# Patient Record
Sex: FEMALE | Race: WHITE | Employment: OTHER | URBAN - METROPOLITAN AREA
[De-identification: names, ages, dates, MRNs, and addresses within clinical notes are randomized per-mention and may not be internally consistent; named-entity substitution may affect disease eponyms.]

---

## 2017-02-20 ENCOUNTER — HOSPITAL ENCOUNTER (OUTPATIENT)
Dept: CT IMAGING | Age: 62
Discharge: HOME | End: 2017-02-20
Payer: SELF-PAY

## 2017-02-20 DIAGNOSIS — I15.8 OTHER SECONDARY HYPERTENSION: ICD-10-CM

## 2017-02-20 PROCEDURE — 75571 CT HRT W/O DYE W/CA TEST: CPT

## 2017-02-21 NOTE — CARDIO/PULMONARY
Reached patient at her given mobile number and shared her coronary artery CT score of 75 with her. Discussed the meaning of this score. 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 Her PCP, Dr. Solo Phillips, is aware of her score, and she plans to follow up with him at her next visit. Patient has no further questions at this time.

## 2017-03-07 ENCOUNTER — OFFICE VISIT (OUTPATIENT)
Dept: FAMILY MEDICINE CLINIC | Age: 62
End: 2017-03-07

## 2017-03-07 VITALS
OXYGEN SATURATION: 96 % | BODY MASS INDEX: 41.15 KG/M2 | HEART RATE: 75 BPM | HEIGHT: 64 IN | DIASTOLIC BLOOD PRESSURE: 75 MMHG | SYSTOLIC BLOOD PRESSURE: 144 MMHG | WEIGHT: 241 LBS | TEMPERATURE: 97.9 F | RESPIRATION RATE: 20 BRPM

## 2017-03-07 DIAGNOSIS — I10 ESSENTIAL HYPERTENSION: ICD-10-CM

## 2017-03-07 DIAGNOSIS — E11.9 TYPE 2 DIABETES MELLITUS WITHOUT COMPLICATION, WITHOUT LONG-TERM CURRENT USE OF INSULIN (HCC): Primary | ICD-10-CM

## 2017-03-07 NOTE — MR AVS SNAPSHOT
Visit Information Date & Time Provider Department Dept. Phone Encounter #  
 3/7/2017  8:00 AM Tobias Becerra MD [address] [place] [address] [id number] Follow-up Instructions Return in about 3 months (around 6/7/2017). Follow-up and Disposition History Upcoming Health Maintenance Date Due Hepatitis C Screening 1955 EYE EXAM RETINAL OR DILATED Q1 7/12/1965 Pneumococcal 19-64 Medium Risk (1 of 1 - PPSV23) 7/12/1974 DTaP/Tdap/Td series (1 - Tdap) 7/12/1976 PAP AKA CERVICAL CYTOLOGY 7/12/1976 FOBT Q 1 YEAR AGE 50-75 7/12/2005 ZOSTER VACCINE AGE 60> 7/12/2015 INFLUENZA AGE 9 TO ADULT 8/1/2016 HEMOGLOBIN A1C Q6M 12/14/2016 FOOT EXAM Q1 6/14/2017 MICROALBUMIN Q1 6/14/2017 LIPID PANEL Q1 6/14/2017 BREAST CANCER SCRN MAMMOGRAM 11/4/2017 Allergies as of 3/7/2017  Review Complete On: 3/7/2017 By: Tobias Becerra MD  
  
 Severity Noted Reaction Type Reactions Iodine  07/07/2015    Swelling  
 shrimp Current Immunizations  Never Reviewed No immunizations on file. Not reviewed this visit You Were Diagnosed With   
  
 Codes Comments Type 2 diabetes mellitus without complication, without long-term current use of insulin (HCC)    -  Primary ICD-10-CM: E11.9 ICD-9-CM: 250.00 Essential hypertension     ICD-10-CM: I10 
ICD-9-CM: 401.9 Vitals BP Pulse Temp Resp Height(growth percentile) Weight(growth percentile) 144/75 (BP 1 Location: Right arm, BP Patient Position: Sitting) 75 97.9 °F (36.6 °C) (Oral) 20 5' 4\" (1.626 m) 241 lb (109.3 kg) SpO2 BMI OB Status Smoking Status 96% 41.37 kg/m2 Hysterectomy Never Smoker Vitals History BMI and BSA Data Body Mass Index Body Surface Area  
 41.37 kg/m 2 2.22 m 2 Preferred Pharmacy Pharmacy Name Phone [facility] [address] [place], [address] Your Updated Medication List  
 This list is accurate as of: 3/7/17  9:04 AM.  Always use your most recent med list.  
  
  
  
  
 amitriptyline 25 mg tablet Commonly known as:  ELAVIL TAKE 3 TABLETS BY MOUTH AT BEDTIME  
  
 aspirin delayed-release 81 mg tablet Take  by mouth daily. carvedilol 25 mg tablet Commonly known as:  COREG  
TAKE 1 TABLET BY MOUTH TWICE DAILY  
  
 glimepiride 1 mg tablet Commonly known as:  AMARYL Take 1 Tab by mouth Daily (before breakfast). metFORMIN 1,000 mg tablet Commonly known as:  GLUCOPHAGE  
TAKE 1 TABLET BY MOUTH TWICE DAILY  
  
 omeprazole 40 mg capsule Commonly known as:  PRILOSEC Take 40 mg by mouth daily. pravastatin 20 mg tablet Commonly known as:  PRAVACHOL  
TAKE 1 TABLET BY MOUTH AT BEDTIME  
  
 triamterene-hydroCHLOROthiazide 37.5-25 mg per capsule Commonly known as:  Verlene Lard TAKE ONE CAPSULE BY MOUTH EVERY DAY We Performed the Following CBC WITH AUTOMATED DIFF [34002 CPT(R)] HEMOGLOBIN A1C WITH EAG [34685 CPT(R)] LIPID PANEL [85532 CPT(R)] METABOLIC PANEL, COMPREHENSIVE [60310 CPT(R)] [place] COLLECTION VENOUS BLOOD,VENIPUNCTURE R024097 CPT(R)] TSH 3RD GENERATION [18272 CPT(R)] Follow-up Instructions Return in about 3 months (around 6/7/2017). Introducing [facility]! Dear Santo Anderson: 
Thank you for requesting a Podclass account. Our records indicate that you already have an active Podclass account. You can access your account anytime at [email or web address]. [employer]iki [employer]SpiderCloud Wireless Did you know that you can access your hospital and ER discharge instructions at any time in Podclass? You can also review all of your test results from your hospital stay or ER visit. Additional Information If you have questions, please visit the Frequently Asked Questions section of the Podclass website at [email or web address] [employer]Spider[employer]/. Remember, Podclass is NOT to be used for urgent needs.  For medical emergencies, dial 911. Now available from your iPhone and Android! Please provide this summary of care documentation to your next provider. Your primary care clinician is listed as Christine Mathew. If you have any questions after today's visit, please call [phone].

## 2017-03-07 NOTE — PROGRESS NOTES
Chief Complaint   Patient presents with    Diabetes     4 month         HPI:       is a 64 y.o. female. 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 She has a history of AODM (A1c = 10.1 June 14, 2016), HTN, hyperlipidemia, and upper airway stenosis. She is is mindful of her diet and walks for exercise. She denies any hypoglycemic events. She is presently not taking insulin. LDL was 130 in June 2016. She is presently taking Pravastatin. New Issues:  Lots of stress at home. Allergies   Allergen Reactions    Iodine Swelling     shrimp       Current Outpatient Prescriptions   Medication Sig    amitriptyline (ELAVIL) 25 mg tablet TAKE 3 TABLETS BY MOUTH AT BEDTIME    aspirin delayed-release 81 mg tablet Take  by mouth daily.  triamterene-hydrochlorothiazide (DYAZIDE) 37.5-25 mg per capsule TAKE ONE CAPSULE BY MOUTH EVERY DAY    pravastatin (PRAVACHOL) 20 mg tablet TAKE 1 TABLET BY MOUTH AT BEDTIME    metFORMIN (GLUCOPHAGE) 1,000 mg tablet TAKE 1 TABLET BY MOUTH TWICE DAILY    glimepiride (AMARYL) 1 mg tablet Take 1 Tab by mouth Daily (before breakfast).  carvedilol (COREG) 25 mg tablet TAKE 1 TABLET BY MOUTH TWICE DAILY    omeprazole (PRILOSEC) 40 mg capsule Take 40 mg by mouth daily. No current facility-administered medications for this visit. Past Medical History:   Diagnosis Date    Diabetes ([address]     GERD (gastroesophageal reflux disease)     Hernia of abdominal cavity     Subxyphoid hernia    Hypertension     Inflammatory polyarthropathy (HCC)     Joint pain     Joint swelling     Ocular nevus of left eye     Pancreatitis     Tracheal stenosis          ROS:  Denies fever, chills, cough, chest pain, SOB,  nausea, vomiting, or diarrhea. Denies wt loss, wt gain, hemoptysis, hematochezia or melena.     Physical Examination:    Visit Vitals    /75 (BP 1 Location: Right arm, BP Patient Position: Sitting)    Pulse 75    Temp 97.9 °F (36.6 °C) (Oral)    Resp 20    Ht 5' 4\" (1.626 m)    Wt 241 lb (109.3 kg)    SpO2 96%    BMI 41.37 kg/m2       General: Alert and Ox3, Fluent speech  HEENT: NC/AT, EOMI, OP: clear; She has a brown nevus in the inferior third of her iris OS  Neck: Supple, no adenopathy, JVD, mass or bruit  Chest: Clear to Ausculation, without wheezes, rales, rubs or ronchi  Cardiac: RRR  Abdomen: +BS, soft, nontender without palpable HSM; She has a subxyphoid hernia  Extremities: No cyanosis, clubbing or edema  Neurologic: Ambulatory without assist, CN 2-12 grossly intact. Moves all extremities. Skin: no rash  Lymphadenopathy: no cervical or supraclavicular nodes      ASSESSMENT AND PLAN:     1. AODM:  Labs today. Restricted from prescribing meds that cause pancreatitis  2. Well controlled HTN  3.  RTC in 3 months    No orders of the defined types were placed in this encounter.       Betty Elmore MD, [place]

## 2017-03-08 LAB
ALBUMIN SERPL-MCNC: 4 G/DL (ref 3.6–4.8)
ALBUMIN/GLOB SERPL: 1.5 {RATIO} (ref 1.1–2.5)
ALP SERPL-CCNC: 70 IU/L (ref 39–117)
ALT SERPL-CCNC: 19 IU/L (ref 0–32)
AST SERPL-CCNC: 17 IU/L (ref 0–40)
BASOPHILS # BLD AUTO: 0 X10E3/UL (ref 0–0.2)
BASOPHILS NFR BLD AUTO: 0 %
BILIRUB SERPL-MCNC: 0.2 MG/DL (ref 0–1.2)
BUN SERPL-MCNC: 15 MG/DL (ref 8–27)
BUN/CREAT SERPL: 24 (ref 11–26)
CALCIUM SERPL-MCNC: 9.6 MG/DL (ref 8.7–10.3)
CHLORIDE SERPL-SCNC: 97 MMOL/L (ref 96–106)
CHOLEST SERPL-MCNC: 194 MG/DL (ref 100–199)
CO2 SERPL-SCNC: 24 MMOL/L (ref 18–29)
CREAT SERPL-MCNC: 0.62 MG/DL (ref 0.57–1)
EOSINOPHIL # BLD AUTO: 0.2 X10E3/UL (ref 0–0.4)
EOSINOPHIL NFR BLD AUTO: 3 %
ERYTHROCYTE [DISTWIDTH] IN BLOOD BY AUTOMATED COUNT: 13.3 % (ref 12.3–15.4)
EST. AVERAGE GLUCOSE BLD GHB EST-MCNC: 180 MG/DL
GLOBULIN SER CALC-MCNC: 2.7 G/DL (ref 1.5–4.5)
GLUCOSE SERPL-MCNC: 214 MG/DL (ref 65–99)
HBA1C MFR BLD: 7.9 % (ref 4.8–5.6)
HCT VFR BLD AUTO: 40.3 % (ref 34–46.6)
HDLC SERPL-MCNC: 50 MG/DL
HGB BLD-MCNC: 13.4 G/DL (ref 11.1–15.9)
IMM GRANULOCYTES # BLD: 0 X10E3/UL (ref 0–0.1)
IMM GRANULOCYTES NFR BLD: 0 %
LDLC SERPL CALC-MCNC: 107 MG/DL (ref 0–99)
LYMPHOCYTES # BLD AUTO: 1.8 X10E3/UL (ref 0.7–3.1)
LYMPHOCYTES NFR BLD AUTO: 27 %
MCH RBC QN AUTO: 29 PG (ref 26.6–33)
MCHC RBC AUTO-ENTMCNC: 33.3 G/DL (ref 31.5–35.7)
MCV RBC AUTO: 87 FL (ref 79–97)
MONOCYTES # BLD AUTO: 0.5 X10E3/UL (ref 0.1–0.9)
MONOCYTES NFR BLD AUTO: 8 %
NEUTROPHILS # BLD AUTO: 4.2 X10E3/UL (ref 1.4–7)
NEUTROPHILS NFR BLD AUTO: 62 %
PLATELET # BLD AUTO: 257 X10E3/UL (ref 150–379)
POTASSIUM SERPL-SCNC: 4.4 MMOL/L (ref 3.5–5.2)
PROT SERPL-MCNC: 6.7 G/DL (ref 6–8.5)
RBC # BLD AUTO: 4.62 X10E6/UL (ref 3.77–5.28)
SODIUM SERPL-SCNC: 139 MMOL/L (ref 134–144)
TRIGL SERPL-MCNC: 186 MG/DL (ref 0–149)
TSH SERPL DL<=0.005 MIU/L-ACNC: 2.59 UIU/ML (ref 0.45–4.5)
VLDLC SERPL CALC-MCNC: 37 MG/DL (ref 5–40)
WBC # BLD AUTO: 6.8 X10E3/UL (ref 3.4–10.8)

## 2017-06-09 ENCOUNTER — OFFICE VISIT (OUTPATIENT)
Dept: FAMILY MEDICINE CLINIC | Age: 62
End: 2017-06-09

## 2017-06-09 VITALS
DIASTOLIC BLOOD PRESSURE: 64 MMHG | SYSTOLIC BLOOD PRESSURE: 118 MMHG | WEIGHT: 248 LBS | OXYGEN SATURATION: 94 % | HEART RATE: 74 BPM | BODY MASS INDEX: 42.34 KG/M2 | TEMPERATURE: 98.5 F | HEIGHT: 64 IN | RESPIRATION RATE: 16 BRPM

## 2017-06-09 DIAGNOSIS — E11.9 TYPE 2 DIABETES MELLITUS WITHOUT COMPLICATION, WITHOUT LONG-TERM CURRENT USE OF INSULIN (HCC): Primary | ICD-10-CM

## 2017-06-09 DIAGNOSIS — Z11.59 ENCOUNTER FOR HEPATITIS C SCREENING TEST FOR LOW RISK PATIENT: ICD-10-CM

## 2017-06-09 DIAGNOSIS — M19.90 OSTEOARTHRITIS, UNSPECIFIED OSTEOARTHRITIS TYPE, UNSPECIFIED SITE: ICD-10-CM

## 2017-06-09 DIAGNOSIS — I10 ESSENTIAL HYPERTENSION: ICD-10-CM

## 2017-06-09 NOTE — PROGRESS NOTES
Chief Complaint   Patient presents with    Diabetes    Hypertension         HPI:       is a 64 y.o. female. 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 She has a history of AODM (A1c = 10.1 June 14, 2016), HTN, hyperlipidemia, and upper airway stenosis. She is is mindful of her diet and walks for exercise. She denies any hypoglycemic events. She is presently not taking insulin. LDL is due. She is presently taking Pravastatin. OA sx are increasingly problematic. Allergies   Allergen Reactions    Iodine Swelling     shrimp       Current Outpatient Prescriptions   Medication Sig    amitriptyline (ELAVIL) 25 mg tablet TAKE 3 TABLETS BY MOUTH AT BEDTIME    aspirin delayed-release 81 mg tablet Take  by mouth daily.  triamterene-hydrochlorothiazide (DYAZIDE) 37.5-25 mg per capsule TAKE ONE CAPSULE BY MOUTH EVERY DAY    metFORMIN (GLUCOPHAGE) 1,000 mg tablet TAKE 1 TABLET BY MOUTH TWICE DAILY    glimepiride (AMARYL) 1 mg tablet Take 1 Tab by mouth Daily (before breakfast).  carvedilol (COREG) 25 mg tablet TAKE 1 TABLET BY MOUTH TWICE DAILY    omeprazole (PRILOSEC) 40 mg capsule Take 40 mg by mouth daily.  pravastatin (PRAVACHOL) 20 mg tablet TAKE 1 TABLET BY MOUTH AT BEDTIME     No current facility-administered medications for this visit. Past Medical History:   Diagnosis Date    Diabetes ([address]     GERD (gastroesophageal reflux disease)     Hernia of abdominal cavity     Subxyphoid hernia    Hypertension     Inflammatory polyarthropathy (HCC)     Joint pain     Joint swelling     Ocular nevus of left eye     Pancreatitis     Tracheal stenosis          ROS:  Denies fever, chills, cough, chest pain, SOB,  nausea, vomiting, or diarrhea. Denies wt loss, wt gain, hemoptysis, hematochezia or melena.     Physical Examination:    /64  Pulse 74  Temp 98.5 °F (36.9 °C) (Oral)   Resp 16  Ht 5' 4\" (1.626 m)  Wt 248 lb (112.5 kg)  SpO2 94%  BMI 42.57 kg/m2    General: Alert and Ox3, Fluent speech  HEENT: NC/AT, EOMI, OP: clear; She has a brown nevus in the inferior third of her iris OS  Neck: Supple, no adenopathy, JVD, mass or bruit  Chest: Clear to Ausculation, without wheezes, rales, rubs or ronchi  Cardiac: RRR  Abdomen: +BS, soft, nontender without palpable HSM; She has a subxyphoid hernia  Extremities: No cyanosis, clubbing or edema  Neurologic: Ambulatory without assist, CN 2-12 grossly intact. Moves all extremities. Skin: no rash  Lymphadenopathy: no cervical or supraclavicular nodes      ASSESSMENT AND PLAN:     1. AODM: Labs today. Restricted from prescribing meds that cause pancreatitis  2.  Well controlled HTN  3. RTC in 3 months    Orders Placed This Encounter    HEMOGLOBIN A1C WITH EAG    METABOLIC PANEL, COMPREHENSIVE    LIPID PANEL    HEPATITIS C AB    SED RATE (ESR)    MICROALBUMIN, UR, RAND W/ MICROALBUMIN/CREA RATIO     DIABETES FOOT EXAM       Will Moran MD, [address]

## 2017-06-09 NOTE — MR AVS SNAPSHOT
Visit Information Date & Time Provider Department Dept. Phone Encounter #  
 6/9/2017  8:00 AM Isabella Fleischer, MD [facility] [id number] Follow-up Instructions Return in about 3 months (around 9/9/2017) for Follow up. Follow-up and Disposition History Upcoming Health Maintenance Date Due Hepatitis C Screening 1955 EYE EXAM RETINAL OR DILATED Q1 7/12/1965 Pneumococcal 19-64 Medium Risk (1 of 1 - PPSV23) 7/12/1974 DTaP/Tdap/Td series (1 - Tdap) 7/12/1976 PAP AKA CERVICAL CYTOLOGY 7/12/1976 FOBT Q 1 YEAR AGE 50-75 7/12/2005 ZOSTER VACCINE AGE 60> 7/12/2015 FOOT EXAM Q1 6/14/2017 MICROALBUMIN Q1 6/14/2017 INFLUENZA AGE 9 TO ADULT 8/1/2017 HEMOGLOBIN A1C Q6M 9/7/2017 BREAST CANCER SCRN MAMMOGRAM 11/4/2017 LIPID PANEL Q1 3/7/2018 Allergies as of 6/9/2017  Review Complete On: 6/9/2017 By: Isabella Fleischer, MD  
  
 Severity Noted Reaction Type Reactions Iodine  07/07/2015    Swelling  
 shrimp Current Immunizations  Reviewed on 6/9/2017 No immunizations on file. Reviewed by Isabella Fleischer, MD on 6/9/2017 at  8:33 AM  
You Were Diagnosed With   
  
 Codes Comments Type 2 diabetes mellitus without complication, without long-term current use of insulin (HCC)    -  Primary ICD-10-CM: E11.9 ICD-9-CM: 250.00 Essential hypertension     ICD-10-CM: I10 
ICD-9-CM: 401.9 Encounter for hepatitis C screening test for low risk patient     ICD-10-CM: Z11.59 
ICD-9-CM: V73.89 Osteoarthritis, unspecified osteoarthritis type, unspecified site     ICD-10-CM: M19.90 ICD-9-CM: 715.90 Vitals BP Pulse Temp Resp Height(growth percentile) Weight(growth percentile)  
 118/64 74 98.5 °F (36.9 °C) (Oral) 16 5' 4\" (1.626 m) 248 lb (112.5 kg) SpO2 BMI OB Status Smoking Status 94% 42.57 kg/m2 Hysterectomy Never Smoker BMI and BSA Data Body Mass Index Body Surface Area 42.57 kg/m 2 2.25 m 2 Preferred Pharmacy Pharmacy Name Phone [facility] [address] Your Updated Medication List  
  
   
This list is accurate as of: 6/9/17  8:50 AM.  Always use your most recent med list.  
  
  
  
  
 amitriptyline 25 mg tablet Commonly known as:  ELAVIL TAKE 3 TABLETS BY MOUTH AT BEDTIME  
  
 aspirin delayed-release 81 mg tablet Take  by mouth daily. carvedilol 25 mg tablet Commonly known as:  COREG  
TAKE 1 TABLET BY MOUTH TWICE DAILY  
  
 glimepiride 1 mg tablet Commonly known as:  AMARYL Take 1 Tab by mouth Daily (before breakfast). metFORMIN 1,000 mg tablet Commonly known as:  GLUCOPHAGE  
TAKE 1 TABLET BY MOUTH TWICE DAILY  
  
 omeprazole 40 mg capsule Commonly known as:  PRILOSEC Take 40 mg by mouth daily. pravastatin 20 mg tablet Commonly known as:  PRAVACHOL  
TAKE 1 TABLET BY MOUTH AT BEDTIME  
  
 triamterene-hydroCHLOROthiazide 37.5-25 mg per capsule Commonly known as:  Aron Irene TAKE ONE CAPSULE BY MOUTH EVERY DAY We Performed the Following HEMOGLOBIN A1C WITH EAG [97093 CPT(R)] HEPATITIS C AB [60890 CPT(R)]  DIABETES FOOT EXAM [HM7 Custom] LIPID PANEL [06326 CPT(R)] METABOLIC PANEL, COMPREHENSIVE [68419 CPT(R)] MICROALBUMIN, UR, RAND W/ MICROALBUMIN/CREA RATIO [id number] CPT(R)] SED RATE (ESR) [id number] CPT(R)] Follow-up Instructions Return in about 3 months (around 9/9/2017) for Follow up. Introducing [facility]! Dear Ara Jasso: 
Thank you for requesting a [employer] account. Our records indicate that you already have an active [employer] account. You can access your account anytime at [email or web address]://[email or web address]. [employer] Did you know that you can access your hospital and ER discharge instructions at any time in [employer]?   You can also review all of your test results from your hospital stay or ER visit. Additional Information If you have questions, please visit the Frequently Asked Questions section of the [employer] website at [email or web address] Remember, [employer] is NOT to be used for urgent needs. For medical emergencies, dial 911. Now available from your iPhone and Android! Please provide this summary of care documentation to your next provider. Your primary care clinician is listed as Maris Woodruff. If you have any questions after today's visit, please call [phone].

## 2017-06-10 LAB
ALBUMIN SERPL-MCNC: 4.4 G/DL (ref 3.6–4.8)
ALBUMIN/CREAT UR: <17.2 MG/G CREAT (ref 0–30)
ALBUMIN/GLOB SERPL: 1.5 {RATIO} (ref 1.2–2.2)
ALP SERPL-CCNC: 75 IU/L (ref 39–117)
ALT SERPL-CCNC: 25 IU/L (ref 0–32)
AST SERPL-CCNC: 20 IU/L (ref 0–40)
BILIRUB SERPL-MCNC: 0.3 MG/DL (ref 0–1.2)
BUN SERPL-MCNC: 13 MG/DL (ref 8–27)
BUN/CREAT SERPL: 18 (ref 12–28)
CALCIUM SERPL-MCNC: 9.7 MG/DL (ref 8.7–10.3)
CHLORIDE SERPL-SCNC: 93 MMOL/L (ref 96–106)
CHOLEST SERPL-MCNC: 210 MG/DL (ref 100–199)
CO2 SERPL-SCNC: 24 MMOL/L (ref 18–29)
CREAT SERPL-MCNC: 0.72 MG/DL (ref 0.57–1)
CREAT UR-MCNC: 17.4 MG/DL
ERYTHROCYTE [SEDIMENTATION RATE] IN BLOOD BY WESTERGREN METHOD: 21 MM/HR (ref 0–40)
EST. AVERAGE GLUCOSE BLD GHB EST-MCNC: 209 MG/DL
GLOBULIN SER CALC-MCNC: 2.9 G/DL (ref 1.5–4.5)
GLUCOSE SERPL-MCNC: 226 MG/DL (ref 65–99)
HBA1C MFR BLD: 8.9 % (ref 4.8–5.6)
HCV AB S/CO SERPL IA: <0.1 S/CO RATIO (ref 0–0.9)
HDLC SERPL-MCNC: 51 MG/DL
LDLC SERPL CALC-MCNC: 118 MG/DL (ref 0–99)
MICROALBUMIN UR-MCNC: <3 UG/ML
POTASSIUM SERPL-SCNC: 4.5 MMOL/L (ref 3.5–5.2)
PROT SERPL-MCNC: 7.3 G/DL (ref 6–8.5)
SODIUM SERPL-SCNC: 136 MMOL/L (ref 134–144)
TRIGL SERPL-MCNC: 203 MG/DL (ref 0–149)
VLDLC SERPL CALC-MCNC: 41 MG/DL (ref 5–40)

## 2017-08-01 RX ORDER — CIPROFLOXACIN 500 MG/1
500 TABLET ORAL 2 TIMES DAILY
Qty: 20 TAB | Refills: 1 | Status: SHIPPED | OUTPATIENT
Start: 2017-08-01 | End: 2017-08-11

## 2017-08-03 ENCOUNTER — TELEPHONE (OUTPATIENT)
Dept: FAMILY MEDICINE CLINIC | Age: 62
End: 2017-08-03

## 2017-09-01 ENCOUNTER — OFFICE VISIT (OUTPATIENT)
Dept: FAMILY MEDICINE CLINIC | Age: 62
End: 2017-09-01

## 2017-09-01 VITALS
OXYGEN SATURATION: 94 % | RESPIRATION RATE: 16 BRPM | SYSTOLIC BLOOD PRESSURE: 108 MMHG | DIASTOLIC BLOOD PRESSURE: 78 MMHG | BODY MASS INDEX: 42.74 KG/M2 | HEART RATE: 76 BPM | WEIGHT: 249 LBS

## 2017-09-01 DIAGNOSIS — E78.2 MIXED HYPERLIPIDEMIA: ICD-10-CM

## 2017-09-01 DIAGNOSIS — M25.572 ACUTE LEFT ANKLE PAIN: ICD-10-CM

## 2017-09-01 DIAGNOSIS — E11.9 TYPE 2 DIABETES MELLITUS WITHOUT COMPLICATION, WITHOUT LONG-TERM CURRENT USE OF INSULIN (HCC): Primary | ICD-10-CM

## 2017-09-01 DIAGNOSIS — K85.00 IDIOPATHIC ACUTE PANCREATITIS, UNSPECIFIED COMPLICATION STATUS: ICD-10-CM

## 2017-09-01 DIAGNOSIS — Z23 ENCOUNTER FOR IMMUNIZATION: ICD-10-CM

## 2017-09-01 NOTE — PROGRESS NOTES
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 T2DM and HTN. Recent problem with left ankle. Last A1c = 8.9; compliant with Meds including Metformin and Glimepiride. No hypoglycemic reactions. Under tremendous stress with her 's illness:  He is bipolar and was recently admitted to [employer] [facility]. He has hydrocephalus. New Issues:  Due for P13    Allergies   Allergen Reactions    Iodine Swelling     shrimp       Current Outpatient Prescriptions   Medication Sig    metFORMIN (GLUCOPHAGE) 1,000 mg tablet TAKE 1 TABLET BY MOUTH TWICE DAILY    glimepiride (AMARYL) 1 mg tablet TAKE 1 TABLET BY MOUTH EVERY DAY BEFORE BREAKFAST    aspirin delayed-release 81 mg tablet Take  by mouth daily.  triamterene-hydrochlorothiazide (DYAZIDE) 37.5-25 mg per capsule TAKE ONE CAPSULE BY MOUTH EVERY DAY    carvedilol (COREG) 25 mg tablet TAKE 1 TABLET BY MOUTH TWICE DAILY    omeprazole (PRILOSEC) 40 mg capsule Take 40 mg by mouth daily.  amitriptyline (ELAVIL) 25 mg tablet TAKE 3 TABLETS BY MOUTH AT BEDTIME    pravastatin (PRAVACHOL) 20 mg tablet TAKE 1 TABLET BY MOUTH AT BEDTIME     No current facility-administered medications for this visit. Past Medical History:   Diagnosis Date    Diabetes ([address]     GERD (gastroesophageal reflux disease)     Hernia of abdominal cavity     Subxyphoid hernia    Hypertension     Inflammatory polyarthropathy (HCC)     Joint pain     Joint swelling     Ocular nevus of left eye     Pancreatitis     Tracheal stenosis          ROS:  Denies fever, chills, cough, chest pain, SOB,  nausea, vomiting, or diarrhea. Denies wt loss, wt gain, hemoptysis, hematochezia or melena.     Physical Examination:    /78 (BP 1 Location: Left arm, BP Patient Position: Sitting)  Pulse 76  Resp 16  Wt 249 lb (112.9 kg)  SpO2 94%  BMI 42.74 kg/m2    General: Alert and Ox3, Fluent speech  HEENT: NC/AT, EOMI, OP: clear; She has a brown nevus in the inferior third of her iris OS  Neck: Supple, no adenopathy, JVD, mass or bruit  Chest: Clear to Ausculation, without wheezes, rales, rubs or ronchi  Cardiac: RRR  Abdomen: +BS, soft, nontender without palpable HSM; She has a subxyphoid hernia  Extremities: No cyanosis, clubbing or edema  Neurologic: Ambulatory without assist, CN 2-12 grossly intact. Moves all extremities. Skin: no rash  Lymphadenopathy: no cervical or supraclavicular nodes      ASSESSMENT AND PLAN:     1. T2DM:  Due for labs in mid Sept  2. Well controlled HTN  3.  P 13  4. Mixed hyperlipidemia:  Labs in Sept    No orders of the defined types were placed in this encounter.       Yvonne Gordon MD, [place]

## 2017-09-01 NOTE — MR AVS SNAPSHOT
Visit Information Date & Time Provider Department Dept. Phone Encounter #  
 9/1/2017  8:00 AM Emilie Trinh MD [address] [place] [address] [id number] Follow-up Instructions Return in about 3 months (around 12/1/2017). Upcoming Health Maintenance Date Due  
 EYE EXAM RETINAL OR DILATED Q1 7/12/1965 Pneumococcal 19-64 Medium Risk (1 of 1 - PPSV23) 7/12/1974 DTaP/Tdap/Td series (1 - Tdap) 7/12/1976 ZOSTER VACCINE AGE 60> 5/12/2015 BREAST CANCER SCRN MAMMOGRAM 11/4/2017 INFLUENZA AGE 9 TO ADULT 10/30/2017* HEMOGLOBIN A1C Q6M 12/9/2017 FOOT EXAM Q1 6/9/2018 MICROALBUMIN Q1 6/9/2018 LIPID PANEL Q1 6/9/2018 COLONOSCOPY 12/16/2018 *Topic was postponed. The date shown is not the original due date. Allergies as of 9/1/2017  Review Complete On: 9/1/2017 By: Emilie Trinh MD  
  
 Severity Noted Reaction Type Reactions Iodine  07/07/2015    Swelling  
 shrimp Current Immunizations  Reviewed on 6/9/2017 Name Date Pneumococcal Conjugate (PCV-13) 9/1/2017  9:11 AM  
  
 Not reviewed this visit You Were Diagnosed With   
  
 Codes Comments Type 2 diabetes mellitus without complication, without long-term current use of insulin (HCC)    -  Primary ICD-10-CM: E11.9 ICD-9-CM: 250.00 Encounter for immunization     ICD-10-CM: Q76 ICD-9-CM: V03.89 Acute left ankle pain     ICD-10-CM: M25.572 ICD-9-CM: 719.47 Mixed hyperlipidemia     ICD-10-CM: E78.2 ICD-9-CM: 272.2 Vitals BP Pulse Resp Weight(growth percentile) SpO2 BMI  
 108/78 (BP 1 Location: Left arm, BP Patient Position: Sitting) 76 16 249 lb (112.9 kg) 94% 42.74 kg/m2 OB Status Smoking Status Hysterectomy Never Smoker BMI and BSA Data Body Mass Index Body Surface Area 42.74 kg/m 2 2.26 m 2 Preferred Pharmacy Pharmacy Name Phone [facility], [address] [place] [address] Se Your Updated Medication List  
  
   
This list is accurate as of: 9/1/17  9:19 AM.  Always use your most recent med list.  
  
  
  
  
 amitriptyline 25 mg tablet Commonly known as:  ELAVIL TAKE 3 TABLETS BY MOUTH AT BEDTIME  
  
 aspirin delayed-release 81 mg tablet Take  by mouth daily. carvedilol 25 mg tablet Commonly known as:  COREG  
TAKE 1 TABLET BY MOUTH TWICE DAILY  
  
 glimepiride 1 mg tablet Commonly known as:  AMARYL  
TAKE 1 TABLET BY MOUTH EVERY DAY BEFORE BREAKFAST  
  
 metFORMIN 1,000 mg tablet Commonly known as:  GLUCOPHAGE  
TAKE 1 TABLET BY MOUTH TWICE DAILY  
  
 omeprazole 40 mg capsule Commonly known as:  PRILOSEC Take 40 mg by mouth daily. pravastatin 20 mg tablet Commonly known as:  PRAVACHOL  
TAKE 1 TABLET BY MOUTH AT BEDTIME  
  
 triamterene-hydroCHLOROthiazide 37.5-25 mg per capsule Commonly known as:  Jane Perone TAKE ONE CAPSULE BY MOUTH EVERY DAY We Performed the Following ADMIN PNEUMOCOCCAL VACCINE [ HCP] PNEUMOCOCCAL CONJ VACCINE 13 VALENT IM [id number] CPT(R)] Follow-up Instructions Return in about 3 months (around 12/1/2017). To-Do List   
 Around 09/11/2017 Lab:  CBC WITH AUTOMATED DIFF Around 09/11/2017 Lab:  HEMOGLOBIN A1C WITH EAG Around 09/11/2017 Lab:  LIPID PANEL Around 09/11/2017 Lab:  METABOLIC PANEL, COMPREHENSIVE Around 09/11/2017 Lab:  MICROALBUMIN, UR, RAND W/ MICROALBUMIN/CREA RATIO Around 09/11/2017 Lab:  TSH 3RD GENERATION Around 10/25/2017 Imaging:  XR ANKLE LT MIN 3 V Introducing [facility]! Dear Medora Goldmann: 
Thank you for requesting a [employer] account. Our records indicate that you already have an active [employer] account. You can access your account anytime at [email or web address] Did you know that you can access your hospital and ER discharge instructions at any time in [employer]? You can also review all of your test results from your hospital stay or ER visit. Additional Information If you have questions, please visit the Frequently Asked Questions section of the [employer] website at [email or web address] Remember, [employer] is NOT to be used for urgent needs. For medical emergencies, dial 911. Now available from your iPhone and Android! Please provide this summary of care documentation to your next provider. Your primary care clinician is listed as Tennille King. If you have any questions after today's visit, please call [phone].

## 2017-11-07 ENCOUNTER — OFFICE VISIT (OUTPATIENT)
Dept: FAMILY MEDICINE CLINIC | Age: 62
End: 2017-11-07

## 2017-11-07 ENCOUNTER — PATIENT OUTREACH (OUTPATIENT)
Dept: OTHER | Age: 62
End: 2017-11-07

## 2017-11-07 VITALS
SYSTOLIC BLOOD PRESSURE: 108 MMHG | HEIGHT: 63 IN | RESPIRATION RATE: 16 BRPM | BODY MASS INDEX: 42.35 KG/M2 | WEIGHT: 239 LBS | OXYGEN SATURATION: 95 % | DIASTOLIC BLOOD PRESSURE: 70 MMHG | HEART RATE: 74 BPM

## 2017-11-07 DIAGNOSIS — K85.90 ACUTE PANCREATITIS, UNSPECIFIED COMPLICATION STATUS, UNSPECIFIED PANCREATITIS TYPE: Primary | ICD-10-CM

## 2017-11-07 RX ORDER — METFORMIN HYDROCHLORIDE 1000 MG/1
1000 TABLET ORAL 2 TIMES DAILY WITH MEALS
COMMUNITY
End: 2017-12-22

## 2017-11-07 NOTE — PROGRESS NOTES
First attempt to reach patient to offer [employer] Benefits CM. Left discreet vm with this CM contact information. Will attempt to contact patient again.

## 2017-11-07 NOTE — PROGRESS NOTES
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 Seen in the ER 3 days ago with acute pancreatitis:  Suggested by sx and CT scan. IV hydration and labs obtained and she was discharged home. Still not back to baseline. Mostly eating liquids. Pain is much less. Wonders aloud if stress can be a factor    Allergies   Allergen Reactions    Iodine Swelling     shrimp       Current Outpatient Prescriptions   Medication Sig    triamterene-hydroCHLOROthiazide (DYAZIDE) 37.5-25 mg per capsule TAKE ONE CAPSULE BY MOUTH EVERY DAY    glimepiride (AMARYL) 1 mg tablet TAKE 1 TABLET BY MOUTH EVERY DAY BEFORE BREAKFAST    aspirin delayed-release 81 mg tablet Take  by mouth daily.  carvedilol (COREG) 25 mg tablet TAKE 1 TABLET BY MOUTH TWICE DAILY    omeprazole (PRILOSEC) 40 mg capsule Take 40 mg by mouth daily.  metFORMIN (GLUCOPHAGE) 1,000 mg tablet Take 1,000 mg by mouth two (2) times daily (with meals).  oxyCODONE-acetaminophen (PERCOCET) 5-325 mg per tablet Take 1 Tab by mouth every four (4) hours as needed for Pain. Max Daily Amount: 6 Tabs.  ondansetron (ZOFRAN ODT) 4 mg disintegrating tablet Take 1 Tab by mouth every eight (8) hours as needed for Nausea.  amitriptyline (ELAVIL) 25 mg tablet TAKE 3 TABLETS BY MOUTH AT BEDTIME    pravastatin (PRAVACHOL) 20 mg tablet TAKE 1 TABLET BY MOUTH AT BEDTIME     No current facility-administered medications for this visit. Past Medical History:   Diagnosis Date    Diabetes ([address]     GERD (gastroesophageal reflux disease)     Hernia of abdominal cavity     Subxyphoid hernia    Hypertension     Inflammatory polyarthropathy (HCC)     Joint pain     Joint swelling     Ocular nevus of left eye     Pancreatitis     Tracheal stenosis          ROS:  Denies fever, chills, cough, chest pain, SOB,  nausea, vomiting, or diarrhea.   Denies wt loss, wt gain, hemoptysis, hematochezia or melena. Physical Examination:    /70 (BP 1 Location: Left arm, BP Patient Position: Sitting)  Pulse 74  Resp 16  Ht 5' 3\" (1.6 m)  Wt 239 lb (108.4 kg)  SpO2 95%  BMI 42.34 kg/m2    General: Alert and Ox3, Fluent speech  HEENT:  NC/AT, EOMI, OP: clear  Neck:  Supple, no adenopathy, JVD, mass or bruit  Chest:  Clear to Ausculation, without wheezes, rales, rubs or ronchi  Cardiac: RRR  Abdomen:  +BS, soft, nontender without palpable HSM  Extremities:  No cyanosis, clubbing or edema  Neurologic:  Ambulatory without assist, CN 2-12 grossly intact. Moves all extremities. Skin: no rash  Lymphadenopathy: no cervical or supraclavicular nodes      ASSESSMENT AND PLAN:     1. Acute pancreatitis:  Etiology is unclear. COntinue with soft foods and liquids. RTC in 2 weeks. Consider GI referral, MRCP, pancreatic enzymes, and discussions about insulin. Orders Placed This Encounter    metFORMIN (GLUCOPHAGE) 1,000 mg tablet     Sig: Take 1,000 mg by mouth two (2) times daily (with meals).        Travis Lerma MD, [address]

## 2017-11-07 NOTE — MR AVS SNAPSHOT
Visit Information Date & Time Provider Department Dept. Phone Encounter #  
 11/7/2017  8:15 AM Iman Roberts MD [address] [place] [address] [id number] Your Appointments 12/22/2017  8:00 AM  
ESTABLISHED PATIENT with MD Neal Sandersjacob 38 ([facility]) Appt Note: 3mo fu  
 [address] [facility] [address] [facility],[address] [address] [phone]  
  
   
 [address] [facility] [address] [facility],[address] [address] Upcoming Health Maintenance Date Due  
 EYE EXAM RETINAL OR DILATED Q1 7/12/1965 ZOSTER VACCINE AGE 60> 5/12/2015 BREAST CANCER SCRN MAMMOGRAM 11/4/2017 HEMOGLOBIN A1C Q6M 12/9/2017 FOOT EXAM Q1 6/9/2018 MICROALBUMIN Q1 6/9/2018 LIPID PANEL Q1 6/9/2018 COLONOSCOPY 12/16/2018 DTaP/Tdap/Td series (2 - Td) 11/7/2027 Allergies as of 11/7/2017  Review Complete On: 11/7/2017 By: Iman Roberts MD  
  
 Severity Noted Reaction Type Reactions Iodine  07/07/2015    Swelling  
 shrimp Current Immunizations  Reviewed on 6/9/2017 Name Date Pneumococcal Conjugate (PCV-13) 9/1/2017  9:11 AM  
  
 Not reviewed this visit You Were Diagnosed With   
  
 Codes Comments Acute pancreatitis, unspecified complication status, unspecified pancreatitis type    -  Primary ICD-10-CM: K85.90 ICD-9-CM: 484.3 Vitals BP Pulse Resp Height(growth percentile) Weight(growth percentile) SpO2  
 108/70 (BP 1 Location: Left arm, BP Patient Position: Sitting) 74 16 5' 3\" (1.6 m) 239 lb (108.4 kg) 95% BMI OB Status Smoking Status 42.34 kg/m2 Hysterectomy Never Smoker Vitals History BMI and BSA Data Body Mass Index Body Surface Area  
 42.34 kg/m 2 2.2 m 2 Preferred Pharmacy Pharmacy Name Phone [facility] [address] [place], [address] [place] [address] Street Se Your Updated Medication List  
  
   
This list is accurate as of: 11/7/17  8:48 AM.  Always use your most recent med list.  
  
  
  
  
 amitriptyline 25 mg tablet Commonly known as:  ELAVIL TAKE 3 TABLETS BY MOUTH AT BEDTIME  
  
 aspirin delayed-release 81 mg tablet Take  by mouth daily. carvedilol 25 mg tablet Commonly known as:  COREG  
TAKE 1 TABLET BY MOUTH TWICE DAILY  
  
 glimepiride 1 mg tablet Commonly known as:  AMARYL  
TAKE 1 TABLET BY MOUTH EVERY DAY BEFORE BREAKFAST  
  
 metFORMIN 1,000 mg tablet Commonly known as:  GLUCOPHAGE Take 1,000 mg by mouth two (2) times daily (with meals). omeprazole 40 mg capsule Commonly known as:  PRILOSEC Take 40 mg by mouth daily. ondansetron 4 mg disintegrating tablet Commonly known as:  ZOFRAN ODT Take 1 Tab by mouth every eight (8) hours as needed for Nausea. oxyCODONE-acetaminophen 5-325 mg per tablet Commonly known as:  PERCOCET Take 1 Tab by mouth every four (4) hours as needed for Pain. Max Daily Amount: 6 Tabs. pravastatin 20 mg tablet Commonly known as:  PRAVACHOL  
TAKE 1 TABLET BY MOUTH AT BEDTIME  
  
 triamterene-hydroCHLOROthiazide 37.5-25 mg per capsule Commonly known as:  [place] Jeffy TAKE ONE CAPSULE BY MOUTH EVERY DAY Introducing [facility]! Dear Didi Moise: 
Thank you for requesting a [employer] account. Our records indicate that you already have an active [employer] account. You can access your account anytime at [email or web address] Did you know that you can access your hospital and ER discharge instructions at any time in Framed Data? You can also review all of your test results from your hospital stay or ER visit. Additional Information If you have questions, please visit the Frequently Asked Questions section of the Framed Data website at [email or web address] Remember, Framed Data is NOT to be used for urgent needs. For medical emergencies, dial 911. Now available from your iPhone and Android! Please provide this summary of care documentation to your next provider. Your primary care clinician is listed as Kain Preston. If you have any questions after today's visit, please call [phone].

## 2017-11-10 ENCOUNTER — PATIENT OUTREACH (OUTPATIENT)
Dept: OTHER | Age: 62
End: 2017-11-10

## 2017-11-10 NOTE — LETTER
11/10/2017 4:26 PM 
 
Ms. Juanito Byrnes [address] [place]  [address] [phone] Dear Ms Chana Pereira My name is Eden Lobo , Employee Care Manager for [employer], and I have been trying to reach you. The Employee Care  is a free-of-charge, confidential service provided to our employees and their family members covered by the [employer]. Part of my job is to follow up with members who have recently been in the hospital or emergency room, to help them coordinate their care and answer questions they may have about their visit. I am able to provide assistance with medication questions, scheduling needed follow-up appointments, and arranging services like home health or home medical equipment. I can also provide education regarding your hospital or ER visit as well as your medical conditions. As healthcare providers, we know that patients do better when they have close follow up with a primary care provider (PCP), especially after a hospital or emergency department visit. If you do not have a PCP, I can help you find one that is convenient to you and covered by your insurance. I can also help you understand any after visit instructions, such as what symptoms to watch out for, or any new or changed medications. Remember that you can access your After Visit Summary by logging into your [employer] account. If you do not have a [employer] account, I can help you request access. Our program is designed to provide you with the opportunity to have a [employer] care manager partner with you for your healthcare needs. Please contact me at the below number if I can provide you with assistance for any of the above services. Sincerely, Mirna Goel RN 
[phone]

## 2017-11-10 NOTE — PROGRESS NOTES
Patient identified as eligible for [address] [place] [address] services. Second telephone outreach attempted. Left discreet voicemail with this CM confidential contact information. Will send UTR letter.

## 2017-11-21 ENCOUNTER — OFFICE VISIT (OUTPATIENT)
Dept: FAMILY MEDICINE CLINIC | Age: 62
End: 2017-11-21

## 2017-11-21 VITALS
OXYGEN SATURATION: 93 % | HEART RATE: 76 BPM | RESPIRATION RATE: 16 BRPM | BODY MASS INDEX: 42.7 KG/M2 | WEIGHT: 241 LBS | TEMPERATURE: 98.5 F | SYSTOLIC BLOOD PRESSURE: 102 MMHG | HEIGHT: 63 IN | DIASTOLIC BLOOD PRESSURE: 76 MMHG

## 2017-11-21 DIAGNOSIS — E11.9 TYPE 2 DIABETES MELLITUS WITHOUT COMPLICATION, WITHOUT LONG-TERM CURRENT USE OF INSULIN (HCC): Primary | ICD-10-CM

## 2017-11-21 RX ORDER — GLIMEPIRIDE 2 MG/1
2 TABLET ORAL
Qty: 60 TAB | Refills: 5 | Status: SHIPPED | OUTPATIENT
Start: 2017-11-21 | End: 2017-12-29 | Stop reason: ALTCHOICE

## 2017-11-21 RX ORDER — INSULIN GLARGINE 100 [IU]/ML
INJECTION, SOLUTION SUBCUTANEOUS
Qty: 3 ADJUSTABLE DOSE PRE-FILLED PEN SYRINGE | Refills: 5 | Status: SHIPPED | OUTPATIENT
Start: 2017-11-21 | End: 2017-12-22 | Stop reason: SDUPTHER

## 2017-11-21 NOTE — MR AVS SNAPSHOT
Visit Information Date & Time Provider Department Dept. Phone Encounter #  
 11/21/2017  8:15 AM Ghassan Mcallister MD [address] [place] [address] [id number] Your Appointments 12/22/2017  8:00 AM  
ESTABLISHED PATIENT with MD Neal Callesjacob 38 ([facility] Appt Note: 3mo fu  
 [address] [address] [phone]  
  
   
 [address] [address] Upcoming Health Maintenance Date Due  
 EYE EXAM RETINAL OR DILATED Q1 7/12/1965 ZOSTER VACCINE AGE 60> 5/12/2015 BREAST CANCER SCRN MAMMOGRAM 11/4/2017 HEMOGLOBIN A1C Q6M 12/9/2017 FOOT EXAM Q1 6/9/2018 MICROALBUMIN Q1 6/9/2018 LIPID PANEL Q1 6/9/2018 COLONOSCOPY 12/16/2018 DTaP/Tdap/Td series (2 - Td) 11/7/2027 Allergies as of 11/21/2017  Review Complete On: 11/21/2017 By: Donnie Edge RN Severity Noted Reaction Type Reactions Iodine  07/07/2015    Swelling  
 shrimp Current Immunizations  Reviewed on 6/9/2017 Name Date Pneumococcal Conjugate (PCV-13) 9/1/2017  9:11 AM  
  
 Not reviewed this visit You Were Diagnosed With   
  
 Codes Comments Type 2 diabetes mellitus without complication, without long-term current use of insulin (HCC)    -  Primary ICD-10-CM: E11.9 ICD-9-CM: 250.00 Vitals BP Pulse Temp Resp Height(growth percentile) Weight(growth percentile) 102/76 (BP 1 Location: Left arm, BP Patient Position: Sitting) 76 98.5 °F (36.9 °C) (Oral) 16 5' 3\" (1.6 m) 241 lb (109.3 kg) SpO2 BMI OB Status Smoking Status 93% 42.69 kg/m2 Hysterectomy Never Smoker BMI and BSA Data Body Mass Index Body Surface Area  
 42.69 kg/m 2 2.2 m 2 Preferred Pharmacy Pharmacy Name Phone [facility] [address] [place], [address] Your Updated Medication List  
  
   
 This list is accurate as of: 11/21/17  9:04 AM.  Always use your most recent med list.  
  
  
  
  
 amitriptyline 25 mg tablet Commonly known as:  ELAVIL TAKE 3 TABLETS BY MOUTH AT BEDTIME  
  
 aspirin delayed-release 81 mg tablet Take  by mouth daily. carvedilol 25 mg tablet Commonly known as:  COREG  
TAKE 1 TABLET BY MOUTH TWICE DAILY  
  
 glimepiride 2 mg tablet Commonly known as:  AMARYL Take 1 Tab by mouth every morning. insulin glargine 100 unit/mL (3 mL) Inpn Commonly known as:  Chava Pill Inject 10 units SQ QHS  
  
 metFORMIN 1,000 mg tablet Commonly known as:  GLUCOPHAGE Take 1,000 mg by mouth two (2) times daily (with meals). omeprazole 40 mg capsule Commonly known as:  PRILOSEC Take 40 mg by mouth daily. ondansetron 4 mg disintegrating tablet Commonly known as:  ZOFRAN ODT Take 1 Tab by mouth every eight (8) hours as needed for Nausea. pravastatin 20 mg tablet Commonly known as:  PRAVACHOL  
TAKE 1 TABLET BY MOUTH AT BEDTIME  
  
 triamterene-hydroCHLOROthiazide 37.5-25 mg per capsule Commonly known as:  Marcia Sarah TAKE ONE CAPSULE BY MOUTH EVERY DAY Prescriptions Printed Refills  
 insulin glargine (LANTUS,BASAGLAR) 100 unit/mL (3 mL) inpn 5 Sig: Inject 10 units SQ QHS Class: Print Prescriptions Sent to Pharmacy Refills  
 glimepiride (AMARYL) 2 mg tablet 5 Sig: Take 1 Tab by mouth every morning. Class: Normal  
 Pharmacy: [facility] [address]  Ph #: [phone] Route: Oral  
  
We Performed the Following REFERRAL TO ENDOCRINOLOGY [WJE47 Custom] Comments:  
 Please evaluate for difficult to control T2DM. 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 Referral Information Referral ID Referred By Referred To  
  
 [id number] Mere Hewitt MD   
   [address] [place] [address] [address] [place] [address], [facility] Phone: [phone] Fax: [phone] Visits Status Start Date End Date 1 New Request 11/21/17 11/21/18 If your referral has a status of pending review or denied, additional information will be sent to support the outcome of this decision. Introducing [facility]! Dear Indira Cueva: 
Thank you for requesting a [employer] account. Our records indicate that you already have an active [employer] account. You can access your account anytime at [email or web address] Did you know that you can access your hospital and ER discharge instructions at any time in Vastari? You can also review all of your test results from your hospital stay or ER visit. Additional Information If you have questions, please visit the Frequently Asked Questions section of the Vastari website at [email or web address] Remember, V[employer]ari is NOT to be used for urgent needs. For medical emergencies, dial 911. Now available from your iPhone and Android! Please provide this summary of care documentation to your next provider. Your primary care clinician is listed as Rodolfo Kebede. If you have any questions after today's visit, please call [phone].

## 2017-11-21 NOTE — PROGRESS NOTES
Chief Complaint   Patient presents with   Beninese Pipe    Diabetes         HPI:       is a 58 y.o. female. Recently recovering from pancreatitis. Glucose remains in the 200-300 range. Metformin held. Glimepiride 1 mg daily. Unable to tolerate many of the other T2DM due to prior pancreatitis. New Issues:  Has a cold. Allergies   Allergen Reactions    Iodine Swelling     shrimp       Current Outpatient Prescriptions   Medication Sig    glimepiride (AMARYL) 2 mg tablet Take 1 Tab by mouth every morning.  insulin glargine (LANTUS,BASAGLAR) 100 unit/mL (3 mL) inpn Inject 10 units SQ QHS    triamterene-hydroCHLOROthiazide (DYAZIDE) 37.5-25 mg per capsule TAKE ONE CAPSULE BY MOUTH EVERY DAY    aspirin delayed-release 81 mg tablet Take  by mouth daily.  pravastatin (PRAVACHOL) 20 mg tablet TAKE 1 TABLET BY MOUTH AT BEDTIME    carvedilol (COREG) 25 mg tablet TAKE 1 TABLET BY MOUTH TWICE DAILY    omeprazole (PRILOSEC) 40 mg capsule Take 40 mg by mouth daily.  metFORMIN (GLUCOPHAGE) 1,000 mg tablet Take 1,000 mg by mouth two (2) times daily (with meals).  ondansetron (ZOFRAN ODT) 4 mg disintegrating tablet Take 1 Tab by mouth every eight (8) hours as needed for Nausea.  amitriptyline (ELAVIL) 25 mg tablet TAKE 3 TABLETS BY MOUTH AT BEDTIME     No current facility-administered medications for this visit. Past Medical History:   Diagnosis Date    Diabetes ([address]     GERD (gastroesophageal reflux disease)     Hernia of abdominal cavity     Subxyphoid hernia    Hypertension     Inflammatory polyarthropathy (HCC)     Joint pain     Joint swelling     Ocular nevus of left eye     Pancreatitis     Tracheal stenosis          ROS:  Denies fever, chills, cough, chest pain, SOB,  nausea, vomiting, or diarrhea. Denies wt loss, wt gain, hemoptysis, hematochezia or melena.     Physical Examination:    /76 (BP 1 Location: Left arm, BP Patient Position: Sitting)  Pulse 76  Temp 98.5 °F (36.9 °C) (Oral)   Resp 16  Ht 5' 3\" (1.6 m)  Wt 241 lb (109.3 kg)  SpO2 93%  BMI 42.69 kg/m2    General: Alert and Ox3, Fluent speech  HEENT:  NC/AT, EOMI, OP: clear  Neck:  Supple, no adenopathy, JVD, mass or bruit  Chest:  Clear to Ausculation, without wheezes, rales, rubs or ronchi  Cardiac: RRR  Abdomen:  +BS, soft, nontender without palpable HSM  Extremities:  No cyanosis, clubbing or edema  Neurologic:  Ambulatory without assist, CN 2-12 grossly intact. Moves all extremities. Skin: no rash  Lymphadenopathy: no cervical or supraclavicular nodes      ASSESSMENT AND PLAN:     1. T2DM:  Up Glimepiride 1 mg per week until FBS < 200. If we are unable to achieve this, will start Lantus. Referral to Dr Melita Rojas. RTC in 3 weeks. Orders Placed This Encounter    glimepiride (AMARYL) 2 mg tablet     Sig: Take 1 Tab by mouth every morning.      Dispense:  60 Tab     Refill:  5    insulin glargine (LANTUS,BASAGLAR) 100 unit/mL (3 mL) inpn     Sig: Inject 10 units SQ QHS     Dispense:  3 Adjustable Dose Pre-filled Pen Syringe     Refill:  5       Johnson Mckeon MD, [address]

## 2017-12-22 ENCOUNTER — OFFICE VISIT (OUTPATIENT)
Dept: ENDOCRINOLOGY | Age: 62
End: 2017-12-22

## 2017-12-22 VITALS
SYSTOLIC BLOOD PRESSURE: 129 MMHG | DIASTOLIC BLOOD PRESSURE: 79 MMHG | WEIGHT: 235.2 LBS | HEIGHT: 63 IN | RESPIRATION RATE: 12 BRPM | BODY MASS INDEX: 41.67 KG/M2 | HEART RATE: 73 BPM

## 2017-12-22 DIAGNOSIS — E11.9 TYPE 2 DIABETES MELLITUS WITHOUT COMPLICATION, WITHOUT LONG-TERM CURRENT USE OF INSULIN (HCC): ICD-10-CM

## 2017-12-22 PROBLEM — E11.21 TYPE 2 DIABETES MELLITUS WITH NEPHROPATHY (HCC): Status: ACTIVE | Noted: 2017-12-22

## 2017-12-22 PROBLEM — E66.01 OBESITY, MORBID (HCC): Status: ACTIVE | Noted: 2017-12-22

## 2017-12-22 RX ORDER — INSULIN GLARGINE 100 [IU]/ML
INJECTION, SOLUTION SUBCUTANEOUS
Qty: 3 ADJUSTABLE DOSE PRE-FILLED PEN SYRINGE | Refills: 5
Start: 2017-12-22 | End: 2018-07-13 | Stop reason: SDUPTHER

## 2017-12-22 NOTE — PROGRESS NOTES
Chief Complaint   Patient presents with    Diabetes     1. Have you been to the ER, urgent care clinic since your last visit? Hospitalized since your last visit? [address] [place] [address] for abdominal pain on 11/3/17. 2. Have you seen or consulted any other health care providers outside of the [address] [place] [address] since your last visit? Include any pap smears or colon screening.  No

## 2017-12-22 NOTE — PROGRESS NOTES
This 57 yo WF with Type 2 diabetes comes for diabetes care and self-management training. Diagnosed approximately 6 years (Note: She had GDM 40 years ago). Positive family history of diabetes in son (YENI) and grandson. . Originally treated with metformin; Jardiance initiated some time ago but discontinued after UTI; now on low dose basal insulin. A1c 8.9% (June 2017). Of note, patient reports pancreatitis at the time of the diabetes diagnosis and again in early November of this year. Patient reports major stressors at this time with  scheduled to have a  shunt completed in early [facility]. Past Medical History:   Diagnosis Date    [facility]     GERD (gastroesophageal reflux disease)     Hernia of abdominal cavity     Subxyphoid hernia    Hypertension     Inflammatory polyarthropathy (HCC)     Joint pain     Joint swelling     Ocular nevus of left eye     Pancreatitis     Tracheal stenosis        Current Outpatient Prescriptions   Medication Sig    insulin glargine (LANTUS,BASAGLAR) 100 unit/mL (3 mL) inpn Inject 30 units SQ QHS. Increase weekly by 4 units until -150    glimepiride (AMARYL) 2 mg tablet Take 1 Tab by mouth every morning.  ondansetron (ZOFRAN ODT) 4 mg disintegrating tablet Take 1 Tab by mouth every eight (8) hours as needed for Nausea.  triamterene-hydroCHLOROthiazide (DYAZIDE) 37.5-25 mg per capsule TAKE ONE CAPSULE BY MOUTH EVERY DAY    amitriptyline (ELAVIL) 25 mg tablet TAKE 3 TABLETS BY MOUTH AT BEDTIME    aspirin delayed-release 81 mg tablet Take  by mouth daily.  pravastatin (PRAVACHOL) 20 mg tablet TAKE 1 TABLET BY MOUTH AT BEDTIME    carvedilol (COREG) 25 mg tablet TAKE 1 TABLET BY MOUTH TWICE DAILY    omeprazole (PRILOSEC) 40 mg capsule Take 40 mg by mouth daily. No current facility-administered medications for this visit.         Medication compliance per rooming staff    Nursing Assessment:    Subjective:   Diabetes Self-care Practices  Healthy Eating-Consumes moderate carbohydrate meals 3 times on most days. Variation in CHO load noted. 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 glucoses using [employer] meter system   (B) Mid-200s  Taking Medications-Is taking prescribed diabetes medications    Objective:  Alert, oriented and in no acute distress     Visit Vitals    /79 (BP 1 Location: Right arm, BP Patient Position: Sitting)    Pulse 73    Resp 12    Ht 5' 3\" (1.6 m)    Wt 235 lb 3.2 oz (106.7 kg)    BMI 41.66 kg/m2        Lab Results   Component Value Date    HBA1C 8.9 (H) 06/09/2017    HBA1C 7.9 (H) 03/07/2017    HBA1C 10.1 (H) 06/14/2016    CHOL 210 (H) 06/09/2017    LDLC 118 (H) 06/09/2017    GFRAA >60 11/03/2017    GFRNA 51 (L) 11/03/2017    MCACR <17.2 06/09/2017    TSH 2.590 03/07/2017       Diabetes Self-care Practices  Problemsolving-Open to discussion of treatment options  Healthy Coping-Is not anxious or depressed  Reducing Risks-Annual diabetic foot exam completed today.  Not on ASA; treated with BP medications and statin therapy  Barriers to diabetes self-care-Include minor educational gaps    Nursing Diagnosis:    Readiness for Enhanced Health Management      Nursing Interventions:  Examined usual diabetes self-management practices related to meals, physical activity, BG monitoring and medication dosing  Explored strategies patient is willing to incorporate into their self-care plan  Informed of carb load limitation to 45 grams per meal  Advised that dose of basal insulin will be weight-based  Encouraged better foot care to keep skin moist and calluses down      Evaluation: Patient is ready, willing, and agrees to weight basing basal insulin. Plan:  1. Diabetes medication reconciliation per Shilpi Lorenzo MD  2. Increase Lantus insulin dose to 30 units daily, and slowly advance dose by 4 units every week until at FBS of <150 (per Shilpi Lorenzo MD)  3. Check fasting blood glucose readings   4.  RTC one month    Madeleine Moser DNP, RN, ACNS-BC, BC-ADM, CDE  Adult Health Clinical Nurse Specialist-Diabetes & Endocrine

## 2017-12-22 NOTE — PROGRESS NOTES
Chief Complaint   Patient presents with    Diabetes       HPI  This is a 58-year-old white female referred for evaluation and management of diabetes mellitus. The patient has a strong family history of diabetes. She was pregnant 40 years ago and her first child weighed 9 pounds. He was subsequently diagnosed with maturity onset diabetes of youth but has been on insulin for quite some time. Her grandson who is now 25years of age also has diabetes. But she was diagnosed with diabetes 6 years ago formally. She was treated with metformin and was on metformin only. It is not clear how well her blood sugar was controlled but 6 years ago she developed pancreatitis and the metformin was stopped. She underwent an ERCP which was negative. She was off the metformin and placed on jardiance briefly but developed a urinary tract infection and this was stopped. She was put back on metformin and did reasonably well with blood sugars in the 200-220 range until November 3 of this year when she had symptoms suggestive of pancreatitis. Laboratory testing was completely negative including lipase and amylase but she did have a CT scan that suggested some mild stranding in the head of the pancreas and she was presumed to have pancreatitis. Metformin was stopped and she was placed on Amaryl 2 mg. Her blood sugars did not respond to this and so Lantus insulin was added at 10 units at bedtime. The patient increased it herself to 15 units and she has been taking that for the last several weeks. Despite that, her blood sugars in the morning are ranging between 250 and 300. Breakfast is yogurt or 2 packs of oatmeal or eggs and toast.  She will have a snack of fruit midmorning. Lunch is a salad or meat and a non-starchy vegetable. She may have a snack back in the afternoon. Dinner can be fish with rice or potatoes or they may have a breakfast tight meal for the dinner. If she snacks on anything it usually Jell-O.     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 She lives with her  who has cognitive impairment thought secondary to congenital hydrocephalus. ROS  She denies chest pain, shortness of breath, constipation or diarrhea. She also denies neuropathic symptoms. Remainder of the review of systems was unremarkable.   Family History   Problem Relation Age of Onset    Heart Disease Mother     Heart Attack Mother     Cancer Father      lung    Diabetes Sister         Social History     Social History    Marital status:      Spouse name: N/A    Number of children: 3    Years of education: N/A     Occupational History    RN  [employer]      Social History Main Topics    Smoking status: Never Smoker    Smokeless tobacco: Never Used    Alcohol use No    Drug use: None    Sexual activity: Not Asked     Other Topics Concern    None     Social History Narrative        Vitals:    12/22/17 1319   BP: 129/79   Pulse: 73   Resp: 12   Weight: 235 lb 3.2 oz (106.7 kg)   Height: 5' 3\" (1.6 m)   PainSc:   0 - No pain        Physical Examination: General appearance - alert, well appearing, and in no distress  Mental status - alert, oriented to person, place, and time  Neck - supple, no significant adenopathy  Chest - clear to auscultation, no wheezes, rales or rhonchi, symmetric air entry  Heart - normal rate, regular rhythm, normal S1, S2, no murmurs, rubs, clicks or gallops  Abdomen - soft, nontender, nondistended, no masses or organomegaly  Extremities - peripheral pulses normal, no pedal edema, no clubbing or cyanosis    Diabetic foot exam:     Left: Reflexes 4+     Vibratory sensation normal    Filament test normal sensation with micro filament   Pulse DP: 2+ (normal)   Pulse PT: 2+ (normal)   Deformities: None  Right: Reflexes 4+   Vibratory sensation normal   Filament test normal sensation with micro filament   Pulse DP: 2+ (normal)   Pulse PT: 2+ (normal)   Deformities: None      ASSESSMENT & PLAN  This woman has diabetes mellitus perhaps related to her pancreatitis but with persistently elevated blood sugars on 15 units of Lantus at bedtime +2 mg of glimepiride daily. The etiology of the pancreatitis is not at all clear. I doubt very much that the metformin contributed in any way to the pancreatitis but for now we will certainly eliminated. We also discontinued the Amaryl. Plan:   1. The Amaryl and metformin were discontinued  2. The Lantus insulin was increased to 30 units (approximately 0.3 U/kg)  3. Patient was instructed to increase the dose of Lantus by 4 units every week until the fasting blood sugars are consistently 100-150.  4. The patient will come back in 1 month and we will review her blood sugar readings.

## 2017-12-26 ENCOUNTER — PATIENT OUTREACH (OUTPATIENT)
Dept: OTHER | Age: 62
End: 2017-12-26

## 2017-12-26 NOTE — LETTER
12/26/2017 1:57 PM 
 
Ms. Tamiko Espinosa [address]  [phone] Dear Ms Lópezema Cesar My name is Nate Sheridan , Employee Care Manager for [employer], and I have been trying to reach you. The Employee Care Management [facility]) program is a free-of-charge, confidential service provided to our employees and their family members covered by the [facility]. I can help you with care transitions such as when you come home from the hospital, when help is needed to manage your disease, or when you need assistance coordinating services or appointments. [employer] now partners with [facility]. If you are a qualifying employee, you may receive an additional 10 wellness incentive points for every month of active participation with an Employee Care Manager. As healthcare providers, we know that patients do better when they have close follow up with a primary care provider (PCP). I can help you find one that is convenient to you and covered by your insurance. I can also help you understand any after visit instructions, such as what symptoms to watch out for, or any new or changed medications. We can work together using your preferred communication -- telephone, email, Sapheonhart. If you do not have a [employer] account, I can help you request access. Our program is designed to provide you with the opportunity to have a [employer] care manager partner with you for your healthcare needs. Due to not being able to reach you, I am closing out the current program, but will remain available to you should you have any questions. Please contact me at the below number if I can provide you with assistance for any of the above services. Sincerely, Devika Loja RN 
[phone]

## 2017-12-29 ENCOUNTER — OFFICE VISIT (OUTPATIENT)
Dept: FAMILY MEDICINE CLINIC | Age: 62
End: 2017-12-29

## 2017-12-29 VITALS
BODY MASS INDEX: 42.45 KG/M2 | DIASTOLIC BLOOD PRESSURE: 80 MMHG | RESPIRATION RATE: 16 BRPM | WEIGHT: 239.6 LBS | SYSTOLIC BLOOD PRESSURE: 130 MMHG | TEMPERATURE: 98 F | HEART RATE: 70 BPM | OXYGEN SATURATION: 96 % | HEIGHT: 63 IN

## 2017-12-29 DIAGNOSIS — E11.21 TYPE 2 DIABETES MELLITUS WITH NEPHROPATHY (HCC): Primary | ICD-10-CM

## 2017-12-29 NOTE — Clinical Note
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 I am grateful that you are managing her diabetes.   Labs are pending Marli

## 2017-12-29 NOTE — PROGRESS NOTES
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 T2DM with pancreatitis seen last week for the first time by Dr Sunny Parker. Lantus now at 30 units. No hypoglycemic sx. No abdominal pain. BP and LDL are being actively managed. Diet and exercise discussed. Her  is getting a  shunt next week at [employer]    Allergies   Allergen Reactions    Iodine Swelling     shrimp       Current Outpatient Prescriptions   Medication Sig    insulin glargine (LANTUS,BASAGLAR) 100 unit/mL (3 mL) inpn Inject 30 units SQ QHS. Increase weekly by 4 units until -150    ondansetron (ZOFRAN ODT) 4 mg disintegrating tablet Take 1 Tab by mouth every eight (8) hours as needed for Nausea.  triamterene-hydroCHLOROthiazide (DYAZIDE) 37.5-25 mg per capsule TAKE ONE CAPSULE BY MOUTH EVERY DAY    amitriptyline (ELAVIL) 25 mg tablet TAKE 3 TABLETS BY MOUTH AT BEDTIME    aspirin delayed-release 81 mg tablet Take  by mouth daily.  pravastatin (PRAVACHOL) 20 mg tablet TAKE 1 TABLET BY MOUTH AT BEDTIME    carvedilol (COREG) 25 mg tablet TAKE 1 TABLET BY MOUTH TWICE DAILY    omeprazole (PRILOSEC) 40 mg capsule Take 40 mg by mouth daily. No current facility-administered medications for this visit. Past Medical History:   Diagnosis Date    Diabetes ([address]     GERD (gastroesophageal reflux disease)     Hernia of abdominal cavity     Subxyphoid hernia    Hypertension     Inflammatory polyarthropathy (HCC)     Joint pain     Joint swelling     Ocular nevus of left eye     Pancreatitis     Tracheal stenosis          ROS:  Denies fever, chills, cough, chest pain, SOB,  nausea, vomiting, or diarrhea. Denies wt loss, wt gain, hemoptysis, hematochezia or melena.     Physical Examination:    /80 (BP 1 Location: Left arm, BP Patient Position: Sitting)  Pulse 70  Temp 98 °F (36.7 °C) (Oral)   Resp 16  Ht 5' 3\" (1.6 m)  Wt 239 lb 9.6 oz (108.7 kg) SpO2 96%  BMI 42.44 kg/m2    General: Alert and Ox3, Fluent speech  HEENT:  NC/AT, EOMI, OP: clear  Neck:  Supple, no adenopathy, JVD, mass or bruit  Chest:  Clear to Ausculation, without wheezes, rales, rubs or ronchi  Cardiac: RRR  Abdomen:  +BS, soft, nontender without palpable HSM  Extremities:  No cyanosis, clubbing or edema  Neurologic:  Ambulatory without assist, CN 2-12 grossly intact. Moves all extremities. Skin: no rash  Lymphadenopathy: no cervical or supraclavicular nodes      ASSESSMENT AND PLAN:     1. T2DM:  Labs today  2. Skin lesion left upper chest: trial of topical HC cream.  Will shave if not resolved next visit. 3.  BMI 42:  Diet and exercise reviewed.   45 gm carbs TID    Orders Placed This Encounter    LIPID PANEL    METABOLIC PANEL, COMPREHENSIVE    CBC WITH AUTOMATED DIFF    HEMOGLOBIN A1C WITH EAG       Melvin Ashraf MD, [address]

## 2017-12-30 LAB
ALBUMIN SERPL-MCNC: 4.5 G/DL (ref 3.6–4.8)
ALBUMIN/GLOB SERPL: 1.6 {RATIO} (ref 1.2–2.2)
ALP SERPL-CCNC: 95 IU/L (ref 39–117)
ALT SERPL-CCNC: 24 IU/L (ref 0–32)
AST SERPL-CCNC: 20 IU/L (ref 0–40)
BASOPHILS # BLD AUTO: 0 X10E3/UL (ref 0–0.2)
BASOPHILS NFR BLD AUTO: 0 %
BILIRUB SERPL-MCNC: 0.3 MG/DL (ref 0–1.2)
BUN SERPL-MCNC: 15 MG/DL (ref 8–27)
BUN/CREAT SERPL: 19 (ref 12–28)
CALCIUM SERPL-MCNC: 10 MG/DL (ref 8.7–10.3)
CHLORIDE SERPL-SCNC: 94 MMOL/L (ref 96–106)
CHOLEST SERPL-MCNC: 243 MG/DL (ref 100–199)
CO2 SERPL-SCNC: 28 MMOL/L (ref 18–29)
CREAT SERPL-MCNC: 0.78 MG/DL (ref 0.57–1)
EOSINOPHIL # BLD AUTO: 0.2 X10E3/UL (ref 0–0.4)
EOSINOPHIL NFR BLD AUTO: 3 %
ERYTHROCYTE [DISTWIDTH] IN BLOOD BY AUTOMATED COUNT: 13.6 % (ref 12.3–15.4)
EST. AVERAGE GLUCOSE BLD GHB EST-MCNC: 258 MG/DL
GLOBULIN SER CALC-MCNC: 2.8 G/DL (ref 1.5–4.5)
GLUCOSE SERPL-MCNC: 310 MG/DL (ref 65–99)
HBA1C MFR BLD: 10.6 % (ref 4.8–5.6)
HCT VFR BLD AUTO: 43.9 % (ref 34–46.6)
HDLC SERPL-MCNC: 57 MG/DL
HGB BLD-MCNC: 14.7 G/DL (ref 11.1–15.9)
IMM GRANULOCYTES # BLD: 0 X10E3/UL (ref 0–0.1)
IMM GRANULOCYTES NFR BLD: 0 %
LDLC SERPL CALC-MCNC: 146 MG/DL (ref 0–99)
LYMPHOCYTES # BLD AUTO: 2 X10E3/UL (ref 0.7–3.1)
LYMPHOCYTES NFR BLD AUTO: 29 %
MCH RBC QN AUTO: 29.6 PG (ref 26.6–33)
MCHC RBC AUTO-ENTMCNC: 33.5 G/DL (ref 31.5–35.7)
MCV RBC AUTO: 88 FL (ref 79–97)
MONOCYTES # BLD AUTO: 0.6 X10E3/UL (ref 0.1–0.9)
MONOCYTES NFR BLD AUTO: 9 %
NEUTROPHILS # BLD AUTO: 3.9 X10E3/UL (ref 1.4–7)
NEUTROPHILS NFR BLD AUTO: 59 %
PLATELET # BLD AUTO: 251 X10E3/UL (ref 150–379)
POTASSIUM SERPL-SCNC: 4.2 MMOL/L (ref 3.5–5.2)
PROT SERPL-MCNC: 7.3 G/DL (ref 6–8.5)
RBC # BLD AUTO: 4.97 X10E6/UL (ref 3.77–5.28)
SODIUM SERPL-SCNC: 137 MMOL/L (ref 134–144)
TRIGL SERPL-MCNC: 201 MG/DL (ref 0–149)
VLDLC SERPL CALC-MCNC: 40 MG/DL (ref 5–40)
WBC # BLD AUTO: 6.8 X10E3/UL (ref 3.4–10.8)

## 2018-02-02 ENCOUNTER — OFFICE VISIT (OUTPATIENT)
Dept: ENDOCRINOLOGY | Age: 63
End: 2018-02-02

## 2018-02-02 VITALS
BODY MASS INDEX: 41.29 KG/M2 | HEART RATE: 70 BPM | HEIGHT: 63 IN | SYSTOLIC BLOOD PRESSURE: 113 MMHG | DIASTOLIC BLOOD PRESSURE: 80 MMHG | WEIGHT: 233 LBS

## 2018-02-02 RX ORDER — INSULIN LISPRO 100 [IU]/ML
INJECTION, SOLUTION INTRAVENOUS; SUBCUTANEOUS
Qty: 1 PEN | Refills: 0 | Status: SHIPPED | COMMUNITY
Start: 2018-02-02 | End: 2018-02-21 | Stop reason: SDUPTHER

## 2018-02-02 NOTE — PROGRESS NOTES
This woman returns for follow-up of her type 2 diabetes mellitus. She has a history of pancreatitis and was on metformin at the time of the pancreatitis episode so the metformin was stopped. Although it seems unlikely that the Metformin caused the pancreatitis she and her primary care physician are reluctant to restart it. The same reason, we started long-acting insulin at 30 units which she has gradually increased to 42 units at bedtime. Unfortunately, despite that, her blood sugars are in the 250-350 range most mornings. Her diet is very restricted in carbohydrate. In fact her only meal that has carbohydrate is her dinner meal and she estimates that she is only eating about 30 g of carbohydrate for that meal.    Examination  Blood pressure 113/80  Pulse 70  Weight 233    Impression type 2 diabetes mellitus with a modest improvement in blood sugar with the addition of bedtime Lantus insulin. Plan: Since she is eating no carbohydrate for breakfast or lunch, we have elected to begin Humalog insulin 10 units with the dinner meal only. If her bedtime blood sugars are not sufficiently covered, we will increase t0 15 or even 20 units. We will see her back in 1 month. We spent more than 25 mintutes face to face, more than 50% was in counseling regarding diet, exercise and carbohydrate intake.

## 2018-02-21 ENCOUNTER — TELEPHONE (OUTPATIENT)
Dept: ENDOCRINOLOGY | Age: 63
End: 2018-02-21

## 2018-02-21 RX ORDER — INSULIN LISPRO 100 [IU]/ML
INJECTION, SOLUTION INTRAVENOUS; SUBCUTANEOUS
Qty: 5 PEN | Refills: 3 | Status: SHIPPED | OUTPATIENT
Start: 2018-02-21 | End: 2018-08-16

## 2018-02-21 NOTE — TELEPHONE ENCOUNTER
Patient is requesting a refill on Humalog to be sent to her pharmacy. She uses [facility] at [address] [facility].

## 2018-02-23 RX ORDER — INSULIN ASPART 100 [IU]/ML
INJECTION, SOLUTION INTRAVENOUS; SUBCUTANEOUS
Qty: 5 PEN | Refills: 3 | Status: SHIPPED | OUTPATIENT
Start: 2018-02-23 | End: 2018-07-06 | Stop reason: SDUPTHER

## 2018-03-23 ENCOUNTER — OFFICE VISIT (OUTPATIENT)
Dept: ENDOCRINOLOGY | Age: 63
End: 2018-03-23

## 2018-03-23 VITALS
BODY MASS INDEX: 41.43 KG/M2 | SYSTOLIC BLOOD PRESSURE: 120 MMHG | HEIGHT: 63 IN | RESPIRATION RATE: 14 BRPM | WEIGHT: 233.8 LBS | DIASTOLIC BLOOD PRESSURE: 85 MMHG | HEART RATE: 74 BPM

## 2018-03-23 DIAGNOSIS — E11.21 TYPE 2 DIABETES MELLITUS WITH NEPHROPATHY (HCC): Primary | ICD-10-CM

## 2018-03-23 LAB — HBA1C MFR BLD HPLC: 9.8 %

## 2018-03-23 NOTE — PROGRESS NOTES
Ms. Negrita Castañeda returns for follow-up of her type 2 diabetes mellitus currently on Basaglar insulin 44 units daily and now on NovoLog insulin 20 units with her dinner meal only. She had previously been on metformin but was admitted with pancreatitis which she and her primary care provider associated with the metformin so the metformin was discontinued. Her A1c has dropped from 96.2-3.1% but certainly not near goal.  She continues to eat a very low carbohydrate diet. She checks her blood sugars in the morning and they are in the 170-250 range. She always checks blood sugars before dinner and most of those readings are in the 200-280 range. Breakfast is usually yogurt. Lunch is a salad. Daiva Sermon is a meal that is relatively low in carbohydrate but still has the highest carbs of her day. She does not check blood sugars after dinner. There is lots of stress in her life. There are health issues with her  and there were issues related to heat over the winter which are now resolved. Examination  Blood pressure 120/85  Pulse 74  Weight 233    Impression type 2 diabetes mellitus with poor control currently on basal insulin and a single dose of mealtime insulin at dinner. Plan: We have asked her to monitor her blood sugar before and 4 hours after dinner for 1 week. She will communicate via my chart and we will make adjustments as needed. She clearly understood the rationale of the strategy. Follow-up will be in 2 months. We spent more than 25 mintutes face to face, more than 50% was in counseling regarding diet, exercise and carbohydrate intake.

## 2018-03-23 NOTE — PROGRESS NOTES
Christ Forbes is a 58 y.o. female      Chief Complaint   Patient presents with    Diabetes       1. Have you been to the ER, urgent care clinic since your last visit? Hospitalized since your last visit? No    2. Have you seen or consulted any other health care providers outside of the [address] [place] [address] since your last visit? Include any pap smears or colon screening.  No

## 2018-08-16 ENCOUNTER — OFFICE VISIT (OUTPATIENT)
Dept: FAMILY MEDICINE CLINIC | Age: 63
End: 2018-08-16

## 2018-08-16 VITALS
TEMPERATURE: 98.5 F | HEIGHT: 63 IN | BODY MASS INDEX: 43.59 KG/M2 | SYSTOLIC BLOOD PRESSURE: 138 MMHG | HEART RATE: 83 BPM | WEIGHT: 246 LBS | OXYGEN SATURATION: 95 % | DIASTOLIC BLOOD PRESSURE: 88 MMHG

## 2018-08-16 DIAGNOSIS — E11.9 TYPE 2 DIABETES MELLITUS WITHOUT COMPLICATION, WITHOUT LONG-TERM CURRENT USE OF INSULIN (HCC): Primary | ICD-10-CM

## 2018-08-16 DIAGNOSIS — G89.29 CHRONIC RIGHT SHOULDER PAIN: ICD-10-CM

## 2018-08-16 DIAGNOSIS — I10 ESSENTIAL HYPERTENSION: ICD-10-CM

## 2018-08-16 DIAGNOSIS — E78.2 MIXED HYPERLIPIDEMIA: ICD-10-CM

## 2018-08-16 DIAGNOSIS — M25.511 CHRONIC RIGHT SHOULDER PAIN: ICD-10-CM

## 2018-08-16 NOTE — MR AVS SNAPSHOT
[address] [place] [address],[address] [address] [phone] Patient: Doc Tomlinson MRN: [id number] YNA:[id number] Visit Information Date & Time Provider Department Dept. Phone Encounter #  
 8/16/2018  2:00 PM Samuel Geiger, Justo Rubio [id number] Upcoming Health Maintenance Date Due ZOSTER VACCINE AGE 60> 5/12/2015 FOOT EXAM Q1 6/9/2018 MICROALBUMIN Q1 6/9/2018 Influenza Age 5 to Adult 8/1/2018 COLONOSCOPY 12/16/2018 HEMOGLOBIN A1C Q6M 9/23/2018 LIPID PANEL Q1 12/29/2018 EYE EXAM RETINAL OR DILATED Q1 5/31/2019 BREAST CANCER SCRN MAMMOGRAM 3/7/2020 DTaP/Tdap/Td series (2 - Td) 11/7/2027 Allergies as of 8/16/2018  Review Complete On: 8/16/2018 By: Megan Sow CNA Severity Noted Reaction Type Reactions Iodine  07/07/2015    Swelling  
 shrimp Current Immunizations  Reviewed on 6/9/2017 Name Date Pneumococcal Conjugate (PCV-13) 9/1/2017  9:11 AM  
  
 Not reviewed this visit You Were Diagnosed With   
  
 Codes Comments Type 2 diabetes mellitus without complication, without long-term current use of insulin (HCC)    -  Primary ICD-10-CM: E11.9 ICD-9-CM: 250.00 BMI 40.0-44.9, adult [facility])     ICD-10-CM: Z68.41 
ICD-9-CM: V85.41 Mixed hyperlipidemia     ICD-10-CM: E78.2 ICD-9-CM: 272.2 Essential hypertension     ICD-10-CM: I10 
ICD-9-CM: 401.9 Chronic right shoulder pain     ICD-10-CM: M25.511, G89.29 ICD-9-CM: 719.41, 338.29 Vitals BP Pulse Temp Height(growth percentile) Weight(growth percentile) 138/88 (BP 1 Location: Left arm, BP Patient Position: Sitting) 83 98.5 °F (36.9 °C) (Temporal) 5' 3\" (1.6 m) 246 lb (111.6 kg) SpO2 BMI OB Status Smoking Status 95% 43.58 kg/m2 Hysterectomy Never Smoker BMI and BSA Data Body Mass Index Body Surface Area 43.58 kg/m 2 2.23 m 2 Preferred Pharmacy Pharmacy Name Phone [facility] [address], [address] [place] [address] Your Updated Medication List  
  
   
This list is accurate as of 8/16/18  3:01 PM.  Always use your most recent med list.  
  
  
  
  
 amitriptyline 25 mg tablet Commonly known as:  ELAVIL TAKE 3 TABLETS BY MOUTH AT BEDTIME  
  
 aspirin delayed-release 81 mg tablet Take  by mouth daily. BASAGLAR KWIKPEN U-100 INSULIN 100 unit/mL (3 mL) Inpn Generic drug:  insulin glargine INJECT 10 UNITS SUBCUTANEOUSLY AT BEDTIME  
  
 carvedilol 25 mg tablet Commonly known as:  COREG  
TAKE 1 TABLET BY MOUTH TWICE DAILY  
  
 insulin aspart U-100 100 unit/mL Inpn Commonly known as:  NovoLOG Flexpen U-100 Insulin 15-20 units with dinner only  
  
 pravastatin 20 mg tablet Commonly known as:  PRAVACHOL  
TAKE 1 TABLET BY MOUTH EVERY NIGHT AT BEDTIME  
  
 triamterene-hydroCHLOROthiazide 37.5-25 mg per capsule Commonly known as:  Yana Ermine TAKE ONE CAPSULE BY MOUTH EVERY DAY We Performed the Following CBC WITH AUTOMATED DIFF [02479 CPT(R)] HEMOGLOBIN A1C WITH EAG [46412 CPT(R)] LIPID PANEL [35996 CPT(R)] METABOLIC PANEL, COMPREHENSIVE [83228 CPT(R)] TSH 3RD GENERATION [57887 CPT(R)] To-Do List   
 Around 08/30/2018 Imaging:  XR SHOULDER RT AP/LAT MIN 2 V Introducing [facility]! Dear Suresh Jimenez: 
Thank you for requesting a tic account. Our records indicate that you already have an active tic account. You can access your account anytime at [email or web address] Did you know that you can access your hospital and ER discharge instructions at any time in tic? You can also review all of your test results from your hospital stay or ER visit. Additional Information If you have questions, please visit the Frequently Asked Questions section of the tic website at [email or web address] Remember, [facility] is NOT to be used for urgent needs. For medical emergencies, dial 911. Now available from your iPhone and Android! Please provide this summary of care documentation to your next provider. Your primary care clinician is listed as Iman Rboerts. If you have any questions after today's visit, please call [phone].

## 2018-08-16 NOTE — PROGRESS NOTES
Chief Complaint   Patient presents with    Diabetes     f/u    Shoulder Pain      Right. Past injury 14+ yrs ago, pain started about a month ago. 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 T2DM with pancreatitis seen last week for the first time by Dr Pam Bautista. Lantus now at 30 units. No hypoglycemic sx. No abdominal pain. BP and LDL are being actively managed. Diet and exercise discussed. Experiencing right shoulder pain for the past few months. Allergies   Allergen Reactions    Iodine Swelling     shrimp       Current Outpatient Prescriptions   Medication Sig    pravastatin (PRAVACHOL) 20 mg tablet TAKE 1 TABLET BY MOUTH EVERY NIGHT AT BEDTIME    BASAGLAR KWIKPEN U-100 INSULIN 100 unit/mL (3 mL) inpn INJECT 10 UNITS SUBCUTANEOUSLY AT BEDTIME    insulin aspart U-100 (NOVOLOG FLEXPEN U-100 INSULIN) 100 unit/mL inpn 15-20 units with dinner only    carvedilol (COREG) 25 mg tablet TAKE 1 TABLET BY MOUTH TWICE DAILY    amitriptyline (ELAVIL) 25 mg tablet TAKE 3 TABLETS BY MOUTH AT BEDTIME (Patient taking differently: TAKE 2 TABLETS BY MOUTH AT BEDTIME)    triamterene-hydroCHLOROthiazide (DYAZIDE) 37.5-25 mg per capsule TAKE ONE CAPSULE BY MOUTH EVERY DAY    aspirin delayed-release 81 mg tablet Take  by mouth daily. No current facility-administered medications for this visit. Past Medical History:   Diagnosis Date    Diabetes ([address]     GERD (gastroesophageal reflux disease)     Hernia of abdominal cavity     Subxyphoid hernia    Hypertension     Inflammatory polyarthropathy (HCC)     Joint pain     Joint swelling     Menopause     Ocular nevus of left eye     Pancreatitis     Tracheal stenosis          ROS:  Denies fever, chills, cough, chest pain, SOB,  nausea, vomiting, or diarrhea. Denies wt loss, wt gain, hemoptysis, hematochezia or melena.     Physical Examination:    /88 (BP 1 Location: Left arm, BP Patient Position: Sitting)  Pulse 83  Temp 98.5 °F (36.9 °C) (Temporal)   Ht 5' 3\" (1.6 m)  Wt 246 lb (111.6 kg)  SpO2 95%  BMI 43.58 kg/m2    General: Alert and Ox3, Fluent speech  HEENT:  NC/AT, EOMI, OP: clear  Neck:  Supple, no adenopathy, JVD, mass or bruit  Chest:  Clear to Ausculation, without wheezes, rales, rubs or ronchi  Cardiac: RRR  Abdomen:  +BS, soft, nontender without palpable HSM  Extremities:  No cyanosis, clubbing or edema  Neurologic:  Ambulatory without assist, CN 2-12 grossly intact. Moves all extremities. Skin: no rash  Lymphadenopathy: no cervical or supraclavicular nodes      ASSESSMENT AND PLAN:     1.  RIght shoulder pain:  Plain films  2. T2DM:  Labs  3. LDL:  Near goal  4. SBP is at goal.    No orders of the defined types were placed in this encounter.       Prerna Lopez MD, [address]

## 2018-08-17 LAB
ALBUMIN SERPL-MCNC: 4.4 G/DL (ref 3.6–4.8)
ALBUMIN/GLOB SERPL: 1.6 {RATIO} (ref 1.2–2.2)
ALP SERPL-CCNC: 84 IU/L (ref 39–117)
ALT SERPL-CCNC: 18 IU/L (ref 0–32)
AST SERPL-CCNC: 16 IU/L (ref 0–40)
BASOPHILS # BLD AUTO: 0 X10E3/UL (ref 0–0.2)
BASOPHILS NFR BLD AUTO: 0 %
BILIRUB SERPL-MCNC: 0.2 MG/DL (ref 0–1.2)
BUN SERPL-MCNC: 16 MG/DL (ref 8–27)
BUN/CREAT SERPL: 18 (ref 12–28)
CALCIUM SERPL-MCNC: 9.9 MG/DL (ref 8.7–10.3)
CHLORIDE SERPL-SCNC: 97 MMOL/L (ref 96–106)
CHOLEST SERPL-MCNC: 226 MG/DL (ref 100–199)
CO2 SERPL-SCNC: 26 MMOL/L (ref 20–29)
CREAT SERPL-MCNC: 0.87 MG/DL (ref 0.57–1)
EOSINOPHIL # BLD AUTO: 0.2 X10E3/UL (ref 0–0.4)
EOSINOPHIL NFR BLD AUTO: 3 %
ERYTHROCYTE [DISTWIDTH] IN BLOOD BY AUTOMATED COUNT: 13.4 % (ref 12.3–15.4)
EST. AVERAGE GLUCOSE BLD GHB EST-MCNC: 240 MG/DL
GLOBULIN SER CALC-MCNC: 2.8 G/DL (ref 1.5–4.5)
GLUCOSE SERPL-MCNC: 186 MG/DL (ref 65–99)
HBA1C MFR BLD: 10 % (ref 4.8–5.6)
HCT VFR BLD AUTO: 42.4 % (ref 34–46.6)
HDLC SERPL-MCNC: 51 MG/DL
HGB BLD-MCNC: 14.3 G/DL (ref 11.1–15.9)
IMM GRANULOCYTES # BLD: 0 X10E3/UL (ref 0–0.1)
IMM GRANULOCYTES NFR BLD: 0 %
LDLC SERPL CALC-MCNC: 132 MG/DL (ref 0–99)
LYMPHOCYTES # BLD AUTO: 2.2 X10E3/UL (ref 0.7–3.1)
LYMPHOCYTES NFR BLD AUTO: 33 %
MCH RBC QN AUTO: 29.2 PG (ref 26.6–33)
MCHC RBC AUTO-ENTMCNC: 33.7 G/DL (ref 31.5–35.7)
MCV RBC AUTO: 87 FL (ref 79–97)
MONOCYTES # BLD AUTO: 0.6 X10E3/UL (ref 0.1–0.9)
MONOCYTES NFR BLD AUTO: 9 %
NEUTROPHILS # BLD AUTO: 3.7 X10E3/UL (ref 1.4–7)
NEUTROPHILS NFR BLD AUTO: 55 %
PLATELET # BLD AUTO: 257 X10E3/UL (ref 150–379)
POTASSIUM SERPL-SCNC: 3.7 MMOL/L (ref 3.5–5.2)
PROT SERPL-MCNC: 7.2 G/DL (ref 6–8.5)
RBC # BLD AUTO: 4.9 X10E6/UL (ref 3.77–5.28)
SODIUM SERPL-SCNC: 140 MMOL/L (ref 134–144)
TRIGL SERPL-MCNC: 216 MG/DL (ref 0–149)
TSH SERPL DL<=0.005 MIU/L-ACNC: 2.99 UIU/ML (ref 0.45–4.5)
VLDLC SERPL CALC-MCNC: 43 MG/DL (ref 5–40)
WBC # BLD AUTO: 6.6 X10E3/UL (ref 3.4–10.8)

## 2019-05-23 RX ORDER — INSULIN ASPART 100 [IU]/ML
INJECTION, SOLUTION INTRAVENOUS; SUBCUTANEOUS
Qty: 15 ADJUSTABLE DOSE PRE-FILLED PEN SYRINGE | Refills: 2 | Status: SHIPPED | OUTPATIENT
Start: 2019-05-23 | End: 2019-06-18

## 2019-07-13 RX ORDER — CARVEDILOL 25 MG/1
TABLET ORAL
Qty: 60 TAB | Refills: 10 | Status: SHIPPED | OUTPATIENT
Start: 2019-07-13 | End: 2020-08-20

## 2019-08-09 RX ORDER — PRAVASTATIN SODIUM 20 MG/1
TABLET ORAL
Qty: 30 TAB | Refills: 10 | Status: SHIPPED | OUTPATIENT
Start: 2019-08-09 | End: 2020-04-14 | Stop reason: SDUPTHER

## 2020-02-25 ENCOUNTER — TELEPHONE (OUTPATIENT)
Dept: PHARMACY | Age: 65
End: 2020-02-25

## 2020-02-25 NOTE — TELEPHONE ENCOUNTER
Noted.    Pharmacy Pop Care Documentation:   Patient is missing the following requirements: DM Program Application; Urine Albumin. If completed by 3/15/20 patient will be eligible for enrollment in the DM Program on 4/01/20. Application e- mailed to patient.     Pantera Umaña, [address] [facility]   Department, toll free: [phone], option 7

## 2020-02-25 NOTE — TELEPHONE ENCOUNTER
Incoming call regarding enrollment for DM program and [facility]. Patient was using [employer]. If completed by 3/01/20 patient will be eligible for enrollment in the DM Program on 4/01/20. Emailed enrollment forms for both using email patient provided:  [email or web address]    Routed to  for tracking purposes.

## 2020-02-25 NOTE — LETTER
[address] [facility] [address] Phone: toll free [phone] option 7 Ms. [facility] [address] \[facility]\"" [phone] Congratulations! You have successfully enrolled in the Be Well With Diabetes program for 2020. What you receive Beginning April 1, you will begin receiving up to $600 in waived copays for specific medications and pharmacy-related supplies purchased through our home delivery pharmacy, [facility]. A list of eligible medications and pharmacy supplies can be found at [employer] under Be Well With Diabetes. In addition, youll receive advice and help from our pharmacists, associate care management team and diabetes educators. (And, if you also participate in the Be Well program, you can earn points and Lifestyle Management or Health Management program credit, if applicable.) What you need to do To maintain your benefit this year and remain eligible next year, complete the following requirements: *Can be satisfied through [facility] Screening on-site. **Requirements to enroll must be completed within 6 months of enrollment date **Pneumonia vaccine is dependent on previous immunization and your age. Remember, program requirements must be completed by deadlines shown. If not, your benefit may be terminated, and you will not be eligible to participate again until the following year. To keep you on track, well review your [employer] account and send reminders for action. (If you dont have a [address], submit documentation to [email or web address] or by fax to [phone].) Congratulations and thank you for taking steps to improve your health and to Be Well With Diabetes. [address] [facility] Phone: [phone], option 7 Email: [email or web address] Fax: [phone]

## 2020-03-23 DIAGNOSIS — E11.9 TYPE 2 DIABETES MELLITUS WITHOUT COMPLICATION, WITHOUT LONG-TERM CURRENT USE OF INSULIN (HCC): ICD-10-CM

## 2020-03-23 RX ORDER — INSULIN GLARGINE 300 U/ML
INJECTION, SOLUTION SUBCUTANEOUS
Qty: 4.5 ML | Refills: 11 | Status: SHIPPED | OUTPATIENT
Start: 2020-03-23 | End: 2020-04-14 | Stop reason: SDUPTHER

## 2020-03-25 ENCOUNTER — TELEPHONE (OUTPATIENT)
Dept: PHARMACY | Age: 65
End: 2020-03-25

## 2020-03-25 NOTE — TELEPHONE ENCOUNTER
Called patient to schedule 2020 yearly pharmacist appointment to discuss medications for Diabetes Management Program.    No answer. Left VM on home/cell TAD: Please call back at [phone] Option #7.      Reyes Whitman, [address] [facility]   Department, toll free: [phone], option 7

## 2020-03-25 NOTE — TELEPHONE ENCOUNTER
DM Program Application received: 7/49/48. Per [address] [facility] Supervisor: Patient will be given an override for the Urine Albumin test requirement for enrollment. Patient will be advised that they must complete this test in 2020. Pharmacy Pop Care Documentation:   Patient will be enrolled in the DM Program on 4/01/20. Application received: 7/38/53  Application scanned. Letter mailed to patient.     Christiane Pride, [address] [address]   Department, toll free: [phone], option 7

## 2020-03-26 DIAGNOSIS — E11.9 TYPE 2 DIABETES MELLITUS WITHOUT COMPLICATION, WITHOUT LONG-TERM CURRENT USE OF INSULIN (HCC): ICD-10-CM

## 2020-03-26 NOTE — LETTER
Dajuan 2 [address] [facility] [address] Phone: [phone], option 7 [address] [facility] [address] [place]  [phone]  
 
 
 
 
 
03/30/20 Dear [address] [place] [address], [facility]) Clinical Pharmacy Team has attempted to contact you for your scheduled Diabetes Management telephone appointment but was unable to reach you. One of the requirements to participate in the Be Well with Diabetes Management Program is to complete this appointment. We would like to work with you and your doctor to: · Review your medications, including over-the-counter and herbal medications · Answer questions about your medications and how to get the most benefit from them · Identify potential drug interactions or side effects and help fix them · Identify preferred medications that are equally effective, but available at a lower cost to you · Help you reach the necessary requirements to remain enrolled in the Diabetes Management Program offered by [facility] Please call [phone] and select option #7 to reschedule this appointment to take advantage of this service. Sincerely, Dajuan 2 Phone: [phone], option 7

## 2020-03-27 ENCOUNTER — PATIENT MESSAGE (OUTPATIENT)
Dept: PHARMACY | Age: 65
End: 2020-03-27

## 2020-03-27 NOTE — TELEPHONE ENCOUNTER
CLINICAL PHARMACY NOTE - [facility] Diabetes Management Program    Two attempts made to reach the patient by telephone for scheduled pharmacist appointment. Left voice message for patient to return clinician's phone call to [phone] and/or [phone] option 7. Will prepare letter and send to the patient (previous Nail Your Mortgagehart message has not yet been read).     Ragini Russ, PharmD, [facility], [address]  [phone], Option 7    =======================================================    For Pharmacy Admin Tracking Only  PHSO: [id number] Patient?: Yes  Recommended intervention potential cost savings: 0  Time Spent (min): 30

## 2020-03-27 NOTE — TELEPHONE ENCOUNTER
CLINICAL PHARMACY NOTE - [address] Diabetes Management Program    Two attempts made to reach the patient by telephone for scheduled pharmacist appointment. Left voice message for patient to return clinician's phone call to [phone] and/or [phone] option 7. Will prepare [employer] message and send to the patient.      Joelle Mendoza, PharmD, BCACP, [address]  [phone], Option 7

## 2020-03-27 NOTE — TELEPHONE ENCOUNTER
CLINICAL PHARMACY NOTE - [facility] AT [place] Diabetes Management Program    Patient returned writer's message - unable to complete appointment at this time, has to reschedule. Appointment rescheduled for 9am on Monday 3/30.      Rodney Dockery, PharmD, BCACP, [address] [facility]  [phone], Option 7

## 2020-03-30 RX ORDER — IBUPROFEN 200 MG
200 TABLET ORAL
COMMUNITY
End: 2020-07-16

## 2020-03-30 RX ORDER — ACETAMINOPHEN 500 MG
1000 TABLET ORAL
Status: ON HOLD | COMMUNITY
End: 2022-07-23

## 2020-03-30 NOTE — TELEPHONE ENCOUNTER
CLINICAL PHARMACY NOTE - [facility] Diabetes Management [facility]    Cassy Kearney is a 59 y.o. female enrolled in the [facility] AT [facility]) Employee Diabetes Management Program. Patient provided writer with verbal consent to participate in the program for this year. Medications:  Current Outpatient Medications   Medication Sig Dispense Refill    omeprazole (PRILOSEC) 40 mg capsule Take 1 Cap by mouth daily. 90 Cap 1    insulin lispro (HumaLOG KwikPen Insulin) 100 unit/mL kwikpen Inject 25 units into the skin with breakfast, 15 units with lunch (if BG is >200 and eating carbs with meal), and 30 units with dinner      acetaminophen (Acetaminophen Extra Strength) 500 mg tablet Take 1,000 mg by mouth two (2) times a day.  ibuprofen (Motrin IB) 200 mg tablet Take 200 mg by mouth every eight (8) hours as needed for Pain.  Needle, Disp, ndle 1 Each by Does Not Apply route two (2) times daily as needed for Other. 500 Each 5    insulin glargine U-300 conc (Toujeo SoloStar U-300 Insulin) 300 unit/mL (1.5 mL) inpn pen 55 units every bedtime 4.5 mL 11    triamterene-hydroCHLOROthiazide (DYAZIDE) 37.5-25 mg per capsule TAKE ONE CAPSULE BY MOUTH EVERY DAY 30 Cap 11    pravastatin (PRAVACHOL) 20 mg tablet TAKE 1 TABLET BY MOUTH EVERY NIGHT AT BEDTIME 30 Tab 10    carvedilol (COREG) 25 mg tablet TAKE 1 TABLET BY MOUTH TWICE DAILY 60 Tab 10    aspirin delayed-release 81 mg tablet Take  by mouth daily. Current Pharmacy: [facility]  Current testing supplies/frequency: [employer]     Allergies:   Allergen Reactions    Iodine Swelling     shrimp      Vitals/Labs:  BP Readings from Last 3 Encounters:   03/06/20 102/56   01/27/20 (!) 80/60   11/21/19 128/62     A1c   Component Value Date/Time    Hemoglobin A1c 9.8 (H) 01/27/2020 09:20 AM    Hemoglobin A1c 9.9 (H) 07/01/2019 08:36 AM    Hemoglobin A1c 10.2 (H) 04/01/2019 08:41 AM     Lipids   Component Value Date/Time Cholesterol, total 201 (H) 07/01/2019 08:36 AM    HDL Cholesterol 52 07/01/2019 08:36 AM    LDL, calculated 113 (H) 07/01/2019 08:36 AM    VLDL, calculated 36 07/01/2019 08:36 AM    Triglyceride 178 (H) 07/01/2019 08:36 AM     ALT (SGPT)   Date Value Ref Range Status   01/27/2020 18 0 - 32 IU/L Final     Renal Function   Component Value Date/Time    Creatinine 0.78 01/27/2020 09:20 AM    GFR est non-AA 81 01/27/2020 09:20 AM   Estimated Creatinine Clearance: 85.9 mL/min (by C-G formula based on SCr of 0.78 mg/dL). Immunizations:  Administered Date(s) Administered    Influenza Vaccine 09/17/2018    Pneumococcal Conjugate (PCV-13) 09/01/2017    Tdap 08/17/2017      Social History:  Tobacco Use    Smoking status: Never Smoker    Smokeless tobacco: Never Used   Substance Use Topics    Alcohol use: No     Alcohol/week: 0.0 standard drinks     Frequency: Never     Binge frequency: Never     ASSESSMENT:  Initial Program Requirements (Y indicates has completed for the year, N indicates needs to be completed by 07/01/2020):  Yes - OV with provider for DM (1st)  Yes - A1c (1st)     Ongoing Program Requirements (Y indicates has completed for the year, N indicates needs to be completed by 12/31/2020): No - OV with provider for DM (2nd)  No - ACC/diabetes educator visit (if A1c over 8%)   No - A1c (2nd)  No - Lipid panel  No - Urine microalbumin  No - Pneumococcal vaccination: PPSV23  No - Influenza vaccination for Fall 2020  No - Medication adherence over 70%  Yes - On statin or contraindication(s) - pravastatin  Yes - On ACEi/ARB or contraindication(s) Normal blood pressure, urinary albumin-to-creatinine ratio, and eGFR - will override for this year and review MCR in 2021    Formulary Medication Review:  Non-formulary or medications with cost-effective alternatives: n/a.      Current medications eligible for copay waiver, up to $600, through [facility]:  - Toujeo (insulin glargine U300), Humalog (insulin lispro), pravastatin   - Generic ([facility] pharmacy-stocked) insulin syringes and pen needles  - Agamatrix or Prodigy meter and supplies    Diabetes Care:   - Glycemic Goal: <7.0%. Is not at blood glucose goal but is on insulin therapy. Type 2 DM under poor control as evidenced by A1c = 9.8%. - Appropriateness of Insulin Therapy: be consistent with Humalog dosing, consider 50:50  - Adherent to statin: yes per MedImpact  - Medication compliance: compliant all of the time  · Pravastatin 20m2019 30ds, 2019 30ds, 2019 30ds, 2019 30ds,  30ds, 10/15/2019 30ds, 2019 30ds, 2019 30ds, 2020 30ds, 2020 30ds, 2020 30ds  · Toujeo: 2019 30ds, 2019 30ds, 2019 30ds, 08/10/2019 24ds, 2019 24ds, 10/04/2019 24ds, 2019 24ds, 2019 24ds, 2019 24ds, 2020 24ds, 2020 24ds, 2020 24ds  · Humalo2019 30ds, 2019 30ds, 2019 30ds, 09/10/2019 30ds, 10/15/2019 30ds, 2019 30ds, 2019 30ds, 2019 30ds, 2020 30ds, 2020 30ds, 2020 30ds    Other Considerations:  - Blood Pressure Goal: BP less than 140/90 mmHg due to history of DM: Is at blood pressure goal.   - Lipids: Patient is prescribed low-intensity statin therapy.   - Smoking status: never smoked    PLAN:  - DM program gaps identified:   · Initial requirements: Requirements met   · Ongoing requirements: OV with provider for DM (2nd), ACC/diabetes educator visit (if A1c over 8%), A1c (2nd), Lipid panel, Urine microalbumin, Pneumococcal vaccination: PPSV23, Influenza vaccination for 5154-9999 and Medication adherence over 70%   - Education to patient: program review, mail order   - Follow up: PCP for identified gaps or as scheduled below and Diabetes Educator or [address] [place] [address] Coordinator for identified gaps or as scheduled below  - Upcoming appointments:   Date Time Provider Louis Campuzano   2020  8:00 AM Fawn Dubois,  Woods Chandan, PharmD, Jono fink [place], [address]  [phone], Option 7    =======================================================    For Pharmacy Admin Tracking Only  PHSO: [facility] Patient?: Yes  Total # of Interventions Recommended: Count: 1  - Updated Order #: 1 Updated Order Reason(s):  Other  Recommended intervention potential cost savings: 1  Total Interventions Accepted: 1  Time Spent (min): 120

## 2020-03-31 RX ORDER — INSULIN LISPRO 100 [IU]/ML
INJECTION, SOLUTION INTRAVENOUS; SUBCUTANEOUS
COMMUNITY
End: 2020-04-14 | Stop reason: SDUPTHER

## 2020-04-13 ENCOUNTER — TELEPHONE (OUTPATIENT)
Dept: PHARMACY | Age: 65
End: 2020-04-13

## 2020-04-13 NOTE — TELEPHONE ENCOUNTER
Patient would like to speak with pharmacist who performed her telephone appointment. Currently unavailable. Patient has questions about setting up HHP, what meter to change to and suggestions for better BS control. Patient will be available at [phone] for call back.

## 2020-04-14 NOTE — TELEPHONE ENCOUNTER
Dr. Radha aPstor MD,    Your patient is currently enrolled in the [facility] AT [place] Employee Diabetes Management Program. After your patient's recent visit with a [facility] Clinical Pharmacist, the below were identified as opportunities to assist with their diabetes management (if patient is not eligible for below recommendations, please reply with reason/contraindication):   · Patient is interesting in trying the Dexcom G6 to monitor her blood glucose. If you agree, please sign pended prescriptions. · Consider issuing prescriptions for Humalog, Toujeo, and pravastatin so patient can utilize copay waiver benefit of program when filled at [address]. Orders have been pended for your convenience. To remain active in the program, the patient is to meet the remaining requirements and documentation must be provided by 12/31/2020:   1. Office visit with provider for DM (2nd)  2. A1c (2nd)  3. Lipid panel  4. Urine microalbumin   5. [id number]  6.  Influenza vaccination for Fall 2020    Thank you,  Barb Wallace, PharmD, Anselmo Mcgregor, [address]  [phone], Option 7

## 2020-04-14 NOTE — TELEPHONE ENCOUNTER
CLINICAL PHARMACY NOTE - [facility] AT [place] Diabetes Management Program    Attempt made to reach the patient by telephone to review mail order pharmacy and glucometer. Left voice message for patient to return clinician's phone call to [phone]. Also resent email with information.      Joelle Mendoza, PharmD, BCACP, [address]  [phone], Option 7

## 2020-04-15 RX ORDER — BLOOD-GLUCOSE TRANSMITTER
EACH MISCELLANEOUS
Qty: 1 DEVICE | Refills: 3 | Status: SHIPPED | OUTPATIENT
Start: 2020-04-15 | End: 2021-05-10 | Stop reason: SDUPTHER

## 2020-04-15 RX ORDER — INSULIN GLARGINE 300 U/ML
INJECTION, SOLUTION SUBCUTANEOUS
Qty: 9 PEN | Refills: 3 | Status: SHIPPED | OUTPATIENT
Start: 2020-04-15 | End: 2020-11-02

## 2020-04-15 RX ORDER — PRAVASTATIN SODIUM 20 MG/1
TABLET ORAL
Qty: 90 TAB | Refills: 3 | Status: SHIPPED | OUTPATIENT
Start: 2020-04-15 | End: 2020-07-16

## 2020-04-15 RX ORDER — BLOOD-GLUCOSE SENSOR
EACH MISCELLANEOUS
Qty: 3 DEVICE | Refills: 3 | Status: SHIPPED | OUTPATIENT
Start: 2020-04-15 | End: 2021-05-10 | Stop reason: SDUPTHER

## 2020-04-15 RX ORDER — INSULIN LISPRO 100 [IU]/ML
INJECTION, SOLUTION INTRAVENOUS; SUBCUTANEOUS
Qty: 15 PEN | Refills: 3 | Status: SHIPPED | OUTPATIENT
Start: 2020-04-15 | End: 2021-06-16

## 2020-04-15 RX ORDER — BLOOD-GLUCOSE,RECEIVER,CONT
EACH MISCELLANEOUS
Qty: 1 EACH | Refills: 0 | Status: SHIPPED | OUTPATIENT
Start: 2020-04-15 | End: 2021-05-10 | Stop reason: SDUPTHER

## 2020-05-04 DIAGNOSIS — K85.90 ACUTE PANCREATITIS, UNSPECIFIED COMPLICATION STATUS, UNSPECIFIED PANCREATITIS TYPE: Primary | ICD-10-CM

## 2020-05-04 RX ORDER — HYDROMORPHONE HYDROCHLORIDE 2 MG/1
2 TABLET ORAL
Qty: 30 TAB | Refills: 0 | Status: SHIPPED | OUTPATIENT
Start: 2020-05-04 | End: 2020-06-10 | Stop reason: SDUPTHER

## 2020-05-08 ENCOUNTER — ANESTHESIA EVENT (OUTPATIENT)
Dept: ENDOSCOPY | Age: 65
End: 2020-05-08
Payer: COMMERCIAL

## 2020-05-08 ENCOUNTER — ANESTHESIA (OUTPATIENT)
Dept: ENDOSCOPY | Age: 65
End: 2020-05-08
Payer: COMMERCIAL

## 2020-05-08 ENCOUNTER — HOSPITAL ENCOUNTER (OUTPATIENT)
Age: 65
Setting detail: OUTPATIENT SURGERY
Discharge: HOME | End: 2020-05-08
Attending: INTERNAL MEDICINE | Admitting: INTERNAL MEDICINE
Payer: COMMERCIAL

## 2020-05-08 VITALS
TEMPERATURE: 97.9 F | DIASTOLIC BLOOD PRESSURE: 77 MMHG | OXYGEN SATURATION: 98 % | RESPIRATION RATE: 15 BRPM | HEART RATE: 75 BPM | BODY MASS INDEX: 39.09 KG/M2 | WEIGHT: 229 LBS | SYSTOLIC BLOOD PRESSURE: 150 MMHG | HEIGHT: 64 IN

## 2020-05-08 LAB
GLUCOSE BLD STRIP.AUTO-MCNC: 190 MG/DL (ref 65–100)
SERVICE CMNT-IMP: ABNORMAL

## 2020-05-08 PROCEDURE — 77030019988 HC FCPS ENDOSC DISP BSC -B: Performed by: INTERNAL MEDICINE

## 2020-05-08 PROCEDURE — 88173 CYTOPATH EVAL FNA REPORT: CPT

## 2020-05-08 PROCEDURE — 74011000250 HC RX REV CODE- 250: Performed by: REGISTERED NURSE

## 2020-05-08 PROCEDURE — 74011250636 HC RX REV CODE- 250/636: Performed by: ANESTHESIOLOGY

## 2020-05-08 PROCEDURE — 76040000007: Performed by: INTERNAL MEDICINE

## 2020-05-08 PROCEDURE — 74011250636 HC RX REV CODE- 250/636: Performed by: INTERNAL MEDICINE

## 2020-05-08 PROCEDURE — 76060000032 HC ANESTHESIA 0.5 TO 1 HR: Performed by: INTERNAL MEDICINE

## 2020-05-08 PROCEDURE — 77030019957 HC CUF BLN GASTSCP OCOA -B: Performed by: INTERNAL MEDICINE

## 2020-05-08 PROCEDURE — 88305 TISSUE EXAM BY PATHOLOGIST: CPT

## 2020-05-08 PROCEDURE — 77030003406 HC NDL ASPIR BIOP OCOA -C: Performed by: INTERNAL MEDICINE

## 2020-05-08 PROCEDURE — 74011000250 HC RX REV CODE- 250: Performed by: ANESTHESIOLOGY

## 2020-05-08 PROCEDURE — 82962 GLUCOSE BLOOD TEST: CPT

## 2020-05-08 PROCEDURE — 88172 CYTP DX EVAL FNA 1ST EA SITE: CPT

## 2020-05-08 RX ORDER — SODIUM CHLORIDE 9 MG/ML
75 INJECTION, SOLUTION INTRAVENOUS CONTINUOUS
Status: DISCONTINUED | OUTPATIENT
Start: 2020-05-08 | End: 2020-05-08 | Stop reason: HOSPADM

## 2020-05-08 RX ORDER — PROPOFOL 10 MG/ML
INJECTION, EMULSION INTRAVENOUS AS NEEDED
Status: DISCONTINUED | OUTPATIENT
Start: 2020-05-08 | End: 2020-05-08

## 2020-05-08 RX ORDER — SODIUM CHLORIDE 0.9 % (FLUSH) 0.9 %
5-40 SYRINGE (ML) INJECTION AS NEEDED
Status: DISCONTINUED | OUTPATIENT
Start: 2020-05-08 | End: 2020-05-08 | Stop reason: HOSPADM

## 2020-05-08 RX ORDER — EPINEPHRINE 0.1 MG/ML
1 INJECTION INTRACARDIAC; INTRAVENOUS
Status: DISCONTINUED | OUTPATIENT
Start: 2020-05-08 | End: 2020-05-08 | Stop reason: HOSPADM

## 2020-05-08 RX ORDER — ATROPINE SULFATE 0.1 MG/ML
0.5 INJECTION INTRAVENOUS
Status: DISCONTINUED | OUTPATIENT
Start: 2020-05-08 | End: 2020-05-08 | Stop reason: HOSPADM

## 2020-05-08 RX ORDER — LIDOCAINE HYDROCHLORIDE 20 MG/ML
INJECTION, SOLUTION EPIDURAL; INFILTRATION; INTRACAUDAL; PERINEURAL AS NEEDED
Status: DISCONTINUED | OUTPATIENT
Start: 2020-05-08 | End: 2020-05-08 | Stop reason: HOSPADM

## 2020-05-08 RX ORDER — DEXTROMETHORPHAN/PSEUDOEPHED 2.5-7.5/.8
1.2 DROPS ORAL
Status: DISCONTINUED | OUTPATIENT
Start: 2020-05-08 | End: 2020-05-08 | Stop reason: HOSPADM

## 2020-05-08 RX ORDER — FLUMAZENIL 0.1 MG/ML
0.2 INJECTION INTRAVENOUS
Status: DISCONTINUED | OUTPATIENT
Start: 2020-05-08 | End: 2020-05-08 | Stop reason: HOSPADM

## 2020-05-08 RX ORDER — GLYCOPYRROLATE 0.2 MG/ML
INJECTION INTRAMUSCULAR; INTRAVENOUS AS NEEDED
Status: DISCONTINUED | OUTPATIENT
Start: 2020-05-08 | End: 2020-05-08 | Stop reason: HOSPADM

## 2020-05-08 RX ORDER — PROPOFOL 10 MG/ML
INJECTION, EMULSION INTRAVENOUS
Status: DISCONTINUED | OUTPATIENT
Start: 2020-05-08 | End: 2020-05-08 | Stop reason: HOSPADM

## 2020-05-08 RX ORDER — PROPOFOL 10 MG/ML
INJECTION, EMULSION INTRAVENOUS AS NEEDED
Status: DISCONTINUED | OUTPATIENT
Start: 2020-05-08 | End: 2020-05-08 | Stop reason: HOSPADM

## 2020-05-08 RX ORDER — SODIUM CHLORIDE 0.9 % (FLUSH) 0.9 %
5-40 SYRINGE (ML) INJECTION EVERY 8 HOURS
Status: DISCONTINUED | OUTPATIENT
Start: 2020-05-08 | End: 2020-05-08 | Stop reason: HOSPADM

## 2020-05-08 RX ORDER — PROPOFOL 10 MG/ML
INJECTION, EMULSION INTRAVENOUS
Status: DISCONTINUED | OUTPATIENT
Start: 2020-05-08 | End: 2020-05-08

## 2020-05-08 RX ORDER — NALOXONE HYDROCHLORIDE 0.4 MG/ML
0.4 INJECTION, SOLUTION INTRAMUSCULAR; INTRAVENOUS; SUBCUTANEOUS
Status: DISCONTINUED | OUTPATIENT
Start: 2020-05-08 | End: 2020-05-08 | Stop reason: HOSPADM

## 2020-05-08 RX ADMIN — PROPOFOL 250 MG: 10 INJECTION, EMULSION INTRAVENOUS at 15:10

## 2020-05-08 RX ADMIN — LIDOCAINE HYDROCHLORIDE 100 MG: 20 INJECTION, SOLUTION EPIDURAL; INFILTRATION; INTRACAUDAL; PERINEURAL at 14:15

## 2020-05-08 RX ADMIN — PROPOFOL 75 MCG/KG/MIN: 10 INJECTION, EMULSION INTRAVENOUS at 14:20

## 2020-05-08 RX ADMIN — SODIUM CHLORIDE 75 ML/HR: 900 INJECTION, SOLUTION INTRAVENOUS at 13:44

## 2020-05-08 RX ADMIN — GLYCOPYRROLATE 0.2 MG: 0.2 INJECTION, SOLUTION INTRAMUSCULAR; INTRAVENOUS at 14:07

## 2020-05-08 NOTE — DISCHARGE INSTRUCTIONS
Cindy Jeffers  [id number]  1955    EGD DISCHARGE INSTRUCTIONS  Discomfort:  Sore throat- throat lozenges or warm salt water gargle  redness at IV site- apply warm compress to area; if redness or soreness persist- contact your physician  Gaseous discomfort- walking, belching will help relieve any discomfort  You may not operate a vehicle for 12 hours  You may not engage in an occupation involving machinery or appliances for rest of today  You may not drink alcoholic beverages for at least 12 hours  Avoid making any critical decisions for at least 24 hour  DIET  You may resume your regular diet - however -  remember your colon is empty and a heavy meal will produce gas. Avoid these foods:  vegetables, fried / greasy foods, carbonated drinks    MEDICATIONS:  Per Medication Reconciliation      ACTIVITY  You may resume your normal daily activities until tomorrow AM;  Spend the remainder of the day resting -  avoid any strenuous activity. CALL M.D. ANY SIGN OF   Increasing pain, nausea, vomiting  Abdominal distension (swelling)  New increased bleeding (oral or rectal)  Fever (chills)  Pain in chest area  Bloody discharge from nose or mouth  Shortness of breath    You may not take any Advil, Aspirin, Ibuprofen, Motrin, Aleve, or Goodys for 10 days, ONLY  Tylenol as needed for pain. IMPRESSION:  Endoscopic:  1. Normal proximal, mid, and distal esophagus  2. Mild, patchy, non-erosive gastropathy in body and antrum. Biopsied  3. Stomach otherwise normal, including retroflexion  4. Normal duodenal bulb and 2nd portion of the duodenum    Ultrasound:   Pancreas:     1. 35 mm by 25 mm hypoechoic mass in pancreatic body with invasion of SMV. Interventions: Fine needle biopsy was performed of the pancreas  using a 22 gauge needle with 4 of passes with preliminary results suggesting malignancy. Recommendations:   1. Follow up pathology and cytology  2.  Oncology referral/Dr. Marylen Juba    Follow-up Instructions:   Call Dr. Michaelle Young for the results of procedure / biopsy in 7-10 days   Telephone # [phone]    Ai Flor MD

## 2020-05-08 NOTE — PROCEDURES
NAME:  Levar Faith   :   1955   MRN:   [id number]     [facility]ULIANNA THORNE [facility]    Date/Time:  2020   Procedure Type: EGD/Linear EUS    Indications: Pancreatic mass    Pre-operative Diagnosis: see indication above    Post-operative Diagnosis:  See findings below    : Ashutosh Ann MD    Referring Provider: --Jazmín Kerr MD    Procedure Details:    Exam:  Airway: clear, no airway problems anticipated  Heart: RRR, without gallops or rubs  Lungs: clear bilaterally without wheezes, crackles, or rhonchi  Abdomen: soft, nontender, nondistended, bowel sounds present  Mental Status: awake, alert and oriented to person, place and time     Anethesia/Sedation:  MAC anesthesia Propofol      Procedure Details   After infom consent was obtained for the procedure, with all risks and benefits of procedure explained the patient was taken to the endoscopy suite and placed in the left lateral decubitus position. Following sequential administration of sedation as per above, the linear echoendoscope was inserted into the mouth and advanced under direct vision to second portion of the duodenum. A careful inspection was made as the gastroscope was withdrawn, including a retroflexed view of the proximal stomach; findings and interventions are described below. Findings:     Endoscopic:  1. Normal proximal, mid, and distal esophagus  2. Mild, patchy, non-erosive gastropathy in body and antrum. Biopsied  3. Stomach otherwise normal, including retroflexion  4. Normal duodenal bulb and 2nd portion of the duodenum    Ultrasound:   Pancreas:     1. 35 mm by 25 mm hypoechoic mass in pancreatic body with invasion of SMV. Specimen Removed: 1. Gastric    Complications: None. EBL:  None. Interventions: Fine needle biopsy was performed of the pancreas  using a 22 gauge needle with 4 of passes with preliminary results suggesting malignancy. Recommendations:   1. Follow up pathology and cytology  2.  Oncology referral/Dr. Bhavna Cordova MD

## 2020-05-08 NOTE — PROGRESS NOTES
.. Endoscope was pre-cleaned at bedside immediately following procedure by DORI Laguna .Anesthesia reports 650mg Propofol, 100mg Lidocaine and 500mL NS given during procedure. Received report from anesthesia staff on vital signs and status of patient.

## 2020-05-08 NOTE — ANESTHESIA PREPROCEDURE EVALUATION
Relevant Problems   No relevant active problems       Anesthetic History   No history of anesthetic complications            Review of Systems / Medical History  Patient summary reviewed, nursing notes reviewed and pertinent labs reviewed    Pulmonary  Within defined limits                 Neuro/Psych   Within defined limits           Cardiovascular    Hypertension          Hyperlipidemia    Exercise tolerance: >4 METS  Comments: 5-1-2020 EKG: NSR   GI/Hepatic/Renal     GERD          Comments: Mass of pancreas Endo/Other    Diabetes: using insulin    Morbid obesity and arthritis     Other Findings   Comments: Tracheal stenosis  Inflammatory polyarthropathy  Ocular nevus Left           Physical Exam    Airway  Mallampati: II  TM Distance: 4 - 6 cm  Neck ROM: normal range of motion   Mouth opening: Normal     Cardiovascular  Regular rate and rhythm,  S1 and S2 normal,  no murmur, click, rub, or gallop             Dental  No notable dental hx       Pulmonary  Breath sounds clear to auscultation               Abdominal  GI exam deferred       Other Findings            Anesthetic Plan    ASA: 3  Anesthesia type: total IV anesthesia          Induction: Intravenous  Anesthetic plan and risks discussed with: Patient

## 2020-05-08 NOTE — ANESTHESIA POSTPROCEDURE EVALUATION
Procedure(s):  ENDOSCOPIC ULTRASOUND (EUS)  ESOPHAGOGASTRODUODENOSCOPY (EGD)  ESOPHAGOGASTRODUODENAL (EGD) BIOPSY  ENDOSCOPIC ULTRASOUND ASPIRATION FINE NEEDLE.    total IV anesthesia    Anesthesia Post Evaluation        Patient location during evaluation: PACU  Note status: Adequate. Level of consciousness: responsive to verbal stimuli and sleepy but conscious  Pain management: satisfactory to patient  Airway patency: patent  Anesthetic complications: no  Cardiovascular status: acceptable  Respiratory status: acceptable  Hydration status: acceptable  Comments: +Post-Anesthesia Evaluation and Assessment    Patient: Jaxon Maldonado MRN: [id number]  SSN: [id number]   YOB: 1955  Age: 59 y.o. Sex: female      Cardiovascular Function/Vital Signs    /84   Pulse 75   Temp 36.6 °C (97.9 °F)   Resp 20   Ht 5' 4\" (1.626 m)   Wt 103.9 kg (229 lb)   SpO2 98%   Breastfeeding No   BMI 39.31 kg/m²     Patient is status post Procedure(s):  ENDOSCOPIC ULTRASOUND (EUS)  ESOPHAGOGASTRODUODENOSCOPY (EGD)  ESOPHAGOGASTRODUODENAL (EGD) BIOPSY  ENDOSCOPIC ULTRASOUND ASPIRATION FINE NEEDLE. Nausea/Vomiting: Controlled. Postoperative hydration reviewed and adequate. Pain:  Pain Scale 1: Numeric (0 - 10) (05/08/20 1523)  Pain Intensity 1: 0 (05/08/20 1523)   Managed. Neurological Status: At baseline. Mental Status and Level of Consciousness: Arousable. Pulmonary Status:   O2 Device: Room air (05/08/20 1523)   Adequate oxygenation and airway patent. Complications related to anesthesia: None    Post-anesthesia assessment completed. No concerns.     Signed By: Noemy Valentine DO    5/8/2020  Post anesthesia nausea and vomiting:  controlled      Vitals Value Taken Time   /84 5/8/2020  3:23 PM   Temp 36.6 °C (97.9 °F) 5/8/2020  3:18 PM   Pulse 75 5/8/2020  3:23 PM   Resp 20 5/8/2020  3:23 PM   SpO2 98 % 5/8/2020  3:23 PM

## 2020-05-11 ENCOUNTER — PATIENT OUTREACH (OUTPATIENT)
Dept: OTHER | Age: 65
End: 2020-05-11

## 2020-05-11 NOTE — PROGRESS NOTES
[facility] progress note    Patient eligible for [facility] care management    Two patient identifiers verified. Discussed the care management program.  Patient agrees to care management services at this time. Pt reported she is doing well from procedure. Pt continues to have abdominal/back pain. 5/10 on pain scale with Tylenol. Patient denies C/P, SOB, cough, wheezing, fever, pain/swelling of legs or feet, N/V, diarrhea, difficulty urinating or constipation. Appetite/hydration fair. Pt has not checked BS today, but agreed to check later. Patient's primary care provider relationship reviewed with patient and modified, as applicable. Patient has significant diagnosis of pancreas mass and DM2 . Care management assessment completed:    Patient stated problem: \"I'm waiting on my biopsy results and I'm scheduled to see oncology soon\"    Care manager identified primary concern     [facility] on 5/8/2020    Pre-procedure Diagnoses   Mass of pancreas [K86.89]   Post-procedure Diagnoses   Mass of pancreas [K86.89]   Gastritis without bleeding, unspecified chronicity, unspecified gastritis type [K29.70]   Procedures   UPPER GI ENDOSCOPY,BIOPSY [[id number]]   UPPER GI ENDOSCOPY,FN NEEDLE BX,GUIDED [[id number]]       Emotional Status/Adjustment to Health State: in good spirits    Readiness to Change: []  Pre-contemplative    []  Contemplative  [x]  Preparation               []  Action                  []  Maintenance    Barriers/Challenges to Care: []  Decline in memory    []  Language barrier     []  Emotional                  []  Limited mobility  [x]  Lack of motivation     [] Vision, hearing or cognitive impairment []  Knowledge [] Financial Barriers  []  Other      Patient stated goal I need to start checking my BS's daily    Care Manager/Provider identified medical goal     Goals      Attends follow-up appointments as directed.       Oncology - 5/15/2020  PCP - TBD       Patient verbalizes understanding of self -management goals of living with Diabetes.  Supportive resources in place to maintain patient in the community (ie. Home Health, DME equipment, refer to, medication assistant plan, etc.)           Support System/Resources:     Advance Care Planning: not on file     ADLS/DME: no    Referrals: no      Upcoming appointments:   Future Appointments   Date Time Provider Louis Vaili   5/15/2020  2:15 PM MD [address]   7/14/2020 11:00 AM Tate Serve, DO [address] for Chronic Disease:    1. Diagnosis 1- DM2    2. Diagnosis 2- pancreas mass    3. Focused Assessment- Gastrointestinal Condition Focused Assessment    Skin- any open wounds, incisions or appliance no  Description of wound- n/a  New or worsening pain? yes  If yes, pain rated 0-10: 5 Location/pain characteristics: \"over pancreas and back\"   New or worsening numbness or tingling? no  If yes, location of numbness and tingling: n/a  Activity level- moving several times a day, or as recommended? yes  Abnormal activity level reported: no   Nutrition- prescribed diet? yes   Diet prescribed or recommended: diabetic  Difficulty swallowing no  Last weight of 229 lbs on 5/8/2020  Last BM on 5/11/2020 abnormal consistency or amount no  Abnormal labs no     In the last 24 hour have you experienced; Fever no  Low body temperature no  Chills or shaking no  Sweating no  Fast heart rate no  Fast breathing no  Dizziness/lightheadedness no  Confusion or unusual change in mental status no  Diarrhea no  Nausea no   Vomiting no  Shortness of breath or difficulty breathing no  Less urine output no  Cold, clammy, and pale skin no  Skin rash or skin color changes no      4.  Key pt activities to achieve better health:   [x]  Weight loss  [x]  Improved diabetic control  []  Decreased cholesterol levels  []  Decreased blood pressure  []    []    Patient verbalized understanding of all information discussed. Pt has my contact information for any further questions, concerns, or needs.     Plan for next call:    1 week

## 2020-05-14 PROBLEM — C25.9 ADENOCARCINOMA OF PANCREAS (HCC): Status: ACTIVE | Noted: 2020-05-13

## 2020-05-15 ENCOUNTER — DOCUMENTATION ONLY (OUTPATIENT)
Dept: ONCOLOGY | Age: 65
End: 2020-05-15

## 2020-05-15 ENCOUNTER — OFFICE VISIT (OUTPATIENT)
Dept: ONCOLOGY | Age: 65
End: 2020-05-15

## 2020-05-15 VITALS
SYSTOLIC BLOOD PRESSURE: 96 MMHG | HEART RATE: 66 BPM | HEIGHT: 64 IN | DIASTOLIC BLOOD PRESSURE: 62 MMHG | OXYGEN SATURATION: 97 % | TEMPERATURE: 98.7 F | WEIGHT: 230 LBS | BODY MASS INDEX: 39.27 KG/M2

## 2020-05-15 DIAGNOSIS — C25.9 ADENOCARCINOMA OF PANCREAS (HCC): Primary | ICD-10-CM

## 2020-05-15 DIAGNOSIS — G89.3 CANCER ASSOCIATED PAIN: ICD-10-CM

## 2020-05-15 RX ORDER — LIDOCAINE AND PRILOCAINE 25; 25 MG/G; MG/G
CREAM TOPICAL AS NEEDED
Qty: 30 G | Refills: 0 | Status: SHIPPED | OUTPATIENT
Start: 2020-05-15 | End: 2020-07-27 | Stop reason: ALTCHOICE

## 2020-05-15 RX ORDER — PROCHLORPERAZINE MALEATE 10 MG
5 TABLET ORAL
Qty: 60 TAB | Refills: 3 | Status: SHIPPED | OUTPATIENT
Start: 2020-05-15 | End: 2021-12-16 | Stop reason: ALTCHOICE

## 2020-05-15 NOTE — PROGRESS NOTES
Oncology Navigator  Psychosocial Assessment    Reason for Assessment:    []Depression  []Anxiety  []Caregiver [place]  []Maladaptive Coping with Serious Illness   [] Social Work Referral [x] Initial Assessment  [] Other     Sources of Information:    [x]Patient  []Family  [x]Staff  [x]Medical Record    Advance Care Planning:  No flowsheet data found.     Mental Status:    [x]Alert  []Lethargic  []Unresponsive   [] Unable to assess   Oriented to:  [x]Person  [x]Place  [x]Time  [x]Situation      Barriers to Learning:    []Language  []Developmental  []Cognitive  []Altered Mental Status  []Visual/Hearing Impairment  []Unable to Read/Write  []Motivational   [x]No Barriers Identified  []Other:    Relationship Status:  []Single  [x]  []Significant Other/Life Partner  []  []  []      Living Circumstances:  []Lives Alone  [x]Family/Significant Other in Household  []Roommates  []Children in the Home  []Paid Caregivers  []Assisted Living Facility/Group Home  []Skilled [address]  []Homeless  []Incarcerated  []Environmental/Care Concerns  []other:    Employment Status:  [x]Employed Full-time []Employed Part-time []Homemaker [] Disabled  [] Retired []Other:    Support System:    []Strong  [x]Fair  []Limited    Financial/Legal Concerns:    []Uninsured  []Limited Income/Resources  []Non-Citizen  [x]No Concerns Identified  []Financial POA:    []Other:    [employer]ian/Spiritual/Existential:  [x]Strong Sense of Spirituality  [x]Involved in [employer]  []Request  Visit  []Expressing Spiritual/Existential Angst  []No Concerns Identified    Coping with Illness:         Patient: Family/Caregiver:   Understanding and Acceptance of Illness/Prognosis  [x] []   Strong Sense of Resilience [x] []   Self Reflection [x] []   Engaged Support System [x] []   Does not Readily Discuss Illness [] []   Denial of Terminal Status [] []   Anger [] []   Depression [] []   Anxiety/Fear [] []   Bargaining [] [] Recent Diagnosis/Prognosis [] []   Difficulties with Body Image [] []   Loss of Identity [] []   Excessive Substance Use [] []   Mental Health History [] []   Enmeshed Relationships [] []   History of Loss [] []   Anticipatory Grief [] []   Concern for Complicated Grief [] []   Suicidal Ideation or Plan [] []   Unable to assess [] []            Narrative:    Met with patient to introduce social work navigator role and supports. Pt present with best friend, who is wife of her PCP.  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   Pts CG for her spouse who is ok when in his own home\"   shunt in 2018 for hydrocephalus which caused frontal lobe damage. Assessment/Action:    1. Introduce self and role of the  in the [employer]. 2. Informed the patient of the [facility] and available resources there. virtual  3. Continue to meet with the patient when she  returns to the clinic for ongoing assessment of the patient's adjustment to her diagnosis and treatment. 4. Ongoing psychosocial support as desired by patient. Plan/Referral:      Insurance/Entitlements referral   [facility] insurance      Financial/Medication assistance referral   Unsure of out of pocket expenses  Other referral       TATIANNA Alejo/ZANE  Supervisee in Social Work

## 2020-05-15 NOTE — PROGRESS NOTES
Юлия Pereira is a 59 y. [place] female here for new patient appt for:  Chief Complaint   Patient presents with    New Patient     pancreatic mass    Referral / Consult     hospital     1. Have you been to the ER, urgent care clinic since your last visit? Hospitalized since your last visit? New Pt    2. Have you seen or consulted any other health care providers outside of the [address] [place] [address] since your last visit? Include any pap smears or colon screening. New Pt    Pt here with best friend. They both work for [employer]. Pt has had bouts of pancreatitis in 2013, 2017, and 2019. She had been having bad back pain for about 2 months and was getting progressive. She had lack of energy, fatigue, and weight loss. Pt had recent scan on 5/12 and endoscopy Bx 5/8.  is disabled. Daughter lives across street from her. She is a  and is home right now. Has lots of supportive friends.

## 2020-05-15 NOTE — PROGRESS NOTES
Chemotherapy education packet provided to the patient and reviewed. Patient's close friend in with her during the visit. Consent was also obtained. All questions and concerns were addressed. Time spent with patient approximately 15 minutes.

## 2020-05-18 ENCOUNTER — OFFICE VISIT (OUTPATIENT)
Dept: SURGERY | Age: 65
End: 2020-05-18

## 2020-05-18 VITALS
TEMPERATURE: 98 F | DIASTOLIC BLOOD PRESSURE: 63 MMHG | SYSTOLIC BLOOD PRESSURE: 145 MMHG | RESPIRATION RATE: 16 BRPM | HEIGHT: 64 IN | BODY MASS INDEX: 39.27 KG/M2 | HEART RATE: 64 BPM | WEIGHT: 230 LBS

## 2020-05-18 DIAGNOSIS — C25.9 MALIGNANT NEOPLASM OF PANCREAS, UNSPECIFIED LOCATION OF MALIGNANCY (HCC): Primary | ICD-10-CM

## 2020-05-18 DIAGNOSIS — C25.9 ADENOCARCINOMA OF PANCREAS (HCC): Primary | ICD-10-CM

## 2020-05-18 NOTE — PATIENT INSTRUCTIONS
Implanted Port: Before Your Procedure  What is an implanted port? An implanted port is a device to put medicine, blood, nutrients, or fluids directly into your blood. The port may be used to draw blood for tests only if another vein, such as in the hand or arm, can't be used. People can have a port for weeks, months, or longer. A port is usually put under the skin of your chest below your collarbone. A thin, flexible tube goes from the port into a large vein. This tube also goes under your skin. It's called a catheter. A port can be made of plastic, stainless steel, or titanium. It's usually about the size of a quarter, but thicker. It has a silicone bubble in the center. This is called a septum. Before your doctor puts in the port, you will get medicine to make you sleep or feel relaxed. Then the doctor threads the catheter up a vein in your neck or chest to a larger vein. Next, the doctor puts in the port just under your skin. It looks like a small bump. Fluid goes into the port through a needle. You will feel a slight pain when the needle goes into the port. Some ports have a small reservoir that can be filled with medicine or fluid. The reservoir slowly puts medicine into your bloodstream. A special needle may stay in the port for a short time. This is called a Ferguson needle. Follow-up care is a key part of your treatment and safety. Be sure to make and go to all appointments, and call your doctor if you are having problems. It's also a good idea to know your test results and keep a list of the medicines you take. What happens before the procedure?   Preparing for the procedure    · Understand exactly what procedure is planned, along with the risks, benefits, and other options. · Tell your doctors ALL the medicines, vitamins, supplements, and herbal remedies you take.  Some of these can increase the risk of bleeding or interact with anesthesia.     · If you take aspirin or some other blood thinner, ask your doctor if you should stop taking it before your procedure. Make sure that you understand exactly what your doctor wants you to do. These medicines increase the risk of bleeding.     · Your doctor will tell you which medicines to take or stop before your procedure. You may need to stop taking certain medicines a week or more before the procedure. So talk to your doctor as soon as you can.     · If you have an advance directive, let your doctor know. It may include a living will and a durable power of  for health care. Bring a copy to the hospital. If you don't have one, you may want to prepare one. It lets your doctor and loved ones know your health care wishes. Doctors advise that everyone prepare these papers before any type of surgery or procedure. Procedures can be stressful. This information will help you understand what you can expect. And it will help you safely prepare for your procedure. What happens on the day of the procedure? · Follow the instructions exactly about when to stop eating and drinking. If you don't, your procedure may be canceled. If your doctor told you to take your medicines on the day of the procedure, take them with only a sip of water.     · Take a bath or shower before you come in for your procedure. Do not apply lotions, perfumes, deodorants, or nail polish.     · Take off all jewelry and piercings. And take out contact lenses, if you wear them.    At the hospital or surgery center   · Bring a picture ID.     · You will be kept comfortable and safe by your anesthesia provider. The anesthesia may make you sleep. Or it may just numb the area being worked on.     · The procedure will take about 1 hour. Going home   · Be sure you have someone to drive you home. Anesthesia and pain medicine make it unsafe for you to drive.     · You will be given more specific instructions about recovering from your procedure.  They will cover things like diet, wound care, follow-up care, driving, and getting back to your normal routine.     · You will be told how to use and care for the implanted port. When should you call your doctor? · You have questions or concerns.     · You don't understand how to prepare for your procedure.     · You become ill before the procedure (such as fever, flu, or a cold).     · You need to reschedule or have changed your mind about having the procedure. Where can you learn more? Go to http://silver-jessica.info/  Enter L362 in the search box to learn more about \"Implanted Port: Before Your Procedure. \"  Current as of: June 26, 2019Content Version: 12.4  © 9019-8133 Healthwise, Incorporated. Care instructions adapted under license by [employer] (which disclaims liability or warranty for this information). If you have questions about a medical condition or this instruction, always ask your healthcare professional. [address] any warranty or liability for your use of this information.

## 2020-05-18 NOTE — PROGRESS NOTES
Edda Gar is a 59 y.o. female who presents today with the following:  No chief complaint on file. HPI    66-year-old female who presents to us as a referral by Dr. Gwen Silva for evaluation of newly diagnosed adenocarcinoma the pancreas with plans for port placement. She has had recurrent episodes of pancreatitis and had a CT scan that showed a suspicious lesion in the pancreas and underwent endoscopic ultrasound with biopsy that revealed adenocarcinoma. She has already had discussions with oncology with anticipation of chemotherapy. Unfortunately the lesion is not currently resectable. She has not had complete work-up in terms of staging yet. The plan is for chemotherapy to prevent progression of the lesion or possibly reduce the size of the lesion to allow for surgery. She has a history of insulin-dependent diabetes. .  She also has hypertension and arthritis. She has had a cholecystectomy and a hysterectomy and a tonsillectomy in the right inguinal hernia repair. She does take 81 mg aspirin a day and she did take some today. Her chart lists an allergy to iodine but she states that she is not allergic to any contrast dye.   Past Medical History:   Diagnosis Date    Adenocarcinoma of pancreas ([facility] 05/13/2020    Dr Mary Ellen Ramey biopsy via EUS    Diabetes ([facility]     GERD (gastroesophageal reflux disease)     Hernia of abdominal cavity     Subxyphoid hernia    Hypertension     Inflammatory polyarthropathy (HCC)     Joint pain     Joint swelling     Menopause     Ocular nevus of left eye     Pancreatitis     Tracheal stenosis        Past Surgical History:   Procedure Laterality Date    HX CARPAL TUNNEL RELEASE Bilateral     HX COLONOSCOPY      HX HYSTERECTOMY      HX KNEE ARTHROSCOPY Right     HX KNEE REPLACEMENT Right     partial    HX TONSILLECTOMY         Social History     Socioeconomic History    Marital status:      Spouse name: Mann Posadas Number of children: 3    Years of education: Not on file    Highest education level: Associate degree: academic program   Occupational History    Occupation: RN  [employer]   Social Needs    Financial resource strain: Not hard at all   Carolin-Rafa insecurity     Worry: Never true     Inability: Never true   [employer] needs     Medical: No     Non-medical: No   Tobacco Use    Smoking status: Never Smoker    Smokeless tobacco: Never Used   Substance and Sexual Activity    Alcohol use: No     Alcohol/week: 0.0 standard drinks     Frequency: Never     Binge frequency: Never    Drug use: No    Sexual activity: Not Currently   Lifestyle    Physical activity     Days per week: 0 days     Minutes per session: 0 min    Stress: Rather much   Relationships    Social connections     Talks on phone: More than three times a week     Gets together: Once a week     Attends Mu-ism service: Never     Active member of club or organization: Yes     Attends meetings of clubs or organizations: 1 to 4 times per year     Relationship status:     Intimate partner violence     Fear of current or ex partner: No     Emotionally abused: No     Physically abused: No     Forced sexual activity: No   Other Topics Concern    Not on file   Social History Narrative    Not on file       Family History   Problem Relation Age of Onset    Heart Disease Mother     Heart Attack Mother     Cancer Father         lung    Diabetes Sister        Allergies   Allergen Reactions    Iodine Swelling     Shrimp      5/15/2020 Pt states she is not allergic to iodine. Had one reaction many years ago but has used many times since just fine. Current Outpatient Medications   Medication Sig    lidocaine-prilocaine (EMLA) topical cream Apply  to affected area as needed for Pain.  prochlorperazine (COMPAZINE) 10 mg tablet Take 0.5 Tabs by mouth every six (6) hours as needed for Nausea.     ondansetron (ZOFRAN ODT) 4 mg disintegrating tablet Take 1 Tab by mouth every eight (8) hours as needed for Nausea or Vomiting.  lipase-protease-amylase (CREON 12,000) 12,000-38,000 -60,000 unit capsule Take 1 Cap by mouth three (3) times daily (with meals). Indications: exocrine pancreatic insufficiency    HYDROmorphone (DILAUDID) 2 mg tablet Take 1 Tab by mouth every four (4) hours as needed for Pain for up to 30 days. Max Daily Amount: 12 mg.  Blood-Glucose Meter,Continuous (Dexcom G6 ) misc Use as directed to monitor blood glucose as directed    Blood-Glucose Sensor (Dexcom G6 Sensor) fadi Use as directed to monitor blood glucose as directed    Blood-Glucose Transmitter (Dexcom G6 Transmitter) fadi Use as directed to monitor blood glucose as directed    insulin lispro (HumaLOG KwikPen Insulin) 100 unit/mL kwikpen Inject 25 units into the skin with breakfast, 15 units with lunch (if BG is >200 and eating carbs with meal), and 30 units with dinner    insulin glargine U-300 conc (Toujeo SoloStar U-300 Insulin) 300 unit/mL (1.5 mL) inpn pen 55 units every bedtime    pravastatin (PRAVACHOL) 20 mg tablet TAKE 1 TABLET BY MOUTH EVERY NIGHT AT BEDTIME    omeprazole (PRILOSEC) 40 mg capsule Take 1 Cap by mouth daily.  acetaminophen (Acetaminophen Extra Strength) 500 mg tablet Take 1,000 mg by mouth two (2) times a day.  ibuprofen (Motrin IB) 200 mg tablet Take 200 mg by mouth every eight (8) hours as needed for Pain.  Needle, Disp, ndle 1 Each by Does Not Apply route two (2) times daily as needed for Other.  triamterene-hydroCHLOROthiazide (DYAZIDE) 37.5-25 mg per capsule TAKE ONE CAPSULE BY MOUTH EVERY DAY    carvedilol (COREG) 25 mg tablet TAKE 1 TABLET BY MOUTH TWICE DAILY    aspirin delayed-release 81 mg tablet Take  by mouth daily. No current facility-administered medications for this visit. The above histories, medications and allergies have been reviewed. Review of Systems   Constitutional: Positive for weight loss.    Gastrointestinal: Positive for abdominal pain. Visit Vitals  /63 (BP 1 Location: Left arm, BP Patient Position: Sitting)   Pulse 64   Temp 98 °F (36.7 °C)   Resp 16   Ht 5' 4\" (1.626 m)   Wt 230 lb (104.3 kg)   BMI 39.48 kg/m²     Physical Exam  Constitutional:       Appearance: She is obese. Cardiovascular:      Rate and Rhythm: Normal rate and regular rhythm. Pulmonary:      Effort: Pulmonary effort is normal.      Breath sounds: Normal breath sounds. Neurological:      Mental Status: She is alert. 1. Adenocarcinoma of pancreas ([address]  Offered Port-A-Cath placement. Risks of the procedure were shared. Risk of pneumothorax, injury to the great vessels of the chest, infection, port malplacement or malfunction were all discussed. Benefits of port were also discussed. Alternative treatments were discussed including PICC line placement. Patient understands and wishes to proceed with port placement. We will schedule. The patient was counseled at length about the risks of nithya Covid-19 during their perioperative period and any recovery window from their procedure. The patient was made aware that nithya Covid-19  may worsen their prognosis for recovering from their procedure and lend to a higher morbidity and/or mortality risk. All material risks, benefits, and reasonable alternatives including postponing the procedure were discussed. The patient does  wish to proceed with the procedure at this time. Follow-up and Dispositions    · Return for post procedure.          Edd Victor MD

## 2020-05-18 NOTE — PROGRESS NOTES
1. Have you been to the ER, urgent care clinic since your last visit? Hospitalized since your last visit? No    2. Have you seen or consulted any other health care providers outside of the [address] [place] [address] since your last visit? Include any pap smears or colon screening.  No

## 2020-05-19 ENCOUNTER — TELEPHONE (OUTPATIENT)
Dept: ONCOLOGY | Age: 65
End: 2020-05-19

## 2020-05-19 RX ORDER — HYDROCORTISONE SODIUM SUCCINATE 100 MG/2ML
100 INJECTION, POWDER, FOR SOLUTION INTRAMUSCULAR; INTRAVENOUS AS NEEDED
Status: CANCELLED | OUTPATIENT
Start: 2020-05-26

## 2020-05-19 RX ORDER — ALBUTEROL SULFATE 0.83 MG/ML
2.5 SOLUTION RESPIRATORY (INHALATION) AS NEEDED
Status: CANCELLED
Start: 2020-05-26

## 2020-05-19 RX ORDER — SODIUM CHLORIDE 9 MG/ML
10 INJECTION INTRAMUSCULAR; INTRAVENOUS; SUBCUTANEOUS AS NEEDED
Status: CANCELLED | OUTPATIENT
Start: 2020-05-26

## 2020-05-19 RX ORDER — SODIUM CHLORIDE 9 MG/ML
10 INJECTION INTRAMUSCULAR; INTRAVENOUS; SUBCUTANEOUS AS NEEDED
Status: CANCELLED | OUTPATIENT
Start: 2020-05-28

## 2020-05-19 RX ORDER — SODIUM CHLORIDE 0.9 % (FLUSH) 0.9 %
10 SYRINGE (ML) INJECTION AS NEEDED
Status: CANCELLED
Start: 2020-05-28

## 2020-05-19 RX ORDER — ATROPINE SULFATE 0.4 MG/ML
0.4 INJECTION, SOLUTION ENDOTRACHEAL; INTRAMEDULLARY; INTRAMUSCULAR; INTRAVENOUS; SUBCUTANEOUS
Status: CANCELLED | OUTPATIENT
Start: 2020-05-26

## 2020-05-19 RX ORDER — ONDANSETRON 2 MG/ML
8 INJECTION INTRAMUSCULAR; INTRAVENOUS AS NEEDED
Status: CANCELLED | OUTPATIENT
Start: 2020-05-26

## 2020-05-19 RX ORDER — DIPHENHYDRAMINE HYDROCHLORIDE 50 MG/ML
50 INJECTION, SOLUTION INTRAMUSCULAR; INTRAVENOUS AS NEEDED
Status: CANCELLED
Start: 2020-05-26

## 2020-05-19 RX ORDER — HEPARIN 100 UNIT/ML
300-500 SYRINGE INTRAVENOUS AS NEEDED
Status: CANCELLED
Start: 2020-05-28

## 2020-05-19 RX ORDER — ATROPINE SULFATE 0.4 MG/ML
0.4 INJECTION, SOLUTION ENDOTRACHEAL; INTRAMEDULLARY; INTRAMUSCULAR; INTRAVENOUS; SUBCUTANEOUS ONCE
Status: CANCELLED | OUTPATIENT
Start: 2020-05-26

## 2020-05-19 RX ORDER — FLUOROURACIL 50 MG/ML
400 INJECTION, SOLUTION INTRAVENOUS ONCE
Status: CANCELLED
Start: 2020-05-26 | End: 2020-05-26

## 2020-05-19 RX ORDER — EPINEPHRINE 1 MG/ML
0.3 INJECTION, SOLUTION, CONCENTRATE INTRAVENOUS AS NEEDED
Status: CANCELLED | OUTPATIENT
Start: 2020-05-26

## 2020-05-19 RX ORDER — SODIUM CHLORIDE 0.9 % (FLUSH) 0.9 %
10 SYRINGE (ML) INJECTION AS NEEDED
Status: CANCELLED
Start: 2020-05-26

## 2020-05-19 RX ORDER — DIPHENHYDRAMINE HYDROCHLORIDE 50 MG/ML
25 INJECTION, SOLUTION INTRAMUSCULAR; INTRAVENOUS AS NEEDED
Status: CANCELLED
Start: 2020-05-26

## 2020-05-19 RX ORDER — DEXTROSE MONOHYDRATE 50 MG/ML
25 INJECTION, SOLUTION INTRAVENOUS CONTINUOUS
Status: CANCELLED
Start: 2020-05-26

## 2020-05-19 RX ORDER — HEPARIN 100 UNIT/ML
300-500 SYRINGE INTRAVENOUS AS NEEDED
Status: CANCELLED
Start: 2020-05-26

## 2020-05-19 RX ORDER — PALONOSETRON 0.05 MG/ML
0.25 INJECTION, SOLUTION INTRAVENOUS ONCE
Status: CANCELLED | OUTPATIENT
Start: 2020-05-26

## 2020-05-19 RX ORDER — ACETAMINOPHEN 325 MG/1
650 TABLET ORAL AS NEEDED
Status: CANCELLED
Start: 2020-05-26

## 2020-05-19 NOTE — TELEPHONE ENCOUNTER
[address] [facility] [facility], [address] [facility]   W: [phone]  F: [phone]    Medical Nutrition Therapy  Nutrition Referral:    Referral received regarding new pancreatic cancer. Called and left a message, explained that RD is available to address nutrition throughout the spectrum of care. Provided contact information. Signed By: Joselo Lal, [address] [facility] [facility] [address], [address] [place] [address] , [address] [facility] [address]      Addendum: Patient called office, PSR indicated RD would call her back. RD called back shortly after, no answer.

## 2020-05-20 ENCOUNTER — ANESTHESIA (OUTPATIENT)
Dept: ENDOSCOPY | Age: 65
End: 2020-05-20
Payer: COMMERCIAL

## 2020-05-20 ENCOUNTER — NURSE NAVIGATOR (OUTPATIENT)
Dept: CASE MANAGEMENT | Age: 65
End: 2020-05-20

## 2020-05-20 ENCOUNTER — PATIENT OUTREACH (OUTPATIENT)
Dept: OTHER | Age: 65
End: 2020-05-20

## 2020-05-20 ENCOUNTER — HOSPITAL ENCOUNTER (OUTPATIENT)
Age: 65
Setting detail: OUTPATIENT SURGERY
Discharge: HOME | End: 2020-05-20
Attending: INTERNAL MEDICINE | Admitting: INTERNAL MEDICINE
Payer: COMMERCIAL

## 2020-05-20 ENCOUNTER — TELEPHONE (OUTPATIENT)
Dept: ONCOLOGY | Age: 65
End: 2020-05-20

## 2020-05-20 ENCOUNTER — ANESTHESIA EVENT (OUTPATIENT)
Dept: ENDOSCOPY | Age: 65
End: 2020-05-20
Payer: COMMERCIAL

## 2020-05-20 VITALS
DIASTOLIC BLOOD PRESSURE: 61 MMHG | SYSTOLIC BLOOD PRESSURE: 119 MMHG | OXYGEN SATURATION: 99 % | HEIGHT: 64 IN | RESPIRATION RATE: 22 BRPM | TEMPERATURE: 97.9 F | BODY MASS INDEX: 39.27 KG/M2 | HEART RATE: 61 BPM | WEIGHT: 230 LBS

## 2020-05-20 DIAGNOSIS — C25.9 ADENOCARCINOMA OF PANCREAS (HCC): Primary | ICD-10-CM

## 2020-05-20 PROCEDURE — 77030003406 HC NDL ASPIR BIOP OCOA -C: Performed by: INTERNAL MEDICINE

## 2020-05-20 PROCEDURE — 77030003494 HC NDL BIOP SFT COOK -C: Performed by: INTERNAL MEDICINE

## 2020-05-20 PROCEDURE — 74011250636 HC RX REV CODE- 250/636: Performed by: NURSE ANESTHETIST, CERTIFIED REGISTERED

## 2020-05-20 PROCEDURE — 74011000250 HC RX REV CODE- 250: Performed by: INTERNAL MEDICINE

## 2020-05-20 PROCEDURE — 74011000250 HC RX REV CODE- 250: Performed by: NURSE ANESTHETIST, CERTIFIED REGISTERED

## 2020-05-20 PROCEDURE — 77030018846 HC SOL IRR STRL H20 ICUM -A: Performed by: INTERNAL MEDICINE

## 2020-05-20 PROCEDURE — 76060000032 HC ANESTHESIA 0.5 TO 1 HR: Performed by: INTERNAL MEDICINE

## 2020-05-20 PROCEDURE — 77030011992 HC AIRWY NASOPHGL TELE -A: Performed by: ANESTHESIOLOGY

## 2020-05-20 PROCEDURE — 77030019957 HC CUF BLN GASTSCP OCOA -B: Performed by: INTERNAL MEDICINE

## 2020-05-20 PROCEDURE — 74011250636 HC RX REV CODE- 250/636: Performed by: INTERNAL MEDICINE

## 2020-05-20 PROCEDURE — 76040000007: Performed by: INTERNAL MEDICINE

## 2020-05-20 RX ORDER — GLYCOPYRROLATE 0.2 MG/ML
INJECTION INTRAMUSCULAR; INTRAVENOUS AS NEEDED
Status: DISCONTINUED | OUTPATIENT
Start: 2020-05-20 | End: 2020-05-20 | Stop reason: HOSPADM

## 2020-05-20 RX ORDER — FLUMAZENIL 0.1 MG/ML
0.2 INJECTION INTRAVENOUS
Status: DISCONTINUED | OUTPATIENT
Start: 2020-05-20 | End: 2020-05-20 | Stop reason: HOSPADM

## 2020-05-20 RX ORDER — SODIUM CHLORIDE 9 MG/ML
75 INJECTION, SOLUTION INTRAVENOUS CONTINUOUS
Status: DISCONTINUED | OUTPATIENT
Start: 2020-05-20 | End: 2020-05-20 | Stop reason: HOSPADM

## 2020-05-20 RX ORDER — LIDOCAINE HYDROCHLORIDE 20 MG/ML
INJECTION, SOLUTION EPIDURAL; INFILTRATION; INTRACAUDAL; PERINEURAL AS NEEDED
Status: DISCONTINUED | OUTPATIENT
Start: 2020-05-20 | End: 2020-05-20 | Stop reason: HOSPADM

## 2020-05-20 RX ORDER — BUPIVACAINE HYDROCHLORIDE 5 MG/ML
10 INJECTION, SOLUTION EPIDURAL; INTRACAUDAL ONCE
Status: DISCONTINUED | OUTPATIENT
Start: 2020-05-20 | End: 2020-05-20 | Stop reason: HOSPADM

## 2020-05-20 RX ORDER — SODIUM CHLORIDE 0.9 % (FLUSH) 0.9 %
5-40 SYRINGE (ML) INJECTION EVERY 8 HOURS
Status: DISCONTINUED | OUTPATIENT
Start: 2020-05-20 | End: 2020-05-20 | Stop reason: HOSPADM

## 2020-05-20 RX ORDER — SODIUM CHLORIDE 9 MG/ML
1000 INJECTION, SOLUTION INTRAVENOUS CONTINUOUS
Status: DISCONTINUED | OUTPATIENT
Start: 2020-05-20 | End: 2020-05-20 | Stop reason: HOSPADM

## 2020-05-20 RX ORDER — SODIUM CHLORIDE 0.9 % (FLUSH) 0.9 %
5-40 SYRINGE (ML) INJECTION AS NEEDED
Status: DISCONTINUED | OUTPATIENT
Start: 2020-05-20 | End: 2020-05-20 | Stop reason: HOSPADM

## 2020-05-20 RX ORDER — DEXTROMETHORPHAN/PSEUDOEPHED 2.5-7.5/.8
1.2 DROPS ORAL
Status: DISCONTINUED | OUTPATIENT
Start: 2020-05-20 | End: 2020-05-20 | Stop reason: HOSPADM

## 2020-05-20 RX ORDER — BUPIVACAINE HYDROCHLORIDE 2.5 MG/ML
10 INJECTION, SOLUTION EPIDURAL; INFILTRATION; INTRACAUDAL ONCE
Status: COMPLETED | OUTPATIENT
Start: 2020-05-20 | End: 2020-05-20

## 2020-05-20 RX ORDER — PROPOFOL 10 MG/ML
INJECTION, EMULSION INTRAVENOUS AS NEEDED
Status: DISCONTINUED | OUTPATIENT
Start: 2020-05-20 | End: 2020-05-20 | Stop reason: HOSPADM

## 2020-05-20 RX ORDER — EPINEPHRINE 0.1 MG/ML
1 INJECTION INTRACARDIAC; INTRAVENOUS
Status: DISCONTINUED | OUTPATIENT
Start: 2020-05-20 | End: 2020-05-20 | Stop reason: HOSPADM

## 2020-05-20 RX ORDER — NALOXONE HYDROCHLORIDE 0.4 MG/ML
0.4 INJECTION, SOLUTION INTRAMUSCULAR; INTRAVENOUS; SUBCUTANEOUS
Status: DISCONTINUED | OUTPATIENT
Start: 2020-05-20 | End: 2020-05-20 | Stop reason: HOSPADM

## 2020-05-20 RX ORDER — ATROPINE SULFATE 0.1 MG/ML
0.5 INJECTION INTRAVENOUS
Status: DISCONTINUED | OUTPATIENT
Start: 2020-05-20 | End: 2020-05-20 | Stop reason: HOSPADM

## 2020-05-20 RX ADMIN — GLYCOPYRROLATE 0.2 MG: 0.2 INJECTION, SOLUTION INTRAMUSCULAR; INTRAVENOUS at 12:53

## 2020-05-20 RX ADMIN — PROPOFOL 50 MG: 10 INJECTION, EMULSION INTRAVENOUS at 12:44

## 2020-05-20 RX ADMIN — BUPIVACAINE HYDROCHLORIDE 25 MG: 2.5 INJECTION, SOLUTION EPIDURAL; INFILTRATION; INTRACAUDAL at 12:54

## 2020-05-20 RX ADMIN — PROPOFOL 50 MG: 10 INJECTION, EMULSION INTRAVENOUS at 12:42

## 2020-05-20 RX ADMIN — SODIUM CHLORIDE 75 ML/HR: 900 INJECTION, SOLUTION INTRAVENOUS at 12:00

## 2020-05-20 RX ADMIN — LIDOCAINE HYDROCHLORIDE 20 MG: 20 INJECTION, SOLUTION EPIDURAL; INFILTRATION; INTRACAUDAL; PERINEURAL at 12:41

## 2020-05-20 RX ADMIN — ALCOHOL 10 ML: 0.98 INJECTION INTRASPINAL at 11:01

## 2020-05-20 RX ADMIN — PROPOFOL 50 MG: 10 INJECTION, EMULSION INTRAVENOUS at 12:53

## 2020-05-20 NOTE — ANESTHESIA POSTPROCEDURE EVALUATION
Procedure(s):  ENDOSCOPIC ULTRASOUND (EUS) WITH SILIAC PLEXUS.    total IV anesthesia, general    Anesthesia Post Evaluation        Patient location during evaluation: PACU  Note status: Adequate. Level of consciousness: responsive to verbal stimuli and sleepy but conscious  Pain management: satisfactory to patient  Airway patency: patent  Anesthetic complications: no  Cardiovascular status: acceptable  Respiratory status: acceptable  Hydration status: acceptable  Comments: +Post-Anesthesia Evaluation and Assessment    Patient: Jaxon Maldonado MRN: [id number]  SSN: [id number]   YOB: 1955  Age: 59 y.o. Sex: female      Cardiovascular Function/Vital Signs    /63   Pulse 63   Temp 36.8 °C (98.2 °F)   Resp 10   Ht 5' 4\" (1.626 m)   Wt 104.3 kg (230 lb)   SpO2 95%   Breastfeeding No   BMI 39.48 kg/m²     Patient is status post Procedure(s):  ENDOSCOPIC ULTRASOUND (EUS) WITH SILIAC PLEXUS. Nausea/Vomiting: Controlled. Postoperative hydration reviewed and adequate. Pain:  Pain Scale 1: Visual (05/20/20 1309)  Pain Intensity 1: 0 (05/20/20 1309)   Managed. Neurological Status: At baseline. Mental Status and Level of Consciousness: Arousable. Pulmonary Status:   O2 Device: Nasal cannula (05/20/20 1309)   Adequate oxygenation and airway patent. Complications related to anesthesia: None    Post-anesthesia assessment completed. No concerns.     Signed By: Noemy Valentine DO    5/20/2020  Post anesthesia nausea and vomiting:  controlled      Vitals Value Taken Time   /63 5/20/2020  1:09 PM   Temp     Pulse 63 5/20/2020  1:09 PM   Resp 10 5/20/2020  1:09 PM   SpO2 95 % 5/20/2020  1:09 PM

## 2020-05-20 NOTE — PROGRESS NOTES
[facility] Outreach     Call placed to pt, Verified  and address for HIPAA security. Pt reported she just arrived home after having a scheduled procedure today at [facility]. Procedure Type: Linear EUS with Celiac Plexus Neurolysis    Indications: Pancreatic adenocarcinoma    Spoke briefly with pt. Pt reported she is scheduled to return tomorrow for a diane cath placement in order to start chemo at infusion center. Pt reported she is doing as well as expected, but continues to have a good outlook on the situation and is very pleasant to work with. Continues to have pain in abdomen/back. 4/10 on pain scale. Appetite fair. Planning to have soup and rice tonight with children. Denies N/V, cough, wheeze, SOB or temp. Encouraged pt to get some rest and that this     CM will f/u back up in 1 week.

## 2020-05-20 NOTE — ANESTHESIA PREPROCEDURE EVALUATION
Relevant Problems   No relevant active problems       Anesthetic History   No history of anesthetic complications            Review of Systems / Medical History  Patient summary reviewed, nursing notes reviewed and pertinent labs reviewed    Pulmonary  Within defined limits                 Neuro/Psych   Within defined limits           Cardiovascular    Hypertension          Hyperlipidemia    Exercise tolerance: >4 METS  Comments: 5-1-2020 EKG: NSR    2015 ECHO: 60% EF    2015 Neg Stress Test   GI/Hepatic/Renal     GERD          Comments: Pancreatic Adenocarcinoma for Celiac Plexus Block Endo/Other    Diabetes: using insulin    Morbid obesity and arthritis     Other Findings   Comments: 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  Inflammatory polyarthropathy  Ocular nevus Left           Physical Exam    Airway  Mallampati: II  TM Distance: 4 - 6 cm  Neck ROM: normal range of motion   Mouth opening: Normal     Cardiovascular  Regular rate and rhythm,  S1 and S2 normal,  no murmur, click, rub, or gallop             Dental  No notable dental hx       Pulmonary  Breath sounds clear to auscultation               Abdominal  GI exam deferred       Other Findings            Anesthetic Plan    ASA: 3  Anesthesia type: total IV anesthesia          Induction: Intravenous  Anesthetic plan and risks discussed with: Patient      Preop Glucose 140

## 2020-05-20 NOTE — NURSE NAVIGATOR
ONCOLOGY NURSE NAVIGATOR    Marly Ba 60 yo     (spouse has dementia) 2 dtr (1 local, 1 [facility] 1 son (hx stroke essentially homebound) 9 grandchildren 1 great grandchild    Dx: Adenocarcinoma Pancreas, staging workup CT Scan ABD 5/21; PAC placement 5/21  Start FOLFIRINOX next week     ONN referred to see pt in endoscopy, here today for Celiac Plexus Block per Dr. Bandar Espinoza. Restless, moving trying to get comfortable. Has Diluadid 2mg Every 4 hrs PRN, \"I've only taken twice as I have a histamine release. Feel like ants crawling all over my skin, took one at 9PM and was up until 1am\". Used Benadryl with Diluadid. Reports having difficulty in past w/ Percocet and Lortab, similar sx. Does not find much pain relief from [address]. Explained supportive care to pt, message sent to Justina Delgado offered to email resources and contact dtr, agreed verbalized appreciation. Email sent with [facility], [facility], [address] [facility], and [facility] site address and links to Panatic Ca Guidelines,  Chemotherapy and You/Eating Hints PDF  Also sent information on Hand/Feet moisturizing twice a day using 20% UREA lotion and using baking soda/salt water mouth rinses after meals and at bedtime. FINANCIAL: has seen  to get paperwork in order (Will, Trust for care of spouse)    EMOTIONAL: Pt upbeat and positive, tearful at times, shares \"I am not afraid to die, I have a lot to live for, I'm ready to fight this. My God is bigger than this. I am not going to waste time asking why me\"  States she feels as if she's been given a gift, as in having time to spend, unlike some who just wake up and don't come home one day. I do worry about my family, which is why I've spent the last 3 weeks getting my affairs in order. Dtr/YRIS and their 3 children will help care for spouse, as live across the street. Speaks about raising her 26 yo granddaughter from age 15. 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     TC to dtr Socorro Clancy (waiting in parking lot) reports pt is [employer] and will shake her head, gets easily flustered, and have difficulty remembering things. She is not normally anxious and tries to make everyone think she is ok. Pts CG for her spouse who is ok when in his own home\"   shunt in 2018 for hydrocephalus which caused frontal lobe damage. TRANSPORTATION: Dtr Eduardo Gilliam) whom lives across the street drove her today. Will receive chemo at [facility]. Pt drives.     Ashley Kate RN

## 2020-05-20 NOTE — PROGRESS NOTES
[facility]  at [address] [place] [address], [address] [facility]  [phone]       Oncology Consultation Note        Patient: Jaxon Maldonado MRN: [id number]  SSN: [id number]    YOB: 1955  Age: 59 y.o. Sex: female      Subjective:      Jaxon Maldonado is a 59 y.o. female who I am seeing in consultation for a new diagnosis of adenocarcinoma of the head of the pancreas. She has noticed elevation in the blood sugar in the last 3-6 months which has been difficult to manage. She has also been experiencing increasing pain and discomfort in the epigastric area radiating to the back and referred pain in the left shoulder. The pain is described as constant with bouts of exacerbation, radiating to the back, 8/10 in severity and partially relieved with pain meds. A CT showed a mass in the pancreas. She was then referred to Dr. Shereen Aparicio. An EUS established the diagnosis of pancreatic adenocarcinoma. She comes in to discuss the next steps in management.      Review of Systems:    Constitutional: negative  Eyes: negative  Ears, Nose, Mouth, Throat, and Face: negative  Respiratory: negative  Cardiovascular: negative  Gastrointestinal: negative  Genitourinary:negative  Integument/Breast: negative  Hematologic/Lymphatic: negative  Musculoskeletal:negative  Neurological: negative        Past Medical History:   Diagnosis Date    Adenocarcinoma of pancreas ([facility] [address] 05/13/2020    Dr Shereen Aparicio biopsy via EUS    Diabetes ([facility] [address]     GERD (gastroesophageal reflux disease)     Hernia of abdominal cavity     Subxyphoid hernia    Hypertension     Inflammatory polyarthropathy (HCC)     Joint pain     Joint swelling     Menopause     Ocular nevus of left eye     Pancreatitis     Tracheal stenosis      Past Surgical History:   Procedure Laterality Date    HX CARPAL TUNNEL RELEASE Bilateral     HX COLONOSCOPY      HX HYSTERECTOMY      HX KNEE ARTHROSCOPY Right     HX KNEE REPLACEMENT Right     partial    HX LAP CHOLECYSTECTOMY      HX TONSILLECTOMY        Family History   Problem Relation Age of Onset    Heart Disease Mother     Heart Attack Mother     Cancer Father         lung    Diabetes Sister      Social History     Tobacco Use    Smoking status: Never Smoker    Smokeless tobacco: Never Used   Substance Use Topics    Alcohol use: No     Alcohol/week: 0.0 standard drinks     Frequency: Never     Binge frequency: Never      Prior to Admission medications    Medication Sig Start Date End Date Taking? Authorizing Provider   lidocaine-prilocaine (EMLA) topical cream Apply  to affected area as needed for Pain. 5/15/20  Yes Malcolm Melchor MD   prochlorperazine (COMPAZINE) 10 mg tablet Take 0.5 Tabs by mouth every six (6) hours as needed for Nausea. 5/15/20  Yes Malcolm Melchor MD   ondansetron (ZOFRAN ODT) 4 mg disintegrating tablet Take 1 Tab by mouth every eight (8) hours as needed for Nausea or Vomiting. 5/12/20  Yes Shira Prieto MD   lipase-protease-amylase (CREON 12,000) 12,000-38,000 -60,000 unit capsule Take 1 Cap by mouth three (3) times daily (with meals). Indications: exocrine pancreatic insufficiency 5/6/20  Yes Jose Yang MD   HYDROmorphone (DILAUDID) 2 mg tablet Take 1 Tab by mouth every four (4) hours as needed for Pain for up to 30 days.  Max Daily Amount: 12 mg. 5/4/20 6/3/20 Yes Shira Prieto MD   Blood-Glucose Meter,Continuous (Dexcom G6 ) misc Use as directed to monitor blood glucose as directed 4/15/20  Yes Jose Yang MD   Blood-Glucose Sensor (Dexcom G6 Sensor) fadi Use as directed to monitor blood glucose as directed 4/15/20  Yes Shira Prieto MD   Blood-Glucose Transmitter (Dexcom G6 Transmitter) fadi Use as directed to monitor blood glucose as directed 4/15/20  Yes Shira Prieto MD   insulin lispro (HumaLOG KwikPen Insulin) 100 unit/mL kwikpen Inject 25 units into the skin with breakfast, 15 units with lunch (if BG is >200 and eating carbs with meal), and 30 units with dinner 4/15/20  Yes Darby Yang MD   insulin glargine U-300 conc (Toujeo SoloStar U-300 Insulin) 300 unit/mL (1.5 mL) inpn pen 55 units every bedtime 4/15/20  Yes Darby Yang MD   pravastatin (PRAVACHOL) 20 mg tablet TAKE 1 TABLET BY MOUTH EVERY NIGHT AT BEDTIME 4/15/20  Yes Darby Yang MD   omeprazole (PRILOSEC) 40 mg capsule Take 1 Cap by mouth daily. Patient taking differently: Take 40 mg by mouth nightly. 4/2/20  Yes Darby Yang MD   acetaminophen (Acetaminophen Extra Strength) 500 mg tablet Take 1,000 mg by mouth two (2) times a day. Yes Provider, Historical   ibuprofen (Motrin IB) 200 mg tablet Take 200 mg by mouth every eight (8) hours as needed for Pain. Yes Provider, Historical   Needle, Disp, ndle 1 Each by Does Not Apply route two (2) times daily as needed for Other. 3/27/20  Yes Harsha Silverman MD   triamterene-hydroCHLOROthiazide (DYAZIDE) 37.5-25 mg per capsule TAKE ONE CAPSULE BY MOUTH EVERY DAY 3/18/20 3/18/21 Yes Darby Yang MD   carvedilol (COREG) 25 mg tablet TAKE 1 TABLET BY MOUTH TWICE DAILY  Patient taking differently: !/2 dose twice daily 7/13/19  Yes Harsha Silverman MD   aspirin delayed-release 81 mg tablet Take  by mouth daily. Yes Provider, Historical              No Known Allergies        Objective:     Vitals:    05/15/20 1412   BP: 96/62   Pulse: 66   Temp: 98.7 °F (37.1 °C)   TempSrc: Oral   SpO2: 97%   Weight: 230 lb (104.3 kg)   Height: 5' 4\" (1.626 m)            Physical Exam:    GENERAL: alert, cooperative, no distress, appears stated age  EYE: conjunctivae/corneas clear. PERRL, EOM's intact  LYMPHATIC: Cervical, supraclavicular, and axillary nodes normal.   THROAT & NECK: normal and no erythema or exudates noted.    LUNG: clear to auscultation bilaterally  HEART: regular rate and rhythm, S1, S2 normal, no murmur, click, rub or gallop  ABDOMEN: soft, non-tender. Bowel sounds normal. No masses,  no organomegaly  EXTREMITIES:  extremities normal, atraumatic, no cyanosis or edema  SKIN: Normal.  NEUROLOGIC: AOx3. Gait normal. Reflexes and motor strength normal and symmetric. Cranial nerves 2-12 and sensation grossly intact. Lab Results   Component Value Date/Time    WBC 6.3 04/30/2020 09:00 AM    HGB 13.4 04/30/2020 09:00 AM    HCT 40.2 04/30/2020 09:00 AM    PLATELET 001 83/12/0216 09:00 AM    MCV 88.4 04/30/2020 09:00 AM       Lab Results   Component Value Date/Time    Sodium 141 04/30/2020 09:00 AM    Potassium 3.9 04/30/2020 09:00 AM    Chloride 104 04/30/2020 09:00 AM    CO2 30 04/30/2020 09:00 AM    Anion gap 7 04/30/2020 09:00 AM    Glucose 150 (H) 04/30/2020 09:00 AM    BUN 12 04/30/2020 09:00 AM    Creatinine 0.87 04/30/2020 09:00 AM    BUN/Creatinine ratio 14 04/30/2020 09:00 AM    GFR est AA >60 04/30/2020 09:00 AM    GFR est non-AA >60 04/30/2020 09:00 AM    Calcium 9.3 04/30/2020 09:00 AM    Bilirubin, total 0.5 04/30/2020 09:00 AM    AST (SGOT) 18 04/30/2020 09:00 AM    Alk. phosphatase 63 04/30/2020 09:00 AM    Protein, total 6.7 05/01/2020 10:26 AM    Albumin 3.6 04/30/2020 09:00 AM    Globulin 3.5 04/30/2020 09:00 AM    A-G Ratio 1.0 (L) 04/30/2020 09:00 AM    ALT (SGPT) 24 04/30/2020 09:00 AM         CT Results (most recent):  Results from Hospital Encounter encounter on 04/30/20   CT ABD PELV W WO CONT    Narrative EXAM: CT ABD PELV W WO CONT    INDICATION: Acute pancreatitis, unspecified. COMPARISON: None. CONTRAST: 100 mL of Isovue-370. TECHNIQUE:    Multislice helical CT was performed from the diaphragm to the pubic symphysis  prior to intravenous contrast administration and from the diaphragm to the  symphysis pubis during uneventful rapid bolus intravenous contrast  administration in portal venous phase. Oral contrast administered.  Contiguous 5  mm axial images were reconstructed and lung and soft tissue windows were  generated. Coronal and sagittal reformations were generated. CT dose reduction  was achieved through use of a standardized protocol tailored for this  examination and automatic exposure control for dose modulation. FINDINGS:   LOWER THORAX: No significant abnormality in the incidentally imaged lower chest.  LIVER: No significant change in right and left lobe cysts. Otherwise  unremarkable. BILIARY TREE: The gallbladder is surgically absent. CBD is not dilated. SPLEEN: within normal limits. PANCREAS: There is pancreatic body and tail atrophy with indistinct  hypoenhancement and slight peripancreatic stranding of the head and adjacent  body, with significant increase in soft tissue volume compared to prior  measuring approximately 2.7 x 4.8 cm. The splenic vein is occluded at the  posterior left lateral margin of this lesion and there is an occlusion the  superior most inferior mesenteric vein at the inferior aspect of the lesion. The  splenic artery courses along the superior margin of this lesion but does not  appear to be significantly encased. The superior mesenteric artery is patent and  courses along the posterior aspect of this lesion with some poorly defined  strandiness around the lumen at the inferior margin of the lesion (4, 34). ADRENALS: Unremarkable. KIDNEYS: 5 mm lower pole collecting system stone on the left. No other stones,  masses, or hydronephrosis. STOMACH: Unremarkable. SMALL BOWEL: No dilatation or wall thickening. COLON: No dilatation or wall thickening. APPENDIX: Unremarkable. PERITONEUM: No ascites or pneumoperitoneum. RETROPERITONEUM: Atherosclerotic calcination without aneurysm. No enlarged  lymphadenopathy. REPRODUCTIVE ORGANS: Uterus is surgically absent. Ovaries appear unremarkable. URINARY BLADDER: No mass or calculus. BONES: Degenerative spine change. No acute fracture or aggressive lesion. ABDOMINAL WALL: No mass or hernia.   ADDITIONAL COMMENTS: N/A      Impression IMPRESSION: Suspect neoplastic pancreatic mass versus atypical focal  pancreatitis with splenic and superior mesenteric vein occlusion. Consider  further evaluation with endoscopic ultrasound at which time biopsy could  potentially be performed. 23X     I personally reviewed the images. Pancreatic head mass incasing the splenic vein and superior mesenteric vein. Assessment:     1. Adenocarcinoma of the head of the pancreas   T2 N0    ECOG PS 0  Intent of Treatment: Palliative  Prognosis: Poor    I spent 90 minute with the patient in a face-to-face encounter. I explained her the stage of the disease, pathophysiology of the disease and the treatment approaches. I answered all her questions. More than 50% of the time was utilized in education, counseling and co-ordination of care. Since the pancreatic tumor encases the splenic vein and superior mesenteric vein, it is unresectable. I proposed systemic therapy with mFOLFIRINOX. I will plan to administer this therapy for 3-4 months. Then I shall restage the disease. If the disease remains unresectable, I would consider either SBRT or concurrent chemotherapy with radiation with a goal for disease control.      The duration of this treatment plan will be until progression, intolerable side effects, or patient choice. Patient will be meeting with navigation services to discuss any financial barriers to care/estimated cost of care. The patient's emotional well being was addressed during this office visit and patient seems to be coping well with the diagnosis and the treatment.      Common Side Effects of Chemotherapy  Decreased Blood Counts Your blood counts can decrease temporarily due to chemotherapy, they will recover over time. This is an expected side effect that your Doctor will be monitoring.   · If you experience fevers (temperature >100.4°F), bleeding or unexplained bruising, please call the office right away   Risk of Infection Your white blood cells can decrease temporarily due to chemotherapy and can put you at higher risk of infection. Washing hands frequently with soap and avoiding sick contacts can reduce your risk of infection. · If you experience fevers (temperature >100.4°F), shaking chills, or any signs of infection, please call the office immediately   Anemia Chemotherapy can cause your red blood cells to temporarily decrease; this is an expected side effect that your Doctor will be monitoring. · You may experience fatigue if this occurs, please notify the office if you experience bleeding, shortness of breath with minimal exertion or at rest, rapid heartbeat, or feeling as though you may lose consciousness. Hair Loss Chemotherapy can affect your hair follicles and cause you to lose hair. This can occur on your scalp hair but also all over your body including eyebrows and eye lashes   Nausea  You have been prescribed nausea medication to take if needed. Please follow the directions given to you by your Doctor. · Please call the office if the medications you have been given are not relieving nausea. Vomiting Make sure you are taking anti-nausea medication as prescribed. Eating small amounts of bland foods frequently can help. · Please call the office right away if you are vomiting more than 4 times per day or are unable to keep down food or fluids   Diarrhea Eating small amounts of bland foods frequently can help, increase your fluid intake. It is usually ok to take Imodium for diarrhea. · Please call the office right away if you experience more than 4 episodes of watery diarrhea or if you are feeling dehydrated.         You can reach Medical Oncology at [facility] with further questions or concerns at: [phone]  . · Calls during normal business hours will reach our office. · Calls after hours or on the weekend will reach an answering service and the on-call Oncologist will return your call.       2. Abdominal pain    Celiac plexus block  Pain medicine  Palliative care consultation    Plan:       > Discuss the patient in the multidisciplinary cancer conference.  > Start mFOLFIRINOX.   > Port placement  > Celiac plexus block       Signed By: Radha Perez MD     May 19, 2020         CC. Joe Price MD  CC. Bárbara Zuniga MD  CC.  Mathew Kenyon MD

## 2020-05-20 NOTE — PROCEDURES
NAME:  [facility]   :   1955   MRN:   [id number]     JULIANNA THORNE [facility]    Date/Time:  2020   Procedure Type: Linear EUS with Celiac Plexus Neurolysis    Indications: Pancreatic adenocarcinoma    Pre-operative Diagnosis: see indication above    Post-operative Diagnosis:  See findings below    : Joe Price MD    Referring Provider: Aiyana Martinez MD    Procedure Details:    Exam:  Airway: clear, no airway problems anticipated  Heart: RRR, without gallops or rubs  Lungs: clear bilaterally without wheezes, crackles, or rhonchi  Abdomen: soft, nontender, nondistended, bowel sounds present  Mental Status: awake, alert and oriented to person, place and time     Anethesia/Sedation:  MAC anesthesia Propofol      Procedure Details   After infom consent was obtained for the procedure, with all risks and benefits of procedure explained the patient was taken to the endoscopy suite and placed in the left lateral decubitus position. Following sequential administration of sedation as per above, the linear echoendoscope was inserted into the mouth and advanced under direct vision to second portion of the duodenum. A careful inspection was made as the gastroscope was withdrawn, including a retroflexed view of the proximal stomach; findings and interventions are described below. Findings:     Ultrasound:  Under EUS guidance the celiac trunk was identified branching off the aorta. A 20 gauge Celiac plexus needle was primed with 5 cc of saline. The needle was then advanced anterior to the celiac trunk in the region of the celiac plexus. 3 cc of of normal saline was used to then flush the needle. An aspiration test was performed and this was negative (ruled out vessel penetration). Subsequently 10 cc of 0.25% Bupivacaine was injected followed by 10 cc of 98% alcohol.  The needle was then flushed with an additional 3 cc of normal saline prior to withdrawing the needle. Complications: None. EBL:  None. Recommendations:   1. Monitor in Recovery for 90 minutes  2.  Follow up with Dr. Elkin Brock as planned    Sudheer Harris MD

## 2020-05-20 NOTE — PERIOP NOTES
Endoscope was pre-cleaned at bedside immediately following procedure by Shikha Davis ET    Anesthesia reports 150mg Propofol, 20mg Lidocaine and 1100mL NS and 0.2mg Robinol given during procedure. Received report from anesthesia staff on vital signs and status of patient.

## 2020-05-20 NOTE — DISCHARGE INSTRUCTIONS
[facility]  [id number]  1955    EUS DISCHARGE INSTRUCTIONS  Discomfort:  Sore throat- throat lozenges or warm salt water gargle  redness at IV site- apply warm compress to area; if redness or soreness persist- contact your physician  Gaseous discomfort- walking, belching will help relieve any discomfort  You may not operate a vehicle for 12 hours  You may not engage in an occupation involving machinery or appliances for rest of today  You may not drink alcoholic beverages for at least 12 hours  Avoid making any critical decisions for at least 24 hour  DIET  You may resume your regular diet - however -  remember your colon is empty and a heavy meal will produce gas. Avoid these foods:  vegetables, fried / greasy foods, carbonated drinks    MEDICATIONS:  Per Medication Reconciliation      ACTIVITY  You may resume your normal daily activities until tomorrow AM;  Spend the remainder of the day resting -  avoid any strenuous activity. CALL M.D. ANY SIGN OF   Increasing pain, nausea, vomiting  Abdominal distension (swelling)  New increased bleeding (oral or rectal)  Fever (chills)  Pain in chest area  Bloody discharge from nose or mouth  Shortness of breath    You may take any Advil, Aspirin, Ibuprofen, Motrin, Aleve, or Goodys for 10 days, ONLY  Tylenol as needed for pain. IMPRESSION:  Ultrasound:  Under EUS guidance the celiac trunk was identified branching off the aorta. A 20 gauge Celiac plexus needle was primed with 5 cc of saline. The needle was then advanced anterior to the celiac trunk in the region of the celiac plexus. 3 cc of of normal saline was used to then flush the needle. An aspiration test was performed and this was negative (ruled out vessel penetration). Subsequently 10 cc of 0.25% Bupivacaine was injected followed by 10 cc of 98% alcohol. The needle was then flushed with an additional 3 cc of normal saline prior to withdrawing the needle.        Recommendations:   1.  Monitor in Recovery for 90 minutes  2.  Follow up with Dr. Elkin Brock as planned    Follow-up Instructions:   Telephone # [phone]    Sudheer Harris MD

## 2020-05-21 PROCEDURE — 74011000250 HC RX REV CODE- 250: Performed by: INTERNAL MEDICINE

## 2020-05-22 ENCOUNTER — TELEPHONE (OUTPATIENT)
Dept: PALLATIVE CARE | Age: 65
End: 2020-05-22

## 2020-05-22 ENCOUNTER — PATIENT OUTREACH (OUTPATIENT)
Dept: OTHER | Age: 65
End: 2020-05-22

## 2020-05-22 NOTE — TELEPHONE ENCOUNTER
[employer] Palliative Medicine Office  Nursing Note  [phone]  Fax [phone]      Name:  ySdney Mahoney  YOB: 1955    Received outpatient Palliative Medicine referral from Dr. Antonio Rogers to see pt for symptom management and supportive care. Chart  reviewed. Sydney Mahoney is a 59 y.o. female with recently diagnosed pancreatic cancer. Pt's most recent office visit with Dr. Chloe Nation was on 5/15/20. See his note for complete HPI, treatment history, and plan. Pt had celiac plexus block on 5/20/20 and port placement on 5/21/20. ACP:  None on file                                                                                                                                            Nurse called pt and introduced Palliative Medicine services. Pt is receptive to Palliative Medicine and would like to schedule and appt. PSR will contact pt to schedule appt at [facility] office. Update:  Appt scheduled for 6/15/20 at 8:30am, [facility] office.

## 2020-05-22 NOTE — PROGRESS NOTES
[facility] Outreach     Call placed to pt, Verified  and address for HIPAA security. Pt left rosalba cath placement yesterday 2020 at \[facility]\"" for upcoming chemo infusion. Pt reported she is doing well from procedure. Taking Tylenol for pain. Denies sough, wheeze, SOB, temp or s/s of infection. Pt reported BS was 168 at noon and 217 this AM fasting. Pt reported she is starting to see an improvement in BS's; down from 360's. Pt reported PCP will like to have BS's between 100-200 at this time. Pt has now started reusing Dex com 6 today in order to track BS's constantly. Discussed reaching out to  with Oncology for assistance with employee financial aid/hardship henri related to extremely high co-pays/deductibles. Goals      Attends follow-up appointments as directed. Oncology - 5/15/2020  PCP - TBD    2020   scheduled to start chemo 2020  Oncology - attended 5/15/2020 F/U 6/3/2020  PCP - TBD  Palliative - new pt appt 6/15/2020  Surgeon - attended 2020 F/U 2020  Neuro - 2020       Care managment related to pancreatic cancer (pt-stated)      2020   Rosalba cath placement - 2020. Scheduled to start chemo at infusion center 2020. Advised to speak with oncology MSW to assist with financial aid/employee hardship henri.  Patient verbalizes understanding of self -management goals of living with Diabetes. 2020  Pt reported BS was 168 at noon and 217 this AM fasting. Pt reported she is starting to see an improvement in BS's; down from 360's. Pt reported PCP will like to have BS's between 100-200 at this time. Pt has now started reusing Dex com 6 today in order to track BS's constantly.  Supportive resources in place to maintain patient in the community (ie.  Home Health, DME equipment, refer to, medication assistant plan, etc.)      [facility] - out of territory  [address]   Nurse Access line   Oncology MSW            CM will f/u in 1 week

## 2020-05-26 ENCOUNTER — TELEPHONE (OUTPATIENT)
Dept: PALLATIVE CARE | Age: 65
End: 2020-05-26

## 2020-05-27 ENCOUNTER — TELEPHONE (OUTPATIENT)
Dept: ONCOLOGY | Age: 65
End: 2020-05-27

## 2020-05-27 NOTE — TELEPHONE ENCOUNTER
[facility]  Social Work Navigator Encounter     Patient Name:  Gwen Ba    Medical History: dx pancreatic cancer     Advance Directives: not on file    Narrative: Pt shared her PCP, Dr. Yi Sterling was or had completed for forms. Pt last worked on 5/19/20.       Barriers to Care:     Assessment/Action:    Plan/Referral:   Insurance/Entitlements referral  Other referral

## 2020-05-28 ENCOUNTER — OFFICE VISIT (OUTPATIENT)
Dept: ONCOLOGY | Age: 65
End: 2020-05-28

## 2020-05-28 VITALS
RESPIRATION RATE: 16 BRPM | TEMPERATURE: 98.2 F | DIASTOLIC BLOOD PRESSURE: 62 MMHG | OXYGEN SATURATION: 95 % | HEIGHT: 64 IN | HEART RATE: 65 BPM | BODY MASS INDEX: 39.16 KG/M2 | WEIGHT: 229.4 LBS | SYSTOLIC BLOOD PRESSURE: 113 MMHG

## 2020-05-28 DIAGNOSIS — C25.9 MALIGNANT NEOPLASM OF PANCREAS, UNSPECIFIED LOCATION OF MALIGNANCY (HCC): ICD-10-CM

## 2020-05-28 DIAGNOSIS — F32.A DEPRESSION, UNSPECIFIED DEPRESSION TYPE: Primary | ICD-10-CM

## 2020-05-28 RX ORDER — METHYLPHENIDATE HYDROCHLORIDE 5 MG/1
5 TABLET ORAL 2 TIMES DAILY
Qty: 60 TAB | Refills: 0 | Status: SHIPPED | OUTPATIENT
Start: 2020-05-28 | End: 2020-07-27 | Stop reason: ALTCHOICE

## 2020-05-28 NOTE — PROGRESS NOTES
Suresh Rodrigues is a 59 y.o. female her for follow up of new pancreatic cancer. Patient had C1D1 on 5/26/2020, she has had nausea without vomiting, bowels are moving. Patient feels exhausted. Patient has had ABD pain, she took dilaudid it made her itchy, felt likes ants were crawling under her skin and figidity. She tried Oxycodone 5mg IR she did not like how this made her feel, then she took Percocet and did not like that as well. She is now taking 1000mg tylenol 3-4 times daily for pain. She did not like the taste the zofran left in her mouth, she has been taking compazine but it makes her sleepy. Dr Anatoliy Morrison did a celiac plexus block. She has had difficulty eating; Yesterday she had OJ 8 oz, applesauce and 1/4 slice of toast.   Today she had 4 oz of gingerale, 1/2 cup coffee        Chemotherapy Flowsheet 5/26/2020   Cycle C1 D1   Date 5/26/2020   Drug / Regimen Folfirinox   Dosage see MAR   Pre Meds Aloxi,0.25 mg, Emend 150 mg, Decadron 12 mg, Atropine 0.4 mg, see MAR for addtional meds given during treatment   Notes CIV 5FU 5208 mg initiated per pump     Key Pain Meds             HYDROmorphone (DILAUDID) 2 mg tablet Take 1 Tab by mouth every four (4) hours as needed for Pain for up to 30 days. Max Daily Amount: 12 mg.    acetaminophen (Acetaminophen Extra Strength) 500 mg tablet Take 1,000 mg by mouth two (2) times a day. ibuprofen (Motrin IB) 200 mg tablet Take 200 mg by mouth every eight (8) hours as needed for Pain. Key Oncology Meds             prochlorperazine (COMPAZINE) 10 mg tablet Take 0.5 Tabs by mouth every six (6) hours as needed for Nausea. ondansetron (ZOFRAN ODT) 4 mg disintegrating tablet Take 1 Tab by mouth every eight (8) hours as needed for Nausea or Vomiting.

## 2020-05-28 NOTE — PROGRESS NOTES
Reason for Visit:   Cameron Bueno is a 59 y.o. female who is seen for pancreatic adenocarcinoma    Treatment History:   Dx: Pancreatic Adenocarcinoma--May 2020--[id number]--ombhqxelnhr of splenic vein and superior mesenteric vein  Tx: mFOLFIRINOX--first cycle 5/26/2020  Goal: Prolong life--Palliative    History of Present Illness:   Here for dx above, having vague abd/back pain, and increased BG for about 6 months. Had CT scans done 4/30/2020 showing pancreatic head mass. Endoscopic ultrasound showing encasement of splenic/sup mesenteric veins, biopsy revealed pancreatic adenocarcinoma. Had elevated CA 19-9 of 111. She also underwent celiac plexus block. She has seen Dr Frieda Ponce who began mFOLFIRINOX--first dose was two days prior. Did well but had dysarthria towards end of Irinotecan--this was intermittent and lasted approx 3 hours. Complete resolution of dysarthria since. Some nausea that began after chemotherapy. Did not get relief from ondansetron but did have relief with prochlorperazine. Was taking hydromorphone for the pain but caused severe itching and dysphoria--stopped and tried oxycodone (had left from a prior procedure) which also caused severe itching and dysphoria. States does not do well with opiates. Not sure if she's tried morphine in the past but did try hydrocodone with similar side effects. Pain has been well controlled with acetaminophen since her chemotherapy. Is afraid we are going to stop irinotecan since she experienced dysarthria. Mood has been stable, has a lot to live for, getting things together for her  and children to ensure they are cared for if/when she does die. Very little energy for several months--finds it difficult to maintain her activities--also having trouble controlling BG.        Past Medical History:   Diagnosis Date    Adenocarcinoma of pancreas ([facility] [address] 05/13/2020    Dr Evonne Berkowitz biopsy via EUS    Diabetes [facility])     GERD (gastroesophageal reflux disease)     Hernia of abdominal cavity     Subxyphoid hernia    Hypertension     Inflammatory polyarthropathy (HCC)     Joint pain     Joint swelling     Menopause     Ocular nevus of left eye     Pancreatitis     Tracheal stenosis       Past Surgical History:   Procedure Laterality Date    HX CARPAL TUNNEL RELEASE Bilateral     HX COLONOSCOPY      HX HYSTERECTOMY      HX KNEE ARTHROSCOPY Right     HX KNEE REPLACEMENT Right     partial    HX LAP CHOLECYSTECTOMY      HX TONSILLECTOMY      HX VASCULAR ACCESS        Social History     Tobacco Use    Smoking status: Never Smoker    Smokeless tobacco: Never Used   Substance Use Topics    Alcohol use: No     Alcohol/week: 0.0 standard drinks     Frequency: Never     Binge frequency: Never      Family History   Problem Relation Age of Onset    Heart Disease Mother     Heart Attack Mother     Cancer Father         lung    Diabetes Sister      Current Outpatient Medications   Medication Sig    diphenhydrAMINE (BENADRYL) 25 mg tablet Take 25 mg by mouth every six (6) hours as needed.  lidocaine-prilocaine (EMLA) topical cream Apply  to affected area as needed for Pain.  prochlorperazine (COMPAZINE) 10 mg tablet Take 0.5 Tabs by mouth every six (6) hours as needed for Nausea.  ondansetron (ZOFRAN ODT) 4 mg disintegrating tablet Take 1 Tab by mouth every eight (8) hours as needed for Nausea or Vomiting.  lipase-protease-amylase (CREON 12,000) 12,000-38,000 -60,000 unit capsule Take 1 Cap by mouth three (3) times daily (with meals). Indications: exocrine pancreatic insufficiency (Patient taking differently: Take 2 Caps by mouth three (3) times daily (with meals). Indications: exocrine pancreatic insufficiency)    HYDROmorphone (DILAUDID) 2 mg tablet Take 1 Tab by mouth every four (4) hours as needed for Pain for up to 30 days. Max Daily Amount: 12 mg.     Blood-Glucose Meter,Continuous (Dexcom G6 ) misc Use as directed to monitor blood glucose as directed    Blood-Glucose Sensor (Dexcom G6 Sensor) fadi Use as directed to monitor blood glucose as directed    Blood-Glucose Transmitter (Dexcom G6 Transmitter) fadi Use as directed to monitor blood glucose as directed    insulin lispro (HumaLOG KwikPen Insulin) 100 unit/mL kwikpen Inject 25 units into the skin with breakfast, 15 units with lunch (if BG is >200 and eating carbs with meal), and 30 units with dinner    insulin glargine U-300 conc (Toujeo SoloStar U-300 Insulin) 300 unit/mL (1.5 mL) inpn pen 55 units every bedtime    pravastatin (PRAVACHOL) 20 mg tablet TAKE 1 TABLET BY MOUTH EVERY NIGHT AT BEDTIME    omeprazole (PRILOSEC) 40 mg capsule Take 1 Cap by mouth daily. (Patient taking differently: Take 40 mg by mouth nightly.)    acetaminophen (Acetaminophen Extra Strength) 500 mg tablet Take 1,000 mg by mouth two (2) times a day.  ibuprofen (Motrin IB) 200 mg tablet Take 200 mg by mouth every eight (8) hours as needed for Pain.  Needle, Disp, ndle 1 Each by Does Not Apply route two (2) times daily as needed for Other.  carvedilol (COREG) 25 mg tablet TAKE 1 TABLET BY MOUTH TWICE DAILY (Patient taking differently: !/2 dose twice daily)    aspirin delayed-release 81 mg tablet Take  by mouth daily. No current facility-administered medications for this visit.       Facility-Administered Medications Ordered in Other Visits   Medication Dose Route Frequency    pegfilgrastim (NEULASTA) wearable SQ injector 6 mg  6 mg SubCUTAneous ONCE    saline peripheral flush soln 10 mL  10 mL InterCATHeter PRN    0.9% sodium chloride injection 10 mL  10 mL IntraVENous PRN    heparin (porcine) pf 300-500 Units  300-500 Units InterCATHeter PRN    fluorouraciL (ADRUCIL) 5,208 mg in 104.16 mL CADD Cassette  2,400 mg/m2 (Treatment Plan Recorded) IntraVENous ONCE      No Known Allergies     Review of Systems: A complete review of systems was obtained, negative except as described above.    Physical Exam:   There were no vitals taken for this visit. ECOG PS: 0  General: No distress  Eyes: PERRLA, anicteric sclerae  HENT: Atraumatic, OP clear  Neck: Supple  Lymphatic: No cervical, supraclavicular, or inguinal adenopathy  Respiratory: CTAB, normal respiratory effort  CV: Normal rate, regular rhythm, no murmurs, no peripheral edema  GI: Soft, nontender, nondistended, no masses, no hepatomegaly, no splenomegaly  MS: Normal gait and station. Digits without clubbing or cyanosis. Skin: No rashes, ecchymoses, or petechiae. Normal temperature, turgor, and texture. Psych: Alert, oriented, appropriate affect, normal judgment/insight    Results:     Lab Results   Component Value Date/Time    WBC 4.7 05/26/2020 08:35 AM    HGB 12.7 05/26/2020 08:35 AM    HCT 38.3 05/26/2020 08:35 AM    PLATELET 486 09/10/5958 08:35 AM    MCV 89.3 05/26/2020 08:35 AM    ABS. NEUTROPHILS 2.8 05/26/2020 08:35 AM     Lab Results   Component Value Date/Time    Sodium 139 05/26/2020 08:35 AM    Potassium 4.0 05/26/2020 08:35 AM    Chloride 102 05/26/2020 08:35 AM    CO2 28 05/26/2020 08:35 AM    Glucose 174 (H) 05/26/2020 08:35 AM    BUN 15 05/26/2020 08:35 AM    Creatinine 0.79 05/26/2020 08:35 AM    GFR est AA >60 05/26/2020 08:35 AM    GFR est non-AA >60 05/26/2020 08:35 AM    Calcium 9.1 05/26/2020 08:35 AM    Glucose (POC) 156 (H) 05/21/2020 07:19 AM    Creatinine (POC) 0.7 02/06/2013 10:21 AM     Lab Results   Component Value Date/Time    Bilirubin, total 0.4 05/26/2020 08:35 AM    ALT (SGPT) 24 05/26/2020 08:35 AM    Alk. phosphatase 66 05/26/2020 08:35 AM    Protein, total 6.8 05/26/2020 08:35 AM    Albumin 3.5 05/26/2020 08:35 AM    Globulin 3.3 05/26/2020 08:35 AM       CT A/P 4/30/2020  IMPRESSION: Suspect neoplastic pancreatic mass versus atypical focal  pancreatitis with splenic and superior mesenteric vein occlusion.  Consider  further evaluation with endoscopic ultrasound at which time biopsy could  potentially be performed. CT Chest 5/21/2020  IMPRESSION:  1. No evidence of pulmonary metastatic disease. No evidence of mediastinal or  hilar lymphadenopathy. No pleural effusions identified. 2. No definite evidence of central pulmonary embolic disease. 3. Presence of a mass lesion involving the pancreatic head/body. 4. Evidence of fatty infiltration involving the liver. Presence of focal  low-density areas within the liver compatible with cysts. Surgical absence of  the gallbladder. Path: 5/8/2020  CYTOLOGIC INTERPRETATION:   Pancreas, EUS-guided fine needle aspiration and cell blocks: Adenocarcinoma     Assessment:   1) Pancreatic Adenocarcinoma--Unresectable  2) Neoplasm Pain  3) Fatigue  4) Nausea  5) DM  6) Nutrition      Plan:   1) Continue with mFOLFIRINOX--transient dysarthria with Irinotecan is a known side effect--multiple case reports in literature--unknown mechanism of action--we can continue--will try slowing infusion on next cycle, can also give irinotecan prior to oxaliplatin if slowing doesn't resolve. BRCA pending--following with Dr Elkin Brock 6/3.      2) S/p celiac block--pain better and controlled with acetaminophen currently. Future options include repeat celiac block, methadone, fentanyl. 3) Try methylphenidate 5mg bid--watch for anxiety and insomnia    4) Compazine controlling--switch to olanzapine 5mg bid or 10mg once daily if becomes difficult to manage with compazine. 5) Defer to Dr Neelam Gómez for further management. Pharmacy may have ideas. 6) On pancreatic enzymes--weight has stabilized, may need nutrition consult.       Signed By: Audra Holloway MD

## 2020-05-29 ENCOUNTER — OFFICE VISIT (OUTPATIENT)
Dept: PRIMARY CARE CLINIC | Age: 65
End: 2020-05-29

## 2020-05-29 VITALS — OXYGEN SATURATION: 93 % | HEART RATE: 66 BPM | TEMPERATURE: 98.2 F

## 2020-05-29 DIAGNOSIS — Z20.828 EXPOSURE TO SARS-ASSOCIATED CORONAVIRUS: Primary | ICD-10-CM

## 2020-05-29 NOTE — PROGRESS NOTES
Arnulfo Corona is a 59 y.o. female who was seen in clinic today (5/29/2020) for an acute visit. Subjective:   Kathleen was seen today for   Concern For COVID-19 (Coronavirus) (exposure to covid)    Exposed to now known C19+ patient one day prior to Folfirinox chemotherapy for pancreatic cancer (infusion 5/26/20). No sx but was notified today of exposure--now 5 + days previously. She did have physical contact (hug) with the nephew before he became ill. Recent Travel Screening and Travel History documentation     Travel Screening       Question Response     In the last month, have you been in contact with someone who was confirmed or suspected to have Coronavirus / COVID-19? No / Unsure     Do you have any of the following symptoms? None of these     Have you traveled internationally in the last month? No      Travel History   Travel since 04/29/20     No documented travel since 04/29/20                 ROS - Pertinent items are noted in HPI    Patient Active Problem List   Diagnosis Code    Arthritis, degenerative M19.90    Pancreatitis K85.90    Tracheal stenosis J39.8    GERD (gastroesophageal reflux disease) K21.9    Hypertension I10    Ocular nevus of left eye D31.92    Type 2 diabetes mellitus without complication, without long-term current use of insulin (HCC) E11.9    Mixed hyperlipidemia E78.2    Obesity, morbid ([address] E66.01    Type 2 diabetes mellitus with nephropathy ([facility] [address] E11.21    Adenocarcinoma of pancreas ([facility] [address] C25.9     Home Medications    Medication Sig Start Date End Date Taking? Authorizing Provider   methylphenidate HCl (RITALIN) 5 mg tablet Take 1 Tab by mouth two (2) times a day. Max Daily Amount: 10 mg. 5/28/20   Matt Gill MD   diphenhydrAMINE (BENADRYL) 25 mg tablet Take 25 mg by mouth every six (6) hours as needed. Provider, Historical   lidocaine-prilocaine (EMLA) topical cream Apply  to affected area as needed for Pain.  5/15/20   July Kaplan MD prochlorperazine (COMPAZINE) 10 mg tablet Take 0.5 Tabs by mouth every six (6) hours as needed for Nausea. 5/15/20   Shun Campbell MD   ondansetron (ZOFRAN ODT) 4 mg disintegrating tablet Take 1 Tab by mouth every eight (8) hours as needed for Nausea or Vomiting. 5/12/20   Edi Shay MD   lipase-protease-amylase (CREON 12,000) 12,000-38,000 -60,000 unit capsule Take 1 Cap by mouth three (3) times daily (with meals). Indications: exocrine pancreatic insufficiency  Patient taking differently: Take 2 Caps by mouth three (3) times daily (with meals). Indications: exocrine pancreatic insufficiency 5/6/20   Estefanía Beal MD   HYDROmorphone (DILAUDID) 2 mg tablet Take 1 Tab by mouth every four (4) hours as needed for Pain for up to 30 days. Max Daily Amount: 12 mg. 5/4/20 6/3/20  Estefanía Beal MD   Blood-Glucose Meter,Continuous (Dexcom G6 ) misc Use as directed to monitor blood glucose as directed 4/15/20   Edi Shay MD   Blood-Glucose Sensor (Dexcom G6 Sensor) fadi Use as directed to monitor blood glucose as directed 4/15/20   Edi Shay MD   Blood-Glucose Transmitter (Dexcom G6 Transmitter) fadi Use as directed to monitor blood glucose as directed 4/15/20   Estefanía Beal MD   insulin lispro (HumaLOG KwikPen Insulin) 100 unit/mL kwikpen Inject 25 units into the skin with breakfast, 15 units with lunch (if BG is >200 and eating carbs with meal), and 30 units with dinner  Patient taking differently: Inject 25 units into the skin with breakfast, 15 units with lunch (if BG is >200 and eating carbs with meal), and 30 units with dinner  Patient is taking based on her BS levels at Breakfast, Lunch and [employer].  4/15/20   Estefanía Beal MD   insulin glargine U-300 conc (Toujeo SoloStar U-300 Insulin) 300 unit/mL (1.5 mL) inpn pen 55 units every bedtime 4/15/20   Estefanía Beal MD   pravastatin (PRAVACHOL) 20 mg tablet TAKE 1 TABLET BY MOUTH EVERY NIGHT AT BEDTIME 4/15/20 Vijay Taylor MD   omeprazole (PRILOSEC) 40 mg capsule Take 1 Cap by mouth daily. 4/2/20   Vijay Taylor MD   acetaminophen (Acetaminophen Extra Strength) 500 mg tablet Take 1,000 mg by mouth two (2) times a day. Provider, Historical   ibuprofen (Motrin IB) 200 mg tablet Take 200 mg by mouth every eight (8) hours as needed for Pain. Provider, Historical   Needle, Disp, ndle 1 Each by Does Not Apply route two (2) times daily as needed for Other. 3/27/20   Vijay Taylor MD   carvedilol (COREG) 25 mg tablet TAKE 1 TABLET BY MOUTH TWICE DAILY  Patient taking differently: 1/2 dose twice daily  5/28/2020 patient is taking her BP if BP is low she is only taking 1/2 a tab, if high she is taking the 25mg. 7/13/19   Vijay Taylor MD   aspirin delayed-release 81 mg tablet Take  by mouth daily. Provider, Historical      No Known Allergies       Objective:   Physical Exam    Visit Vitals  Pulse 66   Temp 98.2 °F (36.8 °C) (Oral)   SpO2 93%       General:  NAD. Nontoxic  Neck:  appears supple without nodes or JVD  Oropharynx:  no exudate  Respiratory:  speaks in complete sentences. Unlabored. CTA without wheezes, rales or ronchi    Cardiac:  RRR without MGR  Skin:  No rash      Assessment/Plan:     There is a HIGH probability that this is COVID-19. * COVID-19 sample collected and submitted       * Patient given detailed CDC instructions contained within After Visit Summary    Diagnoses and all orders for this visit:    1. Exposure to SARS-associated coronavirus  -     NOVEL CORONAVIRUS (COVID-19)         Reviewed with her that COVID-19 pandemic is an evolving situation with rapidly changing recommendations & guidelines. Medical decisions are made based on the the best information available at the time.   Recommended she stay tuned for updates published by trusted sources and to advise your PCP of any unexpected changes in clinical condition       Disclaimer:  Discussed expected course/resolution/complications of diagnosis in detail with patient. Medication risks/benefits/costs/interactions/alternatives discussed with patient. She was given an after visit summary which includes diagnoses, current medications, & vitals. She expressed understanding with the diagnosis and plan. Aspects of this note may have been generated using voice recognition software. Despite editing, there may be some syntax errors.        Trice Flynn MD

## 2020-05-31 LAB — SARS-COV-2, NAA: NOT DETECTED

## 2020-06-01 ENCOUNTER — OFFICE VISIT (OUTPATIENT)
Dept: SURGERY | Age: 65
End: 2020-06-01

## 2020-06-01 VITALS
WEIGHT: 229 LBS | DIASTOLIC BLOOD PRESSURE: 63 MMHG | TEMPERATURE: 96.1 F | HEART RATE: 75 BPM | BODY MASS INDEX: 39.09 KG/M2 | HEIGHT: 64 IN | SYSTOLIC BLOOD PRESSURE: 120 MMHG

## 2020-06-01 DIAGNOSIS — D49.2 ATYPICAL SQUAMOPROLIFERATIVE SKIN LESION: Primary | ICD-10-CM

## 2020-06-01 DIAGNOSIS — Z09 POSTOPERATIVE EXAMINATION: ICD-10-CM

## 2020-06-01 DIAGNOSIS — K90.9 DIARRHEA DUE TO MALABSORPTION: ICD-10-CM

## 2020-06-01 DIAGNOSIS — R19.7 DIARRHEA DUE TO MALABSORPTION: ICD-10-CM

## 2020-06-01 DIAGNOSIS — K85.00 IDIOPATHIC ACUTE PANCREATITIS, UNSPECIFIED COMPLICATION STATUS: Primary | ICD-10-CM

## 2020-06-01 RX ORDER — DIPHENHYDRAMINE HYDROCHLORIDE 50 MG/ML
25 INJECTION, SOLUTION INTRAMUSCULAR; INTRAVENOUS AS NEEDED
Status: CANCELLED
Start: 2020-06-10

## 2020-06-01 RX ORDER — DEXTROSE MONOHYDRATE 50 MG/ML
25 INJECTION, SOLUTION INTRAVENOUS CONTINUOUS
Status: CANCELLED
Start: 2020-06-30

## 2020-06-01 RX ORDER — HEPARIN 100 UNIT/ML
300-500 SYRINGE INTRAVENOUS AS NEEDED
Status: CANCELLED
Start: 2020-06-12

## 2020-06-01 RX ORDER — SODIUM CHLORIDE 9 MG/ML
10 INJECTION INTRAMUSCULAR; INTRAVENOUS; SUBCUTANEOUS AS NEEDED
Status: CANCELLED | OUTPATIENT
Start: 2020-06-30

## 2020-06-01 RX ORDER — ACETAMINOPHEN 325 MG/1
650 TABLET ORAL AS NEEDED
Status: CANCELLED
Start: 2020-06-30

## 2020-06-01 RX ORDER — HEPARIN 100 UNIT/ML
300-500 SYRINGE INTRAVENOUS AS NEEDED
Status: CANCELLED
Start: 2020-06-30

## 2020-06-01 RX ORDER — ATROPINE SULFATE 0.4 MG/ML
0.4 INJECTION, SOLUTION ENDOTRACHEAL; INTRAMEDULLARY; INTRAMUSCULAR; INTRAVENOUS; SUBCUTANEOUS ONCE
Status: CANCELLED | OUTPATIENT
Start: 2020-06-30

## 2020-06-01 RX ORDER — SODIUM CHLORIDE 0.9 % (FLUSH) 0.9 %
10 SYRINGE (ML) INJECTION AS NEEDED
Status: CANCELLED
Start: 2020-07-02

## 2020-06-01 RX ORDER — DIPHENHYDRAMINE HYDROCHLORIDE 50 MG/ML
25 INJECTION, SOLUTION INTRAMUSCULAR; INTRAVENOUS AS NEEDED
Status: CANCELLED
Start: 2020-06-30

## 2020-06-01 RX ORDER — DIPHENOXYLATE HYDROCHLORIDE AND ATROPINE SULFATE 2.5; .025 MG/1; MG/1
2 TABLET ORAL
Qty: 200 TAB | Refills: 0 | Status: ON HOLD | OUTPATIENT
Start: 2020-06-01 | End: 2022-07-23

## 2020-06-01 RX ORDER — EPINEPHRINE 1 MG/ML
0.3 INJECTION, SOLUTION, CONCENTRATE INTRAVENOUS AS NEEDED
Status: CANCELLED | OUTPATIENT
Start: 2020-06-10

## 2020-06-01 RX ORDER — SODIUM CHLORIDE 0.9 % (FLUSH) 0.9 %
10 SYRINGE (ML) INJECTION AS NEEDED
Status: CANCELLED
Start: 2020-06-10

## 2020-06-01 RX ORDER — PALONOSETRON 0.05 MG/ML
0.25 INJECTION, SOLUTION INTRAVENOUS ONCE
Status: CANCELLED | OUTPATIENT
Start: 2020-06-10

## 2020-06-01 RX ORDER — HYDROCORTISONE SODIUM SUCCINATE 100 MG/2ML
100 INJECTION, POWDER, FOR SOLUTION INTRAMUSCULAR; INTRAVENOUS AS NEEDED
Status: CANCELLED | OUTPATIENT
Start: 2020-06-10

## 2020-06-01 RX ORDER — SODIUM CHLORIDE 9 MG/ML
10 INJECTION INTRAMUSCULAR; INTRAVENOUS; SUBCUTANEOUS AS NEEDED
Status: CANCELLED | OUTPATIENT
Start: 2020-06-12

## 2020-06-01 RX ORDER — HYDROCORTISONE SODIUM SUCCINATE 100 MG/2ML
100 INJECTION, POWDER, FOR SOLUTION INTRAMUSCULAR; INTRAVENOUS AS NEEDED
Status: CANCELLED | OUTPATIENT
Start: 2020-06-30

## 2020-06-01 RX ORDER — SODIUM CHLORIDE 0.9 % (FLUSH) 0.9 %
10 SYRINGE (ML) INJECTION AS NEEDED
Status: CANCELLED
Start: 2020-06-12

## 2020-06-01 RX ORDER — DIPHENHYDRAMINE HYDROCHLORIDE 50 MG/ML
50 INJECTION, SOLUTION INTRAMUSCULAR; INTRAVENOUS AS NEEDED
Status: CANCELLED
Start: 2020-06-10

## 2020-06-01 RX ORDER — ATROPINE SULFATE 0.4 MG/ML
0.4 INJECTION, SOLUTION ENDOTRACHEAL; INTRAMEDULLARY; INTRAMUSCULAR; INTRAVENOUS; SUBCUTANEOUS
Status: CANCELLED | OUTPATIENT
Start: 2020-06-30

## 2020-06-01 RX ORDER — FLUOROURACIL 50 MG/ML
400 INJECTION, SOLUTION INTRAVENOUS ONCE
Status: CANCELLED
Start: 2020-06-24

## 2020-06-01 RX ORDER — SODIUM CHLORIDE 9 MG/ML
10 INJECTION INTRAMUSCULAR; INTRAVENOUS; SUBCUTANEOUS AS NEEDED
Status: CANCELLED | OUTPATIENT
Start: 2020-06-10

## 2020-06-01 RX ORDER — ONDANSETRON 2 MG/ML
8 INJECTION INTRAMUSCULAR; INTRAVENOUS AS NEEDED
Status: CANCELLED | OUTPATIENT
Start: 2020-06-10

## 2020-06-01 RX ORDER — ATROPINE SULFATE 0.4 MG/ML
0.4 INJECTION, SOLUTION ENDOTRACHEAL; INTRAMEDULLARY; INTRAMUSCULAR; INTRAVENOUS; SUBCUTANEOUS
Status: CANCELLED | OUTPATIENT
Start: 2020-06-10

## 2020-06-01 RX ORDER — SODIUM CHLORIDE 0.9 % (FLUSH) 0.9 %
10 SYRINGE (ML) INJECTION AS NEEDED
Status: CANCELLED
Start: 2020-06-30

## 2020-06-01 RX ORDER — ACETAMINOPHEN 325 MG/1
650 TABLET ORAL AS NEEDED
Status: CANCELLED
Start: 2020-06-10

## 2020-06-01 RX ORDER — ALBUTEROL SULFATE 0.83 MG/ML
2.5 SOLUTION RESPIRATORY (INHALATION) AS NEEDED
Status: CANCELLED
Start: 2020-06-30

## 2020-06-01 RX ORDER — SODIUM CHLORIDE 9 MG/ML
10 INJECTION INTRAMUSCULAR; INTRAVENOUS; SUBCUTANEOUS AS NEEDED
Status: CANCELLED | OUTPATIENT
Start: 2020-07-02

## 2020-06-01 RX ORDER — FLUOROURACIL 50 MG/ML
400 INJECTION, SOLUTION INTRAVENOUS ONCE
Status: CANCELLED
Start: 2020-06-10 | End: 2020-06-10

## 2020-06-01 RX ORDER — DIPHENHYDRAMINE HYDROCHLORIDE 50 MG/ML
50 INJECTION, SOLUTION INTRAMUSCULAR; INTRAVENOUS AS NEEDED
Status: CANCELLED
Start: 2020-06-30

## 2020-06-01 RX ORDER — EPINEPHRINE 1 MG/ML
0.3 INJECTION, SOLUTION, CONCENTRATE INTRAVENOUS AS NEEDED
Status: CANCELLED | OUTPATIENT
Start: 2020-06-30

## 2020-06-01 RX ORDER — ATROPINE SULFATE 0.4 MG/ML
0.4 INJECTION, SOLUTION ENDOTRACHEAL; INTRAMEDULLARY; INTRAMUSCULAR; INTRAVENOUS; SUBCUTANEOUS ONCE
Status: CANCELLED | OUTPATIENT
Start: 2020-06-10

## 2020-06-01 RX ORDER — PALONOSETRON 0.05 MG/ML
0.25 INJECTION, SOLUTION INTRAVENOUS ONCE
Status: CANCELLED | OUTPATIENT
Start: 2020-06-30

## 2020-06-01 RX ORDER — ALBUTEROL SULFATE 0.83 MG/ML
2.5 SOLUTION RESPIRATORY (INHALATION) AS NEEDED
Status: CANCELLED
Start: 2020-06-10

## 2020-06-01 RX ORDER — DEXTROSE MONOHYDRATE 50 MG/ML
25 INJECTION, SOLUTION INTRAVENOUS CONTINUOUS
Status: CANCELLED
Start: 2020-06-10

## 2020-06-01 RX ORDER — HEPARIN 100 UNIT/ML
300-500 SYRINGE INTRAVENOUS AS NEEDED
Status: CANCELLED
Start: 2020-07-02

## 2020-06-01 RX ORDER — ONDANSETRON 2 MG/ML
8 INJECTION INTRAMUSCULAR; INTRAVENOUS AS NEEDED
Status: CANCELLED | OUTPATIENT
Start: 2020-06-30

## 2020-06-01 RX ORDER — HEPARIN 100 UNIT/ML
300-500 SYRINGE INTRAVENOUS AS NEEDED
Status: CANCELLED
Start: 2020-06-10

## 2020-06-01 NOTE — PROGRESS NOTES
Senait Hardin is a 59 y.o. female who presents today with the following:  Chief Complaint   Patient presents with    Post OP Follow Up    Lesion       HPI    She presents in follow-up today after placement of a left Port-A-Cath. She is doing well. They have access this and uses for chemotherapy for treatment of her pancreatic cancer without any problems. She also presents for excision of the left temporal region skin lesion that is been present for some time and is easily friable and has increased in size.   Past Medical History:   Diagnosis Date    Adenocarcinoma of pancreas ([facility] [address] 05/13/2020    Dr Balderrama Monday biopsy via EUS    [facility]     GERD (gastroesophageal reflux disease)     Hernia of abdominal cavity     Subxyphoid hernia    Hypertension     Inflammatory polyarthropathy (HCC)     Joint pain     Joint swelling     Menopause     Ocular nevus of left eye     Pancreatitis     Tracheal stenosis        Past Surgical History:   Procedure Laterality Date    HX CARPAL TUNNEL RELEASE Bilateral     HX COLONOSCOPY      HX HYSTERECTOMY      HX KNEE ARTHROSCOPY Right     HX KNEE REPLACEMENT Right     partial    HX LAP CHOLECYSTECTOMY      HX TONSILLECTOMY      HX VASCULAR ACCESS         Social History     Socioeconomic History    Marital status:      Spouse name: Priscilla Sauer Number of children: 3    Years of education: Not on file    Highest education level: Associate degree: academic program   Occupational History    Occupation: RN  [employer] [place]   Social Needs    Financial resource strain: Not hard at all   [place] insecurity     Worry: Never true     Inability: Never true   [employer] needs     Medical: No     Non-medical: No   Tobacco Use    Smoking status: Never Smoker    Smokeless tobacco: Never Used   Substance and Sexual Activity    Alcohol use: No     Alcohol/week: 0.0 standard drinks     Frequency: Never     Binge frequency: Never    Drug use: No    Sexual activity: Not Currently   Lifestyle    Physical activity     Days per week: 0 days     Minutes per session: 0 min    Stress: Rather much   Relationships    Social connections     Talks on phone: More than three times a week     Gets together: Once a week     Attends Christianity service: Never     Active member of club or organization: Yes     Attends meetings of clubs or organizations: 1 to 4 times per year     Relationship status:     Intimate partner violence     Fear of current or ex partner: No     Emotionally abused: No     Physically abused: No     Forced sexual activity: No   Other Topics Concern     Service Not Asked    Blood Transfusions Not Asked    Caffeine Concern Not Asked    Occupational Exposure Not Asked    Hobby Hazards Not Asked    Sleep Concern Not Asked    Stress Concern Not Asked    Weight Concern Not Asked    Special Diet Not Asked    Back Care Not Asked    Exercise Not Asked    Bike Helmet Not Asked   [address] Not Asked    Self-Exams Not Asked   Social History Narrative    Not on file       Family History   Problem Relation Age of Onset    Heart Disease Mother     Heart Attack Mother     Cancer Father         lung    Diabetes Sister        No Known Allergies    Current Outpatient Medications   Medication Sig    diphenoxylate-atropine (LomotiL) 2.5-0.025 mg per tablet Take 2 Tabs by mouth four (4) times daily as needed for Diarrhea. Max Daily Amount: 8 Tabs.  methylphenidate HCl (RITALIN) 5 mg tablet Take 1 Tab by mouth two (2) times a day. Max Daily Amount: 10 mg.    diphenhydrAMINE (BENADRYL) 25 mg tablet Take 25 mg by mouth every six (6) hours as needed.  lidocaine-prilocaine (EMLA) topical cream Apply  to affected area as needed for Pain.  prochlorperazine (COMPAZINE) 10 mg tablet Take 0.5 Tabs by mouth every six (6) hours as needed for Nausea.     ondansetron (ZOFRAN ODT) 4 mg disintegrating tablet Take 1 Tab by mouth every eight (8) hours as needed for Nausea or Vomiting.  lipase-protease-amylase (CREON 12,000) 12,000-38,000 -60,000 unit capsule Take 1 Cap by mouth three (3) times daily (with meals). Indications: exocrine pancreatic insufficiency (Patient taking differently: Take 2 Caps by mouth three (3) times daily (with meals). Indications: exocrine pancreatic insufficiency)    HYDROmorphone (DILAUDID) 2 mg tablet Take 1 Tab by mouth every four (4) hours as needed for Pain for up to 30 days. Max Daily Amount: 12 mg.  Blood-Glucose Meter,Continuous (Dexcom G6 ) misc Use as directed to monitor blood glucose as directed    Blood-Glucose Sensor (Dexcom G6 Sensor) fadi Use as directed to monitor blood glucose as directed    Blood-Glucose Transmitter (Dexcom G6 Transmitter) fadi Use as directed to monitor blood glucose as directed    insulin lispro (HumaLOG KwikPen Insulin) 100 unit/mL kwikpen Inject 25 units into the skin with breakfast, 15 units with lunch (if BG is >200 and eating carbs with meal), and 30 units with dinner (Patient taking differently: Inject 25 units into the skin with breakfast, 15 units with lunch (if BG is >200 and eating carbs with meal), and 30 units with dinner  Patient is taking based on her BS levels at Breakfast, Lunch and Dinner.)    insulin glargine U-300 conc (Toujeo SoloStar U-300 Insulin) 300 unit/mL (1.5 mL) inpn pen 55 units every bedtime    pravastatin (PRAVACHOL) 20 mg tablet TAKE 1 TABLET BY MOUTH EVERY NIGHT AT BEDTIME    omeprazole (PRILOSEC) 40 mg capsule Take 1 Cap by mouth daily.  acetaminophen (Acetaminophen Extra Strength) 500 mg tablet Take 1,000 mg by mouth two (2) times a day.  ibuprofen (Motrin IB) 200 mg tablet Take 200 mg by mouth every eight (8) hours as needed for Pain.  Needle, Disp, ndle 1 Each by Does Not Apply route two (2) times daily as needed for Other.     carvedilol (COREG) 25 mg tablet TAKE 1 TABLET BY MOUTH TWICE DAILY (Patient taking differently: 1/2 dose twice daily  5/28/2020 patient is taking her BP if BP is low she is only taking 1/2 a tab, if high she is taking the 25mg.)    aspirin delayed-release 81 mg tablet Take  by mouth daily. No current facility-administered medications for this visit. The above histories, medications and allergies have been reviewed. ROS    Visit Vitals  /63   Pulse 75   Temp (!) 96.1 °F (35.6 °C)   Ht 5' 4\" (1.626 m)   Wt 229 lb (103.9 kg)   BMI 39.31 kg/m²     Physical Exam  Chest:      Comments: Left infraclavicular port site is healing nicely without signs of infection  Skin:     Comments: Temporal region shows a squama proliferative lesion that measures approximately 6 mm in greatest dimension. 1. Atypical squamoproliferative skin lesion  Recommend excision. 2. Postoperative examination  Doing well. Follow-up PRN. MD Sugey Noe MD    Location of neoplasm: Left temple    Diagnosis:  Encounter Diagnoses   Name Primary?  Atypical squamoproliferative skin lesion Yes    Postoperative examination      Procedure: Excision of lesion of left temple       Local anesthesia given: 2 ml of 2% buffered lidocaine    Excision diameter: 15  mm  Depth: 5 mm    Type of closure: simple    Description of closure: simple interrupted    Antibiotic dressing is applied, and wound care instructions provided. Be alert for any signs of cutaneous infection. The procedure was well tolerated without complications. Follow up: the specimen is labeled and sent to pathology for evaluation, return for suture removal in 10 days.      [facility]  OFFICE PROCEDURE PROGRESS NOTE        Chart reviewed for the following:   Brigette Sanders MD, have reviewed the History, Physical and updated the Allergic reactions for [address] performed immediately prior to start of procedure:   Brigette Sanders MD, have performed the following reviews on Cristina Austin prior to the start of the procedure:            * Patient was identified by name and date of birth   * Agreement on procedure being performed was verified  * Risks and Benefits explained to the patient  * Procedure site verified and marked as necessary  * Patient was positioned for comfort  * Consent was signed and verified     Time In: 3484  Time Out: 8398      Date of procedure: 6/1/2020    Procedure performed by: Luis Sanchez MD    Provider assisted by: None     Patient assisted by: self    How tolerated by patient: tolerated the procedure well with no complications    Pain Scale: 0 - No pain/10    Post Procedural Pain Scale: 0 - No Hurt    Comments: Informed consent was obtained. Risks of the procedure were shared including the risks of bleeding, infection, scar formation or need for additional surgery. The area was then prepped with Betadine. It was draped in standard aseptic fashion. Timeout was performed. A field block was established with a local anesthetic and the lesion was excised in an elliptical fashion and passed off the field as a specimen. Closure of the incision was carried out using 5-0 nylon interrupted sutures. Antibiotic ointment and a Band-Aid were applied. Patient tolerated the procedure well.

## 2020-06-01 NOTE — PATIENT INSTRUCTIONS
Skin Lesion Removal: What to Expect at Home Your Recovery After your procedure, you should not have much pain. But some soreness, swelling, or bruising is normal. Your doctor may recommend over-the-counter medicines to help with any discomfort. Most people can return to their normal routine the same day of their procedure. How quickly your wound heals depends on the size of your wound and the type of procedure you had. Most wounds take 1 to 3 weeks to heal. If you had laser surgery, your skin may change color and then slowly return to its normal color. You may need only a bandage, or you may need stitches. If you had stitches, your doctor will probably remove them 5 to 14 days later. If you have the type of stitches that dissolve, they do not have to be removed. They will disappear on their own. This care sheet gives you a general idea about how long it will take for you to recover. But each person recovers at a different pace. Follow the steps below to get better as quickly as possible. How can you care for yourself at home? Activity · For the first few days, try not to bump or knock your wound. · Depending on where your wound is, you may need to avoid strenuous exercise for 2 weeks after the procedure or until your doctor says it is okay. · If you have had a lesion removed from your face, do not use makeup near your wound until you have your stitches taken out. · Ask your doctor when it is okay to shower, bathe, or swim. Medicines · Your doctor will tell you if and when you can restart your medicines. He or she will also give you instructions about taking any new medicines. · If you take aspirin or some other blood thinner, ask your doctor if and when to start taking it again. Make sure that you understand exactly what your doctor wants you to do. · Be safe with medicines. Take pain medicines exactly as directed.  
? If the doctor gave you a prescription medicine for pain, take it as prescribed. ? If you are not taking a prescription pain medicine, ask your doctor if you can take an over-the-counter medicine. Wound care · If your doctor told you how to care for your incision, follow your doctor's instructions. If you did not get instructions, follow this general advice: 
? Keep the wound bandaged and dry for the first day. ? After the first 24 to 48 hours, wash around the wound with clean water 2 times a day. Don't use hydrogen peroxide or alcohol, which can slow healing. ? You may cover the wound with a thin layer of petroleum jelly, such as Vaseline, and a nonstick bandage. ? Apply more petroleum jelly and replace the bandage as needed. · If you have stitches, you may get other instructions. · If a scab forms, do not pull it off. Let it fall off on its own. Wounds heal faster if no scab forms. Washing the area every day and using petroleum jelly will help prevent a scab from forming. · If the wound bleeds, put direct pressure on it with a clean cloth until the bleeding stops. · If you had a growth \"frozen\" off, you may get a blister. Do not break it. Let it dry up on its own. It is common for the blister to fill with blood. You do not need to do anything about this, but if it becomes too painful, call your doctor. · Avoid the sun until your stitches are removed. Follow-up care is a key part of your treatment and safety. Be sure to make and go to all appointments, and call your doctor if you are having problems. It's also a good idea to know your test results and keep a list of the medicines you take. When should you call for help? [id number] anytime you think you may need emergency care. For example, call if: 
· You passed out (lost consciousness). · You have severe trouble breathing. · You have sudden chest pain and shortness of breath, or you cough up blood. Call your doctor now or seek immediate medical care if: · You have symptoms of a blood clot in your leg (called a deep vein thrombosis), such as: 
? Pain in the calf, back of the knee, thigh, or groin. ? Redness and swelling in your leg or groin. · You have signs of infection, such as: 
? Increased pain, swelling, warmth, or redness. ? Red streaks leading from the wound. ? Pus draining from the wound. ? A fever. · You have pain that does not get better after you take pain medicine. · You have loose stitches. Watch closely for changes in your health, and be sure to contact your doctor if you have any problems. Where can you learn more? Go to http://silver-jessica.info/ Enter Q228 in the search box to learn more about \"Skin Lesion Removal: What to Expect at Home. \" Current as of: October 31, 2019               Content Version: 12.5 © 0264-5529 [employer]wise[employer]. Care instructions adapted under license by [employer] (which disclaims liability or warranty for this information). If you have questions about a medical condition or this instruction, always ask your healthcare professional. [place] [address] any warranty or liability for your use of this information.

## 2020-06-03 ENCOUNTER — TELEPHONE (OUTPATIENT)
Dept: ONCOLOGY | Age: 65
End: 2020-06-03

## 2020-06-03 NOTE — TELEPHONE ENCOUNTER
Upon chart review. Noted pt had COVID testing that was negative. However per our policy we can not see the patient for 14 days from onset. In this case from exposure date. I phoned pt at approx. 0930. HIPAA verified by two patient identifiers and she and her friend were actually on her way here but understood. She mentioned she is going to  a lomotil prescription to be proactive for next round. She had diarrhea starting the Friday after her treatment 5-6 episodes, Saturday 4 and started to subside thereafter. She noted taking benadryl before dilaudid helps her. She had dysarthria from what appeared to be irinotecan so was given medication for that during infusion. She has a \"fuzzy\" feeling on her tongue, she does not feel it is thrush nor her firend but will keep monitoring. She elected to have  as primary due to location and will still reach out to  if needed/requested. This nurse called [facility] at this time who will send out saliva kits to [address]: /Tatianna Holcomb. So pt can get BRCA 1/2 testing done.

## 2020-06-08 ENCOUNTER — PATIENT OUTREACH (OUTPATIENT)
Dept: OTHER | Age: 65
End: 2020-06-08

## 2020-06-08 NOTE — PROGRESS NOTES
[facility] Outreach    Call placed to pt, Verified  and address for HIPAA security. Goals      Attends follow-up appointments as directed. Oncology - 5/15/2020  PCP - TBD    2020   scheduled to start chemo 2020  Oncology - attended 5/15/2020 F/U 6/3/2020  PCP - TBD  Palliative - new pt appt 6/15/2020  Surgeon - attended 2020 F/U 2020  Neuro - 2020 on track       [address] related to pancreatic cancer (pt-stated)      2020   Rosalba cath placement - 2020. Scheduled to start chemo at infusion center 2020. Advised to speak with oncology MSW to assist with financial aid/employee hardship henri. 2020 Started first infusion 2020 and is second for 2nd treatment tomorrow 6/10/2020. Pt reported feeling extremely sick and having difficulty even functioning, poor appetite, N/V, diarrhea and extremely fatigue sleeping most of the day up until 4 days ago. Pt was able to spend time with family and enjoy 3 birthday parties too. Appetite returned and pt has been eating as much as tolerate in anticipation of next treatment. Pt reported she has finally been able to sleep in her bed after 6 months of being in pain related to back pain, which was related to cancer we now know. Pain is now being managed well. Pt is currently scheduled for a new patient appt with Palliative and we discussed uploading ACP into [employer].  Patient verbalizes understanding of self -management goals of living with Diabetes. 2020  Pt reported BS was 168 at noon and 217 this AM fasting. Pt reported she is starting to see an improvement in BS's; down from 360's. Pt reported PCP will like to have BS's between 100-200 at this time. Pt has now started reusing Dex com 6 today in order to track BS's constantly. 2020 BS holding at 187 in AM and 131 in PM. Pt is adjusting insulin as needed based on appetite and food intake.        Prepare patients and caregivers for end of life decisions (ie. need for hospice, pain management, symptom relief, advance directives etc.)      6/8/2020   Discussed with pt about uploading ACP through [address] Healthcare decision makers updated in [address] [facility] resources in place to maintain patient in the community (ie.  Home Health, DME equipment, refer to, medication assistant plan, etc.)      [facility] - out of territory  [facility] 24/7  Nurse Access line 24/7  Oncology MSW            CM will f/u in 3 weeks and PRN

## 2020-06-10 RX ORDER — POTASSIUM CHLORIDE 20 MEQ/1
20 TABLET, EXTENDED RELEASE ORAL 2 TIMES DAILY
Qty: 60 TAB | Refills: 1 | Status: SHIPPED | OUTPATIENT
Start: 2020-06-10 | End: 2020-06-19 | Stop reason: CLARIF

## 2020-06-10 RX ORDER — LORAZEPAM 1 MG/1
1 TABLET ORAL
Qty: 90 TAB | Refills: 2 | Status: SHIPPED | OUTPATIENT
Start: 2020-06-10 | End: 2021-06-11 | Stop reason: SDUPTHER

## 2020-06-11 ENCOUNTER — TELEPHONE (OUTPATIENT)
Dept: PALLATIVE CARE | Age: 65
End: 2020-06-11

## 2020-06-11 NOTE — TELEPHONE ENCOUNTER
Called patient to advise/confirm upcoming appt with Dr. Martinez Sanchez on 6/15/2020    at 8:30am at [facility]. Patient confirmed however she states that she was exposed to person that tested positive for COVID over [address] day weekend. SHe states she has had 2 negative COVID tests done (one on 5/18/2020 and one on 5/29/2020) and she has no symptoms. Advised patient that I would check with nurse and if nurse feels like appt needs to be rescheduled, she will call patient to advised. Call placed to nurse to advise of situation. Also advised to please bring in your Drivers License and Insurance Card with you to appointment as well as any medication in the original container with you to appt.

## 2020-06-12 RX ORDER — DIPHENHYDRAMINE HYDROCHLORIDE 50 MG/ML
25 INJECTION, SOLUTION INTRAMUSCULAR; INTRAVENOUS AS NEEDED
Status: CANCELLED
Start: 2020-07-14

## 2020-06-12 RX ORDER — SODIUM CHLORIDE 9 MG/ML
10 INJECTION INTRAMUSCULAR; INTRAVENOUS; SUBCUTANEOUS AS NEEDED
Status: CANCELLED | OUTPATIENT
Start: 2020-07-14

## 2020-06-12 RX ORDER — LORAZEPAM 2 MG/ML
0.5 INJECTION INTRAMUSCULAR ONCE
Status: CANCELLED
Start: 2020-06-30

## 2020-06-12 RX ORDER — SODIUM CHLORIDE 0.9 % (FLUSH) 0.9 %
10 SYRINGE (ML) INJECTION AS NEEDED
Status: CANCELLED
Start: 2020-07-14

## 2020-06-12 RX ORDER — DEXTROSE MONOHYDRATE 50 MG/ML
25 INJECTION, SOLUTION INTRAVENOUS CONTINUOUS
Status: CANCELLED
Start: 2020-07-14

## 2020-06-12 RX ORDER — ACETAMINOPHEN 325 MG/1
650 TABLET ORAL AS NEEDED
Status: CANCELLED
Start: 2020-07-14

## 2020-06-12 RX ORDER — HEPARIN 100 UNIT/ML
300-500 SYRINGE INTRAVENOUS AS NEEDED
Status: CANCELLED
Start: 2020-07-14

## 2020-06-12 RX ORDER — EPINEPHRINE 1 MG/ML
0.3 INJECTION, SOLUTION, CONCENTRATE INTRAVENOUS AS NEEDED
Status: CANCELLED | OUTPATIENT
Start: 2020-07-14

## 2020-06-12 RX ORDER — ATROPINE SULFATE 0.4 MG/ML
0.4 INJECTION, SOLUTION ENDOTRACHEAL; INTRAMEDULLARY; INTRAMUSCULAR; INTRAVENOUS; SUBCUTANEOUS ONCE
Status: CANCELLED | OUTPATIENT
Start: 2020-07-14

## 2020-06-12 RX ORDER — ONDANSETRON 2 MG/ML
8 INJECTION INTRAMUSCULAR; INTRAVENOUS AS NEEDED
Status: CANCELLED | OUTPATIENT
Start: 2020-07-14

## 2020-06-12 RX ORDER — PALONOSETRON 0.05 MG/ML
0.25 INJECTION, SOLUTION INTRAVENOUS ONCE
Status: CANCELLED | OUTPATIENT
Start: 2020-07-14

## 2020-06-12 RX ORDER — SODIUM CHLORIDE 0.9 % (FLUSH) 0.9 %
10 SYRINGE (ML) INJECTION AS NEEDED
Status: CANCELLED
Start: 2020-07-16

## 2020-06-12 RX ORDER — ALBUTEROL SULFATE 0.83 MG/ML
2.5 SOLUTION RESPIRATORY (INHALATION) AS NEEDED
Status: CANCELLED
Start: 2020-07-14

## 2020-06-12 RX ORDER — HEPARIN 100 UNIT/ML
300-500 SYRINGE INTRAVENOUS AS NEEDED
Status: CANCELLED
Start: 2020-07-16

## 2020-06-12 RX ORDER — FLUOROURACIL 50 MG/ML
400 INJECTION, SOLUTION INTRAVENOUS ONCE
Status: CANCELLED
Start: 2020-07-08

## 2020-06-12 RX ORDER — ATROPINE SULFATE 0.4 MG/ML
0.4 INJECTION, SOLUTION ENDOTRACHEAL; INTRAMEDULLARY; INTRAMUSCULAR; INTRAVENOUS; SUBCUTANEOUS
Status: CANCELLED | OUTPATIENT
Start: 2020-07-14

## 2020-06-12 RX ORDER — DIPHENHYDRAMINE HYDROCHLORIDE 50 MG/ML
50 INJECTION, SOLUTION INTRAMUSCULAR; INTRAVENOUS AS NEEDED
Status: CANCELLED
Start: 2020-07-14

## 2020-06-12 RX ORDER — HYDROCORTISONE SODIUM SUCCINATE 100 MG/2ML
100 INJECTION, POWDER, FOR SOLUTION INTRAMUSCULAR; INTRAVENOUS AS NEEDED
Status: CANCELLED | OUTPATIENT
Start: 2020-07-14

## 2020-06-12 RX ORDER — SODIUM CHLORIDE 9 MG/ML
10 INJECTION INTRAMUSCULAR; INTRAVENOUS; SUBCUTANEOUS AS NEEDED
Status: CANCELLED | OUTPATIENT
Start: 2020-07-16

## 2020-06-12 RX ORDER — LORAZEPAM 2 MG/ML
0.5 INJECTION INTRAMUSCULAR ONCE
Status: CANCELLED
Start: 2020-07-14

## 2020-06-15 ENCOUNTER — OFFICE VISIT (OUTPATIENT)
Dept: PALLATIVE CARE | Age: 65
End: 2020-06-15

## 2020-06-15 VITALS
HEART RATE: 79 BPM | OXYGEN SATURATION: 96 % | HEIGHT: 64 IN | SYSTOLIC BLOOD PRESSURE: 131 MMHG | TEMPERATURE: 97.8 F | WEIGHT: 218.2 LBS | BODY MASS INDEX: 37.25 KG/M2 | RESPIRATION RATE: 18 BRPM | DIASTOLIC BLOOD PRESSURE: 80 MMHG

## 2020-06-15 DIAGNOSIS — R53.83 PROFOUND FATIGUE: ICD-10-CM

## 2020-06-15 DIAGNOSIS — C25.9 MALIGNANT NEOPLASM OF PANCREAS, UNSPECIFIED LOCATION OF MALIGNANCY (HCC): Primary | ICD-10-CM

## 2020-06-15 DIAGNOSIS — R11.0 CHEMOTHERAPY-INDUCED NAUSEA: ICD-10-CM

## 2020-06-15 DIAGNOSIS — B37.0 ORAL THRUSH: ICD-10-CM

## 2020-06-15 DIAGNOSIS — T45.1X5A CHEMOTHERAPY-INDUCED NAUSEA: ICD-10-CM

## 2020-06-15 DIAGNOSIS — R10.9 INTRACTABLE ABDOMINAL PAIN: ICD-10-CM

## 2020-06-15 RX ORDER — DRONABINOL 2.5 MG/1
2.5 CAPSULE ORAL 2 TIMES DAILY
Qty: 60 CAP | Refills: 0 | Status: SHIPPED | OUTPATIENT
Start: 2020-06-15 | End: 2020-06-29 | Stop reason: ALTCHOICE

## 2020-06-15 RX ORDER — NYSTATIN 100000 [USP'U]/ML
1 SUSPENSION ORAL 4 TIMES DAILY
Qty: 100 ML | Refills: 1 | Status: SHIPPED | OUTPATIENT
Start: 2020-06-15 | End: 2020-07-15

## 2020-06-15 NOTE — PROGRESS NOTES
[facility]  Palliative Medicine Outpatient   Psychosocial Assessment  [phone]      Reason for Assessment:    [x] Initial Social Work Encounter  [] Continued Social Work Assessment from previous encounter    Sources of Information:    [x]Patient  [x]Family  []Staff  [x]Medical Record    Narrative:   Ms. Nancy Rojas is a 60 y/o woman whose pmh includes a new diagnosis of Pancreatic CA (4-). She lives at home with her , Doris Fernandez, who suffers from dementia. Their daughter, Narda Rodriguez, lives across the street and was present today. [place] older children are available to help pt and her . Pt has another daughter who lives in [place], and a niece in [place], who also is helpful when available. 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 She has been counting down the days to [facility] in July, 2020 ,when she turns 72, and was looking forward to having more time with her . \"Pancreatic cancer was not in my plan. \"  Pt expressed a philosophy of acceptance, exhibiting a calm and kind demeanor. Assessment / Action:  · Introduced Palliative Social Work as part of the PM supportive team by providing emotional support, resource referrals, advocacy, assistance with AMDs and counseling. · Acknowledged pt's new diagnosis, providing space for pt to express frustrations with this life change. · SW encouraged expression while actively listening. · Provided affirmation to pt's daughter for her presence and support.     Advance Care Planning:    Primary Decision Maker: Yajaira Ning Patel Daughter - [phone]  Advance Care Planning 6/15/2020   Patient's Healthcare Decision Maker is: Verbal statement (Legal Next of Kin remains as decision maker)   Confirm Advance Directive None   Patient Would Like to Complete Advance Directive Yes   Does the patient have other document types -       Mental Status:    [x]Alert  [x]Lethargic []Unresponsive  Oriented to:  [x]Person  [x]Place  [x]Time  [x]Situation      Barriers to Learning:    []Language  []Developmental  []Cognitive  []Altered Mental Status  []Forgetful []Visual/Hearing Impairment  []Unable to Read/Write  []Motivational   [x]No Barriers Identified  []Other:    Relationship Status:  []Single  [x]  []Significant Other/Life Partner  []  []  []      Living Circumstances:  []Lives Alone  [x]Family/Significant Other in Household  []Roommates  []Children in the Home  []Paid Caregivers  []Assisted Living Facility/Group Home  []Skilled [address]  []Homeless  []Incarcerated  []Environmental/Care Concerns  []Transportation Issues  [] Issues  []Domestic Violence []Other:    Support System:    [x]Strong  []Fair  []Limited  How often do you communicate with people who are supportive for you? Sleep Habits:   []Poor []Unsatisfactory [x]Satisfactory []Good []Very Good   Specific sleep problems?      Financial/Legal Concerns:    []Uninsured   []Medical Care Financial Strain  []Limited Income/Resources  []Utility Issues []Rent/Mortgage Issues []Food Insecurity []Non-Citizen [x]No Concerns Identified    []Financial POA:    []Other:    [place]/Spiritual/Existential:  [x]Strong Sense of Spirituality  [x]Involved in [employer]  []Request  Visit  []Expressing Spiritual/Existential Angst  []No Concerns Identified    Coping with Illness:         Patient: Family/Caregiver:   Understanding and Acceptance of Illness/Prognosis  [x] [x]   Strong Sense of Resilience [x] [x]   Self-Reflection [x] [x]   Engaged Support System [x] [x]   Does not Readily Discuss Illness [] []   Denial of Terminal Status [] []   Anger [] []   Depression [] []   Anxiety/Fear/Restlessness/Stress [] []   Bargaining [] []   Recent Diagnosis/Prognosis [x] []   Difficulties with Body Image [] []   Loss of Identity [] []   Excessive Substance Use [] []   Mental Health History [] [] Enmeshed Relationships [] []   History of Loss [] []   Anticipatory Grief [] [x]   Concern for Complicated Grief [] []   Suicidal Ideation or Plan [] []   Caregiver Stress [] []   Unable to assess [] []     Goals/Plan:   Ongoing psychosocial support including assessments, links with resources and counseling prn.      Kristin Marquez, JOANNA, MSSW, LCSW  Palliative   [facility] Palliative Medicine  [phone]

## 2020-06-15 NOTE — PROGRESS NOTES
Palliative Medicine Office Visit  Palliative Medicine Nurse Check In  [phone]    Patient Name: Rossana Shoemaker  YOB: 1955      Date of Office Visit: 6/15/2020    Patient states: \" Diarrhea \"    From Check In Sheet (scanned in Media):  Has a medical provider talked with you about cardiopulmonary resuscitation (CPR)? [x] Yes   [] No   [] Unable to obtain    Nurse reminder to complete or update ACP FlowSheet:    Is ACP on the Problem List?    [] Yes    [x] No  IF ACP Document is ON FILE; Nurse to place ACP on Problem List     Is there an ACP Note in Chart Review/Note? [] Yes    [x] No   If NO: ALERT PROVIDER       Primary Decision MakerLadencarli Patel [facility] [phone]  Advance Care Planning 6/15/2020   Patient's Healthcare Decision Maker is: Verbal statement (Legal Next of Kin remains as decision maker)   Confirm Advance Directive None   Patient Would Like to Complete Advance Directive Yes   Does the patient have other document types -         Is there anything that we should know about you as a person in order to provide you the best care possible? Have you been to the ER, urgent care clinic since your last visit? [] Yes   [x] No   [] Unable to obtain    Have you been hospitalized since your last visit? [] Yes   [x] No   [] Unable to obtain    Have you seen or consulted any other health care providers outside of the [address] [place] [address] since your last visit?    [] Yes   [x] No   [] Unable to obtain    Functional status (describe):         Last BM: 6/15/2020     accessed (date): 6/15/2020    Bottle review (for opioid pain medication):  Medication 1:   Date filled:   Directions:   # filled:   # left:   # pills taking per day:  Last dose taken:    Medication 2:   Date filled:   Directions:   # filled:   # left:   # pills taking per day:  Last dose taken:    Medication 3:   Date filled:   Directions:   # filled:   # left:   # pills taking per day:  Last dose taken:     Medication 4:   Date filled:   Directions:   # filled:   # left:   # pills taking per day:  Last dose taken:

## 2020-06-15 NOTE — PATIENT INSTRUCTIONS
Dear Cristina Austin , It was a pleasure seeing you today in [facility] office We will see you again in 3 weeks If labs or imaging tests have been ordered for you today, please call the office  at [phone] 48 hours after completion to obtain the results. Your stated goal:  
-To live well for as long as possible Your described symptoms were: Fatigue: 10 Drowsiness: 6 Depression: 0 Pain: 4 Anxiety: 4 Nausea: 6 Anorexia: 10 Dyspnea: 0 Best Well-Being: 10 Constipation: No  
Other Problem (Comment): 0 This is the plan we talked about: 1. Severe upper abdominal pain-pancreatic cancer related 
-You are struggling with on and off severe abdominal pain. You tolerate pain fairly well but this contributes to fatigue and you not doing things you enjoy doing therefore we have to work towards achieving better pain control. You are currently taking Dilaudid 2 mg in the morning and at night. Please increase this to Dilaudid 2 mg every 4 hours as needed. -You have a history of intolerances to opioids with itching but thankfully you were able to tolerate Dilaudid and therefore we will continue the same. 2.  Severe nausea 
-You have chemotherapy-induced nausea. You are currently taking Zofran 4 mg oral disintegrating tablet 2 times a day, you can increase this to every 6 hours as needed. You also have Compazine 10 mg tablets at home which you can take in between  Zofran doses. 3.  Profound fatigue 
-You are struggling with severe fatigue, you sleep most of the time, you barely eat anything. 
- You were given Ritalin to try but you are already very anxious when you are awake therefore you did not want to try it. 
-I think we need to help you with your fatigue multifactorially and not rely on chemicals at this time. 
-From what you shared with me, you are not eating anything and barely drink any water.   This will most certainly contribute to the fatigue in addition to fatigue from chemotherapy and cancer itself. 
-We talked about the below to help you with fatigue 4. Anorexia and oral thrush 
-You have absolutely no desire to eat, once in a while when you eat, you have trouble swallowing due to pain in your throat. We identified oral thrush today. 
-Start nystatin swish and swallow 4 times a day 
-Drink warm water with lemon and honey every morning 
-Start Marinol 2.5 mg 2 times a day. I am hoping this will help with nausea and improving appetite 
-You have already met with a nutritionist but the information provided to you was overwhelming. We reviewed things that you can eat today and you will in to improve your calorie intake to at least 1200 Kcal/day 
-Increase water intake to between 60 to 80 ounces of water per day 5. Uncontrolled diabetes 
-You are taking 55 units glargine insulin at nighttime and modify your daytime insulin based on your sugars. You do not have a set sliding scale. I am very worried about extreme sugar levels. Start a food diary and measure your blood glucose 4 times a day and keep a diary so we can review this and modify your insulin needs. I will communicate this with your PCP Dr. Leopoldo Lloyd as well. 6.  Diarrhea and constipation 
-You struggle with severe diarrhea right after chemotherapy but after that you have constipation. 
-Start stool softeners with senna 2 tablets 1 day after your last diarrhea episode. This way we can stay ahead of constipation as much as possible. I am hoping that the water intake will also help with this. 
-You appear very dehydrated today, I have arranged for you to receive IV fluids at [facility] general outpatient infusion center. 7.  Advanced care planning 
-You completed a living will and advanced medical directive naming your daughter Keisha Chapa as you medical power of  8.   Psychosocial support 
-You are the primary caregiver of your  who is cognitively impaired. You dealing with the cancer is causing tremendous amount of stress on you and your family. Your daughter Elvin Acosta is the only one who lives local and is able to help out. We talked about how palliative medicine can certainly provide you as much support as possible. This is what you have shared with us about Advance Care Planning: 
 
  Primary Decision Maker: Ziyad Amor - Daughter - [phone] Advance Care Planning 6/15/2020 Patient's Healthcare Decision Maker is: Verbal statement (Legal Next of Kin remains as decision maker) Confirm Advance Directive None Patient Would Like to Complete Advance Directive Yes Does the patient have other document types - The Palliative Medicine Team is here to support you and your family.   
 
 
Sincerely, 
 
 
Sofía Rivero MD and the Palliative Medicine Team

## 2020-06-15 NOTE — PROGRESS NOTES
Palliative Medicine Outpatient Services  [place]: [phone])    Patient Name: Hebert Sadler  YOB: 1955    Date of Current Visit: 06/15/20  Location of Current Visit:    [] [facility] Office  [] [facility] Office  [x] [facility] Office  [] Home  [] Virtual visit    Date of Initial Visit: 6/15/2020  Referral from: Dr. Kamar Ibanez  Primary Care Physician: Miguel Ángel Ribeiro MD      SUMMARY:   Hebert Sadler is a 59y.o. year old with a  history of uncontrolled diabetes with an A1c of 9.2, newly diagnosed inoperable pancreatic cancer, who was referred to Palliative Medicine by Dr. Kamar Ibanez for management of intractable symptoms and psychosocial support. 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 Patient currently on 5-FU    Initial Referral Intake note reviewed   PALLIATIVE DIAGNOSES:       ICD-10-CM ICD-9-CM    1. Malignant neoplasm of pancreas, unspecified location of malignancy (HCC) C25.9 157.9 dronabinoL (Marinol) 2.5 mg capsule   2. Intractable abdominal pain R10.9 789.00    3. Chemotherapy-induced nausea R11.0 787.02     T45.1X5A E933.1    4. Oral thrush B37.0 112.0 nystatin (MYCOSTATIN) 100,000 unit/mL suspension   5. Profound fatigue R53.83 780.79           PLAN:     Patient Instructions     Dear Hebert Sadler ,    It was a pleasure seeing you today in [facility] [address] office    We will see you again in 3 weeks    If labs or imaging tests have been ordered for you today, please call the office  at [phone] 48 hours after completion to obtain the results.       Your stated goal:   -To live well for as long as possible    Your described symptoms were:  Fatigue: 10 Drowsiness: 6   Depression: 0 Pain: 4   Anxiety: 4 Nausea: 6   Anorexia: 10 Dyspnea: 0   Best Well-Being: 10 Constipation: No   Other Problem (Comment): 0       This is the plan we talked about:  1. Severe upper abdominal pain-pancreatic cancer related  -You are struggling with on and off severe abdominal pain. You tolerate pain fairly well but this contributes to fatigue and you not doing things you enjoy doing therefore we have to work towards achieving better pain control. You are currently taking Dilaudid 2 mg in the morning and at night. Please increase this to Dilaudid 2 mg every 4 hours as needed. 2.  Severe nausea  -You have chemotherapy-induced nausea. You are currently taking Zofran 4 mg oral disintegrating tablet 2 times a day, you can increase this to every 6 hours as needed. You also have Compazine 10 mg tablets at home which you can take in between  Zofran doses. 3.  Profound fatigue  -You are struggling with severe fatigue, you sleep most of the time, you barely eat anything.  - You were given Ritalin to try but you are already very anxious when you are awake therefore you did not want to try it.  -I think we need to help you with your fatigue multifactorially and not rely on chemicals at this time.  -From what you shared with me, you are not eating anything and barely drink any water. This will most certainly contribute to the fatigue in addition to fatigue from chemotherapy and cancer itself.  -We talked about the below to help you with fatigue    4. Anorexia and oral thrush  -You have absolutely no desire to eat, once in a while when you eat, you have trouble swallowing due to pain in your throat. We identified oral thrush today.  -Start nystatin swish and swallow 4 times a day  -Drink warm water with lemon and honey every morning  -Start Marinol 2.5 mg 2 times a day.   I am hoping this will help with nausea and improving appetite  -You have already met with a nutritionist but the information provided to you was overwhelming. We reviewed things that you can eat today and you will in to improve your calorie intake to at least 1200 Kcal/day  -Increase water intake to between 60 to 80 ounces of water per day    5. Uncontrolled diabetes  -You are taking 55 units glargine insulin at nighttime and modify your daytime insulin based on your sugars. You do not have a set sliding scale. I am very worried about extreme sugar levels. Start a food diary and measure your blood glucose 4 times a day and keep a diary so we can review this and modify your insulin needs. I will communicate this with your PCP Dr. Tosin Blackman as well. 6.  Diarrhea and constipation  -You struggle with severe diarrhea right after chemotherapy but after that you have constipation.  -Start stool softeners with senna 2 tablets 1 day after your last diarrhea episode. This way we can stay ahead of constipation as much as possible. I am hoping that the water intake will also help with this.  -You appear very dehydrated today, I have arranged for you to receive IV fluids at [facility] outpatient infusion center. 7.  Advanced care planning  -You completed a living will and advanced medical directive naming your daughter Jacqueline Perez as you medical power of     8. Psychosocial support  -You are the primary caregiver of your  who is cognitively impaired. You dealing with the cancer is causing tremendous amount of stress on you and your family. Your daughter Jacqueline Perez is the only one who lives local and is able to help out. We talked about how palliative medicine can certainly provide you as much support as possible.     This is what you have shared with us about Advance Care Planning:      Primary Decision Maker: Mino Zambrano - Daughter - [phone]  Advance Care Planning 6/15/2020   Patient's Healthcare Decision Maker is: Verbal statement (Legal Next of Kin remains as decision maker) Confirm Advance Directive None   Patient Would Like to Complete Advance Directive Yes   Does the patient have other document types -           The Palliative Medicine Team is here to support you and your family. Sincerely,      Narcisa Arita MD and the Palliative Medicine Team       GOALS OF CARE / TREATMENT PREFERENCES:   [====Goals of Care====]  GOALS OF CARE:  Patient / health care proxy stated goals: See Patient Instructions / Summary    TREATMENT PREFERENCES:   Code Status:  [x] Attempt Resuscitation       [] Do Not Attempt Resuscitation    Advance Care Planning:  [x] The [employer] Interdisciplinary Team has updated the ACP Navigator with Decision Maker and Patient Capacity      Primary Decision MakerFerbenjamin Taylor - [phone]  Advance Care Planning 6/15/2020   Patient's Healthcare Decision Maker is: Verbal statement (Legal Next of Kin remains as decision maker)   Confirm Advance Directive None   Patient Would Like to Complete Advance Directive Yes   Does the patient have other document types -       Other:  (If patient appropriate for POST, consider using PALLPOST smart phrase here)    The palliative care team has discussed with patient / health care proxy about goals of care / treatment preferences for patient.  [====Goals of Care====]     PRESCRIPTIONS GIVEN:     Medications Ordered Today   Medications    dronabinoL (Marinol) 2.5 mg capsule     Sig: Take 1 Cap by mouth two (2) times a day. Max Daily Amount: 5 mg. Dispense:  60 Cap     Refill:  0    nystatin (MYCOSTATIN) 100,000 unit/mL suspension     Sig: Take 5 mL by mouth four (4) times daily.  swish and spit     Dispense:  100 mL     Refill:  1           FOLLOW UP:     Future Appointments   Date Time Provider Louis Campuzano   6/19/2020 10:00 AM Mine Kerr MD [address]   6/19/2020 10:30 AM [facility] INF [facility] 3 [facility]   6/24/2020  8:15 AM [facility] 4 [facility]   6/26/2020  2:30 PM \[facility]\"" 2 [facility] 7/1/2020  3:30 PM Reyna Hussein MD [facility] [address]   7/8/2020  8:15 AM \[facility]\"" 4 [address]   7/10/2020  2:30 PM [facility] INF [facility] 2 [address] [place] [address] & [facility] IN CARE:   Patient Care Team:  Alwin Councilman, MD as PCP - General (Internal Medicine)  Alwin Councilman, MD as PCP - [facility] Empaneled Provider  Mercedes Quiñones MD (General Surgery)  JENN Miller (Certified Clinical Nurse Specialist)  Arti Perez MD (Endocrinology)  Carlos Sullivan RN as Benefits Care Manager  Desirae Cabral MD (Hematology and Oncology)       HISTORY:   Reviewed patient-completed ESAS and advance care planning form. Reviewed patient record in prescription monitoring program.    CHIEF COMPLAINT: No chief complaint on file. HPI/SUBJECTIVE:    The patient is: [x] Verbal / [] Nonverbal   Patient seen today along with her daughter Maria E. Both are very tearful and stressed from patient's new diagnosis. Shraddha Saez tells me that she ignored her abdominal pain for several months and now she is paying for it. She is determined to do everything she can to live as long as possible but is very realistic about her outcome. She is struggling with abdominal pain and is not taking medications as prescribed. Her most significant complaint is profound fatigue. But from what she is sharing, she is eating little to nothing every day, drinking less than 10 ounces of water per day. Her daughter forces her to eat some applesauce with her meds which is about the only calories she gets per day. She sleeps most of the time. She is identified that the first 6 days of the chemotherapy is the worst but she seems to recover from it a little bit after and eats a little bit. She struggles with diarrhea after chemotherapy but constipation once the diarrhea dissipates. 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   Daughter is overwhelmed because of the illness of her mother. Clinical Pain Assessment (nonverbal scale for nonverbal patients):   [++++ Clinical Pain Assessment++++]  [++++Pain Severity++++]: Pain: 4  [++++Pain Character++++]: cramping  [++++Pain Duration++++]: month  [++++Pain Effect++++]: functional and emotional  [++++Pain Factors++++]: none in particular  [++++Pain Frequency++++]: constant  [++++Pain Location++++]: upper abdomen  [++++ Clinical Pain Assessment++++]       FUNCTIONAL ASSESSMENT:     Palliative Performance Scale (PPS):  PPS: 70       PSYCHOSOCIAL/SPIRITUAL SCREENING:     Any spiritual / Yazidism concerns:  [] Yes /  [x] No    Caregiver Burnout:  [] Yes /  [x] No /  [] No Caregiver Present      Anticipatory grief assessment:   [x] Normal  / [] Maladaptive       ESAS Anxiety: Anxiety: 4    ESAS Depression: Depression: 0       REVIEW OF SYSTEMS:     The following systems were [x] reviewed / [] unable to be reviewed  Systems: constitutional, ears/nose/mouth/throat, respiratory, gastrointestinal, genitourinary, musculoskeletal, integumentary, neurologic, psychiatric, endocrine. Positive findings noted below. Modified ESAS Completed by: provider   Fatigue: 10 Drowsiness: 6   Depression: 0 Pain: 4   Anxiety: 4 Nausea: 6   Anorexia: 10 Dyspnea: 0   Best Well-Being: 10 Constipation: No   Other Problem (Comment): 0          PHYSICAL EXAM:     Wt Readings from Last 3 Encounters:   06/15/20 218 lb 3.2 oz (99 kg)   06/12/20 224 lb (101.6 kg)   06/10/20 223 lb 3.2 oz (101.2 kg)     Blood pressure 131/80, pulse 79, temperature 97.8 °F (36.6 °C), temperature source Oral, resp. rate 18, height 5' 4\" (1.626 m), weight 218 lb 3.2 oz (99 kg), SpO2 96 %.   Last bowel movement: See Nursing Note    Constitutional: Alert, oriented, hair is falling  Eyes: pupils equal, anicteric  ENMT: Dry mucous membranes, oral thrush seen  Cardiovascular: regular rhythm, distal pulses intact  Respiratory: breathing not labored, symmetric  Gastrointestinal: Distended abdomen with bowel sounds heard, some tenderness to palpation in the upper abdomen  Musculoskeletal: no deformity, no tenderness to palpation, mild pitting edema bilateral legs  Skin: warm, dry  Neurologic: following commands, moving all extremities  Psychiatric: full affect, no hallucinations  Other:       HISTORY:     Past Medical History:   Diagnosis Date    Adenocarcinoma of pancreas ([facility] 05/13/2020    Dr Ginny Conrad biopsy via EUS    Diabetes ([facility]     GERD (gastroesophageal reflux disease)     Hernia of abdominal cavity     Subxyphoid hernia    Hypertension     Inflammatory polyarthropathy (HCC)     Joint pain     Joint swelling     Menopause     Ocular nevus of left eye     Pancreatitis     Tracheal stenosis       Past Surgical History:   Procedure Laterality Date    HX CARPAL TUNNEL RELEASE Bilateral     HX COLONOSCOPY      HX HYSTERECTOMY      HX KNEE ARTHROSCOPY Right     HX KNEE REPLACEMENT Right     partial    HX LAP CHOLECYSTECTOMY      HX TONSILLECTOMY      HX VASCULAR ACCESS        Family History   Problem Relation Age of Onset    Heart Disease Mother     Heart Attack Mother     Cancer Father         lung    Diabetes Sister       History reviewed, no pertinent family history. Social History     Tobacco Use    Smoking status: Never Smoker    Smokeless tobacco: Never Used   Substance Use Topics    Alcohol use: No     Alcohol/week: 0.0 standard drinks     Frequency: Never     Binge frequency: Never     No Known Allergies   Current Outpatient Medications   Medication Sig    dronabinoL (Marinol) 2.5 mg capsule Take 1 Cap by mouth two (2) times a day. Max Daily Amount: 5 mg.  nystatin (MYCOSTATIN) 100,000 unit/mL suspension Take 5 mL by mouth four (4) times daily. swish and spit    LORazepam (ATIVAN) 1 mg tablet Take 1 Tab by mouth every six (6) hours as needed for Anxiety. Max Daily Amount: 4 mg.  Indications: nausea and vomiting caused by cancer drugs    ondansetron (ZOFRAN ODT) 4 mg disintegrating tablet Take 1 Tab by mouth every eight (8) hours as needed for Nausea or Vomiting.  HYDROmorphone (DILAUDID) 2 mg tablet Take 1 Tab by mouth every four (4) hours as needed for Pain for up to 30 days. Max Daily Amount: 12 mg.  potassium chloride (K-DUR, KLOR-CON) 20 mEq tablet Take 1 Tab by mouth two (2) times a day. Indications: low amount of potassium in the blood    diphenoxylate-atropine (LomotiL) 2.5-0.025 mg per tablet Take 2 Tabs by mouth four (4) times daily as needed for Diarrhea. Max Daily Amount: 8 Tabs.  diphenhydrAMINE (BENADRYL) 25 mg tablet Take 25 mg by mouth every six (6) hours as needed.  lidocaine-prilocaine (EMLA) topical cream Apply  to affected area as needed for Pain.  lipase-protease-amylase (CREON 12,000) 12,000-38,000 -60,000 unit capsule Take 1 Cap by mouth three (3) times daily (with meals). Indications: exocrine pancreatic insufficiency (Patient taking differently: Take 2 Caps by mouth three (3) times daily (with meals).  Indications: exocrine pancreatic insufficiency)    Blood-Glucose Meter,Continuous (Dexcom G6 ) misc Use as directed to monitor blood glucose as directed    Blood-Glucose Sensor (Dexcom G6 Sensor) fadi Use as directed to monitor blood glucose as directed    Blood-Glucose Transmitter (Dexcom G6 Transmitter) fadi Use as directed to monitor blood glucose as directed    insulin lispro (HumaLOG KwikPen Insulin) 100 unit/mL kwikpen Inject 25 units into the skin with breakfast, 15 units with lunch (if BG is >200 and eating carbs with meal), and 30 units with dinner (Patient taking differently: Inject 25 units into the skin with breakfast, 15 units with lunch (if BG is >200 and eating carbs with meal), and 30 units with dinner  Patient is taking based on her BS levels at Breakfast, Lunch and Dinner.)    pravastatin (PRAVACHOL) 20 mg tablet TAKE 1 TABLET BY MOUTH EVERY NIGHT AT BEDTIME    omeprazole (PRILOSEC) 40 mg capsule Take 1 Cap by mouth daily.  Needle, Disp, ndle 1 Each by Does Not Apply route two (2) times daily as needed for Other.  carvedilol (COREG) 25 mg tablet TAKE 1 TABLET BY MOUTH TWICE DAILY (Patient taking differently: 1/2 dose twice daily  5/28/2020 patient is taking her BP if BP is low she is only taking 1/2 a tab, if high she is taking the 25mg.)    aspirin delayed-release 81 mg tablet Take  by mouth daily.  methylphenidate HCl (RITALIN) 5 mg tablet Take 1 Tab by mouth two (2) times a day. Max Daily Amount: 10 mg.  prochlorperazine (COMPAZINE) 10 mg tablet Take 0.5 Tabs by mouth every six (6) hours as needed for Nausea.  insulin glargine U-300 conc (Toujeo SoloStar U-300 Insulin) 300 unit/mL (1.5 mL) inpn pen 55 units every bedtime    acetaminophen (Acetaminophen Extra Strength) 500 mg tablet Take 1,000 mg by mouth two (2) times a day.  ibuprofen (Motrin IB) 200 mg tablet Take 200 mg by mouth every eight (8) hours as needed for Pain. No current facility-administered medications for this visit. LAB DATA REVIEWED:     Lab Results   Component Value Date/Time    WBC 6.6 06/10/2020 09:01 AM    HGB 12.0 06/10/2020 09:01 AM    PLATELET 426 (L) 41/04/4592 09:01 AM     Lab Results   Component Value Date/Time    Sodium 142 06/12/2020 01:35 PM    Potassium 3.4 (L) 06/12/2020 01:35 PM    Chloride 101 06/12/2020 01:35 PM    CO2 31 06/12/2020 01:35 PM    BUN 19 06/12/2020 01:35 PM    Creatinine 0.90 06/12/2020 01:35 PM    Calcium 9.0 06/12/2020 01:35 PM    Magnesium 1.9 06/12/2020 01:35 PM      Lab Results   Component Value Date/Time    Alk.  phosphatase 86 06/10/2020 09:01 AM    Protein, total 6.6 06/10/2020 09:01 AM    Albumin 3.2 (L) 06/10/2020 09:01 AM    Globulin 3.4 06/10/2020 09:01 AM     No results found for: INR, PTMR, PTP, PT1, PT2, APTT, INREXT, INREXT   No results found for: IRON, FE, TIBC, IBCT, PSAT, FERR        CONTROLLED SUBSTANCES SAFETY ASSESSMENT (IF ON CONTROLLED SUBSTANCES):     Reviewed opioid safety handout:  [x] Yes   [] No  24 hour opioid dose >150mg morphine equivalent/day:  [] Yes   [x] No  Benzodiazepines:  [] Yes   [x] No  Sleep apnea:  [] Yes   [x] No  Urine Toxicology Testing within last 6 months:  [] Yes   [x] No  History of or new aberrant medication taking behaviors:  [] Yes   [x] No  Has Narcan been prescribed [x] Yes   [] No          Total time: 90 minutes  Counseling / coordination time: 60 minutes  > 50% counseling / coordination?:  Yes

## 2020-06-16 ENCOUNTER — TELEPHONE (OUTPATIENT)
Dept: ONCOLOGY | Age: 65
End: 2020-06-16

## 2020-06-16 NOTE — TELEPHONE ENCOUNTER
HIPAA verified. Aby Betancur with [employer] called needed questions answered for BRCA testing. Questions answered. Thanked for call.

## 2020-06-17 ENCOUNTER — TELEPHONE (OUTPATIENT)
Dept: PALLATIVE CARE | Age: 65
End: 2020-06-17

## 2020-06-17 NOTE — TELEPHONE ENCOUNTER
Palliative Medicine  Nursing Note  [phone]  Fax [phone]      Telephone Call  Patient Name: Caesar Began  YOB: [birth year]        Primary Decision Maker: Treva Taylor - [phone]   Advance Care Planning 6/16/2020   Patient's Healthcare Decision Maker is: Legal Next of Kin   Confirm Advance Directive None   Patient Would Like to Complete Advance Directive No   Does the patient have other document types -       Outgoing call placed to patient/daughter - per grand daughter, patient is sleeping, but will have Sarabjit Minor return call    Information was shared with Dr. Ngoc Decker, RN  Palliative Medicine

## 2020-06-18 ENCOUNTER — TELEPHONE (OUTPATIENT)
Dept: PALLATIVE CARE | Age: 65
End: 2020-06-18

## 2020-06-18 ENCOUNTER — OFFICE VISIT (OUTPATIENT)
Dept: ONCOLOGY | Age: 65
End: 2020-06-18

## 2020-06-18 VITALS
SYSTOLIC BLOOD PRESSURE: 97 MMHG | DIASTOLIC BLOOD PRESSURE: 60 MMHG | HEIGHT: 64 IN | HEART RATE: 77 BPM | TEMPERATURE: 97.2 F | RESPIRATION RATE: 16 BRPM | OXYGEN SATURATION: 95 % | BODY MASS INDEX: 36.57 KG/M2 | WEIGHT: 214.2 LBS

## 2020-06-18 DIAGNOSIS — C25.9 ADENOCARCINOMA OF PANCREAS (HCC): Primary | ICD-10-CM

## 2020-06-18 NOTE — TELEPHONE ENCOUNTER
Patient's daughter returned call and stated that patient is doing a little better today - only one diarrhea episode this morning so far. She reported that patient has had IV fluids Monday, Tuesday, and yesterday along with injection for the diarrhea as the oral medications were not working - she has a follow up visit with oncologist today, and possibly more fluids. She reported that she continue to have nausea, but better than it has been. No vomiting, but continue to have diarrhea which is finally wearing off     She continues to have abdominal pain when she is having the diarrhea - which she feel is different from the pancreas pain - she reported pain level 3/10 on pain scale - she continues to take Dilaudid 2 mg 3 times per daily. Advised ok to take every 4 hours as needed if pain is worse. Patient/daughter verbalized understanding    Patient was able to get Marinol yesterday, she is taking and tolerating so far - still only taking small bites, but bowels is stimulated when she eats anything. Encouraged small bites as tolerated, and/or protein shakes. Daughter verbalized understanding now that diarrhea seem to be tapering off.     Information was shared with Dr. Cathleen Roy, RN  Palliative Medicine

## 2020-06-18 NOTE — PROGRESS NOTES
Grabiel Dominguez is a 59 y.o. female patient here for f/u pancreatic cancer. Patient took insulin this morning for a BS over 200. Patient had large amount of diarrhea started about 730pm last night, she took 2 lomitil, again at midnight she had large amount of diarrhea took 2 immodium and at 0500 she had a small amount of diarrhea, did not take meds. Patient feels weak and tired. Patient has pain in abdomen upper quadrants when radiates down into bladder area when she coughs. Patient has pain with movement in RUQ. Patient drank lemon gatoraide last night, about a half of a cup. She was able to eat about 4 bites of bakes of fish and 2 bites of potatoes, She is taking her meds with applesauce. Patient ate about 1/3 of a scrambled egg this morning. Chemotherapy Flowsheet 6/12/2020   Cycle C2 D3   Date 6/12/2020   Drug / Regimen CIV 5FU discontinued   Dosage -   Pre Hydration Hydration 1 liter over 2 hours   Pre Meds -   Notes On Body Neulasta 6 mg     Key Pain Meds             HYDROmorphone (DILAUDID) 2 mg tablet (Taking) Take 1 Tab by mouth every four (4) hours as needed for Pain for up to 30 days. Max Daily Amount: 12 mg.    acetaminophen (Acetaminophen Extra Strength) 500 mg tablet Take 1,000 mg by mouth two (2) times a day. ibuprofen (Motrin IB) 200 mg tablet Take 200 mg by mouth every eight (8) hours as needed for Pain. Key Oncology Meds             ondansetron (ZOFRAN ODT) 4 mg disintegrating tablet (Taking) Take 1 Tab by mouth every eight (8) hours as needed for Nausea or Vomiting. prochlorperazine (COMPAZINE) 10 mg tablet Take 0.5 Tabs by mouth every six (6) hours as needed for Nausea.

## 2020-06-18 NOTE — PROGRESS NOTES
6/19/2020-Pain much worse--will get CT a/p today--check amylase and lipase            Reason for Visit:   Bernabe Castañeda is a 59 y.o. female who is seen for pancreatic adenocarcinoma     Treatment History:   Dx: Pancreatic Adenocarcinoma--May 2020--[id number]--aioftsbpkrz of splenic vein and superior mesenteric vein  Tx: mFOLFIRINOX--first cycle 5/26/2020  Goal: Prolong life--Palliative    History of Present Illness:   Severe diarrhea after second cycle--brought to clinic yesterday and given 2L IVF along with Octreotide--diarrhea much better last night. Back for IVF and possible octreotide. Past Medical History:   Diagnosis Date    Adenocarcinoma of pancreas ([facility] [address] 05/13/2020    Dr [employer] biopsy via EUS    [facility]     GERD (gastroesophageal reflux disease)     Hernia of abdominal cavity     Subxyphoid hernia    Hypertension     Inflammatory polyarthropathy (HCC)     Joint pain     Joint swelling     Menopause     Ocular nevus of left eye     Pancreatitis     Tracheal stenosis       Past Surgical History:   Procedure Laterality Date    HX CARPAL TUNNEL RELEASE Bilateral     HX COLONOSCOPY      HX HYSTERECTOMY      HX KNEE ARTHROSCOPY Right     HX KNEE REPLACEMENT Right     partial    HX LAP CHOLECYSTECTOMY      HX TONSILLECTOMY      HX VASCULAR ACCESS        Social History     Tobacco Use    Smoking status: Never Smoker    Smokeless tobacco: Never Used   Substance Use Topics    Alcohol use: No     Alcohol/week: 0.0 standard drinks     Frequency: Never     Binge frequency: Never      Family History   Problem Relation Age of Onset    Heart Disease Mother     Heart Attack Mother     Cancer Father         lung    Diabetes Sister      Current Outpatient Medications   Medication Sig    dronabinoL (Marinol) 2.5 mg capsule Take 1 Cap by mouth two (2) times a day. Max Daily Amount: 5 mg.  nystatin (MYCOSTATIN) 100,000 unit/mL suspension Take 5 mL by mouth four (4) times daily.  swish and spit    LORazepam (ATIVAN) 1 mg tablet Take 1 Tab by mouth every six (6) hours as needed for Anxiety. Max Daily Amount: 4 mg. Indications: nausea and vomiting caused by cancer drugs    ondansetron (ZOFRAN ODT) 4 mg disintegrating tablet Take 1 Tab by mouth every eight (8) hours as needed for Nausea or Vomiting.  HYDROmorphone (DILAUDID) 2 mg tablet Take 1 Tab by mouth every four (4) hours as needed for Pain for up to 30 days. Max Daily Amount: 12 mg.  potassium chloride (K-DUR, KLOR-CON) 20 mEq tablet Take 1 Tab by mouth two (2) times a day. Indications: low amount of potassium in the blood    diphenoxylate-atropine (LomotiL) 2.5-0.025 mg per tablet Take 2 Tabs by mouth four (4) times daily as needed for Diarrhea. Max Daily Amount: 8 Tabs.  diphenhydrAMINE (BENADRYL) 25 mg tablet Take 25 mg by mouth every six (6) hours as needed.  lidocaine-prilocaine (EMLA) topical cream Apply  to affected area as needed for Pain.  lipase-protease-amylase (CREON 12,000) 12,000-38,000 -60,000 unit capsule Take 1 Cap by mouth three (3) times daily (with meals). Indications: exocrine pancreatic insufficiency (Patient taking differently: Take 2 Caps by mouth three (3) times daily (with meals).  Indications: exocrine pancreatic insufficiency)    insulin lispro (HumaLOG KwikPen Insulin) 100 unit/mL kwikpen Inject 25 units into the skin with breakfast, 15 units with lunch (if BG is >200 and eating carbs with meal), and 30 units with dinner (Patient taking differently: Inject 25 units into the skin with breakfast, 15 units with lunch (if BG is >200 and eating carbs with meal), and 30 units with dinner  Patient is taking based on her BS levels at Breakfast, Lunch and Dinner.)    insulin glargine U-300 conc (Toujeo SoloStar U-300 Insulin) 300 unit/mL (1.5 mL) inpn pen 55 units every bedtime    pravastatin (PRAVACHOL) 20 mg tablet TAKE 1 TABLET BY MOUTH EVERY NIGHT AT BEDTIME    omeprazole (PRILOSEC) 40 mg capsule Take 1 Cap by mouth daily.  carvedilol (COREG) 25 mg tablet TAKE 1 TABLET BY MOUTH TWICE DAILY (Patient taking differently: 1/2 dose twice daily  5/28/2020 patient is taking her BP if BP is low she is only taking 1/2 a tab, if high she is taking the 25mg.)    aspirin delayed-release 81 mg tablet Take  by mouth daily.  methylphenidate HCl (RITALIN) 5 mg tablet Take 1 Tab by mouth two (2) times a day. Max Daily Amount: 10 mg.  prochlorperazine (COMPAZINE) 10 mg tablet Take 0.5 Tabs by mouth every six (6) hours as needed for Nausea.  Blood-Glucose Meter,Continuous (Dexcom G6 ) misc Use as directed to monitor blood glucose as directed    Blood-Glucose Sensor (Dexcom G6 Sensor) fadi Use as directed to monitor blood glucose as directed    Blood-Glucose Transmitter (Dexcom G6 Transmitter) fadi Use as directed to monitor blood glucose as directed    acetaminophen (Acetaminophen Extra Strength) 500 mg tablet Take 1,000 mg by mouth two (2) times a day.  ibuprofen (Motrin IB) 200 mg tablet Take 200 mg by mouth every eight (8) hours as needed for Pain.  Needle, Disp, ndle 1 Each by Does Not Apply route two (2) times daily as needed for Other. No current facility-administered medications for this visit. Facility-Administered Medications Ordered in Other Visits   Medication Dose Route Frequency    sodium chloride 0.9 % bolus infusion 1,000 mL  1,000 mL IntraVENous ONCE    octreotide (SANDOSTATIN) injection 100 mcg  100 mcg SubCUTAneous Q8H    diphenhydrAMINE (BENADRYL) capsule 25 mg  25 mg Oral Q6H PRN    0.9% sodium chloride infusion  999 mL/hr IntraVENous CONTINUOUS    diphenhydrAMINE (BENADRYL) capsule 25 mg  25 mg Oral Q6H PRN      No Known Allergies     Review of Systems: A complete review of systems was obtained, negative except as described above.     Physical Exam:     Visit Vitals  Pulse 77   Temp 97.2 °F (36.2 °C) (Skin)   Resp 16   Ht 5' 4\" (1.626 m)   Wt 214 lb 3.2 oz (97.2 kg) SpO2 95%   BMI 36.77 kg/m²     ECOG PS: 2  General: No distress  Eyes: PERRLA, anicteric sclerae  HENT: Atraumatic, OP clear  Neck: Supple  Lymphatic: No cervical, supraclavicular, or inguinal adenopathy  Respiratory: CTAB, normal respiratory effort  CV: Normal rate, regular rhythm, no murmurs, no peripheral edema  GI: Soft, nontender, nondistended, no masses, no hepatomegaly, no splenomegaly  MS: Normal gait and station. Digits without clubbing or cyanosis. Skin: No rashes, ecchymoses, or petechiae. Normal temperature, turgor, and texture. Psych: Alert, oriented, appropriate affect, normal judgment/insight    Results:     Lab Results   Component Value Date/Time    WBC 2.0 (L) 06/17/2020 10:20 AM    HGB 11.6 06/17/2020 10:20 AM    HCT 35.4 06/17/2020 10:20 AM    PLATELET 77 (L) 17/20/6139 10:20 AM    MCV 89.4 06/17/2020 10:20 AM    ABS. NEUTROPHILS 1.3 (L) 06/17/2020 10:20 AM     Lab Results   Component Value Date/Time    Sodium 139 06/17/2020 10:20 AM    Potassium 3.9 06/17/2020 10:20 AM    Chloride 104 06/17/2020 10:20 AM    CO2 25 06/17/2020 10:20 AM    Glucose 110 (H) 06/17/2020 10:20 AM    BUN 9 06/17/2020 10:20 AM    Creatinine 0.71 06/17/2020 10:20 AM    GFR est AA >60 06/17/2020 10:20 AM    GFR est non-AA >60 06/17/2020 10:20 AM    Calcium 8.4 (L) 06/17/2020 10:20 AM    Glucose (POC) 156 (H) 05/21/2020 07:19 AM    Creatinine (POC) 0.7 02/06/2013 10:21 AM     Lab Results   Component Value Date/Time    Bilirubin, total 0.6 06/17/2020 10:20 AM    ALT (SGPT) 38 06/17/2020 10:20 AM    Alk. phosphatase 98 06/17/2020 10:20 AM    Protein, total 6.6 06/17/2020 10:20 AM    Albumin 3.0 (L) 06/17/2020 10:20 AM    Globulin 3.6 06/17/2020 10:20 AM       CT A/P 4/30/2020  IMPRESSION: Suspect neoplastic pancreatic mass versus atypical focal  pancreatitis with splenic and superior mesenteric vein occlusion.  Consider  further evaluation with endoscopic ultrasound at which time biopsy could  potentially be performed.     CT Chest 5/21/2020  IMPRESSION:  1. No evidence of pulmonary metastatic disease. No evidence of mediastinal or  hilar lymphadenopathy. No pleural effusions identified. 2. No definite evidence of central pulmonary embolic disease. 3. Presence of a mass lesion involving the pancreatic head/body. 4. Evidence of fatty infiltration involving the liver. Presence of focal  low-density areas within the liver compatible with cysts. Surgical absence of  the gallbladder.     Path: 5/8/2020  CYTOLOGIC INTERPRETATION:   Pancreas, EUS-guided fine needle aspiration and cell blocks: Adenocarcinoma      Assessment:   1) Pancreatic Adenocarcinoma--Unresectable  2) Neoplasm Pain  3) Fatigue  4) Nausea  5) DM  6) Nutrition  7) Diarrhea        Plan:   1) Continue with mFOLFIRINOX--transient dysarthria with Irinotecan is a known side effect--multiple case reports in literature--unknown mechanism of action--we can continue--will try slowing infusion on next cycle, can also give irinotecan prior to oxaliplatin if slowing doesn't resolve. BRCA pending.      2) S/p celiac block--pain better and controlled with acetaminophen currently. Future options include repeat celiac block, methadone, fentanyl.       3) Try methylphenidate 5mg bid--watch for anxiety and insomnia     4) Compazine controlling--switch to olanzapine 5mg bid or 10mg once daily if becomes difficult to manage with compazine.    5) Defer to Dr Zenia Luther for further management. Pharmacy may have ideas.    6) On pancreatic enzymes--weight has stabilized, may need nutrition consult. 7) Chemo related--will give another liter of fluid today and repeat the Octreotide.   Will also dose reduce 15%.         Signed By: Jose Kelly MD

## 2020-06-19 DIAGNOSIS — K85.00 IDIOPATHIC ACUTE PANCREATITIS, UNSPECIFIED COMPLICATION STATUS: Primary | ICD-10-CM

## 2020-06-19 DIAGNOSIS — C25.9 MALIGNANT NEOPLASM OF PANCREAS, UNSPECIFIED LOCATION OF MALIGNANCY (HCC): Primary | ICD-10-CM

## 2020-06-19 RX ORDER — POTASSIUM CHLORIDE 20MEQ/15ML
20 LIQUID (ML) ORAL 2 TIMES DAILY
Qty: 480 ML | Refills: 2 | Status: SHIPPED | OUTPATIENT
Start: 2020-06-19 | End: 2020-07-15

## 2020-06-19 RX ORDER — FLUOROURACIL 50 MG/ML
340 INJECTION, SOLUTION INTRAVENOUS ONCE
Status: CANCELLED
Start: 2020-06-30 | End: 2020-06-30

## 2020-06-29 ENCOUNTER — TELEPHONE (OUTPATIENT)
Dept: PALLATIVE CARE | Age: 65
End: 2020-06-29

## 2020-07-01 ENCOUNTER — VIRTUAL VISIT (OUTPATIENT)
Dept: PALLATIVE CARE | Age: 65
End: 2020-07-01

## 2020-07-01 DIAGNOSIS — R10.9 INTRACTABLE ABDOMINAL PAIN: Primary | ICD-10-CM

## 2020-07-01 DIAGNOSIS — C25.9 MALIGNANT NEOPLASM OF PANCREAS, UNSPECIFIED LOCATION OF MALIGNANCY (HCC): ICD-10-CM

## 2020-07-01 DIAGNOSIS — K85.90 ACUTE PANCREATITIS, UNSPECIFIED COMPLICATION STATUS, UNSPECIFIED PANCREATITIS TYPE: ICD-10-CM

## 2020-07-01 DIAGNOSIS — T45.1X5A CHEMOTHERAPY-INDUCED NAUSEA: ICD-10-CM

## 2020-07-01 DIAGNOSIS — R11.0 CHEMOTHERAPY-INDUCED NAUSEA: ICD-10-CM

## 2020-07-01 RX ORDER — FLUOROURACIL 50 MG/ML
340 INJECTION, SOLUTION INTRAVENOUS ONCE
Status: CANCELLED
Start: 2020-07-14

## 2020-07-01 RX ORDER — DICLOFENAC SODIUM 50 MG/1
50 TABLET, DELAYED RELEASE ORAL 2 TIMES DAILY
Qty: 60 TAB | Refills: 0 | Status: SHIPPED | OUTPATIENT
Start: 2020-07-01 | End: 2020-07-16

## 2020-07-01 NOTE — PROGRESS NOTES
Palliative Medicine Outpatient Services  [place]: [phone] ([phone])    Patient Name: Rosaura Keith  YOB: 1955    Date of Current Visit: 07/01/20  Location of Current Visit:    [] [facility] Office  [] [facility] Office  [] [id number] [facility] Office  [] Home  [x]Synchronous A/V virtual visit    Date of Initial Visit: 6/15/2020  Referral from: Dr. Marva Zuleta  Primary Care Physician: Delilah Gudino MD      SUMMARY:   Rosaura Keith is a 59y.o. year old with a  history of uncontrolled diabetes with an A1c of 9.2, newly diagnosed inoperable pancreatic cancer, who was referred to Palliative Medicine by Dr. Marva Zuleta for management of intractable symptoms and psychosocial support. 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 Patient currently on 5-FU    CT on June 19 shows pancreatitis with some ascites, pancreatic mass has disappeared. PALLIATIVE DIAGNOSES:       ICD-10-CM ICD-9-CM    1. Intractable abdominal pain R10.9 789.00 diclofenac EC (VOLTAREN) 50 mg EC tablet   2. Chemotherapy-induced nausea R11.0 787.02     T45.1X5A E933.1    3. Malignant neoplasm of pancreas, unspecified location of malignancy (HCC) C25.9 157.9 diclofenac EC (VOLTAREN) 50 mg EC tablet   4.  Acute pancreatitis, unspecified complication status, unspecified pancreatitis type K85.90 577.0           PLAN:     Patient Instructions     Dear Rosaura Keith ,    It was a pleasure seeing you today in your home along with your daughter virtually    We will see you again in 4 weeks    If labs or imaging tests have been ordered for you today, please call the office  at [phone] 48 hours after completion to obtain the results. Your stated goal:   -To live well for as long as possible    Your described symptoms were: Fatigue: 5 Drowsiness: 5   Depression: 2 Pain: 2   Anxiety: 4 Nausea: 0   Anorexia: 4 Dyspnea: 0   Best Well-Bein Constipation: No   Other Problem (Comment): 0       This is the plan we talked about:  1. Severe upper abdominal pain-severe pancreatitis, pancreatic cancer  -We reviewed your most recent CT scan which shows pancreatitis along with some ascites and the pancreatic mass has disappeared. You are very happy with this. I do believe that the pain severity correlates with pancreatitis.  -You are currently on Dilaudid 6 mg every 4 hours with optimal pain relief. We agreed on starting an extended release anti-inflammatory medicine to help with pancreatitis pain the goal is to decrease the amount of Dilaudid you need per day in an effort to achieve better pain control.  -Start diclofenac ER 50 mg 2 times a day. 2.  Severe nausea  -You have chemotherapy-induced nausea. You are currently taking Zofran 4 mg oral disintegrating tablet 2 times a day, you can increase this to every 6 hours as needed. You also have Compazine 10 mg tablets at home which you can take in between  Zofran doses. ,  None    3. Fatigue  -This is improved some especially since she learned the CT results that showed disappearance of the pancreatic mass. 4.  Anorexia and oral thrush  -Oral thrush resolved with nystatin swish and swallow  -Drink warm water with lemon and honey every morning  -You tried Marinol for a week but it caused confusion and hallucination so you stopped it.  -You have already met with a nutritionist but the information provided to you was overwhelming. We reviewed things that you can eat today and you will in to improve your calorie intake to at least 1200 Kcal/day  -Increase water intake to between 60 to 80 ounces of water per day    5.   Uncontrolled diabetes  -You are taking 55 units glargine insulin at nighttime and modify your daytime insulin based on your sugars. You do not have a set sliding scale. I am very worried about extreme sugar levels. Start a food diary and measure your blood glucose 4 times a day and keep a diary so we can review this and modify your insulin needs. 6.  Diarrhea and constipation  -You struggle with severe diarrhea right after chemotherapy but after that you have constipation.  -Start stool softeners with senna 2 tablets 1 day after your last diarrhea episode. This way we can stay ahead of constipation as much as possible. 7.  Advanced care planning  -You completed a living will and advanced medical directive naming your daughter Cori Taylor as you medical power of     8. Psychosocial support  -You are the primary caregiver of your  who is cognitively impaired. You dealing with the cancer is causing tremendous amount of stress on you and your family. Your daughter Cori Taylor is the only one who lives local and is able to help out. We talked about how palliative medicine can certainly provide you as much support as possible. This is what you have shared with us about Advance Care Planning:      Primary Decision Maker: Branda Gilford - Daughter - [phone]  Advance Care Planning 7/1/2020   Patient's [place] is: Verbal statement (Legal Next of Kin remains as decision maker)   Confirm Advance Directive None   Patient Would Like to Complete Advance Directive Yes   Does the patient have other document types -           The Palliative Medicine Team is here to support you and your family.        Sincerely,      Robyn Marshall MD and the Palliative Medicine Team       GOALS OF CARE / TREATMENT PREFERENCES:   [====Goals of Care====]  GOALS OF CARE:  Patient / health care proxy stated goals: See Patient Instructions / Summary    TREATMENT PREFERENCES:   Code Status:  [x] Attempt Resuscitation       [] Do Not Attempt Resuscitation    Advance Care Planning:  [x] The Pall [facility] Interdisciplinary Team has updated the ACP Navigator with Decision Maker and Patient Capacity      Primary Decision Maker: Kasia Sawyer - Daughter - [phone]  Advance Care Planning 7/1/2020   Patient's Healthcare Decision Maker is: Verbal statement (Legal Next of Kin remains as decision maker)   Confirm Advance Directive None   Patient Would Like to Complete Advance Directive Yes   Does the patient have other document types -       Other:  (If patient appropriate for POST, consider using PALLPOST smart phrase here)    The palliative care team has discussed with patient / health care proxy about goals of care / treatment preferences for patient.  [====Goals of Care====]     PRESCRIPTIONS GIVEN:     Medications Ordered Today   Medications    diclofenac EC (VOLTAREN) 50 mg EC tablet     Sig: Take 1 Tab by mouth two (2) times a day. Dispense:  60 Tab     Refill:  0           FOLLOW UP:     Future Appointments   Date Time Provider Louis Campuzano   7/1/2020  3:30 PM Sonny Jenkins MD [facility] Marco Morales   7/2/2020  1:00 PM [facility]   7/2/2020  1:30 PM [address] INF [facility] [address] [facility] [address] [address]   7/15/2020  8:15 AM [address] INF [facility] [address]   7/17/2020  3:30 PM [address] INF [facility] [address]   7/29/2020  8:15 AM [address] INF [facility] [address]   7/31/2020  3:30 PM [address] INF [address]           PHYSICIANS INVOLVED IN CARE:   Patient Care Team:  Sherren Nobles, MD as PCP - General (Internal Medicine)  Sherren Nobles, MD as PCP - [facility] Provider  Cas Parker MD (General Surgery)  JENN Segovia (Certified Clinical Nurse Specialist)  Luke Plascencia RN as Benefits Care Manager  Wilfrido Zavala MD (Hematology and Oncology)       HISTORY:   Reviewed patient-completed ESAS and advance care planning form.   Reviewed patient record in prescription monitoring program.    CHIEF COMPLAINT: No chief complaint on file.      HPI/SUBJECTIVE:    The patient is: [x] Verbal / [] Nonverbal     Patient seen today virtually along with her daughter. She is very happy and talks to me about the CT scan that showed no pancreatic mass. Her pain is better with Dilaudid 6 mg every 4 hours. She got a few nights of sleep with better pain control. Per daughter, her eating is improved a little bit especially after learning the CT results. She still a bit tired but not as much as before. Her second round of 5-FU was much more tolerable. She currently has the 5-FU pump on her and doing fairly well. She has diarrhea right after chemotherapy and now has a good plan in place to combat that.    --------  Patient seen today along with her daughter Claudia Flores. Both are very tearful and stressed from patient's new diagnosis. Carrie Thomas tells me that she ignored her abdominal pain for several months and now she is paying for it. She is determined to do everything she can to live as long as possible but is very realistic about her outcome. She is struggling with abdominal pain and is not taking medications as prescribed. Her most significant complaint is profound fatigue. But from what she is sharing, she is eating little to nothing every day, drinking less than 10 ounces of water per day. Her daughter forces her to eat some applesauce with her meds which is about the only calories she gets per day. She sleeps most of the time. She is identified that the first 6 days of the chemotherapy is the worst but she seems to recover from it a little bit after and eats a little bit. She struggles with diarrhea after chemotherapy but constipation once the diarrhea dissipates. 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 Daughter is overwhelmed because of the illness of her mother.     Clinical Pain Assessment (nonverbal scale for nonverbal patients):   [++++ Clinical Pain Assessment++++]  [++++Pain Severity++++]: Pain: 2  [++++Pain Character++++]: cramping  [++++Pain Duration++++]: month  [++++Pain Effect++++]: functional and emotional  [++++Pain Factors++++]: none in particular  [++++Pain Frequency++++]: constant  [++++Pain Location++++]: upper abdomen  [++++ Clinical Pain Assessment++++]       FUNCTIONAL ASSESSMENT:     Palliative Performance Scale (PPS):  PPS: 70       PSYCHOSOCIAL/SPIRITUAL SCREENING:     Any spiritual / Muslim concerns:  [] Yes /  [x] No    Caregiver Burnout:  [] Yes /  [x] No /  [] No Caregiver Present      Anticipatory grief assessment:   [x] Normal  / [] Maladaptive       ESAS Anxiety: Anxiety: 4    ESAS Depression: Depression: 2       REVIEW OF SYSTEMS:     The following systems were [x] reviewed / [] unable to be reviewed  Systems: constitutional, ears/nose/mouth/throat, respiratory, gastrointestinal, genitourinary, musculoskeletal, integumentary, neurologic, psychiatric, endocrine. Positive findings noted below. Modified ESAS Completed by: provider   Fatigue: 5 Drowsiness: 5   Depression: 2 Pain: 2   Anxiety: 4 Nausea: 0   Anorexia: 4 Dyspnea: 0   Best Well-Bein Constipation: No   Other Problem (Comment): 0          PHYSICAL EXAM:     Wt Readings from Last 3 Encounters:   20 213 lb (96.6 kg)   20 213 lb 6.4 oz (96.8 kg)   20 218 lb (98.9 kg)     There were no vitals taken for this visit.   Last bowel movement: See Nursing Note    Constitutional    [x] Appears well-developed and well-nourished in no apparent distress    [] Abnormal:  Mental status  [x] Alert and awake  [x] Oriented to person/place/time  [x] Able to follow commands  [] Abnormal:   Eyes  [x] EOM normal   [x] Sclera normal   [x] No visible ocular discharge  [] Abnormal:   HENT  [x] Normocephalic, atraumatic  [x] Mouth/Throat: Moist mucous membranes   [x] External Ears normal  [] Abnormal:  Oral thrush resolved  Neck  [x] No visualized mass  [] Abnormal:  Pulmonary/Chest   [x] Respiratory effort normal  [x] No visualized signs of difficulty breathing or respiratory distress  [] Abnormal:  Musculoskeletal  [x] Normal gait with no signs of ataxia  [x] Normal range of motion of neck  [] Abnormal:  Neurological:   [x] No facial asymmetry (Cranial nerve 7 motor function)  [x] No gaze palsy  [] Abnormal:   Skin  [x] No significant exanthematous lesions or discoloration noted on facial skin  [] Abnormal:                                  Psychiatric  [x] Normal affect  [x] No hallucinations  [] Abnormal:    Other pertinent observable physical exam findings:    Due to this being a TeleHealth evaluation, many elements of the physical examination are unable to be assessed. HISTORY:     Past Medical History:   Diagnosis Date    Adenocarcinoma [facility] pancreas ([facility] [address] 05/13/2020    Dr Nancy Hyde biopsy via EUS    [facility]     GERD (gastroesophageal reflux disease)     Hernia of abdominal cavity     Subxyphoid hernia    Hypertension     Inflammatory polyarthropathy (HCC)     Joint pain     Joint swelling     Menopause     Ocular nevus of left eye     Pancreatitis     Tracheal stenosis       Past Surgical History:   Procedure Laterality Date    HX CARPAL TUNNEL RELEASE Bilateral     HX COLONOSCOPY      HX HYSTERECTOMY      HX KNEE ARTHROSCOPY Right     HX KNEE REPLACEMENT Right     partial    HX LAP CHOLECYSTECTOMY      HX TONSILLECTOMY      HX VASCULAR ACCESS        Family History   Problem Relation Age of Onset    Heart Disease Mother     Heart Attack Mother     Cancer Father         lung    Diabetes Sister       History reviewed, no pertinent family history.   Social History     Tobacco Use    Smoking status: Never Smoker    Smokeless tobacco: Never Used   Substance Use Topics    Alcohol use: No     Alcohol/week: 0.0 standard drinks     Frequency: Never     Binge frequency: Never     No Known Allergies   Current Outpatient Medications   Medication Sig    diclofenac EC (VOLTAREN) 50 mg EC tablet Take 1 Tab by mouth two (2) times a day.  HYDROmorphone (DILAUDID) 2 mg tablet Take 3 Tabs by mouth every four (4) hours as needed for Pain for up to 30 days. Max Daily Amount: 36 mg.    potassium chloride (KAON 10%) 20 mEq/15 mL solution Take 15 mL by mouth two (2) times a day. Indications: low amount of potassium in the blood    nystatin (MYCOSTATIN) 100,000 unit/mL suspension Take 5 mL by mouth four (4) times daily. swish and spit    LORazepam (ATIVAN) 1 mg tablet Take 1 Tab by mouth every six (6) hours as needed for Anxiety. Max Daily Amount: 4 mg. Indications: nausea and vomiting caused by cancer drugs    ondansetron (ZOFRAN ODT) 4 mg disintegrating tablet Take 1 Tab by mouth every eight (8) hours as needed for Nausea or Vomiting.  diphenoxylate-atropine (LomotiL) 2.5-0.025 mg per tablet Take 2 Tabs by mouth four (4) times daily as needed for Diarrhea. Max Daily Amount: 8 Tabs.  diphenhydrAMINE (BENADRYL) 25 mg tablet Take 25 mg by mouth every six (6) hours as needed.  lidocaine-prilocaine (EMLA) topical cream Apply  to affected area as needed for Pain.  prochlorperazine (COMPAZINE) 10 mg tablet Take 0.5 Tabs by mouth every six (6) hours as needed for Nausea.  lipase-protease-amylase (CREON 12,000) 12,000-38,000 -60,000 unit capsule Take 1 Cap by mouth three (3) times daily (with meals). Indications: exocrine pancreatic insufficiency (Patient taking differently: Take 2 Caps by mouth three (3) times daily (with meals).  Indications: exocrine pancreatic insufficiency)    Blood-Glucose Meter,Continuous (Dexcom G6 ) misc Use as directed to monitor blood glucose as directed    Blood-Glucose Sensor (Dexcom G6 Sensor) fadi Use as directed to monitor blood glucose as directed    Blood-Glucose Transmitter (Dexcom G6 Transmitter) fadi Use as directed to monitor blood glucose as directed    insulin lispro (HumaLOG KwikPen Insulin) 100 unit/mL kwikpen Inject 25 units into the skin with breakfast, 15 units with lunch (if BG is >200 and eating carbs with meal), and 30 units with dinner (Patient taking differently: Inject 25 units into the skin with breakfast, 15 units with lunch (if BG is >200 and eating carbs with meal), and 30 units with dinner  Patient is taking based on her BS levels at Breakfast, Lunch and Dinner.)    insulin glargine U-300 conc (Toujeo SoloStar U-300 Insulin) 300 unit/mL (1.5 mL) inpn pen 55 units every bedtime    Needle, Disp, ndle 1 Each by Does Not Apply route two (2) times daily as needed for Other.  carvedilol (COREG) 25 mg tablet TAKE 1 TABLET BY MOUTH TWICE DAILY (Patient taking differently: 1/2 dose twice daily  5/28/2020 patient is taking her BP if BP is low she is only taking 1/2 a tab, if high she is taking the 25mg.)    aspirin delayed-release 81 mg tablet Take  by mouth daily.  methylphenidate HCl (RITALIN) 5 mg tablet Take 1 Tab by mouth two (2) times a day. Max Daily Amount: 10 mg.  pravastatin (PRAVACHOL) 20 mg tablet TAKE 1 TABLET BY MOUTH EVERY NIGHT AT BEDTIME    omeprazole (PRILOSEC) 40 mg capsule Take 1 Cap by mouth daily.  acetaminophen (Acetaminophen Extra Strength) 500 mg tablet Take 1,000 mg by mouth two (2) times a day.  ibuprofen (Motrin IB) 200 mg tablet Take 200 mg by mouth every eight (8) hours as needed for Pain. No current facility-administered medications for this visit.       Facility-Administered Medications Ordered in Other Visits   Medication Dose Route Frequency    fluorouraciL (ADRUCIL) 4,427 mg in 0.9% sodium chloride 100 mL CADD Cassette  2,040 mg/m2 (Treatment Plan Recorded) IntraVENous ONCE    0.9% sodium chloride infusion  25 mL/hr IntraVENous CONTINUOUS          LAB DATA REVIEWED:     Lab Results   Component Value Date/Time    WBC 9.1 06/30/2020 08:25 AM    HGB 11.8 06/30/2020 08:25 AM    PLATELET 313 92/84/7421 08:25 AM     Lab Results Component Value Date/Time    Sodium 142 06/30/2020 08:25 AM    Potassium 4.2 06/30/2020 08:25 AM    Chloride 103 06/30/2020 08:25 AM    CO2 30 06/30/2020 08:25 AM    BUN 6 06/30/2020 08:25 AM    Creatinine 0.83 06/30/2020 08:25 AM    Calcium 8.9 06/30/2020 08:25 AM    Magnesium 1.6 06/30/2020 08:25 AM      Lab Results   Component Value Date/Time    Alk. phosphatase 108 06/30/2020 08:25 AM    Protein, total 6.6 06/30/2020 08:25 AM    Albumin 3.0 (L) 06/30/2020 08:25 AM    Globulin 3.6 06/30/2020 08:25 AM     No results found for: INR, PTMR, PTP, PT1, PT2, APTT, INREXT, INREXT   No results found for: IRON, FE, TIBC, IBCT, PSAT, FERR        CONTROLLED SUBSTANCES SAFETY ASSESSMENT (IF ON CONTROLLED SUBSTANCES):     Reviewed opioid safety handout:  [x] Yes   [] No  24 hour opioid dose >150mg morphine equivalent/day:  [] Yes   [x] No  Benzodiazepines:  [] Yes   [x] No  Sleep apnea:  [] Yes   [x] No  Urine Toxicology Testing within last 6 months:  [] Yes   [x] No  History of or new aberrant medication taking behaviors:  [] Yes   [x] No  Has Narcan been prescribed [x] Yes   [] No          Total time: 60 minutes  Counseling / coordination time: 40 minutes  > 50% counseling / coordination?:  Yes    Consent:  He and/or health care decision maker is aware that that he may receive a bill for this telehealth service, depending on his insurance coverage, and has provided verbal consent to proceed: Yes    CPT Codes 56019-26613 for Established Patients may apply to this Telehealth Visit  Pursuant to the emergency declaration under the [facility], [facility] waiver authority and the [employer] and [employer] Act, this Virtual  Visit was conducted, with patient's consent, to reduce the patient's risk of exposure to COVID-19 and provide continuity of care for an established patient.    Services were provided through a video synchronous discussion virtually to substitute for in-person clinic visit.

## 2020-07-01 NOTE — PATIENT INSTRUCTIONS
Dear Merline Martinez , It was a pleasure seeing you today in your home along with your daughter virtually We will see you again in 4 weeks If labs or imaging tests have been ordered for you today, please call the office  at [phone] 48 hours after completion to obtain the results. Your stated goal:  
-To live well for as long as possible Your described symptoms were: Fatigue: 5 Drowsiness: 5 Depression: 2 Pain: 2 Anxiety: 4 Nausea: 0 Anorexia: 4 Dyspnea: 0 Best Well-Bein Constipation: No  
Other Problem (Comment): 0 This is the plan we talked about: 1. Severe upper abdominal pain-severe pancreatitis, pancreatic cancer 
-We reviewed your most recent CT scan which shows pancreatitis along with some ascites and the pancreatic mass has disappeared. You are very happy with this. I do believe that the pain severity correlates with pancreatitis. 
-You are currently on Dilaudid 6 mg every 4 hours with optimal pain relief. We agreed on starting an extended release anti-inflammatory medicine to help with pancreatitis pain the goal is to decrease the amount of Dilaudid you need per day in an effort to achieve better pain control. 
-Start diclofenac ER 50 mg 2 times a day. 2.  Severe nausea 
-You have chemotherapy-induced nausea. You are currently taking Zofran 4 mg oral disintegrating tablet 2 times a day, you can increase this to every 6 hours as needed. You also have Compazine 10 mg tablets at home which you can take in between  Zofran doses. ,  None 3. Fatigue 
-This is improved some especially since she learned the CT results that showed disappearance of the pancreatic mass. 4.  Anorexia and oral thrush 
-Oral thrush resolved with nystatin swish and swallow 
-Drink warm water with lemon and honey every morning 
-You tried Marinol for a week but it caused confusion and hallucination so you stopped it. -You have already met with a nutritionist but the information provided to you was overwhelming. We reviewed things that you can eat today and you will in to improve your calorie intake to at least 1200 Kcal/day 
-Increase water intake to between 60 to 80 ounces of water per day 5. Uncontrolled diabetes 
-You are taking 55 units glargine insulin at nighttime and modify your daytime insulin based on your sugars. You do not have a set sliding scale. I am very worried about extreme sugar levels. Start a food diary and measure your blood glucose 4 times a day and keep a diary so we can review this and modify your insulin needs. 6.  Diarrhea and constipation 
-You struggle with severe diarrhea right after chemotherapy but after that you have constipation. 
-Start stool softeners with senna 2 tablets 1 day after your last diarrhea episode. This way we can stay ahead of constipation as much as possible. 7.  Advanced care planning 
-You completed a living will and advanced medical directive naming your daughter Fermin Wilhelm as you medical power of  8. Psychosocial support 
-You are the primary caregiver of your  who is cognitively impaired. You dealing with the cancer is causing tremendous amount of stress on you and your family. Your daughter Fermin Wilhelm is the only one who lives local and is able to help out. We talked about how palliative medicine can certainly provide you as much support as possible. This is what you have shared with us about Advance Care Planning: 
 
  Primary Decision Maker: Denis Chris - Daughter - [phone] Advance Care Planning 7/1/2020 Patient's Healthcare Decision Maker is: Verbal statement (Legal Next of Kin remains as decision maker) Confirm Advance Directive None Patient Would Like to Complete Advance Directive Yes Does the patient have other document types - The Palliative Medicine Team is here to support you and your family. Sincerely, 
 
 
Sandrita Robbins MD and the Palliative Medicine Team

## 2020-07-01 NOTE — PROGRESS NOTES
Palliative Medicine Office Visit  Palliative Medicine Nurse Check In  [phone]    Patient Name: Jalen Desai  YOB: 1955      Date of Office Visit: 7/1/2020    Patient states: \"  \"    From Check In Sheet (scanned in Media):  Has a medical provider talked with you about cardiopulmonary resuscitation (CPR)? [x] Yes   [] No   [] Unable to obtain    Nurse reminder to complete or update ACP FlowSheet:    Is ACP on the Problem List?    [] Yes    [x] No  IF ACP Document is ON FILE; Nurse to place ACP on Problem List     Is there an ACP Note in Chart Review/Note? [] Yes    [x] No   If NO: ALERT PROVIDER       Primary Decision MakerEloise Taylor - [phone]  Advance Care Planning 7/1/2020   Patient's [place] is: Verbal statement (Legal Next of Kin remains as decision maker)   Confirm Advance Directive None   Patient Would Like to Complete Advance Directive Yes   Does the patient have other document types -         Is there anything that we should know about you as a person in order to provide you the best care possible? Have you been to the ER, urgent care clinic since your last visit? [] Yes   [x] No   [] Unable to obtain    Have you been hospitalized since your last visit? [] Yes   [x] No   [] Unable to obtain    Have you seen or consulted any other health care providers outside of the [address] [place] [address] since your last visit?    [] Yes   [x] No   [] Unable to obtain    Functional status (describe):         Last BM: 6/30/2020     accessed (date): 7/1/2020    Bottle review (for opioid pain medication):  Medication 1:   Date filled:   Directions:   # filled:   # left:   # pills taking per day:  Last dose taken:    Medication 2:   Date filled:   Directions:   # filled:   # left:   # pills taking per day:  Last dose taken:    Medication 3:   Date filled:   Directions:   # filled:   # left:   # pills taking per day:  Last dose taken:     Medication 4:   Date filled:   Directions:   # filled:   # left:   # pills taking per day:  Last dose taken:

## 2020-07-02 ENCOUNTER — PATIENT OUTREACH (OUTPATIENT)
Dept: OTHER | Age: 65
End: 2020-07-02

## 2020-07-02 ENCOUNTER — OFFICE VISIT (OUTPATIENT)
Dept: ONCOLOGY | Age: 65
End: 2020-07-02

## 2020-07-02 VITALS
HEART RATE: 72 BPM | WEIGHT: 210.6 LBS | OXYGEN SATURATION: 97 % | DIASTOLIC BLOOD PRESSURE: 70 MMHG | SYSTOLIC BLOOD PRESSURE: 102 MMHG | HEIGHT: 64 IN | RESPIRATION RATE: 16 BRPM | TEMPERATURE: 97.3 F | BODY MASS INDEX: 35.96 KG/M2

## 2020-07-02 DIAGNOSIS — C25.9 ADENOCARCINOMA OF PANCREAS (HCC): Primary | ICD-10-CM

## 2020-07-02 NOTE — PROGRESS NOTES
Elizabeth Conner is a 59 y.o. female patient here for f/u pancreatic cancer. Patient is taking her Dilaudid 4-6 mg daily, Q6H prn. Patient is feeling better than she has. Pump to be removed today. Patient is eating 3 meals a day, small meals, she is also using a protein supplement. Chemotherapy Flowsheet 6/30/2020   Cycle C3D1   Date 6/30/2020   Drug / Regimen FOLFIRINOX   Dosage see MAR   Time Up 1130   Time Down 1713   Pre Hydration -   Pre Meds Aloxi, Dexamethasone, Emend   Notes 5FU CIV initiated over 46 hour CIV     Key Oncology Meds             ondansetron (ZOFRAN ODT) 4 mg disintegrating tablet Take 1 Tab by mouth every eight (8) hours as needed for Nausea or Vomiting. prochlorperazine (COMPAZINE) 10 mg tablet Take 0.5 Tabs by mouth every six (6) hours as needed for Nausea. Key Pain Meds             diclofenac EC (VOLTAREN) 50 mg EC tablet Take 1 Tab by mouth two (2) times a day. HYDROmorphone (DILAUDID) 2 mg tablet Take 3 Tabs by mouth every four (4) hours as needed for Pain for up to 30 days. Max Daily Amount: 36 mg.    acetaminophen (Acetaminophen Extra Strength) 500 mg tablet Take 1,000 mg by mouth two (2) times a day. ibuprofen (Motrin IB) 200 mg tablet Take 200 mg by mouth every eight (8) hours as needed for Pain.

## 2020-07-02 NOTE — PROGRESS NOTES
[facility] Outreach    Call placed to pt, Verified  and address for HIPAA security. Goals      Attends follow-up appointments as directed. Oncology - 5/15/2020  PCP - TBD    2020   scheduled to start chemo 2020  Oncology - attended 5/15/2020 F/U 6/3/2020  PCP - TBD  Palliative - new pt appt 6/15/2020  Surgeon - attended 2020 F/U 2020  Neuro - 2020 on track     7/3/2020 attending appt's       [address] managment related to pancreatic cancer (pt-stated)      2020   Rosalba cath placement - 2020. Scheduled to start chemo at infusion center 2020. Advised to speak with oncology MSW to assist with financial aid/employee hardship henri. 2020 Started first infusion 2020 and is second for 2nd treatment tomorrow 6/10/2020. Pt reported feeling extremely sick and having difficulty even functioning, poor appetite, N/V, diarrhea and extremely fatigue sleeping most of the day up until 4 days ago. Pt was able to spend time with family and enjoy 3 birthday parties too. Appetite returned and pt has been eating as much as tolerate in anticipation of next treatment. Pt reported she has finally been able to sleep in her bed after 6 months of being in pain related to back pain, which was related to cancer we now know. Pain is now being managed well. Pt is currently scheduled for a new patient appt with Palliative and we discussed uploading ACP into [facility] [employer]. 7/3/2020   Spoke briefly, currently in the car coming home from infusion. Pt reported so far she is feeling ok. Adjustments were made to treatment this time pt reported in order to hopefully reduce side effects. Pt is having extreme fatigue, diarrhea, poor appetite and nausea. Palliative is assisting with symptom management.  Patient verbalizes understanding of self -management goals of living with Diabetes. 2020  Pt reported BS was 168 at noon and 217 this AM fasting.  Pt reported she is starting to see an improvement in BS's; down from 360's. Pt reported PCP will like to have BS's between 100-200 at this time. Pt has now started reusing Dex com 6 today in order to track BS's constantly. 6/8/2020 BS holding at 187 in AM and 131 in PM. Pt is adjusting insulin as needed based on appetite and food intake.  Prepare patients and caregivers for end of life decisions (ie. need for hospice, pain management, symptom relief, advance directives etc.)      6/8/2020   Discussed with pt about uploading ACP through [address] Healthcare decision makers updated in [facility] resources in place to maintain patient in the community (ie.  Home Health, DME equipment, refer to, medication assistant plan, etc.)      [facility] - out of [facility] 24/7  Nurse Access line 24/7  Oncology MSW  Palliative              CM will f/u in 14 days

## 2020-07-02 NOTE — PROGRESS NOTES
Reason for Visit:   Korin Samuel a 59 y. o. female who is seen for pancreatic adenocarcinoma     Treatment History:   Dx: Pancreatic Adenocarcinoma--May 2020--[id number]--ibljiyxzbzg of splenic vein and superior mesenteric vein  Tx: mFOLFIRINOX--first cycle 5/26/2020, second cycle 6/10/2020, dose reduced 15% cycle 3 on 6/30/2020--delayed due to severe side effects. Goal: Prolong life--Palliative     History of Present Illness:   Almost back to baseline, eating well, no n/v/diarrhea or constipation. No pain--controlling very well with hydromorphone. Saw Palliative--appreciate visit. Past Medical History:   Diagnosis Date    Adenocarcinoma of pancreas ([facility] [address] 05/13/2020    Dr Tesfaye Meyers biopsy via EUS    Diabetes ([facility] [address]     GERD (gastroesophageal reflux disease)     Hernia of abdominal cavity     Subxyphoid hernia    Hypertension     Inflammatory polyarthropathy (HCC)     Joint pain     Joint swelling     Menopause     Ocular nevus of left eye     Pancreatitis     Tracheal stenosis       Past Surgical History:   Procedure Laterality Date    HX CARPAL TUNNEL RELEASE Bilateral     HX COLONOSCOPY      HX HYSTERECTOMY      HX KNEE ARTHROSCOPY Right     HX KNEE REPLACEMENT Right     partial    HX LAP CHOLECYSTECTOMY      HX TONSILLECTOMY      HX VASCULAR ACCESS        Social History     Tobacco Use    Smoking status: Never Smoker    Smokeless tobacco: Never Used   Substance Use Topics    Alcohol use: No     Alcohol/week: 0.0 standard drinks     Frequency: Never     Binge frequency: Never      Family History   Problem Relation Age of Onset    Heart Disease Mother     Heart Attack Mother     Cancer Father         lung    Diabetes Sister      Current Outpatient Medications   Medication Sig    diclofenac EC (VOLTAREN) 50 mg EC tablet Take 1 Tab by mouth two (2) times a day.     HYDROmorphone (DILAUDID) 2 mg tablet Take 3 Tabs by mouth every four (4) hours as needed for Pain for up to 30 days. Max Daily Amount: 36 mg.    potassium chloride (KAON 10%) 20 mEq/15 mL solution Take 15 mL by mouth two (2) times a day. Indications: low amount of potassium in the blood    nystatin (MYCOSTATIN) 100,000 unit/mL suspension Take 5 mL by mouth four (4) times daily. swish and spit    LORazepam (ATIVAN) 1 mg tablet Take 1 Tab by mouth every six (6) hours as needed for Anxiety. Max Daily Amount: 4 mg. Indications: nausea and vomiting caused by cancer drugs    ondansetron (ZOFRAN ODT) 4 mg disintegrating tablet Take 1 Tab by mouth every eight (8) hours as needed for Nausea or Vomiting.  diphenoxylate-atropine (LomotiL) 2.5-0.025 mg per tablet Take 2 Tabs by mouth four (4) times daily as needed for Diarrhea. Max Daily Amount: 8 Tabs.  methylphenidate HCl (RITALIN) 5 mg tablet Take 1 Tab by mouth two (2) times a day. Max Daily Amount: 10 mg.    diphenhydrAMINE (BENADRYL) 25 mg tablet Take 25 mg by mouth every six (6) hours as needed.  lidocaine-prilocaine (EMLA) topical cream Apply  to affected area as needed for Pain.  prochlorperazine (COMPAZINE) 10 mg tablet Take 0.5 Tabs by mouth every six (6) hours as needed for Nausea.  lipase-protease-amylase (CREON 12,000) 12,000-38,000 -60,000 unit capsule Take 1 Cap by mouth three (3) times daily (with meals). Indications: exocrine pancreatic insufficiency (Patient taking differently: Take 2 Caps by mouth three (3) times daily (with meals).  Indications: exocrine pancreatic insufficiency)    Blood-Glucose Meter,Continuous (Dexcom G6 ) misc Use as directed to monitor blood glucose as directed    Blood-Glucose Sensor (Dexcom G6 Sensor) fadi Use as directed to monitor blood glucose as directed    Blood-Glucose Transmitter (Dexcom G6 Transmitter) fadi Use as directed to monitor blood glucose as directed    insulin lispro (HumaLOG KwikPen Insulin) 100 unit/mL kwikpen Inject 25 units into the skin with breakfast, 15 units with lunch (if BG is >200 and eating carbs with meal), and 30 units with dinner (Patient taking differently: Inject 25 units into the skin with breakfast, 15 units with lunch (if BG is >200 and eating carbs with meal), and 30 units with dinner  Patient is taking based on her BS levels at Breakfast, Lunch and Dinner.)    insulin glargine U-300 conc (Toujeo SoloStar U-300 Insulin) 300 unit/mL (1.5 mL) inpn pen 55 units every bedtime    pravastatin (PRAVACHOL) 20 mg tablet TAKE 1 TABLET BY MOUTH EVERY NIGHT AT BEDTIME    omeprazole (PRILOSEC) 40 mg capsule Take 1 Cap by mouth daily.  acetaminophen (Acetaminophen Extra Strength) 500 mg tablet Take 1,000 mg by mouth two (2) times a day.  ibuprofen (Motrin IB) 200 mg tablet Take 200 mg by mouth every eight (8) hours as needed for Pain.  Needle, Disp, ndle 1 Each by Does Not Apply route two (2) times daily as needed for Other.  carvedilol (COREG) 25 mg tablet TAKE 1 TABLET BY MOUTH TWICE DAILY (Patient taking differently: 1/2 dose twice daily  5/28/2020 patient is taking her BP if BP is low she is only taking 1/2 a tab, if high she is taking the 25mg.)    aspirin delayed-release 81 mg tablet Take  by mouth daily. No current facility-administered medications for this visit. Facility-Administered Medications Ordered in Other Visits   Medication Dose Route Frequency    pegfilgrastim (NEULASTA) wearable SQ injector 6 mg  6 mg SubCUTAneous ONCE    saline peripheral flush soln 10 mL  10 mL InterCATHeter PRN    0.9% sodium chloride injection 10 mL  10 mL IntraVENous PRN    heparin (porcine) pf 300-500 Units  300-500 Units InterCATHeter PRN    fluorouraciL (ADRUCIL) 4,427 mg in 0.9% sodium chloride 100 mL CADD Cassette  2,040 mg/m2 (Treatment Plan Recorded) IntraVENous ONCE    0.9% sodium chloride infusion  25 mL/hr IntraVENous CONTINUOUS      No Known Allergies     Review of Systems: A complete review of systems was obtained, negative except as described above.     Physical Exam:     Visit Vitals  /70   Pulse 72   Temp 97.3 °F (36.3 °C) (Skin)   Resp 16   Ht 5' 4\" (1.626 m)   Wt 210 lb 9.6 oz (95.5 kg)   SpO2 97%   BMI 36.15 kg/m²     ECOG PS: 1  General: No distress  Eyes: PERRLA, anicteric sclerae  HENT: Atraumatic, OP clear  Neck: Supple  Lymphatic: No cervical, supraclavicular, or inguinal adenopathy  Respiratory: CTAB, normal respiratory effort  CV: Normal rate, regular rhythm, no murmurs, no peripheral edema  GI: Soft, nontender, nondistended, no masses, no hepatomegaly, no splenomegaly  MS: Normal gait and station. Digits without clubbing or cyanosis. Skin: No rashes, ecchymoses, or petechiae. Normal temperature, turgor, and texture. Psych: Alert, oriented, appropriate affect, normal judgment/insight    Results:     Lab Results   Component Value Date/Time    WBC 9.1 06/30/2020 08:25 AM    HGB 11.8 06/30/2020 08:25 AM    HCT 36.5 06/30/2020 08:25 AM    PLATELET 434 48/58/1531 08:25 AM    MCV 90.8 06/30/2020 08:25 AM    ABS. NEUTROPHILS 6.3 06/30/2020 08:25 AM     Lab Results   Component Value Date/Time    Sodium 142 06/30/2020 08:25 AM    Potassium 4.2 06/30/2020 08:25 AM    Chloride 103 06/30/2020 08:25 AM    CO2 30 06/30/2020 08:25 AM    Glucose 379 (H) 06/30/2020 04:05 PM    BUN 6 06/30/2020 08:25 AM    Creatinine 0.83 06/30/2020 08:25 AM    GFR est AA >60 06/30/2020 08:25 AM    GFR est non-AA >60 06/30/2020 08:25 AM    Calcium 8.9 06/30/2020 08:25 AM    Glucose (POC) 156 (H) 05/21/2020 07:19 AM    Creatinine (POC) 0.7 02/06/2013 10:21 AM     Lab Results   Component Value Date/Time    Bilirubin, total 0.4 06/30/2020 08:25 AM    ALT (SGPT) 31 06/30/2020 08:25 AM    Alk.  phosphatase 108 06/30/2020 08:25 AM    Protein, total 6.6 06/30/2020 08:25 AM    Albumin 3.0 (L) 06/30/2020 08:25 AM    Globulin 3.6 06/30/2020 08:25 AM       CT A/P 4/30/2020  IMPRESSION: Suspect neoplastic pancreatic mass versus atypical focal  pancreatitis with splenic and superior mesenteric vein occlusion. Consider  further evaluation with endoscopic ultrasound at which time biopsy could  potentially be performed.     CT Chest 5/21/2020  IMPRESSION:  1. No evidence of pulmonary metastatic disease. No evidence of mediastinal or  hilar lymphadenopathy. No pleural effusions identified. 2. No definite evidence of central pulmonary embolic disease. 3. Presence of a mass lesion involving the pancreatic head/body. 4. Evidence of fatty infiltration involving the liver. Presence of focal  low-density areas within the liver compatible with cysts. Surgical absence of  the gallbladder. CT A/P 6/19/2020  IMPRESSION:  Pancreatitis with ascites favored. Discrete pancreatic mass is not identified. No biliary dilatation or definite arterial or venous thrombosis. No liver  metastases. Fat-containing ventral hernia  Nonobstructing left renal calculus  Retrolisthesis of L2 and L3.     Path: 5/8/2020  CYTOLOGIC INTERPRETATION:   Pancreas, EUS-guided fine needle aspiration and cell blocks: Adenocarcinoma      Assessment:   1) Pancreatic Adenocarcinoma--Unresectable  2) Neoplasm Pain  3) Fatigue  4) Nausea  5) DM  6) Nutrition  7) Diarrhea        Plan:   1) Continue with mFOLFIRINOX--transient dysarthria with Irinotecan is a known side effect--multiple case reports in literature--unknown mechanism of action--we can continue-- slowing infusion on next cycle, can also give irinotecan prior to oxaliplatin if slowing doesn't resolve.  BRCA pending.  Overall doing a 15% dose reduction due to the severe side effects.    2) S/p celiac block--pain better and controlled with acetaminophen currently.  Future options include repeat celiac block, methadone, fentanyl.       3) Methylphenidate 5mg bid--watch for anxiety and insomnia     4) Compazine controlling--switch to olanzapine 5mg bid or 10mg once daily if becomes difficult to manage with compazine.    5) Defer to Dr Jamaal Dunbar for further management. Manfred Brandt may have ideas.       6) On pancreatic enzymes--weight has stabilized, may need nutrition consult.     7) Chemo related--will give another liter of fluid today and repeat the Octreotide. Will also dose reduce 15%.       Signed By: Maranda Reece MD

## 2020-07-06 PROBLEM — C25.0: Status: ACTIVE | Noted: 2020-07-06

## 2020-07-06 PROBLEM — T45.1X5A CHEMOTHERAPY INDUCED NAUSEA AND VOMITING: Status: ACTIVE | Noted: 2020-07-06

## 2020-07-06 PROBLEM — R11.2 INTRACTABLE NAUSEA AND VOMITING: Status: ACTIVE | Noted: 2020-07-06

## 2020-07-06 PROBLEM — R11.2 CHEMOTHERAPY INDUCED NAUSEA AND VOMITING: Status: ACTIVE | Noted: 2020-07-06

## 2020-07-06 PROBLEM — R19.7 DIARRHEA: Status: ACTIVE | Noted: 2020-07-06

## 2020-07-15 ENCOUNTER — HOME HEALTH ADMISSION (OUTPATIENT)
Dept: HOME HEALTH SERVICES | Facility: HOME HEALTH | Age: 65
End: 2020-07-15
Payer: MEDICARE

## 2020-07-15 ENCOUNTER — TELEPHONE (OUTPATIENT)
Dept: PALLATIVE CARE | Age: 65
End: 2020-07-15

## 2020-07-15 NOTE — TELEPHONE ENCOUNTER
Returned call to [facility], [facility] and advised Dr. Anni Kaiser will follow while in [facility]

## 2020-07-16 ENCOUNTER — PATIENT OUTREACH (OUTPATIENT)
Dept: CASE MANAGEMENT | Age: 65
End: 2020-07-16

## 2020-07-16 ENCOUNTER — HOME CARE VISIT (OUTPATIENT)
Dept: SCHEDULING | Facility: HOME HEALTH | Age: 65
End: 2020-07-16
Payer: MEDICARE

## 2020-07-16 ENCOUNTER — HOME CARE VISIT (OUTPATIENT)
Dept: HOME HEALTH SERVICES | Facility: HOME HEALTH | Age: 65
End: 2020-07-16
Payer: MEDICARE

## 2020-07-16 PROCEDURE — 400013 HH SOC

## 2020-07-16 PROCEDURE — 3331090002 HH PPS REVENUE DEBIT

## 2020-07-16 PROCEDURE — G0299 HHS/HOSPICE OF RN EA 15 MIN: HCPCS

## 2020-07-16 PROCEDURE — 3331090001 HH PPS REVENUE CREDIT

## 2020-07-16 NOTE — PROGRESS NOTES
Patient was admitted to [facility] on 7-6-20 and discharged on 7-15-20 for:    DISCHARGE DIAGNOSIS:     Principal Problem:    Intractable nausea and vomiting (7/6/2020)     Active Problems:    Chemotherapy induced nausea and vomiting (7/6/2020)    Diarrhea (7/6/2020)    Pancreatitis     Adenocarcinoma of pancreas ([facility] [address] (5/13/2020)      Overview: Dr Terry Molina biopsy via EUS May 8, 2020      Bx (+) for adenocarcinoma of the pancrease May 13, 2020     Pancreatic malignancy syndrome ([facility] [address] (7/6/2020)    Hypertension     Type 2 diabetes mellitus without complication, without long-term current use of insulin ([facility] [address] (9/1/2017)    Mixed hyperlipidemia (9/1/2017)    GERD (gastroesophageal reflux disease)     Patient was contacted within one business day of discharge. Discharge Challenges to be reviewed by the provider:   Additional needs identified to be addressed with provider:  yes  - Patient reports she has had one episode of nausea/vomiting last evening, 7-15-20, since discharge and vomited 100mls of emesis. States, \"I've been doing pretty good since I've been home. \" States she is keeping her medications down, is taking in fluids, and is \"nibbling on a protein bar\" this morning. Patient states she has a poor appetite and has lost approximately 40 pounds since January 2020.    - Patient states side effects of medications are nausea, vomiting, and anxiety. COVID-19 testing was not completed during this hospitalization.    Method of communication with provider : chart routing       Component      Latest Ref Rng & Units 7/15/2020 7/15/2020 7/14/2020 7/14/2020          11:56 AM  6:10 AM 10:18 PM  4:13 PM   GLUCOSE,FAST - POC      65 - 100 mg/dL 192 (H) 191 (H) 173 (H) 269 (H)     Component      Latest Ref Rng & Units 7/15/2020 7/14/2020 7/14/2020 7/13/2020           5:35 AM  5:57 AM  5:57 AM  5:45 AM   BUN/Creatinine ratio      12 - 20   8 (L)        Component      Latest Ref Rng & Units 7/13/2020 2020           5:45 AM  5:45 AM   BUN/Creatinine ratio      12 - 20    10 (L)     Component      Latest Ref Rng & Units 2020           5:57 AM  5:45 AM  4:30 AM   WBC      3.6 - 11.0 K/uL 13.0 (H) 10.5 7.1   RBC      3.80 - 5.20 M/uL 3.44 (L) 3.12 (L) 3.22 (L)   HGB      11.5 - 16.0 g/dL 10.0 (L) 9.2 (L) 9.4 (L)   HCT      35.0 - 47.0 % 30.4 (L) 27.5 (L) 28.6 (L)     Component      Latest Ref Rng & Units 2020           5:57 AM  5:45 AM  4:30 AM   RDW      11.5 - 14.5 % 16.0 (H) 15.7 (H) 15.3 (H)   PLATELET      929 - 841 K/uL 134 (L) 125 (L) 106 (L)     Component      Latest Ref Rng & Units 2020           5:57 AM  5:45 AM  4:30 AM   NRBC      0  WBC 0.2 (H) 0.5 (H) 0.6 (H)   ABSOLUTE NRBC      0.00 - 0.01 K/uL 0.03 (H) 0.05 (H) 0.04 (H)     Component      Latest Ref Rng & Units 2020           5:57 AM   LYMPHOCYTES      12 - 49 % 10 (L)   MONOCYTES      5 - 13 % 3 (L)     Component      Latest Ref Rng & Units 2020           5:57 AM   ABS. NEUTROPHILS      1.8 - 8.0 K/UL 9.9 (H)       Advance Care Planning:   Does patient have an Advance Directive:  reviewed and current     Was this a readmission? no   Patient stated reason for the admission: \"Nausea and vomiting from my chemo medications. \"  Patients top risk factors for readmission: medical condition  Interventions to address risk factors: Education provided regarding signs/symptoms of intractable nausea and vomiting and patient verbalizes an understanding. Care Transition Nurse (CTN) contacted the patient by telephone to perform post hospital discharge assessment. Verified name and  with patient as identifiers. Provided introduction to self, and explanation of the CTN role. CTN reviewed discharge instructions, medical action plan and red flags with patient who verbalized understanding.  Patient given an opportunity to ask questions and does not have any further questions or concerns at this time. The patient agrees to contact the PCP office for questions related to their healthcare. Medication reconciliation was performed with patient, who verbalizes understanding of administration of home medications. Advised obtaining a 90-day supply of all daily and as-needed medications. Referral to Pharm D needed: no     Home Health/Outpatient orders at discharge: PT, OT, SN, personal care aide and and SW per [facility] office. [facility]. Date of initial visit:  7-16-20. Durable Medical Equipment ordered at discharge: none  [address] received: n/a    Covid Risk Education    Patient has following risk factors of: immunocompromised. Education provided regarding infection prevention, and signs and symptoms of COVID-19 and when to seek medical attention with patient who verbalized understanding. Discussed exposure protocols and quarantine From CDC: Are you at higher risk for severe illness?  and given an opportunity for questions and concerns. The patient agrees to contact the COVID-19 hotline [phone] or PCP office for questions related to COVID-19. For more information on steps you can take to protect yourself, see CDC's How to Protect Yourself     Patient/family/caregiver given information for [employer] and agrees to enroll no  Patient's preferred e-mail: declines  Patient's preferred phone number: declines    Discussed follow-up appointments.  If no appointment was previously scheduled, appointment scheduling offered: yes  [facility] follow up appointment(s):   Future Appointments   Date Time Provider Department Center   7/16/2020 To Be Determined Nicole Herring RN [facility] [address]   7/16/2020  3:20 PM Juli Yang MD [address]   7/28/2020  9:10 AM Juli Daly MD [address]   7/29/2020  8:15 AM [facility] [address]   7/29/2020  2:30 PM Robyn Marshall MD [facility] [address]   7/31/2020  3:30 PM [facility] 2 [address]     Non-[facility] follow up appointment(s): None noted at this time. Plan for follow-up call in 10-14 days based on severity of symptoms and risk factors. CTN provided contact information for future needs. Goals      Attends follow-up appointments as directed. 7-16-20: Patient has virtual [employer] appointment scheduled with Dr. Liberty Clement. Celia/PCP on 7-16-20, palliative appointment scheduled with Dr. Selene Andrew on 7-29-20, [employer] appointment and appointment with Dr. Mary French. Yoli/oncology on 7-22-20. Patient reports her daughter is currently providing transportation to/from appointments. Fanta Sampson Understands red flags post discharge. 7-16-20: Red flags of intractable nausea and vomiting reviewed with patient and patient verbalizes an understanding. Patient reports she has had one episode of nausea/vomiting last evening, 7-15-20, since discharge and vomited 100mls of emesis. States, \"I've been doing pretty good since I've been home. \" States she is keeping her medications down, is taking in fluids, and is \"nibbling on a protein bar\" this morning. Patient states she has a poor appetite and has lost approximately 40 pounds since January 2020. Care Transitions Nurse will review red flags again on next phone conversation with patient.  Seth Horan

## 2020-07-17 ENCOUNTER — HOME CARE VISIT (OUTPATIENT)
Dept: SCHEDULING | Facility: HOME HEALTH | Age: 65
End: 2020-07-17
Payer: MEDICARE

## 2020-07-17 VITALS
RESPIRATION RATE: 16 BRPM | SYSTOLIC BLOOD PRESSURE: 124 MMHG | DIASTOLIC BLOOD PRESSURE: 74 MMHG | OXYGEN SATURATION: 97 % | HEART RATE: 78 BPM | TEMPERATURE: 97.5 F

## 2020-07-17 PROCEDURE — 3331090001 HH PPS REVENUE CREDIT

## 2020-07-17 PROCEDURE — G0299 HHS/HOSPICE OF RN EA 15 MIN: HCPCS

## 2020-07-17 PROCEDURE — 3331090002 HH PPS REVENUE DEBIT

## 2020-07-18 PROBLEM — K86.81 EXOCRINE PANCREATIC INSUFFICIENCY: Chronic | Status: ACTIVE | Noted: 2020-07-18

## 2020-07-18 PROCEDURE — 3331090001 HH PPS REVENUE CREDIT

## 2020-07-18 PROCEDURE — 3331090002 HH PPS REVENUE DEBIT

## 2020-07-19 PROCEDURE — 3331090002 HH PPS REVENUE DEBIT

## 2020-07-19 PROCEDURE — 3331090001 HH PPS REVENUE CREDIT

## 2020-07-20 ENCOUNTER — HOME CARE VISIT (OUTPATIENT)
Dept: SCHEDULING | Facility: HOME HEALTH | Age: 65
End: 2020-07-20
Payer: MEDICARE

## 2020-07-20 VITALS
DIASTOLIC BLOOD PRESSURE: 76 MMHG | HEART RATE: 76 BPM | SYSTOLIC BLOOD PRESSURE: 136 MMHG | OXYGEN SATURATION: 97 % | RESPIRATION RATE: 20 BRPM | TEMPERATURE: 97.2 F

## 2020-07-20 PROCEDURE — 3331090001 HH PPS REVENUE CREDIT

## 2020-07-20 PROCEDURE — G0151 HHCP-SERV OF PT,EA 15 MIN: HCPCS

## 2020-07-20 PROCEDURE — 3331090002 HH PPS REVENUE DEBIT

## 2020-07-20 NOTE — PROGRESS NOTES
Roe Schaumann a 72 y. o. female patient here for f/u pancreatic cancer. Recent extended hospitalization for Chemotherapy induced nausea and vomiting, and diarrhea. Patint c/o having urgency with urination, pain with urination. Patient states as of this past Eloise Ocampo has begun to have shoulder pain again. Chemotherapy Flowsheet 7/2/2020   Cycle C3 D3   Date 7/2/2020   Drug / Regimen CIV 5FU infused and discontinued   Dosage -   Time Up -   Time Down -   Pre Hydration 1000 ml NS hydration   Pre Meds -   Notes ON Body Neulasta 6 mg right post. upper arm     Key Oncology Meds             megestroL (MEGACE) 40 mg tablet Take 1 Tab by mouth two (2) times a day. ondansetron (ZOFRAN ODT) 4 mg disintegrating tablet Take 1 Tab by mouth every eight (8) hours as needed for Nausea or Vomiting. prochlorperazine (COMPAZINE) 10 mg tablet Take 0.5 Tabs by mouth every six (6) hours as needed for Nausea. Key Pain Meds             HYDROmorphone (DILAUDID) 2 mg tablet Take 3 Tabs by mouth every four (4) hours as needed for Pain for up to 30 days. Max Daily Amount: 36 mg.    acetaminophen (Acetaminophen Extra Strength) 500 mg tablet Take 1,000 mg by mouth two (2) times a day.

## 2020-07-21 ENCOUNTER — HOME CARE VISIT (OUTPATIENT)
Dept: SCHEDULING | Facility: HOME HEALTH | Age: 65
End: 2020-07-21
Payer: MEDICARE

## 2020-07-21 ENCOUNTER — HOME CARE VISIT (OUTPATIENT)
Dept: HOME HEALTH SERVICES | Facility: HOME HEALTH | Age: 65
End: 2020-07-21
Payer: MEDICARE

## 2020-07-21 VITALS
HEART RATE: 77 BPM | DIASTOLIC BLOOD PRESSURE: 87 MMHG | SYSTOLIC BLOOD PRESSURE: 139 MMHG | TEMPERATURE: 97.7 F | OXYGEN SATURATION: 97 %

## 2020-07-21 VITALS
TEMPERATURE: 97.8 F | OXYGEN SATURATION: 97 % | HEART RATE: 84 BPM | BODY MASS INDEX: 35.19 KG/M2 | SYSTOLIC BLOOD PRESSURE: 130 MMHG | DIASTOLIC BLOOD PRESSURE: 68 MMHG | WEIGHT: 205 LBS | RESPIRATION RATE: 20 BRPM

## 2020-07-21 PROCEDURE — G0156 HHCP-SVS OF AIDE,EA 15 MIN: HCPCS

## 2020-07-21 PROCEDURE — 3331090002 HH PPS REVENUE DEBIT

## 2020-07-21 PROCEDURE — G0152 HHCP-SERV OF OT,EA 15 MIN: HCPCS

## 2020-07-21 PROCEDURE — 3331090001 HH PPS REVENUE CREDIT

## 2020-07-21 PROCEDURE — G0299 HHS/HOSPICE OF RN EA 15 MIN: HCPCS

## 2020-07-22 ENCOUNTER — HOME CARE VISIT (OUTPATIENT)
Dept: SCHEDULING | Facility: HOME HEALTH | Age: 65
End: 2020-07-22
Payer: MEDICARE

## 2020-07-22 ENCOUNTER — PATIENT OUTREACH (OUTPATIENT)
Dept: OTHER | Age: 65
End: 2020-07-22

## 2020-07-22 PROCEDURE — 3331090002 HH PPS REVENUE DEBIT

## 2020-07-22 PROCEDURE — G0151 HHCP-SERV OF PT,EA 15 MIN: HCPCS

## 2020-07-22 PROCEDURE — 3331090001 HH PPS REVENUE CREDIT

## 2020-07-22 NOTE — PROGRESS NOTES
CCM Outreach     Call placed to pt, Verified  and address for HIPAA security. Pt was noted admitted to [facility] 2020. Admission dates: 2020 - 7/15/2020. This ACM who has been providing CCM services for some time now was not aware of hospital d/c. It was noted in [address] [place] [address] that Margaret Solano RN completed JESUS call on 2020. See note in CC. DISCHARGE DIAGNOSIS:  Principal Problem:    Intractable nausea and vomiting (2020)  Active Problems:  Chemotherapy induced nausea and vomiting (2020)  Diarrhea (2020)  Pancreatitis ()  Adenocarcinoma of pancreas ([address] (2020)  Overview: Dr Ifeanyi Paezull biopsy via EUS May 8, 2020  Bx (+) for adenocarcinoma of the pancrease May 13, 2020   Exocrine Pancreatic Insufficiency  Hypertension ()  Type 2 diabetes mellitus without complication, without long-term current use of insulin ([facility] [address] (2017)  Mixed hyperlipidemia (2017)  GERD (gastroesophageal reflux disease) ()     This CM spoke briefly with pt. Pt reported she is feeling much better since d/c and that she honestly doesn't remember much about hospital admission due to poor condition. Pt reported oncology is currently holding chemo treatment, but will be discussing plan of care tomorrow 2020 at f/u appt. Pt reported appetite has improved and that her diet consisted of 1 piece of [place] toast this AM, mash potatoes and tomatoes for lunch and she will be having BBQ for dinner. Pt reported N/V and diarrhea has resolved; last BM was yesterday, which prior to admission pt was having frequent diarrhea throughout the day and bowel incontinence as well as persistent N/V. V/S today was Temp-97.2, B/P - 116/82, BS - 216, which is an improvement for pt. Pt stated that she is currently on a SS insulin and that her carvedilol dosage is also based on B/P reading. \" systolic B/P <290 hold, 080 take 1/2 dose and >130 full tablet.      Lab Results   Component Value Date/Time    Hemoglobin A1c 8.6 (H) 07/17/2020 01:30 PM    Hemoglobin A1c (POC) 9.8 03/23/2018 02:40 PM     Lab Results   Component Value Date/Time    Sodium 140 07/17/2020 01:30 PM    Potassium 3.2 (L) 07/17/2020 01:30 PM    Chloride 102 07/17/2020 01:30 PM    CO2 26 07/17/2020 01:30 PM    Anion gap 12 07/17/2020 01:30 PM    Glucose 172 (H) 07/17/2020 01:30 PM    BUN 9 07/17/2020 01:30 PM    Creatinine 1.18 (H) 07/17/2020 01:30 PM    BUN/Creatinine ratio 8 (L) 07/17/2020 01:30 PM    GFR est AA 56 (L) 07/17/2020 01:30 PM    GFR est non-AA 46 (L) 07/17/2020 01:30 PM    Calcium 8.9 07/17/2020 01:30 PM     Lab Results   Component Value Date/Time    WBC 12.3 (H) 07/17/2020 01:30 PM    HGB 10.5 (L) 07/17/2020 01:30 PM    HCT 32.2 (L) 07/17/2020 01:30 PM    PLATELET 970 10/80/7399 01:30 PM    MCV 90.4 07/17/2020 01:30 PM       Pt voiced appreciation for CM follow-up call. Pt is also being followed by Terri shin and [facility]. ACM will f/u in 2 weeks to obtain an update and discuss plan of care.

## 2020-07-23 ENCOUNTER — OFFICE VISIT (OUTPATIENT)
Dept: ONCOLOGY | Age: 65
End: 2020-07-23

## 2020-07-23 ENCOUNTER — HOME CARE VISIT (OUTPATIENT)
Dept: SCHEDULING | Facility: HOME HEALTH | Age: 65
End: 2020-07-23
Payer: MEDICARE

## 2020-07-23 VITALS
HEIGHT: 64 IN | SYSTOLIC BLOOD PRESSURE: 105 MMHG | WEIGHT: 204.8 LBS | OXYGEN SATURATION: 95 % | TEMPERATURE: 97.5 F | BODY MASS INDEX: 34.97 KG/M2 | DIASTOLIC BLOOD PRESSURE: 65 MMHG | HEART RATE: 87 BPM | RESPIRATION RATE: 18 BRPM

## 2020-07-23 DIAGNOSIS — R30.0 DYSURIA: Primary | ICD-10-CM

## 2020-07-23 DIAGNOSIS — C25.9 ADENOCARCINOMA OF PANCREAS (HCC): ICD-10-CM

## 2020-07-23 PROCEDURE — 3331090001 HH PPS REVENUE CREDIT

## 2020-07-23 PROCEDURE — 3331090002 HH PPS REVENUE DEBIT

## 2020-07-23 PROCEDURE — G0156 HHCP-SVS OF AIDE,EA 15 MIN: HCPCS

## 2020-07-23 RX ORDER — DIPHENHYDRAMINE HYDROCHLORIDE 50 MG/ML
50 INJECTION, SOLUTION INTRAMUSCULAR; INTRAVENOUS AS NEEDED
Status: CANCELLED
Start: 2020-09-30

## 2020-07-23 RX ORDER — ONDANSETRON 2 MG/ML
8 INJECTION INTRAMUSCULAR; INTRAVENOUS ONCE
Status: CANCELLED | OUTPATIENT
Start: 2020-10-21

## 2020-07-23 RX ORDER — DEXAMETHASONE SODIUM PHOSPHATE 100 MG/10ML
10 INJECTION INTRAMUSCULAR; INTRAVENOUS ONCE
Status: CANCELLED
Start: 2020-08-19

## 2020-07-23 RX ORDER — SODIUM CHLORIDE 9 MG/ML
10 INJECTION INTRAMUSCULAR; INTRAVENOUS; SUBCUTANEOUS AS NEEDED
Status: CANCELLED | OUTPATIENT
Start: 2020-11-04

## 2020-07-23 RX ORDER — HYDROCORTISONE SODIUM SUCCINATE 100 MG/2ML
100 INJECTION, POWDER, FOR SOLUTION INTRAMUSCULAR; INTRAVENOUS AS NEEDED
Status: CANCELLED | OUTPATIENT
Start: 2020-11-18

## 2020-07-23 RX ORDER — LORAZEPAM 2 MG/ML
0.5 INJECTION INTRAMUSCULAR
Status: CANCELLED
Start: 2020-09-16

## 2020-07-23 RX ORDER — ONDANSETRON 2 MG/ML
8 INJECTION INTRAMUSCULAR; INTRAVENOUS ONCE
Status: CANCELLED | OUTPATIENT
Start: 2020-07-29

## 2020-07-23 RX ORDER — SODIUM CHLORIDE 9 MG/ML
10 INJECTION INTRAMUSCULAR; INTRAVENOUS; SUBCUTANEOUS AS NEEDED
Status: CANCELLED | OUTPATIENT
Start: 2020-09-30

## 2020-07-23 RX ORDER — HYDROCORTISONE SODIUM SUCCINATE 100 MG/2ML
100 INJECTION, POWDER, FOR SOLUTION INTRAMUSCULAR; INTRAVENOUS AS NEEDED
Status: CANCELLED | OUTPATIENT
Start: 2020-09-09

## 2020-07-23 RX ORDER — DIPHENHYDRAMINE HYDROCHLORIDE 50 MG/ML
25 INJECTION, SOLUTION INTRAMUSCULAR; INTRAVENOUS AS NEEDED
Status: CANCELLED
Start: 2020-09-09

## 2020-07-23 RX ORDER — SODIUM CHLORIDE 9 MG/ML
10 INJECTION INTRAMUSCULAR; INTRAVENOUS; SUBCUTANEOUS AS NEEDED
Status: CANCELLED | OUTPATIENT
Start: 2020-11-18

## 2020-07-23 RX ORDER — ACETAMINOPHEN 325 MG/1
650 TABLET ORAL AS NEEDED
Status: CANCELLED
Start: 2020-08-19

## 2020-07-23 RX ORDER — DIPHENHYDRAMINE HYDROCHLORIDE 50 MG/ML
50 INJECTION, SOLUTION INTRAMUSCULAR; INTRAVENOUS AS NEEDED
Status: CANCELLED
Start: 2020-11-11

## 2020-07-23 RX ORDER — ALBUTEROL SULFATE 0.83 MG/ML
2.5 SOLUTION RESPIRATORY (INHALATION) AS NEEDED
Status: CANCELLED
Start: 2020-09-02

## 2020-07-23 RX ORDER — DIPHENHYDRAMINE HYDROCHLORIDE 50 MG/ML
50 INJECTION, SOLUTION INTRAMUSCULAR; INTRAVENOUS AS NEEDED
Status: CANCELLED
Start: 2020-07-29

## 2020-07-23 RX ORDER — ALBUTEROL SULFATE 0.83 MG/ML
2.5 SOLUTION RESPIRATORY (INHALATION) AS NEEDED
Status: CANCELLED
Start: 2020-08-05

## 2020-07-23 RX ORDER — DIPHENHYDRAMINE HYDROCHLORIDE 50 MG/ML
25 INJECTION, SOLUTION INTRAMUSCULAR; INTRAVENOUS AS NEEDED
Status: CANCELLED
Start: 2020-10-14

## 2020-07-23 RX ORDER — DIPHENHYDRAMINE HYDROCHLORIDE 50 MG/ML
50 INJECTION, SOLUTION INTRAMUSCULAR; INTRAVENOUS AS NEEDED
Status: CANCELLED
Start: 2020-11-18

## 2020-07-23 RX ORDER — DEXAMETHASONE SODIUM PHOSPHATE 100 MG/10ML
10 INJECTION INTRAMUSCULAR; INTRAVENOUS ONCE
Status: CANCELLED
Start: 2020-09-30

## 2020-07-23 RX ORDER — ONDANSETRON 2 MG/ML
8 INJECTION INTRAMUSCULAR; INTRAVENOUS AS NEEDED
Status: CANCELLED | OUTPATIENT
Start: 2020-08-19

## 2020-07-23 RX ORDER — ONDANSETRON 2 MG/ML
8 INJECTION INTRAMUSCULAR; INTRAVENOUS AS NEEDED
Status: CANCELLED | OUTPATIENT
Start: 2020-10-21

## 2020-07-23 RX ORDER — EPINEPHRINE 1 MG/ML
0.3 INJECTION, SOLUTION, CONCENTRATE INTRAVENOUS AS NEEDED
Status: CANCELLED | OUTPATIENT
Start: 2020-07-29

## 2020-07-23 RX ORDER — LORAZEPAM 2 MG/ML
0.5 INJECTION INTRAMUSCULAR
Status: CANCELLED
Start: 2020-09-02

## 2020-07-23 RX ORDER — DIPHENHYDRAMINE HYDROCHLORIDE 50 MG/ML
25 INJECTION, SOLUTION INTRAMUSCULAR; INTRAVENOUS
Status: CANCELLED
Start: 2020-09-09

## 2020-07-23 RX ORDER — ONDANSETRON 2 MG/ML
8 INJECTION INTRAMUSCULAR; INTRAVENOUS AS NEEDED
Status: CANCELLED | OUTPATIENT
Start: 2020-10-14

## 2020-07-23 RX ORDER — SODIUM CHLORIDE 9 MG/ML
25 INJECTION, SOLUTION INTRAVENOUS CONTINUOUS
Status: CANCELLED | OUTPATIENT
Start: 2020-08-19

## 2020-07-23 RX ORDER — DIPHENHYDRAMINE HYDROCHLORIDE 50 MG/ML
50 INJECTION, SOLUTION INTRAMUSCULAR; INTRAVENOUS AS NEEDED
Status: CANCELLED
Start: 2020-11-04

## 2020-07-23 RX ORDER — DIPHENHYDRAMINE HYDROCHLORIDE 50 MG/ML
50 INJECTION, SOLUTION INTRAMUSCULAR; INTRAVENOUS AS NEEDED
Status: CANCELLED
Start: 2020-10-14

## 2020-07-23 RX ORDER — HEPARIN 100 UNIT/ML
300-500 SYRINGE INTRAVENOUS AS NEEDED
Status: CANCELLED
Start: 2020-09-09

## 2020-07-23 RX ORDER — HEPARIN 100 UNIT/ML
300-500 SYRINGE INTRAVENOUS AS NEEDED
Status: CANCELLED
Start: 2020-11-04

## 2020-07-23 RX ORDER — ACETAMINOPHEN 325 MG/1
650 TABLET ORAL AS NEEDED
Status: CANCELLED
Start: 2020-09-16

## 2020-07-23 RX ORDER — DIPHENHYDRAMINE HYDROCHLORIDE 50 MG/ML
25 INJECTION, SOLUTION INTRAMUSCULAR; INTRAVENOUS
Status: CANCELLED
Start: 2020-07-29

## 2020-07-23 RX ORDER — HYDROCORTISONE SODIUM SUCCINATE 100 MG/2ML
100 INJECTION, POWDER, FOR SOLUTION INTRAMUSCULAR; INTRAVENOUS AS NEEDED
Status: CANCELLED | OUTPATIENT
Start: 2020-07-29

## 2020-07-23 RX ORDER — DIPHENHYDRAMINE HYDROCHLORIDE 50 MG/ML
50 INJECTION, SOLUTION INTRAMUSCULAR; INTRAVENOUS AS NEEDED
Status: CANCELLED
Start: 2020-09-02

## 2020-07-23 RX ORDER — EPINEPHRINE 1 MG/ML
0.3 INJECTION, SOLUTION, CONCENTRATE INTRAVENOUS AS NEEDED
Status: CANCELLED | OUTPATIENT
Start: 2020-09-09

## 2020-07-23 RX ORDER — DIPHENHYDRAMINE HYDROCHLORIDE 50 MG/ML
25 INJECTION, SOLUTION INTRAMUSCULAR; INTRAVENOUS
Status: CANCELLED
Start: 2020-09-30

## 2020-07-23 RX ORDER — SODIUM CHLORIDE 9 MG/ML
10 INJECTION INTRAMUSCULAR; INTRAVENOUS; SUBCUTANEOUS AS NEEDED
Status: CANCELLED | OUTPATIENT
Start: 2020-11-11

## 2020-07-23 RX ORDER — LORAZEPAM 2 MG/ML
0.5 INJECTION INTRAMUSCULAR
Status: CANCELLED
Start: 2020-11-04

## 2020-07-23 RX ORDER — DEXAMETHASONE SODIUM PHOSPHATE 100 MG/10ML
10 INJECTION INTRAMUSCULAR; INTRAVENOUS ONCE
Status: CANCELLED
Start: 2020-11-11

## 2020-07-23 RX ORDER — ACETAMINOPHEN 325 MG/1
650 TABLET ORAL AS NEEDED
Status: CANCELLED
Start: 2020-10-14

## 2020-07-23 RX ORDER — DIPHENHYDRAMINE HYDROCHLORIDE 50 MG/ML
25 INJECTION, SOLUTION INTRAMUSCULAR; INTRAVENOUS AS NEEDED
Status: CANCELLED
Start: 2020-08-19

## 2020-07-23 RX ORDER — ONDANSETRON 2 MG/ML
8 INJECTION INTRAMUSCULAR; INTRAVENOUS AS NEEDED
Status: CANCELLED | OUTPATIENT
Start: 2020-09-16

## 2020-07-23 RX ORDER — CIPROFLOXACIN 250 MG/1
250 TABLET, FILM COATED ORAL 2 TIMES DAILY
Qty: 14 TAB | Refills: 1 | Status: SHIPPED | OUTPATIENT
Start: 2020-07-23 | End: 2020-09-08 | Stop reason: ALTCHOICE

## 2020-07-23 RX ORDER — ACETAMINOPHEN 325 MG/1
650 TABLET ORAL AS NEEDED
Status: CANCELLED
Start: 2020-10-21

## 2020-07-23 RX ORDER — EPINEPHRINE 1 MG/ML
0.3 INJECTION, SOLUTION, CONCENTRATE INTRAVENOUS AS NEEDED
Status: CANCELLED | OUTPATIENT
Start: 2020-09-02

## 2020-07-23 RX ORDER — DIPHENHYDRAMINE HYDROCHLORIDE 50 MG/ML
25 INJECTION, SOLUTION INTRAMUSCULAR; INTRAVENOUS
Status: CANCELLED
Start: 2020-11-11

## 2020-07-23 RX ORDER — DIPHENHYDRAMINE HYDROCHLORIDE 50 MG/ML
25 INJECTION, SOLUTION INTRAMUSCULAR; INTRAVENOUS AS NEEDED
Status: CANCELLED
Start: 2020-10-21

## 2020-07-23 RX ORDER — EPINEPHRINE 1 MG/ML
0.3 INJECTION, SOLUTION, CONCENTRATE INTRAVENOUS AS NEEDED
Status: CANCELLED | OUTPATIENT
Start: 2020-11-04

## 2020-07-23 RX ORDER — DIPHENHYDRAMINE HYDROCHLORIDE 50 MG/ML
25 INJECTION, SOLUTION INTRAMUSCULAR; INTRAVENOUS AS NEEDED
Status: CANCELLED
Start: 2020-08-05

## 2020-07-23 RX ORDER — DIPHENHYDRAMINE HYDROCHLORIDE 50 MG/ML
50 INJECTION, SOLUTION INTRAMUSCULAR; INTRAVENOUS AS NEEDED
Status: CANCELLED
Start: 2020-08-19

## 2020-07-23 RX ORDER — HEPARIN 100 UNIT/ML
300-500 SYRINGE INTRAVENOUS AS NEEDED
Status: CANCELLED
Start: 2020-08-19

## 2020-07-23 RX ORDER — EPINEPHRINE 1 MG/ML
0.3 INJECTION, SOLUTION, CONCENTRATE INTRAVENOUS AS NEEDED
Status: CANCELLED | OUTPATIENT
Start: 2020-09-16

## 2020-07-23 RX ORDER — DEXAMETHASONE SODIUM PHOSPHATE 100 MG/10ML
10 INJECTION INTRAMUSCULAR; INTRAVENOUS ONCE
Status: CANCELLED
Start: 2020-10-21

## 2020-07-23 RX ORDER — DIPHENHYDRAMINE HYDROCHLORIDE 50 MG/ML
50 INJECTION, SOLUTION INTRAMUSCULAR; INTRAVENOUS AS NEEDED
Status: CANCELLED
Start: 2020-09-09

## 2020-07-23 RX ORDER — DIPHENHYDRAMINE HYDROCHLORIDE 50 MG/ML
50 INJECTION, SOLUTION INTRAMUSCULAR; INTRAVENOUS AS NEEDED
Status: CANCELLED
Start: 2020-10-21

## 2020-07-23 RX ORDER — DIPHENHYDRAMINE HYDROCHLORIDE 50 MG/ML
25 INJECTION, SOLUTION INTRAMUSCULAR; INTRAVENOUS AS NEEDED
Status: CANCELLED
Start: 2020-11-04

## 2020-07-23 RX ORDER — SODIUM CHLORIDE 9 MG/ML
25 INJECTION, SOLUTION INTRAVENOUS CONTINUOUS
Status: CANCELLED | OUTPATIENT
Start: 2020-10-14

## 2020-07-23 RX ORDER — ACETAMINOPHEN 325 MG/1
650 TABLET ORAL AS NEEDED
Status: CANCELLED
Start: 2020-11-11

## 2020-07-23 RX ORDER — SODIUM CHLORIDE 9 MG/ML
25 INJECTION, SOLUTION INTRAVENOUS CONTINUOUS
Status: CANCELLED | OUTPATIENT
Start: 2020-09-02

## 2020-07-23 RX ORDER — ALBUTEROL SULFATE 0.83 MG/ML
2.5 SOLUTION RESPIRATORY (INHALATION) AS NEEDED
Status: CANCELLED
Start: 2020-09-09

## 2020-07-23 RX ORDER — ONDANSETRON 2 MG/ML
8 INJECTION INTRAMUSCULAR; INTRAVENOUS AS NEEDED
Status: CANCELLED | OUTPATIENT
Start: 2020-11-11

## 2020-07-23 RX ORDER — DIPHENHYDRAMINE HYDROCHLORIDE 50 MG/ML
25 INJECTION, SOLUTION INTRAMUSCULAR; INTRAVENOUS
Status: CANCELLED
Start: 2020-08-05

## 2020-07-23 RX ORDER — DEXAMETHASONE SODIUM PHOSPHATE 100 MG/10ML
10 INJECTION INTRAMUSCULAR; INTRAVENOUS ONCE
Status: CANCELLED
Start: 2020-10-14

## 2020-07-23 RX ORDER — SODIUM CHLORIDE 0.9 % (FLUSH) 0.9 %
10 SYRINGE (ML) INJECTION AS NEEDED
Status: CANCELLED
Start: 2020-08-05

## 2020-07-23 RX ORDER — SODIUM CHLORIDE 9 MG/ML
25 INJECTION, SOLUTION INTRAVENOUS CONTINUOUS
Status: CANCELLED | OUTPATIENT
Start: 2020-07-29

## 2020-07-23 RX ORDER — ONDANSETRON 2 MG/ML
8 INJECTION INTRAMUSCULAR; INTRAVENOUS ONCE
Status: CANCELLED | OUTPATIENT
Start: 2020-11-04

## 2020-07-23 RX ORDER — SODIUM CHLORIDE 0.9 % (FLUSH) 0.9 %
10 SYRINGE (ML) INJECTION AS NEEDED
Status: CANCELLED
Start: 2020-09-30

## 2020-07-23 RX ORDER — ONDANSETRON 2 MG/ML
8 INJECTION INTRAMUSCULAR; INTRAVENOUS ONCE
Status: CANCELLED | OUTPATIENT
Start: 2020-08-19

## 2020-07-23 RX ORDER — LORAZEPAM 2 MG/ML
0.5 INJECTION INTRAMUSCULAR
Status: CANCELLED
Start: 2020-09-09

## 2020-07-23 RX ORDER — HYDROCORTISONE SODIUM SUCCINATE 100 MG/2ML
100 INJECTION, POWDER, FOR SOLUTION INTRAMUSCULAR; INTRAVENOUS AS NEEDED
Status: CANCELLED | OUTPATIENT
Start: 2020-10-14

## 2020-07-23 RX ORDER — SODIUM CHLORIDE 9 MG/ML
10 INJECTION INTRAMUSCULAR; INTRAVENOUS; SUBCUTANEOUS AS NEEDED
Status: CANCELLED | OUTPATIENT
Start: 2020-10-21

## 2020-07-23 RX ORDER — SODIUM CHLORIDE 9 MG/ML
10 INJECTION INTRAMUSCULAR; INTRAVENOUS; SUBCUTANEOUS AS NEEDED
Status: CANCELLED | OUTPATIENT
Start: 2020-09-09

## 2020-07-23 RX ORDER — SODIUM CHLORIDE 0.9 % (FLUSH) 0.9 %
10 SYRINGE (ML) INJECTION AS NEEDED
Status: CANCELLED
Start: 2020-11-18

## 2020-07-23 RX ORDER — LORAZEPAM 2 MG/ML
0.5 INJECTION INTRAMUSCULAR
Status: CANCELLED
Start: 2020-09-30

## 2020-07-23 RX ORDER — HYDROCORTISONE SODIUM SUCCINATE 100 MG/2ML
100 INJECTION, POWDER, FOR SOLUTION INTRAMUSCULAR; INTRAVENOUS AS NEEDED
Status: CANCELLED | OUTPATIENT
Start: 2020-08-19

## 2020-07-23 RX ORDER — ALBUTEROL SULFATE 0.83 MG/ML
2.5 SOLUTION RESPIRATORY (INHALATION) AS NEEDED
Status: CANCELLED
Start: 2020-09-30

## 2020-07-23 RX ORDER — SODIUM CHLORIDE 9 MG/ML
25 INJECTION, SOLUTION INTRAVENOUS CONTINUOUS
Status: CANCELLED | OUTPATIENT
Start: 2020-09-16

## 2020-07-23 RX ORDER — EPINEPHRINE 1 MG/ML
0.3 INJECTION, SOLUTION, CONCENTRATE INTRAVENOUS AS NEEDED
Status: CANCELLED | OUTPATIENT
Start: 2020-11-18

## 2020-07-23 RX ORDER — SODIUM CHLORIDE 9 MG/ML
25 INJECTION, SOLUTION INTRAVENOUS CONTINUOUS
Status: CANCELLED | OUTPATIENT
Start: 2020-08-05

## 2020-07-23 RX ORDER — ONDANSETRON 2 MG/ML
8 INJECTION INTRAMUSCULAR; INTRAVENOUS AS NEEDED
Status: CANCELLED | OUTPATIENT
Start: 2020-09-02

## 2020-07-23 RX ORDER — DIPHENHYDRAMINE HYDROCHLORIDE 50 MG/ML
25 INJECTION, SOLUTION INTRAMUSCULAR; INTRAVENOUS AS NEEDED
Status: CANCELLED
Start: 2020-11-11

## 2020-07-23 RX ORDER — EPINEPHRINE 1 MG/ML
0.3 INJECTION, SOLUTION, CONCENTRATE INTRAVENOUS AS NEEDED
Status: CANCELLED | OUTPATIENT
Start: 2020-10-14

## 2020-07-23 RX ORDER — ACETAMINOPHEN 325 MG/1
650 TABLET ORAL AS NEEDED
Status: CANCELLED
Start: 2020-07-29

## 2020-07-23 RX ORDER — HEPARIN 100 UNIT/ML
300-500 SYRINGE INTRAVENOUS AS NEEDED
Status: CANCELLED
Start: 2020-09-02

## 2020-07-23 RX ORDER — SODIUM CHLORIDE 9 MG/ML
25 INJECTION, SOLUTION INTRAVENOUS CONTINUOUS
Status: CANCELLED | OUTPATIENT
Start: 2020-11-18

## 2020-07-23 RX ORDER — ACETAMINOPHEN 325 MG/1
650 TABLET ORAL AS NEEDED
Status: CANCELLED
Start: 2020-11-04

## 2020-07-23 RX ORDER — SODIUM CHLORIDE 0.9 % (FLUSH) 0.9 %
10 SYRINGE (ML) INJECTION AS NEEDED
Status: CANCELLED
Start: 2020-11-11

## 2020-07-23 RX ORDER — ACETAMINOPHEN 325 MG/1
650 TABLET ORAL AS NEEDED
Status: CANCELLED
Start: 2020-09-30

## 2020-07-23 RX ORDER — HYDROCORTISONE SODIUM SUCCINATE 100 MG/2ML
100 INJECTION, POWDER, FOR SOLUTION INTRAMUSCULAR; INTRAVENOUS AS NEEDED
Status: CANCELLED | OUTPATIENT
Start: 2020-09-16

## 2020-07-23 RX ORDER — DEXAMETHASONE SODIUM PHOSPHATE 100 MG/10ML
10 INJECTION INTRAMUSCULAR; INTRAVENOUS ONCE
Status: CANCELLED
Start: 2020-09-09

## 2020-07-23 RX ORDER — DIPHENHYDRAMINE HYDROCHLORIDE 50 MG/ML
25 INJECTION, SOLUTION INTRAMUSCULAR; INTRAVENOUS AS NEEDED
Status: CANCELLED
Start: 2020-09-16

## 2020-07-23 RX ORDER — DIPHENHYDRAMINE HYDROCHLORIDE 50 MG/ML
50 INJECTION, SOLUTION INTRAMUSCULAR; INTRAVENOUS AS NEEDED
Status: CANCELLED
Start: 2020-08-05

## 2020-07-23 RX ORDER — LORAZEPAM 2 MG/ML
0.5 INJECTION INTRAMUSCULAR
Status: CANCELLED
Start: 2020-08-19

## 2020-07-23 RX ORDER — LORAZEPAM 2 MG/ML
0.5 INJECTION INTRAMUSCULAR
Status: CANCELLED
Start: 2020-10-21

## 2020-07-23 RX ORDER — SODIUM CHLORIDE 0.9 % (FLUSH) 0.9 %
10 SYRINGE (ML) INJECTION AS NEEDED
Status: CANCELLED
Start: 2020-10-21

## 2020-07-23 RX ORDER — SODIUM CHLORIDE 9 MG/ML
25 INJECTION, SOLUTION INTRAVENOUS CONTINUOUS
Status: CANCELLED | OUTPATIENT
Start: 2020-10-21

## 2020-07-23 RX ORDER — SODIUM CHLORIDE 0.9 % (FLUSH) 0.9 %
10 SYRINGE (ML) INJECTION AS NEEDED
Status: CANCELLED
Start: 2020-07-29

## 2020-07-23 RX ORDER — ACETAMINOPHEN 325 MG/1
650 TABLET ORAL AS NEEDED
Status: CANCELLED
Start: 2020-08-05

## 2020-07-23 RX ORDER — SODIUM CHLORIDE 0.9 % (FLUSH) 0.9 %
10 SYRINGE (ML) INJECTION AS NEEDED
Status: CANCELLED
Start: 2020-08-19

## 2020-07-23 RX ORDER — DIPHENHYDRAMINE HYDROCHLORIDE 50 MG/ML
25 INJECTION, SOLUTION INTRAMUSCULAR; INTRAVENOUS
Status: CANCELLED
Start: 2020-10-14

## 2020-07-23 RX ORDER — SODIUM CHLORIDE 9 MG/ML
10 INJECTION INTRAMUSCULAR; INTRAVENOUS; SUBCUTANEOUS AS NEEDED
Status: CANCELLED | OUTPATIENT
Start: 2020-07-29

## 2020-07-23 RX ORDER — ONDANSETRON 2 MG/ML
8 INJECTION INTRAMUSCULAR; INTRAVENOUS ONCE
Status: CANCELLED | OUTPATIENT
Start: 2020-08-05

## 2020-07-23 RX ORDER — ONDANSETRON 2 MG/ML
8 INJECTION INTRAMUSCULAR; INTRAVENOUS ONCE
Status: CANCELLED | OUTPATIENT
Start: 2020-11-11

## 2020-07-23 RX ORDER — SODIUM CHLORIDE 9 MG/ML
10 INJECTION INTRAMUSCULAR; INTRAVENOUS; SUBCUTANEOUS AS NEEDED
Status: CANCELLED | OUTPATIENT
Start: 2020-09-16

## 2020-07-23 RX ORDER — ONDANSETRON 2 MG/ML
8 INJECTION INTRAMUSCULAR; INTRAVENOUS AS NEEDED
Status: CANCELLED | OUTPATIENT
Start: 2020-07-29

## 2020-07-23 RX ORDER — HYDROCORTISONE SODIUM SUCCINATE 100 MG/2ML
100 INJECTION, POWDER, FOR SOLUTION INTRAMUSCULAR; INTRAVENOUS AS NEEDED
Status: CANCELLED | OUTPATIENT
Start: 2020-11-04

## 2020-07-23 RX ORDER — DIPHENHYDRAMINE HYDROCHLORIDE 50 MG/ML
25 INJECTION, SOLUTION INTRAMUSCULAR; INTRAVENOUS
Status: CANCELLED
Start: 2020-08-19

## 2020-07-23 RX ORDER — ONDANSETRON 2 MG/ML
8 INJECTION INTRAMUSCULAR; INTRAVENOUS ONCE
Status: CANCELLED | OUTPATIENT
Start: 2020-09-30

## 2020-07-23 RX ORDER — HEPARIN 100 UNIT/ML
300-500 SYRINGE INTRAVENOUS AS NEEDED
Status: CANCELLED
Start: 2020-09-30

## 2020-07-23 RX ORDER — ALBUTEROL SULFATE 0.83 MG/ML
2.5 SOLUTION RESPIRATORY (INHALATION) AS NEEDED
Status: CANCELLED
Start: 2020-07-29

## 2020-07-23 RX ORDER — HEPARIN 100 UNIT/ML
300-500 SYRINGE INTRAVENOUS AS NEEDED
Status: CANCELLED
Start: 2020-10-14

## 2020-07-23 RX ORDER — LORAZEPAM 2 MG/ML
0.5 INJECTION INTRAMUSCULAR
Status: CANCELLED
Start: 2020-11-18

## 2020-07-23 RX ORDER — DIPHENHYDRAMINE HYDROCHLORIDE 50 MG/ML
25 INJECTION, SOLUTION INTRAMUSCULAR; INTRAVENOUS AS NEEDED
Status: CANCELLED
Start: 2020-09-30

## 2020-07-23 RX ORDER — ALBUTEROL SULFATE 0.83 MG/ML
2.5 SOLUTION RESPIRATORY (INHALATION) AS NEEDED
Status: CANCELLED
Start: 2020-10-21

## 2020-07-23 RX ORDER — DIPHENHYDRAMINE HYDROCHLORIDE 50 MG/ML
25 INJECTION, SOLUTION INTRAMUSCULAR; INTRAVENOUS
Status: CANCELLED
Start: 2020-10-21

## 2020-07-23 RX ORDER — ONDANSETRON 2 MG/ML
8 INJECTION INTRAMUSCULAR; INTRAVENOUS AS NEEDED
Status: CANCELLED | OUTPATIENT
Start: 2020-09-09

## 2020-07-23 RX ORDER — DIPHENHYDRAMINE HYDROCHLORIDE 50 MG/ML
25 INJECTION, SOLUTION INTRAMUSCULAR; INTRAVENOUS AS NEEDED
Status: CANCELLED
Start: 2020-09-02

## 2020-07-23 RX ORDER — HYDROCORTISONE SODIUM SUCCINATE 100 MG/2ML
100 INJECTION, POWDER, FOR SOLUTION INTRAMUSCULAR; INTRAVENOUS AS NEEDED
Status: CANCELLED | OUTPATIENT
Start: 2020-09-02

## 2020-07-23 RX ORDER — ONDANSETRON 2 MG/ML
8 INJECTION INTRAMUSCULAR; INTRAVENOUS ONCE
Status: CANCELLED | OUTPATIENT
Start: 2020-09-16

## 2020-07-23 RX ORDER — ONDANSETRON 2 MG/ML
8 INJECTION INTRAMUSCULAR; INTRAVENOUS ONCE
Status: CANCELLED | OUTPATIENT
Start: 2020-10-14

## 2020-07-23 RX ORDER — ACETAMINOPHEN 325 MG/1
650 TABLET ORAL AS NEEDED
Status: CANCELLED
Start: 2020-09-09

## 2020-07-23 RX ORDER — ALBUTEROL SULFATE 0.83 MG/ML
2.5 SOLUTION RESPIRATORY (INHALATION) AS NEEDED
Status: CANCELLED
Start: 2020-11-04

## 2020-07-23 RX ORDER — DEXAMETHASONE SODIUM PHOSPHATE 100 MG/10ML
10 INJECTION INTRAMUSCULAR; INTRAVENOUS ONCE
Status: CANCELLED
Start: 2020-07-29

## 2020-07-23 RX ORDER — HEPARIN 100 UNIT/ML
300-500 SYRINGE INTRAVENOUS AS NEEDED
Status: CANCELLED
Start: 2020-10-21

## 2020-07-23 RX ORDER — LORAZEPAM 2 MG/ML
0.5 INJECTION INTRAMUSCULAR
Status: CANCELLED
Start: 2020-08-05

## 2020-07-23 RX ORDER — EPINEPHRINE 1 MG/ML
0.3 INJECTION, SOLUTION, CONCENTRATE INTRAVENOUS AS NEEDED
Status: CANCELLED | OUTPATIENT
Start: 2020-10-21

## 2020-07-23 RX ORDER — ACETAMINOPHEN 325 MG/1
650 TABLET ORAL AS NEEDED
Status: CANCELLED
Start: 2020-09-02

## 2020-07-23 RX ORDER — NYSTATIN 100000 [USP'U]/G
1 POWDER TOPICAL 3 TIMES DAILY
Qty: 60 G | Refills: 1 | Status: SHIPPED | OUTPATIENT
Start: 2020-07-23 | End: 2021-08-04 | Stop reason: SDUPTHER

## 2020-07-23 RX ORDER — DIPHENHYDRAMINE HYDROCHLORIDE 50 MG/ML
25 INJECTION, SOLUTION INTRAMUSCULAR; INTRAVENOUS
Status: CANCELLED
Start: 2020-11-04

## 2020-07-23 RX ORDER — SODIUM CHLORIDE 9 MG/ML
10 INJECTION INTRAMUSCULAR; INTRAVENOUS; SUBCUTANEOUS AS NEEDED
Status: CANCELLED | OUTPATIENT
Start: 2020-10-14

## 2020-07-23 RX ORDER — DIPHENHYDRAMINE HYDROCHLORIDE 50 MG/ML
25 INJECTION, SOLUTION INTRAMUSCULAR; INTRAVENOUS
Status: CANCELLED
Start: 2020-09-16

## 2020-07-23 RX ORDER — LORAZEPAM 2 MG/ML
0.5 INJECTION INTRAMUSCULAR
Status: CANCELLED
Start: 2020-10-14

## 2020-07-23 RX ORDER — SODIUM CHLORIDE 9 MG/ML
25 INJECTION, SOLUTION INTRAVENOUS CONTINUOUS
Status: CANCELLED | OUTPATIENT
Start: 2020-09-09

## 2020-07-23 RX ORDER — DIPHENHYDRAMINE HYDROCHLORIDE 50 MG/ML
25 INJECTION, SOLUTION INTRAMUSCULAR; INTRAVENOUS AS NEEDED
Status: CANCELLED
Start: 2020-07-29

## 2020-07-23 RX ORDER — ONDANSETRON 2 MG/ML
8 INJECTION INTRAMUSCULAR; INTRAVENOUS AS NEEDED
Status: CANCELLED | OUTPATIENT
Start: 2020-11-04

## 2020-07-23 RX ORDER — SODIUM CHLORIDE 9 MG/ML
10 INJECTION INTRAMUSCULAR; INTRAVENOUS; SUBCUTANEOUS AS NEEDED
Status: CANCELLED | OUTPATIENT
Start: 2020-08-05

## 2020-07-23 RX ORDER — SODIUM CHLORIDE 0.9 % (FLUSH) 0.9 %
10 SYRINGE (ML) INJECTION AS NEEDED
Status: CANCELLED
Start: 2020-09-09

## 2020-07-23 RX ORDER — DEXAMETHASONE SODIUM PHOSPHATE 100 MG/10ML
10 INJECTION INTRAMUSCULAR; INTRAVENOUS ONCE
Status: CANCELLED
Start: 2020-08-05

## 2020-07-23 RX ORDER — LORAZEPAM 2 MG/ML
0.5 INJECTION INTRAMUSCULAR
Status: CANCELLED
Start: 2020-11-11

## 2020-07-23 RX ORDER — ALBUTEROL SULFATE 0.83 MG/ML
2.5 SOLUTION RESPIRATORY (INHALATION) AS NEEDED
Status: CANCELLED
Start: 2020-09-16

## 2020-07-23 RX ORDER — DIPHENHYDRAMINE HYDROCHLORIDE 50 MG/ML
25 INJECTION, SOLUTION INTRAMUSCULAR; INTRAVENOUS
Status: CANCELLED
Start: 2020-09-02

## 2020-07-23 RX ORDER — SODIUM CHLORIDE 0.9 % (FLUSH) 0.9 %
10 SYRINGE (ML) INJECTION AS NEEDED
Status: CANCELLED
Start: 2020-10-14

## 2020-07-23 RX ORDER — ONDANSETRON 2 MG/ML
8 INJECTION INTRAMUSCULAR; INTRAVENOUS ONCE
Status: CANCELLED | OUTPATIENT
Start: 2020-11-18

## 2020-07-23 RX ORDER — SODIUM CHLORIDE 9 MG/ML
25 INJECTION, SOLUTION INTRAVENOUS CONTINUOUS
Status: CANCELLED | OUTPATIENT
Start: 2020-11-11

## 2020-07-23 RX ORDER — SODIUM CHLORIDE 0.9 % (FLUSH) 0.9 %
10 SYRINGE (ML) INJECTION AS NEEDED
Status: CANCELLED
Start: 2020-09-02

## 2020-07-23 RX ORDER — DEXAMETHASONE SODIUM PHOSPHATE 100 MG/10ML
10 INJECTION INTRAMUSCULAR; INTRAVENOUS ONCE
Status: CANCELLED
Start: 2020-09-16

## 2020-07-23 RX ORDER — SODIUM CHLORIDE 0.9 % (FLUSH) 0.9 %
10 SYRINGE (ML) INJECTION AS NEEDED
Status: CANCELLED
Start: 2020-11-04

## 2020-07-23 RX ORDER — EPINEPHRINE 1 MG/ML
0.3 INJECTION, SOLUTION, CONCENTRATE INTRAVENOUS AS NEEDED
Status: CANCELLED | OUTPATIENT
Start: 2020-08-05

## 2020-07-23 RX ORDER — HEPARIN 100 UNIT/ML
300-500 SYRINGE INTRAVENOUS AS NEEDED
Status: CANCELLED
Start: 2020-11-18

## 2020-07-23 RX ORDER — HEPARIN 100 UNIT/ML
300-500 SYRINGE INTRAVENOUS AS NEEDED
Status: CANCELLED
Start: 2020-07-29

## 2020-07-23 RX ORDER — HYDROCORTISONE SODIUM SUCCINATE 100 MG/2ML
100 INJECTION, POWDER, FOR SOLUTION INTRAMUSCULAR; INTRAVENOUS AS NEEDED
Status: CANCELLED | OUTPATIENT
Start: 2020-10-21

## 2020-07-23 RX ORDER — EPINEPHRINE 1 MG/ML
0.3 INJECTION, SOLUTION, CONCENTRATE INTRAVENOUS AS NEEDED
Status: CANCELLED | OUTPATIENT
Start: 2020-08-19

## 2020-07-23 RX ORDER — DEXAMETHASONE SODIUM PHOSPHATE 100 MG/10ML
10 INJECTION INTRAMUSCULAR; INTRAVENOUS ONCE
Status: CANCELLED
Start: 2020-11-04

## 2020-07-23 RX ORDER — ONDANSETRON 2 MG/ML
8 INJECTION INTRAMUSCULAR; INTRAVENOUS ONCE
Status: CANCELLED | OUTPATIENT
Start: 2020-09-02

## 2020-07-23 RX ORDER — ALBUTEROL SULFATE 0.83 MG/ML
2.5 SOLUTION RESPIRATORY (INHALATION) AS NEEDED
Status: CANCELLED
Start: 2020-08-19

## 2020-07-23 RX ORDER — ONDANSETRON 2 MG/ML
8 INJECTION INTRAMUSCULAR; INTRAVENOUS AS NEEDED
Status: CANCELLED | OUTPATIENT
Start: 2020-11-18

## 2020-07-23 RX ORDER — HEPARIN 100 UNIT/ML
300-500 SYRINGE INTRAVENOUS AS NEEDED
Status: CANCELLED
Start: 2020-11-11

## 2020-07-23 RX ORDER — ALBUTEROL SULFATE 0.83 MG/ML
2.5 SOLUTION RESPIRATORY (INHALATION) AS NEEDED
Status: CANCELLED
Start: 2020-10-14

## 2020-07-23 RX ORDER — HYDROCORTISONE SODIUM SUCCINATE 100 MG/2ML
100 INJECTION, POWDER, FOR SOLUTION INTRAMUSCULAR; INTRAVENOUS AS NEEDED
Status: CANCELLED | OUTPATIENT
Start: 2020-11-11

## 2020-07-23 RX ORDER — DIPHENHYDRAMINE HYDROCHLORIDE 50 MG/ML
50 INJECTION, SOLUTION INTRAMUSCULAR; INTRAVENOUS AS NEEDED
Status: CANCELLED
Start: 2020-09-16

## 2020-07-23 RX ORDER — SODIUM CHLORIDE 9 MG/ML
25 INJECTION, SOLUTION INTRAVENOUS CONTINUOUS
Status: CANCELLED | OUTPATIENT
Start: 2020-09-30

## 2020-07-23 RX ORDER — SODIUM CHLORIDE 9 MG/ML
10 INJECTION INTRAMUSCULAR; INTRAVENOUS; SUBCUTANEOUS AS NEEDED
Status: CANCELLED | OUTPATIENT
Start: 2020-09-02

## 2020-07-23 RX ORDER — ONDANSETRON 2 MG/ML
8 INJECTION INTRAMUSCULAR; INTRAVENOUS AS NEEDED
Status: CANCELLED | OUTPATIENT
Start: 2020-09-30

## 2020-07-23 RX ORDER — ALBUTEROL SULFATE 0.83 MG/ML
2.5 SOLUTION RESPIRATORY (INHALATION) AS NEEDED
Status: CANCELLED
Start: 2020-11-11

## 2020-07-23 RX ORDER — ALBUTEROL SULFATE 0.83 MG/ML
2.5 SOLUTION RESPIRATORY (INHALATION) AS NEEDED
Status: CANCELLED
Start: 2020-11-18

## 2020-07-23 RX ORDER — SODIUM CHLORIDE 0.9 % (FLUSH) 0.9 %
10 SYRINGE (ML) INJECTION AS NEEDED
Status: CANCELLED
Start: 2020-09-16

## 2020-07-23 RX ORDER — HYDROCORTISONE SODIUM SUCCINATE 100 MG/2ML
100 INJECTION, POWDER, FOR SOLUTION INTRAMUSCULAR; INTRAVENOUS AS NEEDED
Status: CANCELLED | OUTPATIENT
Start: 2020-08-05

## 2020-07-23 RX ORDER — EPINEPHRINE 1 MG/ML
0.3 INJECTION, SOLUTION, CONCENTRATE INTRAVENOUS AS NEEDED
Status: CANCELLED | OUTPATIENT
Start: 2020-11-11

## 2020-07-23 RX ORDER — DEXAMETHASONE SODIUM PHOSPHATE 100 MG/10ML
10 INJECTION INTRAMUSCULAR; INTRAVENOUS ONCE
Status: CANCELLED
Start: 2020-09-02

## 2020-07-23 RX ORDER — LORAZEPAM 2 MG/ML
0.5 INJECTION INTRAMUSCULAR
Status: CANCELLED
Start: 2020-07-29

## 2020-07-23 RX ORDER — HYDROCORTISONE SODIUM SUCCINATE 100 MG/2ML
100 INJECTION, POWDER, FOR SOLUTION INTRAMUSCULAR; INTRAVENOUS AS NEEDED
Status: CANCELLED | OUTPATIENT
Start: 2020-09-30

## 2020-07-23 RX ORDER — PHENAZOPYRIDINE HYDROCHLORIDE 200 MG/1
200 TABLET, FILM COATED ORAL
Qty: 90 TAB | Refills: 1 | Status: SHIPPED | OUTPATIENT
Start: 2020-07-23 | End: 2020-09-08 | Stop reason: ALTCHOICE

## 2020-07-23 RX ORDER — DIPHENHYDRAMINE HYDROCHLORIDE 50 MG/ML
25 INJECTION, SOLUTION INTRAMUSCULAR; INTRAVENOUS
Status: CANCELLED
Start: 2020-11-18

## 2020-07-23 RX ORDER — SODIUM CHLORIDE 9 MG/ML
25 INJECTION, SOLUTION INTRAVENOUS CONTINUOUS
Status: CANCELLED | OUTPATIENT
Start: 2020-11-04

## 2020-07-23 RX ORDER — HEPARIN 100 UNIT/ML
300-500 SYRINGE INTRAVENOUS AS NEEDED
Status: CANCELLED
Start: 2020-08-05

## 2020-07-23 RX ORDER — ACETAMINOPHEN 325 MG/1
650 TABLET ORAL AS NEEDED
Status: CANCELLED
Start: 2020-11-18

## 2020-07-23 RX ORDER — HEPARIN 100 UNIT/ML
300-500 SYRINGE INTRAVENOUS AS NEEDED
Status: CANCELLED
Start: 2020-09-16

## 2020-07-23 RX ORDER — DEXAMETHASONE SODIUM PHOSPHATE 100 MG/10ML
10 INJECTION INTRAMUSCULAR; INTRAVENOUS ONCE
Status: CANCELLED
Start: 2020-11-18

## 2020-07-23 RX ORDER — ONDANSETRON 2 MG/ML
8 INJECTION INTRAMUSCULAR; INTRAVENOUS ONCE
Status: CANCELLED | OUTPATIENT
Start: 2020-09-09

## 2020-07-23 RX ORDER — ONDANSETRON 2 MG/ML
8 INJECTION INTRAMUSCULAR; INTRAVENOUS AS NEEDED
Status: CANCELLED | OUTPATIENT
Start: 2020-08-05

## 2020-07-23 RX ORDER — DIPHENHYDRAMINE HYDROCHLORIDE 50 MG/ML
25 INJECTION, SOLUTION INTRAMUSCULAR; INTRAVENOUS AS NEEDED
Status: CANCELLED
Start: 2020-11-18

## 2020-07-23 RX ORDER — EPINEPHRINE 1 MG/ML
0.3 INJECTION, SOLUTION, CONCENTRATE INTRAVENOUS AS NEEDED
Status: CANCELLED | OUTPATIENT
Start: 2020-09-30

## 2020-07-23 RX ORDER — SODIUM CHLORIDE 9 MG/ML
10 INJECTION INTRAMUSCULAR; INTRAVENOUS; SUBCUTANEOUS AS NEEDED
Status: CANCELLED | OUTPATIENT
Start: 2020-08-19

## 2020-07-23 NOTE — PROGRESS NOTES
Reason for Visit:   Domingo Miranda a 72 y. o. female who is seen for pancreatic adenocarcinoma     Treatment History:   Dx: Pancreatic Adenocarcinoma--May 2020--[id number]--[place] of splenic vein and superior mesenteric vein  Tx: mFOLFIRINOX--first cycle 5/26/2020, second cycle 6/10/2020, dose reduced 15% cycle 3 on 6/30/2020--delayed due to severe side effects. Goal: Prolong life--Palliative    History of Present Illness:   Burning with urination, decreased pain but still using hydromorphone 1mg prn. Wants to restart--agrees with [place]/Abraxane    Past Medical History:   Diagnosis Date    Adenocarcinoma of pancreas ([facility] [address] 05/13/2020    Dr Ariel Estes biopsy via EUS    [facility]     GERD (gastroesophageal reflux disease)     Hernia of abdominal cavity     Subxyphoid hernia    Hypertension     Inflammatory polyarthropathy (HCC)     Joint pain     Joint swelling     Menopause     Ocular nevus of left eye     Pancreatitis     Tracheal stenosis       Past Surgical History:   Procedure Laterality Date    HX CARPAL TUNNEL RELEASE Bilateral     HX COLONOSCOPY      HX HYSTERECTOMY      HX KNEE ARTHROSCOPY Right     HX KNEE REPLACEMENT Right     partial    HX LAP CHOLECYSTECTOMY      HX TONSILLECTOMY      HX VASCULAR ACCESS        Social History     Tobacco Use    Smoking status: Never Smoker    Smokeless tobacco: Never Used   Substance Use Topics    Alcohol use: No     Alcohol/week: 0.0 standard drinks     Frequency: Never     Binge frequency: Never      Family History   Problem Relation Age of Onset    Heart Disease Mother     Heart Attack Mother     Cancer Father         lung    Diabetes Sister      Current Outpatient Medications   Medication Sig    promethazine (PHENERGAN) 25 mg tablet Take 1 Tab by mouth every six (6) hours as needed for Nausea.  megestroL (MEGACE) 40 mg tablet Take 1 Tab by mouth two (2) times a day.     lipase-protease-amylase (Creon) 12,000-38,000 -60,000 unit capsule Take 2 Caps by mouth four (4) times daily (with meals).  potassium bicarb-citric acid (EFFER-K) 20 mEq tablet Take 1 Tab by mouth two (2) times daily (with meals).  HYDROmorphone (DILAUDID) 2 mg tablet Take 3 Tabs by mouth every four (4) hours as needed for Pain for up to 30 days. Max Daily Amount: 36 mg.    LORazepam (ATIVAN) 1 mg tablet Take 1 Tab by mouth every six (6) hours as needed for Anxiety. Max Daily Amount: 4 mg. Indications: nausea and vomiting caused by cancer drugs    ondansetron (ZOFRAN ODT) 4 mg disintegrating tablet Take 1 Tab by mouth every eight (8) hours as needed for Nausea or Vomiting.  diphenoxylate-atropine (LomotiL) 2.5-0.025 mg per tablet Take 2 Tabs by mouth four (4) times daily as needed for Diarrhea. Max Daily Amount: 8 Tabs.  methylphenidate HCl (RITALIN) 5 mg tablet Take 1 Tab by mouth two (2) times a day. Max Daily Amount: 10 mg.    diphenhydrAMINE (BENADRYL) 25 mg tablet Take 25 mg by mouth every six (6) hours as needed.  lidocaine-prilocaine (EMLA) topical cream Apply  to affected area as needed for Pain.  prochlorperazine (COMPAZINE) 10 mg tablet Take 0.5 Tabs by mouth every six (6) hours as needed for Nausea.     Blood-Glucose Meter,Continuous (Dexcom G6 ) misc Use as directed to monitor blood glucose as directed    Blood-Glucose Sensor (Dexcom G6 Sensor) fadi Use as directed to monitor blood glucose as directed    Blood-Glucose Transmitter (Dexcom G6 Transmitter) fadi Use as directed to monitor blood glucose as directed    insulin lispro (HumaLOG KwikPen Insulin) 100 unit/mL kwikpen Inject 25 units into the skin with breakfast, 15 units with lunch (if BG is >200 and eating carbs with meal), and 30 units with dinner (Patient taking differently: Inject 25 units into the skin with breakfast, 15 units with lunch (if BG is >200 and eating carbs with meal), and 30 units with dinner  Patient is taking based on her BS levels at Breakfast, Lunch and Dinner.)    insulin glargine U-300 conc (Toujeo SoloStar U-300 Insulin) 300 unit/mL (1.5 mL) inpn pen 55 units every bedtime    omeprazole (PRILOSEC) 40 mg capsule Take 1 Cap by mouth daily.  acetaminophen (Acetaminophen Extra Strength) 500 mg tablet Take 1,000 mg by mouth two (2) times a day.  Needle, Disp, ndle 1 Each by Does Not Apply route two (2) times daily as needed for Other.  carvedilol (COREG) 25 mg tablet TAKE 1 TABLET BY MOUTH TWICE DAILY (Patient taking differently: 1/2 dose twice daily  5/28/2020 patient is taking her BP if BP is low she is only taking 1/2 a tab, if high she is taking the 25mg.)    aspirin delayed-release 81 mg tablet Take  by mouth daily. No current facility-administered medications for this visit. No Known Allergies     Review of Systems: A complete review of systems was obtained, negative except as described above. Physical Exam:     Visit Vitals  /65   Pulse 87   Temp 97.5 °F (36.4 °C) (Skin)   Resp 18   Ht 5' 4\" (1.626 m)   Wt 204 lb 12.8 oz (92.9 kg)   SpO2 95%   BMI 35.15 kg/m²     ECOG PS: 1  General: No distress  Eyes: PERRLA, anicteric sclerae  HENT: Atraumatic, OP clear  Neck: Supple  Lymphatic: No cervical, supraclavicular, or inguinal adenopathy  Respiratory: CTAB, normal respiratory effort  CV: Normal rate, regular rhythm, no murmurs, no peripheral edema  GI: Soft, nontender, nondistended, no masses, no hepatomegaly, no splenomegaly  MS: Normal gait and station. Digits without clubbing or cyanosis. Skin: No rashes, ecchymoses, or petechiae. Normal temperature, turgor, and texture. Psych: Alert, oriented, appropriate affect, normal judgment/insight    Results:     Lab Results   Component Value Date/Time    WBC 12.3 (H) 07/17/2020 01:30 PM    HGB 10.5 (L) 07/17/2020 01:30 PM    HCT 32.2 (L) 07/17/2020 01:30 PM    PLATELET 180 09/46/9261 01:30 PM    MCV 90.4 07/17/2020 01:30 PM    ABS.  NEUTROPHILS 8.7 (H) 07/17/2020 01:30 PM     Lab Results   Component Value Date/Time    Sodium 140 07/17/2020 01:30 PM    Potassium 3.2 (L) 07/17/2020 01:30 PM    Chloride 102 07/17/2020 01:30 PM    CO2 26 07/17/2020 01:30 PM    Glucose 172 (H) 07/17/2020 01:30 PM    BUN 9 07/17/2020 01:30 PM    Creatinine 1.18 (H) 07/17/2020 01:30 PM    GFR est AA 56 (L) 07/17/2020 01:30 PM    GFR est non-AA 46 (L) 07/17/2020 01:30 PM    Calcium 8.9 07/17/2020 01:30 PM    Glucose (POC) 192 (H) 07/15/2020 11:56 AM    Creatinine (POC) 0.7 02/06/2013 10:21 AM     Lab Results   Component Value Date/Time    Bilirubin, total 0.6 07/05/2020 09:00 PM    ALT (SGPT) 38 07/05/2020 09:00 PM    Alk. phosphatase 143 (H) 07/05/2020 09:00 PM    Protein, total 7.0 07/05/2020 09:00 PM    Albumin 3.2 (L) 07/05/2020 09:00 PM    Globulin 3.8 07/05/2020 09:00 PM       CT A/P 4/30/2020  IMPRESSION: Suspect neoplastic pancreatic mass versus atypical focal  pancreatitis with splenic and superior mesenteric vein occlusion. Consider  further evaluation with endoscopic ultrasound at which time biopsy could  potentially be performed.     CT Chest 5/21/2020  IMPRESSION:  1. No evidence of pulmonary metastatic disease. No evidence of mediastinal or  hilar lymphadenopathy. No pleural effusions identified. 2. No definite evidence of central pulmonary embolic disease. 3. Presence of a mass lesion involving the pancreatic head/body. 4. Evidence of fatty infiltration involving the liver. Presence of focal  low-density areas within the liver compatible with cysts. Surgical absence of  the gallbladder.     CT A/P 6/19/2020  IMPRESSION:  Pancreatitis with ascites favored. Discrete pancreatic mass is not identified. No biliary dilatation or definite arterial or venous thrombosis. No liver  metastases.   Fat-containing ventral hernia  Nonobstructing left renal calculus  Retrolisthesis of L2 and L3.     Path: 5/8/2020  CYTOLOGIC INTERPRETATION:   Pancreas, EUS-guided fine needle aspiration and cell blocks: Adenocarcinoma      Assessment:   1) Pancreatic Adenocarcinoma--Unresectable  2) Neoplasm Pain  3) Fatigue  4) Nausea  5) DM  6) Nutrition  7) Diarrhea        Plan:   1) Continue with mFOLFIRINOX--Stop--switched to gem/abraxane.  BRCA pending.      2) S/p celiac block--pain better and controlled with hydromorphone.  Future options include repeat celiac block, methadone, fentanyl.       3) Methylphenidate 5mg bid--watch for anxiety and insomnia     4) Compazine controlling--switch to olanzapine 5mg bid or 10mg once daily if becomes difficult to manage with compazine.       5) Defer to Dr Joel Coats for further management.  Pharmacy may have ideas.       6) On pancreatic enzymes--weight has stabilized, may need nutrition consult.     7) Chemo related--hopefully with not recurr.         Signed By: Shakir Casarez MD

## 2020-07-24 ENCOUNTER — HOME CARE VISIT (OUTPATIENT)
Dept: SCHEDULING | Facility: HOME HEALTH | Age: 65
End: 2020-07-24
Payer: MEDICARE

## 2020-07-24 ENCOUNTER — HOME CARE VISIT (OUTPATIENT)
Dept: HOME HEALTH SERVICES | Facility: HOME HEALTH | Age: 65
End: 2020-07-24
Payer: MEDICARE

## 2020-07-24 VITALS
DIASTOLIC BLOOD PRESSURE: 72 MMHG | TEMPERATURE: 97.2 F | BODY MASS INDEX: 35.02 KG/M2 | OXYGEN SATURATION: 96 % | WEIGHT: 204 LBS | SYSTOLIC BLOOD PRESSURE: 128 MMHG | RESPIRATION RATE: 18 BRPM | HEART RATE: 74 BPM

## 2020-07-24 PROCEDURE — G0299 HHS/HOSPICE OF RN EA 15 MIN: HCPCS

## 2020-07-24 PROCEDURE — 3331090001 HH PPS REVENUE CREDIT

## 2020-07-24 PROCEDURE — 3331090002 HH PPS REVENUE DEBIT

## 2020-07-25 PROCEDURE — 3331090001 HH PPS REVENUE CREDIT

## 2020-07-25 PROCEDURE — 3331090002 HH PPS REVENUE DEBIT

## 2020-07-26 PROCEDURE — 3331090001 HH PPS REVENUE CREDIT

## 2020-07-26 PROCEDURE — 3331090002 HH PPS REVENUE DEBIT

## 2020-07-27 ENCOUNTER — DOCUMENTATION ONLY (OUTPATIENT)
Dept: ONCOLOGY | Age: 65
End: 2020-07-27

## 2020-07-27 ENCOUNTER — TELEPHONE (OUTPATIENT)
Dept: PALLATIVE CARE | Age: 65
End: 2020-07-27

## 2020-07-27 ENCOUNTER — TELEPHONE (OUTPATIENT)
Dept: ONCOLOGY | Age: 65
End: 2020-07-27

## 2020-07-27 PROCEDURE — 3331090001 HH PPS REVENUE CREDIT

## 2020-07-27 PROCEDURE — 3331090002 HH PPS REVENUE DEBIT

## 2020-07-27 NOTE — TELEPHONE ENCOUNTER
Patient in for chemo education today. She has a few questions she would like to ask.    1. She has noted vision changes and floaters, and was thinking about going to see Eye doctor for RX change. The education for the Abraxane says it can cause vision changes in 10-29% of the patients. She would like you advise if she should wait or go ahead an see Eye doctor. 2. She has not been taking a multivitamin since she was diagnosed with cancer. She would like to know if it is okay to start taking a multivitamin, and do you recommend a specific one?     Thank you

## 2020-07-27 NOTE — TELEPHONE ENCOUNTER
The vision issues are likely due to steroids. I doubt the Abraxane causes it to worsen. I wouldnt get Visio checked until chemo is over as the steroids will continue to cause the lens to shrink and swell.

## 2020-07-27 NOTE — PROGRESS NOTES
Chemo care information for Paclitaxel- protein bound and Gemcitabine reviewed with Patient and daughter. Side effects/symptom management reviewed. Provided and reviewed chemo packet, and after hours contact information. Questions answered. Consent for Palliative treatment obtained at this time.

## 2020-07-27 NOTE — TELEPHONE ENCOUNTER
Calling patient to advise of Virtual Visit with Dr. Sandi Jules on 7/29/2020 at 2:30pm . Advised nurse could be calling as early as 12:00pm to get updates . Patient states this is fine, she will be in [facility] on 7/29/2020 at 8:00 and is not sure how long she will be there, but will have her phone with her.   Advised would inform team.

## 2020-07-28 ENCOUNTER — HOME CARE VISIT (OUTPATIENT)
Dept: SCHEDULING | Facility: HOME HEALTH | Age: 65
End: 2020-07-28
Payer: MEDICARE

## 2020-07-28 ENCOUNTER — HOME CARE VISIT (OUTPATIENT)
Dept: HOME HEALTH SERVICES | Facility: HOME HEALTH | Age: 65
End: 2020-07-28
Payer: MEDICARE

## 2020-07-28 VITALS
TEMPERATURE: 97.2 F | WEIGHT: 201 LBS | RESPIRATION RATE: 18 BRPM | OXYGEN SATURATION: 98 % | SYSTOLIC BLOOD PRESSURE: 130 MMHG | HEART RATE: 93 BPM | BODY MASS INDEX: 34.5 KG/M2 | DIASTOLIC BLOOD PRESSURE: 70 MMHG

## 2020-07-28 PROCEDURE — G0299 HHS/HOSPICE OF RN EA 15 MIN: HCPCS

## 2020-07-28 PROCEDURE — 3331090002 HH PPS REVENUE DEBIT

## 2020-07-28 PROCEDURE — 3331090001 HH PPS REVENUE CREDIT

## 2020-07-28 PROCEDURE — G0156 HHCP-SVS OF AIDE,EA 15 MIN: HCPCS

## 2020-07-28 PROCEDURE — G0151 HHCP-SERV OF PT,EA 15 MIN: HCPCS

## 2020-07-29 ENCOUNTER — VIRTUAL VISIT (OUTPATIENT)
Dept: PALLATIVE CARE | Age: 65
End: 2020-07-29

## 2020-07-29 ENCOUNTER — TELEPHONE (OUTPATIENT)
Dept: FAMILY MEDICINE CLINIC | Age: 65
End: 2020-07-29

## 2020-07-29 DIAGNOSIS — C25.9 MALIGNANT NEOPLASM OF PANCREAS, UNSPECIFIED LOCATION OF MALIGNANCY (HCC): ICD-10-CM

## 2020-07-29 DIAGNOSIS — K85.90 ACUTE PANCREATITIS, UNSPECIFIED COMPLICATION STATUS, UNSPECIFIED PANCREATITIS TYPE: ICD-10-CM

## 2020-07-29 DIAGNOSIS — T45.1X5A CHEMOTHERAPY-INDUCED NAUSEA: ICD-10-CM

## 2020-07-29 DIAGNOSIS — R11.0 CHEMOTHERAPY-INDUCED NAUSEA: ICD-10-CM

## 2020-07-29 DIAGNOSIS — R10.9 INTRACTABLE ABDOMINAL PAIN: Primary | ICD-10-CM

## 2020-07-29 PROCEDURE — 3331090001 HH PPS REVENUE CREDIT

## 2020-07-29 PROCEDURE — 3331090002 HH PPS REVENUE DEBIT

## 2020-07-29 NOTE — PATIENT INSTRUCTIONS
Dear Matty Johnson , It was a pleasure seeing you today in your home along with your daughter virtually We will see you again in 6 weeks If labs or imaging tests have been ordered for you today, please call the office  at [phone] 48 hours after completion to obtain the results. Your stated goal:  
-To live well for as long as possible Your described symptoms were: Fatigue: 5 Drowsiness: 1 Depression: 0 Pain: 0 Anxiety: 4 Nausea: 0 Anorexia: 5 Dyspnea: 0 Best Well-Bein Constipation: No  
Other Problem (Comment): 0 This is the plan we talked about: 1. Upper abdominal pain-pancreatic cancer related 
-Your pain is much improved now, your CA 19 numbers have come down as well. You are now using only Dilaudid 1 mg every 6 hours, up to 4 tablets/day. -You can start weaning this off by taking 1 tablet every 8 hours tomorrow and then followed by taking 1 tablet every 12 hours the next day and then just 1 tablet the day after and then stop. 2.  Severe nausea 
-You have chemotherapy-induced nausea. You are currently taking Zofran 8 mg oral disintegrating tablet 2 times a day, you can increase this to every 6 hours as needed. You also have Compazine 10 mg tablets at home. 
-Please take Zofran 8 mg at least 3 times a day for 5 days following chemotherapy. Taking nausea medicine proactively will help you. If you are having nausea even after taking Zofran, you can take Compazine or Phenergan. 
-You can also use Ativan 0.5 mg every 6 hours as needed for nausea. 3.  Fatigue 
-This is improved some especially since she learned the CT results that showed disappearance of the pancreatic mass. 4.  Anorexia and oral thrush 
-Oral thrush resolved with nystatin swish and swallow 
-Drink warm water with lemon and honey every morning 
-You tried Marinol for a week but it caused confusion and hallucination so you stopped it. -You have already met with a nutritionist but the information provided to you was overwhelming. We reviewed things that you can eat today and you will in to improve your calorie intake to at least 1200 Kcal/day 
-Increase water intake to between 60 to 80 ounces of water per day 5. Uncontrolled diabetes 
-You are taking 55 units glargine insulin at nighttime and modify your daytime insulin based on your sugars. You do not have a set sliding scale. I am very worried about extreme sugar levels. Start a food diary and measure your blood glucose 4 times a day and keep a diary so we can review this and modify your insulin needs. 6.  Diarrhea and constipation 
-Diarrhea is improved since stopping FOLFOX. 7.  Advanced care planning 
-You completed a living will and advanced medical directive naming your daughter Jeb Norman as you medical power of  8. Psychosocial support 
-You are the primary caregiver of your  who is cognitively impaired. You dealing with the cancer is causing tremendous amount of stress on you and your family. Your daughter Jeb Norman is the only one who lives local and is able to help out. We talked about how palliative medicine can certainly provide you as much support as possible. 9.  Pancreatic cancer 
-You completed FOLFOX treatment, you had to be admitted for about 10 days after your last treatment of FOLFOX due to severe dehydration from nausea vomiting and diarrhea. He started your first dose of gemcitabine plus Abraxane today. The plan is to do this regimen every week for 3 weeks and then a week off. You have scans scheduled in the next several weeks. We talked about how most patients do tolerate gemcitabine plus Abraxane better than FOLFOX and you are hoping the same. You are very encouraged now that the pain has much better and the pancreatic mass is decreased on your previous CT along with decrease in your cancer markers. This is what you have shared with us about Advance Care Planning:  
 
  Primary Decision Maker: Rosaura Mejia - Daughter - [phone] Advance Care Planning 7/6/2020 Patient's Healthcare Decision Maker is: - Confirm Advance Directive Yes, on file Patient Would Like to Complete Advance Directive - Does the patient have other document types - The Palliative Medicine Team is here to support you and your family.   
 
 
Sincerely, 
 
 
Cuauhtemoc Toro MD and the Palliative Medicine Team

## 2020-07-29 NOTE — TELEPHONE ENCOUNTER
Pt called stating that she received a call and it hung up. She is ready for virtual visit.     Best contact: [phone]

## 2020-07-29 NOTE — PROGRESS NOTES
Palliative Medicine Outpatient Services  [place]: [phone])    Patient Name: Delmar Moreno  YOB: 1955    Date of Current Visit: 07/29/20  Location of Current Visit:    [] [facility] Office  [] [facility] Office  [] [facility] Office  [] Home  [x]Synchronous A/V virtual visit    Date of Initial Visit: 6/15/2020  Referral from: Dr. Lyric Vergara  Primary Care Physician: Shannan Barnhart MD      SUMMARY:   Delmar Moreno is a 72y.o. year old with a  history of uncontrolled diabetes with an A1c of 9.2, newly diagnosed inoperable pancreatic cancer, who was referred to Palliative Medicine by Dr. Lyric Vergara for management of intractable symptoms and psychosocial support. 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 CT on June 19 shows pancreatitis with some ascites, pancreatic mass has disappeared. Hospitalization at [facility] from July 4 to July 15 for dehydration. Completed 3 doses of 5-FU, started Gemzar Abraxane on 7/29/2020     PALLIATIVE DIAGNOSES:       ICD-10-CM ICD-9-CM    1. Intractable abdominal pain  R10.9 789.00    2. Chemotherapy-induced nausea  R11.0 787.02     T45.1X5A E933.1    3. Malignant neoplasm of pancreas, unspecified location of malignancy (HCC)  C25.9 157.9    4.  Acute pancreatitis, unspecified complication status, unspecified pancreatitis type  K85.90 577.0           PLAN:     Patient Instructions     Dear Delmar Moreno ,    It was a pleasure seeing you today in your home along with your daughter virtually    We will see you again in 6 weeks    If labs or imaging tests have been ordered for you today, please call the office  at [phone] 48 hours after completion to obtain the results. Your stated goal:   -To live well for as long as possible    Your described symptoms were: Fatigue: 5 Drowsiness: 1   Depression: 0 Pain: 0   Anxiety: 4 Nausea: 0   Anorexia: 5 Dyspnea: 0   Best Well-Bein Constipation: No   Other Problem (Comment): 0       This is the plan we talked about:    1. Upper abdominal pain-pancreatic cancer related  -Your pain is much improved now, your CA 19 numbers have come down as well. You are now using only Dilaudid 1 mg every 6 hours, up to 4 tablets/day. -You can start weaning this off by taking 1 tablet every 8 hours tomorrow and then followed by taking 1 tablet every 12 hours the next day and then just 1 tablet the day after and then stop. 2.  Severe nausea  -You have chemotherapy-induced nausea. You are currently taking Zofran 8 mg oral disintegrating tablet 2 times a day, you can increase this to every 6 hours as needed. You also have Compazine 10 mg tablets at home.  -Please take Zofran 8 mg at least 3 times a day for 5 days following chemotherapy. Taking nausea medicine proactively will help you. If you are having nausea even after taking Zofran, you can take Compazine or Phenergan.  -You can also use Ativan 0.5 mg every 6 hours as needed for nausea. 3.  Fatigue  -This is improved some especially since she learned the CT results that showed disappearance of the pancreatic mass. 4.  Anorexia and oral thrush  -Oral thrush resolved with nystatin swish and swallow  -Drink warm water with lemon and honey every morning  -You tried Marinol for a week but it caused confusion and hallucination so you stopped it.  -You have already met with a nutritionist but the information provided to you was overwhelming. We reviewed things that you can eat today and you will in to improve your calorie intake to at least 1200 Kcal/day  -Increase water intake to between 60 to 80 ounces of water per day    5. Uncontrolled diabetes  -You are taking 55 units glargine insulin at nighttime and modify your daytime insulin based on your sugars. You do not have a set sliding scale. I am very worried about extreme sugar levels. Start a food diary and measure your blood glucose 4 times a day and keep a diary so we can review this and modify your insulin needs. 6.  Diarrhea and constipation  -Diarrhea is improved since stopping FOLFOX. 7.  Advanced care planning  -You completed a living will and advanced medical directive naming your daughter Oracio Spencer as you medical power of     8. Psychosocial support  -You are the primary caregiver of your  who is cognitively impaired. You dealing with the cancer is causing tremendous amount of stress on you and your family. Your daughter Oracio Spencer is the only one who lives local and is able to help out. We talked about how palliative medicine can certainly provide you as much support as possible. 9.  Pancreatic cancer  -You completed FOLFOX treatment, you had to be admitted for about 10 days after your last treatment of FOLFOX due to severe dehydration from nausea vomiting and diarrhea. He started your first dose of gemcitabine plus Abraxane today. The plan is to do this regimen every week for 3 weeks and then a week off. You have scans scheduled in the next several weeks. We talked about how most patients do tolerate gemcitabine plus Abraxane better than FOLFOX and you are hoping the same. You are very encouraged now that the pain has much better and the pancreatic mass is decreased on your previous CT along with decrease in your cancer markers.       This is what you have shared with us about Advance Care Planning:       Primary Decision Maker: Cristine Cowart - Daughter - [phone]  Advance Care Planning 7/6/2020   Patient's [address] is: -   Confirm Advance Directive Yes, on file   Patient Would Like to Complete Advance Directive -   Does the patient have other document types -           The Palliative Medicine Team is here to support you and your family. Sincerely,      Theresa Espinosa MD and the Palliative Medicine Team       GOALS OF CARE / TREATMENT PREFERENCES:   [====Goals of Care====]  GOALS OF CARE:  Patient / health care proxy stated goals: See Patient Instructions / Summary    TREATMENT PREFERENCES:   Code Status:  [x] Attempt Resuscitation       [] Do Not Attempt Resuscitation    Advance Care Planning:  [x] [facility] Interdisciplinary Team has updated the ACP Navigator with Decision Maker and Patient Capacity      Primary Decision MakerMichelet Taylor - [phone]  Advance Care Planning 7/29/2020   Patient's Healthcare Decision Maker is: Named in scanned ACP document   Confirm Advance Directive Yes, on file   Patient Would Like to Complete Advance Directive -   Does the patient have other document types Power of        Other:  (If patient appropriate for POST, consider using PALLPOST smart phrase here)    The palliative care team has discussed with patient / health care proxy about goals of care / treatment preferences for patient.  [====Goals of Care====]     PRESCRIPTIONS GIVEN:     No orders of the defined types were placed in this encounter.           FOLLOW UP:     Future Appointments   Date Time Provider Department Center   7/30/2020 To Be Determined Osorio Daniel [address]   7/30/2020 To Be Determined Jessica [facility]   7/31/2020 To Be Determined Abdirashid Chavez LPN [place] [address] Medical [facility]   7/31/2020  9:30 AM Sabas Saeed [facility]   7/31/2020  2:40 PM Malcolm Yang MD [facility] [facility] [facility]   8/3/2020 To Be Determined Chalino eBck [place] [address] [address] [facility]   8/4/2020 To Be Determined Jessica [facility]   8/4/2020 To Be Determined Alexy Fragoso RN [facility] [address]   8/5/2020  8:30 AM [address] [facility] [address] [facility] 8/6/2020 To Be Determined Daryn Og [place] [address]   8/6/2020 To Be Determined Vanetta Nissen [facility]   8/7/2020 To Be Determined Yoselin Fragoso RN [facility] [address]   8/7/2020 To Be Determined Yoselin Fragoso RN [facility] [address]   8/7/2020 To Be Determined Yonatan Bartlett [place] [address]   8/10/2020 To Be Determined Star [facility] [address]   8/11/2020 To Be Determined Yoselin Fragoso RN [facility] [address]   8/12/2020  8:30 AM [facility]   8/13/2020 To Be Determined Daryn Foley [place] [address]   8/14/2020 To Be Determined Yonatan [facility] [address]   8/17/2020 To Be Determined [facility] [address] [facility]           PHYSICIANS INVOLVED IN CARE:   Patient Care Team:  Celeste Carrillo MD as PCP - General (Internal Medicine)  Celeste Carrillo MD as PCP - [facility]  Erika Doty MD (General Surgery)  Estela Gitelman, CNS (Certified Clinical Nurse Specialist)  Ronald Danielle, VERITO as Benefits Care Manager  Marci Ryan MD (Hematology and Oncology)  Chyna Nava MD as Physician (Hematology and Oncology)  Tez Marr RN as Care Transitions Nurse       HISTORY:   Reviewed patient-completed ESAS and advance care planning form. Reviewed patient record in prescription monitoring program.    CHIEF COMPLAINT: No chief complaint on file. HPI/SUBJECTIVE:    The patient is: [x] Verbal / [] Nonverbal     Patient seen today virtually along with her daughter. She appears well, wearing a new wig and feeling very happy about it. We reviewed her hospitalization in detail, she was very dehydrated, intractable nausea vomiting and diarrhea which led to severe dehydration after her last dose of FOLFOX. She feels much better now. She started treatment with Gemzar Abraxane today and hoping that she will be able to tolerate this much better.   She started eating much better and drinking protein shakes. --------  Patient seen today along with her daughter Abdulkadir Abel. Both are very tearful and stressed from patient's new diagnosis. Harman Brand tells me that she ignored her abdominal pain for several months and now she is paying for it. She is determined to do everything she can to live as long as possible but is very realistic about her outcome. She is struggling with abdominal pain and is not taking medications as prescribed. Her most significant complaint is profound fatigue. But from what she is sharing, she is eating little to nothing every day, drinking less than 10 ounces of water per day. Her daughter forces her to eat some applesauce with her meds which is about the only calories she gets per day. She sleeps most of the time. She is identified that the first 6 days of the chemotherapy is the worst but she seems to recover from it a little bit after and eats a little bit. She struggles with diarrhea after chemotherapy but constipation once the diarrhea dissipates. 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 Daughter is overwhelmed because of the illness of her mother.     Clinical Pain Assessment (nonverbal scale for nonverbal patients):   [++++ Clinical Pain Assessment++++]  [++++Pain Severity++++]: Pain: 0  [++++Pain Character++++]: cramping  [++++Pain Duration++++]: month  [++++Pain Effect++++]: functional and emotional  [++++Pain Factors++++]: none in particular  [++++Pain Frequency++++]: constant  [++++Pain Location++++]: upper abdomen  [++++ Clinical Pain Assessment++++]       FUNCTIONAL ASSESSMENT:     Palliative Performance Scale (PPS):  PPS: 70       PSYCHOSOCIAL/SPIRITUAL SCREENING:     Any spiritual / Gnosticist concerns:  [] Yes /  [x] No    Caregiver Burnout:  [] Yes /  [x] No /  [] No Caregiver Present      Anticipatory grief assessment:   [x] Normal  / [] Maladaptive       ESAS Anxiety: Anxiety: 4    ESAS Depression: Depression: 0       REVIEW OF SYSTEMS:     The following systems were [x] reviewed / [] unable to be reviewed  Systems: constitutional, ears/nose/mouth/throat, respiratory, gastrointestinal, genitourinary, musculoskeletal, integumentary, neurologic, psychiatric, endocrine. Positive findings noted below. Modified ESAS Completed by: provider   Fatigue: 5 Drowsiness: 1   Depression: 0 Pain: 0   Anxiety: 4 Nausea: 0   Anorexia: 5 Dyspnea: 0   Best Well-Bein Constipation: No   Other Problem (Comment): 0          PHYSICAL EXAM:     Wt Readings from Last 3 Encounters:   20 202 lb 3.2 oz (91.7 kg)   20 201 lb (91.2 kg)   20 204 lb (92.5 kg)     There were no vitals taken for this visit.   Last bowel movement: See Nursing Note    Constitutional    [x] Appears well-developed and well-nourished in no apparent distress    [] Abnormal:  Mental status  [x] Alert and awake  [x] Oriented to person/place/time  [x] Able to follow commands  [] Abnormal:   Eyes  [x] EOM normal   [x] Sclera normal   [x] No visible ocular discharge  [] Abnormal:   HENT  [x] Normocephalic, atraumatic  [x] Mouth/Throat: Moist mucous membranes   [x] External Ears normal  [] Abnormal:  Oral thrush resolved  Neck  [x] No visualized mass  [] Abnormal:  Pulmonary/Chest   [x] Respiratory effort normal  [x] No visualized signs of difficulty breathing or respiratory distress  [] Abnormal:  Musculoskeletal  [x] Normal gait with no signs of ataxia  [x] Normal range of motion of neck  [] Abnormal:  Neurological:   [x] No facial asymmetry (Cranial nerve 7 motor function)  [x] No gaze palsy  [] Abnormal:   Skin  [x] No significant exanthematous lesions or discoloration noted on facial skin  [] Abnormal:                                  Psychiatric  [x] Normal affect  [x] No hallucinations  [] Abnormal:    Other pertinent observable physical exam findings:    Due to this being a TeleHealth evaluation, many elements of the physical examination are unable to be assessed. HISTORY:     Past Medical History:   Diagnosis Date    Adenocarcinoma of pancreas ([facility] [address] 05/13/2020    Dr Gusman Filler biopsy via EUS    [facility]     GERD (gastroesophageal reflux disease)     Hernia of abdominal cavity     Subxyphoid hernia    Hypertension     Inflammatory polyarthropathy (HCC)     Joint pain     Joint swelling     Menopause     Ocular nevus of left eye     Pancreatitis     Tracheal stenosis       Past Surgical History:   Procedure Laterality Date    HX CARPAL TUNNEL RELEASE Bilateral     HX COLONOSCOPY      HX HYSTERECTOMY      HX KNEE ARTHROSCOPY Right     HX KNEE REPLACEMENT Right     partial    HX LAP CHOLECYSTECTOMY      HX TONSILLECTOMY      HX VASCULAR ACCESS        Family History   Problem Relation Age of Onset    Heart Disease Mother     Heart Attack Mother     Cancer Father         lung    Diabetes Sister       History reviewed, no pertinent family history. Social History     Tobacco Use    Smoking status: Never Smoker    Smokeless tobacco: Never Used   Substance Use Topics    Alcohol use: No     Alcohol/week: 0.0 standard drinks     Frequency: Never     Binge frequency: Never     No Known Allergies   Current Outpatient Medications   Medication Sig    esomeprazole (NexIUM) 20 mg capsule Take 20 mg by mouth daily.  aspirin delayed-release 81 mg tablet Take 81 mg by mouth daily.  lidocaine-prilocaine (EMLA) topical cream Apply  to affected area as needed for Pain (pain ). Apply a thin layer over port site prior to accessing port    diphenhydrAMINE (BENADRYL) 25 mg tablet Take 25 mg by mouth every six (6) hours as needed for Itching or Skin Irritation.  ciprofloxacin HCl (CIPRO) 250 mg tablet Take 1 Tab by mouth two (2) times a day.  nystatin (MYCOSTATIN) powder Apply 1 g to affected area three (3) times daily.     promethazine (PHENERGAN) 25 mg tablet Take 1 Tab by mouth every six (6) hours as needed for Nausea.  lipase-protease-amylase (Creon) 12,000-38,000 -60,000 unit capsule Take 2 Caps by mouth four (4) times daily (with meals). (Patient taking differently: Take 2 Caps by mouth four (4) times daily (with meals). And one with a snack)    potassium bicarb-citric acid (EFFER-K) 20 mEq tablet Take 1 Tab by mouth two (2) times daily (with meals). (Patient taking differently: Take 20 mEq by mouth two (2) times daily (with meals). takes 10 meq 2 x day)    HYDROmorphone (DILAUDID) 2 mg tablet Take 3 Tabs by mouth every four (4) hours as needed for Pain for up to 30 days. Max Daily Amount: 36 mg. (Patient taking differently: Take 1 mg by mouth every four (4) hours as needed for Pain.)    LORazepam (ATIVAN) 1 mg tablet Take 1 Tab by mouth every six (6) hours as needed for Anxiety. Max Daily Amount: 4 mg. Indications: nausea and vomiting caused by cancer drugs    ondansetron (ZOFRAN ODT) 4 mg disintegrating tablet Take 1 Tab by mouth every eight (8) hours as needed for Nausea or Vomiting.  diphenoxylate-atropine (LomotiL) 2.5-0.025 mg per tablet Take 2 Tabs by mouth four (4) times daily as needed for Diarrhea. Max Daily Amount: 8 Tabs.  prochlorperazine (COMPAZINE) 10 mg tablet Take 0.5 Tabs by mouth every six (6) hours as needed for Nausea.     Blood-Glucose Meter,Continuous (Dexcom G6 ) misc Use as directed to monitor blood glucose as directed    Blood-Glucose Sensor (Dexcom G6 Sensor) fadi Use as directed to monitor blood glucose as directed    Blood-Glucose Transmitter (Dexcom G6 Transmitter) fadi Use as directed to monitor blood glucose as directed    insulin lispro (HumaLOG KwikPen Insulin) 100 unit/mL kwikpen Inject 25 units into the skin with breakfast, 15 units with lunch (if BG is >200 and eating carbs with meal), and 30 units with dinner (Patient taking differently: patient on sliding scale)    insulin glargine U-300 conc (Toujeo SoloStar U-300 Insulin) 300 unit/mL (1.5 mL) inpn pen 55 units every bedtime (Patient taking differently: 15 Units by SubCUTAneous route nightly. 55 units every bedtime)    acetaminophen (Acetaminophen Extra Strength) 500 mg tablet Take 1,000 mg by mouth two (2) times a day.  Needle, Disp, ndle 1 Each by Does Not Apply route two (2) times daily as needed for Other.  carvedilol (COREG) 25 mg tablet TAKE 1 TABLET BY MOUTH TWICE DAILY (Patient taking differently: 1/2 dose twice daily  5/28/2020 patient is taking her BP if BP is low she is only taking 1/2 a tab, if high she is taking the 25mg.)    phenazopyridine (PYRIDIUM) 200 mg tablet Take 1 Tab by mouth three (3) times daily as needed for Pain for up to 60 days.  megestroL (MEGACE) 40 mg tablet Take 1 Tab by mouth two (2) times a day.  omeprazole (PRILOSEC) 40 mg capsule Take 1 Cap by mouth daily. No current facility-administered medications for this visit.       Facility-Administered Medications Ordered in Other Visits   Medication Dose Route Frequency    lidocaine-prilocaine (EMLA) 2.5-2.5 % cream 5 g  5 g Topical PRN    0.9% sodium chloride infusion  25 mL/hr IntraVENous CONTINUOUS    saline peripheral flush soln 10 mL  10 mL InterCATHeter PRN    heparin (porcine) pf 300-500 Units  300-500 Units InterCATHeter PRN    sodium chloride 0.9 % bolus infusion 500 mL  500 mL IntraVENous PRN    diphenhydrAMINE (BENADRYL) injection 25 mg  25 mg IntraVENous PRN    famotidine (PF) (PEPCID) 20 mg in 0.9% sodium chloride 10 mL injection  20 mg IntraVENous PRN    acetaminophen (TYLENOL) tablet 650 mg  650 mg Oral PRN    meperidine (DEMEROL) injection 25 mg  25 mg IntraVENous PRN    ondansetron (ZOFRAN) injection 8 mg  8 mg IntraVENous PRN          LAB DATA REVIEWED:     Lab Results   Component Value Date/Time    WBC 5.0 07/29/2020 08:46 AM    HGB 10.6 (L) 07/29/2020 08:46 AM    PLATELET 986 37/84/2271 08:46 AM     Lab Results   Component Value Date/Time    Sodium 138 07/29/2020 08:46 AM Potassium 4.1 07/29/2020 08:46 AM    Chloride 101 07/29/2020 08:46 AM    CO2 28 07/29/2020 08:46 AM    BUN 9 07/29/2020 08:46 AM    Creatinine 1.02 07/29/2020 08:46 AM    Calcium 8.9 07/29/2020 08:46 AM    Magnesium 1.7 07/15/2020 05:35 AM    Phosphorus 3.0 07/15/2020 05:35 AM      Lab Results   Component Value Date/Time    Alk. phosphatase 94 07/29/2020 08:46 AM    Protein, total 7.0 07/29/2020 08:46 AM    Albumin 3.2 (L) 07/29/2020 08:46 AM    Globulin 3.8 07/29/2020 08:46 AM     No results found for: INR, PTMR, PTP, PT1, PT2, APTT, INREXT, INREXT   No results found for: IRON, FE, TIBC, IBCT, PSAT, FERR        CONTROLLED SUBSTANCES SAFETY ASSESSMENT (IF ON CONTROLLED SUBSTANCES):     Reviewed opioid safety handout:  [x] Yes   [] No  24 hour opioid dose >150mg morphine equivalent/day:  [] Yes   [x] No  Benzodiazepines:  [] Yes   [x] No  Sleep apnea:  [] Yes   [x] No  Urine Toxicology Testing within last 6 months:  [] Yes   [x] No  History of or new aberrant medication taking behaviors:  [] Yes   [x] No  Has Narcan been prescribed [x] Yes   [] No          Total time: 60 minutes  Counseling / coordination time: 40 minutes  > 50% counseling / coordination?:  Yes    Consent:  He and/or health care decision maker is aware that that he may receive a bill for this telehealth service, depending on his insurance coverage, and has provided verbal consent to proceed: Yes    CPT Codes 51463-57179 for Established Patients may apply to this Telehealth Visit  Pursuant to the emergency declaration under the [facility] waiver authority and the [employer] Act, this Virtual  Visit was conducted, with patient's consent, to reduce the patient's risk of exposure to COVID-19 and provide continuity of care for an established patient.    Services were provided through a video synchronous discussion virtually to substitute for in-person clinic visit.

## 2020-07-30 ENCOUNTER — HOME CARE VISIT (OUTPATIENT)
Dept: SCHEDULING | Facility: HOME HEALTH | Age: 65
End: 2020-07-30
Payer: MEDICARE

## 2020-07-30 PROCEDURE — G0151 HHCP-SERV OF PT,EA 15 MIN: HCPCS

## 2020-07-30 PROCEDURE — 3331090002 HH PPS REVENUE DEBIT

## 2020-07-30 PROCEDURE — 3331090001 HH PPS REVENUE CREDIT

## 2020-07-30 PROCEDURE — G0156 HHCP-SVS OF AIDE,EA 15 MIN: HCPCS

## 2020-07-31 ENCOUNTER — HOME CARE VISIT (OUTPATIENT)
Dept: HOME HEALTH SERVICES | Facility: HOME HEALTH | Age: 65
End: 2020-07-31
Payer: MEDICARE

## 2020-07-31 ENCOUNTER — HOME CARE VISIT (OUTPATIENT)
Dept: SCHEDULING | Facility: HOME HEALTH | Age: 65
End: 2020-07-31
Payer: MEDICARE

## 2020-07-31 VITALS
HEART RATE: 82 BPM | DIASTOLIC BLOOD PRESSURE: 80 MMHG | TEMPERATURE: 97.8 F | SYSTOLIC BLOOD PRESSURE: 102 MMHG | OXYGEN SATURATION: 98 %

## 2020-07-31 PROCEDURE — G0300 HHS/HOSPICE OF LPN EA 15 MIN: HCPCS

## 2020-07-31 PROCEDURE — G0152 HHCP-SERV OF OT,EA 15 MIN: HCPCS

## 2020-07-31 PROCEDURE — 3331090001 HH PPS REVENUE CREDIT

## 2020-07-31 PROCEDURE — 3331090002 HH PPS REVENUE DEBIT

## 2020-08-01 PROCEDURE — 3331090001 HH PPS REVENUE CREDIT

## 2020-08-01 PROCEDURE — 3331090002 HH PPS REVENUE DEBIT

## 2020-08-02 VITALS
OXYGEN SATURATION: 97 % | DIASTOLIC BLOOD PRESSURE: 70 MMHG | TEMPERATURE: 98.4 F | HEART RATE: 76 BPM | SYSTOLIC BLOOD PRESSURE: 120 MMHG

## 2020-08-02 PROCEDURE — 3331090002 HH PPS REVENUE DEBIT

## 2020-08-02 PROCEDURE — 3331090001 HH PPS REVENUE CREDIT

## 2020-08-03 ENCOUNTER — HOME CARE VISIT (OUTPATIENT)
Dept: SCHEDULING | Facility: HOME HEALTH | Age: 65
End: 2020-08-03
Payer: MEDICARE

## 2020-08-03 ENCOUNTER — HOME CARE VISIT (OUTPATIENT)
Dept: HOME HEALTH SERVICES | Facility: HOME HEALTH | Age: 65
End: 2020-08-03
Payer: MEDICARE

## 2020-08-03 VITALS
SYSTOLIC BLOOD PRESSURE: 130 MMHG | BODY MASS INDEX: 33.99 KG/M2 | TEMPERATURE: 97.1 F | WEIGHT: 198 LBS | DIASTOLIC BLOOD PRESSURE: 68 MMHG | RESPIRATION RATE: 18 BRPM | HEART RATE: 85 BPM | OXYGEN SATURATION: 99 %

## 2020-08-03 VITALS
SYSTOLIC BLOOD PRESSURE: 116 MMHG | OXYGEN SATURATION: 98 % | DIASTOLIC BLOOD PRESSURE: 80 MMHG | TEMPERATURE: 97.7 F | HEART RATE: 77 BPM | RESPIRATION RATE: 20 BRPM

## 2020-08-03 PROCEDURE — G0157 HHC PT ASSISTANT EA 15: HCPCS

## 2020-08-03 PROCEDURE — 3331090001 HH PPS REVENUE CREDIT

## 2020-08-03 PROCEDURE — 3331090002 HH PPS REVENUE DEBIT

## 2020-08-04 ENCOUNTER — HOME CARE VISIT (OUTPATIENT)
Dept: HOME HEALTH SERVICES | Facility: HOME HEALTH | Age: 65
End: 2020-08-04
Payer: MEDICARE

## 2020-08-04 PROBLEM — E66.01 OBESITY, MORBID (HCC): Status: RESOLVED | Noted: 2017-12-22 | Resolved: 2020-08-04

## 2020-08-04 PROCEDURE — 3331090001 HH PPS REVENUE CREDIT

## 2020-08-04 PROCEDURE — 3331090002 HH PPS REVENUE DEBIT

## 2020-08-05 PROCEDURE — 3331090002 HH PPS REVENUE DEBIT

## 2020-08-05 PROCEDURE — 3331090001 HH PPS REVENUE CREDIT

## 2020-08-06 ENCOUNTER — HOME CARE VISIT (OUTPATIENT)
Dept: SCHEDULING | Facility: HOME HEALTH | Age: 65
End: 2020-08-06
Payer: MEDICARE

## 2020-08-06 ENCOUNTER — HOME CARE VISIT (OUTPATIENT)
Dept: HOME HEALTH SERVICES | Facility: HOME HEALTH | Age: 65
End: 2020-08-06
Payer: MEDICARE

## 2020-08-06 VITALS
OXYGEN SATURATION: 97 % | TEMPERATURE: 97.2 F | SYSTOLIC BLOOD PRESSURE: 143 MMHG | HEART RATE: 82 BPM | DIASTOLIC BLOOD PRESSURE: 82 MMHG

## 2020-08-06 VITALS
HEART RATE: 82 BPM | OXYGEN SATURATION: 98 % | TEMPERATURE: 96.9 F | DIASTOLIC BLOOD PRESSURE: 80 MMHG | RESPIRATION RATE: 18 BRPM | SYSTOLIC BLOOD PRESSURE: 152 MMHG

## 2020-08-06 PROCEDURE — G0151 HHCP-SERV OF PT,EA 15 MIN: HCPCS

## 2020-08-06 PROCEDURE — G0156 HHCP-SVS OF AIDE,EA 15 MIN: HCPCS

## 2020-08-06 PROCEDURE — 3331090001 HH PPS REVENUE CREDIT

## 2020-08-06 PROCEDURE — 3331090002 HH PPS REVENUE DEBIT

## 2020-08-06 PROCEDURE — G0299 HHS/HOSPICE OF RN EA 15 MIN: HCPCS

## 2020-08-06 PROCEDURE — G0152 HHCP-SERV OF OT,EA 15 MIN: HCPCS

## 2020-08-07 ENCOUNTER — PATIENT OUTREACH (OUTPATIENT)
Dept: OTHER | Age: 65
End: 2020-08-07

## 2020-08-07 ENCOUNTER — HOME CARE VISIT (OUTPATIENT)
Dept: SCHEDULING | Facility: HOME HEALTH | Age: 65
End: 2020-08-07
Payer: MEDICARE

## 2020-08-07 PROCEDURE — G0156 HHCP-SVS OF AIDE,EA 15 MIN: HCPCS

## 2020-08-07 PROCEDURE — 3331090002 HH PPS REVENUE DEBIT

## 2020-08-07 PROCEDURE — 3331090001 HH PPS REVENUE CREDIT

## 2020-08-07 NOTE — PROGRESS NOTES
[facility] Outreach    Pt returned call, Verified  and address for HIPAA security. Pt reported she is doing much better with new chemo regimen. Denies further issues with uncontrollable N/V and diarrhea. Pt reported having body aches and chills a couple of days after treatment, but soon resolved. Reported appetite has been fair and that she has been consuming a diabetic shake daily. Weight now 197 lbs down from 244 lbs. Scheduled for f/u diagnostic scan this month.      CM will f/u 2 weeks

## 2020-08-08 PROCEDURE — 3331090001 HH PPS REVENUE CREDIT

## 2020-08-08 PROCEDURE — 3331090002 HH PPS REVENUE DEBIT

## 2020-08-09 PROCEDURE — 3331090002 HH PPS REVENUE DEBIT

## 2020-08-09 PROCEDURE — 3331090001 HH PPS REVENUE CREDIT

## 2020-08-10 ENCOUNTER — HOME CARE VISIT (OUTPATIENT)
Dept: SCHEDULING | Facility: HOME HEALTH | Age: 65
End: 2020-08-10
Payer: MEDICARE

## 2020-08-10 ENCOUNTER — HOME CARE VISIT (OUTPATIENT)
Dept: HOME HEALTH SERVICES | Facility: HOME HEALTH | Age: 65
End: 2020-08-10
Payer: MEDICARE

## 2020-08-10 VITALS
OXYGEN SATURATION: 99 % | DIASTOLIC BLOOD PRESSURE: 70 MMHG | SYSTOLIC BLOOD PRESSURE: 132 MMHG | RESPIRATION RATE: 18 BRPM | HEART RATE: 76 BPM | WEIGHT: 196 LBS | TEMPERATURE: 97.7 F | BODY MASS INDEX: 33.64 KG/M2

## 2020-08-10 PROCEDURE — 3331090001 HH PPS REVENUE CREDIT

## 2020-08-10 PROCEDURE — G0299 HHS/HOSPICE OF RN EA 15 MIN: HCPCS

## 2020-08-10 PROCEDURE — 3331090002 HH PPS REVENUE DEBIT

## 2020-08-10 PROCEDURE — G0151 HHCP-SERV OF PT,EA 15 MIN: HCPCS

## 2020-08-11 VITALS — DIASTOLIC BLOOD PRESSURE: 65 MMHG | TEMPERATURE: 97.2 F | OXYGEN SATURATION: 99 % | SYSTOLIC BLOOD PRESSURE: 123 MMHG

## 2020-08-11 PROCEDURE — 3331090001 HH PPS REVENUE CREDIT

## 2020-08-11 PROCEDURE — 3331090002 HH PPS REVENUE DEBIT

## 2020-08-12 DIAGNOSIS — C25.9 ADENOCARCINOMA OF PANCREAS (HCC): Primary | ICD-10-CM

## 2020-08-12 PROCEDURE — 3331090001 HH PPS REVENUE CREDIT

## 2020-08-12 PROCEDURE — 3331090002 HH PPS REVENUE DEBIT

## 2020-08-12 RX ORDER — PANCRELIPASE 60000; 12000; 38000 [USP'U]/1; [USP'U]/1; [USP'U]/1
3 CAPSULE, DELAYED RELEASE PELLETS ORAL
Qty: 240 CAP | Refills: 5 | Status: SHIPPED | OUTPATIENT
Start: 2020-08-12 | End: 2021-09-07 | Stop reason: SDUPTHER

## 2020-08-12 RX ORDER — TRIAMCINOLONE ACETONIDE 1 MG/ML
LOTION TOPICAL 3 TIMES DAILY
Qty: 60 ML | Refills: 5 | Status: SHIPPED | OUTPATIENT
Start: 2020-08-12 | End: 2020-09-08 | Stop reason: ALTCHOICE

## 2020-08-13 ENCOUNTER — HOME CARE VISIT (OUTPATIENT)
Dept: HOME HEALTH SERVICES | Facility: HOME HEALTH | Age: 65
End: 2020-08-13
Payer: MEDICARE

## 2020-08-13 ENCOUNTER — HOME CARE VISIT (OUTPATIENT)
Dept: SCHEDULING | Facility: HOME HEALTH | Age: 65
End: 2020-08-13
Payer: MEDICARE

## 2020-08-13 VITALS
TEMPERATURE: 97.4 F | DIASTOLIC BLOOD PRESSURE: 81 MMHG | HEART RATE: 85 BPM | SYSTOLIC BLOOD PRESSURE: 110 MMHG | OXYGEN SATURATION: 96 %

## 2020-08-13 PROCEDURE — 3331090001 HH PPS REVENUE CREDIT

## 2020-08-13 PROCEDURE — G0157 HHC PT ASSISTANT EA 15: HCPCS

## 2020-08-13 PROCEDURE — 3331090002 HH PPS REVENUE DEBIT

## 2020-08-14 ENCOUNTER — HOME CARE VISIT (OUTPATIENT)
Dept: HOME HEALTH SERVICES | Facility: HOME HEALTH | Age: 65
End: 2020-08-14
Payer: MEDICARE

## 2020-08-14 ENCOUNTER — HOME CARE VISIT (OUTPATIENT)
Dept: SCHEDULING | Facility: HOME HEALTH | Age: 65
End: 2020-08-14
Payer: MEDICARE

## 2020-08-14 VITALS
DIASTOLIC BLOOD PRESSURE: 70 MMHG | RESPIRATION RATE: 18 BRPM | SYSTOLIC BLOOD PRESSURE: 114 MMHG | OXYGEN SATURATION: 98 % | TEMPERATURE: 97.3 F | HEART RATE: 91 BPM

## 2020-08-14 VITALS
TEMPERATURE: 97.7 F | HEART RATE: 84 BPM | DIASTOLIC BLOOD PRESSURE: 82 MMHG | SYSTOLIC BLOOD PRESSURE: 99 MMHG | OXYGEN SATURATION: 96 %

## 2020-08-14 PROCEDURE — 3331090003 HH PPS REVENUE ADJ

## 2020-08-14 PROCEDURE — 3331090002 HH PPS REVENUE DEBIT

## 2020-08-14 PROCEDURE — 3331090001 HH PPS REVENUE CREDIT

## 2020-08-14 PROCEDURE — G0299 HHS/HOSPICE OF RN EA 15 MIN: HCPCS

## 2020-08-14 PROCEDURE — G0152 HHCP-SERV OF OT,EA 15 MIN: HCPCS

## 2020-08-15 PROCEDURE — 3331090002 HH PPS REVENUE DEBIT

## 2020-08-15 PROCEDURE — 3331090001 HH PPS REVENUE CREDIT

## 2020-08-16 PROCEDURE — 3331090001 HH PPS REVENUE CREDIT

## 2020-08-16 PROCEDURE — 3331090002 HH PPS REVENUE DEBIT

## 2020-08-17 ENCOUNTER — HOME CARE VISIT (OUTPATIENT)
Dept: SCHEDULING | Facility: HOME HEALTH | Age: 65
End: 2020-08-17
Payer: MEDICARE

## 2020-08-17 PROCEDURE — 400013 HH SOC

## 2020-08-17 PROCEDURE — 3331090002 HH PPS REVENUE DEBIT

## 2020-08-17 PROCEDURE — G0151 HHCP-SERV OF PT,EA 15 MIN: HCPCS

## 2020-08-17 PROCEDURE — 3331090001 HH PPS REVENUE CREDIT

## 2020-08-18 ENCOUNTER — HOME CARE VISIT (OUTPATIENT)
Dept: HOME HEALTH SERVICES | Facility: HOME HEALTH | Age: 65
End: 2020-08-18
Payer: MEDICARE

## 2020-08-18 PROCEDURE — 3331090001 HH PPS REVENUE CREDIT

## 2020-08-18 PROCEDURE — 3331090002 HH PPS REVENUE DEBIT

## 2020-08-18 PROCEDURE — G0156 HHCP-SVS OF AIDE,EA 15 MIN: HCPCS

## 2020-08-19 PROCEDURE — 3331090001 HH PPS REVENUE CREDIT

## 2020-08-19 PROCEDURE — 3331090002 HH PPS REVENUE DEBIT

## 2020-08-20 ENCOUNTER — HOME CARE VISIT (OUTPATIENT)
Dept: SCHEDULING | Facility: HOME HEALTH | Age: 65
End: 2020-08-20
Payer: MEDICARE

## 2020-08-20 ENCOUNTER — HOME CARE VISIT (OUTPATIENT)
Dept: HOME HEALTH SERVICES | Facility: HOME HEALTH | Age: 65
End: 2020-08-20
Payer: MEDICARE

## 2020-08-20 VITALS
DIASTOLIC BLOOD PRESSURE: 70 MMHG | BODY MASS INDEX: 34.5 KG/M2 | HEART RATE: 81 BPM | SYSTOLIC BLOOD PRESSURE: 124 MMHG | OXYGEN SATURATION: 98 % | RESPIRATION RATE: 20 BRPM | TEMPERATURE: 97.9 F | WEIGHT: 201 LBS

## 2020-08-20 PROCEDURE — G0299 HHS/HOSPICE OF RN EA 15 MIN: HCPCS

## 2020-08-20 PROCEDURE — G0156 HHCP-SVS OF AIDE,EA 15 MIN: HCPCS

## 2020-08-20 PROCEDURE — 3331090002 HH PPS REVENUE DEBIT

## 2020-08-20 PROCEDURE — 3331090001 HH PPS REVENUE CREDIT

## 2020-08-20 RX ORDER — CARVEDILOL 25 MG/1
TABLET ORAL
Qty: 60 TAB | Refills: 9 | Status: SHIPPED | OUTPATIENT
Start: 2020-08-20 | End: 2021-12-16 | Stop reason: ALTCHOICE

## 2020-08-21 ENCOUNTER — HOME CARE VISIT (OUTPATIENT)
Dept: SCHEDULING | Facility: HOME HEALTH | Age: 65
End: 2020-08-21
Payer: MEDICARE

## 2020-08-21 ENCOUNTER — HOME CARE VISIT (OUTPATIENT)
Dept: HOME HEALTH SERVICES | Facility: HOME HEALTH | Age: 65
End: 2020-08-21
Payer: MEDICARE

## 2020-08-21 PROCEDURE — G0157 HHC PT ASSISTANT EA 15: HCPCS

## 2020-08-21 PROCEDURE — 3331090001 HH PPS REVENUE CREDIT

## 2020-08-21 PROCEDURE — 3331090002 HH PPS REVENUE DEBIT

## 2020-08-22 PROCEDURE — 3331090002 HH PPS REVENUE DEBIT

## 2020-08-22 PROCEDURE — 3331090001 HH PPS REVENUE CREDIT

## 2020-08-23 VITALS
TEMPERATURE: 97.6 F | DIASTOLIC BLOOD PRESSURE: 73 MMHG | HEART RATE: 79 BPM | SYSTOLIC BLOOD PRESSURE: 117 MMHG | OXYGEN SATURATION: 99 %

## 2020-08-23 PROCEDURE — 3331090002 HH PPS REVENUE DEBIT

## 2020-08-23 PROCEDURE — 3331090001 HH PPS REVENUE CREDIT

## 2020-08-24 ENCOUNTER — HOME CARE VISIT (OUTPATIENT)
Dept: HOME HEALTH SERVICES | Facility: HOME HEALTH | Age: 65
End: 2020-08-24
Payer: MEDICARE

## 2020-08-24 PROCEDURE — 3331090002 HH PPS REVENUE DEBIT

## 2020-08-24 PROCEDURE — 3331090001 HH PPS REVENUE CREDIT

## 2020-08-25 ENCOUNTER — HOME CARE VISIT (OUTPATIENT)
Dept: SCHEDULING | Facility: HOME HEALTH | Age: 65
End: 2020-08-25
Payer: MEDICARE

## 2020-08-25 PROCEDURE — 3331090001 HH PPS REVENUE CREDIT

## 2020-08-25 PROCEDURE — G0156 HHCP-SVS OF AIDE,EA 15 MIN: HCPCS

## 2020-08-25 PROCEDURE — 3331090002 HH PPS REVENUE DEBIT

## 2020-08-26 ENCOUNTER — HOME CARE VISIT (OUTPATIENT)
Dept: HOME HEALTH SERVICES | Facility: HOME HEALTH | Age: 65
End: 2020-08-26
Payer: MEDICARE

## 2020-08-26 ENCOUNTER — HOME CARE VISIT (OUTPATIENT)
Dept: SCHEDULING | Facility: HOME HEALTH | Age: 65
End: 2020-08-26
Payer: MEDICARE

## 2020-08-26 VITALS
TEMPERATURE: 97.6 F | SYSTOLIC BLOOD PRESSURE: 112 MMHG | OXYGEN SATURATION: 99 % | HEART RATE: 76 BPM | DIASTOLIC BLOOD PRESSURE: 66 MMHG

## 2020-08-26 PROCEDURE — 3331090001 HH PPS REVENUE CREDIT

## 2020-08-26 PROCEDURE — 3331090002 HH PPS REVENUE DEBIT

## 2020-08-26 PROCEDURE — G0157 HHC PT ASSISTANT EA 15: HCPCS

## 2020-08-27 ENCOUNTER — HOME CARE VISIT (OUTPATIENT)
Dept: SCHEDULING | Facility: HOME HEALTH | Age: 65
End: 2020-08-27
Payer: MEDICARE

## 2020-08-27 ENCOUNTER — HOME CARE VISIT (OUTPATIENT)
Dept: HOME HEALTH SERVICES | Facility: HOME HEALTH | Age: 65
End: 2020-08-27
Payer: MEDICARE

## 2020-08-27 VITALS
RESPIRATION RATE: 18 BRPM | BODY MASS INDEX: 33.3 KG/M2 | WEIGHT: 194 LBS | OXYGEN SATURATION: 98 % | HEART RATE: 86 BPM | DIASTOLIC BLOOD PRESSURE: 64 MMHG | TEMPERATURE: 97.3 F | SYSTOLIC BLOOD PRESSURE: 112 MMHG

## 2020-08-27 PROCEDURE — G0156 HHCP-SVS OF AIDE,EA 15 MIN: HCPCS

## 2020-08-27 PROCEDURE — 3331090001 HH PPS REVENUE CREDIT

## 2020-08-27 PROCEDURE — 3331090002 HH PPS REVENUE DEBIT

## 2020-08-27 PROCEDURE — G0157 HHC PT ASSISTANT EA 15: HCPCS

## 2020-08-27 PROCEDURE — G0299 HHS/HOSPICE OF RN EA 15 MIN: HCPCS

## 2020-08-28 PROCEDURE — 3331090002 HH PPS REVENUE DEBIT

## 2020-08-28 PROCEDURE — 3331090001 HH PPS REVENUE CREDIT

## 2020-08-29 PROCEDURE — 3331090001 HH PPS REVENUE CREDIT

## 2020-08-29 PROCEDURE — 3331090002 HH PPS REVENUE DEBIT

## 2020-08-30 VITALS
TEMPERATURE: 97.5 F | OXYGEN SATURATION: 98 % | SYSTOLIC BLOOD PRESSURE: 130 MMHG | HEART RATE: 86 BPM | DIASTOLIC BLOOD PRESSURE: 70 MMHG

## 2020-08-30 PROCEDURE — 3331090002 HH PPS REVENUE DEBIT

## 2020-08-30 PROCEDURE — 3331090001 HH PPS REVENUE CREDIT

## 2020-08-31 ENCOUNTER — HOME CARE VISIT (OUTPATIENT)
Dept: HOME HEALTH SERVICES | Facility: HOME HEALTH | Age: 65
End: 2020-08-31
Payer: MEDICARE

## 2020-08-31 ENCOUNTER — HOME CARE VISIT (OUTPATIENT)
Dept: SCHEDULING | Facility: HOME HEALTH | Age: 65
End: 2020-08-31
Payer: MEDICARE

## 2020-08-31 VITALS
TEMPERATURE: 97.5 F | SYSTOLIC BLOOD PRESSURE: 101 MMHG | DIASTOLIC BLOOD PRESSURE: 73 MMHG | HEART RATE: 78 BPM | OXYGEN SATURATION: 96 %

## 2020-08-31 PROCEDURE — 3331090002 HH PPS REVENUE DEBIT

## 2020-08-31 PROCEDURE — 3331090001 HH PPS REVENUE CREDIT

## 2020-08-31 PROCEDURE — G0157 HHC PT ASSISTANT EA 15: HCPCS

## 2020-09-01 ENCOUNTER — HOME CARE VISIT (OUTPATIENT)
Dept: SCHEDULING | Facility: HOME HEALTH | Age: 65
End: 2020-09-01
Payer: MEDICARE

## 2020-09-01 PROCEDURE — 3331090001 HH PPS REVENUE CREDIT

## 2020-09-01 PROCEDURE — 3331090002 HH PPS REVENUE DEBIT

## 2020-09-01 PROCEDURE — G0156 HHCP-SVS OF AIDE,EA 15 MIN: HCPCS

## 2020-09-02 PROCEDURE — 3331090001 HH PPS REVENUE CREDIT

## 2020-09-02 PROCEDURE — 3331090002 HH PPS REVENUE DEBIT

## 2020-09-03 ENCOUNTER — HOME CARE VISIT (OUTPATIENT)
Dept: SCHEDULING | Facility: HOME HEALTH | Age: 65
End: 2020-09-03
Payer: MEDICARE

## 2020-09-03 PROCEDURE — 3331090002 HH PPS REVENUE DEBIT

## 2020-09-03 PROCEDURE — 3331090001 HH PPS REVENUE CREDIT

## 2020-09-03 PROCEDURE — G0156 HHCP-SVS OF AIDE,EA 15 MIN: HCPCS

## 2020-09-04 ENCOUNTER — HOME CARE VISIT (OUTPATIENT)
Dept: HOME HEALTH SERVICES | Facility: HOME HEALTH | Age: 65
End: 2020-09-04
Payer: MEDICARE

## 2020-09-04 ENCOUNTER — HOME CARE VISIT (OUTPATIENT)
Dept: SCHEDULING | Facility: HOME HEALTH | Age: 65
End: 2020-09-04
Payer: MEDICARE

## 2020-09-04 VITALS
SYSTOLIC BLOOD PRESSURE: 113 MMHG | DIASTOLIC BLOOD PRESSURE: 70 MMHG | HEART RATE: 74 BPM | OXYGEN SATURATION: 99 % | TEMPERATURE: 97.6 F

## 2020-09-04 PROCEDURE — G0157 HHC PT ASSISTANT EA 15: HCPCS

## 2020-09-04 PROCEDURE — 3331090002 HH PPS REVENUE DEBIT

## 2020-09-04 PROCEDURE — 3331090001 HH PPS REVENUE CREDIT

## 2020-09-05 PROCEDURE — 3331090002 HH PPS REVENUE DEBIT

## 2020-09-05 PROCEDURE — 3331090001 HH PPS REVENUE CREDIT

## 2020-09-06 PROCEDURE — 3331090001 HH PPS REVENUE CREDIT

## 2020-09-06 PROCEDURE — 3331090002 HH PPS REVENUE DEBIT

## 2020-09-07 PROCEDURE — 3331090001 HH PPS REVENUE CREDIT

## 2020-09-07 PROCEDURE — 3331090002 HH PPS REVENUE DEBIT

## 2020-09-08 ENCOUNTER — HOME CARE VISIT (OUTPATIENT)
Dept: SCHEDULING | Facility: HOME HEALTH | Age: 65
End: 2020-09-08
Payer: MEDICARE

## 2020-09-08 ENCOUNTER — VIRTUAL VISIT (OUTPATIENT)
Dept: PALLATIVE CARE | Age: 65
End: 2020-09-08
Payer: MEDICARE

## 2020-09-08 VITALS
HEART RATE: 88 BPM | TEMPERATURE: 96.8 F | OXYGEN SATURATION: 98 % | DIASTOLIC BLOOD PRESSURE: 58 MMHG | SYSTOLIC BLOOD PRESSURE: 150 MMHG

## 2020-09-08 DIAGNOSIS — R11.0 CHEMOTHERAPY-INDUCED NAUSEA: Primary | ICD-10-CM

## 2020-09-08 DIAGNOSIS — R10.84 ABDOMINAL PAIN, GENERALIZED: ICD-10-CM

## 2020-09-08 DIAGNOSIS — C25.9 MALIGNANT NEOPLASM OF PANCREAS, UNSPECIFIED LOCATION OF MALIGNANCY (HCC): ICD-10-CM

## 2020-09-08 DIAGNOSIS — R53.83 PROFOUND FATIGUE: ICD-10-CM

## 2020-09-08 DIAGNOSIS — T45.1X5A CHEMOTHERAPY-INDUCED NAUSEA: Primary | ICD-10-CM

## 2020-09-08 DIAGNOSIS — G62.9 NEUROPATHY: ICD-10-CM

## 2020-09-08 PROCEDURE — G0157 HHC PT ASSISTANT EA 15: HCPCS

## 2020-09-08 PROCEDURE — 3331090001 HH PPS REVENUE CREDIT

## 2020-09-08 PROCEDURE — 3331090002 HH PPS REVENUE DEBIT

## 2020-09-08 PROCEDURE — G0156 HHCP-SVS OF AIDE,EA 15 MIN: HCPCS

## 2020-09-08 PROCEDURE — 99215 OFFICE O/P EST HI 40 MIN: CPT | Performed by: INTERNAL MEDICINE

## 2020-09-08 RX ORDER — SENNOSIDES 8.6 MG/1
1-2 CAPSULE, GELATIN COATED ORAL 2 TIMES DAILY
Status: ON HOLD | COMMUNITY
End: 2022-07-23

## 2020-09-08 RX ORDER — POLYETHYLENE GLYCOL 3350 17 G/17G
17 POWDER, FOR SOLUTION ORAL
Status: ON HOLD | COMMUNITY
End: 2022-07-23

## 2020-09-08 RX ORDER — GABAPENTIN 100 MG/1
100 CAPSULE ORAL 3 TIMES DAILY
Qty: 90 CAP | Refills: 0 | Status: SHIPPED | OUTPATIENT
Start: 2020-09-08 | End: 2020-10-07 | Stop reason: SDUPTHER

## 2020-09-08 RX ORDER — FAMOTIDINE 20 MG/1
20 TABLET, FILM COATED ORAL 2 TIMES DAILY
Status: ON HOLD | COMMUNITY
End: 2022-07-23

## 2020-09-08 NOTE — PROGRESS NOTES
Palliative Medicine Outpatient Services  [place]: [phone] ([phone])    Patient Name: Matty Johnson  YOB: 1955    Date of Current Visit: 09/08/20  Location of Current Visit:    [] [facility] Office  [] [facility] Office  [] [id number] [facility] Office  [] Home  [x]Synchronous A/V virtual visit    Date of Initial Visit: 6/15/2020  Referral from: Dr. Hilary Mesa  Primary Care Physician: Roslyn Gonsalez MD      SUMMARY:   Matty Johnson is a 72y.o. year old with a  history of uncontrolled diabetes with an A1c of 9.2, newly diagnosed inoperable pancreatic cancer, who was referred to Palliative Medicine by Dr. Hilary Mesa for management of intractable symptoms and psychosocial support. 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 CT on June 19 shows pancreatitis with some ascites, pancreatic mass has disappeared. Hospitalization at [facility] from July 4 to July 15 for dehydration. Completed 3 doses of 5-FU, started Gemzar Abraxane on 7/29/2020   CA 19-normal       PALLIATIVE DIAGNOSES:       ICD-10-CM ICD-9-CM    1. Chemotherapy-induced nausea  R11.0 787.02     T45.1X5A E933.1    2. Abdominal pain, generalized  R10.84 789.07    3. Malignant neoplasm of pancreas, unspecified location of malignancy (HCC)  C25.9 157.9 gabapentin (NEURONTIN) 100 mg capsule   4. Neuropathy  G62.9 355.9    5.  Profound fatigue  R53.83 780.79           PLAN:     Patient Instructions     Dear Matty Johnson ,    It was a pleasure seeing you today in your home along with your daughter virtually    We will see you again in 4 weeks virtually    If labs or imaging tests have been ordered for you today, please call the office  at [phone] 48 hours after completion to obtain the results. Your stated goal:   -To live well for as long as possible    Your described symptoms were: Fatigue: 10 Drowsiness: 2   Depression: 2 Pain: 4   Anxiety: 3 Nausea: 0   Anorexia: 5 Dyspnea: 0     Constipation: No           This is the plan we talked about:    1. Upper abdominal pain-pancreatic cancer related  -Your pain is much improved now, your CA 19 is normal!.  You are now using only Dilaudid 1 mg every 6 hours as needed, you take about 2 to 3 tablets/day or less some days. You have plenty of Dilaudid, no need for refill now. 2.  Severe nausea  -You have chemotherapy-induced nausea. You are currently taking Zofran 8 mg oral disintegrating tablet 2 times and this is helping. You have not needed Compazine. You also take Ativan at bedtime which helps with sleep and nausea. 3.  Insomnia   - You have been taking Ativan 0.5 mg at bedtime which is very helpful and so you want to continue the same. 4.  Anorexia and oral thrush  -Oral thrush resolved with nystatin swish and swallow  -Drink warm water with lemon and honey every morning  -You tried Marinol for a week but it caused confusion and hallucination so you stopped it.  -You have already met with a nutritionist but the information provided to you was overwhelming. We reviewed things that you can eat today and you will in to improve your calorie intake to at least 1200 Kcal/day  -Increase water intake to between 60 to 80 ounces of water per day    5. Uncontrolled diabetes  -You are taking 55 units glargine insulin at nighttime and modify your daytime insulin based on your sugars. You do not have a set sliding scale. I am very worried about extreme sugar levels. Start a food diary and measure your blood glucose 4 times a day and keep a diary so we can review this and modify your insulin needs.      6.  Diarrhea and constipation  -Diarrhea is improved since stopping FOLFOX.  - You are taking stool softeners on a regular basis and using MiraLAX as needed. 7.  Neuropathy  -You are starting to experience some numbness and tingling in your bilateral fingers and toes. Given that you are high risk for neuropathy from chemotherapy and diabetes, let us start gabapentin 100 mg at bedtime for a week, increase to 200 mg at bedtime for a week, and then increase to 300 mg/day. 8.  Advanced care planning  -You completed a living will and advanced medical directive naming your daughter Xiang Brunner as you medical power of     9. Psychosocial support  -You are the primary caregiver of your  who is cognitively impaired. You dealing with the cancer is causing tremendous amount of stress on you and your family. Your daughter Xiang Brunner is the only one who lives local and is able to help out. We talked about how palliative medicine can certainly provide you as much support as possible. 10.  Pancreatic cancer  - You are doing very well, your cancer is responding really well to treatment. Your CA 19 numbers are normal.  You are currently on gemcitabine plus Abraxane and doing fairly well. This is what you have shared with us about Advance Care Planning:       Primary Decision Maker: Chata Marr - Daughter - [phone]  Advance Care Planning 9/2/2020   Patient's [place] is: Named in scanned ACP document   Confirm Advance Directive Yes, on file   Patient Would Like to Complete Advance Directive -   Does the patient have other document types -           The Palliative Medicine Team is here to support you and your family.        Sincerely,      Romina Pichardo MD and the Palliative Medicine Team       GOALS OF CARE / TREATMENT PREFERENCES:   [====Goals of Care====]  GOALS OF CARE:  Patient / health care proxy stated goals: See Patient Instructions / Summary    TREATMENT PREFERENCES:   Code Status:  [x] Attempt Resuscitation [] Do Not Attempt Resuscitation    Advance Care Planning:  [x] The Pall [facility] Interdisciplinary Team has updated the ACP Navigator with Decision Maker and Patient Capacity      Primary Decision Maker: Sandra Justice - [place] - [phone]  Advance Care Planning 9/2/2020   Patient's Healthcare Decision Maker is: Named in scanned ACP document   Confirm Advance Directive Yes, on file   Patient Would Like to Complete Advance Directive -   Does the patient have other document types -       Other:  (If patient appropriate for POST, consider using PALLPOST smart phrase here)    The palliative care team has discussed with patient / health care proxy about goals of care / treatment preferences for patient.  [====Goals of Care====]     PRESCRIPTIONS GIVEN:     Medications Ordered Today   Medications    gabapentin (NEURONTIN) 100 mg capsule     Sig: Take 1 Cap by mouth three (3) times daily. Max Daily Amount: 300 mg.      Dispense:  90 Cap     Refill:  0           FOLLOW UP:     Future Appointments   Date Time Provider Louis Campuzano   9/8/2020  1:30 PM Hoda Laws MD [facility] BS AMB   9/9/2020  8:30 AM [facility] INF [facility] [address] [facility]   9/9/2020  8:40 AM [facility]    9/10/2020 To Be Determined Ayoub Shailesh [facility]   9/11/2020 To Be Determined Melia Montiel RI [facility] Medical [facility]   9/11/2020  1:00 PM Sophia Pineda MD [facility]   9/16/2020  8:30 AM [facility]   9/30/2020  8:30 AM [facility]   10/7/2020  8:30 AM [facility] [address] [facility]           PHYSICIANS INVOLVED IN CARE:   Patient Care Team:  Sophia Pineda MD as PCP - General (Internal Medicine)  Sophia Pineda MD as PCP - [facility]  Virgil Perez MD (General Surgery)  JENN Haro (Certified Clinical Nurse Specialist)  Anamika Baer RN as Benefits Care Manager  Deborah Santiago MD (Hematology and Oncology)  Dasha Corea, MD as Physician (Hematology and Oncology)       HISTORY:   Reviewed patient-completed ESAS and advance care planning form. Reviewed patient record in prescription monitoring program.    CHIEF COMPLAINT:   Chief Complaint   Patient presents with    Joint Pain       HPI/SUBJECTIVE:    The patient is: [x] Verbal / [] Nonverbal     Patient seen today virtually along with her daughter. She appears well, very excited to tell me about her CA 19 numbers which are normal.  She is very pleased with how she has tolerated treatment well so far. She continues to have some nausea which is well controlled with Zofran and Ativan. She also needs the Ativan to sleep at night. She continues to use Dilaudid 1 to 2 tablets/day on most days. She is starting to feel some numbness and tingling in her fingers and toes. Appetite is fairly good, she consumes about 1500 to 2000 preet/day.  --------  Patient seen today along with her daughter Sean Velazco. Both are very tearful and stressed from patient's new diagnosis. Nick Kebede tells me that she ignored her abdominal pain for several months and now she is paying for it. She is determined to do everything she can to live as long as possible but is very realistic about her outcome. She is struggling with abdominal pain and is not taking medications as prescribed. Her most significant complaint is profound fatigue. But from what she is sharing, she is eating little to nothing every day, drinking less than 10 ounces of water per day. Her daughter forces her to eat some applesauce with her meds which is about the only calories she gets per day. She sleeps most of the time. She is identified that the first 6 days of the chemotherapy is the worst but she seems to recover from it a little bit after and eats a little bit. She struggles with diarrhea after chemotherapy but constipation once the diarrhea dissipates.   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 Daughter is overwhelmed because of the illness of her mother. Clinical Pain Assessment (nonverbal scale for nonverbal patients):   [++++ Clinical Pain Assessment++++]  [++++Pain Severity++++]: Pain: 4  [++++Pain Character++++]: cramping  [++++Pain Duration++++]: month  [++++Pain Effect++++]: functional and emotional  [++++Pain Factors++++]: none in particular  [++++Pain Frequency++++]: constant  [++++Pain Location++++]: upper abdomen  [++++ Clinical Pain Assessment++++]       FUNCTIONAL ASSESSMENT:     Palliative Performance Scale (PPS):          PSYCHOSOCIAL/SPIRITUAL SCREENING:     Any spiritual / Worship concerns:  [] Yes /  [x] No    Caregiver Burnout:  [] Yes /  [x] No /  [] No Caregiver Present      Anticipatory grief assessment:   [x] Normal  / [] Maladaptive       ESAS Anxiety: Anxiety: 3    ESAS Depression: Depression: 2       REVIEW OF SYSTEMS:     The following systems were [x] reviewed / [] unable to be reviewed  Systems: constitutional, ears/nose/mouth/throat, respiratory, gastrointestinal, genitourinary, musculoskeletal, integumentary, neurologic, psychiatric, endocrine. Positive findings noted below. Modified ESAS Completed by: provider   Fatigue: 10 Drowsiness: 2   Depression: 2 Pain: 4   Anxiety: 3 Nausea: 0   Anorexia: 5 Dyspnea: 0     Constipation: No              PHYSICAL EXAM:     Wt Readings from Last 3 Encounters:   09/02/20 200 lb 13.4 oz (91.1 kg)   08/27/20 194 lb (88 kg)   08/20/20 201 lb (91.2 kg)     There were no vitals taken for this visit.   Last bowel movement: See Nursing Note    Constitutional    [x] Appears well-developed and well-nourished in no apparent distress    [] Abnormal:  Mental status  [x] Alert and awake  [x] Oriented to person/place/time  [x] Able to follow commands  [] Abnormal:   Eyes  [x] EOM normal   [x] Sclera normal   [x] No visible ocular discharge  [] Abnormal:   HENT  [x] Normocephalic, atraumatic  [x] Mouth/Throat: Moist mucous membranes   [x] External Ears normal  [] Abnormal:  Oral thrush resolved  Neck  [x] No visualized mass  [] Abnormal:  Pulmonary/Chest   [x] Respiratory effort normal  [x] No visualized signs of difficulty breathing or respiratory distress  [] Abnormal:  Musculoskeletal  [x] Normal gait with no signs of ataxia  [x] Normal range of motion of neck  [] Abnormal:  Neurological:   [x] No facial asymmetry (Cranial nerve 7 motor function)  [x] No gaze palsy  [] Abnormal:   Skin  [x] No significant exanthematous lesions or discoloration noted on facial skin  [] Abnormal:                                  Psychiatric  [x] Normal affect  [x] No hallucinations  [] Abnormal:    Other pertinent observable physical exam findings:    Due to this being a TeleHealth evaluation, many elements of the physical examination are unable to be assessed. HISTORY:     Past Medical History:   Diagnosis Date    Aden[facility] pancreas [facility] [address] 05/13/2020    Dr Carlin Filler biopsy via EUS    [facility]     GERD (gastroesophageal reflux disease)     Hernia of abdominal cavity     Subxyphoid hernia    Hypertension     Inflammatory polyarthropathy (HCC)     Joint pain     Joint swelling     Menopause     Ocular nevus of left eye     Pancreatitis     Tracheal stenosis       Past Surgical History:   Procedure Laterality Date    HX CARPAL TUNNEL RELEASE Bilateral     HX COLONOSCOPY      HX HYSTERECTOMY      HX KNEE ARTHROSCOPY Right     HX KNEE REPLACEMENT Right     partial    HX LAP CHOLECYSTECTOMY      HX TONSILLECTOMY      HX VASCULAR ACCESS        Family History   Problem Relation Age of Onset    Heart Disease Mother     Heart Attack Mother     Cancer Father         lung    Diabetes Sister       History reviewed, no pertinent family history. Social History     Tobacco Use    Smoking status: Never Smoker    Smokeless tobacco: Never Used   Substance Use Topics    Alcohol use:  No Alcohol/week: 0.0 standard drinks     Frequency: Never     Binge frequency: Never     No Known Allergies   Current Outpatient Medications   Medication Sig    famotidine (Pepcid) 20 mg tablet Take 20 mg by mouth two (2) times a day.  sennosides (Senna) 8.6 mg cap Take  by mouth.  polyethylene glycol (Miralax) 17 gram/dose powder Take 17 g by mouth two (2) times a day.  gabapentin (NEURONTIN) 100 mg capsule Take 1 Cap by mouth three (3) times daily. Max Daily Amount: 300 mg.  carvediloL (COREG) 25 mg tablet TAKE 1 TABLET BY MOUTH TWICE DAILY    HYDROmorphone (Dilaudid) 2 mg tablet Take 2 mg by mouth every four (4) hours as needed for Pain. takes 3 tabs  by mouth    lipase-protease-amylase (Creon) 12,000-38,000 -60,000 unit capsule Take 3 Caps by mouth three (3) times daily (with meals). Indications: exocrine pancreatic insufficiency    lidocaine-prilocaine (EMLA) topical cream Apply  to affected area as needed for Pain (pain ). Apply a thin layer over port site prior to accessing port    diphenhydrAMINE (BENADRYL) 25 mg tablet Take 25 mg by mouth two (2) times a day.  nystatin (MYCOSTATIN) powder Apply 1 g to affected area three (3) times daily.  potassium bicarb-citric acid (EFFER-K) 20 mEq tablet Take 1 Tab by mouth two (2) times daily (with meals). (Patient taking differently: Take 20 mEq by mouth two (2) times daily (with meals). takes 10 meq 2 x day)    LORazepam (ATIVAN) 1 mg tablet Take 1 Tab by mouth every six (6) hours as needed for Anxiety. Max Daily Amount: 4 mg. Indications: nausea and vomiting caused by cancer drugs    ondansetron (ZOFRAN ODT) 4 mg disintegrating tablet Take 1 Tab by mouth every eight (8) hours as needed for Nausea or Vomiting.     Blood-Glucose Meter,Continuous (Dexcom G6 ) misc Use as directed to monitor blood glucose as directed    Blood-Glucose Sensor (Dexcom G6 Sensor) fadi Use as directed to monitor blood glucose as directed    Blood-Glucose Transmitter (Dexcom G6 Transmitter) fadi Use as directed to monitor blood glucose as directed    insulin lispro (HumaLOG KwikPen Insulin) 100 unit/mL kwikpen Inject 25 units into the skin with breakfast, 15 units with lunch (if BG is >200 and eating carbs with meal), and 30 units with dinner (Patient taking differently: patient on sliding scale)    insulin glargine U-300 conc (Toujeo SoloStar U-300 Insulin) 300 unit/mL (1.5 mL) inpn pen 55 units every bedtime (Patient taking differently: 15 Units by SubCUTAneous route nightly. 55 units every bedtime)    acetaminophen (Acetaminophen Extra Strength) 500 mg tablet Take 1,000 mg by mouth two (2) times a day.  Needle, Disp, ndle 1 Each by Does Not Apply route two (2) times daily as needed for Other.  promethazine (PHENERGAN) 25 mg tablet Take 1 Tab by mouth every six (6) hours as needed for Nausea.  diphenoxylate-atropine (LomotiL) 2.5-0.025 mg per tablet Take 2 Tabs by mouth four (4) times daily as needed for Diarrhea. Max Daily Amount: 8 Tabs.  prochlorperazine (COMPAZINE) 10 mg tablet Take 0.5 Tabs by mouth every six (6) hours as needed for Nausea. No current facility-administered medications for this visit. LAB DATA REVIEWED:     Lab Results   Component Value Date/Time    WBC 2.6 (L) 09/02/2020 08:50 AM    HGB 10.6 (L) 09/02/2020 08:50 AM    PLATELET 070 13/49/8659 08:50 AM     Lab Results   Component Value Date/Time    Sodium 140 09/02/2020 08:50 AM    Potassium 4.2 09/02/2020 08:50 AM    Chloride 103 09/02/2020 08:50 AM    CO2 25 09/02/2020 08:50 AM    BUN 14 09/02/2020 08:50 AM    Creatinine 0.84 09/02/2020 08:50 AM    Calcium 9.1 09/02/2020 08:50 AM    Magnesium 1.7 07/15/2020 05:35 AM    Phosphorus 3.0 07/15/2020 05:35 AM      Lab Results   Component Value Date/Time    Alk.  phosphatase 78 09/02/2020 08:50 AM    Protein, total 7.0 09/02/2020 08:50 AM    Albumin 3.5 09/02/2020 08:50 AM    Globulin 3.5 09/02/2020 08:50 AM     No results found for: INR, PTMR, PTP, PT1, PT2, APTT, INREXT, INREXT   Lab Results   Component Value Date/Time    Iron 50 08/12/2020 11:00 AM    TIBC 244 (L) 08/12/2020 11:00 AM    Iron % saturation 20 08/12/2020 11:00 AM    Ferritin 603 (H) 08/12/2020 11:00 AM           CONTROLLED SUBSTANCES SAFETY ASSESSMENT (IF ON CONTROLLED SUBSTANCES):     Reviewed opioid safety handout:  [x] Yes   [] No  24 hour opioid dose >150mg morphine equivalent/day:  [] Yes   [x] No  Benzodiazepines:  [] Yes   [x] No  Sleep apnea:  [] Yes   [x] No  Urine Toxicology Testing within last 6 months:  [] Yes   [x] No  History of or new aberrant medication taking behaviors:  [] Yes   [x] No  Has Narcan been prescribed [x] Yes   [] No          Total time: 60 minutes  Counseling / coordination time: 40 minutes  > 50% counseling / coordination?:  Yes    Consent:  He and/or health care decision maker is aware that that he may receive a bill for this telehealth service, depending on his insurance coverage, and has provided verbal consent to proceed: Yes    CPT Codes 53755-34805 for Established Patients may apply to this Telehealth Visit  Pursuant to the emergency declaration under the [facility], [facility] waiver authority and the [employer] and [employer] Act, this Virtual  Visit was conducted, with patient's consent, to reduce the patient's risk of exposure to COVID-19 and provide continuity of care for an established patient. Services were provided through a video synchronous discussion virtually to substitute for in-person clinic visit.

## 2020-09-08 NOTE — PATIENT INSTRUCTIONS
Dear Elisha Mejia , It was a pleasure seeing you today in your home along with your daughter virtually We will see you again in 4 weeks virtually If labs or imaging tests have been ordered for you today, please call the office  at [phone] 48 hours after completion to obtain the results. Your stated goal:  
-To live well for as long as possible Your described symptoms were: Fatigue: 10 Drowsiness: 2 Depression: 2 Pain: 4 Anxiety: 3 Nausea: 0 Anorexia: 5 Dyspnea: 0 Constipation: No  
     
 
This is the plan we talked about: 1. Upper abdominal pain-pancreatic cancer related 
-Your pain is much improved now, your CA 19 is normal!.  You are now using only Dilaudid 1 mg every 6 hours as needed, you take about 2 to 3 tablets/day or less some days. You have plenty of Dilaudid, no need for refill now. 2.  Severe nausea 
-You have chemotherapy-induced nausea. You are currently taking Zofran 8 mg oral disintegrating tablet 2 times and this is helping. You have not needed Compazine. You also take Ativan at bedtime which helps with sleep and nausea. 3.  Insomnia - You have been taking Ativan 0.5 mg at bedtime which is very helpful and so you want to continue the same. 4.  Anorexia and oral thrush 
-Oral thrush resolved with nystatin swish and swallow 
-Drink warm water with lemon and honey every morning 
-You tried Marinol for a week but it caused confusion and hallucination so you stopped it. 
-You have already met with a nutritionist but the information provided to you was overwhelming. We reviewed things that you can eat today and you will in to improve your calorie intake to at least 1200 Kcal/day 
-Increase water intake to between 60 to 80 ounces of water per day 5. Uncontrolled diabetes 
-You are taking 55 units glargine insulin at nighttime and modify your daytime insulin based on your sugars.   You do not have a set sliding scale.  I am very worried about extreme sugar levels. Start a food diary and measure your blood glucose 4 times a day and keep a diary so we can review this and modify your insulin needs. 6.  Diarrhea and constipation 
-Diarrhea is improved since stopping FOLFOX. 
- You are taking stool softeners on a regular basis and using MiraLAX as needed. 7.  Neuropathy 
-You are starting to experience some numbness and tingling in your bilateral fingers and toes. Given that you are high risk for neuropathy from chemotherapy and diabetes, let us start gabapentin 100 mg at bedtime for a week, increase to 200 mg at bedtime for a week, and then increase to 300 mg/day. 8.  Advanced care planning 
-You completed a living will and advanced medical directive naming your daughter Dianne San as you medical power of  9. Psychosocial support 
-You are the primary caregiver of your  who is cognitively impaired. You dealing with the cancer is causing tremendous amount of stress on you and your family. Your daughter Dianne San is the only one who lives local and is able to help out. We talked about how palliative medicine can certainly provide you as much support as possible. 10.  Pancreatic cancer - You are doing very well, your cancer is responding really well to treatment. Your CA 19 numbers are normal.  You are currently on gemcitabine plus Abraxane and doing fairly well. This is what you have shared with us about Advance Care Planning:  
 
  Primary Decision Maker: Dandre Villalta - Daughter - [phone] Advance Care Planning 9/2/2020 Patient's Healthcare Decision Maker is: Named in scanned ACP document Confirm Advance Directive Yes, on file Patient Would Like to Complete Advance Directive - Does the patient have other document types - The Palliative Medicine Team is here to support you and your family.   
 
 
Sincerely, 
 
 
Danay Rizzo MD and the Palliative Medicine Team

## 2020-09-08 NOTE — PROGRESS NOTES
Palliative Medicine Office Visit  Palliative Medicine Nurse Check In  [phone] ([phone])    Patient Name: Malick Bella  YOB: 1955      Date of Office Visit:     Patient states: Would like to discuss white count levels    From Check In Sheet (scanned in Media):  Has a medical provider talked with you about cardiopulmonary resuscitation (CPR)? [x] Yes   [] No   [] Unable to obtain    Nurse reminder to complete or update ACP FlowSheet:    Is ACP on the Problem List?    [x] Yes    [] No  IF ACP Document is ON FILE; Nurse to place ACP on Problem List     Is there an ACP Note in Chart Review/Note? [x] Yes    [] No   If NO: [address] 9/2/2020   Patient's Healthcare Decision Maker is: Named in scanned ACP document   Confirm Advance Directive Yes, on file   Patient Would Like to Complete Advance Directive -   Does the patient have other document types -       Is there anything that we should know about you as a person in order to provide you the best care possible? No  Have you been to the ER, urgent care clinic since your last visit? [] Yes   [x] No   [] Unable to obtain    Have you been hospitalized since your last visit? [] Yes   [x] No   [] Unable to obtain    Have you seen or consulted any other health care providers outside of the [address] [place] [address] since your last visit?    [] Yes   [x] No   [] Unable to obtain    Functional status (describe):   Reminder to complete Palliative Performance Scale or ECOG Performance Scale if patient has cancer      Last BM: 9/8/20     accessed (date): 9/8/20    Bottle review (for opioid pain medication):  Medication 1:   Date filled:   Directions:   # filled:   # left:   # pills taking per day:  Last dose taken:    Medication 2:   Date filled:   Directions:   # filled:   # left:   # pills taking per day:  Last dose taken:    Medication 3:   Date filled:   Directions:   # filled:   # left:   # pills taking per day:  Last dose taken:    Medication 4:   Date filled:   Directions:   # filled:   # left:   # pills taking per day:  Last dose taken:

## 2020-09-08 NOTE — PROGRESS NOTES
Mariann James is a 72 y.o. female here for f/u for Pancreatic cancer. Patient is scheduled for C2D8 today in [facility]. Patient states she did not rebound as quickly after this is cycle as she did the time before. After this last treatment she has flu type s/s until Monday. She c/o neuropathy s/s in her fingertips bilaterally and the neuropathy in her feet has progressaed. During the day she feels the skin under her toes feels burned, and is painful, at night the neuropathy s/s progresses to about 1/3 of her feet, she feels they are on fire. Her palliative care doctor has written her an RX for Gabapentin. Chemotherapy Flowsheet 9/2/2020   Cycle C2 D1   Date 9/2/2020   Drug / Regimen Abraxane/Gemcitabine   Dosage see MAR   Time Up 1040   Time Down 1125   Pre Hydration -   Pre Meds Zofran/Decadron   Notes -     Key Oncology Meds             megestroL (MEGACE) 40 mg tablet (Discontinued) Take 1 Tab by mouth two (2) times a day. ondansetron (ZOFRAN ODT) 4 mg disintegrating tablet Take 1 Tab by mouth every eight (8) hours as needed for Nausea or Vomiting. prochlorperazine (COMPAZINE) 10 mg tablet Take 0.5 Tabs by mouth every six (6) hours as needed for Nausea. Key Pain Meds             HYDROmorphone (Dilaudid) 2 mg tablet Take 2 mg by mouth every four (4) hours as needed for Pain. takes 3 tabs  by mouth    diclofenac EC (VOLTAREN) 50 mg EC tablet (Discontinued)     acetaminophen (Acetaminophen Extra Strength) 500 mg tablet Take 1,000 mg by mouth two (2) times a day.

## 2020-09-09 ENCOUNTER — OFFICE VISIT (OUTPATIENT)
Dept: ONCOLOGY | Age: 65
End: 2020-09-09

## 2020-09-09 VITALS
DIASTOLIC BLOOD PRESSURE: 58 MMHG | OXYGEN SATURATION: 99 % | TEMPERATURE: 97.3 F | SYSTOLIC BLOOD PRESSURE: 118 MMHG | HEIGHT: 64 IN | WEIGHT: 199 LBS | HEART RATE: 82 BPM | RESPIRATION RATE: 16 BRPM | BODY MASS INDEX: 33.97 KG/M2

## 2020-09-09 DIAGNOSIS — C25.9 ADENOCARCINOMA OF PANCREAS (HCC): Primary | ICD-10-CM

## 2020-09-09 PROCEDURE — 3331090002 HH PPS REVENUE DEBIT

## 2020-09-09 PROCEDURE — 3331090001 HH PPS REVENUE CREDIT

## 2020-09-09 RX ORDER — METHYLPHENIDATE HYDROCHLORIDE 5 MG/1
5 TABLET ORAL
COMMUNITY
End: 2020-10-21 | Stop reason: SDUPTHER

## 2020-09-09 NOTE — PROGRESS NOTES
Reason for Visit:   Ev Brown a 72 y. o. female who is seen for pancreatic adenocarcinoma     Treatment History:   Dx: Pancreatic Adenocarcinoma--May 2020--[id number]--oaweuvzpien of splenic vein and superior mesenteric vein  Tx: mFOLFIRINOX--first cycle 5/26/2020, second cycle 6/10/2020, dose reduced 15% cycle 3 on 6/30/2020--delayed due to severe side effects--switched to Gemcitabine/Abraxane--Cycle #1 7/29/2002, Cycle #2 9/2/2020  Goal: Prolong life--Palliative    History of Present Illness:   More fatigue--needed assistance moving around over the weekend, neuropathy is getting worse--especially in toes at night--pain and burning. Palliative care prescribed gabapentin but hasn't started. Taking pepcid and benadryl at night at it does help her sleep. Taking ritalin and it does help her mood.       Past Medical History:   Diagnosis Date    Adenocarcinoma of pancreas ([facility] 05/13/2020    Dr Jalen Curiel biopsy via EUS    Diabetes ([facility]     GERD (gastroesophageal reflux disease)     Hernia of abdominal cavity     Subxyphoid hernia    Hypertension     Inflammatory polyarthropathy (HCC)     Joint pain     Joint swelling     Menopause     Ocular nevus of left eye     Pancreatitis     Tracheal stenosis       Past Surgical History:   Procedure Laterality Date    HX CARPAL TUNNEL RELEASE Bilateral     HX COLONOSCOPY      HX HYSTERECTOMY      HX KNEE ARTHROSCOPY Right     HX KNEE REPLACEMENT Right     partial    HX LAP CHOLECYSTECTOMY      HX TONSILLECTOMY      HX VASCULAR ACCESS        Social History     Tobacco Use    Smoking status: Never Smoker    Smokeless tobacco: Never Used   Substance Use Topics    Alcohol use: No     Alcohol/week: 0.0 standard drinks     Frequency: Never     Binge frequency: Never      Family History   Problem Relation Age of Onset    Heart Disease Mother     Heart Attack Mother     Cancer Father         lung    Diabetes Sister      Current Outpatient Medications Medication Sig    methylphenidate HCl (Ritalin) 5 mg tablet Take 5 mg by mouth daily as needed.  famotidine (Pepcid) 20 mg tablet Take 20 mg by mouth two (2) times a day.  polyethylene glycol (Miralax) 17 gram/dose powder Take 17 g by mouth two (2) times a day.  carvediloL (COREG) 25 mg tablet TAKE 1 TABLET BY MOUTH TWICE DAILY (Patient taking differently: Take 25 mg by mouth two (2) times daily as needed.)    HYDROmorphone (Dilaudid) 2 mg tablet Take 1 mg by mouth two (2) times a day. takes 3 tabs  by mouth, taking BID and PRN    lipase-protease-amylase (Creon) 12,000-38,000 -60,000 unit capsule Take 3 Caps by mouth three (3) times daily (with meals). Indications: exocrine pancreatic insufficiency    lidocaine-prilocaine (EMLA) topical cream Apply  to affected area as needed for Pain (pain ). Apply a thin layer over port site prior to accessing port    diphenhydrAMINE (BENADRYL) 25 mg tablet Take 25 mg by mouth two (2) times a day.  nystatin (MYCOSTATIN) powder Apply 1 g to affected area three (3) times daily.  potassium bicarb-citric acid (EFFER-K) 20 mEq tablet Take 1 Tab by mouth two (2) times daily (with meals). (Patient taking differently: Take 20 mEq by mouth two (2) times daily (with meals). takes 10 meq 2 x day)    LORazepam (ATIVAN) 1 mg tablet Take 1 Tab by mouth every six (6) hours as needed for Anxiety. Max Daily Amount: 4 mg. Indications: nausea and vomiting caused by cancer drugs    ondansetron (ZOFRAN ODT) 4 mg disintegrating tablet Take 1 Tab by mouth every eight (8) hours as needed for Nausea or Vomiting.     insulin lispro (HumaLOG KwikPen Insulin) 100 unit/mL kwikpen Inject 25 units into the skin with breakfast, 15 units with lunch (if BG is >200 and eating carbs with meal), and 30 units with dinner (Patient taking differently: patient on sliding scale)    insulin glargine U-300 conc (Toujeo SoloStar U-300 Insulin) 300 unit/mL (1.5 mL) inpn pen 55 units every bedtime (Patient taking differently: 25 Units by SubCUTAneous route nightly. 55 units every bedtime)    acetaminophen (Acetaminophen Extra Strength) 500 mg tablet Take 1,000 mg by mouth two (2) times a day.  sennosides (Senna) 8.6 mg cap Take 3 Tabs by mouth nightly.  gabapentin (NEURONTIN) 100 mg capsule Take 1 Cap by mouth three (3) times daily. Max Daily Amount: 300 mg.  promethazine (PHENERGAN) 25 mg tablet Take 1 Tab by mouth every six (6) hours as needed for Nausea.  diphenoxylate-atropine (LomotiL) 2.5-0.025 mg per tablet Take 2 Tabs by mouth four (4) times daily as needed for Diarrhea. Max Daily Amount: 8 Tabs.  prochlorperazine (COMPAZINE) 10 mg tablet Take 0.5 Tabs by mouth every six (6) hours as needed for Nausea.  Blood-Glucose Meter,Continuous (Dexcom G6 ) misc Use as directed to monitor blood glucose as directed    Blood-Glucose Sensor (Dexcom G6 Sensor) fadi Use as directed to monitor blood glucose as directed    Blood-Glucose Transmitter (Dexcom G6 Transmitter) fadi Use as directed to monitor blood glucose as directed    Needle, Disp, ndle 1 Each by Does Not Apply route two (2) times daily as needed for Other. No current facility-administered medications for this visit. No Known Allergies     Review of Systems: A complete review of systems was obtained, negative except as described above. Physical Exam:     Visit Vitals  /58   Pulse 82   Temp 97.3 °F (36.3 °C) (Skin)   Resp 16   Ht 5' 4\" (1.626 m)   Wt 199 lb (90.3 kg)   SpO2 99%   BMI 34.16 kg/m²     ECOG PS: 1  General: No distress  Eyes: PERRLA, anicteric sclerae  HENT: Atraumatic, OP clear  Neck: Supple  Lymphatic: No cervical, supraclavicular, or inguinal adenopathy  Respiratory: CTAB, normal respiratory effort  CV: Normal rate, regular rhythm, no murmurs, no peripheral edema  GI: Soft, nontender, nondistended, no masses, no hepatomegaly, no splenomegaly  MS: Normal gait and station.  Digits without clubbing or cyanosis. Skin: No rashes, ecchymoses, or petechiae. Normal temperature, turgor, and texture. Psych: Alert, oriented, appropriate affect, normal judgment/insight    Results:     Lab Results   Component Value Date/Time    WBC 3.5 (L) 09/09/2020 08:27 AM    HGB 10.4 (L) 09/09/2020 08:27 AM    HCT 32.8 (L) 09/09/2020 08:27 AM    PLATELET 408 (L) 96/97/2518 08:27 AM    MCV 97.6 09/09/2020 08:27 AM    ABS. NEUTROPHILS 2.0 09/09/2020 08:27 AM     Lab Results   Component Value Date/Time    Sodium 140 09/02/2020 08:50 AM    Potassium 4.2 09/02/2020 08:50 AM    Chloride 103 09/02/2020 08:50 AM    CO2 25 09/02/2020 08:50 AM    Glucose 198 (H) 09/02/2020 08:50 AM    BUN 14 09/02/2020 08:50 AM    Creatinine 0.84 09/02/2020 08:50 AM    GFR est AA >60 09/02/2020 08:50 AM    GFR est non-AA >60 09/02/2020 08:50 AM    Calcium 9.1 09/02/2020 08:50 AM    Glucose (POC) 192 (H) 07/15/2020 11:56 AM    Creatinine (POC) 0.7 02/06/2013 10:21 AM     Lab Results   Component Value Date/Time    Bilirubin, total 0.3 09/02/2020 08:50 AM    ALT (SGPT) 26 09/02/2020 08:50 AM    Alk. phosphatase 78 09/02/2020 08:50 AM    Protein, total 7.0 09/02/2020 08:50 AM    Albumin 3.5 09/02/2020 08:50 AM    Globulin 3.5 09/02/2020 08:50 AM       CT A/P 4/30/2020  IMPRESSION: Suspect neoplastic pancreatic mass versus atypical focal  pancreatitis with splenic and superior mesenteric vein occlusion. Consider  further evaluation with endoscopic ultrasound at which time biopsy could  potentially be performed.     CT Chest 5/21/2020  IMPRESSION:  1. No evidence of pulmonary metastatic disease. No evidence of mediastinal or  hilar lymphadenopathy. No pleural effusions identified. 2. No definite evidence of central pulmonary embolic disease. 3. Presence of a mass lesion involving the pancreatic head/body. 4. Evidence of fatty infiltration involving the liver. Presence of focal  low-density areas within the liver compatible with cysts.  Surgical absence of  the gallbladder.     CT A/P 6/19/2020  IMPRESSION:  Pancreatitis with ascites favored. Discrete pancreatic mass is not identified. No biliary dilatation or definite arterial or venous thrombosis. No liver  metastases. Fat-containing ventral hernia  Nonobstructing left renal calculus  Retrolisthesis of L2 and L3.     Path: 5/8/2020  CYTOLOGIC INTERPRETATION:   Pancreas, EUS-guided fine needle aspiration and cell blocks: Adenocarcinoma      Assessment:   1) Pancreatic Adenocarcinoma--Unresectable  2) Neoplasm Pain/Neuropathic Pain  3) Fatigue  4) Nausea  5) DM  6) Nutrition  7) Diarrhea        Plan:   1) Switched to gem/abraxane--today is day 8 of cycle 2.  Repeat imaging after cycle #3. BRCA pending--may need to send off for know your tumor.    2) S/p celiac block--pain better and controlled with hydromorphone.  Future options include repeat celiac block, methadone, fentanyl.  Agree with trial of gabapentin for the neuropathic pain in feet/toes (100mg qhs titrating to 300mg qhs)     3) Methylphenidate 5mg bid--watch for anxiety and insomnia. I do want to drop the dose of both Abraxane (from 125 to 100mg/m2) and Gemcitabine (from 1000 to 900mg/m2) due to her significant fatigue.    4) Compazine controlling--switch to olanzapine 5mg bid or 10mg once daily if becomes difficult to manage with compazine.    5) Defer to Dr Vitor Hudson for further management.  Pharmacy may have ideas.       6) On pancreatic enzymes--weight has stabilized, may need nutrition consult.     7) Chemo related--hopefully with not recurr.   Taking senna/miralax to prevent constipation.       Signed By: Kinga Choi MD

## 2020-09-10 ENCOUNTER — HOME CARE VISIT (OUTPATIENT)
Dept: SCHEDULING | Facility: HOME HEALTH | Age: 65
End: 2020-09-10
Payer: MEDICARE

## 2020-09-10 ENCOUNTER — PATIENT OUTREACH (OUTPATIENT)
Dept: OTHER | Age: 65
End: 2020-09-10

## 2020-09-10 PROCEDURE — G0156 HHCP-SVS OF AIDE,EA 15 MIN: HCPCS

## 2020-09-10 PROCEDURE — 3331090002 HH PPS REVENUE DEBIT

## 2020-09-10 PROCEDURE — 3331090001 HH PPS REVENUE CREDIT

## 2020-09-10 NOTE — PROGRESS NOTES
[facility]    Call placed to pt, Verified  and address for HIPAA security. Goals      Benefits (pt-stated)      9/10/2020 pt remains a [employer] on [employer], but no longer has MMO; switched to Medicare. Be Well - TBD       Care Cordination related to Pancreas Cancer (pt-stated)      9/10/2020 continues to be followed by oncology and receive chemo at infusion center. Last infusion was yesterday 2020. Dosage has been decreased due to s/s, but pt reported so far so good with new dosage. Appetite good and denies diarrhea, which had previously been an issue.           CM will f/u in 4 weeks

## 2020-09-11 ENCOUNTER — HOME CARE VISIT (OUTPATIENT)
Dept: HOME HEALTH SERVICES | Facility: HOME HEALTH | Age: 65
End: 2020-09-11
Payer: MEDICARE

## 2020-09-11 PROCEDURE — 3331090002 HH PPS REVENUE DEBIT

## 2020-09-11 PROCEDURE — 3331090001 HH PPS REVENUE CREDIT

## 2020-09-12 PROCEDURE — 3331090001 HH PPS REVENUE CREDIT

## 2020-09-12 PROCEDURE — 3331090002 HH PPS REVENUE DEBIT

## 2020-09-13 PROCEDURE — 3331090002 HH PPS REVENUE DEBIT

## 2020-09-13 PROCEDURE — 3331090001 HH PPS REVENUE CREDIT

## 2020-09-15 ENCOUNTER — TELEPHONE (OUTPATIENT)
Dept: PALLATIVE CARE | Age: 65
End: 2020-09-15

## 2020-09-30 RX ORDER — SODIUM CHLORIDE 0.9 % (FLUSH) 0.9 %
10 SYRINGE (ML) INJECTION AS NEEDED
Status: CANCELLED
Start: 2020-10-07

## 2020-09-30 RX ORDER — ALBUTEROL SULFATE 0.83 MG/ML
2.5 SOLUTION RESPIRATORY (INHALATION) AS NEEDED
Status: CANCELLED
Start: 2020-10-07

## 2020-09-30 RX ORDER — SODIUM CHLORIDE 9 MG/ML
10 INJECTION INTRAMUSCULAR; INTRAVENOUS; SUBCUTANEOUS AS NEEDED
Status: CANCELLED | OUTPATIENT
Start: 2020-10-07

## 2020-09-30 RX ORDER — DIPHENHYDRAMINE HYDROCHLORIDE 50 MG/ML
25 INJECTION, SOLUTION INTRAMUSCULAR; INTRAVENOUS AS NEEDED
Status: CANCELLED
Start: 2020-10-07

## 2020-09-30 RX ORDER — HEPARIN 100 UNIT/ML
300-500 SYRINGE INTRAVENOUS AS NEEDED
Status: CANCELLED
Start: 2020-10-07

## 2020-09-30 RX ORDER — SODIUM CHLORIDE 9 MG/ML
25 INJECTION, SOLUTION INTRAVENOUS CONTINUOUS
Status: CANCELLED | OUTPATIENT
Start: 2020-10-07

## 2020-09-30 RX ORDER — DIPHENHYDRAMINE HYDROCHLORIDE 50 MG/ML
25 INJECTION, SOLUTION INTRAMUSCULAR; INTRAVENOUS
Status: CANCELLED
Start: 2020-10-07

## 2020-09-30 RX ORDER — ONDANSETRON 2 MG/ML
8 INJECTION INTRAMUSCULAR; INTRAVENOUS AS NEEDED
Status: CANCELLED | OUTPATIENT
Start: 2020-10-07

## 2020-09-30 RX ORDER — EPINEPHRINE 1 MG/ML
0.3 INJECTION, SOLUTION, CONCENTRATE INTRAVENOUS AS NEEDED
Status: CANCELLED | OUTPATIENT
Start: 2020-10-07

## 2020-09-30 RX ORDER — ACETAMINOPHEN 325 MG/1
650 TABLET ORAL AS NEEDED
Status: CANCELLED
Start: 2020-10-07

## 2020-09-30 RX ORDER — ONDANSETRON 2 MG/ML
8 INJECTION INTRAMUSCULAR; INTRAVENOUS ONCE
Status: CANCELLED | OUTPATIENT
Start: 2020-10-07

## 2020-09-30 RX ORDER — LORAZEPAM 2 MG/ML
0.5 INJECTION INTRAMUSCULAR
Status: CANCELLED
Start: 2020-10-07

## 2020-09-30 RX ORDER — HYDROCORTISONE SODIUM SUCCINATE 100 MG/2ML
100 INJECTION, POWDER, FOR SOLUTION INTRAMUSCULAR; INTRAVENOUS AS NEEDED
Status: CANCELLED | OUTPATIENT
Start: 2020-10-07

## 2020-09-30 RX ORDER — DIPHENHYDRAMINE HYDROCHLORIDE 50 MG/ML
50 INJECTION, SOLUTION INTRAMUSCULAR; INTRAVENOUS AS NEEDED
Status: CANCELLED
Start: 2020-10-07

## 2020-09-30 RX ORDER — DEXAMETHASONE SODIUM PHOSPHATE 100 MG/10ML
10 INJECTION INTRAMUSCULAR; INTRAVENOUS ONCE
Status: CANCELLED
Start: 2020-10-07

## 2020-10-02 RX ORDER — POTASSIUM CHLORIDE 20 MEQ/1
TABLET, EXTENDED RELEASE ORAL
Qty: 60 TAB | Refills: 0 | Status: SHIPPED | OUTPATIENT
Start: 2020-10-02 | End: 2020-11-30 | Stop reason: SDUPTHER

## 2020-10-07 ENCOUNTER — OFFICE VISIT (OUTPATIENT)
Dept: ONCOLOGY | Age: 65
End: 2020-10-07
Payer: MEDICARE

## 2020-10-07 ENCOUNTER — VIRTUAL VISIT (OUTPATIENT)
Dept: PALLATIVE CARE | Age: 65
End: 2020-10-07
Payer: MEDICARE

## 2020-10-07 VITALS
OXYGEN SATURATION: 98 % | SYSTOLIC BLOOD PRESSURE: 145 MMHG | HEART RATE: 95 BPM | WEIGHT: 195.8 LBS | RESPIRATION RATE: 18 BRPM | TEMPERATURE: 98.4 F | HEIGHT: 64 IN | BODY MASS INDEX: 33.43 KG/M2 | DIASTOLIC BLOOD PRESSURE: 66 MMHG

## 2020-10-07 DIAGNOSIS — R11.0 CHEMOTHERAPY-INDUCED NAUSEA: Primary | ICD-10-CM

## 2020-10-07 DIAGNOSIS — C25.9 MALIGNANT NEOPLASM OF PANCREAS, UNSPECIFIED LOCATION OF MALIGNANCY (HCC): ICD-10-CM

## 2020-10-07 DIAGNOSIS — T45.1X5A CHEMOTHERAPY-INDUCED NAUSEA: Primary | ICD-10-CM

## 2020-10-07 DIAGNOSIS — G62.9 NEUROPATHY: ICD-10-CM

## 2020-10-07 DIAGNOSIS — R10.84 ABDOMINAL PAIN, GENERALIZED: ICD-10-CM

## 2020-10-07 PROCEDURE — 99214 OFFICE O/P EST MOD 30 MIN: CPT | Performed by: INTERNAL MEDICINE

## 2020-10-07 RX ORDER — GABAPENTIN 300 MG/1
300 CAPSULE ORAL 3 TIMES DAILY
Qty: 90 CAP | Refills: 3 | Status: SHIPPED | OUTPATIENT
Start: 2020-10-07 | End: 2020-11-11 | Stop reason: SDUPTHER

## 2020-10-07 NOTE — PROGRESS NOTES
Palliative Medicine Office Visit  Palliative Medicine Nurse Check In  [phone]    Patient Name: Juanito Byrnes  YOB: 1955      Date of Office Visit: 10/7/2020    Patient states: \"  \"    From Check In Sheet (scanned in Media):  Has a medical provider talked with you about cardiopulmonary resuscitation (CPR)? [x] Yes   [] No   [] Unable to obtain    Nurse reminder to complete or update ACP FlowSheet:    Is ACP on the Problem List?    [] Yes    [] No  IF ACP Document is ON FILE; Nurse to place ACP on Problem List     Is there an ACP Note in Chart Review/Note? [] Yes    [] No   If NO: [address] 10/7/2020   Patient's Healthcare Decision Maker is: Named in scanned ACP document   Confirm Advance Directive Yes, on file   Patient Would Like to Complete Advance Directive -   Does the patient have other document types -       Is there anything that we should know about you as a person in order to provide you the best care possible? Have you been to the ER, urgent care clinic since your last visit? [] Yes   [] No   [] Unable to obtain    Have you been hospitalized since your last visit? [] Yes   [] No   [] Unable to obtain    Have you seen or consulted any other health care providers outside of the [address] [place] [address] since your last visit?    [] Yes   [] No   [] Unable to obtain    Functional status (describe):         Last BM: 10/6/2020     accessed (date): 10/7/2020    Bottle review (for opioid pain medication):  Medication 1:   Date filled:   Directions:   # filled:   # left:   # pills taking per day:  Last dose taken:    Medication 2:   Date filled:   Directions:   # filled:   # left:   # pills taking per day:  Last dose taken:    Medication 3:   Date filled:   Directions:   # filled:   # left:   # pills taking per day:  Last dose taken:    Medication 4:   Date filled:   Directions:   # filled:   # left:   # pills taking per day:  Last dose taken:

## 2020-10-07 NOTE — PATIENT INSTRUCTIONS
Dear Cecil Herring , It was a pleasure seeing you today in your home along with your daughter virtually We will see you again in 6 weeks virtually If labs or imaging tests have been ordered for you today, please call the office  at [phone] 48 hours after completion to obtain the results. Your stated goal:  
-To live well for as long as possible Your described symptoms were: Fatigue: 4 Drowsiness: 0 Depression: 1 Pain: 4 Anxiety: 2 Nausea: 0 Anorexia: 4 Dyspnea: 0 Best Well-Bein Constipation: No  
Other Problem (Comment): 0 This is the plan we talked about: 1. Upper abdominal pain-pancreatic cancer related 
-Your pain is much improved now, your CA 19 is normal!.  You are now using only Dilaudid 1 mg every 6 hours as needed, you take about 2 to 3 tablets/day or less some days. You have plenty of Dilaudid, no need for refill now. 2.  Severe nausea 
-You have chemotherapy-induced nausea. You are currently taking Zofran 8 mg oral disintegrating tablet 2 times and this is helping. You have not needed Compazine. You also take Ativan at bedtime which helps with sleep and nausea. 3.  Insomnia - You have been taking Ativan 0.5 mg at bedtime which is very helpful and so you want to continue the same. 4.  Anorexia and oral thrush 
-Oral thrush resolved with nystatin swish and swallow 
-Drink warm water with lemon and honey every morning 
-You tried Marinol for a week but it caused confusion and hallucination so you stopped it. 
-You have already met with a nutritionist but the information provided to you was overwhelming. We reviewed things that you can eat today and you will in to improve your calorie intake to at least 1200 Kcal/day 
-Increase water intake to between 60 to 80 ounces of water per day 5.   Uncontrolled diabetes 
-You are taking 55 units glargine insulin at nighttime and modify your daytime insulin based on your sugars. You do not have a set sliding scale. I am very worried about extreme sugar levels. Start a food diary and measure your blood glucose 4 times a day and keep a diary so we can review this and modify your insulin needs. 6.  Diarrhea and constipation 
-Diarrhea is improved since stopping FOLFOX. 
- You are taking stool softeners on a regular basis and using MiraLAX as needed. 7.  Neuropathy 
-We started you on gabapentin for the numbness and tingling in your fingers and toes and slowly increased it up to 300 mg/day. Now let us increase this to 300 mg 2 times a day for about a week and then increase to 300 mg 3 times a day. 8.  Advanced care planning 
-You completed a living will and advanced medical directive naming your daughter Morena Edge as you medical power of  9. Psychosocial support 
-You are the primary caregiver of your  who is cognitively impaired. You dealing with the cancer is causing tremendous amount of stress on you and your family. Your daughter Morena Edge is the only one who lives local and is able to help out. We talked about how palliative medicine can certainly provide you as much support as possible. 10.  Pancreatic cancer - You are doing very well, your cancer is responding really well to treatment. Your CA 19 numbers are normal.  You are currently on gemcitabine plus Abraxane and doing fairly well. This is what you have shared with us about Advance Care Planning:  
 
  Primary Decision Maker: Reji Dears - Daughter - [phone] Advance Care Planning 10/7/2020 Patient's Healthcare Decision Maker is: Named in scanned ACP document Confirm Advance Directive Yes, on file Patient Would Like to Complete Advance Directive - Does the patient have other document types - The Palliative Medicine Team is here to support you and your family.   
 
 
Sincerely, 
 
 
 Joon Alberto MD and the Palliative Medicine Team

## 2020-10-07 NOTE — PROGRESS NOTES
Pt is here for routine follow up, she reports feeling well, some pain, but taking pain meds with relief. Visit Vitals  BP (!) 145/66   Pulse 95   Temp 98.4 °F (36.9 °C)   Resp 18   Ht 5' 4\" (1.626 m)   Wt 195 lb 12.8 oz (88.8 kg)   SpO2 98%   BMI 33.61 kg/m²       Pain Scale: 4/10  Pain Location:       1. Have you been to the ER, urgent care clinic since your last visit? Hospitalized since your last visit? no    2. Have you seen or consulted any other health care providers outside of the [address] [place] [address] since your last visit? Include any pap smears or colon screening.   No    Health Maintenance reviewed

## 2020-10-07 NOTE — PROGRESS NOTES
Reason for Visit:   Mini Weber a 64 U. o. female who is seen for pancreatic adenocarcinoma     Treatment History:   Dx: Pancreatic Adenocarcinoma--May 2020--[id number]--fvpxojiaxjy of splenic vein and superior mesenteric vein  Tx: mFOLFIRINOX--first cycle 5/26/2020, second cycle 6/10/2020, dose reduced 15% cycle 3 on 6/30/2020--delayed due to severe side effects--switched to Gemcitabine/Abraxane--Cycle #1 7/29/2002, Cycle #2 9/2/2020, Cycle #3 10/7/2020  Goal: Prolong life--Palliative    History of Present Illness:   Still with neuropathy--little better with gabapentin--taking 300mg qhs. Also notes increased back pain--lower--hasn't required pain medications--4/10 at worst--this is similar to pain had when first diagnosed--this is causing anxiety. Taking lorazepam for the anxiety and it helps some.       Past Medical History:   Diagnosis Date    Adenocarcinoma of pancreas ([facility] [address] 05/13/2020    Dr Palomo Parents biopsy via EUS    Diabetes ([facility] [address]     GERD (gastroesophageal reflux disease)     Hernia of abdominal cavity     Subxyphoid hernia    Hypertension     Inflammatory polyarthropathy (HCC)     Joint pain     Joint swelling     Menopause     Ocular nevus of left eye     Pancreatitis     Tracheal stenosis       Past Surgical History:   Procedure Laterality Date    HX CARPAL TUNNEL RELEASE Bilateral     HX COLONOSCOPY      HX HYSTERECTOMY      HX KNEE ARTHROSCOPY Right     HX KNEE REPLACEMENT Right     partial    HX LAP CHOLECYSTECTOMY      HX TONSILLECTOMY      HX VASCULAR ACCESS        Social History     Tobacco Use    Smoking status: Never Smoker    Smokeless tobacco: Never Used   Substance Use Topics    Alcohol use: No     Alcohol/week: 0.0 standard drinks     Frequency: Never     Binge frequency: Never      Family History   Problem Relation Age of Onset    Heart Disease Mother     Heart Attack Mother     Cancer Father         lung    Diabetes Sister      Current Outpatient Medications   Medication Sig    potassium chloride (K-DUR, KLOR-CON) 20 mEq tablet TAKE 1 TABLET BY MOUTH TWICE A DAY. Indications: low amount of potassium in the blood    methylphenidate HCl (Ritalin) 5 mg tablet Take 5 mg by mouth daily as needed.  famotidine (Pepcid) 20 mg tablet Take 20 mg by mouth two (2) times a day.  sennosides (Senna) 8.6 mg cap Take 3 Tabs by mouth nightly.  polyethylene glycol (Miralax) 17 gram/dose powder Take 17 g by mouth two (2) times a day.  gabapentin (NEURONTIN) 100 mg capsule Take 1 Cap by mouth three (3) times daily. Max Daily Amount: 300 mg.  carvediloL (COREG) 25 mg tablet TAKE 1 TABLET BY MOUTH TWICE DAILY (Patient taking differently: Take 25 mg by mouth two (2) times daily as needed.)    HYDROmorphone (Dilaudid) 2 mg tablet Take 1 mg by mouth two (2) times a day. takes 3 tabs  by mouth, taking BID and PRN    lipase-protease-amylase (Creon) 12,000-38,000 -60,000 unit capsule Take 3 Caps by mouth three (3) times daily (with meals). Indications: exocrine pancreatic insufficiency    lidocaine-prilocaine (EMLA) topical cream Apply  to affected area as needed for Pain (pain ). Apply a thin layer over port site prior to accessing port    diphenhydrAMINE (BENADRYL) 25 mg tablet Take 25 mg by mouth two (2) times a day.  nystatin (MYCOSTATIN) powder Apply 1 g to affected area three (3) times daily.  promethazine (PHENERGAN) 25 mg tablet Take 1 Tab by mouth every six (6) hours as needed for Nausea.  potassium bicarb-citric acid (EFFER-K) 20 mEq tablet Take 1 Tab by mouth two (2) times daily (with meals). (Patient taking differently: Take 20 mEq by mouth two (2) times daily (with meals). takes 10 meq 2 x day)    LORazepam (ATIVAN) 1 mg tablet Take 1 Tab by mouth every six (6) hours as needed for Anxiety. Max Daily Amount: 4 mg.  Indications: nausea and vomiting caused by cancer drugs    ondansetron (ZOFRAN ODT) 4 mg disintegrating tablet Take 1 Tab by mouth every eight (8) hours as needed for Nausea or Vomiting.  diphenoxylate-atropine (LomotiL) 2.5-0.025 mg per tablet Take 2 Tabs by mouth four (4) times daily as needed for Diarrhea. Max Daily Amount: 8 Tabs.  prochlorperazine (COMPAZINE) 10 mg tablet Take 0.5 Tabs by mouth every six (6) hours as needed for Nausea.  Blood-Glucose Meter,Continuous (Dexcom G6 ) misc Use as directed to monitor blood glucose as directed    Blood-Glucose Sensor (Dexcom G6 Sensor) fadi Use as directed to monitor blood glucose as directed    Blood-Glucose Transmitter (Dexcom G6 Transmitter) fadi Use as directed to monitor blood glucose as directed    insulin lispro (HumaLOG KwikPen Insulin) 100 unit/mL kwikpen Inject 25 units into the skin with breakfast, 15 units with lunch (if BG is >200 and eating carbs with meal), and 30 units with dinner (Patient taking differently: patient on sliding scale)    insulin glargine U-300 conc (Toujeo SoloStar U-300 Insulin) 300 unit/mL (1.5 mL) inpn pen 55 units every bedtime (Patient taking differently: 25 Units by SubCUTAneous route nightly. 55 units every bedtime)    acetaminophen (Acetaminophen Extra Strength) 500 mg tablet Take 1,000 mg by mouth two (2) times a day.  Needle, Disp, ndle 1 Each by Does Not Apply route two (2) times daily as needed for Other.  nitrofurantoin, macrocrystal-monohydrate, (Macrobid) 100 mg capsule Take 1 Cap by mouth two (2) times a day for 5 days. No current facility-administered medications for this visit. No Known Allergies     Review of Systems: A complete review of systems was obtained, negative except as described above.     Physical Exam:     Visit Vitals  BP (!) 145/66   Pulse 95   Temp 98.4 °F (36.9 °C)   Resp 18   Ht 5' 4\" (1.626 m)   Wt 195 lb 12.8 oz (88.8 kg)   SpO2 98%   BMI 33.61 kg/m²     ECOG PS: 1  General: No distress  Eyes: PERRLA, anicteric sclerae  HENT: Atraumatic, OP clear  Neck: Supple  Lymphatic: No cervical, supraclavicular, or inguinal adenopathy  Respiratory: CTAB, normal respiratory effort  CV: Normal rate, regular rhythm, no murmurs, no peripheral edema  GI: Soft, nontender, nondistended, no masses, no hepatomegaly, no splenomegaly  MS: Normal gait and station. Digits without clubbing or cyanosis. Skin: No rashes, ecchymoses, or petechiae. Normal temperature, turgor, and texture. Psych: Alert, oriented, appropriate affect, normal judgment/insight    Results:     Lab Results   Component Value Date/Time    WBC 2.0 (L) 09/16/2020 08:27 AM    HGB 9.8 (L) 09/16/2020 08:27 AM    HCT 30.2 (L) 09/16/2020 08:27 AM    PLATELET 96 (L) 41/44/2807 08:27 AM    MCV 95.3 09/16/2020 08:27 AM    ABS. NEUTROPHILS 0.8 (L) 09/16/2020 08:27 AM     Lab Results   Component Value Date/Time    Sodium 140 09/16/2020 08:27 AM    Potassium 4.2 09/16/2020 08:27 AM    Chloride 102 09/16/2020 08:27 AM    CO2 29 09/16/2020 08:27 AM    Glucose 182 (H) 09/16/2020 08:27 AM    BUN 12 09/16/2020 08:27 AM    Creatinine 0.79 09/16/2020 08:27 AM    GFR est AA >60 09/16/2020 08:27 AM    GFR est non-AA >60 09/16/2020 08:27 AM    Calcium 9.1 09/16/2020 08:27 AM    Glucose (POC) 192 (H) 07/15/2020 11:56 AM    Creatinine (POC) 0.7 02/06/2013 10:21 AM     Lab Results   Component Value Date/Time    Bilirubin, total 0.4 09/16/2020 08:27 AM    ALT (SGPT) 37 09/16/2020 08:27 AM    Alk. phosphatase 61 09/16/2020 08:27 AM    Protein, total 6.7 09/16/2020 08:27 AM    Albumin 3.4 (L) 09/16/2020 08:27 AM    Globulin 3.3 09/16/2020 08:27 AM       CT A/P 4/30/2020  IMPRESSION: Suspect neoplastic pancreatic mass versus atypical focal  pancreatitis with splenic and superior mesenteric vein occlusion. Consider  further evaluation with endoscopic ultrasound at which time biopsy could  potentially be performed.     CT Chest 5/21/2020  IMPRESSION:  1. No evidence of pulmonary metastatic disease. No evidence of mediastinal or  hilar lymphadenopathy.  No pleural effusions identified. 2. No definite evidence of central pulmonary embolic disease. 3. Presence of a mass lesion involving the pancreatic head/body. 4. Evidence of fatty infiltration involving the liver. Presence of focal  low-density areas within the liver compatible with cysts. Surgical absence of  the gallbladder.     CT A/P 6/19/2020  IMPRESSION:  Pancreatitis with ascites favored. Discrete pancreatic mass is not identified. No biliary dilatation or definite arterial or venous thrombosis. No liver  metastases. Fat-containing ventral hernia  Nonobstructing left renal calculus  Retrolisthesis of L2 and L3.     Path: 5/8/2020  CYTOLOGIC INTERPRETATION:   Pancreas, EUS-guided fine needle aspiration and cell blocks: Adenocarcinoma      Assessment:   1) Pancreatic Adenocarcinoma--Unresectable  2) Neoplasm Pain/Neuropathic Pain  3) Fatigue  4) Nausea  5) DM  6) Nutrition  7) Diarrhea        Plan:   1) Switched to gem/abraxane--today is day 1 of cycle 3.  Repeat imaging. BRCA pending--send off for know your tumor.    2) S/p celiac block--pain better and controlled with hydromorphone.  Future options include repeat celiac block, methadone, fentanyl.  Increase gabapentin for the neuropathic pain in feet/toes to 300mg tid.    3) Methylphenidate 5mg bid--watch for anxiety and insomnia (normally only taking dose in am). I did drop the dose of both Abraxane (from 125 to 100mg/m2) and Gemcitabine (from 1000 to 900mg/m2) due to her significant fatigue.    4) Compazine/Ondansetron controlling--switch to olanzapine 5mg bid or 10mg once daily if becomes difficult to manage with compazine.    5) Defer to Dr Zara Garcia for further management.  Pharmacy may have ideas.       6) On pancreatic enzymes--weight has stabilized, may need nutrition consult.     7) Chemo related--hopefully with not recurr. Taking senna/miralax to prevent constipation.       Signed By: Giovanny Shoemaker MD

## 2020-10-07 NOTE — PROGRESS NOTES
Palliative Medicine Outpatient Services  [place]: [phone] ([phone])    Patient Name: Onel Brothers  YOB: 1955    Date of Current Visit: 10/07/20  Location of Current Visit:    [] [facility] Office  [] [facility] Office  [] [facility] Office  [] Home  [x]Synchronous A/V virtual visit    Date of Initial Visit: 6/15/2020  Referral from: Dr. Olimpia Alaniz  Primary Care Physician: Kena Minor MD      SUMMARY:   Onel Brothers is a 72y.o. year old with a  history of uncontrolled diabetes with an A1c of 9.2, newly diagnosed inoperable pancreatic cancer, who was referred to Palliative Medicine by Dr. Olimpia Alaniz for management of intractable symptoms and psychosocial support. 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 CT on June 19 shows pancreatitis with some ascites, pancreatic mass has disappeared. Hospitalization at [facility] from July 4 to July 15 for dehydration. Completed 3 doses of 5-FU, started Gemzar Abraxane on 7/29/2020   CA 19-normal       PALLIATIVE DIAGNOSES:       ICD-10-CM ICD-9-CM    1. Chemotherapy-induced nausea  R11.0 787.02     T45.1X5A E933.1    2. Abdominal pain, generalized  R10.84 789.07    3. Malignant neoplasm of pancreas, unspecified location of malignancy (HCC)  C25.9 157.9    4.  Neuropathy  G62.9 355.9           PLAN:     Patient Instructions     Dear Onel Brothers ,    It was a pleasure seeing you today in your home along with your daughter virtually    We will see you again in 6 weeks virtually    If labs or imaging tests have been ordered for you today, please call the office  at [phone] 48 hours after completion to obtain the results. Your stated goal:   -To live well for as long as possible    Your described symptoms were: Fatigue: 4 Drowsiness: 0   Depression: 1 Pain: 4   Anxiety: 2 Nausea: 0   Anorexia: 4 Dyspnea: 0   Best Well-Bein Constipation: No   Other Problem (Comment): 0       This is the plan we talked about:    1. Upper abdominal pain-pancreatic cancer related  -Your pain is much improved now, your CA 19 is normal!.  You are now using only Dilaudid 1 mg every 6 hours as needed, you take about 2 to 3 tablets/day or less some days. You have plenty of Dilaudid, no need for refill now. 2.  Severe nausea  -You have chemotherapy-induced nausea. You are currently taking Zofran 8 mg oral disintegrating tablet 2 times and this is helping. You have not needed Compazine. You also take Ativan at bedtime which helps with sleep and nausea. 3.  Insomnia   - You have been taking Ativan 0.5 mg at bedtime which is very helpful and so you want to continue the same. 4.  Anorexia and oral thrush  -Oral thrush resolved with nystatin swish and swallow  -Drink warm water with lemon and honey every morning  -You tried Marinol for a week but it caused confusion and hallucination so you stopped it.  -You have already met with a nutritionist but the information provided to you was overwhelming. We reviewed things that you can eat today and you will in to improve your calorie intake to at least 1200 Kcal/day  -Increase water intake to between 60 to 80 ounces of water per day    5. Uncontrolled diabetes  -You are taking 55 units glargine insulin at nighttime and modify your daytime insulin based on your sugars. You do not have a set sliding scale. I am very worried about extreme sugar levels. Start a food diary and measure your blood glucose 4 times a day and keep a diary so we can review this and modify your insulin needs.      6.  Diarrhea and constipation  -Diarrhea is improved since stopping FOLFOX.  - You are taking stool softeners on a regular basis and using MiraLAX as needed. 7.  Neuropathy  -We started you on gabapentin for the numbness and tingling in your fingers and toes and slowly increased it up to 300 mg/day. Now let us increase this to 300 mg 2 times a day for about a week and then increase to 300 mg 3 times a day. 8.  Advanced care planning  -You completed a living will and advanced medical directive naming your daughter Farshad Post as you medical power of     9. Psychosocial support  -You are the primary caregiver of your  who is cognitively impaired. You dealing with the cancer is causing tremendous amount of stress on you and your family. Your daughter Farshad Post is the only one who lives local and is able to help out. We talked about how palliative medicine can certainly provide you as much support as possible. 10.  Pancreatic cancer  - You are doing very well, your cancer is responding really well to treatment. Your CA 19 numbers are normal.  You are currently on gemcitabine plus Abraxane and doing fairly well. This is what you have shared with us about Advance Care Planning:       Primary Decision Maker: Jerri Hussein - Daughter - [phone]  Advance Care Planning 10/7/2020   Patient's [address] is: Named in scanned ACP document   Confirm Advance Directive Yes, on file   Patient Would Like to Complete Advance Directive -   Does the patient have other document types -           The Palliative Medicine Team is here to support you and your family.        Sincerely,      Mani Hodge MD and the Palliative Medicine Team       GOALS OF CARE / TREATMENT PREFERENCES:   [====Goals of Care====]  GOALS OF CARE:  Patient / health care proxy stated goals: See Patient Instructions / Summary    TREATMENT PREFERENCES:   Code Status:  [x] Attempt Resuscitation       [] Do Not Attempt Resuscitation    Advance Care Planning:  [x] [facility] [employer] Interdisciplinary Team has updated the ACP Navigator with Decision Maker and Patient Capacity      Primary Decision Maker: Hebert Mora - Daughter - [phone]  Advance Care Planning 10/7/2020   Patient's Healthcare Decision Maker is: Named in scanned ACP document   Confirm Advance Directive Yes, on file   Patient Would Like to Complete Advance Directive -   Does the patient have other document types -       Other:  (If patient appropriate for POST, consider using PALLPOST smart phrase here)    The palliative care team has discussed with patient / health care proxy about goals of care / treatment preferences for patient.  [====Goals of Care====]     PRESCRIPTIONS GIVEN:     No orders of the defined types were placed in this encounter. FOLLOW UP:     Future Appointments   Date Time Provider Louis Campuzano   10/7/2020  2:30 PM [facility]   10/14/2020  8:00 AM [facility] INF [facility] [address]   10/21/2020  9:30 AM [facility] INF MACI [address]   11/4/2020  [facility]           PHYSICIANS INVOLVED IN CARE:   Patient Care Team:  Zoë Velásquez MD as PCP - General (Internal Medicine)  Zoë Velásquez MD as PCP - [address] [place] [address] Provider  Pantera Molina MD (General Surgery)  Althea Hamman, CNS (Certified Clinical Nurse Specialist)  Berny Truong RN as Benefits Care Manager  Nav Munguia MD (Hematology and Oncology)  Ning Foley MD as Physician (Hematology and Oncology)       HISTORY:   Reviewed patient-completed ESAS and advance care planning form. Reviewed patient record in prescription monitoring program.    CHIEF COMPLAINT:   No chief complaint on file. HPI/SUBJECTIVE:    The patient is: [x] Verbal / [] Nonverbal     Patient seen today virtually . She is doing well overall. Her pain is pretty well controlled with occasional Dilaudid. She takes about 2-3 doses per week. She could take more but she tries not to.   We talked about taking Dilaudid as needed and not wait until the pain is too severe. Her numbness and tingling in fingers and toes is worsened over the last few weeks, she felt a bit dizzy with gabapentin in the beginning but now is able to tolerate the dose very well, we agreed on increasing the dose. No constipation. Eating well. Nausea well controlled with current regimen.  --------  Patient seen today along with her daughter Leanne Flores. Both are very tearful and stressed from patient's new diagnosis. Chetan Montaño tells me that she ignored her abdominal pain for several months and now she is paying for it. She is determined to do everything she can to live as long as possible but is very realistic about her outcome. She is struggling with abdominal pain and is not taking medications as prescribed. Her most significant complaint is profound fatigue. But from what she is sharing, she is eating little to nothing every day, drinking less than 10 ounces of water per day. Her daughter forces her to eat some applesauce with her meds which is about the only calories she gets per day. She sleeps most of the time. She is identified that the first 6 days of the chemotherapy is the worst but she seems to recover from it a little bit after and eats a little bit. She struggles with diarrhea after chemotherapy but constipation once the diarrhea dissipates. 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 Daughter is overwhelmed because of the illness of her mother.     Clinical Pain Assessment (nonverbal scale for nonverbal patients):   [++++ Clinical Pain Assessment++++]  [++++Pain Severity++++]: Pain: 4  [++++Pain Character++++]: cramping  [++++Pain Duration++++]: month  [++++Pain Effect++++]: functional and emotional  [++++Pain Factors++++]: none in particular  [++++Pain Frequency++++]: constant  [++++Pain Location++++]: upper abdomen  [++++ Clinical Pain Assessment++++] FUNCTIONAL ASSESSMENT:     Palliative Performance Scale (PPS):  PPS: 70       PSYCHOSOCIAL/SPIRITUAL SCREENING:     Any spiritual / Voodoo concerns:  [] Yes /  [x] No    Caregiver Burnout:  [] Yes /  [x] No /  [] No Caregiver Present      Anticipatory grief assessment:   [x] Normal  / [] Maladaptive       ESAS Anxiety: Anxiety: 2    ESAS Depression: Depression: 1       REVIEW OF SYSTEMS:     The following systems were [x] reviewed / [] unable to be reviewed  Systems: constitutional, ears/nose/mouth/throat, respiratory, gastrointestinal, genitourinary, musculoskeletal, integumentary, neurologic, psychiatric, endocrine. Positive findings noted below. Modified ESAS Completed by: provider   Fatigue: 4 Drowsiness: 0   Depression: 1 Pain: 4   Anxiety: 2 Nausea: 0   Anorexia: 4 Dyspnea: 0   Best Well-Bein Constipation: No   Other Problem (Comment): 0          PHYSICAL EXAM:     Wt Readings from Last 3 Encounters:   10/07/20 195 lb 12.8 oz (88.8 kg)   10/07/20 195 lb 12.3 oz (88.8 kg)   20 195 lb 12.8 oz (88.8 kg)     There were no vitals taken for this visit.   Last bowel movement: See Nursing Note    Constitutional    [x] Appears well-developed and well-nourished in no apparent distress    [] Abnormal:  Mental status  [x] Alert and awake  [x] Oriented to person/place/time  [x] Able to follow commands  [] Abnormal:   Eyes  [x] EOM normal   [x] Sclera normal   [x] No visible ocular discharge  [] Abnormal:   HENT  [x] Normocephalic, atraumatic  [x] Mouth/Throat: Moist mucous membranes   [x] External Ears normal  [] Abnormal:  Oral thrush resolved  Neck  [x] No visualized mass  [] Abnormal:  Pulmonary/Chest   [x] Respiratory effort normal  [x] No visualized signs of difficulty breathing or respiratory distress  [] Abnormal:  Musculoskeletal  [x] Normal gait with no signs of ataxia  [x] Normal range of motion of neck  [] Abnormal:  Neurological:   [x] No facial asymmetry (Cranial nerve 7 motor function)  [x] No gaze palsy  [] Abnormal:   Skin  [x] No significant exanthematous lesions or discoloration noted on facial skin  [] Abnormal:                                  Psychiatric  [x] Normal affect  [x] No hallucinations  [] Abnormal:    Other pertinent observable physical exam findings:    Due to this being a TeleHealth evaluation, many elements of the physical examination are unable to be assessed. HISTORY:     Past Medical History:   Diagnosis Date    Adenocarcinoma of pancreas ([facility] [address] 05/13/2020    Dr Valente Led biopsy via EUS    [facility]     GERD (gastroesophageal reflux disease)     Hernia of abdominal cavity     Subxyphoid hernia    Hypertension     Inflammatory polyarthropathy (HCC)     Joint pain     Joint swelling     Menopause     Ocular nevus of left eye     Pancreatitis     Tracheal stenosis       Past Surgical History:   Procedure Laterality Date    HX CARPAL TUNNEL RELEASE Bilateral     HX COLONOSCOPY      HX HYSTERECTOMY      HX KNEE ARTHROSCOPY Right     HX KNEE REPLACEMENT Right     partial    HX LAP CHOLECYSTECTOMY      HX TONSILLECTOMY      HX VASCULAR ACCESS        Family History   Problem Relation Age of Onset    Heart Disease Mother     Heart Attack Mother     Cancer Father         lung    Diabetes Sister       History reviewed, no pertinent family history. Social History     Tobacco Use    Smoking status: Never Smoker    Smokeless tobacco: Never Used   Substance Use Topics    Alcohol use: No     Alcohol/week: 0.0 standard drinks     Frequency: Never     Binge frequency: Never     No Known Allergies   Current Outpatient Medications   Medication Sig    gabapentin (NEURONTIN) 300 mg capsule Take 1 Cap by mouth three (3) times daily. Max Daily Amount: 900 mg. Indications: neuropathic pain    potassium chloride (K-DUR, KLOR-CON) 20 mEq tablet TAKE 1 TABLET BY MOUTH TWICE A DAY.  Indications: low amount of potassium in the blood    methylphenidate HCl (Ritalin) 5 mg tablet Take 5 mg by mouth daily as needed.  famotidine (Pepcid) 20 mg tablet Take 20 mg by mouth two (2) times a day.  sennosides (Senna) 8.6 mg cap Take 3 Tabs by mouth nightly.  polyethylene glycol (Miralax) 17 gram/dose powder Take 17 g by mouth two (2) times a day.  carvediloL (COREG) 25 mg tablet TAKE 1 TABLET BY MOUTH TWICE DAILY (Patient taking differently: Take 25 mg by mouth two (2) times daily as needed.)    HYDROmorphone (Dilaudid) 2 mg tablet Take 1 mg by mouth two (2) times a day. takes 3 tabs  by mouth, taking BID and PRN    lipase-protease-amylase (Creon) 12,000-38,000 -60,000 unit capsule Take 3 Caps by mouth three (3) times daily (with meals). Indications: exocrine pancreatic insufficiency    lidocaine-prilocaine (EMLA) topical cream Apply  to affected area as needed for Pain (pain ). Apply a thin layer over port site prior to accessing port    diphenhydrAMINE (BENADRYL) 25 mg tablet Take 25 mg by mouth two (2) times a day.  nystatin (MYCOSTATIN) powder Apply 1 g to affected area three (3) times daily.  promethazine (PHENERGAN) 25 mg tablet Take 1 Tab by mouth every six (6) hours as needed for Nausea.  potassium bicarb-citric acid (EFFER-K) 20 mEq tablet Take 1 Tab by mouth two (2) times daily (with meals). (Patient taking differently: Take 20 mEq by mouth two (2) times daily (with meals). takes 10 meq 2 x day)    LORazepam (ATIVAN) 1 mg tablet Take 1 Tab by mouth every six (6) hours as needed for Anxiety. Max Daily Amount: 4 mg. Indications: nausea and vomiting caused by cancer drugs    ondansetron (ZOFRAN ODT) 4 mg disintegrating tablet Take 1 Tab by mouth every eight (8) hours as needed for Nausea or Vomiting.  diphenoxylate-atropine (LomotiL) 2.5-0.025 mg per tablet Take 2 Tabs by mouth four (4) times daily as needed for Diarrhea. Max Daily Amount: 8 Tabs.     prochlorperazine (COMPAZINE) 10 mg tablet Take 0.5 Tabs by mouth every six (6) hours as needed for Nausea.  Blood-Glucose Meter,Continuous (Dexcom G6 ) misc Use as directed to monitor blood glucose as directed    Blood-Glucose Sensor (Dexcom G6 Sensor) fadi Use as directed to monitor blood glucose as directed    Blood-Glucose Transmitter (Dexcom G6 Transmitter) fadi Use as directed to monitor blood glucose as directed    insulin lispro (HumaLOG KwikPen Insulin) 100 unit/mL kwikpen Inject 25 units into the skin with breakfast, 15 units with lunch (if BG is >200 and eating carbs with meal), and 30 units with dinner (Patient taking differently: patient on sliding scale)    insulin glargine U-300 conc (Toujeo SoloStar U-300 Insulin) 300 unit/mL (1.5 mL) inpn pen 55 units every bedtime (Patient taking differently: 25 Units by SubCUTAneous route nightly. 55 units every bedtime)    acetaminophen (Acetaminophen Extra Strength) 500 mg tablet Take 1,000 mg by mouth two (2) times a day.  Needle, Disp, ndle 1 Each by Does Not Apply route two (2) times daily as needed for Other.  nitrofurantoin, macrocrystal-monohydrate, (Macrobid) 100 mg capsule Take 1 Cap by mouth two (2) times a day for 5 days. No current facility-administered medications for this visit.       Facility-Administered Medications Ordered in Other Visits   Medication Dose Route Frequency    0.9% sodium chloride infusion  25 mL/hr IntraVENous CONTINUOUS    saline peripheral flush soln 10 mL  10 mL InterCATHeter PRN    0.9% sodium chloride injection 10 mL  10 mL IntraVENous PRN    heparin (porcine) pf 300-500 Units  300-500 Units InterCATHeter PRN    sodium chloride 0.9 % bolus infusion 500 mL  500 mL IntraVENous PRN    diphenhydrAMINE (BENADRYL) injection 25 mg  25 mg IntraVENous PRN    famotidine (PF) (PEPCID) 20 mg in 0.9% sodium chloride 10 mL injection  20 mg IntraVENous PRN    acetaminophen (TYLENOL) tablet 650 mg  650 mg Oral PRN    meperidine (DEMEROL) injection 25 mg  25 mg IntraVENous PRN    ondansetron (ZOFRAN) injection 8 mg  8 mg IntraVENous PRN          LAB DATA REVIEWED:     Lab Results   Component Value Date/Time    WBC 4.7 10/07/2020 09:08 AM    HGB 10.8 (L) 10/07/2020 09:08 AM    PLATELET 780 99/92/5335 09:08 AM     Lab Results   Component Value Date/Time    Sodium 140 10/07/2020 09:08 AM    Potassium 3.6 10/07/2020 09:08 AM    Chloride 104 10/07/2020 09:08 AM    CO2 25 10/07/2020 09:08 AM    BUN 9 10/07/2020 09:08 AM    Creatinine 0.87 10/07/2020 09:08 AM    Calcium 9.1 10/07/2020 09:08 AM    Magnesium 1.7 07/15/2020 05:35 AM    Phosphorus 3.0 07/15/2020 05:35 AM      Lab Results   Component Value Date/Time    Alk.  phosphatase 57 10/07/2020 09:08 AM    Protein, total 6.5 10/07/2020 09:08 AM    Albumin 3.5 10/07/2020 09:08 AM    Globulin 3.0 10/07/2020 09:08 AM     No results found for: INR, PTMR, PTP, PT1, PT2, APTT, INREXT, INREXT   Lab Results   Component Value Date/Time    Iron 50 08/12/2020 11:00 AM    TIBC 244 (L) 08/12/2020 11:00 AM    Iron % saturation 20 08/12/2020 11:00 AM    Ferritin 603 (H) 08/12/2020 11:00 AM           CONTROLLED SUBSTANCES SAFETY ASSESSMENT (IF ON CONTROLLED SUBSTANCES):     Reviewed opioid safety handout:  [x] Yes   [] No  24 hour opioid dose >150mg morphine equivalent/day:  [] Yes   [x] No  Benzodiazepines:  [] Yes   [x] No  Sleep apnea:  [] Yes   [x] No  Urine Toxicology Testing within last 6 months:  [] Yes   [x] No  History of or new aberrant medication taking behaviors:  [] Yes   [x] No  Has Narcan been prescribed [x] Yes   [] No          Total time: 60 minutes  Counseling / coordination time: 40 minutes  > 50% counseling / coordination?:  Yes    Consent:  He and/or health care decision maker is aware that that he may receive a bill for this telehealth service, depending on his insurance coverage, and has provided verbal consent to proceed: Yes    CPT Codes 77810-02396 for Established Patients may apply to this Telehealth Visit  Pursuant to the emergency declaration under the [facility] waiver authority and the [employer] Act, this Virtual  Visit was conducted, with patient's consent, to reduce the patient's risk of exposure to COVID-19 and provide continuity of care for an established patient. Services were provided through a video synchronous discussion virtually to substitute for in-person clinic visit.

## 2020-10-08 ENCOUNTER — DOCUMENTATION ONLY (OUTPATIENT)
Dept: PHARMACY | Age: 65
End: 2020-10-08

## 2020-10-08 NOTE — PROGRESS NOTES
The application for Domenica Martin for enrollment into the diabetes management program has been reviewed and accepted on 10/08/20.     Julianna Osorio

## 2020-10-19 ENCOUNTER — PATIENT OUTREACH (OUTPATIENT)
Dept: OTHER | Age: 65
End: 2020-10-19

## 2020-10-19 NOTE — PROGRESS NOTES
[facility] Outreach     Call placed to pt, Verified  and address for HIPAA security. Pt no longer has MMO insurance, but instead has medicare. ACM reaching out anyway to pt to touch base on how she is doing. Pt reported she is doing fair, but is feeling down becoming emotional throughout conversation. Pt and  is now living with dtr and grandchildren after selling home and buying home with dtr. Pt is struggling with isolation, lack of ability to care for self at times and not looking and feeling like her old self. Pt is quick to point out that is she is very grateful and blessed that her treatment is indicating tumor is shrinking. Actively listened to pt and offered suggestions for lifting spirits. Pt also stated that she may have a UTI as a results of intermittent issues with diarrhea and the need to wear a depends due to inability to control B&B at times. Pt is scheduled to have labs checked in 2 days, but will reach out to provider if s/s get worse and need to be seen sooner for a UA/CS. Also suggested pt discuss with oncologist/palliative about having weekly hydration infusions due to poor/lack of appetite. This CM sent staff message to oncologist and Palliative provider. Reminded pt to reach out to this ACM anytime if she needed anything or just to talk, agreed.

## 2020-10-21 DIAGNOSIS — C25.9 ADENOCARCINOMA OF PANCREAS (HCC): ICD-10-CM

## 2020-10-21 DIAGNOSIS — F32.A DEPRESSION, UNSPECIFIED DEPRESSION TYPE: Primary | ICD-10-CM

## 2020-10-21 RX ORDER — METHYLPHENIDATE HYDROCHLORIDE 5 MG/1
5 TABLET ORAL 3 TIMES DAILY
Qty: 90 TAB | Refills: 0 | Status: SHIPPED | OUTPATIENT
Start: 2020-10-21 | End: 2020-10-26 | Stop reason: SDUPTHER

## 2020-10-22 ENCOUNTER — TELEPHONE (OUTPATIENT)
Dept: PALLATIVE CARE | Age: 65
End: 2020-10-22

## 2020-10-22 NOTE — TELEPHONE ENCOUNTER
Palliative Medicine  Nursing Note  [phone]  Fax [phone]      Telephone Call  Patient Name: Esperanza Calvin  YOB: 1955    10/22/2020        Primary Decision Maker: Yunior Arthur - Daughter - [phone]   Advance Care Planning 10/14/2020   Patient's Healthcare Decision Maker is: Named in scanned ACP document   Confirm Advance Directive Yes, on file   Patient Would Like to Complete Advance Directive -   Does the patient have other document types -     Outgoing call placed to patient and she reported that today is a good day - even though she had chemo yesterday. she stated that Decadron usually gives her boost of energy, and then she is sure she will feel Marlyne Jose David starting tomorrow through the weekend, which she will try to sleep through and will be back to her norm come Monday. Patient stated that after chemo she has increase pain in legs (stabbing and joint pain) and will take her Dilaudid 1 mg as prescribed - she will then have a break next week and her next chemo is schedule for Nov 4    Patient reported that she is still not eating well, she is now down to 188 lbs - she reported lack of appetite, and things doesn't taste good. she continues to drink a protein shake in the morning, and make it a point to eat a small kids plate at dinner with the family. she reported that everyday that she gets up is a blessing    Patient reported that last scan (last Monday) showed that tumor is no longer on her pancreas. She discussed the report with her PCP and he had another radiology look at it and has now referred her to Dr. Ari Rodriguez with [facility] - appt pending     Per patient she is still trying to schedule genetic marker testing through [facility] \"know your tumor\" which is still pending     She stated that Kasia, case management with [facility] called her earlier this week and caught her on a bad day - she was crying a lot but is really doing fine.     Patient reported that about 4 weeks ago, her and her daughter sold both homes, and bougt a new home with everyone now living together - she stated that it is an adjustment but overall better for the family. Advised patient that if she has any questions or concerns that she can reach out to this nurse by my chart or phone anytime.   Patient verbalized understanding and appreciative of phone call and our team.    Next virtual visit with Dr. Chuy Mackey schedule for 11/18/20     Information was shared with Dr. Waleska Tarango, RN  Palliative Medicine

## 2020-11-02 NOTE — PROGRESS NOTES
Lebron Ba is a 65 y. o. female who is seen for pancreatic adenocarcinoma. Patient is scheduled for C4D1 today in [facility]. Patient started with diarrhea/mucus via rectum it started 2-3 days after chemo which lasted for 4 days, she has been using a rectal hemroid suppository and taking metamucil to help form a stool, she was not eating during this time. She is now having formed stools and eating. Patient complains of neuropathy, she states it has taken on a life of its owns. Her fingertips and feet are numb. Chemotherapy Flowsheet 10/21/2020   Cycle C3D15   Date 10/21/2020   Drug / Regimen Abraxane/Gemcitabine   Dosage see MAR   Time Up 1100   Time Down 1300   Pre Hydration -   Pre Meds Zofran/Dexamethasone   Notes -     Key Oncology Meds             ondansetron (ZOFRAN ODT) 4 mg disintegrating tablet Take 1 Tab by mouth every eight (8) hours as needed for Nausea or Vomiting. prochlorperazine (COMPAZINE) 10 mg tablet Take 0.5 Tabs by mouth every six (6) hours as needed for Nausea. Key Pain Meds             HYDROmorphone (Dilaudid) 2 mg tablet Take 1 mg by mouth two (2) times a day. takes 3 tabs  by mouth, taking BID and PRN    acetaminophen (Acetaminophen Extra Strength) 500 mg tablet Take 1,000 mg by mouth two (2) times a day.

## 2020-11-04 ENCOUNTER — OFFICE VISIT (OUTPATIENT)
Dept: ONCOLOGY | Age: 65
End: 2020-11-04
Payer: MEDICARE

## 2020-11-04 VITALS
DIASTOLIC BLOOD PRESSURE: 56 MMHG | WEIGHT: 191.6 LBS | BODY MASS INDEX: 32.71 KG/M2 | TEMPERATURE: 96.9 F | HEART RATE: 72 BPM | HEIGHT: 64 IN | RESPIRATION RATE: 17 BRPM | OXYGEN SATURATION: 97 % | SYSTOLIC BLOOD PRESSURE: 118 MMHG

## 2020-11-04 DIAGNOSIS — C25.9 ADENOCARCINOMA OF PANCREAS (HCC): Primary | ICD-10-CM

## 2020-11-04 PROCEDURE — 99214 OFFICE O/P EST MOD 30 MIN: CPT | Performed by: INTERNAL MEDICINE

## 2020-11-04 NOTE — PROGRESS NOTES
Reason for Visit:   Mateus Garcia a 69 F. o. female who is seen for pancreatic adenocarcinoma     Treatment History:   Dx: Pancreatic Adenocarcinoma--May 2020--Y7D1Ca--bcodgsvbwld of splenic vein and superior mesenteric vein  Tx: mFOLFIRINOX--first cycle 5/26/2020, second cycle 6/10/2020, dose reduced 15% cycle 3 on 6/30/2020--delayed due to severe side effects--switched to Gemcitabine/Abraxane--Cycle #1 7/29/2002, Cycle #2 9/2/2020, Cycle #3 10/7/2020, Cycle #4 11/4/2020  Goal: Prolong life--Palliative    History of Present Illness:   Still with neuropathy--becoming an issue with daily activities--doesn't want to drop the dose. Diarrhea with mucous last cycle. Added metamucil and became formed.       Past Medical History:   Diagnosis Date    Adenocarcinoma of pancreas ([facility] 05/13/2020    Dr Ryan Wiggins biopsy via EUS    Diabetes ([facility] [address]     GERD (gastroesophageal reflux disease)     Hernia of abdominal cavity     Subxyphoid hernia    Hypertension     Inflammatory polyarthropathy (HCC)     Joint pain     Joint swelling     Menopause     Ocular nevus of left eye     Pancreatitis     Tracheal stenosis       Past Surgical History:   Procedure Laterality Date    HX CARPAL TUNNEL RELEASE Bilateral     HX COLONOSCOPY      HX HYSTERECTOMY      HX KNEE ARTHROSCOPY Right     HX KNEE REPLACEMENT Right     partial    HX LAP CHOLECYSTECTOMY      HX TONSILLECTOMY      HX VASCULAR ACCESS        Social History     Tobacco Use    Smoking status: Never Smoker    Smokeless tobacco: Never Used   Substance Use Topics    Alcohol use: No     Alcohol/week: 0.0 standard drinks     Frequency: Never     Binge frequency: Never      Family History   Problem Relation Age of Onset    Heart Disease Mother     Heart Attack Mother     Cancer Father         lung    Diabetes Sister      Current Outpatient Medications   Medication Sig    insulin glargine U-300 conc (Toujeo SoloStar U-300 Insulin) 300 unit/mL (1.5 mL) inpn pen INJECT 55 UNITS ONCE DAILY AT BEDTIME - DOSE INCREASED (Patient taking differently: 15 Units. INJECT 15 UNITS ONCE DAILY AT BEDTIME - DOSE INCREASED)    methylphenidate HCl (Ritalin) 5 mg tablet Take 1 Tab by mouth three (3) times daily. Max Daily Amount: 15 mg.    gabapentin (NEURONTIN) 300 mg capsule Take 1 Cap by mouth three (3) times daily. Max Daily Amount: 900 mg. Indications: neuropathic pain (Patient taking differently: Take 300 mg by mouth three (3) times daily. 2 caps in AM, the 1 tab BID  Indications: neuropathic pain)    famotidine (Pepcid) 20 mg tablet Take 20 mg by mouth two (2) times a day.  sennosides (Senna) 8.6 mg cap Take 3 Tabs by mouth nightly.  carvediloL (COREG) 25 mg tablet TAKE 1 TABLET BY MOUTH TWICE DAILY (Patient taking differently: Take 25 mg by mouth two (2) times daily as needed.)    HYDROmorphone (Dilaudid) 2 mg tablet Take 1 mg by mouth two (2) times a day. takes 3 tabs  by mouth, taking BID and PRN    lipase-protease-amylase (Creon) 12,000-38,000 -60,000 unit capsule Take 3 Caps by mouth three (3) times daily (with meals). Indications: exocrine pancreatic insufficiency    lidocaine-prilocaine (EMLA) topical cream Apply  to affected area as needed for Pain (pain ). Apply a thin layer over port site prior to accessing port    diphenhydrAMINE (BENADRYL) 25 mg tablet Take 25 mg by mouth two (2) times a day.  nystatin (MYCOSTATIN) powder Apply 1 g to affected area three (3) times daily.  potassium bicarb-citric acid (EFFER-K) 20 mEq tablet Take 1 Tab by mouth two (2) times daily (with meals). (Patient taking differently: Take 20 mEq by mouth two (2) times daily (with meals). takes 10 meq 2 x day)    LORazepam (ATIVAN) 1 mg tablet Take 1 Tab by mouth every six (6) hours as needed for Anxiety. Max Daily Amount: 4 mg.  Indications: nausea and vomiting caused by cancer drugs (Patient taking differently: Take 1 mg by mouth every six (6) hours as needed for Anxiety. Taking 1 mg at bedtime  Indications: nausea and vomiting caused by cancer drugs)    ondansetron (ZOFRAN ODT) 4 mg disintegrating tablet Take 1 Tab by mouth every eight (8) hours as needed for Nausea or Vomiting.  insulin lispro (HumaLOG KwikPen Insulin) 100 unit/mL kwikpen Inject 25 units into the skin with breakfast, 15 units with lunch (if BG is >200 and eating carbs with meal), and 30 units with dinner (Patient taking differently: patient on sliding scale)    acetaminophen (Acetaminophen Extra Strength) 500 mg tablet Take 1,000 mg by mouth two (2) times a day.  hydrocortisone (ANUSOL-HC) 25 mg supp Insert 1 Suppository into rectum nightly.  potassium chloride (K-DUR, KLOR-CON) 20 mEq tablet TAKE 1 TABLET BY MOUTH TWICE A DAY. Indications: low amount of potassium in the blood    polyethylene glycol (Miralax) 17 gram/dose powder Take 17 g by mouth two (2) times a day.  promethazine (PHENERGAN) 25 mg tablet Take 1 Tab by mouth every six (6) hours as needed for Nausea.  diphenoxylate-atropine (LomotiL) 2.5-0.025 mg per tablet Take 2 Tabs by mouth four (4) times daily as needed for Diarrhea. Max Daily Amount: 8 Tabs.  prochlorperazine (COMPAZINE) 10 mg tablet Take 0.5 Tabs by mouth every six (6) hours as needed for Nausea.  Blood-Glucose Meter,Continuous (Dexcom G6 ) misc Use as directed to monitor blood glucose as directed    Blood-Glucose Sensor (Dexcom G6 Sensor) fadi Use as directed to monitor blood glucose as directed    Blood-Glucose Transmitter (Dexcom G6 Transmitter) fadi Use as directed to monitor blood glucose as directed    Needle, Disp, ndle 1 Each by Does Not Apply route two (2) times daily as needed for Other. No current facility-administered medications for this visit. No Known Allergies     Review of Systems: A complete review of systems was obtained, negative except as described above.     Physical Exam:     Visit Vitals  BP (!) 118/56   Pulse 72 Temp 96.9 °F (36.1 °C) (Skin)   Resp 17   Ht 5' 4\" (1.626 m)   Wt 191 lb 9.6 oz (86.9 kg)   SpO2 97%   BMI 32.89 kg/m²     ECOG PS: 1  General: No distress  Eyes: PERRLA, anicteric sclerae  HENT: Atraumatic, OP clear  Neck: Supple  Lymphatic: No cervical, supraclavicular, or inguinal adenopathy  Respiratory: CTAB, normal respiratory effort  CV: Normal rate, regular rhythm, no murmurs, no peripheral edema  GI: Soft, nontender, nondistended, no masses, no hepatomegaly, no splenomegaly  MS: Normal gait and station. Digits without clubbing or cyanosis. Skin: No rashes, ecchymoses, or petechiae. Normal temperature, turgor, and texture. Psych: Alert, oriented, appropriate affect, normal judgment/insight    Results:     Lab Results   Component Value Date/Time    WBC 3.2 (L) 11/04/2020 08:27 AM    HGB 9.6 (L) 11/04/2020 08:27 AM    HCT 30.0 (L) 11/04/2020 08:27 AM    PLATELET 296 86/68/3845 08:27 AM    MCV 92.9 11/04/2020 08:27 AM    ABS. NEUTROPHILS 1.6 (L) 11/04/2020 08:27 AM     Lab Results   Component Value Date/Time    Sodium 133 (L) 10/21/2020 08:23 AM    Potassium 4.1 10/21/2020 08:23 AM    Chloride 101 10/21/2020 08:23 AM    CO2 26 10/21/2020 08:23 AM    Glucose 248 (H) 10/21/2020 08:23 AM    BUN 9 10/21/2020 08:23 AM    Creatinine 0.84 10/21/2020 08:23 AM    GFR est AA >60 10/21/2020 08:23 AM    GFR est non-AA >60 10/21/2020 08:23 AM    Calcium 9.2 10/21/2020 08:23 AM    Glucose (POC) 192 (H) 07/15/2020 11:56 AM    Creatinine (POC) 0.7 02/06/2013 10:21 AM     Lab Results   Component Value Date/Time    Bilirubin, total 0.5 10/21/2020 08:23 AM    ALT (SGPT) 47 10/21/2020 08:23 AM    Alk. phosphatase 60 10/21/2020 08:23 AM    Protein, total 6.8 10/21/2020 08:23 AM    Albumin 3.3 (L) 10/21/2020 08:23 AM    Globulin 3.5 10/21/2020 08:23 AM       CT A/P 4/30/2020  IMPRESSION: Suspect neoplastic pancreatic mass versus atypical focal  pancreatitis with splenic and superior mesenteric vein occlusion.  Consider  further evaluation with endoscopic ultrasound at which time biopsy could  potentially be performed.     CT Chest 5/21/2020  IMPRESSION:  1. No evidence of pulmonary metastatic disease. No evidence of mediastinal or  hilar lymphadenopathy. No pleural effusions identified. 2. No definite evidence of central pulmonary embolic disease. 3. Presence of a mass lesion involving the pancreatic head/body. 4. Evidence of fatty infiltration involving the liver. Presence of focal  low-density areas within the liver compatible with cysts. Surgical absence of  the gallbladder.     CT A/P 6/19/2020  IMPRESSION:  Pancreatitis with ascites favored. Discrete pancreatic mass is not identified. No biliary dilatation or definite arterial or venous thrombosis. No liver  metastases. Fat-containing ventral hernia  Nonobstructing left renal calculus  Retrolisthesis of L2 and L3.    CT C/A/P 10/12/2020  IMPRESSION:  1. Interval improvement in appearance of the pancreas (see discussion above). 2. Normal sized liver. Stable appearance of the previously described areas of  decreased attenuation within liver. 3. Surgical absence of the gallbladder. 4. Evidence of splenomegaly. 5. Normal sized kidneys. Presence of small, nonobstructing calculi within the  left kidney. No evidence of hydronephrotic change. 6. Suboptimal distention of the urinary bladder. 7. Presence of a small, fat-containing ventral hernia in the mid abdominal  region.     Path: 5/8/2020  CYTOLOGIC INTERPRETATION:   Pancreas, EUS-guided fine needle aspiration and cell blocks: Adenocarcinoma      Assessment:   1) Pancreatic Adenocarcinoma--Unresectable  2) Neoplasm Pain/Neuropathic Pain  3) Fatigue  4) Nausea  5) DM  6) Nutrition  7) Diarrhea        Plan:   1) Switched to gem/abraxane--today is day 1 of cycle 4.  Repeat imaging continues to show response.  BRCA negative.  [facility].   Will need radiation if no surgical intervention appropriate.     2) S/p celiac block--pain better and controlled with hydromorphone.  Future options include repeat celiac block, methadone, fentanyl.  Increase gabapentin for the neuropathic pain in feet/toes to 600mg am, 300mg bid. Will continue titrating gabapentin dose, switch to lyrica or Cymbalta if nelida stops working     3) Methylphenidate 5mg bid--watch for anxiety and insomnia (normally only taking dose in am).  I did drop the dose of both Abraxane (from 125 to 100mg/m2) and Gemcitabine (from 1000 to 900mg/m2) due to her significant fatigue.       4) Compazine/Ondansetron controlling--switch to olanzapine 5mg bid or 10mg once daily if becomes difficult to manage with compazine.    5) Defer to Dr Jenni Whitney for further management.  Pharmacy may have ideas.       6) On pancreatic enzymes--weight has stabilized, may need nutrition consult.     7) Chemo related--hopefully with not recurr.  Taking senna/miralax to prevent constipation.       Signed By: Celi Grant MD

## 2020-11-09 ENCOUNTER — DOCUMENTATION ONLY (OUTPATIENT)
Dept: PHARMACY | Age: 65
End: 2020-11-09

## 2020-11-09 NOTE — LETTER
[address]uan [address] [facility] [address] Phone: toll free [phone] option 7 [address] [facility] [address] [facility] [phone] We regret to inform you that you have been disqualified from [facility] Program because of the following: 
  
Per [address], you are no longer eligible: Benefits ended on 630/20 You will not receive the copay waiver up to $600 towards your diabetic medications and supplies in 2020. If you qualify once again, you will be eligible to reapply to [facility]  the following calendar year. [address]ajuan 2 [phone] Option #7 Email: [email or web address] Fax Number: [phone]

## 2020-11-09 NOTE — PROGRESS NOTES
Patient has been discharged from the DM Program: Per [address] [employer]: Changed coverage due to loss or gain of coverage; coverage ended 6/30/2020. Letter mailed to patient.

## 2020-11-11 DIAGNOSIS — C25.9 MALIGNANT NEOPLASM OF PANCREAS, UNSPECIFIED LOCATION OF MALIGNANCY (HCC): ICD-10-CM

## 2020-11-11 RX ORDER — GABAPENTIN 300 MG/1
600 CAPSULE ORAL 3 TIMES DAILY
Qty: 180 CAP | Refills: 3 | Status: SHIPPED | OUTPATIENT
Start: 2020-11-11 | End: 2021-03-08 | Stop reason: SDUPTHER

## 2020-11-12 ENCOUNTER — TELEPHONE (OUTPATIENT)
Dept: PALLATIVE CARE | Age: 65
End: 2020-11-12

## 2020-11-12 NOTE — TELEPHONE ENCOUNTER
Calling patient to advise of Virtual Visit with Dr. Chata Mena on  11/18/2020    . A nurse will call you between the hours of 9:00am and 1:30pm.  If you have any questions, please call [phone]. No answer so left voicemail.

## 2020-11-18 ENCOUNTER — VIRTUAL VISIT (OUTPATIENT)
Dept: PALLATIVE CARE | Age: 65
End: 2020-11-18
Payer: MEDICARE

## 2020-11-18 DIAGNOSIS — C25.9 MALIGNANT NEOPLASM OF PANCREAS, UNSPECIFIED LOCATION OF MALIGNANCY (HCC): ICD-10-CM

## 2020-11-18 DIAGNOSIS — R11.0 CHEMOTHERAPY-INDUCED NAUSEA: ICD-10-CM

## 2020-11-18 DIAGNOSIS — C25.9 ADENOCARCINOMA OF PANCREAS (HCC): ICD-10-CM

## 2020-11-18 DIAGNOSIS — G62.9 NEUROPATHY: ICD-10-CM

## 2020-11-18 DIAGNOSIS — T45.1X5A CHEMOTHERAPY-INDUCED NAUSEA: ICD-10-CM

## 2020-11-18 DIAGNOSIS — R53.83 PROFOUND FATIGUE: ICD-10-CM

## 2020-11-18 DIAGNOSIS — R10.84 ABDOMINAL PAIN, GENERALIZED: Primary | ICD-10-CM

## 2020-11-18 PROCEDURE — 99215 OFFICE O/P EST HI 40 MIN: CPT | Performed by: INTERNAL MEDICINE

## 2020-11-18 RX ORDER — DULOXETIN HYDROCHLORIDE 20 MG/1
20 CAPSULE, DELAYED RELEASE ORAL DAILY
Qty: 30 CAP | Refills: 0 | Status: SHIPPED | OUTPATIENT
Start: 2020-11-18 | End: 2020-12-28 | Stop reason: SDUPTHER

## 2020-11-18 NOTE — PATIENT INSTRUCTIONS
Dear Annie Black , It was a pleasure seeing you today in your home  virtually We will see you again in 4 virtually or sooner if needed If labs or imaging tests have been ordered for you today, please call the office  at [phone] 48 hours after completion to obtain the results. Your stated goal:  
-To live well for as long as possible Your described symptoms were: Fatigue: 4 Drowsiness: 1 Depression: 0 Pain: 3 Anxiety: 3 Nausea: 0 Anorexia: 4 Dyspnea: 4 Best Well-Bein Constipation: No  
Other Problem (Comment): 0 This is the plan we talked about: 1. Upper abdominal pain-pancreatic cancer related 
-Your pain is well controlled. you are now using only Dilaudid 1 mg every 6 hours as needed, you take about 2 to 3 tablets/day or less some days. You have plenty of Dilaudid, no need for refill now. 2.  Severe nausea 
-You have chemotherapy-induced nausea. You are currently taking Zofran 8 mg oral disintegrating tablet 2 times and this is helping. You have not needed Compazine. You also take Ativan at bedtime which helps with sleep and nausea. -Please call us when you having severe nausea and vomiting, you may need IV intervention along with fluids, we did this for you 2 weeks ago and this was very helpful. 3.  Insomnia - You have been taking Ativan 0.5 mg at bedtime which is very helpful and so you want to continue the same. 4.  Next steps and new treatment 
-You have a very busy next few weeks, you met with the [facility] radiation oncology team and plan is to place fiduciary markers endoscopically into your pancreas to start stereotactic radiation next week. You are very anxious about this and worried about the symptoms you may face. My team will call you every few days to check on you, we talked about you reaching out to us for any symptoms and not wait until you feel very poorly. 5.  Uncontrolled diabetes -You are taking 55 units glargine insulin at nighttime and modify your daytime insulin based on your sugars. You do not have a set sliding scale. I am very worried about extreme sugar levels. Start a food diary and measure your blood glucose 4 times a day and keep a diary so we can review this and modify your insulin needs. 6.  Diarrhea and constipation 
-Diarrhea is improved since stopping FOLFOX. 
- You are taking stool softeners on a regular basis and using MiraLAX as needed. 7.  Neuropathy 
-Right hand neuropathy and numbness is worse than the left hand. We increased her gabapentin to 600 mg 3 times a day and you have not seen much of an improvement. Let us add Cymbalta 20 mg at bedtime in addition to the gabapentin. 8.  Advanced care planning 
-You completed a living will and advanced medical directive naming your daughter Jose Magana as you medical power of  9. Psychosocial support 
-You are the primary caregiver of your  who is cognitively impaired. You dealing with the cancer is causing tremendous amount of stress on you and your family. Your daughter Jose Magana is the only one who lives local and is able to help out. We talked about how palliative medicine can certainly provide you as much support as possible. This is what you have shared with us about Advance Care Planning:  
 
  Primary Decision Maker: Yoshi Schneider - Daughter - [phone] Secondary Decision Maker: Hong Padgett Advance Care Planning 11/18/2020 Patient's Healthcare Decision Maker is: Named in scanned ACP document Confirm Advance Directive Yes, on file Patient Would Like to Complete Advance Directive - Does the patient have other document types Power of RadioSck The Palliative Medicine Team is here to support you and your family.   
 
 
Sincerely, 
 
 
Cecile Lyons MD and the Palliative Medicine Team

## 2020-11-18 NOTE — PROGRESS NOTES
Palliative Medicine Office Visit  Palliative Medicine Nurse Check In  [phone]    Patient Name: Subhash Ba  YOB: 1955      Date of Office Visit: 11/18/2020    Patient states: \"  \"    From Check In Sheet (scanned in Media):  Has a medical provider talked with you about cardiopulmonary resuscitation (CPR)? [x] Yes   [] No   [] Unable to obtain    Nurse reminder to complete or update ACP FlowSheet:    Is ACP on the Problem List?    [x] Yes    [] No  IF ACP Document is ON FILE; Nurse to place ACP on Problem List     Is there an ACP Note in Chart Review/Note? [x] Yes    [] No   If NO: ALERT PROVIDER       Primary Decision MakerEvalene Malena - Daughter - [phone]  Advance Care Planning 11/4/2020   Patient's Healthcare Decision Maker is: Named in scanned ACP document   Confirm Advance Directive Yes, on file   Patient Would Like to Complete Advance Directive -   Does the patient have other document types -         Is there anything that we should know about you as a person in order to provide you the best care possible? Have you been to the ER, urgent care clinic since your last visit? [] Yes   [x] No   [] Unable to obtain    Have you been hospitalized since your last visit? [] Yes   [x] No   [] Unable to obtain    Have you seen or consulted any other health care providers outside of the [address] [place] [address] since your last visit?    [] Yes   [x] No   [] Unable to obtain    Functional status (describe):         Last BM:    accessed (date): 11/18/2020    Bottle review (for opioid pain medication):  Medication 1:   Date filled:   Directions:   # filled:   # left:   # pills taking per day:  Last dose taken:    Medication 2:   Date filled:   Directions:   # filled:   # left:   # pills taking per day:  Last dose taken:    Medication 3:   Date filled:   Directions:   # filled:   # left:   # pills taking per day:  Last dose taken:    Medication 4:   Date filled: Directions:   # filled:   # left:   # pills taking per day:  Last dose taken:

## 2020-11-19 NOTE — PROGRESS NOTES
Palliative Medicine Outpatient Services  Morris: [phone] ([phone])    Patient Name: Mundo Rashid  YOB: 1955    Date of Current Visit: 11/19/20  Location of Current Visit:    [] [facility] Office  [] [facility] Office  [] [facility] Office  [] Home  [x]Synchronous A/V virtual visit    Date of Initial Visit: 6/15/2020  Referral from: Dr. Chung Emery  Primary Care Physician: Augustine Anderson MD      SUMMARY:   Mundo Rashid is a 72y.o. year old with a  history of uncontrolled diabetes with an A1c of 9.2, newly diagnosed inoperable pancreatic cancer, who was referred to Palliative Medicine by Dr. Chung Emery for management of intractable symptoms and psychosocial support. 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 CT on June 19 shows pancreatitis with some ascites, pancreatic mass has disappeared. Hospitalization at [facility] from July 4 to July 15 for dehydration. Completed 3 doses of 5-FU, started Gemzar Abraxane on 7/29/2020   CA 19 normal, starting stereotactic radiation part of a clinical trial at [address] [place] [address] with plans for surgery eventually. PALLIATIVE DIAGNOSES:       ICD-10-CM ICD-9-CM    1. Abdominal pain, generalized  R10.84 789.07    2. Chemotherapy-induced nausea  R11.0 787.02     T45.1X5A E933.1    3. Malignant neoplasm of pancreas, unspecified location of malignancy (HCC)  C25.9 157.9    4. Neuropathy  G62.9 355.9    5. Profound fatigue  R53.83 780.79    6.  Adenocarcinoma of pancreas (HCC)  C25.9 157.9           PLAN:     Patient Instructions     Dear Mundo Rashid ,    It was a pleasure seeing you today in your home  virtually    We will see you again in 4 virtually or sooner if needed    If labs or imaging tests have been ordered for you today, please call the office  at [phone] 48 hours after completion to obtain the results. Your stated goal:   -To live well for as long as possible    Your described symptoms were: Fatigue: 4 Drowsiness: 1   Depression: 0 Pain: 3   Anxiety: 3 Nausea: 0   Anorexia: 4 Dyspnea: 4   Best Well-Bein Constipation: No   Other Problem (Comment): 0       This is the plan we talked about:    1. Upper abdominal pain-pancreatic cancer related  -Your pain is well controlled. you are now using only Dilaudid 1 mg every 6 hours as needed, you take about 2 to 3 tablets/day or less some days. You have plenty of Dilaudid, no need for refill now. 2.  Severe nausea  -You have chemotherapy-induced nausea. You are currently taking Zofran 8 mg oral disintegrating tablet 2 times and this is helping. You have not needed Compazine. You also take Ativan at bedtime which helps with sleep and nausea. -Please call us when you having severe nausea and vomiting, you may need IV intervention along with fluids, we did this for you 2 weeks ago and this was very helpful. 3.  Insomnia   - You have been taking Ativan 0.5 mg at bedtime which is very helpful and so you want to continue the same. 4.  Next steps and new treatment  -You have a very busy next few weeks, you met with the [facility] radiation oncology team and plan is to place fiduciary markers endoscopically into your pancreas to start stereotactic radiation next week. You are very anxious about this and worried about the symptoms you may face. My team will call you every few days to check on you, we talked about you reaching out to us for any symptoms and not wait until you feel very poorly. 5.  Uncontrolled diabetes  -You are taking 55 units glargine insulin at nighttime and modify your daytime insulin based on your sugars. You do not have a set sliding scale.   I am very worried about extreme sugar levels. Start a food diary and measure your blood glucose 4 times a day and keep a diary so we can review this and modify your insulin needs. 6.  Diarrhea and constipation  -Diarrhea is improved since stopping FOLFOX.  - You are taking stool softeners on a regular basis and using MiraLAX as needed. 7.  Neuropathy  -Right hand neuropathy and numbness is worse than the left hand. We increased her gabapentin to 600 mg 3 times a day and you have not seen much of an improvement. Let us add Cymbalta 20 mg at bedtime in addition to the gabapentin. 8.  Advanced care planning  -You completed a living will and advanced medical directive naming your daughter Shawnee Corona as you medical power of     9. Psychosocial support  -You are the primary caregiver of your  who is cognitively impaired. You dealing with the cancer is causing tremendous amount of stress on you and your family. Your daughter Shawnee Corona is the only one who lives local and is able to help out. We talked about how palliative medicine can certainly provide you as much support as possible. This is what you have shared with us about Advance Care Planning:       Primary Decision Maker: Racquel Taylor - [phone]    Secondary Decision Maker: Priscila Saldaña, [facility] Planning 11/18/2020   Patient's Healthcare Decision Maker is: Named in scanned ACP document   Confirm Advance Directive Yes, on file   Patient Would Like to Complete Advance Directive -   Does the patient have other document types Power of            The Palliative Medicine Team is here to support you and your family.        Sincerely,      Priscilla Lyon MD and the Palliative Medicine Team       GOALS OF CARE / TREATMENT PREFERENCES:   [====Goals of Care====]  GOALS OF CARE:  Patient / health care proxy stated goals: See Patient Instructions / Summary    TREATMENT PREFERENCES:   Code Status:  [x] Attempt Resuscitation       [] Do Not Attempt Resuscitation    Advance Care Planning:  [x] The Pall Med Interdisciplinary Team has updated the ACP Navigator with Decision Maker and Patient Capacity      Primary Decision Maker: Racquel Taylor - [phone]    Secondary Decision Maker: Tanmay Brewer  Advance Care Planning 11/18/2020   Patient's Healthcare Decision Maker is: Named in scanned ACP document   Confirm Advance Directive Yes, on file   Patient Would Like to Complete Advance Directive -   Does the patient have other document types Power of        Other:  (If patient appropriate for POST, consider using PALLPOST smart phrase here)    The palliative care team has discussed with patient / health care proxy about goals of care / treatment preferences for patient.  [====Goals of Care====]     PRESCRIPTIONS GIVEN:     Medications Ordered Today   Medications    DULoxetine (CYMBALTA) 20 mg capsule     Sig: Take 1 Cap by mouth daily. Dispense:  30 Cap     Refill:  0           FOLLOW UP:     Future Appointments   Date Time Provider Louis Campuzano   12/2/2020 11:10 AM Cathryn Lesches, MD ONC BS [facility]   12/2/2020 11:45 AM \[facility] [place] [facility]\"" 2 [facility]           PHYSICIANS INVOLVED IN CARE:   Patient Care Team:  William Acuña MD as PCP - General (Internal Medicine)  William Acuña MD as PCP - [facility] Provider  Yuliet Woods MD (General Surgery)  JENN Chauhan (Certified Clinical Nurse Specialist)  Micah Christianson RN as Benefits Care Manager  Magaly Alvarado MD (Hematology and Oncology)  Cathryn Lesches, MD as Physician (Hematology and Oncology)       HISTORY:   Reviewed patient-completed ESAS and advance care planning form. Reviewed patient record in prescription monitoring program.    CHIEF COMPLAINT:   No chief complaint on file. HPI/SUBJECTIVE:    The patient is: [x] Verbal / [] Nonverbal     Patient seen today virtually .   Eduin Wei is very tearful today, anxious about the next steps with the radiation. We talked extensively about her reaching out to us when she is feeling poorly. She very rarely calls us to complain of symptoms, home health nurse calls us to let us know that she is doing poorly after which we initiate either fluids or change in medication which has been helpful for her. Akil Barber agrees to call us more regularly to discuss her symptoms so we can treat her better. She is also agreeing for my nurse to follow her every few days by phone to make sure she is doing okay now that we are starting radiation. Spent time talking about likely side effects of radiation. Encouraged her to share how she is truly feeling with her family and not necessarily to have a smile on her face all the time. Abdominal pain is fairly well controlled with Dilaudid as needed. She has been taking at least 1 tablet almost every day. Nausea is okay now that she completed chemotherapy. Constipation well managed. --------  Patient seen today along with her daughter Royce Corbett. Both are very tearful and stressed from patient's new diagnosis. Akil Barber tells me that she ignored her abdominal pain for several months and now she is paying for it. She is determined to do everything she can to live as long as possible but is very realistic about her outcome. She is struggling with abdominal pain and is not taking medications as prescribed. Her most significant complaint is profound fatigue. But from what she is sharing, she is eating little to nothing every day, drinking less than 10 ounces of water per day. Her daughter forces her to eat some applesauce with her meds which is about the only calories she gets per day. She sleeps most of the time. She is identified that the first 6 days of the chemotherapy is the worst but she seems to recover from it a little bit after and eats a little bit.   She struggles with diarrhea after chemotherapy but constipation once the diarrhea dissipates. 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 Daughter is overwhelmed because of the illness of her mother. Clinical Pain Assessment (nonverbal scale for nonverbal patients):   [++++ Clinical Pain Assessment++++]  [++++Pain Severity++++]: Pain: 3  [++++Pain Character++++]: cramping  [++++Pain Duration++++]: month  [++++Pain Effect++++]: functional and emotional  [++++Pain Factors++++]: none in particular  [++++Pain Frequency++++]: constant  [++++Pain Location++++]: upper abdomen  [++++ Clinical Pain Assessment++++]       FUNCTIONAL ASSESSMENT:     Palliative Performance Scale (PPS):  PPS: 70       PSYCHOSOCIAL/SPIRITUAL SCREENING:     Any spiritual / Advent concerns:  [] Yes /  [x] No    Caregiver Burnout:  [] Yes /  [x] No /  [] No Caregiver Present      Anticipatory grief assessment:   [x] Normal  / [] Maladaptive       ESAS Anxiety: Anxiety: 3    ESAS Depression: Depression: 0       REVIEW OF SYSTEMS:     The following systems were [x] reviewed / [] unable to be reviewed  Systems: constitutional, ears/nose/mouth/throat, respiratory, gastrointestinal, genitourinary, musculoskeletal, integumentary, neurologic, psychiatric, endocrine. Positive findings noted below. Modified ESAS Completed by: provider   Fatigue: 4 Drowsiness: 1   Depression: 0 Pain: 3   Anxiety: 3 Nausea: 0   Anorexia: 4 Dyspnea: 4   Best Well-Bein Constipation: No   Other Problem (Comment): 0          PHYSICAL EXAM:     Wt Readings from Last 3 Encounters:   20 186 lb 4.6 oz (84.5 kg)   20 191 lb 9.3 oz (86.9 kg)   20 191 lb 9.6 oz (86.9 kg)     There were no vitals taken for this visit.   Last bowel movement: See Nursing Note    Constitutional    [x] Appears well-developed and well-nourished in no apparent distress    [] Abnormal:  Mental status  [x] Alert and awake  [x] Oriented to person/place/time  [x] Able to follow commands  [] Abnormal:   Eyes  [x] EOM normal   [x] Sclera normal   [x] No visible ocular discharge  [] Abnormal:   HENT  [x] Normocephalic, atraumatic  [x] Mouth/Throat: Moist mucous membranes   [x] External Ears normal  [] Abnormal:  Oral thrush resolved  Neck  [x] No visualized mass  [] Abnormal:  Pulmonary/Chest   [x] Respiratory effort normal  [x] No visualized signs of difficulty breathing or respiratory distress  [] Abnormal:  Musculoskeletal  [x] Normal gait with no signs of ataxia  [x] Normal range of motion of neck  [] Abnormal:  Neurological:   [x] No facial asymmetry (Cranial nerve 7 motor function)  [x] No gaze palsy  [] Abnormal:   Skin  [x] No significant exanthematous lesions or discoloration noted on facial skin  [] Abnormal:                                  Psychiatric  [x] Normal affect  [x] No hallucinations  [] Abnormal:    Other pertinent observable physical exam findings:    Due to this being a TeleHealth evaluation, many elements of the physical examination are unable to be assessed.                HISTORY:     Past Medical History:   Diagnosis Date    Adenocarcinoma of pancreas ([facility] 05/13/2020    Dr Yee Sleet biopsy via EUS    [facility]     GERD (gastroesophageal reflux disease)     Hernia of abdominal cavity     Subxyphoid hernia    Hypertension     Inflammatory polyarthropathy (HCC)     Joint pain     Joint swelling     Menopause     Ocular nevus of left eye     Pancreatitis     Tracheal stenosis       Past Surgical History:   Procedure Laterality Date    HX CARPAL TUNNEL RELEASE Bilateral     HX COLONOSCOPY      HX HYSTERECTOMY      HX KNEE ARTHROSCOPY Right     HX KNEE REPLACEMENT Right     partial    HX LAP CHOLECYSTECTOMY      HX TONSILLECTOMY      HX VASCULAR ACCESS        Family History   Problem Relation Age of Onset    Heart Disease Mother     Heart Attack Mother     Cancer Father         lung    Diabetes Sister       History reviewed, no pertinent family history. Social History     Tobacco Use    Smoking status: Never Smoker    Smokeless tobacco: Never Used   Substance Use Topics    Alcohol use: No     Alcohol/week: 0.0 standard drinks     Frequency: Never     Binge frequency: Never     No Known Allergies   Current Outpatient Medications   Medication Sig    DULoxetine (CYMBALTA) 20 mg capsule Take 1 Cap by mouth daily.  gabapentin (NEURONTIN) 300 mg capsule Take 2 Caps by mouth three (3) times daily. Max Daily Amount: 1,800 mg. Indications: neuropathic pain    insulin glargine U-300 conc (Toujeo SoloStar U-300 Insulin) 300 unit/mL (1.5 mL) inpn pen INJECT 55 UNITS ONCE DAILY AT BEDTIME - DOSE INCREASED (Patient taking differently: 15 Units. INJECT 15 UNITS ONCE DAILY AT BEDTIME - DOSE INCREASED)    methylphenidate HCl (Ritalin) 5 mg tablet Take 1 Tab by mouth three (3) times daily. Max Daily Amount: 15 mg.    hydrocortisone (ANUSOL-HC) 25 mg supp Insert 1 Suppository into rectum nightly.  famotidine (Pepcid) 20 mg tablet Take 20 mg by mouth two (2) times a day.  sennosides (Senna) 8.6 mg cap Take 3 Tabs by mouth nightly.  polyethylene glycol (Miralax) 17 gram/dose powder Take 17 g by mouth two (2) times a day.  carvediloL (COREG) 25 mg tablet TAKE 1 TABLET BY MOUTH TWICE DAILY (Patient taking differently: Take 25 mg by mouth two (2) times daily as needed.)    HYDROmorphone (Dilaudid) 2 mg tablet Take 1 mg by mouth two (2) times a day. takes 3 tabs  by mouth, taking BID and PRN    lipase-protease-amylase (Creon) 12,000-38,000 -60,000 unit capsule Take 3 Caps by mouth three (3) times daily (with meals). Indications: exocrine pancreatic insufficiency    lidocaine-prilocaine (EMLA) topical cream Apply  to affected area as needed for Pain (pain ). Apply a thin layer over port site prior to accessing port    diphenhydrAMINE (BENADRYL) 25 mg tablet Take 25 mg by mouth two (2) times a day.     nystatin (MYCOSTATIN) powder Apply 1 g to affected area three (3) times daily.  promethazine (PHENERGAN) 25 mg tablet Take 1 Tab by mouth every six (6) hours as needed for Nausea.  potassium bicarb-citric acid (EFFER-K) 20 mEq tablet Take 1 Tab by mouth two (2) times daily (with meals). (Patient taking differently: Take 20 mEq by mouth two (2) times daily (with meals). takes 10 meq 2 x day)    LORazepam (ATIVAN) 1 mg tablet Take 1 Tab by mouth every six (6) hours as needed for Anxiety. Max Daily Amount: 4 mg. Indications: nausea and vomiting caused by cancer drugs (Patient taking differently: Take 1 mg by mouth every six (6) hours as needed for Anxiety. Taking 1 mg at bedtime  Indications: nausea and vomiting caused by cancer drugs)    ondansetron (ZOFRAN ODT) 4 mg disintegrating tablet Take 1 Tab by mouth every eight (8) hours as needed for Nausea or Vomiting.  diphenoxylate-atropine (LomotiL) 2.5-0.025 mg per tablet Take 2 Tabs by mouth four (4) times daily as needed for Diarrhea. Max Daily Amount: 8 Tabs.  prochlorperazine (COMPAZINE) 10 mg tablet Take 0.5 Tabs by mouth every six (6) hours as needed for Nausea.  Blood-Glucose Meter,Continuous (Dexcom G6 ) misc Use as directed to monitor blood glucose as directed    Blood-Glucose Sensor (Dexcom G6 Sensor) fadi Use as directed to monitor blood glucose as directed    Blood-Glucose Transmitter (Dexcom G6 Transmitter) fadi Use as directed to monitor blood glucose as directed    insulin lispro (HumaLOG KwikPen Insulin) 100 unit/mL kwikpen Inject 25 units into the skin with breakfast, 15 units with lunch (if BG is >200 and eating carbs with meal), and 30 units with dinner (Patient taking differently: patient on sliding scale)    acetaminophen (Acetaminophen Extra Strength) 500 mg tablet Take 1,000 mg by mouth two (2) times a day.  Needle, Disp, ndle 1 Each by Does Not Apply route two (2) times daily as needed for Other.     potassium chloride (K-DUR, KLOR-CON) 20 mEq tablet TAKE 1 TABLET BY MOUTH TWICE A DAY. Indications: low amount of potassium in the blood     No current facility-administered medications for this visit. LAB DATA REVIEWED:     Lab Results   Component Value Date/Time    WBC 3.8 11/11/2020 08:27 AM    HGB 9.6 (L) 11/11/2020 08:27 AM    PLATELET 243 37/47/4596 08:27 AM     Lab Results   Component Value Date/Time    Sodium 138 11/11/2020 08:27 AM    Potassium 3.9 11/11/2020 08:27 AM    Chloride 102 11/11/2020 08:27 AM    CO2 26 11/11/2020 08:27 AM    BUN 9 11/11/2020 08:27 AM    Creatinine 0.85 11/11/2020 08:27 AM    Calcium 8.7 11/11/2020 08:27 AM    Magnesium 1.7 07/15/2020 05:35 AM    Phosphorus 3.0 07/15/2020 05:35 AM      Lab Results   Component Value Date/Time    Alk.  phosphatase 66 11/11/2020 08:27 AM    Protein, total 6.4 11/11/2020 08:27 AM    Albumin 2.8 (L) 11/11/2020 08:27 AM    Globulin 3.6 11/11/2020 08:27 AM     No results found for: INR, PTMR, PTP, PT1, PT2, APTT, INREXT, INREXT   Lab Results   Component Value Date/Time    Iron 50 08/12/2020 11:00 AM    TIBC 244 (L) 08/12/2020 11:00 AM    Iron % saturation 20 08/12/2020 11:00 AM    Ferritin 603 (H) 08/12/2020 11:00 AM           CONTROLLED SUBSTANCES SAFETY ASSESSMENT (IF ON CONTROLLED SUBSTANCES):     Reviewed opioid safety handout:  [x] Yes   [] No  24 hour opioid dose >150mg morphine equivalent/day:  [] Yes   [x] No  Benzodiazepines:  [] Yes   [x] No  Sleep apnea:  [] Yes   [x] No  Urine Toxicology Testing within last 6 months:  [] Yes   [x] No  History of or new aberrant medication taking behaviors:  [] Yes   [x] No  Has Narcan been prescribed [x] Yes   [] No          Total time: 60 minutes  Counseling / coordination time: 40 minutes  > 50% counseling / coordination?:  Yes    Consent:  He and/or health care decision maker is aware that that he may receive a bill for this telehealth service, depending on his insurance coverage, and has provided verbal consent to proceed: Yes    CPT Codes 54923-63127 for Established Patients may apply to this Telehealth Visit  Pursuant to the emergency declaration under the [facility], [facility] waiver authority and the [employer] Act, this Virtual  Visit was conducted, with patient's consent, to reduce the patient's risk of exposure to COVID-19 and provide continuity of care for an established patient. Services were provided through a video synchronous discussion virtually to substitute for in-person clinic visit.

## 2020-11-24 ENCOUNTER — TELEPHONE (OUTPATIENT)
Dept: ONCOLOGY | Age: 65
End: 2020-11-24

## 2020-11-24 NOTE — TELEPHONE ENCOUNTER
HIPAA verified. Patient states she saw DR Brandon Weeks yesterday at [facility]. She had at CT with contrast and has had her tattoo's to start sterotactatic radiation. She stated she is scheduled for XRT on December 8, 10, 14, 16, and 18th. Then rest for 4-6 weeks, then she will see DR Perrna Banuelos for surgery.     Requested Viral Beatty to get records

## 2020-11-24 NOTE — TELEPHONE ENCOUNTER
Called Dr Gonzalez Notice off @ [facility]. Spoke to [employer]. Requested office notes & CT results. Fax number given.

## 2020-11-30 DIAGNOSIS — C25.9 MALIGNANT NEOPLASM OF PANCREAS, UNSPECIFIED (HCC): ICD-10-CM

## 2020-11-30 RX ORDER — POTASSIUM CHLORIDE 20 MEQ/1
TABLET, EXTENDED RELEASE ORAL
Qty: 60 TAB | Refills: 0 | Status: SHIPPED | OUTPATIENT
Start: 2020-11-30 | End: 2020-12-02

## 2020-12-02 RX ORDER — POTASSIUM CHLORIDE 20 MEQ/1
TABLET, EXTENDED RELEASE ORAL
Qty: 60 TAB | Refills: 0 | Status: SHIPPED | OUTPATIENT
Start: 2020-12-02 | End: 2021-02-19 | Stop reason: SDUPTHER

## 2020-12-03 NOTE — PROGRESS NOTES
Raya Ba is a 65 y. o. female who is seen for pancreatic adenocarcinoma      Key Oncology Meds             ondansetron (ZOFRAN ODT) 4 mg disintegrating tablet Take 1 Tab by mouth every eight (8) hours as needed for Nausea or Vomiting. prochlorperazine (COMPAZINE) 10 mg tablet Take 0.5 Tabs by mouth every six (6) hours as needed for Nausea. Chemotherapy Flowsheet 11/11/2020   Cycle C 5   Date 11/11/2020   Drug / Regimen HELD   Dosage -   Time Up -   Time Down -   Pre Hydration -   Pre Meds -   Notes -     Key Pain Meds             HYDROmorphone (Dilaudid) 2 mg tablet Take 1 mg by mouth two (2) times a day. takes 3 tabs  by mouth, taking BID and PRN    acetaminophen (Acetaminophen Extra Strength) 500 mg tablet Take 1,000 mg by mouth two (2) times a day.

## 2020-12-04 ENCOUNTER — OFFICE VISIT (OUTPATIENT)
Dept: ONCOLOGY | Age: 65
End: 2020-12-04
Payer: MEDICARE

## 2020-12-04 VITALS
WEIGHT: 186 LBS | TEMPERATURE: 98.1 F | SYSTOLIC BLOOD PRESSURE: 113 MMHG | OXYGEN SATURATION: 98 % | BODY MASS INDEX: 31.76 KG/M2 | RESPIRATION RATE: 20 BRPM | HEART RATE: 74 BPM | DIASTOLIC BLOOD PRESSURE: 55 MMHG | HEIGHT: 64 IN

## 2020-12-04 DIAGNOSIS — C25.9 ADENOCARCINOMA OF PANCREAS (HCC): Primary | ICD-10-CM

## 2020-12-04 PROCEDURE — G8427 DOCREV CUR MEDS BY ELIG CLIN: HCPCS | Performed by: INTERNAL MEDICINE

## 2020-12-04 PROCEDURE — 1090F PRES/ABSN URINE INCON ASSESS: CPT | Performed by: INTERNAL MEDICINE

## 2020-12-04 PROCEDURE — G9899 SCRN MAM PERF RSLTS DOC: HCPCS | Performed by: INTERNAL MEDICINE

## 2020-12-04 PROCEDURE — G8510 SCR DEP NEG, NO PLAN REQD: HCPCS | Performed by: INTERNAL MEDICINE

## 2020-12-04 PROCEDURE — G8399 PT W/DXA RESULTS DOCUMENT: HCPCS | Performed by: INTERNAL MEDICINE

## 2020-12-04 PROCEDURE — 99214 OFFICE O/P EST MOD 30 MIN: CPT | Performed by: INTERNAL MEDICINE

## 2020-12-04 PROCEDURE — 3017F COLORECTAL CA SCREEN DOC REV: CPT | Performed by: INTERNAL MEDICINE

## 2020-12-04 PROCEDURE — G8752 SYS BP LESS 140: HCPCS | Performed by: INTERNAL MEDICINE

## 2020-12-04 PROCEDURE — G8754 DIAS BP LESS 90: HCPCS | Performed by: INTERNAL MEDICINE

## 2020-12-04 PROCEDURE — G8536 NO DOC ELDER MAL SCRN: HCPCS | Performed by: INTERNAL MEDICINE

## 2020-12-04 PROCEDURE — 1100F PTFALLS ASSESS-DOCD GE2>/YR: CPT | Performed by: INTERNAL MEDICINE

## 2020-12-04 PROCEDURE — 3288F FALL RISK ASSESSMENT DOCD: CPT | Performed by: INTERNAL MEDICINE

## 2020-12-04 PROCEDURE — G8417 CALC BMI ABV UP PARAM F/U: HCPCS | Performed by: INTERNAL MEDICINE

## 2020-12-04 RX ORDER — LIDOCAINE AND PRILOCAINE 25; 25 MG/G; MG/G
CREAM TOPICAL AS NEEDED
Qty: 30 G | Refills: 0 | Status: SHIPPED | OUTPATIENT
Start: 2020-12-04 | End: 2021-01-08 | Stop reason: SDUPTHER

## 2020-12-04 NOTE — PROGRESS NOTES
Reason for Visit:   Kalie Pritchett a 29 H. o. female who is seen for pancreatic adenocarcinoma     Treatment History:   Dx: Pancreatic Adenocarcinoma--May 2020--[id number]--rwpsmazzady of splenic vein and superior mesenteric vein  Tx: mFOLFIRINOX--first cycle 5/26/2020, second cycle 6/10/2020, dose reduced 15% cycle 3 on 6/30/2020--delayed due to severe side effects--switched to Gemcitabine/Abraxane--Cycle #1 7/29/2002, Cycle #2 9/2/2020, Cycle #3 10/7/2020, Cycle #4 11/4/2020  Goal: Prolong life--Palliative    History of Present Illness:   Doing better, strength is improving, eval by [facility]--decision for Radiation with Dr Rob Opitz, then possible surgical intervention with Dr Oh Armstrong. Needs refill of ondansetron. Stopping benadryl and pepcid (was taking due to rash from gem/abraxane). Described significant neuropathy--pain and numbness hands/feet--burning, hard to hold objects and put pressure on feet--on 1800mg gabapentin tid--Palliative Care added Cymbalta.       Past Medical History:   Diagnosis Date    Adenocarcinoma of pancreas ([facility] [address] 05/13/2020    Dr Sonja Patel biopsy via EUS    Diabetes ([facility] [address]     GERD (gastroesophageal reflux disease)     Hernia of abdominal cavity     Subxyphoid hernia    Hypertension     Inflammatory polyarthropathy (HCC)     Joint pain     Joint swelling     Menopause     Ocular nevus of left eye     Pancreatitis     Tracheal stenosis       Past Surgical History:   Procedure Laterality Date    HX CARPAL TUNNEL RELEASE Bilateral     HX COLONOSCOPY      HX HYSTERECTOMY      HX KNEE ARTHROSCOPY Right     HX KNEE REPLACEMENT Right     partial    HX LAP CHOLECYSTECTOMY      HX TONSILLECTOMY      HX VASCULAR ACCESS        Social History     Tobacco Use    Smoking status: Never Smoker    Smokeless tobacco: Never Used   Substance Use Topics    Alcohol use: No     Alcohol/week: 0.0 standard drinks     Frequency: Never     Binge frequency: Never      Family History   Problem Relation Age of Onset    Heart Disease Mother     Heart Attack Mother     Cancer Father         lung    Diabetes Sister      Current Outpatient Medications   Medication Sig    potassium chloride (K-DUR, KLOR-CON) 20 mEq tablet TAKE 1 TABLET BY MOUTH TWICE DAILY    DULoxetine (CYMBALTA) 20 mg capsule Take 1 Cap by mouth daily.  gabapentin (NEURONTIN) 300 mg capsule Take 2 Caps by mouth three (3) times daily. Max Daily Amount: 1,800 mg. Indications: neuropathic pain    insulin glargine U-300 conc (Toujeo SoloStar U-300 Insulin) 300 unit/mL (1.5 mL) inpn pen INJECT 55 UNITS ONCE DAILY AT BEDTIME - DOSE INCREASED (Patient taking differently: 15 Units. INJECT 15 UNITS ONCE DAILY AT BEDTIME - DOSE INCREASED)    methylphenidate HCl (Ritalin) 5 mg tablet Take 1 Tab by mouth three (3) times daily. Max Daily Amount: 15 mg.    hydrocortisone (ANUSOL-HC) 25 mg supp Insert 1 Suppository into rectum nightly.  famotidine (Pepcid) 20 mg tablet Take 20 mg by mouth two (2) times a day.  sennosides (Senna) 8.6 mg cap Take 3 Tabs by mouth nightly.  polyethylene glycol (Miralax) 17 gram/dose powder Take 17 g by mouth two (2) times a day.  carvediloL (COREG) 25 mg tablet TAKE 1 TABLET BY MOUTH TWICE DAILY (Patient taking differently: Take 25 mg by mouth two (2) times daily as needed.)    HYDROmorphone (Dilaudid) 2 mg tablet Take 1 mg by mouth two (2) times a day. takes 3 tabs  by mouth, taking BID and PRN    lipase-protease-amylase (Creon) 12,000-38,000 -60,000 unit capsule Take 3 Caps by mouth three (3) times daily (with meals). Indications: exocrine pancreatic insufficiency    lidocaine-prilocaine (EMLA) topical cream Apply  to affected area as needed for Pain (pain ). Apply a thin layer over port site prior to accessing port    diphenhydrAMINE (BENADRYL) 25 mg tablet Take 25 mg by mouth two (2) times a day.  nystatin (MYCOSTATIN) powder Apply 1 g to affected area three (3) times daily.     promethazine (PHENERGAN) 25 mg tablet Take 1 Tab by mouth every six (6) hours as needed for Nausea.  potassium bicarb-citric acid (EFFER-K) 20 mEq tablet Take 1 Tab by mouth two (2) times daily (with meals). (Patient taking differently: Take 20 mEq by mouth two (2) times daily (with meals). takes 10 meq 2 x day)    LORazepam (ATIVAN) 1 mg tablet Take 1 Tab by mouth every six (6) hours as needed for Anxiety. Max Daily Amount: 4 mg. Indications: nausea and vomiting caused by cancer drugs (Patient taking differently: Take 1 mg by mouth every six (6) hours as needed for Anxiety. Taking 1 mg at bedtime  Indications: nausea and vomiting caused by cancer drugs)    ondansetron (ZOFRAN ODT) 4 mg disintegrating tablet Take 1 Tab by mouth every eight (8) hours as needed for Nausea or Vomiting.  diphenoxylate-atropine (LomotiL) 2.5-0.025 mg per tablet Take 2 Tabs by mouth four (4) times daily as needed for Diarrhea. Max Daily Amount: 8 Tabs.  prochlorperazine (COMPAZINE) 10 mg tablet Take 0.5 Tabs by mouth every six (6) hours as needed for Nausea.  Blood-Glucose Meter,Continuous (Dexcom G6 ) misc Use as directed to monitor blood glucose as directed    Blood-Glucose Sensor (Dexcom G6 Sensor) fadi Use as directed to monitor blood glucose as directed    Blood-Glucose Transmitter (Dexcom G6 Transmitter) fadi Use as directed to monitor blood glucose as directed    insulin lispro (HumaLOG KwikPen Insulin) 100 unit/mL kwikpen Inject 25 units into the skin with breakfast, 15 units with lunch (if BG is >200 and eating carbs with meal), and 30 units with dinner (Patient taking differently: patient on sliding scale)    acetaminophen (Acetaminophen Extra Strength) 500 mg tablet Take 1,000 mg by mouth two (2) times a day.  Needle, Disp, ndle 1 Each by Does Not Apply route two (2) times daily as needed for Other. No current facility-administered medications for this visit.        No Known Allergies     Review of Systems: A complete review of systems was obtained, negative except as described above. Physical Exam:     Visit Vitals  BP (!) 113/55   Pulse 74   Temp 98.1 °F (36.7 °C) (Oral)   Resp 20   Ht 5' 4\" (1.626 m)   Wt 186 lb (84.4 kg)   SpO2 98%   BMI 31.93 kg/m²     ECOG PS: 1  General: No distress  Eyes: PERRLA, anicteric sclerae  HENT: Atraumatic, OP clear  Neck: Supple  Lymphatic: No cervical, supraclavicular, or inguinal adenopathy  Respiratory: CTAB, normal respiratory effort  CV: Normal rate, regular rhythm, no murmurs, no peripheral edema  GI: Soft, nontender, nondistended, no masses, no hepatomegaly, no splenomegaly  MS: Normal gait and station. Digits without clubbing or cyanosis. Skin: No rashes, ecchymoses, or petechiae. Normal temperature, turgor, and texture. Psych: Alert, oriented, appropriate affect, normal judgment/insight    Results:     Lab Results   Component Value Date/Time    WBC 3.8 11/11/2020 08:27 AM    HGB 9.6 (L) 11/11/2020 08:27 AM    HCT 29.9 (L) 11/11/2020 08:27 AM    PLATELET 746 49/47/1585 08:27 AM    MCV 92.3 11/11/2020 08:27 AM    ABS. NEUTROPHILS 2.6 11/11/2020 08:27 AM     Lab Results   Component Value Date/Time    Sodium 138 11/11/2020 08:27 AM    Potassium 3.9 11/11/2020 08:27 AM    Chloride 102 11/11/2020 08:27 AM    CO2 26 11/11/2020 08:27 AM    Glucose 279 (H) 11/11/2020 08:27 AM    BUN 9 11/11/2020 08:27 AM    Creatinine 0.85 11/11/2020 08:27 AM    GFR est AA >60 11/11/2020 08:27 AM    GFR est non-AA >60 11/11/2020 08:27 AM    Calcium 8.7 11/11/2020 08:27 AM    Glucose (POC) 192 (H) 07/15/2020 11:56 AM    Creatinine (POC) 0.7 02/06/2013 10:21 AM     Lab Results   Component Value Date/Time    Bilirubin, total 0.4 11/11/2020 08:27 AM    ALT (SGPT) 40 11/11/2020 08:27 AM    Alk.  phosphatase 66 11/11/2020 08:27 AM    Protein, total 6.4 11/11/2020 08:27 AM    Albumin 2.8 (L) 11/11/2020 08:27 AM    Globulin 3.6 11/11/2020 08:27 AM       CT A/P 4/30/2020  IMPRESSION: Suspect neoplastic pancreatic mass versus atypical focal  pancreatitis with splenic and superior mesenteric vein occlusion. Consider  further evaluation with endoscopic ultrasound at which time biopsy could  potentially be performed.     CT Chest 5/21/2020  IMPRESSION:  1. No evidence of pulmonary metastatic disease. No evidence of mediastinal or  hilar lymphadenopathy. No pleural effusions identified. 2. No definite evidence of central pulmonary embolic disease. 3. Presence of a mass lesion involving the pancreatic head/body. 4. Evidence of fatty infiltration involving the liver. Presence of focal  low-density areas within the liver compatible with cysts. Surgical absence of  the gallbladder.     CT A/P 6/19/2020  IMPRESSION:  Pancreatitis with ascites favored. Discrete pancreatic mass is not identified. No biliary dilatation or definite arterial or venous thrombosis. No liver  metastases. Fat-containing ventral hernia  Nonobstructing left renal calculus  Retrolisthesis of L2 and L3.     CT C/A/P 10/12/2020  IMPRESSION:  1. Interval improvement in appearance of the pancreas (see discussion above). 2. Normal sized liver. Stable appearance of the previously described areas of  decreased attenuation within liver. 3. Surgical absence of the gallbladder. 4. Evidence of splenomegaly. 5. Normal sized kidneys. Presence of small, nonobstructing calculi within the  left kidney. No evidence of hydronephrotic change. 6. Suboptimal distention of the urinary bladder. 7. Presence of a small, fat-containing ventral hernia in the mid abdominal  region.     Path: 5/8/2020  CYTOLOGIC INTERPRETATION:   Pancreas, EUS-guided fine needle aspiration and cell blocks:    Adenocarcinoma      Assessment:   1) Pancreatic Adenocarcinoma--Unresectable  2) Neoplasm Pain/Neuropathic Pain  3) Fatigue  4) Nausea  5) DM  6) Nutrition  7) Diarrhea        Plan:   1) Completed 4 cycles of gem/abraxane.  Repeat imaging continues to show response.  BRCA negative.  Has seen Shaunna Jacome is to get Radiation with Dr Praneeth Grewal, then possible surgery. Radiation starts Tuesday 12/8/2020.     2) S/p celiac block--pain better and controlled with hydromorphone.  Future options include repeat celiac block, methadone, fentanyl.  Increase gabapentin for the neuropathic pain in feet/toes to 600mg am, 300mg bid. Will continue titrating gabapentin dose, switch to lyrica or Cymbalta if nelida stops working     3) Methylphenidate 5mg bid--watch for anxiety and insomnia (normally only taking dose in am). We did drop the dose of both Abraxane (from 125 to 100mg/m2) and Gemcitabine (from 1000 to 900mg/m2) due to her significant fatigue.       4) Compazine/Ondansetron controlling--switch to olanzapine 5mg bid or 10mg once daily if becomes difficult to manage with compazine.    5) Defer to Dr Alo Foy for further management.  Pharmacy may have ideas.       6) On pancreatic enzymes--weight has stabilized, may need nutrition consult.     7) Chemo related--hopefully with not recurr.  Taking senna/miralax to prevent constipation.       Signed By: Rock Jarvis MD

## 2020-12-10 ENCOUNTER — PATIENT OUTREACH (OUTPATIENT)
Dept: OTHER | Age: 65
End: 2020-12-10

## 2020-12-10 NOTE — PROGRESS NOTES
Resolving current episode of case management due to change in bebefits.  Pt continues to be managed by Oncology and Palliative team.

## 2020-12-28 ENCOUNTER — TELEPHONE (OUTPATIENT)
Dept: PALLATIVE CARE | Age: 65
End: 2020-12-28

## 2020-12-28 RX ORDER — DULOXETIN HYDROCHLORIDE 20 MG/1
20 CAPSULE, DELAYED RELEASE ORAL DAILY
Qty: 30 CAP | Refills: 1 | Status: SHIPPED | OUTPATIENT
Start: 2020-12-28 | End: 2021-01-08 | Stop reason: SDUPTHER

## 2020-12-28 NOTE — TELEPHONE ENCOUNTER
Palliative Medicine  Nursing Note  [phone]  Fax [phone]      Telephone Call  Patient Name: Epifanio Puente  YOB: [birth year]        Primary Decision Maker: Felice Baldwin - Daughter - [phone]    Secondary Decision Maker: Trisha Simmons   Advance Care Planning 11/18/2020   Patient's Healthcare Decision Maker is: Named in scanned ACP document   Confirm Advance Directive Yes, on file   Patient Would Like to Complete Advance Directive -   Does the patient have other document types Power of      Returned call to patient and she requested refill on Cymbalta to be sent to local pharmacy. Patient reported that she saw Dr. Kian Clay @ [facility] and had radiation which completed on 12/18. Patient reported that she experienced a few days of fatigue and diarrhea which has now resolved. She is schedule for follow up on 1/15/21 and CT scan to decide if she is a candidate for exploratory laparoscopy now that the tumor has shrunk.      Patient appreciative of palliative medicine support, and thankful that she has lived to [facility] and spend quality time with her family    Information was shared with Dr. Jasmeet Adler    Virtual visit schedule for 1/20/21     Deric Gracia RN  Palliative Medicine

## 2021-01-08 ENCOUNTER — OFFICE VISIT (OUTPATIENT)
Dept: ONCOLOGY | Age: 66
End: 2021-01-08
Payer: MEDICARE

## 2021-01-08 VITALS
SYSTOLIC BLOOD PRESSURE: 124 MMHG | WEIGHT: 179.2 LBS | RESPIRATION RATE: 16 BRPM | HEIGHT: 64 IN | TEMPERATURE: 98.2 F | BODY MASS INDEX: 30.59 KG/M2 | OXYGEN SATURATION: 94 % | HEART RATE: 72 BPM | DIASTOLIC BLOOD PRESSURE: 82 MMHG

## 2021-01-08 DIAGNOSIS — C25.9 ADENOCARCINOMA OF PANCREAS (HCC): Primary | ICD-10-CM

## 2021-01-08 DIAGNOSIS — E08.21 DIABETES MELLITUS DUE TO UNDERLYING CONDITION WITH DIABETIC NEPHROPATHY, UNSPECIFIED WHETHER LONG TERM INSULIN USE (HCC): ICD-10-CM

## 2021-01-08 PROCEDURE — G8427 DOCREV CUR MEDS BY ELIG CLIN: HCPCS | Performed by: INTERNAL MEDICINE

## 2021-01-08 PROCEDURE — 1090F PRES/ABSN URINE INCON ASSESS: CPT | Performed by: INTERNAL MEDICINE

## 2021-01-08 PROCEDURE — 2022F DILAT RTA XM EVC RTNOPTHY: CPT | Performed by: INTERNAL MEDICINE

## 2021-01-08 PROCEDURE — 3017F COLORECTAL CA SCREEN DOC REV: CPT | Performed by: INTERNAL MEDICINE

## 2021-01-08 PROCEDURE — 3046F HEMOGLOBIN A1C LEVEL >9.0%: CPT | Performed by: INTERNAL MEDICINE

## 2021-01-08 PROCEDURE — G8754 DIAS BP LESS 90: HCPCS | Performed by: INTERNAL MEDICINE

## 2021-01-08 PROCEDURE — G8536 NO DOC ELDER MAL SCRN: HCPCS | Performed by: INTERNAL MEDICINE

## 2021-01-08 PROCEDURE — 1100F PTFALLS ASSESS-DOCD GE2>/YR: CPT | Performed by: INTERNAL MEDICINE

## 2021-01-08 PROCEDURE — G8399 PT W/DXA RESULTS DOCUMENT: HCPCS | Performed by: INTERNAL MEDICINE

## 2021-01-08 PROCEDURE — 3288F FALL RISK ASSESSMENT DOCD: CPT | Performed by: INTERNAL MEDICINE

## 2021-01-08 PROCEDURE — G8510 SCR DEP NEG, NO PLAN REQD: HCPCS | Performed by: INTERNAL MEDICINE

## 2021-01-08 PROCEDURE — G8417 CALC BMI ABV UP PARAM F/U: HCPCS | Performed by: INTERNAL MEDICINE

## 2021-01-08 PROCEDURE — 99214 OFFICE O/P EST MOD 30 MIN: CPT | Performed by: INTERNAL MEDICINE

## 2021-01-08 PROCEDURE — G8752 SYS BP LESS 140: HCPCS | Performed by: INTERNAL MEDICINE

## 2021-01-08 RX ORDER — DULOXETIN HYDROCHLORIDE 20 MG/1
20 CAPSULE, DELAYED RELEASE ORAL 2 TIMES DAILY
Qty: 60 CAP | Refills: 3 | Status: SHIPPED | OUTPATIENT
Start: 2021-01-08 | End: 2021-06-11 | Stop reason: SDUPTHER

## 2021-01-08 NOTE — PROGRESS NOTES
Reason for Visit:   Boby Ruggiero a 24 O. o. female who is seen for pancreatic adenocarcinoma     Treatment History:   Dx: Pancreatic Adenocarcinoma--May 2020--[id number]--zoqjxrhbkvp of splenic vein and superior mesenteric vein  Tx: mFOLFIRINOX--first cycle 5/26/2020, second cycle 6/10/2020, dose reduced 15% cycle 3 on 6/30/2020--delayed due to severe side effects--switched to Gemcitabine/Abraxane--Cycle #1 7/29/2002, Cycle #2 9/2/2020, Cycle #3 10/7/2020, Cycle #4 11/4/2020. Radiation therapy with Dr Ragini Jenkins at [facility] 30Gy in 5 fractions--completed 12/18/2020. Clinical Trial with CIVA sheet. Goal: Prolong life--Palliative    History of Present Illness:   Still with neuropathy in hands and feet that is limiting. On gabapentin and cymbalta--they both help--only on 20mg cymbalta. Completed radiation with Dr Trang Lopez enrolled in clinical trial with CIVA Sheet--Dr David Day to do ex lap. Pain well managed.     Past Medical History:   Diagnosis Date    Adenocarcinoma of pancreas ([facility] [address] 05/13/2020    Dr Maria De Jesus Snyder biopsy via EUS    Diabetes ([facility] [address]     GERD (gastroesophageal reflux disease)     Hernia of abdominal cavity     Subxyphoid hernia    Hypertension     Inflammatory polyarthropathy (HCC)     Joint pain     Joint swelling     Menopause     Ocular nevus of left eye     Pancreatitis     Tracheal stenosis       Past Surgical History:   Procedure Laterality Date    HX CARPAL TUNNEL RELEASE Bilateral     HX COLONOSCOPY      HX HYSTERECTOMY      HX KNEE ARTHROSCOPY Right     HX KNEE REPLACEMENT Right     partial    HX LAP CHOLECYSTECTOMY      HX TONSILLECTOMY      HX VASCULAR ACCESS        Social History     Tobacco Use    Smoking status: Never Smoker    Smokeless tobacco: Never Used   Substance Use Topics    Alcohol use: No     Alcohol/week: 0.0 standard drinks     Frequency: Never     Binge frequency: Never      Family History   Problem Relation Age of Onset    Heart Disease Mother     Heart Attack Mother     Cancer Father         lung    Diabetes Sister      Current Outpatient Medications   Medication Sig    DULoxetine (CYMBALTA) 20 mg capsule Take 1 Cap by mouth daily.  lidocaine-prilocaine (EMLA) topical cream Apply  to affected area as needed for Pain.  potassium chloride (K-DUR, KLOR-CON) 20 mEq tablet TAKE 1 TABLET BY MOUTH TWICE DAILY    gabapentin (NEURONTIN) 300 mg capsule Take 2 Caps by mouth three (3) times daily. Max Daily Amount: 1,800 mg. Indications: neuropathic pain    insulin glargine U-300 conc (Toujeo SoloStar U-300 Insulin) 300 unit/mL (1.5 mL) inpn pen INJECT 55 UNITS ONCE DAILY AT BEDTIME - DOSE INCREASED (Patient taking differently: 15 Units. Sliding scale)    methylphenidate HCl (Ritalin) 5 mg tablet Take 1 Tab by mouth three (3) times daily. Max Daily Amount: 15 mg. (Patient taking differently: Take 5 mg by mouth daily.)    hydrocortisone (ANUSOL-HC) 25 mg supp Insert 1 Suppository into rectum nightly.  famotidine (Pepcid) 20 mg tablet Take 20 mg by mouth two (2) times a day.  sennosides (Senna) 8.6 mg cap Take 2 Tabs by mouth nightly.  polyethylene glycol (Miralax) 17 gram/dose powder Take 17 g by mouth daily as needed.  carvediloL (COREG) 25 mg tablet TAKE 1 TABLET BY MOUTH TWICE DAILY (Patient taking differently: Take 25 mg by mouth two (2) times daily as needed.)    HYDROmorphone (Dilaudid) 2 mg tablet Take 1 mg by mouth two (2) times a day. takes 3 tabs  by mouth, taking BID and PRN    lipase-protease-amylase (Creon) 12,000-38,000 -60,000 unit capsule Take 3 Caps by mouth three (3) times daily (with meals). Indications: exocrine pancreatic insufficiency (Patient taking differently: Take 2 Caps by mouth three (3) times daily (with meals). 2 caps with meal and 1 with snacks  Indications: exocrine pancreatic insufficiency)    lidocaine-prilocaine (EMLA) topical cream Apply  to affected area as needed for Pain (pain ).  Apply a thin layer over port site prior to accessing port    diphenhydrAMINE (BENADRYL) 25 mg tablet Take 25 mg by mouth two (2) times a day.  nystatin (MYCOSTATIN) powder Apply 1 g to affected area three (3) times daily.  promethazine (PHENERGAN) 25 mg tablet Take 1 Tab by mouth every six (6) hours as needed for Nausea.  potassium bicarb-citric acid (EFFER-K) 20 mEq tablet Take 1 Tab by mouth two (2) times daily (with meals). (Patient taking differently: Take 20 mEq by mouth two (2) times daily (with meals). takes 10 meq 2 x day)    LORazepam (ATIVAN) 1 mg tablet Take 1 Tab by mouth every six (6) hours as needed for Anxiety. Max Daily Amount: 4 mg. Indications: nausea and vomiting caused by cancer drugs (Patient taking differently: Take 1 mg by mouth every six (6) hours as needed for Anxiety. Taking 1 mg at bedtime  Indications: nausea and vomiting caused by cancer drugs)    ondansetron (ZOFRAN ODT) 4 mg disintegrating tablet Take 1 Tab by mouth every eight (8) hours as needed for Nausea or Vomiting.  diphenoxylate-atropine (LomotiL) 2.5-0.025 mg per tablet Take 2 Tabs by mouth four (4) times daily as needed for Diarrhea. Max Daily Amount: 8 Tabs.  prochlorperazine (COMPAZINE) 10 mg tablet Take 0.5 Tabs by mouth every six (6) hours as needed for Nausea.     Blood-Glucose Meter,Continuous (Dexcom G6 ) misc Use as directed to monitor blood glucose as directed    Blood-Glucose Sensor (Dexcom G6 Sensor) fadi Use as directed to monitor blood glucose as directed    Blood-Glucose Transmitter (Dexcom G6 Transmitter) fadi Use as directed to monitor blood glucose as directed    insulin lispro (HumaLOG KwikPen Insulin) 100 unit/mL kwikpen Inject 25 units into the skin with breakfast, 15 units with lunch (if BG is >200 and eating carbs with meal), and 30 units with dinner (Patient taking differently: patient on sliding scale)    acetaminophen (Acetaminophen Extra Strength) 500 mg tablet Take 1,000 mg by mouth daily as needed.  Needle, Disp, ndle 1 Each by Does Not Apply route two (2) times daily as needed for Other. No current facility-administered medications for this visit. No Known Allergies     Review of Systems: A complete review of systems was obtained, negative except as described above. Physical Exam:   There were no vitals taken for this visit. ECOG PS: 1  General: No distress  Eyes: PERRLA, anicteric sclerae  HENT: Atraumatic, OP clear  Neck: Supple  Lymphatic: No cervical, supraclavicular, or inguinal adenopathy  Respiratory: CTAB, normal respiratory effort  CV: Normal rate, regular rhythm, no murmurs, no peripheral edema  GI: Soft, nontender, nondistended, no masses, no hepatomegaly, no splenomegaly  MS: Normal gait and station. Digits without clubbing or cyanosis. Skin: No rashes, ecchymoses, or petechiae. Normal temperature, turgor, and texture. Psych: Alert, oriented, appropriate affect, normal judgment/insight    Results:     Lab Results   Component Value Date/Time    WBC 3.8 11/11/2020 08:27 AM    HGB 9.6 (L) 11/11/2020 08:27 AM    HCT 29.9 (L) 11/11/2020 08:27 AM    PLATELET 721 44/94/4904 08:27 AM    MCV 92.3 11/11/2020 08:27 AM    ABS. NEUTROPHILS 2.6 11/11/2020 08:27 AM     Lab Results   Component Value Date/Time    Sodium 138 11/11/2020 08:27 AM    Potassium 3.9 11/11/2020 08:27 AM    Chloride 102 11/11/2020 08:27 AM    CO2 26 11/11/2020 08:27 AM    Glucose 279 (H) 11/11/2020 08:27 AM    BUN 9 11/11/2020 08:27 AM    Creatinine 0.85 11/11/2020 08:27 AM    GFR est AA >60 11/11/2020 08:27 AM    GFR est non-AA >60 11/11/2020 08:27 AM    Calcium 8.7 11/11/2020 08:27 AM    Glucose (POC) 192 (H) 07/15/2020 11:56 AM    Creatinine (POC) 0.7 02/06/2013 10:21 AM     Lab Results   Component Value Date/Time    Bilirubin, total 0.4 11/11/2020 08:27 AM    ALT (SGPT) 40 11/11/2020 08:27 AM    Alk.  phosphatase 66 11/11/2020 08:27 AM    Protein, total 6.4 11/11/2020 08:27 AM    Albumin 2.8 (L) 11/11/2020 08:27 AM    Globulin 3.6 11/11/2020 08:27 AM       CT A/P 4/30/2020  IMPRESSION: Suspect neoplastic pancreatic mass versus atypical focal  pancreatitis with splenic and superior mesenteric vein occlusion. Consider  further evaluation with endoscopic ultrasound at which time biopsy could  potentially be performed.     CT Chest 5/21/2020  IMPRESSION:  1. No evidence of pulmonary metastatic disease. No evidence of mediastinal or  hilar lymphadenopathy. No pleural effusions identified. 2. No definite evidence of central pulmonary embolic disease. 3. Presence of a mass lesion involving the pancreatic head/body. 4. Evidence of fatty infiltration involving the liver. Presence of focal  low-density areas within the liver compatible with cysts. Surgical absence of  the gallbladder.     CT A/P 6/19/2020  IMPRESSION:  Pancreatitis with ascites favored. Discrete pancreatic mass is not identified. No biliary dilatation or definite arterial or venous thrombosis. No liver  metastases. Fat-containing ventral hernia  Nonobstructing left renal calculus  Retrolisthesis of L2 and L3.     CT C/A/P 10/12/2020  IMPRESSION:  1. Interval improvement in appearance of the pancreas (see discussion above). 2. Normal sized liver. Stable appearance of the previously described areas of  decreased attenuation within liver. 3. Surgical absence of the gallbladder. 4. Evidence of splenomegaly. 5. Normal sized kidneys. Presence of small, nonobstructing calculi within the  left kidney. No evidence of hydronephrotic change. 6. Suboptimal distention of the urinary bladder. 7. Presence of a small, fat-containing ventral hernia in the mid abdominal  region.     Path: 5/8/2020  CYTOLOGIC INTERPRETATION:   Pancreas, EUS-guided fine needle aspiration and cell blocks:    Adenocarcinoma      Assessment:   1) Pancreatic Adenocarcinoma--Unresectable  2) Neoplasm Pain/Neuropathic Pain  3) Fatigue  4) Nausea  5) DM  6) Nutrition  7) Diarrhea        Plan: 1) Completed 4 cycles of gem/abraxane.  Repeat imaging continues to show response.  BRCA negative.  Completed Radiation with Dr Prabha Diaz, now seeing Dr Danny Aguilar and getting CIVA sheet. Will discuss possible \"adjuvant\" therapies once surgery is complete.     2) S/p celiac block--pain better and controlled with hydromorphone.  Future options include repeat celiac block, methadone, fentanyl. Continue gabapentin for the neuropathic pain and increase Cymbalta to 20mg bid     3) Methylphenidate 5mg bid--watch for anxiety and insomnia (normally only taking dose in am). We did drop the dose of both Abraxane (from 125 to 100mg/m2) and Gemcitabine (from 1000 to 900mg/m2) due to her significant fatigue.       4) Compazine/Ondansetron controlling--none since stopping chemotherapy.       5) Defer to Dr Sage Martins for further management.  Pharmacy may have ideas.       6) On pancreatic enzymes--weight has stabilized, may need nutrition consult.     7) Chemo related--hopefully with not recurr.  Taking senna/miralax to prevent constipation.         Signed By: Ashley Bruce MD

## 2021-01-08 NOTE — PROGRESS NOTES
Jaxon Maldonado is a 72 y.o. female here for f/u of pancreatic cancer. Patient c/o feet being completely numb on he bottoms, she has trouble with keeping up with others while walking. She gets along okay at home, but does not do well if she has to walk a lot, between her feet, balance and fatigue. Patient c/o swelling in hands, Right is greater than Left, he pinkie finger on the right is swollen. By night time patient is not able to lift her hands over her head. Patient is having problems gripping and picking up things. Right hand is weaker than Left, but her Left leg is weaker than the right. Key Oncology Meds             ondansetron (ZOFRAN ODT) 4 mg disintegrating tablet (Taking) Take 1 Tab by mouth every eight (8) hours as needed for Nausea or Vomiting. prochlorperazine (COMPAZINE) 10 mg tablet (Taking) Take 0.5 Tabs by mouth every six (6) hours as needed for Nausea. Key Pain Meds             HYDROmorphone (Dilaudid) 2 mg tablet (Taking) Take 1 mg by mouth two (2) times a day. takes 3 tabs  by mouth, taking BID and PRN    acetaminophen (Acetaminophen Extra Strength) 500 mg tablet (Taking) Take 1,000 mg by mouth daily as needed.         Chemotherapy Flowsheet 11/11/2020   Cycle C 5   Date 11/11/2020   Drug / Regimen HELD   Dosage -   Time Up -   Time Down -   Pre Hydration -   Pre Meds -   Notes -

## 2021-01-12 ENCOUNTER — TELEPHONE (OUTPATIENT)
Dept: ONCOLOGY | Age: 66
End: 2021-01-12

## 2021-01-12 NOTE — TELEPHONE ENCOUNTER
HIPAA verified. Notified patient of decreasing CA 19.9 results, per Dr Freda Huerta note. Patient verbalized understanding and thanked for call.

## 2021-01-12 NOTE — TELEPHONE ENCOUNTER
----- Message from Jose Kelly MD sent at 1/11/2021  7:42 PM EST -----  19-9 still falling--please inform

## 2021-01-20 ENCOUNTER — VIRTUAL VISIT (OUTPATIENT)
Dept: PALLATIVE CARE | Age: 66
End: 2021-01-20

## 2021-01-20 NOTE — PROGRESS NOTES
Palliative Medicine Office Visit  Palliative Medicine Nurse Check In  [phone])    Patient Name: Belle Castillo  YOB: 1955      Date of Office Visit: 1/20/2021    Patient states: \"  \"    From Check In Sheet (scanned in Media):  Has a medical provider talked with you about cardiopulmonary resuscitation (CPR)? [x] Yes   [] No   [] Unable to obtain    Nurse reminder to complete or update ACP FlowSheet:    Is ACP on the Problem List?    [] Yes    [] No  IF ACP Document is ON FILE; Nurse to place ACP on Problem List     Is there an ACP Note in Chart Review/Note? [] Yes    [] No   If NO: [address] Planning 11/18/2020   Patient's Healthcare Decision Maker is: Named in scanned ACP document   Confirm Advance Directive Yes, on file   Patient Would Like to Complete Advance Directive -   Does the patient have other document types Power of        Is there anything that we should know about you as a person in order to provide you the best care possible? Have you been to the ER, urgent care clinic since your last visit? [] Yes   [x] No   [] Unable to obtain    Have you been hospitalized since your last visit? [] Yes   [x] No   [] Unable to obtain    Have you seen or consulted any other health care providers outside of the [address] [place] [address] since your last visit?    [] Yes   [x] No   [] Unable to obtain    Functional status (describe):         Last BM: 1/20/2021     accessed (date): 1/20/2021  Bottle review (for opioid pain medication):  Medication 1:   Date filled:   Directions:   # filled:   # left:   # pills taking per day:  Last dose taken:    Medication 2:   Date filled:   Directions:   # filled:   # left:   # pills taking per day:  Last dose taken:    Medication 3:   Date filled:   Directions:   # filled:   # left:   # pills taking per day:  Last dose taken:    Medication 4:   Date filled:   Directions:   # filled:   # left:   # pills taking per day:  Last dose taken:

## 2021-01-29 ENCOUNTER — IMMUNIZATION (OUTPATIENT)
Dept: PEDIATRICS CLINIC | Age: 66
End: 2021-01-29
Payer: MEDICARE

## 2021-01-29 DIAGNOSIS — Z23 ENCOUNTER FOR IMMUNIZATION: Primary | ICD-10-CM

## 2021-01-29 PROCEDURE — 91301 COVID-19, MRNA, LNP-S, PF, 100MCG/0.5ML DOSE(MODERNA): CPT | Performed by: FAMILY MEDICINE

## 2021-01-29 PROCEDURE — 0011A PR IMM ADMN SARSCOV2 100 MCG/0.5 ML 1ST DOSE: CPT | Performed by: FAMILY MEDICINE

## 2021-02-02 RX ORDER — LIDOCAINE AND PRILOCAINE 25; 25 MG/G; MG/G
CREAM TOPICAL
Qty: 30 G | OUTPATIENT
Start: 2021-02-02

## 2021-02-03 ENCOUNTER — VIRTUAL VISIT (OUTPATIENT)
Dept: PALLATIVE CARE | Age: 66
End: 2021-02-03
Payer: MEDICARE

## 2021-02-03 DIAGNOSIS — R10.84 ABDOMINAL PAIN, GENERALIZED: Primary | ICD-10-CM

## 2021-02-03 DIAGNOSIS — K59.1 FUNCTIONAL DIARRHEA: ICD-10-CM

## 2021-02-03 DIAGNOSIS — G62.9 NEUROPATHY: ICD-10-CM

## 2021-02-03 DIAGNOSIS — C25.9 MALIGNANT NEOPLASM OF PANCREAS, UNSPECIFIED LOCATION OF MALIGNANCY (HCC): ICD-10-CM

## 2021-02-03 PROCEDURE — 99214 OFFICE O/P EST MOD 30 MIN: CPT | Performed by: INTERNAL MEDICINE

## 2021-02-03 NOTE — PATIENT INSTRUCTIONS
Dear Yoko Gonzalez , It was a pleasure seeing you today in your home  virtually We will see you again after your surgery in March, 6 to 8 weeks If labs or imaging tests have been ordered for you today, please call the office  at [phone] 48 hours after completion to obtain the results. Your stated goal:  
-To live well for as long as possible Your described symptoms were: Fatigue: 2 Drowsiness: 1 Depression: 3 Pain: 3 Anxiety: 3 Nausea: 0 Anorexia: 2 Dyspnea: 0 Best Well-Being: 3 Constipation: No  
Other Problem (Comment): 0 This is the plan we talked about: 1. Upper abdominal pain-pancreatic cancer related 
-Your pain is well controlled. you are now using only Dilaudid 1 mg every 6 hours as needed, you take about 2 to 3 tablets/day or less some days. You have plenty of Dilaudid, no need for refill now. 2.  Severe nausea 
-This is improved since completion of chemotherapy. But he still needs Zofran occasionally 3. Insomnia - You have been taking Ativan 0.5 mg at bedtime which is very helpful and so you want to continue the same. 4.  Exploratory laparoscopy 
-We talked at length about your upcoming procedure in March. You are very anxious about it and we went over step-by-step what that would involve. You are also anxious because no family members will be allowed with you. Encourage you to talk to the team over there and see if they will allow 1 person to be with you want to be going to the PACU and then have one visitor after the surgery. We also agreed that you will let them know that you want palliative medicine consulted while you are at [facility] for better pain control and support. 
-Plan is to have distal pancreatectomy and splenectomy along with placement of fiduciary markers for radiation after. 5.  Uncontrolled diabetes 
-You are taking 55 units glargine insulin at nighttime and modify your daytime insulin based on your sugars.   You do not have a set sliding scale. I am very worried about extreme sugar levels. Start a food diary and measure your blood glucose 4 times a day and keep a diary so we can review this and modify your insulin needs. 6.  Diarrhea  
-You are on Creon but still have diarrhea which we talked about in detail. 7.  Neuropathy 
-Right hand neuropathy and numbness is worse than the left hand. We increased her gabapentin to 600 mg 3 times a day and you have not seen much of an improvement. Let us continue Cymbalta 20 mg at bedtime in addition to the gabapentin. 8.  Advanced care planning 
-You completed a living will and advanced medical directive naming your daughter Masood Moran as you medical power of  9. Psychosocial support 
-You are the primary caregiver of your  who is cognitively impaired. You dealing with the cancer is causing tremendous amount of stress on you and your family. Your daughter Masood Moran is the only one who lives local and is able to help out. We talked about how palliative medicine can certainly provide you as much support as possible. This is what you have shared with us about Advance Care Planning:  
 
  Primary Decision Maker: Giovanny Hess - Daughter - [phone] Secondary Decision Maker: Lida Lloyd Advance Care Planning 2/3/2021 Patient's Healthcare Decision Maker is: Named in scanned ACP document Confirm Advance Directive Yes, on file Patient Would Like to Complete Advance Directive - Does the patient have other document types Do Not Resuscitate The Palliative Medicine Team is here to support you and your family.   
 
 
Sincerely, 
 
 
Reyna Hussein MD and the Palliative Medicine Team

## 2021-02-03 NOTE — PROGRESS NOTES
Palliative Medicine Office Visit  Palliative Medicine Nurse Check In  [phone]    Patient Name: Bernabe Castañeda  YOB: 1955      Date of Office Visit: 2/3/2021    Patient states: \"  \"    From Check In Sheet (scanned in Media):  Has a medical provider talked with you about cardiopulmonary resuscitation (CPR)? [x] Yes   [] No   [] Unable to obtain    Nurse reminder to complete or update ACP FlowSheet:    Is ACP on the Problem List?    [x] Yes    [] No  IF ACP Document is ON FILE; Nurse to place ACP on Problem List     Is there an ACP Note in Chart Review/Note? [x] Yes    [] No   If NO: ALERT PROVIDER       Primary Decision MakerMvik Anay - Daughter - [phone]    Secondary Decision Maker: Savanna Valentin, [address] Planning 2/3/2021   Patient's Healthcare Decision Maker is: Named in scanned ACP document   Confirm Advance Directive Yes, on file   Patient Would Like to Complete Advance Directive -   Does the patient have other document types Do Not Resuscitate         Is there anything that we should know about you as a person in order to provide you the best care possible? Have you been to the ER, urgent care clinic since your last visit? [] Yes   [x] No   [] Unable to obtain    Have you been hospitalized since your last visit? [] Yes   [x] No   [] Unable to obtain    Have you seen or consulted any other health care providers outside of the [address] [place] [address] since your last visit?    [] Yes   [x] No   [] Unable to obtain    Functional status (describe):       Last BM:      accessed (date): 2/3/2021    Bottle review (for opioid pain medication):  Medication 1:   Date filled:   Directions:   # filled:   # left:   # pills taking per day:  Last dose taken:    Medication 2:   Date filled:   Directions:   # filled:   # left:   # pills taking per day:  Last dose taken:    Medication 3:   Date filled:   Directions:   # filled:   # left:   # pills taking per day:  Last dose taken:    Medication 4:   Date filled:   Directions:   # filled:   # left:   # pills taking per day:  Last dose taken:

## 2021-02-03 NOTE — PROGRESS NOTES
Palliative Medicine Outpatient Services  [place]: [phone] ([phone])    Patient Name: Senait Hardin  YOB: 1955    Date of Current Visit: 02/03/21  Location of Current Visit:    [] [address] [place] [address] Office  [] [facility] Office  [] [facility] Office  [] Home  [x]Synchronous A/V virtual visit    Date of Initial Visit: 6/15/2020  Referral from: Dr. Bernardo Toledo  Primary Care Physician: Nellie Contreras MD      SUMMARY:   Senait Hardin is a 72y.o. year old with a  history of uncontrolled diabetes with an A1c of 9.2, newly diagnosed inoperable pancreatic cancer, who was referred to Palliative Medicine by Dr. Bernardo Toledo for management of intractable symptoms and psychosocial support. 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 CT on June 19 shows pancreatitis with some ascites, pancreatic mass has disappeared. Hospitalization at [facility] from July 4 to July 15 for dehydration. Completed 3 doses of 5-FU, started Gemzar Abraxane on 7/29/2020     CA 19 normal, patient undergoing exploratory  laparoscopy at [facility] with Dr. Rosalba Devries and Dr. Tanner Dorman as part of clinical trial on March 17, 2021     PALLIATIVE DIAGNOSES:       ICD-10-CM ICD-9-CM    1. Abdominal pain, generalized  R10.84 789.07    2. Malignant neoplasm of pancreas, unspecified location of malignancy (HCC)  C25.9 157.9    3. Functional diarrhea  K59.1 564.5    4.  Neuropathy  G62.9 355.9           PLAN:     Patient Instructions     Dear Senait Hardin ,    It was a pleasure seeing you today in your home  virtually    We will see you again after your surgery in March, 6 to 8 weeks    If labs or imaging tests have been ordered for you today, please call the office  at [phone] 48 hours after completion to obtain the results. Your stated goal:   -To live well for as long as possible    Your described symptoms were: Fatigue: 2 Drowsiness: 1   Depression: 3 Pain: 3   Anxiety: 3 Nausea: 0   Anorexia: 2 Dyspnea: 0   Best Well-Being: 3 Constipation: No   Other Problem (Comment): 0       This is the plan we talked about:    1. Upper abdominal pain-pancreatic cancer related  -Your pain is well controlled. you are now using only Dilaudid 1 mg every 6 hours as needed, you take about 2 to 3 tablets/day or less some days. You have plenty of Dilaudid, no need for refill now. 2.  Severe nausea  -This is improved since completion of chemotherapy. But he still needs Zofran occasionally    3. Insomnia   - You have been taking Ativan 0.5 mg at bedtime which is very helpful and so you want to continue the same. 4.  Exploratory laparoscopy  -We talked at length about your upcoming procedure in March. You are very anxious about it and we went over step-by-step what that would involve. You are also anxious because no family members will be allowed with you. Encourage you to talk to the team over there and see if they will allow 1 person to be with you want to be going to the PACU and then have one visitor after the surgery. We also agreed that you will let them know that you want palliative medicine consulted while you are at [facility] for better pain control and support.  -Plan is to have distal pancreatectomy and splenectomy along with placement of fiduciary markers for radiation after. 5.  Uncontrolled diabetes  -You are taking 55 units glargine insulin at nighttime and modify your daytime insulin based on your sugars. You do not have a set sliding scale. I am very worried about extreme sugar levels.   Start a food diary and measure your blood glucose 4 times a day and keep a diary so we can review this and modify your insulin needs. 6.  Diarrhea   -You are on Creon but still have diarrhea which we talked about in detail. 7.  Neuropathy  -Right hand neuropathy and numbness is worse than the left hand. We increased her gabapentin to 600 mg 3 times a day and you have not seen much of an improvement. Let us continue Cymbalta 20 mg at bedtime in addition to the gabapentin. 8.  Advanced care planning  -You completed a living will and advanced medical directive naming your daughter Dilan Weber as you medical power of     9. Psychosocial support  -You are the primary caregiver of your  who is cognitively impaired. You dealing with the cancer is causing tremendous amount of stress on you and your family. Your daughter Dilan Weber is the only one who lives local and is able to help out. We talked about how palliative medicine can certainly provide you as much support as possible. This is what you have shared with us about Advance Care Planning:       Primary Decision Maker: Liban Champagne - Daughter - [phone]    Secondary Decision Maker: Sharon Hannah, [address] [place] [address] Planning 2/3/2021   Patient's Healthcare Decision Maker is: Named in scanned ACP document   Confirm Advance Directive Yes, on file   Patient Would Like to Complete Advance Directive -   Does the patient have other document types Do Not Resuscitate           The Palliative Medicine Team is here to support you and your family.        Sincerely,      Constantino Savage MD and the Palliative Medicine Team       GOALS OF CARE / TREATMENT PREFERENCES:   [====Goals of Care====]  GOALS OF CARE:  Patient / health care proxy stated goals: See Patient Instructions / Summary    TREATMENT PREFERENCES:   Code Status:  [x] Attempt Resuscitation       [] Do Not Attempt Resuscitation    Advance Care Planning:  [x] The [facility] Interdisciplinary Team has updated the ACP Navigator with Decision Maker and Patient Capacity      Primary Decision Maker: Juaquin Fairchild - Daughter - [phone]    Secondary Decision Maker: Lynden Krabbe, [address] Planning 2/3/2021   Patient's Healthcare Decision Maker is: Named in scanned ACP document   Confirm Advance Directive Yes, on file   Patient Would Like to Complete Advance Directive -   Does the patient have other document types Do Not Resuscitate       Other:  (If patient appropriate for POST, consider using PALLPOST smart phrase here)    The palliative care team has discussed with patient / health care proxy about goals of care / treatment preferences for patient.  [====Goals of Care====]     PRESCRIPTIONS GIVEN:     No orders of the defined types were placed in this encounter. FOLLOW UP:     Future Appointments   Date Time Provider Louis Campuzano   2/3/2021  2:30 PM [facility]   2/26/2021  9:30 AM [facility] COVID VACCINE INITIAL R[facility] BS AMB           PHYSICIANS INVOLVED IN CARE:   Patient Care Team:  Sedrick Landaverde MD as PCP - General (Internal Medicine)  Sedrick Landaverde MD as PCP - [facility]  King Marcos MD (General Surgery)  JENN Witt (Certified Clinical Nurse Specialist)  Lizzie Quintana MD (Hematology and Oncology)  Syed Golden MD as Physician (Hematology and Oncology)       HISTORY:   Reviewed patient-completed ESAS and advance care planning form. Reviewed patient record in prescription monitoring program.    CHIEF COMPLAINT:   No chief complaint on file. HPI/SUBJECTIVE:    The patient is: [x] Verbal / [] Nonverbal     Patient seen today virtually . Nithin Abdi is in a good mood, her  and grandchildren are around her. She has been feeling much better after discontinuation of chemo. More energy and less nausea has been good. She is very anxious about the surgery that she has planned for in March. More anxious because no family members allowed to visit or be with her prior to surgery.  We talked about ways to cope during that time and encouraged her to get palliative medicine on board while admitted for support. Her pain is fairly well controlled with Dilaudid 1 mg the morning and night. He continues to take Ativan at nighttime.  --------  Patient seen today along with her daughter [facility]. Both are very tearful and stressed from patient's new diagnosis. Rudolph David tells me that she ignored her abdominal pain for several months and now she is paying for it. She is determined to do everything she can to live as long as possible but is very realistic about her outcome. She is struggling with abdominal pain and is not taking medications as prescribed. Her most significant complaint is profound fatigue. But from what she is sharing, she is eating little to nothing every day, drinking less than 10 ounces of water per day. Her daughter forces her to eat some applesauce with her meds which is about the only calories she gets per day. She sleeps most of the time. She is identified that the first 6 days of the chemotherapy is the worst but she seems to recover from it a little bit after and eats a little bit. She struggles with diarrhea after chemotherapy but constipation once the diarrhea dissipates. 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 Daughter is overwhelmed because of the illness of her mother.     Clinical Pain Assessment (nonverbal scale for nonverbal patients):   [++++ Clinical Pain Assessment++++]  [++++Pain Severity++++]: Pain: 3  [++++Pain Character++++]: cramping  [++++Pain Duration++++]: month  [++++Pain Effect++++]: functional and emotional  [++++Pain Factors++++]: none in particular  [++++Pain Frequency++++]: constant  [++++Pain Location++++]: upper abdomen  [++++ Clinical Pain Assessment++++]       FUNCTIONAL ASSESSMENT:     Palliative Performance Scale (PPS):  PPS: 70       PSYCHOSOCIAL/SPIRITUAL SCREENING:     Any spiritual / Lutheran concerns:  [] Yes /  [x] No    Caregiver Burnout:  [] Yes /  [x] No /  [] No Caregiver Present      Anticipatory grief assessment:   [x] Normal  / [] Maladaptive       ESAS Anxiety: Anxiety: 3    ESAS Depression: Depression: 3       REVIEW OF SYSTEMS:     The following systems were [x] reviewed / [] unable to be reviewed  Systems: constitutional, ears/nose/mouth/throat, respiratory, gastrointestinal, genitourinary, musculoskeletal, integumentary, neurologic, psychiatric, endocrine. Positive findings noted below. Modified ESAS Completed by: provider   Fatigue: 2 Drowsiness: 1   Depression: 3 Pain: 3   Anxiety: 3 Nausea: 0   Anorexia: 2 Dyspnea: 0   Best Well-Being: 3 Constipation: No   Other Problem (Comment): 0          PHYSICAL EXAM:     Wt Readings from Last 3 Encounters:   01/08/21 179 lb 3.2 oz (81.3 kg)   01/08/21 179 lb 3.2 oz (81.3 kg)   12/04/20 186 lb (84.4 kg)     There were no vitals taken for this visit.   Last bowel movement: See Nursing Note    Constitutional    [x] Appears well-developed and well-nourished in no apparent distress    [] Abnormal:  Mental status  [x] Alert and awake  [x] Oriented to person/place/time  [x] Able to follow commands  [] Abnormal:   Eyes  [x] EOM normal   [x] Sclera normal   [x] No visible ocular discharge  [] Abnormal:   HENT  [x] Normocephalic, atraumatic  [x] Mouth/Throat: Moist mucous membranes   [x] External Ears normal  [] Abnormal:  Oral thrush resolved  Neck  [x] No visualized mass  [] Abnormal:  Pulmonary/Chest   [x] Respiratory effort normal  [x] No visualized signs of difficulty breathing or respiratory distress  [] Abnormal:  Musculoskeletal  [x] Normal gait with no signs of ataxia  [x] Normal range of motion of neck  [] Abnormal:  Neurological:   [x] No facial asymmetry (Cranial nerve 7 motor function)  [x] No gaze palsy  [] Abnormal:   Skin  [x] No significant exanthematous lesions or discoloration noted on facial skin  [] Abnormal: Psychiatric  [x] Normal affect  [x] No hallucinations  [] Abnormal:    Other pertinent observable physical exam findings:    Due to this being a TeleHealth evaluation, many elements of the physical examination are unable to be assessed. HISTORY:     Past Medical History:   Diagnosis Date    Adenocarcinoma of pancreas ([facility] [address] 05/13/2020    Dr Yañez Null biopsy via EUS    Diabetes ([facility] [address]     GERD (gastroesophageal reflux disease)     Hernia of abdominal cavity     Subxyphoid hernia    Hypertension     Inflammatory polyarthropathy (HCC)     Joint pain     Joint swelling     Menopause     Ocular nevus of left eye     Pancreatitis     Tracheal stenosis       Past Surgical History:   Procedure Laterality Date    HX CARPAL TUNNEL RELEASE Bilateral     HX COLONOSCOPY      HX HYSTERECTOMY      HX KNEE ARTHROSCOPY Right     HX KNEE REPLACEMENT Right     partial    HX LAP CHOLECYSTECTOMY      HX TONSILLECTOMY      HX VASCULAR ACCESS        Family History   Problem Relation Age of Onset    Heart Disease Mother     Heart Attack Mother     Cancer Father         lung    Diabetes Sister       History reviewed, no pertinent family history. Social History     Tobacco Use    Smoking status: Never Smoker    Smokeless tobacco: Never Used   Substance Use Topics    Alcohol use: No     Alcohol/week: 0.0 standard drinks     Frequency: Never     Binge frequency: Never     No Known Allergies   Current Outpatient Medications   Medication Sig    methylphenidate HCl (Ritalin) 5 mg tablet Take 1 Tab by mouth three (3) times daily. Max Daily Amount: 15 mg.    DULoxetine (CYMBALTA) 20 mg capsule Take 1 Cap by mouth two (2) times a day.  potassium chloride (K-DUR, KLOR-CON) 20 mEq tablet TAKE 1 TABLET BY MOUTH TWICE DAILY    gabapentin (NEURONTIN) 300 mg capsule Take 2 Caps by mouth three (3) times daily. Max Daily Amount: 1,800 mg.  Indications: neuropathic pain    insulin glargine U-300 conc (Toujeo SoloStar U-300 Insulin) 300 unit/mL (1.5 mL) inpn pen INJECT 55 UNITS ONCE DAILY AT BEDTIME - DOSE INCREASED (Patient taking differently: 15 Units. Sliding scale)    hydrocortisone (ANUSOL-HC) 25 mg supp Insert 1 Suppository into rectum nightly.  famotidine (Pepcid) 20 mg tablet Take 20 mg by mouth two (2) times a day.  sennosides (Senna) 8.6 mg cap Take 2 Tabs by mouth nightly.  polyethylene glycol (Miralax) 17 gram/dose powder Take 17 g by mouth daily as needed.  carvediloL (COREG) 25 mg tablet TAKE 1 TABLET BY MOUTH TWICE DAILY (Patient taking differently: Take 12.5 mg by mouth two (2) times daily as needed.)    HYDROmorphone (Dilaudid) 2 mg tablet Take 1 mg by mouth two (2) times a day. takes 3 tabs  by mouth, taking BID and PRN    lipase-protease-amylase (Creon) 12,000-38,000 -60,000 unit capsule Take 3 Caps by mouth three (3) times daily (with meals). Indications: exocrine pancreatic insufficiency (Patient taking differently: Take 2 Caps by mouth three (3) times daily (with meals). 2 caps with meal and 1 with snacks  Indications: exocrine pancreatic insufficiency)    lidocaine-prilocaine (EMLA) topical cream Apply  to affected area as needed for Pain (pain ). Apply a thin layer over port site prior to accessing port    diphenhydrAMINE (BENADRYL) 25 mg tablet Take 25 mg by mouth nightly as needed for Itching or Skin Irritation.  nystatin (MYCOSTATIN) powder Apply 1 g to affected area three (3) times daily.  promethazine (PHENERGAN) 25 mg tablet Take 1 Tab by mouth every six (6) hours as needed for Nausea.  potassium bicarb-citric acid (EFFER-K) 20 mEq tablet Take 1 Tab by mouth two (2) times daily (with meals). (Patient taking differently: Take 20 mEq by mouth two (2) times daily (with meals). takes 10 meq 2 x day)    LORazepam (ATIVAN) 1 mg tablet Take 1 Tab by mouth every six (6) hours as needed for Anxiety. Max Daily Amount: 4 mg.  Indications: nausea and vomiting caused by cancer drugs (Patient taking differently: Take 1 mg by mouth nightly. Taking 1 mg at bedtime  Indications: nausea and vomiting caused by cancer drugs)    ondansetron (ZOFRAN ODT) 4 mg disintegrating tablet Take 1 Tab by mouth every eight (8) hours as needed for Nausea or Vomiting.  diphenoxylate-atropine (LomotiL) 2.5-0.025 mg per tablet Take 2 Tabs by mouth four (4) times daily as needed for Diarrhea. Max Daily Amount: 8 Tabs.  prochlorperazine (COMPAZINE) 10 mg tablet Take 0.5 Tabs by mouth every six (6) hours as needed for Nausea.  Blood-Glucose Meter,Continuous (Dexcom G6 ) misc Use as directed to monitor blood glucose as directed    Blood-Glucose Sensor (Dexcom G6 Sensor) fadi Use as directed to monitor blood glucose as directed    Blood-Glucose Transmitter (Dexcom G6 Transmitter) fadi Use as directed to monitor blood glucose as directed    insulin lispro (HumaLOG KwikPen Insulin) 100 unit/mL kwikpen Inject 25 units into the skin with breakfast, 15 units with lunch (if BG is >200 and eating carbs with meal), and 30 units with dinner (Patient taking differently: patient on sliding scale)    acetaminophen (Acetaminophen Extra Strength) 500 mg tablet Take 1,000 mg by mouth daily as needed.  Needle, Disp, ndle 1 Each by Does Not Apply route two (2) times daily as needed for Other. No current facility-administered medications for this visit.            LAB DATA REVIEWED:     Lab Results   Component Value Date/Time    WBC 4.4 01/08/2021 03:37 PM    HGB 11.7 01/08/2021 03:37 PM    PLATELET 573 (L) 09/60/1873 03:37 PM     Lab Results   Component Value Date/Time    Sodium 139 01/08/2021 03:37 PM    Potassium 4.0 01/08/2021 03:37 PM    Chloride 103 01/08/2021 03:37 PM    CO2 28 01/08/2021 03:37 PM    BUN 18 01/08/2021 03:37 PM    Creatinine 0.84 01/08/2021 03:37 PM    Calcium 9.7 01/08/2021 03:37 PM    Magnesium 1.7 07/15/2020 05:35 AM    Phosphorus 3.0 07/15/2020 05:35 AM      Lab Results Component Value Date/Time    Alk. phosphatase 67 01/08/2021 03:37 PM    Protein, total 6.9 01/08/2021 03:37 PM    Albumin 3.7 01/08/2021 03:37 PM    Globulin 3.2 01/08/2021 03:37 PM     No results found for: INR, PTMR, PTP, PT1, PT2, APTT, INREXT, INREXT   Lab Results   Component Value Date/Time    Iron 50 08/12/2020 11:00 AM    TIBC 244 (L) 08/12/2020 11:00 AM    Iron % saturation 20 08/12/2020 11:00 AM    Ferritin 603 (H) 08/12/2020 11:00 AM           CONTROLLED SUBSTANCES SAFETY ASSESSMENT (IF ON CONTROLLED SUBSTANCES):     Reviewed opioid safety handout:  [x] Yes   [] No  24 hour opioid dose >150mg morphine equivalent/day:  [] Yes   [x] No  Benzodiazepines:  [] Yes   [x] No  Sleep apnea:  [] Yes   [x] No  Urine Toxicology Testing within last 6 months:  [] Yes   [x] No  History of or new aberrant medication taking behaviors:  [] Yes   [x] No  Has Narcan been prescribed [x] Yes   [] No          Total time: 60 minutes  Counseling / coordination time: 40 minutes  > 50% counseling / coordination?:  Yes    Consent:  He and/or health care decision maker is aware that that he may receive a bill for this telehealth service, depending on his insurance coverage, and has provided verbal consent to proceed: Yes    CPT Codes 98652-20270 for Established Patients may apply to this Telehealth Visit  Pursuant to the emergency declaration under the [address] [place] [address], [facility] waiver authority and the [employer] Act, this Virtual  Visit was conducted, with patient's consent, to reduce the patient's risk of exposure to COVID-19 and provide continuity of care for an established patient. Services were provided through a video synchronous discussion virtually to substitute for in-person clinic visit.

## 2021-02-26 ENCOUNTER — IMMUNIZATION (OUTPATIENT)
Dept: PEDIATRICS CLINIC | Age: 66
End: 2021-02-26
Payer: MEDICARE

## 2021-02-26 DIAGNOSIS — Z23 ENCOUNTER FOR IMMUNIZATION: Primary | ICD-10-CM

## 2021-02-26 PROCEDURE — 91301 COVID-19, MRNA, LNP-S, PF, 100MCG/0.5ML DOSE(MODERNA): CPT | Performed by: FAMILY MEDICINE

## 2021-02-26 PROCEDURE — 0012A COVID-19, MRNA, LNP-S, PF, 100MCG/0.5ML DOSE(MODERNA): CPT | Performed by: FAMILY MEDICINE

## 2021-03-08 ENCOUNTER — TELEPHONE (OUTPATIENT)
Dept: FAMILY MEDICINE CLINIC | Age: 66
End: 2021-03-08

## 2021-03-08 DIAGNOSIS — C25.9 MALIGNANT NEOPLASM OF PANCREAS, UNSPECIFIED LOCATION OF MALIGNANCY (HCC): ICD-10-CM

## 2021-03-08 RX ORDER — GABAPENTIN 300 MG/1
600 CAPSULE ORAL 3 TIMES DAILY
Qty: 180 CAP | Refills: 3 | Status: SHIPPED | OUTPATIENT
Start: 2021-03-08 | End: 2021-07-07

## 2021-03-08 NOTE — TELEPHONE ENCOUNTER
I have called and talked to this patient. I advised her that this office does have the Menactra vaccine. She has already gotten the others needed. She will call and make apt if she needs this vaccine.

## 2021-03-08 NOTE — TELEPHONE ENCOUNTER
[place], [address] [facility]             General Message/Vendor Calls     Caller's first and last name: n/a       Reason for call: requesting to know if this office give these vaccines: pneumococbal vaccine, h. influeza type b; meningococcal vaccine       Callback required yes/no and why:yes       Best contact number(s): [phone]       Details to clarify the request: n/a

## 2021-03-10 ENCOUNTER — DOCUMENTATION ONLY (OUTPATIENT)
Dept: PHARMACY | Age: 66
End: 2021-03-10

## 2021-03-10 NOTE — PROGRESS NOTES
Pharmacy Pop Care Documentation:     Violet Dominique is being removed from the diabetes management program for the following reason(s): Patient no longer has [address] Riddhi Sanchez

## 2021-03-25 ENCOUNTER — VIRTUAL VISIT (OUTPATIENT)
Dept: PALLATIVE CARE | Age: 66
End: 2021-03-25
Payer: MEDICARE

## 2021-03-25 DIAGNOSIS — K59.1 FUNCTIONAL DIARRHEA: ICD-10-CM

## 2021-03-25 DIAGNOSIS — G62.9 NEUROPATHY: ICD-10-CM

## 2021-03-25 DIAGNOSIS — T45.1X5A CHEMOTHERAPY-INDUCED NEUROPATHY (HCC): ICD-10-CM

## 2021-03-25 DIAGNOSIS — G62.0 CHEMOTHERAPY-INDUCED NEUROPATHY (HCC): ICD-10-CM

## 2021-03-25 DIAGNOSIS — R10.84 ABDOMINAL PAIN, GENERALIZED: Primary | ICD-10-CM

## 2021-03-25 DIAGNOSIS — C25.9 MALIGNANT NEOPLASM OF PANCREAS, UNSPECIFIED LOCATION OF MALIGNANCY (HCC): ICD-10-CM

## 2021-03-25 PROCEDURE — 1090F PRES/ABSN URINE INCON ASSESS: CPT | Performed by: INTERNAL MEDICINE

## 2021-03-25 PROCEDURE — G8399 PT W/DXA RESULTS DOCUMENT: HCPCS | Performed by: INTERNAL MEDICINE

## 2021-03-25 PROCEDURE — 1100F PTFALLS ASSESS-DOCD GE2>/YR: CPT | Performed by: INTERNAL MEDICINE

## 2021-03-25 PROCEDURE — 3017F COLORECTAL CA SCREEN DOC REV: CPT | Performed by: INTERNAL MEDICINE

## 2021-03-25 PROCEDURE — 99214 OFFICE O/P EST MOD 30 MIN: CPT | Performed by: INTERNAL MEDICINE

## 2021-03-25 PROCEDURE — G8756 NO BP MEASURE DOC: HCPCS | Performed by: INTERNAL MEDICINE

## 2021-03-25 PROCEDURE — G8510 SCR DEP NEG, NO PLAN REQD: HCPCS | Performed by: INTERNAL MEDICINE

## 2021-03-25 PROCEDURE — G8427 DOCREV CUR MEDS BY ELIG CLIN: HCPCS | Performed by: INTERNAL MEDICINE

## 2021-03-25 PROCEDURE — 3288F FALL RISK ASSESSMENT DOCD: CPT | Performed by: INTERNAL MEDICINE

## 2021-03-25 NOTE — PATIENT INSTRUCTIONS
Dear Natasha Tilley , It was a pleasure seeing you today in your home  virtually We will see you again in 3 months If labs or imaging tests have been ordered for you today, please call the office  at [phone] 48 hours after completion to obtain the results. Your stated goal:  
-To live well for as long as possible Your described symptoms were: Fatigue: 4 Drowsiness: 3 Depression: 0 Pain: 4 Anxiety: 0 Nausea: 0 Anorexia: 2 Dyspnea: 0 Best Well-Bein Constipation: No  
Other Problem (Comment): 0 This is the plan we talked about: 1. Abdominal pain, postoperative 
-You are 8 days post surgery, doing very well, on Dilaudid 1 mg every 6 hours with good relief in pain. You have already decided on cutting back on Dilaudid to half a tablet in the next day or so. Good bowel movements post surgery. 2.  Severe nausea 
-This is improved since completion of chemotherapy. But he still needs Zofran occasionally 3. Insomnia - You have been taking Ativan 0.5 mg at bedtime which is very helpful and so you want to continue the same. 4.  I am so glad the surgery went well, they removed the \"all the cancer \"and also inserted the TIM sheet for radiation. You have close follow-up with [facility]. 5.  Diarrhea  
-You are on Creon but still have diarrhea which we talked about in detail. 6.  Chemotherapy-induced neuropathy 
-Right hand neuropathy and numbness is worse than the left hand. We increased her gabapentin to 600 mg 3 times a day and you have not seen much of an improvement. Let us continue Cymbalta 20 mg at bedtime in addition to the gabapentin. 7.  Advanced care planning 
-You completed a living will and advanced medical directive naming your daughter Maria E as you medical power of  This is what you have shared with us about Advance Care Planning:  
 
  Primary Decision Maker: Grazyna Jama - Daughter - [phone]   Secondary Decision Maker: Benjamin Castle Advance Care Planning 3/25/2021 Patient's Healthcare Decision Maker is: Named in scanned ACP document Confirm Advance Directive Yes, on file Patient Would Like to Complete Advance Directive - Does the patient have other document types Power of [place] The Palliative Medicine Team is here to support you and your family.   
 
 
Sincerely, 
 
 
Ragini Appiah MD and the Palliative Medicine Team

## 2021-03-25 NOTE — PROGRESS NOTES
Palliative Medicine Outpatient Services  [place]: [phone] ([phone])    Patient Name: Bernabe Castañeda  YOB: 1955    Date of Current Visit: 03/25/21  Location of Current Visit:    [] [facility] Office  [] [facility] Office  [] [id number] [facility]  [] Home  [x]Synchronous A/V virtual visit    Date of Initial Visit: 6/15/2020  Referral from: Dr. Karin Rudolph  Primary Care Physician: Omer Pittman MD      SUMMARY:   Bernabe Castañeda is a 72y.o. year old with a  history of uncontrolled diabetes with an A1c of 9.2, newly diagnosed inoperable pancreatic cancer, who was referred to Palliative Medicine by Dr. Karin Rudolph for management of intractable symptoms and psychosocial support. 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 CT on June 19 shows pancreatitis with some ascites, pancreatic mass has disappeared. Hospitalization at [facility] from July 4 to July 15 for dehydration. Completed 3 doses of 5-FU, started Gemzar Abraxane on 7/29/2020     CA 19 normal, patient undergoing exploratory  laparoscopy at [address] [place] [address] with Dr. Dee Conn and Dr. Kimmy Back as part of clinical trial on March 17, 2021     PALLIATIVE DIAGNOSES:       ICD-10-CM ICD-9-CM    1. Abdominal pain, generalized  R10.84 789.07    2. Malignant neoplasm of pancreas, unspecified location of malignancy (HCC)  C25.9 157.9    3. Chemotherapy-induced neuropathy (HCC)  G62.0 357.6     T45.1X5A E933.1    4. Functional diarrhea  K59.1 564.5    5.  Neuropathy  G62.9 355.9           PLAN:     Patient Instructions     Dear Bernabe Castañeda ,    It was a pleasure seeing you today in your home  virtually    We will see you again in 3 months    If labs or imaging tests have been ordered for you today, please call the office  at [phone] 48 hours after completion to obtain the results. Your stated goal:   -To live well for as long as possible    Your described symptoms were: Fatigue: 4 Drowsiness: 3   Depression: 0 Pain: 4   Anxiety: 0 Nausea: 0   Anorexia: 2 Dyspnea: 0   Best Well-Bein Constipation: No   Other Problem (Comment): 0       This is the plan we talked about:    1. Abdominal pain, postoperative  -You are 8 days post surgery, doing very well, on Dilaudid 1 mg every 6 hours with good relief in pain. You have already decided on cutting back on Dilaudid to half a tablet in the next day or so. Good bowel movements post surgery. 2.  Severe nausea  -This is improved since completion of chemotherapy. But he still needs Zofran occasionally    3. Insomnia   - You have been taking Ativan 0.5 mg at bedtime which is very helpful and so you want to continue the same. 4.  I am so glad the surgery went well, they removed the \"all the cancer \"and also inserted the TIM sheet for radiation. You have close follow-up with [facility]. 5.  Diarrhea   -You are on Creon but still have diarrhea which we talked about in detail. 6.  Chemotherapy-induced neuropathy  -Right hand neuropathy and numbness is worse than the left hand. We increased her gabapentin to 600 mg 3 times a day and you have not seen much of an improvement. Let us continue Cymbalta 20 mg at bedtime in addition to the gabapentin.     7.  Advanced care planning  -You completed a living will and advanced medical directive naming your daughter Anderson Hunt as you medical power of           This is what you have shared with us about Advance Care Planning:       Primary Decision Maker: Sonny Sampson - Daughter - [phone]    Secondary Decision Maker: Benjamin Castle  Advance Care Planning 3/25/2021   Patient's [address] is: Named in scanned ACP document   Confirm Advance Directive Yes, on file   Patient Would Like to Complete Advance Directive -   Does the patient have other document types Power of            The Palliative Medicine Team is here to support you and your family. Sincerely,      Brandon Hanson MD and the Palliative Medicine Team       GOALS OF CARE / TREATMENT PREFERENCES:   [====Goals of Care====]  GOALS OF CARE:  Patient / health care proxy stated goals: See Patient Instructions / Summary    TREATMENT PREFERENCES:   Code Status:  [x] Attempt Resuscitation       [] Do Not Attempt Resuscitation    Advance Care Planning:  [x] The [facility] Interdisciplinary Team has updated the ACP Navigator with Decision Maker and Patient Capacity      Primary Decision MakerRen Taylor - [phone]    Secondary Decision Maker: Yoshi Ana  Advance Care Planning 3/25/2021   Patient's Healthcare Decision Maker is: Named in scanned ACP document   Confirm Advance Directive Yes, on file   Patient Would Like to Complete Advance Directive -   Does the patient have other document types Power of        Other:  (If patient appropriate for POST, consider using PALLPOST smart phrase here)    The palliative care team has discussed with patient / health care proxy about goals of care / treatment preferences for patient.  [====Goals of Care====]     PRESCRIPTIONS GIVEN:     No orders of the defined types were placed in this encounter. FOLLOW UP:     No future appointments.         PHYSICIANS INVOLVED IN CARE:   Patient Care Team:  Vijay Taylor MD as PCP - General (Internal Medicine)  Vijay Taylor MD as PCP - [facility]  Mel Cruz MD (General Surgery)  JENN Luevano (Certified Clinical Nurse Specialist)  Rodri Guevara MD (Hematology and Oncology)  Shravan Lake MD as Physician (Hematology and Oncology)  Brandon Hanson MD as Physician (Palliative Medicine)       HISTORY:   Reviewed patient-completed ESAS and advance care planning form. Reviewed patient record in prescription monitoring program.    CHIEF COMPLAINT:   No chief complaint on file. HPI/SUBJECTIVE:    The patient is: [x] Verbal / [] Nonverbal     Patient seen today virtually . wGen Butterfield is so happy to tell me that the surgery went really well, they were able to remove the residual tumor and were successful in placing the TIM sheet. Even the surgeons were impressed with how much they were able to get accomplished. She truly feels like this is a miracle. Her prognosis is now extended to many years and she is very happy about this. She has some postoperative pain, has been taking Dilaudid 2 mg but decrease to 1 mg every 6 hours as needed. No constipation.  --------  Patient seen today along with her daughter Margie Morales. Both are very tearful and stressed from patient's new diagnosis. Gwen Butterfield tells me that she ignored her abdominal pain for several months and now she is paying for it. She is determined to do everything she can to live as long as possible but is very realistic about her outcome. She is struggling with abdominal pain and is not taking medications as prescribed. Her most significant complaint is profound fatigue. But from what she is sharing, she is eating little to nothing every day, drinking less than 10 ounces of water per day. Her daughter forces her to eat some applesauce with her meds which is about the only calories she gets per day. She sleeps most of the time. She is identified that the first 6 days of the chemotherapy is the worst but she seems to recover from it a little bit after and eats a little bit. She struggles with diarrhea after chemotherapy but constipation once the diarrhea dissipates. 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   Daughter is overwhelmed because of the illness of her mother. Clinical Pain Assessment (nonverbal scale for nonverbal patients):   [++++ Clinical Pain Assessment++++]  [++++Pain Severity++++]: Pain: 4  [++++Pain Character++++]: cramping  [++++Pain Duration++++]: month  [++++Pain Effect++++]: functional and emotional  [++++Pain Factors++++]: none in particular  [++++Pain Frequency++++]: constant  [++++Pain Location++++]: upper abdomen  [++++ Clinical Pain Assessment++++]       FUNCTIONAL ASSESSMENT:     Palliative Performance Scale (PPS):  PPS: 70       PSYCHOSOCIAL/SPIRITUAL SCREENING:     Any spiritual / Restorationism concerns:  [] Yes /  [x] No    Caregiver Burnout:  [] Yes /  [x] No /  [] No Caregiver Present      Anticipatory grief assessment:   [x] Normal  / [] Maladaptive       ESAS Anxiety: Anxiety: 0    ESAS Depression: Depression: 0       REVIEW OF SYSTEMS:     The following systems were [x] reviewed / [] unable to be reviewed  Systems: constitutional, ears/nose/mouth/throat, respiratory, gastrointestinal, genitourinary, musculoskeletal, integumentary, neurologic, psychiatric, endocrine. Positive findings noted below. Modified ESAS Completed by: provider   Fatigue: 4 Drowsiness: 3   Depression: 0 Pain: 4   Anxiety: 0 Nausea: 0   Anorexia: 2 Dyspnea: 0   Best Well-Bein Constipation: No   Other Problem (Comment): 0          PHYSICAL EXAM:     Wt Readings from Last 3 Encounters:   21 179 lb 3.2 oz (81.3 kg)   21 179 lb 3.2 oz (81.3 kg)   20 186 lb (84.4 kg)     There were no vitals taken for this visit.   Last bowel movement: See Nursing Note    Constitutional    [x] Appears well-developed and well-nourished in no apparent distress    [] Abnormal:  Mental status  [x] Alert and awake  [x] Oriented to person/place/time  [x] Able to follow commands  [] Abnormal:   Eyes  [x] EOM normal   [x] Sclera normal   [x] No visible ocular discharge  [] Abnormal:   HENT  [x] Normocephalic, atraumatic  [x] Mouth/Throat: Moist mucous membranes   [x] External Ears normal  [] Abnormal:  Oral thrush resolved  Neck  [x] No visualized mass  [] Abnormal:  Pulmonary/Chest   [x] Respiratory effort normal  [x] No visualized signs of difficulty breathing or respiratory distress  [] Abnormal:  Musculoskeletal  [x] Normal gait with no signs of ataxia  [x] Normal range of motion of neck  [] Abnormal:  Neurological:   [x] No facial asymmetry (Cranial nerve 7 motor function)  [x] No gaze palsy  [] Abnormal:   Skin  [x] No significant exanthematous lesions or discoloration noted on facial skin  [] Abnormal:                                  Psychiatric  [x] Normal affect  [x] No hallucinations  [] Abnormal:    Abdomen-laparotomy scar appears good, sutures healing well    Other pertinent observable physical exam findings:    Due to this being a TeleHealth evaluation, many elements of the physical examination are unable to be assessed. HISTORY:     Past Medical History:   Diagnosis Date    Adenocarcinoma of pancreas ([facility] [address] 05/13/2020    Dr Ginny Conrad biopsy via EUS    Diabetes ([facility] [address] [facility]     GERD (gastroesophageal reflux disease)     Hernia of abdominal cavity     Subxyphoid hernia    Hypertension     Inflammatory polyarthropathy (HCC)     Joint pain     Joint swelling     Menopause     Ocular nevus of left eye     Pancreatitis     Tracheal stenosis       Past Surgical History:   Procedure Laterality Date    HX CARPAL TUNNEL RELEASE Bilateral     HX COLONOSCOPY      HX HYSTERECTOMY      HX KNEE ARTHROSCOPY Right     HX KNEE REPLACEMENT Right     partial    HX LAP CHOLECYSTECTOMY      HX TONSILLECTOMY      HX VASCULAR ACCESS        Family History   Problem Relation Age of Onset    Heart Disease Mother     Heart Attack Mother     Cancer Father         lung    Diabetes Sister       History reviewed, no pertinent family history.   Social History     Tobacco Use    Smoking status: Never Smoker    Smokeless tobacco: Never Used   Substance Use Topics    Alcohol use: No     Alcohol/week: 0.0 standard drinks     Frequency: Never     Binge frequency: Never     No Known Allergies   Current Outpatient Medications   Medication Sig    gabapentin (NEURONTIN) 300 mg capsule Take 2 Caps by mouth three (3) times daily. Max Daily Amount: 1,800 mg. Indications: neuropathic pain    potassium chloride (K-DUR, KLOR-CON) 20 mEq tablet Take 1 Tab by mouth two (2) times a day.  methylphenidate HCl (Ritalin) 5 mg tablet Take 1 Tab by mouth three (3) times daily. Max Daily Amount: 15 mg.    DULoxetine (CYMBALTA) 20 mg capsule Take 1 Cap by mouth two (2) times a day.  insulin glargine U-300 conc (Toujeo SoloStar U-300 Insulin) 300 unit/mL (1.5 mL) inpn pen INJECT 55 UNITS ONCE DAILY AT BEDTIME - DOSE INCREASED (Patient taking differently: 15 Units. Sliding scale)    hydrocortisone (ANUSOL-HC) 25 mg supp Insert 1 Suppository into rectum nightly.  famotidine (Pepcid) 20 mg tablet Take 20 mg by mouth two (2) times a day.  sennosides (Senna) 8.6 mg cap Take 2 Tabs by mouth nightly.  polyethylene glycol (Miralax) 17 gram/dose powder Take 17 g by mouth daily as needed.  carvediloL (COREG) 25 mg tablet TAKE 1 TABLET BY MOUTH TWICE DAILY (Patient taking differently: Take 12.5 mg by mouth two (2) times daily as needed.)    HYDROmorphone (Dilaudid) 2 mg tablet Take 1 mg by mouth two (2) times a day. takes 3 tabs  by mouth, taking BID and PRN    lipase-protease-amylase (Creon) 12,000-38,000 -60,000 unit capsule Take 3 Caps by mouth three (3) times daily (with meals). Indications: exocrine pancreatic insufficiency (Patient taking differently: Take 2 Caps by mouth three (3) times daily (with meals). 2 caps with meal and 1 with snacks  Indications: exocrine pancreatic insufficiency)    lidocaine-prilocaine (EMLA) topical cream Apply  to affected area as needed for Pain (pain ).  Apply a thin layer over port site prior to accessing port    diphenhydrAMINE (BENADRYL) 25 mg tablet Take 25 mg by mouth nightly as needed for Itching or Skin Irritation.  nystatin (MYCOSTATIN) powder Apply 1 g to affected area three (3) times daily.  promethazine (PHENERGAN) 25 mg tablet Take 1 Tab by mouth every six (6) hours as needed for Nausea.  LORazepam (ATIVAN) 1 mg tablet Take 1 Tab by mouth every six (6) hours as needed for Anxiety. Max Daily Amount: 4 mg. Indications: nausea and vomiting caused by cancer drugs (Patient taking differently: Take 1 mg by mouth nightly. Taking 1 mg at bedtime  Indications: nausea and vomiting caused by cancer drugs)    ondansetron (ZOFRAN ODT) 4 mg disintegrating tablet Take 1 Tab by mouth every eight (8) hours as needed for Nausea or Vomiting.  diphenoxylate-atropine (LomotiL) 2.5-0.025 mg per tablet Take 2 Tabs by mouth four (4) times daily as needed for Diarrhea. Max Daily Amount: 8 Tabs.  prochlorperazine (COMPAZINE) 10 mg tablet Take 0.5 Tabs by mouth every six (6) hours as needed for Nausea.  Blood-Glucose Meter,Continuous (Dexcom G6 ) misc Use as directed to monitor blood glucose as directed    Blood-Glucose Sensor (Dexcom G6 Sensor) fadi Use as directed to monitor blood glucose as directed    Blood-Glucose Transmitter (Dexcom G6 Transmitter) fadi Use as directed to monitor blood glucose as directed    insulin lispro (HumaLOG KwikPen Insulin) 100 unit/mL kwikpen Inject 25 units into the skin with breakfast, 15 units with lunch (if BG is >200 and eating carbs with meal), and 30 units with dinner (Patient taking differently: patient on sliding scale)    acetaminophen (Acetaminophen Extra Strength) 500 mg tablet Take 1,000 mg by mouth daily as needed.  Needle, Disp, ndle 1 Each by Does Not Apply route two (2) times daily as needed for Other. No current facility-administered medications for this visit.            LAB DATA REVIEWED:     Lab Results   Component Value Date/Time    WBC 4.4 01/08/2021 03:37 PM    HGB 11.7 01/08/2021 03:37 PM    PLATELET 655 (L) 79/47/7438 03:37 PM     Lab Results   Component Value Date/Time    Sodium 139 01/08/2021 03:37 PM    Potassium 4.0 01/08/2021 03:37 PM    Chloride 103 01/08/2021 03:37 PM    CO2 28 01/08/2021 03:37 PM    BUN 18 01/08/2021 03:37 PM    Creatinine 0.84 01/08/2021 03:37 PM    Calcium 9.7 01/08/2021 03:37 PM    Magnesium 1.7 07/15/2020 05:35 AM    Phosphorus 3.0 07/15/2020 05:35 AM      Lab Results   Component Value Date/Time    Alk.  phosphatase 67 01/08/2021 03:37 PM    Protein, total 6.9 01/08/2021 03:37 PM    Albumin 3.7 01/08/2021 03:37 PM    Globulin 3.2 01/08/2021 03:37 PM     No results found for: INR, PTMR, PTP, PT1, PT2, APTT, INREXT, INREXT   Lab Results   Component Value Date/Time    Iron 50 08/12/2020 11:00 AM    TIBC 244 (L) 08/12/2020 11:00 AM    Iron % saturation 20 08/12/2020 11:00 AM    Ferritin 603 (H) 08/12/2020 11:00 AM           CONTROLLED SUBSTANCES SAFETY ASSESSMENT (IF ON CONTROLLED SUBSTANCES):     Reviewed opioid safety handout:  [x] Yes   [] No  24 hour opioid dose >150mg morphine equivalent/day:  [] Yes   [x] No  Benzodiazepines:  [] Yes   [x] No  Sleep apnea:  [] Yes   [x] No  Urine Toxicology Testing within last 6 months:  [] Yes   [x] No  History of or new aberrant medication taking behaviors:  [] Yes   [x] No  Has Narcan been prescribed [x] Yes   [] No          Total time: 60 minutes  Counseling / coordination time: 40 minutes  > 50% counseling / coordination?:  Yes    Consent:  He and/or health care decision maker is aware that that he may receive a bill for this telehealth service, depending on his insurance coverage, and has provided verbal consent to proceed: Yes    CPT Codes 84049-04804 for Established Patients may apply to this Telehealth Visit  Pursuant to the emergency declaration under the [address] and the [facility], [facility] waiver authority and the [employer] Act, this Virtual Visit was conducted, with patient's consent, to reduce the patient's risk of exposure to COVID-19 and provide continuity of care for an established patient. Services were provided through a video synchronous discussion virtually to substitute for in-person clinic visit.

## 2021-03-25 NOTE — PROGRESS NOTES
Palliative Medicine Office Visit  Palliative Medicine Nurse Check In  [phone]    Patient Name: Senait Hardin  YOB: 1955      Date of Office Visit: 3/25/2021    Patient states: \" splenectomy and distal pancreectomy   \"    From Check In Sheet (scanned in Media):  Has a medical provider talked with you about cardiopulmonary resuscitation (CPR)? [x] Yes   [] No   [] Unable to obtain    Nurse reminder to complete or update ACP FlowSheet:    Is ACP on the Problem List?    [x] Yes    [] No  IF ACP Document is ON FILE; Nurse to place ACP on Problem List     Is there an ACP Note in Chart Review/Note? [x] Yes    [] No   If NO: ALERT PROVIDER       Primary Decision MakerMarilyn Souza - Daughter - [phone]    Secondary Decision Maker: Hollie Hankins  Advance Care Planning 3/25/2021   Patient's Healthcare Decision Maker is: Named in scanned ACP document   Confirm Advance Directive Yes, on file   Patient Would Like to Complete Advance Directive -   Does the patient have other document types Power of          Is there anything that we should know about you as a person in order to provide you the best care possible? Have you been to the ER, urgent care clinic since your last visit? [] Yes   [x] No   [] Unable to obtain    Have you been hospitalized since your last visit? [] Yes   [x] No   [] Unable to obtain    Have you seen or consulted any other health care providers outside of the [address] [place] [address] since your last visit?    [] Yes   [x] No   [] Unable to obtain    Functional status (describe):         Last BM: 3/24/2021     accessed (date):  3/25/2021    Bottle review (for opioid pain medication):  Medication 1:   Date filled:   Directions:   # filled:   # left:   # pills taking per day:  Last dose taken:    Medication 2:   Date filled:   Directions:   # filled:   # left:   # pills taking per day:  Last dose taken:    Medication 3:   Date filled: Directions:   # filled:   # left:   # pills taking per day:  Last dose taken:    Medication 4:   Date filled:   Directions:   # filled:   # left:   # pills taking per day:  Last dose taken:

## 2021-04-07 ENCOUNTER — TELEPHONE (OUTPATIENT)
Dept: ONCOLOGY | Age: 66
End: 2021-04-07

## 2021-04-07 ENCOUNTER — HOSPITAL ENCOUNTER (OUTPATIENT)
Dept: INFUSION THERAPY | Age: 66
Discharge: HOME | End: 2021-04-07
Payer: MEDICARE

## 2021-04-07 DIAGNOSIS — C25.9 ADENOCARCINOMA OF PANCREAS (HCC): ICD-10-CM

## 2021-04-07 DIAGNOSIS — C25.9 ADENOCARCINOMA OF PANCREAS (HCC): Primary | ICD-10-CM

## 2021-04-07 DIAGNOSIS — F41.1 GENERALIZED ANXIETY DISORDER: ICD-10-CM

## 2021-04-07 LAB
ALBUMIN SERPL-MCNC: 3.2 G/DL (ref 3.5–5)
ALBUMIN/GLOB SERPL: 0.8 {RATIO} (ref 1.1–2.2)
ALP SERPL-CCNC: 105 U/L (ref 45–117)
ALT SERPL-CCNC: 19 U/L (ref 12–78)
ANION GAP SERPL CALC-SCNC: 11 MMOL/L (ref 5–15)
AST SERPL-CCNC: 15 U/L (ref 15–37)
BASOPHILS # BLD: 0 K/UL (ref 0–0.1)
BASOPHILS NFR BLD: 1 % (ref 0–1)
BILIRUB DIRECT SERPL-MCNC: 0.1 MG/DL (ref 0–0.2)
BILIRUB SERPL-MCNC: 0.3 MG/DL (ref 0.2–1)
BUN SERPL-MCNC: 12 MG/DL (ref 6–20)
BUN/CREAT SERPL: 14 (ref 12–20)
CALCIUM SERPL-MCNC: 9.5 MG/DL (ref 8.5–10.1)
CHLORIDE SERPL-SCNC: 100 MMOL/L (ref 97–108)
CO2 SERPL-SCNC: 25 MMOL/L (ref 21–32)
CREAT SERPL-MCNC: 0.87 MG/DL (ref 0.55–1.02)
DIFFERENTIAL METHOD BLD: ABNORMAL
EOSINOPHIL # BLD: 0.2 K/UL (ref 0–0.4)
EOSINOPHIL NFR BLD: 2 % (ref 0–7)
ERYTHROCYTE [DISTWIDTH] IN BLOOD BY AUTOMATED COUNT: 13 % (ref 11.5–14.5)
GLOBULIN SER CALC-MCNC: 4.1 G/DL (ref 2–4)
GLUCOSE SERPL-MCNC: 339 MG/DL (ref 65–100)
HCT VFR BLD AUTO: 32.7 % (ref 35–47)
HGB BLD-MCNC: 10.3 G/DL (ref 11.5–16)
IMM GRANULOCYTES # BLD AUTO: 0 K/UL (ref 0–0.04)
IMM GRANULOCYTES NFR BLD AUTO: 0 % (ref 0–0.5)
LYMPHOCYTES # BLD: 1.7 K/UL (ref 0.8–3.5)
LYMPHOCYTES NFR BLD: 23 % (ref 12–49)
MAGNESIUM SERPL-MCNC: 1.8 MG/DL (ref 1.6–2.4)
MCH RBC QN AUTO: 28.5 PG (ref 26–34)
MCHC RBC AUTO-ENTMCNC: 31.5 G/DL (ref 30–36.5)
MCV RBC AUTO: 90.6 FL (ref 80–99)
MONOCYTES # BLD: 1.2 K/UL (ref 0–1)
MONOCYTES NFR BLD: 16 % (ref 5–13)
NEUTS SEG # BLD: 4.4 K/UL (ref 1.8–8)
NEUTS SEG NFR BLD: 58 % (ref 32–75)
NRBC # BLD: 0 K/UL (ref 0–0.01)
NRBC BLD-RTO: 0 PER 100 WBC
PLATELET # BLD AUTO: 507 K/UL (ref 150–400)
PMV BLD AUTO: 10.9 FL (ref 8.9–12.9)
POTASSIUM SERPL-SCNC: 4.2 MMOL/L (ref 3.5–5.1)
PROT SERPL-MCNC: 7.3 G/DL (ref 6.4–8.2)
RBC # BLD AUTO: 3.61 M/UL (ref 3.8–5.2)
SODIUM SERPL-SCNC: 136 MMOL/L (ref 136–145)
TSH SERPL DL<=0.05 MIU/L-ACNC: 1.82 UIU/ML (ref 0.36–3.74)
WBC # BLD AUTO: 7.5 K/UL (ref 3.6–11)

## 2021-04-07 PROCEDURE — 84443 ASSAY THYROID STIM HORMONE: CPT

## 2021-04-07 PROCEDURE — 83735 ASSAY OF MAGNESIUM: CPT

## 2021-04-07 PROCEDURE — 36415 COLL VENOUS BLD VENIPUNCTURE: CPT

## 2021-04-07 PROCEDURE — 80076 HEPATIC FUNCTION PANEL: CPT

## 2021-04-07 PROCEDURE — 85025 COMPLETE CBC W/AUTO DIFF WBC: CPT

## 2021-04-07 PROCEDURE — 80048 BASIC METABOLIC PNL TOTAL CA: CPT

## 2021-04-07 NOTE — PROGRESS NOTES
Labs look ok except for Glucose. Needs to increase insulin dose. Would suggest talking with PCP or whomever is managing the insulin.

## 2021-04-07 NOTE — TELEPHONE ENCOUNTER
----- Message from Star August MD sent at 4/7/2021  1:23 PM EDT -----  Labs look ok except for Glucose. Needs to increase insulin dose. Would suggest talking with PCP or whomever is managing the insulin.

## 2021-04-08 NOTE — TELEPHONE ENCOUNTER
----- Message from Zahira Deutsch MD sent at 4/7/2021  1:23 PM EDT -----  Labs look ok except for Glucose. Needs to increase insulin dose. Would suggest talking with PCP or whomever is managing the insulin.

## 2021-04-08 NOTE — TELEPHONE ENCOUNTER
HIPAA verified. Notified patient of lab results per DR Kent's note. Discussed Gloucose level, patient states she has not taken any insulin before coming into the office for labs. She will manage better. Encouraged follow up with PCP . Patient verbalized understand and thanked for call.

## 2021-04-12 DIAGNOSIS — C25.9 MALIGNANT NEOPLASM OF PANCREAS, UNSPECIFIED LOCATION OF MALIGNANCY (HCC): ICD-10-CM

## 2021-04-13 ENCOUNTER — PATIENT MESSAGE (OUTPATIENT)
Dept: ONCOLOGY | Age: 66
End: 2021-04-13

## 2021-04-13 RX ORDER — LORAZEPAM 1 MG/1
1 TABLET ORAL
Qty: 90 TAB | Refills: 1 | Status: SHIPPED | OUTPATIENT
Start: 2021-04-13 | End: 2021-04-16

## 2021-04-15 DIAGNOSIS — C25.9 MALIGNANT NEOPLASM OF PANCREAS, UNSPECIFIED LOCATION OF MALIGNANCY (HCC): ICD-10-CM

## 2021-04-16 RX ORDER — LORAZEPAM 1 MG/1
1 TABLET ORAL
Qty: 90 TAB | Refills: 1 | Status: SHIPPED | OUTPATIENT
Start: 2021-04-16 | End: 2021-05-31

## 2021-04-19 ENCOUNTER — TELEPHONE (OUTPATIENT)
Dept: OTHER | Age: 66
End: 2021-04-19

## 2021-04-19 NOTE — TELEPHONE ENCOUNTER
[facility] Navigator Encounter    4/19/2021- Submitted urgent PA request for this patient's medication, Ativan via cover my meds. This request is currently in clinical review with a turnaround time of 24 hours. Case ID# [id number]. This request was approved through 4/19/2022.

## 2021-05-06 ENCOUNTER — CLINICAL SUPPORT (OUTPATIENT)
Dept: FAMILY MEDICINE CLINIC | Age: 66
End: 2021-05-06
Payer: MEDICARE

## 2021-05-06 DIAGNOSIS — Z23 ENCOUNTER FOR IMMUNIZATION: Primary | ICD-10-CM

## 2021-05-06 PROCEDURE — 90732 PPSV23 VACC 2 YRS+ SUBQ/IM: CPT

## 2021-05-06 PROCEDURE — G0009 ADMIN PNEUMOCOCCAL VACCINE: HCPCS

## 2021-05-06 NOTE — PROGRESS NOTES
After obtaining consent, and per orders of  [place] Nurse. MARBIN Li,  Pneumococcal 23 injection given by Ruben Bee RN. Patient instructed  to report any adverse reaction to me immediately.

## 2021-05-10 ENCOUNTER — PATIENT MESSAGE (OUTPATIENT)
Dept: FAMILY MEDICINE CLINIC | Age: 66
End: 2021-05-10

## 2021-05-10 RX ORDER — BLOOD-GLUCOSE SENSOR
EACH MISCELLANEOUS
Qty: 3 DEVICE | Refills: 3 | Status: SHIPPED | OUTPATIENT
Start: 2021-05-10 | End: 2021-05-11 | Stop reason: SDUPTHER

## 2021-05-10 RX ORDER — BLOOD-GLUCOSE TRANSMITTER
EACH MISCELLANEOUS
Qty: 1 DEVICE | Refills: 3 | Status: SHIPPED | OUTPATIENT
Start: 2021-05-10 | End: 2021-05-11 | Stop reason: SDUPTHER

## 2021-05-10 RX ORDER — BLOOD-GLUCOSE,RECEIVER,CONT
EACH MISCELLANEOUS
Qty: 1 EACH | Refills: 0 | Status: SHIPPED | OUTPATIENT
Start: 2021-05-10 | End: 2021-05-11 | Stop reason: SDUPTHER

## 2021-05-11 DIAGNOSIS — E11.9 TYPE 2 DIABETES MELLITUS WITHOUT COMPLICATION, WITHOUT LONG-TERM CURRENT USE OF INSULIN (HCC): Primary | ICD-10-CM

## 2021-05-11 RX ORDER — BLOOD-GLUCOSE,RECEIVER,CONT
EACH MISCELLANEOUS
Qty: 1 EACH | Refills: 0 | Status: SHIPPED | OUTPATIENT
Start: 2021-05-11 | End: 2021-12-16 | Stop reason: ALTCHOICE

## 2021-05-11 RX ORDER — BLOOD-GLUCOSE SENSOR
EACH MISCELLANEOUS
Qty: 4 DEVICE | Refills: 3 | Status: SHIPPED | OUTPATIENT
Start: 2021-05-11 | End: 2021-12-16 | Stop reason: ALTCHOICE

## 2021-05-11 RX ORDER — BLOOD-GLUCOSE TRANSMITTER
EACH MISCELLANEOUS
Qty: 1 DEVICE | Refills: 3 | Status: SHIPPED | OUTPATIENT
Start: 2021-05-11 | End: 2021-12-16 | Stop reason: ALTCHOICE

## 2021-05-11 NOTE — PROGRESS NOTES
Floyd Ba is a 65 y. o. female who is seen for pancreatic adenocarcinoma. Patient states she is doing well, since surgery. Key Oncology Meds             ondansetron (ZOFRAN ODT) 4 mg disintegrating tablet Take 1 Tab by mouth every eight (8) hours as needed for Nausea or Vomiting. prochlorperazine (COMPAZINE) 10 mg tablet Take 0.5 Tabs by mouth every six (6) hours as needed for Nausea. Key Pain Meds             HYDROmorphone (Dilaudid) 2 mg tablet Take 1 mg by mouth two (2) times a day. takes 3 tabs  by mouth, taking BID and PRN    acetaminophen (Acetaminophen Extra Strength) 500 mg tablet Take 1,000 mg by mouth daily as needed.         Chemotherapy Flowsheet 11/11/2020   Cycle C 5   Date 11/11/2020   Drug / Regimen HELD   Dosage -   Time Up -   Time Down -   Pre Hydration -   Pre Meds -   Notes -

## 2021-05-12 ENCOUNTER — HOSPITAL ENCOUNTER (OUTPATIENT)
Dept: INFUSION THERAPY | Age: 66
Discharge: HOME | End: 2021-05-12
Payer: MEDICARE

## 2021-05-12 ENCOUNTER — OFFICE VISIT (OUTPATIENT)
Dept: ONCOLOGY | Age: 66
End: 2021-05-12
Payer: MEDICARE

## 2021-05-12 VITALS
RESPIRATION RATE: 16 BRPM | WEIGHT: 175.6 LBS | OXYGEN SATURATION: 99 % | HEART RATE: 69 BPM | SYSTOLIC BLOOD PRESSURE: 139 MMHG | BODY MASS INDEX: 30.14 KG/M2 | TEMPERATURE: 98.5 F | DIASTOLIC BLOOD PRESSURE: 76 MMHG

## 2021-05-12 VITALS
HEART RATE: 69 BPM | TEMPERATURE: 98.5 F | DIASTOLIC BLOOD PRESSURE: 76 MMHG | HEIGHT: 64 IN | SYSTOLIC BLOOD PRESSURE: 139 MMHG | WEIGHT: 175.6 LBS | OXYGEN SATURATION: 99 % | BODY MASS INDEX: 29.98 KG/M2 | RESPIRATION RATE: 16 BRPM

## 2021-05-12 DIAGNOSIS — C25.9 ADENOCARCINOMA OF PANCREAS (HCC): Primary | ICD-10-CM

## 2021-05-12 PROCEDURE — G8754 DIAS BP LESS 90: HCPCS | Performed by: INTERNAL MEDICINE

## 2021-05-12 PROCEDURE — G8752 SYS BP LESS 140: HCPCS | Performed by: INTERNAL MEDICINE

## 2021-05-12 PROCEDURE — 74011250636 HC RX REV CODE- 250/636: Performed by: INTERNAL MEDICINE

## 2021-05-12 PROCEDURE — 3017F COLORECTAL CA SCREEN DOC REV: CPT | Performed by: INTERNAL MEDICINE

## 2021-05-12 PROCEDURE — 96523 IRRIG DRUG DELIVERY DEVICE: CPT

## 2021-05-12 PROCEDURE — 77030012965 HC NDL HUBR BBMI -A

## 2021-05-12 PROCEDURE — 1090F PRES/ABSN URINE INCON ASSESS: CPT | Performed by: INTERNAL MEDICINE

## 2021-05-12 PROCEDURE — G8417 CALC BMI ABV UP PARAM F/U: HCPCS | Performed by: INTERNAL MEDICINE

## 2021-05-12 PROCEDURE — 3288F FALL RISK ASSESSMENT DOCD: CPT | Performed by: INTERNAL MEDICINE

## 2021-05-12 PROCEDURE — G8510 SCR DEP NEG, NO PLAN REQD: HCPCS | Performed by: INTERNAL MEDICINE

## 2021-05-12 PROCEDURE — G8399 PT W/DXA RESULTS DOCUMENT: HCPCS | Performed by: INTERNAL MEDICINE

## 2021-05-12 PROCEDURE — G8536 NO DOC ELDER MAL SCRN: HCPCS | Performed by: INTERNAL MEDICINE

## 2021-05-12 PROCEDURE — 99214 OFFICE O/P EST MOD 30 MIN: CPT | Performed by: INTERNAL MEDICINE

## 2021-05-12 PROCEDURE — G8427 DOCREV CUR MEDS BY ELIG CLIN: HCPCS | Performed by: INTERNAL MEDICINE

## 2021-05-12 PROCEDURE — 1100F PTFALLS ASSESS-DOCD GE2>/YR: CPT | Performed by: INTERNAL MEDICINE

## 2021-05-12 RX ORDER — SODIUM CHLORIDE 0.9 % (FLUSH) 0.9 %
5-10 SYRINGE (ML) INJECTION AS NEEDED
Status: DISCONTINUED | OUTPATIENT
Start: 2021-05-12 | End: 2021-05-13 | Stop reason: HOSPADM

## 2021-05-12 RX ORDER — HEPARIN 100 UNIT/ML
500 SYRINGE INTRAVENOUS AS NEEDED
Status: DISCONTINUED | OUTPATIENT
Start: 2021-05-12 | End: 2021-05-13 | Stop reason: HOSPADM

## 2021-05-12 RX ADMIN — Medication 500 UNITS: at 14:25

## 2021-05-12 RX ADMIN — Medication 10 ML: at 14:25

## 2021-05-12 NOTE — PROGRESS NOTES
Provider at bedside       Moreno Jackson, ZOEY  03/17/18 2301 Reason for Visit:   Betty Resendiz a 27 I. o. female who is seen for pancreatic adenocarcinoma     Treatment History:   Dx: Pancreatic Adenocarcinoma--May 2020--[id number]--[id number] of splenic vein and superior mesenteric vein  Tx: mFOLFIRINOX--first cycle 5/26/2020, second cycle 6/10/2020, dose reduced 15% cycle 3 on 6/30/2020--delayed due to severe side effects--switched to Gemcitabine/Abraxane--Cycle #1 7/29/2002, Cycle #2 9/2/2020, Cycle #3 10/7/2020, Cycle #4 11/4/2020. Neoadjuvant Radiation with Dr Carla Saucedo ending 12/18/2021. Distal Pancreatectomy, Splenectomy, and Civa Sheet with Dr Fay Hernandez on 3/17/2021. Adjuvant radiation with Dr Carla Saucedo. Goal: Prolong life--Palliative     History of Present Illness:   Doing very well. Feeling almost normal.  Discussed idea of chemotherapy given the hernia sac finding.                                                                                                                                                                                                                                    Past Medical History:   Diagnosis Date    Adenocarcinoma of pancreas ([facility] [address] 05/13/2020    Dr Mark Reyes biopsy via EUS    Diabetes ([facility] [address]     GERD (gastroesophageal reflux disease)     Hernia of abdominal cavity     Subxyphoid hernia    Hypertension     Inflammatory polyarthropathy (HCC)     Joint pain     Joint swelling     Menopause     Ocular nevus of left eye     Pancreatitis     Tracheal stenosis       Past Surgical History:   Procedure Laterality Date    HX CARPAL TUNNEL RELEASE Bilateral     HX COLONOSCOPY      HX HYSTERECTOMY      HX KNEE ARTHROSCOPY Right     HX KNEE REPLACEMENT Right     partial    HX LAP CHOLECYSTECTOMY      HX TONSILLECTOMY      HX VASCULAR ACCESS        Social History     Tobacco Use    Smoking status: Never Smoker    Smokeless tobacco: Never Used   Substance Use Topics    Alcohol use: No     Alcohol/week: 0.0 standard drinks Frequency: Never     Binge frequency: Never      Family History   Problem Relation Age of Onset    Heart Disease Mother     Heart Attack Mother     Cancer Father         lung    Diabetes Sister      Current Outpatient Medications   Medication Sig    LORazepam (ATIVAN) 1 mg tablet Take 1 Tab by mouth every six (6) hours as needed for Anxiety. Max Daily Amount: 4 mg. Indications: nausea and vomiting caused by cancer drugs (Patient taking differently: Take 0.5 mg by mouth nightly as needed for Anxiety. Indications: nausea and vomiting caused by cancer drugs)    gabapentin (NEURONTIN) 300 mg capsule Take 2 Caps by mouth three (3) times daily. Max Daily Amount: 1,800 mg. Indications: neuropathic pain    potassium chloride (K-DUR, KLOR-CON) 20 mEq tablet Take 1 Tab by mouth two (2) times a day.  methylphenidate HCl (Ritalin) 5 mg tablet Take 1 Tab by mouth three (3) times daily. Max Daily Amount: 15 mg.    DULoxetine (CYMBALTA) 20 mg capsule Take 1 Cap by mouth two (2) times a day.  insulin glargine U-300 conc (Toujeo SoloStar U-300 Insulin) 300 unit/mL (1.5 mL) inpn pen INJECT 55 UNITS ONCE DAILY AT BEDTIME - DOSE INCREASED (Patient taking differently: 15 Units. Sliding scale 4 units in morning, 2-4 units with dinner)    sennosides (Senna) 8.6 mg cap Take 2 Tabs by mouth nightly.  carvediloL (COREG) 25 mg tablet TAKE 1 TABLET BY MOUTH TWICE DAILY (Patient taking differently: Take 12.5 mg by mouth two (2) times daily as needed.)    HYDROmorphone (Dilaudid) 2 mg tablet Take 1 mg by mouth two (2) times a day. takes 3 tabs  by mouth, taking BID and PRN    lipase-protease-amylase (Creon) 12,000-38,000 -60,000 unit capsule Take 3 Caps by mouth three (3) times daily (with meals). Indications: exocrine pancreatic insufficiency (Patient taking differently: Take 2 Caps by mouth three (3) times daily (with meals).  2 caps with meal and 1 with snacks  Indications: exocrine pancreatic insufficiency)    lidocaine-prilocaine (EMLA) topical cream Apply  to affected area as needed for Pain (pain ). Apply a thin layer over port site prior to accessing port    diphenhydrAMINE (BENADRYL) 25 mg tablet Take 25 mg by mouth nightly as needed for Itching or Skin Irritation.  nystatin (MYCOSTATIN) powder Apply 1 g to affected area three (3) times daily.  ondansetron (ZOFRAN ODT) 4 mg disintegrating tablet Take 1 Tab by mouth every eight (8) hours as needed for Nausea or Vomiting.  insulin lispro (HumaLOG KwikPen Insulin) 100 unit/mL kwikpen Inject 25 units into the skin with breakfast, 15 units with lunch (if BG is >200 and eating carbs with meal), and 30 units with dinner (Patient taking differently: patient on sliding scale)    acetaminophen (Acetaminophen Extra Strength) 500 mg tablet Take 1,000 mg by mouth daily as needed.  Blood-Glucose Transmitter (Dexcom G6 Transmitter) fadi Use as directed to monitor blood glucose as directed    Blood-Glucose Sensor (Dexcom G6 Sensor) fadi Use as directed to monitor blood glucose as directed    Blood-Glucose Meter,Continuous (Dexcom G6 ) misc Use as directed to monitor blood glucose as directed    hydrocortisone (ANUSOL-HC) 25 mg supp Insert 1 Suppository into rectum nightly.  famotidine (Pepcid) 20 mg tablet Take 20 mg by mouth two (2) times a day.  polyethylene glycol (Miralax) 17 gram/dose powder Take 17 g by mouth daily as needed.  promethazine (PHENERGAN) 25 mg tablet Take 1 Tab by mouth every six (6) hours as needed for Nausea.  diphenoxylate-atropine (LomotiL) 2.5-0.025 mg per tablet Take 2 Tabs by mouth four (4) times daily as needed for Diarrhea. Max Daily Amount: 8 Tabs.  prochlorperazine (COMPAZINE) 10 mg tablet Take 0.5 Tabs by mouth every six (6) hours as needed for Nausea.  Needle, Disp, ndle 1 Each by Does Not Apply route two (2) times daily as needed for Other. No current facility-administered medications for this visit. Facility-Administered Medications Ordered in Other Visits   Medication Dose Route Frequency    sodium chloride (NS) flush 5-10 mL  5-10 mL IntraVENous PRN    heparin (porcine) pf 500 Units  500 Units IntraVENous PRN      No Known Allergies     Review of Systems: A complete review of systems was obtained, negative except as described above. Physical Exam:     Visit Vitals  /76   Pulse 69   Temp 98.5 °F (36.9 °C) (Oral)   Resp 16   Ht 5' 4\" (1.626 m)   Wt 175 lb 9.6 oz (79.7 kg)   SpO2 99%   BMI 30.14 kg/m²     ECOG PS: 0  General: No distress  Eyes: PERRLA, anicteric sclerae  HENT: Atraumatic, OP clear  Neck: Supple  Lymphatic: No cervical, supraclavicular, or inguinal adenopathy  Respiratory: CTAB, normal respiratory effort  CV: Normal rate, regular rhythm, no murmurs, no peripheral edema  GI: Soft, nontender, nondistended, no masses, no hepatomegaly, no splenomegaly  MS: Normal gait and station. Digits without clubbing or cyanosis. Skin: No rashes, ecchymoses, or petechiae. Normal temperature, turgor, and texture. Psych: Alert, oriented, appropriate affect, normal judgment/insight    Results:     Lab Results   Component Value Date/Time    WBC 7.5 04/07/2021 11:05 AM    HGB 10.3 (L) 04/07/2021 11:05 AM    HCT 32.7 (L) 04/07/2021 11:05 AM    PLATELET 159 (H) 89/26/9580 11:05 AM    MCV 90.6 04/07/2021 11:05 AM    ABS.  NEUTROPHILS 4.4 04/07/2021 11:05 AM     Lab Results   Component Value Date/Time    Sodium 136 04/07/2021 11:05 AM    Potassium 4.2 04/07/2021 11:05 AM    Chloride 100 04/07/2021 11:05 AM    CO2 25 04/07/2021 11:05 AM    Glucose 339 (H) 04/07/2021 11:05 AM    BUN 12 04/07/2021 11:05 AM    Creatinine 0.87 04/07/2021 11:05 AM    GFR est AA >60 04/07/2021 11:05 AM    GFR est non-AA >60 04/07/2021 11:05 AM    Calcium 9.5 04/07/2021 11:05 AM    Glucose (POC) 192 (H) 07/15/2020 11:56 AM    Creatinine (POC) 0.7 02/06/2013 10:21 AM     Lab Results   Component Value Date/Time    Bilirubin, total 0.3 04/07/2021 11:05 AM    ALT (SGPT) 19 04/07/2021 11:05 AM    Alk. phosphatase 105 04/07/2021 11:05 AM    Protein, total 7.3 04/07/2021 11:05 AM    Albumin 3.2 (L) 04/07/2021 11:05 AM    Globulin 4.1 (H) 04/07/2021 11:05 AM       CT A/P 4/30/2020  IMPRESSION: Suspect neoplastic pancreatic mass versus atypical focal  pancreatitis with splenic and superior mesenteric vein occlusion. Consider  further evaluation with endoscopic ultrasound at which time biopsy could  potentially be performed.     CT Chest 5/21/2020  IMPRESSION:  1. No evidence of pulmonary metastatic disease. No evidence of mediastinal or  hilar lymphadenopathy. No pleural effusions identified. 2. No definite evidence of central pulmonary embolic disease. 3. Presence of a mass lesion involving the pancreatic head/body. 4. Evidence of fatty infiltration involving the liver. Presence of focal  low-density areas within the liver compatible with cysts. Surgical absence of  the gallbladder.     CT A/P 6/19/2020  IMPRESSION:  Pancreatitis with ascites favored. Discrete pancreatic mass is not identified. No biliary dilatation or definite arterial or venous thrombosis. No liver  metastases. Fat-containing ventral hernia  Nonobstructing left renal calculus  Retrolisthesis of L2 and L3.     CT C/A/P 10/12/2020  IMPRESSION:  1. Interval improvement in appearance of the pancreas (see discussion above). 2. Normal sized liver. Stable appearance of the previously described areas of  decreased attenuation within liver. 3. Surgical absence of the gallbladder. 4. Evidence of splenomegaly. 5. Normal sized kidneys. Presence of small, nonobstructing calculi within the  left kidney. No evidence of hydronephrotic change. 6. Suboptimal distention of the urinary bladder.   7. Presence of a small, fat-containing ventral hernia in the mid abdominal  region.     Path: 5/8/2020  CYTOLOGIC INTERPRETATION:   Pancreas, EUS-guided fine needle aspiration and cell blocks: Adenocarcinoma     Path: 3/17/2021  No residual malignancy in pancrease, 0/29 lymph nodes. 2-3mm focus metastatic adenocarcinoma within hernia sac     Assessment:   1) Pancreatic Adenocarcinoma--Unresectable  2) Neoplasm Pain/Neuropathic Pain  3) Fatigue  4) Nausea  5) DM  6) Nutrition  7) Diarrhea     Plan:   1) Completed neoadjuvant chemotherapy. Radiation with Dr Fay Tomlinson. Definitive resection with Dr Maico Deng focus of adenocarcinoma in ventral hernia. Discussed further chemotherapy given the residual dz. Does not want to undergo further chemotherapy. This is reasonable. Will continue to follow along with Dr Fidel Laboy and Fay Tomlinson.    2) S/p celiac block--pain controlled.       3) Methylphenidate 5mg bid--watch for anxiety and insomnia (normally only taking dose in am).       4) Resolved.    5) Defer to Dr Ifrah Anthony for further management.       6) On pancreatic enzymes--weight has stabilized.    7) Chemo related--hopefully with not recurr.  Taking senna/miralax to prevent constipation.       Signed By: Cecile Samuels MD

## 2021-05-14 ENCOUNTER — TELEPHONE (OUTPATIENT)
Dept: FAMILY MEDICINE CLINIC | Age: 66
End: 2021-05-14

## 2021-05-14 NOTE — TELEPHONE ENCOUNTER
Ye requested 6 months to ensure patient needs for Medicare purposes. . Order for Continuous Glucose Monitor.  Information faxed to Medicare [phone] Fax  [phone] Telephone

## 2021-05-18 ENCOUNTER — DOCUMENTATION ONLY (OUTPATIENT)
Dept: FAMILY MEDICINE CLINIC | Age: 66
End: 2021-05-18

## 2021-05-28 ENCOUNTER — CLINICAL SUPPORT (OUTPATIENT)
Dept: FAMILY MEDICINE CLINIC | Age: 66
End: 2021-05-28
Payer: MEDICARE

## 2021-05-28 DIAGNOSIS — Z23 ENCOUNTER FOR IMMUNIZATION: Primary | ICD-10-CM

## 2021-05-28 PROCEDURE — 90734 MENACWYD/MENACWYCRM VACC IM: CPT | Performed by: FAMILY MEDICINE

## 2021-05-28 PROCEDURE — 90471 IMMUNIZATION ADMIN: CPT | Performed by: FAMILY MEDICINE

## 2021-06-11 ENCOUNTER — OFFICE VISIT (OUTPATIENT)
Dept: FAMILY MEDICINE CLINIC | Age: 66
End: 2021-06-11
Payer: MEDICARE

## 2021-06-11 VITALS
TEMPERATURE: 98.1 F | HEART RATE: 68 BPM | OXYGEN SATURATION: 98 % | BODY MASS INDEX: 29.91 KG/M2 | HEIGHT: 64 IN | RESPIRATION RATE: 17 BRPM | WEIGHT: 175.2 LBS | DIASTOLIC BLOOD PRESSURE: 70 MMHG | SYSTOLIC BLOOD PRESSURE: 126 MMHG

## 2021-06-11 DIAGNOSIS — C25.9 MALIGNANT NEOPLASM OF PANCREAS, UNSPECIFIED LOCATION OF MALIGNANCY (HCC): ICD-10-CM

## 2021-06-11 DIAGNOSIS — Z00.00 MEDICARE ANNUAL WELLNESS VISIT, SUBSEQUENT: Primary | ICD-10-CM

## 2021-06-11 DIAGNOSIS — F32.A DEPRESSION, UNSPECIFIED DEPRESSION TYPE: ICD-10-CM

## 2021-06-11 DIAGNOSIS — E11.21 TYPE 2 DIABETES MELLITUS WITH NEPHROPATHY (HCC): ICD-10-CM

## 2021-06-11 LAB
EST. AVERAGE GLUCOSE BLD GHB EST-MCNC: 226 MG/DL
HBA1C MFR BLD: 9.5 % (ref 4–5.6)

## 2021-06-11 PROCEDURE — G8417 CALC BMI ABV UP PARAM F/U: HCPCS | Performed by: INTERNAL MEDICINE

## 2021-06-11 PROCEDURE — 3017F COLORECTAL CA SCREEN DOC REV: CPT | Performed by: INTERNAL MEDICINE

## 2021-06-11 PROCEDURE — 2022F DILAT RTA XM EVC RTNOPTHY: CPT | Performed by: INTERNAL MEDICINE

## 2021-06-11 PROCEDURE — G8752 SYS BP LESS 140: HCPCS | Performed by: INTERNAL MEDICINE

## 2021-06-11 PROCEDURE — G8510 SCR DEP NEG, NO PLAN REQD: HCPCS | Performed by: INTERNAL MEDICINE

## 2021-06-11 PROCEDURE — 3051F HG A1C>EQUAL 7.0%<8.0%: CPT | Performed by: INTERNAL MEDICINE

## 2021-06-11 PROCEDURE — 3288F FALL RISK ASSESSMENT DOCD: CPT | Performed by: INTERNAL MEDICINE

## 2021-06-11 PROCEDURE — G8399 PT W/DXA RESULTS DOCUMENT: HCPCS | Performed by: INTERNAL MEDICINE

## 2021-06-11 PROCEDURE — G8536 NO DOC ELDER MAL SCRN: HCPCS | Performed by: INTERNAL MEDICINE

## 2021-06-11 PROCEDURE — G0439 PPPS, SUBSEQ VISIT: HCPCS | Performed by: INTERNAL MEDICINE

## 2021-06-11 PROCEDURE — 99214 OFFICE O/P EST MOD 30 MIN: CPT | Performed by: INTERNAL MEDICINE

## 2021-06-11 PROCEDURE — G8427 DOCREV CUR MEDS BY ELIG CLIN: HCPCS | Performed by: INTERNAL MEDICINE

## 2021-06-11 PROCEDURE — 1090F PRES/ABSN URINE INCON ASSESS: CPT | Performed by: INTERNAL MEDICINE

## 2021-06-11 PROCEDURE — 1100F PTFALLS ASSESS-DOCD GE2>/YR: CPT | Performed by: INTERNAL MEDICINE

## 2021-06-11 PROCEDURE — G8754 DIAS BP LESS 90: HCPCS | Performed by: INTERNAL MEDICINE

## 2021-06-11 RX ORDER — METHYLPHENIDATE HYDROCHLORIDE 5 MG/1
5 TABLET ORAL 3 TIMES DAILY
Qty: 90 TABLET | Refills: 0 | Status: SHIPPED | OUTPATIENT
Start: 2021-06-11 | End: 2021-09-03 | Stop reason: SDUPTHER

## 2021-06-11 RX ORDER — LORAZEPAM 1 MG/1
1 TABLET ORAL
Qty: 90 TABLET | Refills: 2 | Status: SHIPPED | OUTPATIENT
Start: 2021-06-11 | End: 2022-01-12 | Stop reason: SDUPTHER

## 2021-06-11 RX ORDER — DULOXETIN HYDROCHLORIDE 20 MG/1
20 CAPSULE, DELAYED RELEASE ORAL 2 TIMES DAILY
Qty: 60 CAPSULE | Refills: 3 | Status: SHIPPED | OUTPATIENT
Start: 2021-06-11 | End: 2021-09-01 | Stop reason: SDUPTHER

## 2021-06-11 NOTE — PROGRESS NOTES
Caitlin Aguilar is a 72 y.o. female presenting for/with:    Chief Complaint   Patient presents with    Diabetes     Requiring more SSI daily. Attempting to keep sugars below 300. States at least 2 short term insulin. Currently 207. Has meter readings. Wants to discuss dosage for long term meds. [facility] Annual Wellness Visit       Visit Vitals  /70 (BP 1 Location: Left upper arm, BP Patient Position: Sitting, BP Cuff Size: Adult long)   Pulse 68   Temp 98.1 °F (36.7 °C) (Temporal)   Resp 17   Ht 5' 4\" (1.626 m)   Wt 175 lb 3.2 oz (79.5 kg)   SpO2 98%   BMI 30.07 kg/m²     Pain Scale: 2/10  Pain Location: Hand    1. Have you been to the ER, urgent care clinic since your last visit? Hospitalized since your last visit? NO    2. Have you seen or consulted any other health care providers outside of the [address] [place] [address] since your last visit? Include any pap smears or colon screening. NO    Symptom review:  NO  Fever   NO  Shaking chills  NO  Cough  NO  Body aches  NO  Coughing up blood  NO  Chest congestion  NO  Chest pain  NO  Shortness of breath  NO  Profound Loss of smell/taste  NO  Nausea/Vomiting   NO  Loose stool/Diarrhea  NO  any skin issues    Patient Risk Factors Reviewed as follows:  NO  have you been in Close contact with confirmed COVID19 patient   NO  History of recent travel to affected geographical areas within the past 14 days  NO  COPD  NO  Active Cancer/Leukemia/Lymphoma/Chemotherapy  NO  Oral steroid use  NO  Pregnant  YES  Diabetes Mellitus  YES  Heart disease  NO  Asthma  YES Health care worker at home  [address] care worker  YES Is there a Pregnant Woman in the home  YES Dialysis pt in the home   YES a large number of people living in the home    Learning Assessment 6/11/2021   PRIMARY LEARNER Patient   PRIMARY LANGUAGE ENGLISH   LEARNER PREFERENCE PRIMARY DEMONSTRATION   ANSWERED BY patient   RELATIONSHIP SELF     Fall Risk Assessment, last 12 mths 6/11/2021   Able to walk?  Yes   Fall in past 12 months? 0   Do you feel unsteady? 0   Are you worried about falling 0   Is the gait abnormal? -   Number of falls in past 12 months -   Fall with injury? -       3 most recent PHQ Screens 6/11/2021   Little interest or pleasure in doing things Not at all   Feeling down, depressed, irritable, or hopeless Not at all   Total Score PHQ 2 0     Abuse Screening Questionnaire 6/11/2021   Do you ever feel afraid of your partner? N   Are you in a relationship with someone who physically or mentally threatens you? N   Is it safe for you to go home? Y       ADL Assessment 6/11/2021   Feeding yourself No Help Needed   Getting from bed to chair No Help Needed   Getting dressed No Help Needed   Bathing or showering No Help Needed   Walk across the room (includes cane/walker) No Help Needed   Using the telphone No Help Needed   Taking your medications No Help Needed   Preparing meals No Help Needed   Managing money (expenses/bills) No Help Needed   Moderately strenuous housework (laundry) No Help Needed   Shopping for personal items (toiletries/medicines) No Help Needed   Shopping for groceries No Help Needed   Driving No Help Needed   Climbing a flight of stairs No Help Needed   Getting to places beyond walking distances No Help Needed      Advance directive and POA on file and current. This is the Subsequent Medicare Annual Wellness Exam, performed 12 months or more after the Initial AWV or the last Subsequent AWV    I have reviewed the patient's medical history in detail and updated the computerized patient record. Assessment/Plan   Education and counseling provided:  Are appropriate based on today's review and evaluation    1. Medicare annual wellness visit, subsequent  2. Depression, unspecified depression type  3.  Malignant neoplasm of pancreas, unspecified location of malignancy ([facility] [address]       Depression Risk Factor Screening     3 most recent PHQ Screens 5/12/2021   Little interest or pleasure in doing things Not at all   Feeling down, depressed, irritable, or hopeless Not at all   Total Score PHQ 2 0       Alcohol Risk Screen    Do you average more than 1 drink per night or more than 7 drinks a week:  No    On any one occasion in the past three months have you have had more than 3 drinks containing alcohol:  No        Functional Ability and Level of Safety    Hearing: Hearing is good. Activities of Daily Living: The home contains: no safety equipment. Patient does total self care      Ambulation: with no difficulty     Fall Risk:  Fall Risk Assessment, last 12 mths 5/12/2021   Able to walk? Yes   Fall in past 12 months? 0   Do you feel unsteady? 0   Are you worried about falling 0   Is the gait abnormal? -   Number of falls in past 12 months -   Fall with injury?  -      Abuse Screen:  Patient is not abused       Cognitive Screening    Has your family/caregiver stated any concerns about your memory: no       Health Maintenance Due     Health Maintenance Due   Topic Date Due    Shingrix Vaccine Age 49> (1 of 2) Never done    MICROALBUMIN Q1  06/09/2018    Colorectal Cancer Screening Combo  12/16/2018    Foot Exam Q1  04/01/2020    Eye Exam Retinal or Dilated  05/31/2020    Lipid Screen  07/01/2020       Patient Care Team   Patient Care Team:  Deisy Chaudhari MD as PCP - General (Internal Medicine)  Deisy Chaudhari MD as PCP - [address] [place] [address] Provider  Cristian Causey MD (General Surgery)  Avelino Arango MD (Hematology and Oncology)  Sonya Priest MD as Physician (Hematology and Oncology)  Meghann Bruner MD as Physician (Palliative Medicine)    History     Patient Active Problem List   Diagnosis Code    Arthritis, degenerative M19.90    Pancreatitis K85.90    Tracheal stenosis J39.8    GERD (gastroesophageal reflux disease) K21.9    Hypertension I10    Ocular nevus of left eye D31.92    Type 2 diabetes mellitus without complication, without long-term current use of insulin ([facility] [address] E11.9    Mixed hyperlipidemia E78.2    Type 2 diabetes mellitus with nephropathy (HCC) E11.21    Adenocarcinoma of pancreas (HCC) C25.9    Atypical squamoproliferative skin lesion D49.2    Postoperative examination Z09    Chemotherapy induced nausea and vomiting R11.2, T45.1X5A    Diarrhea R19.7    Pancreatic malignancy syndrome (HCC) C25.0    Intractable nausea and vomiting R11.2    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     Past Medical History:   Diagnosis Date    Adenocarcinoma of pancreas ([facility] [address] 05/13/2020    Dr Altagracia Alexandre biopsy via EUS    Diabetes ([facility] [address]     GERD (gastroesophageal reflux disease)     Hernia of abdominal cavity     Subxyphoid hernia    Hypertension     Inflammatory polyarthropathy (HCC)     Joint pain     Joint swelling     Menopause     Ocular nevus of left eye     Pancreatitis     Tracheal stenosis       Past Surgical History:   Procedure Laterality Date    HX CARPAL TUNNEL RELEASE Bilateral     HX COLONOSCOPY      HX HYSTERECTOMY      HX KNEE ARTHROSCOPY Right     HX KNEE REPLACEMENT Right     partial    HX LAP CHOLECYSTECTOMY      HX TONSILLECTOMY      HX VASCULAR ACCESS       Current Outpatient Medications   Medication Sig Dispense Refill    methylphenidate HCl (Ritalin) 5 mg tablet Take 1 Tablet by mouth three (3) times daily. Max Daily Amount: 15 mg. 90 Tablet 0    multivit-min/folic [id number] (ALIVE WOMEN'S GUMMY VITAMIN PO) Take 2 Each by mouth daily.  Blood-Glucose Transmitter (Dexcom G6 Transmitter) fadi Use as directed to monitor blood glucose as directed 1 Device 3    Blood-Glucose Sensor (Dexcom G6 Sensor) fadi Use as directed to monitor blood glucose as directed 4 Device 3    Blood-Glucose Meter,Continuous (Dexcom G6 ) misc Use as directed to monitor blood glucose as directed 1 Each 0    gabapentin (NEURONTIN) 300 mg capsule Take 2 Caps by mouth three (3) times daily. Max Daily Amount: 1,800 mg.  Indications: neuropathic pain 180 Cap 3    potassium chloride (K-DUR, KLOR-CON) 20 mEq tablet Take 1 Tab by mouth two (2) times a day. 180 Tab 4    DULoxetine (CYMBALTA) 20 mg capsule Take 1 Cap by mouth two (2) times a day. 60 Cap 3    insulin glargine U-300 conc (Toujeo SoloStar U-300 Insulin) 300 unit/mL (1.5 mL) inpn pen INJECT 55 UNITS ONCE DAILY AT BEDTIME - DOSE INCREASED (Patient taking differently: 15 Units. Sliding scale 4 units in morning, 2-4 units with dinner) 4.5 Adjustable Dose Pre-filled Pen Syringe 10    sennosides (Senna) 8.6 mg cap Take 2 Tabs by mouth nightly.  carvediloL (COREG) 25 mg tablet TAKE 1 TABLET BY MOUTH TWICE DAILY (Patient taking differently: Take 12.5 mg by mouth two (2) times daily as needed.) 60 Tab 9    HYDROmorphone (Dilaudid) 2 mg tablet Take 1 mg by mouth two (2) times a day. takes 3 tabs  by mouth, taking BID and PRN      lipase-protease-amylase (Creon) 12,000-38,000 -60,000 unit capsule Take 3 Caps by mouth three (3) times daily (with meals). Indications: exocrine pancreatic insufficiency (Patient taking differently: Take 2 Caps by mouth three (3) times daily (with meals). 2 caps with meal and 1 with snacks  Indications: exocrine pancreatic insufficiency) 240 Cap 5    diphenhydrAMINE (BENADRYL) 25 mg tablet Take 25 mg by mouth nightly as needed for Itching or Skin Irritation.  nystatin (MYCOSTATIN) powder Apply 1 g to affected area three (3) times daily. 60 g 1    promethazine (PHENERGAN) 25 mg tablet Take 1 Tab by mouth every six (6) hours as needed for Nausea. 50 Tab 4    ondansetron (ZOFRAN ODT) 4 mg disintegrating tablet Take 1 Tab by mouth every eight (8) hours as needed for Nausea or Vomiting. 30 Tab 5    diphenoxylate-atropine (LomotiL) 2.5-0.025 mg per tablet Take 2 Tabs by mouth four (4) times daily as needed for Diarrhea. Max Daily Amount: 8 Tabs. 200 Tab 0    prochlorperazine (COMPAZINE) 10 mg tablet Take 0.5 Tabs by mouth every six (6) hours as needed for Nausea.  60 Tab 3    insulin lispro (HumaLOG KwikPen Insulin) 100 unit/mL kwikpen Inject 25 units into the skin with breakfast, 15 units with lunch (if BG is >200 and eating carbs with meal), and 30 units with dinner (Patient taking differently: patient on sliding scale) 15 Pen 3    acetaminophen (Acetaminophen Extra Strength) 500 mg tablet Take 1,000 mg by mouth daily as needed.  hydrocortisone (ANUSOL-HC) 25 mg supp Insert 1 Suppository into rectum nightly. (Patient not taking: Reported on 6/11/2021) 24 Suppository 2    famotidine (Pepcid) 20 mg tablet Take 20 mg by mouth two (2) times a day. (Patient not taking: Reported on 6/11/2021)      polyethylene glycol (Miralax) 17 gram/dose powder Take 17 g by mouth daily as needed. (Patient not taking: Reported on 6/11/2021)      lidocaine-prilocaine (EMLA) topical cream Apply  to affected area as needed for Pain (pain ). Apply a thin layer over port site prior to accessing port      Needle, Disp, ndle 1 Each by Does Not Apply route two (2) times daily as needed for Other.  (Patient not taking: Reported on 6/11/2021) 500 Each 5     No Known Allergies    Family History   Problem Relation Age of Onset    Heart Disease Mother     Heart Attack Mother     Cancer Father         lung    Diabetes Sister      Social History     Tobacco Use    Smoking status: Never Smoker    Smokeless tobacco: Never Used   Substance Use Topics    Alcohol use: No     Alcohol/week: 0.0 standard drinks         [facility]

## 2021-06-11 NOTE — PATIENT INSTRUCTIONS
Medicare Wellness Visit, Female     The best way to live healthy is to have a lifestyle where you eat a well-balanced diet, exercise regularly, limit alcohol use, and quit all forms of tobacco/nicotine, if applicable. Regular preventive services are another way to keep healthy. Preventive services (vaccines, screening tests, monitoring & exams) can help personalize your care plan, which helps you manage your own care. Screening tests can find health problems at the earliest stages, when they are easiest to treat. [facility] follows the current, evidence-based guidelines published by the [employer] ([facility]) when recommending preventive services for our patients. Because we follow these guidelines, sometimes recommendations change over time as research supports it. (For example, mammograms used to be recommended annually. Even though Medicare will still pay for an annual mammogram, the newer guidelines recommend a mammogram every two years for women of average risk). Of course, you and your doctor may decide to screen more often for some diseases, based on your risk and your co-morbidities (chronic disease you are already diagnosed with). Preventive services for you include:  - Medicare offers their members a free annual wellness visit, which is time for you and your primary care provider to discuss and plan for your preventive service needs. Take advantage of this benefit every year!  -All adults over the age of 72 should receive the recommended pneumonia vaccines. Current USPSTF guidelines recommend a series of two vaccines for the best pneumonia protection.   -All adults should have a flu vaccine yearly and a tetanus vaccine every 10 years.   -All adults age 48 and older should receive the shingles vaccines (series of two vaccines).       -All adults age 38-68 who are overweight should have a diabetes screening test once every three years.   -All adults born between [birth year] and 1965 should be screened once for Hepatitis C.  -Other screening tests and preventive services for persons with diabetes include: an eye exam to screen for diabetic retinopathy, a kidney function test, a foot exam, and stricter control over your cholesterol.   -Cardiovascular screening for adults with routine risk involves an electrocardiogram (ECG) at intervals determined by your doctor.   -Colorectal cancer screenings should be done for adults age 54-65 with no increased risk factors for colorectal cancer. There are a number of acceptable methods of screening for this type of cancer. Each test has its own benefits and drawbacks. Discuss with your doctor what is most appropriate for you during your annual wellness visit. The different tests include: colonoscopy (considered the best screening method), a fecal occult blood test, a fecal DNA test, and sigmoidoscopy.    -A bone mass density test is recommended when a woman turns 65 to screen for osteoporosis. This test is only recommended one time, as a screening. Some providers will use this same test as a disease monitoring tool if you already have osteoporosis. -Breast cancer screenings are recommended every other year for women of normal risk, age 54-69.  -Cervical cancer screenings for women over age 72 are only recommended with certain risk factors.      Here is a list of your current Health Maintenance items (your personalized list of preventive services) with a due date:  Health Maintenance Due   Topic Date Due    Shingles Vaccine (1 of 2) Never done    Albumin Urine Test  06/09/2018    Colorectal Screening  12/16/2018    Diabetic Foot Care  04/01/2020    Eye Exam  05/31/2020    Cholesterol Test   07/01/2020

## 2021-06-11 NOTE — PROGRESS NOTES
Ms. Liliana Sullivan is a 72 y.o. female who is here for evaluation of   Chief Complaint   Patient presents with    Diabetes     Requiring more SSI daily. Attempting to keep sugars below 300. States at least 2 short term insulin. Currently 207. Has meter readings. Wants to discuss dosage for long term meds.  Annual Wellness Visit   . ASSESSMENT AND PLAN:    1. Depression, unspecified depression type  meds refilled. She is doing great. - methylphenidate HCl (Ritalin) 5 mg tablet; Take 1 Tablet by mouth three (3) times daily. Max Daily Amount: 15 mg. Dispense: 90 Tablet; Refill: 0    2. Malignant neoplasm of pancreas, unspecified location of malignancy (HCC)  - LORazepam (ATIVAN) 1 mg tablet; Take 1 Tablet by mouth every six (6) hours as needed for Anxiety. Max Daily Amount: 4 mg. Indications: nausea and vomiting caused by cancer drugs  Dispense: 90 Tablet; Refill: 2    3. Medicare annual wellness visit, subsequent  Completed below    4. Type 2 diabetes mellitus with nephropathy (HCC)  Increase LA insulin to 18 units surinder.  - HEMOGLOBIN A1C WITH EAG; Future      Orders Placed This Encounter    HEMOGLOBIN A1C WITH EAG     Standing Status:   Future     Standing Expiration Date:   6/18/2021    methylphenidate HCl (Ritalin) 5 mg tablet     Sig: Take 1 Tablet by mouth three (3) times daily. Max Daily Amount: 15 mg. Dispense:  90 Tablet     Refill:  0    LORazepam (ATIVAN) 1 mg tablet     Sig: Take 1 Tablet by mouth every six (6) hours as needed for Anxiety. Max Daily Amount: 4 mg. Indications: nausea and vomiting caused by cancer drugs     Dispense:  90 Tablet     Refill:  2    DULoxetine (CYMBALTA) 20 mg capsule     Sig: Take 1 Capsule by mouth two (2) times a day. 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 Doing well. Creon for dumping sx is helpful. T2DM:  Due for A1c. Variable glucose since surgery.        Path:  Small focus of cancer noted in the hernia sac. ROS:  Denies fever, chills, cough, chest pain, SOB,  nausea, vomiting, or diarrhea. Denies wt loss, wt gain, hemoptysis, hematochezia or melena. Physical Examination:    Visit Vitals  /70 (BP 1 Location: Left upper arm, BP Patient Position: Sitting, BP Cuff Size: Adult long)   Pulse 68   Temp 98.1 °F (36.7 °C) (Temporal)   Resp 17   Ht 5' 4\" (1.626 m)   Wt 175 lb 3.2 oz (79.5 kg)   SpO2 98%   BMI 30.07 kg/m²      General:  Alert, cooperative, no distress. Head:  Normocephalic, without obvious abnormality, atraumatic. Eyes:  Conjunctivae/corneas clear. Pupils equal, round, reactive to light. Extraocular movements intact. Lungs:   Clear to auscultation bilaterally. Chest wall:  No tenderness or deformity. Cardiac:  RRR   Abdomen:   Soft, non-tender. Bowel sounds normal. No masses. No organomegaly. Extremities: Extremities normal, atraumatic, no cyanosis or edema. Pulses: 2+ and symmetric all extremities. Skin: Skin color, texture, turgor normal. No rashes or lesions. Lymph nodes: Cervical, supraclavicular, and axillary nodes normal.   Neurologic: CNII-XII intact. Normal strength, sensation, and reflexes throughout. On this date 06/11/2021 I have spent 30 minutes reviewing previous notes, test results and face to face with the patient discussing the diagnosis and importance of compliance with the treatment plan as well as documenting on the day of the visit.     Morena Cardozo MD FACP    (signed electronically) on 6/11/2021 at 2:52 PM

## 2021-06-17 ENCOUNTER — VIRTUAL VISIT (OUTPATIENT)
Dept: PALLATIVE CARE | Age: 66
End: 2021-06-17
Payer: MEDICARE

## 2021-06-17 DIAGNOSIS — R10.84 ABDOMINAL PAIN, GENERALIZED: Primary | ICD-10-CM

## 2021-06-17 DIAGNOSIS — C25.9 MALIGNANT NEOPLASM OF PANCREAS, UNSPECIFIED LOCATION OF MALIGNANCY (HCC): ICD-10-CM

## 2021-06-17 DIAGNOSIS — T45.1X5A CHEMOTHERAPY-INDUCED NEUROPATHY (HCC): ICD-10-CM

## 2021-06-17 DIAGNOSIS — G62.0 CHEMOTHERAPY-INDUCED NEUROPATHY (HCC): ICD-10-CM

## 2021-06-17 DIAGNOSIS — K59.1 FUNCTIONAL DIARRHEA: ICD-10-CM

## 2021-06-17 PROCEDURE — G8417 CALC BMI ABV UP PARAM F/U: HCPCS | Performed by: INTERNAL MEDICINE

## 2021-06-17 PROCEDURE — 1090F PRES/ABSN URINE INCON ASSESS: CPT | Performed by: INTERNAL MEDICINE

## 2021-06-17 PROCEDURE — 3017F COLORECTAL CA SCREEN DOC REV: CPT | Performed by: INTERNAL MEDICINE

## 2021-06-17 PROCEDURE — G8427 DOCREV CUR MEDS BY ELIG CLIN: HCPCS | Performed by: INTERNAL MEDICINE

## 2021-06-17 PROCEDURE — 3288F FALL RISK ASSESSMENT DOCD: CPT | Performed by: INTERNAL MEDICINE

## 2021-06-17 PROCEDURE — 99215 OFFICE O/P EST HI 40 MIN: CPT | Performed by: INTERNAL MEDICINE

## 2021-06-17 PROCEDURE — G8432 DEP SCR NOT DOC, RNG: HCPCS | Performed by: INTERNAL MEDICINE

## 2021-06-17 PROCEDURE — G8399 PT W/DXA RESULTS DOCUMENT: HCPCS | Performed by: INTERNAL MEDICINE

## 2021-06-17 PROCEDURE — G8536 NO DOC ELDER MAL SCRN: HCPCS | Performed by: INTERNAL MEDICINE

## 2021-06-17 PROCEDURE — G8756 NO BP MEASURE DOC: HCPCS | Performed by: INTERNAL MEDICINE

## 2021-06-17 PROCEDURE — 1100F PTFALLS ASSESS-DOCD GE2>/YR: CPT | Performed by: INTERNAL MEDICINE

## 2021-06-17 NOTE — PROGRESS NOTES
Palliative Medicine Outpatient Services  [place]: [phone] ([phone])    Patient Name: Isaiah Garcia  YOB: 1955    Date of Current Visit: 06/17/21  Location of Current Visit:    [] [facility] Office  [] [facility] Office  [] [id number] [facility] Office  [] Home  [x]Synchronous A/V virtual visit    Date of Initial Visit: 6/15/2020  Referral from: Dr. Dwain Valadez  Primary Care Physician: Diana Collins MD      SUMMARY:   Isaiah Garcia is a 72y.o. year old with a  history of uncontrolled diabetes with an A1c of 9.2, newly diagnosed inoperable pancreatic cancer, who was referred to Palliative Medicine by Dr. Dwain Valadez for management of intractable symptoms and psychosocial support. 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 CT on June 19 shows pancreatitis with some ascites, pancreatic mass has disappeared. Completed 3 doses of 5-FU, started Gemzar Abraxane on 7/29/2020     CA 19 normal, patient s/p exploratory  laparoscopy at [address] [place] [address] with Dr. Amber Rao and Dr. Alexsandra Adkins as part of clinical trial on March 17, 2021     PALLIATIVE DIAGNOSES:       ICD-10-CM ICD-9-CM    1. Abdominal pain, generalized  R10.84 789.07    2. Malignant neoplasm of pancreas, unspecified location of malignancy (HCC)  C25.9 157.9    3. Chemotherapy-induced neuropathy (HCC)  G62.0 357.6     T45.1X5A E933.1    4.  Functional diarrhea  K59.1 564.5           PLAN:     Patient Instructions     Dear Isaiah Garcia ,    It was a pleasure seeing you today in your home  virtually    We will see you again in 4 months but sooner if need be    If labs or imaging tests have been ordered for you today, please call the office  at [phone] 48 hours after completion to obtain the results. Your stated goal:   -To live well for as long as possible    Your described symptoms were: Fatigue: 3 Drowsiness: 1   Depression: 0 Pain: 4   Anxiety: 1 Nausea: 0   Anorexia: 3 Dyspnea: 0   Best Well-Bein Constipation: No   Other Problem (Comment): 0       This is the plan we talked about:    1. Abdominal pain, postoperative  -You are 8 days post surgery, doing very well. You were treated with Dilaudid. 2.  Back pain  -Likely arthritis related but you have been taking Dilaudid 1 mg 2 times a day. Let us start weaning you off of opioids. Take half a tablet of 1 mg 2 times a day for 1 week, then take half a tablet of 1 mg daily for 1 week and then take half a tablet of 1 mg every other day for 1 week and then stop. You will have some leftover Dilaudid which you will keep at home in case of an emergency. 3.  Insomnia   - You have been taking Ativan 0.5 mg at bedtime which is very helpful and so you want to continue the same. 4.  Diarrhea   -You are on Creon but still have diarrhea which we talked about in detail. 5.  Uncontrolled diabetes  -Your sugar is not very well controlled, HbA1c is greater than 9. You are working with your PCP on insulin doses. Uncontrolled diabetes can also exacerbate neuropathy. 6.  Chemotherapy-induced neuropathy  -Right hand neuropathy and numbness is worse than the left hand.   -Gabapentin 600 mg 3 times a day, total 1800 mg/day  -Cymbalta 20 mg 2 times a day    7. The pathology from the hernia removal show some cancer cells and you also have 2 spots in your lung which is being followed. You have scans scheduled for this month. If there are suspicious spots then it will be considered as metastatic cancer and you will resume chemotherapy.     8.  Advanced care planning  -You completed a living will and advanced medical directive naming your daughter Mai Arce as you medical power of           This is what you have shared with us about Advance Care Planning:       Primary Decision Maker: Elizabeth Boland - Daughter - [phone]    Secondary Decision Maker: Ramya Angel  Advance Care Planning 6/17/2021   Patient's Healthcare Decision Maker is: Named in scanned ACP document   Confirm Advance Directive Yes, on file   Patient Would Like to Complete Advance Directive -   Does the patient have other document types Power of            The Palliative Medicine Team is here to support you and your family. Sincerely,      Reva Garcia MD and the Palliative Medicine Team       GOALS OF CARE / TREATMENT PREFERENCES:   [====Goals of Care====]  GOALS OF CARE:  Patient / health care proxy stated goals: See Patient Instructions / Summary    TREATMENT PREFERENCES:   Code Status:  [x] Attempt Resuscitation       [] Do Not Attempt Resuscitation    Advance Care Planning:  [x] [facility] Interdisciplinary Team has updated the ACP Navigator with Decision Maker and Patient Capacity      Primary Decision MakerCraanthony Taylor - [phone]    Secondary Decision Maker: Ramya Angel  Advance Care Planning 6/17/2021   Patient's Healthcare Decision Maker is: Named in scanned ACP document   Confirm Advance Directive Yes, on file   Patient Would Like to Complete Advance Directive -   Does the patient have other document types Power of        Other:  (If patient appropriate for POST, consider using PALLPOST smart phrase here)    The palliative care team has discussed with patient / health care proxy about goals of care / treatment preferences for patient.  [====Goals of Care====]     PRESCRIPTIONS GIVEN:     No orders of the defined types were placed in this encounter.           FOLLOW UP:     Future Appointments   Date Time Provider Louis Campuzano   6/23/2021 11:30 AM [facility] 3 [facility] [facility]   8/12/2021 10:20 AM Elmer Benavides MD [facility]/KELLI TRISTAN [facility] PHYSICIANS INVOLVED IN CARE:   Patient Care Team:  Chyna Rosales MD as PCP - General (Internal Medicine)  Chyna Rosales MD as PCP - [facility]  Roverto Perry MD (General Surgery)  Ori Sheriff MD (Hematology and Oncology)  Santo Moeller MD as Physician (Hematology and Oncology)  Mesfin Abel MD as Physician (Palliative Medicine)       HISTORY:   Reviewed patient-completed ESAS and advance care planning form. Reviewed patient record in prescription monitoring program.    CHIEF COMPLAINT:   No chief complaint on file. HPI/SUBJECTIVE:    The patient is: [x] Verbal / [] Nonverbal     Patient seen today virtually along with her daughter. Kimberlyn Schreiber is in very good spirits, talked at length about her chickens. She is having some word finding difficulty every now and then and attributes it to chemo brain. She had a rough few days after taking the meningitis shot but she recovered from it slowly. She also went a few days without Cymbalta which caused severe withdrawal which she is very aware of now and will take Cymbalta on a regular basis. Neuropathy remains in both hands and feet but current doses of gabapentin Cymbalta are helping her. She has been taking Dilaudid 1 mg 2 times a day for fear of pain more than anything. There are days that she has more back pain especially when she is more active and we talked about her taking Tylenol instead of Dilaudid for that. We will also wean her off of Dilaudid. She is anxious about the next set of scans which may show cancer recurrence and that she may have to go back on chemo. She is excepting of this for now.  --------  Patient seen today along with her daughter Oliverio Kwok. Both are very tearful and stressed from patient's new diagnosis. Kimberlyn Schreiber tells me that she ignored her abdominal pain for several months and now she is paying for it.   She is determined to do everything she can to live as long as possible but is very realistic about her outcome. She is struggling with abdominal pain and is not taking medications as prescribed. Her most significant complaint is profound fatigue. But from what she is sharing, she is eating little to nothing every day, drinking less than 10 ounces of water per day. Her daughter forces her to eat some applesauce with her meds which is about the only calories she gets per day. She sleeps most of the time. She is identified that the first 6 days of the chemotherapy is the worst but she seems to recover from it a little bit after and eats a little bit. She struggles with diarrhea after chemotherapy but constipation once the diarrhea dissipates. 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 Daughter is overwhelmed because of the illness of her mother. Clinical Pain Assessment (nonverbal scale for nonverbal patients):   [++++ Clinical Pain Assessment++++]  [++++Pain Severity++++]: Pain: 4  [++++Pain Character++++]: cramping  [++++Pain Duration++++]: month  [++++Pain Effect++++]: functional and emotional  [++++Pain Factors++++]: none in particular  [++++Pain Frequency++++]: constant  [++++Pain Location++++]: upper abdomen  [++++ Clinical Pain Assessment++++]       FUNCTIONAL ASSESSMENT:     Palliative Performance Scale (PPS):  PPS: 70       PSYCHOSOCIAL/SPIRITUAL SCREENING:     Any spiritual / Religion concerns:  [] Yes /  [x] No    Caregiver Burnout:  [] Yes /  [x] No /  [] No Caregiver Present      Anticipatory grief assessment:   [x] Normal  / [] Maladaptive       ESAS Anxiety: Anxiety: 1    ESAS Depression: Depression: 0       REVIEW OF SYSTEMS:     The following systems were [x] reviewed / [] unable to be reviewed  Systems: constitutional, ears/nose/mouth/throat, respiratory, gastrointestinal, genitourinary, musculoskeletal, integumentary, neurologic, psychiatric, endocrine.  Positive findings noted below.  Modified ESAS Completed by: provider   Fatigue: 3 Drowsiness: 1   Depression: 0 Pain: 4   Anxiety: 1 Nausea: 0   Anorexia: 3 Dyspnea: 0   Best Well-Bein Constipation: No   Other Problem (Comment): 0          PHYSICAL EXAM:     Wt Readings from Last 3 Encounters:   21 175 lb 3.2 oz (79.5 kg)   21 175 lb 9.6 oz (79.7 kg)   21 175 lb 9.6 oz (79.7 kg)     There were no vitals taken for this visit. Last bowel movement: See Nursing Note    Constitutional    [x] Appears well-developed and well-nourished in no apparent distress    [] Abnormal:  Mental status  [x] Alert and awake  [x] Oriented to person/place/time  [x] Able to follow commands  [] Abnormal:   Eyes  [x] EOM normal   [x] Sclera normal   [x] No visible ocular discharge  [] Abnormal:   HENT  [x] Normocephalic, atraumatic  [x] Mouth/Throat: Moist mucous membranes   [x] External Ears normal  [] Abnormal:  Oral thrush resolved  Neck  [x] No visualized mass  [] Abnormal:  Pulmonary/Chest   [x] Respiratory effort normal  [x] No visualized signs of difficulty breathing or respiratory distress  [] Abnormal:  Musculoskeletal  [x] Normal gait with no signs of ataxia  [x] Normal range of motion of neck  [] Abnormal:  Neurological:   [x] No facial asymmetry (Cranial nerve 7 motor function)  [x] No gaze palsy  [] Abnormal:   Skin  [x] No significant exanthematous lesions or discoloration noted on facial skin  [] Abnormal:                                  Psychiatric  [x] Normal affect  [x] No hallucinations  [] Abnormal:    Abdomen-laparotomy scar appears good, sutures healing well    Other pertinent observable physical exam findings:    Due to this being a TeleHealth evaluation, many elements of the physical examination are unable to be assessed.                HISTORY:     Past Medical History:   Diagnosis Date    Adenocarcinoma of pancreas ([email or web address] 2020    Dr Wyatt Shall biopsy via EUS    Diabetes ([email or web address]     GERD (gastroesophageal reflux disease)     Hernia of abdominal cavity     Subxyphoid hernia    Hypertension     Inflammatory polyarthropathy (HCC)     Joint pain     Joint swelling     Menopause     Ocular nevus of left eye     Pancreatitis     Tracheal stenosis       Past Surgical History:   Procedure Laterality Date    HX CARPAL TUNNEL RELEASE Bilateral     HX COLONOSCOPY      HX HYSTERECTOMY      HX KNEE ARTHROSCOPY Right     HX KNEE REPLACEMENT Right     partial    HX LAP CHOLECYSTECTOMY      HX TONSILLECTOMY      HX VASCULAR ACCESS        Family History   Problem Relation Age of Onset    Heart Disease Mother     Heart Attack Mother     Cancer Father         lung    Diabetes Sister       History reviewed, no pertinent family history. Social History     Tobacco Use    Smoking status: Never Smoker    Smokeless tobacco: Never Used   Substance Use Topics    Alcohol use: No     Alcohol/week: 0.0 standard drinks     No Known Allergies   Current Outpatient Medications   Medication Sig    HumaLOG KwikPen Insulin 100 unit/mL kwikpen patient on sliding scale    methylphenidate HCl (Ritalin) 5 mg tablet Take 1 Tablet by mouth three (3) times daily. Max Daily Amount: 15 mg.  LORazepam (ATIVAN) 1 mg tablet Take 1 Tablet by mouth every six (6) hours as needed for Anxiety. Max Daily Amount: 4 mg. Indications: nausea and vomiting caused by cancer drugs    DULoxetine (CYMBALTA) 20 mg capsule Take 1 Capsule by mouth two (2) times a day.  multivit-min/folic EWSC/QPP578 (ALIVE WOMEN'S GUMMY VITAMIN PO) Take 2 Each by mouth daily.     Blood-Glucose Transmitter (Dexcom G6 Transmitter) fadi Use as directed to monitor blood glucose as directed    Blood-Glucose Sensor (Dexcom G6 Sensor) fadi Use as directed to monitor blood glucose as directed    Blood-Glucose Meter,Continuous (Dexcom G6 ) misc Use as directed to monitor blood glucose as directed    gabapentin (NEURONTIN) 300 mg capsule Take 2 Caps by mouth three (3) times daily. Max Daily Amount: 1,800 mg. Indications: neuropathic pain    potassium chloride (K-DUR, KLOR-CON) 20 mEq tablet Take 1 Tab by mouth two (2) times a day.  insulin glargine U-300 conc (Toujeo SoloStar U-300 Insulin) 300 unit/mL (1.5 mL) inpn pen INJECT 55 UNITS ONCE DAILY AT BEDTIME - DOSE INCREASED (Patient taking differently: 15 Units. Sliding scale 4 units in morning, 2-4 units with dinner)    hydrocortisone (ANUSOL-HC) 25 mg supp Insert 1 Suppository into rectum nightly. (Patient not taking: Reported on 6/11/2021)    famotidine (Pepcid) 20 mg tablet Take 20 mg by mouth two (2) times a day. (Patient not taking: Reported on 6/11/2021)    sennosides (Senna) 8.6 mg cap Take 2 Tabs by mouth nightly.  polyethylene glycol (Miralax) 17 gram/dose powder Take 17 g by mouth daily as needed. (Patient not taking: Reported on 6/11/2021)    carvediloL (COREG) 25 mg tablet TAKE 1 TABLET BY MOUTH TWICE DAILY (Patient taking differently: Take 12.5 mg by mouth two (2) times daily as needed.)    HYDROmorphone (Dilaudid) 2 mg tablet Take 1 mg by mouth two (2) times a day. takes 3 tabs  by mouth, taking BID and PRN    lipase-protease-amylase (Creon) 12,000-38,000 -60,000 unit capsule Take 3 Caps by mouth three (3) times daily (with meals). Indications: exocrine pancreatic insufficiency (Patient taking differently: Take 2 Caps by mouth three (3) times daily (with meals). 2 caps with meal and 1 with snacks  Indications: exocrine pancreatic insufficiency)    lidocaine-prilocaine (EMLA) topical cream Apply  to affected area as needed for Pain (pain ). Apply a thin layer over port site prior to accessing port    diphenhydrAMINE (BENADRYL) 25 mg tablet Take 25 mg by mouth nightly as needed for Itching or Skin Irritation.  nystatin (MYCOSTATIN) powder Apply 1 g to affected area three (3) times daily.  promethazine (PHENERGAN) 25 mg tablet Take 1 Tab by mouth every six (6) hours as needed for Nausea.     ondansetron (ZOFRAN ODT) 4 mg disintegrating tablet Take 1 Tab by mouth every eight (8) hours as needed for Nausea or Vomiting.  diphenoxylate-atropine (LomotiL) 2.5-0.025 mg per tablet Take 2 Tabs by mouth four (4) times daily as needed for Diarrhea. Max Daily Amount: 8 Tabs.  prochlorperazine (COMPAZINE) 10 mg tablet Take 0.5 Tabs by mouth every six (6) hours as needed for Nausea.  acetaminophen (Acetaminophen Extra Strength) 500 mg tablet Take 1,000 mg by mouth daily as needed.  Needle, Disp, ndle 1 Each by Does Not Apply route two (2) times daily as needed for Other. (Patient not taking: Reported on 6/11/2021)     No current facility-administered medications for this visit. LAB DATA REVIEWED:     Lab Results   Component Value Date/Time    WBC 7.5 04/07/2021 11:05 AM    HGB 10.3 (L) 04/07/2021 11:05 AM    PLATELET 578 (H) 83/12/5960 11:05 AM     Lab Results   Component Value Date/Time    Sodium 136 04/07/2021 11:05 AM    Potassium 4.2 04/07/2021 11:05 AM    Chloride 100 04/07/2021 11:05 AM    CO2 25 04/07/2021 11:05 AM    BUN 12 04/07/2021 11:05 AM    Creatinine 0.87 04/07/2021 11:05 AM    Calcium 9.5 04/07/2021 11:05 AM    Magnesium 1.8 04/07/2021 11:05 AM    Phosphorus 3.0 07/15/2020 05:35 AM      Lab Results   Component Value Date/Time    Alk.  phosphatase 105 04/07/2021 11:05 AM    Protein, total 7.3 04/07/2021 11:05 AM    Albumin 3.2 (L) 04/07/2021 11:05 AM    Globulin 4.1 (H) 04/07/2021 11:05 AM     No results found for: INR, PTMR, PTP, PT1, PT2, APTT, INREXT, INREXT   Lab Results   Component Value Date/Time    Iron 50 08/12/2020 11:00 AM    TIBC 244 (L) 08/12/2020 11:00 AM    Iron % saturation 20 08/12/2020 11:00 AM    Ferritin 603 (H) 08/12/2020 11:00 AM           CONTROLLED SUBSTANCES SAFETY ASSESSMENT (IF ON CONTROLLED SUBSTANCES):     Reviewed opioid safety handout:  [x] Yes   [] No  24 hour opioid dose >150mg morphine equivalent/day:  [] Yes   [x] No  Benzodiazepines:  [] Yes   [x] No  Sleep apnea:  [] Yes   [x] No  Urine Toxicology Testing within last 6 months:  [] Yes   [x] No  History of or new aberrant medication taking behaviors:  [] Yes   [x] No  Has Narcan been prescribed [x] Yes   [] No          Total time: 60 minutes  Counseling / coordination time: 40 minutes  > 50% counseling / coordination?:  Yes    Consent:  He and/or health care decision maker is aware that that he may receive a bill for this telehealth service, depending on his insurance coverage, and has provided verbal consent to proceed: Yes    CPT Codes 64254-88429 for Established Patients may apply to this Telehealth Visit  Pursuant to the emergency declaration under the [facility], [facility] waiver authority and the [employer] Act, this Virtual  Visit was conducted, with patient's consent, to reduce the patient's risk of exposure to COVID-19 and provide continuity of care for an established patient. Services were provided through a video synchronous discussion virtually to substitute for in-person clinic visit.

## 2021-06-17 NOTE — PROGRESS NOTES
Palliative Medicine Office Visit  Palliative Medicine Nurse Check In  [phone]    Patient Name: Clare Paul  YOB: 1955      Date of Office Visit: 6/17/2021    Patient states: \"  \"    From Check In Sheet (scanned in Media):  Has a medical provider talked with you about cardiopulmonary resuscitation (CPR)? [x] Yes   [] No   [] Unable to obtain    Nurse reminder to complete or update ACP FlowSheet:    Is ACP on the Problem List?    [x] Yes    [] No  IF ACP Document is ON FILE; Nurse to place ACP on Problem List     Is there an ACP Note in Chart Review/Note? [x] Yes    [] No   If NO: ALERT PROVIDER       Primary Decision MakerSarcelia Taylor - [phone]    Secondary Decision Maker: Tereso Cheadle  Advance Care Planning 6/17/2021   Patient's Healthcare Decision Maker is: Named in scanned ACP document   Confirm Advance Directive Yes, on file   Patient Would Like to Complete Advance Directive -   Does the patient have other document types Power of          Is there anything that we should know about you as a person in order to provide you the best care possible? Have you been to the ER, urgent care clinic since your last visit? [] Yes   [x] No   [] Unable to obtain    Have you been hospitalized since your last visit? [] Yes   [x] No   [] Unable to obtain    Have you seen or consulted any other health care providers outside of the [address] [place] [address] since your last visit?    [] Yes   [x] No   [] Unable to obtain    Functional status (describe):       Last BM: 6/16/2021     accessed (date): 6/17/2021    Bottle review (for opioid pain medication):  Medication 1:   Date filled:   Directions:   # filled:   # left:   # pills taking per day:  Last dose taken:    Medication 2:   Date filled:   Directions:   # filled:   # left:   # pills taking per day:  Last dose taken:    Medication 3:   Date filled:   Directions:   # filled:   # left:   # pills taking per day:  Last dose taken:    Medication 4:   Date filled:   Directions:   # filled:   # left:   # pills taking per day:  Last dose taken:

## 2021-06-17 NOTE — PATIENT INSTRUCTIONS
Dear Alee Naylor , It was a pleasure seeing you today in your home  virtually We will see you again in 4 months but sooner if need be If labs or imaging tests have been ordered for you today, please call the office  at [phone] 48 hours after completion to obtain the results. Your stated goal:  
-To live well for as long as possible Your described symptoms were: Fatigue: 3 Drowsiness: 1 Depression: 0 Pain: 4 Anxiety: 1 Nausea: 0 Anorexia: 3 Dyspnea: 0 Best Well-Bein Constipation: No  
Other Problem (Comment): 0 This is the plan we talked about: 1. Abdominal pain, postoperative 
-You are 8 days post surgery, doing very well. You were treated with Dilaudid. 2.  Back pain 
-Likely arthritis related but you have been taking Dilaudid 1 mg 2 times a day. Let us start weaning you off of opioids. Take half a tablet of 1 mg 2 times a day for 1 week, then take half a tablet of 1 mg daily for 1 week and then take half a tablet of 1 mg every other day for 1 week and then stop. You will have some leftover Dilaudid which you will keep at home in case of an emergency. 3.  Insomnia - You have been taking Ativan 0.5 mg at bedtime which is very helpful and so you want to continue the same. 4.  Diarrhea  
-You are on Creon but still have diarrhea which we talked about in detail. 5.  Uncontrolled diabetes 
-Your sugar is not very well controlled, HbA1c is greater than 9. You are working with your PCP on insulin doses. Uncontrolled diabetes can also exacerbate neuropathy. 6.  Chemotherapy-induced neuropathy 
-Right hand neuropathy and numbness is worse than the left hand.  
-Gabapentin 600 mg 3 times a day, total 1800 mg/day 
-Cymbalta 20 mg 2 times a day 7. The pathology from the hernia removal show some cancer cells and you also have 2 spots in your lung which is being followed. You have scans scheduled for this month.   If there are suspicious spots then it will be considered as metastatic cancer and you will resume chemotherapy. 8.  Advanced care planning 
-You completed a living will and advanced medical directive naming your daughter Alf Cunningham as you medical power of  This is what you have shared with us about Advance Care Planning:  
 
  Primary Decision Maker: Troy Dias - Daughter - [phone] Secondary Decision Maker: Andre Kearney Advance Care Planning 6/17/2021 Patient's Healthcare Decision Maker is: Named in scanned ACP document Confirm Advance Directive Yes, on file Patient Would Like to Complete Advance Directive - Does the patient have other document types Power of [employer] The Palliative Medicine Team is here to support you and your family.   
 
 
Sincerely, 
 
 
Jaime Lord MD and the Palliative Medicine Team

## 2021-06-23 ENCOUNTER — OFFICE VISIT (OUTPATIENT)
Dept: ONCOLOGY | Age: 66
End: 2021-06-23
Payer: MEDICARE

## 2021-06-23 ENCOUNTER — HOSPITAL ENCOUNTER (OUTPATIENT)
Dept: INFUSION THERAPY | Age: 66
Discharge: HOME | End: 2021-06-23
Payer: MEDICARE

## 2021-06-23 VITALS
DIASTOLIC BLOOD PRESSURE: 51 MMHG | BODY MASS INDEX: 29.26 KG/M2 | SYSTOLIC BLOOD PRESSURE: 84 MMHG | WEIGHT: 171.4 LBS | HEART RATE: 75 BPM | HEIGHT: 64 IN | TEMPERATURE: 98.8 F | OXYGEN SATURATION: 96 % | RESPIRATION RATE: 16 BRPM

## 2021-06-23 VITALS
TEMPERATURE: 98.8 F | WEIGHT: 171.4 LBS | HEIGHT: 64 IN | DIASTOLIC BLOOD PRESSURE: 55 MMHG | SYSTOLIC BLOOD PRESSURE: 84 MMHG | OXYGEN SATURATION: 96 % | RESPIRATION RATE: 16 BRPM | HEART RATE: 75 BPM | BODY MASS INDEX: 29.26 KG/M2

## 2021-06-23 DIAGNOSIS — C25.9 ADENOCARCINOMA OF PANCREAS (HCC): ICD-10-CM

## 2021-06-23 PROCEDURE — G8754 DIAS BP LESS 90: HCPCS | Performed by: INTERNAL MEDICINE

## 2021-06-23 PROCEDURE — 1090F PRES/ABSN URINE INCON ASSESS: CPT | Performed by: INTERNAL MEDICINE

## 2021-06-23 PROCEDURE — 99214 OFFICE O/P EST MOD 30 MIN: CPT | Performed by: INTERNAL MEDICINE

## 2021-06-23 PROCEDURE — G8752 SYS BP LESS 140: HCPCS | Performed by: INTERNAL MEDICINE

## 2021-06-23 PROCEDURE — G8536 NO DOC ELDER MAL SCRN: HCPCS | Performed by: INTERNAL MEDICINE

## 2021-06-23 PROCEDURE — G8510 SCR DEP NEG, NO PLAN REQD: HCPCS | Performed by: INTERNAL MEDICINE

## 2021-06-23 PROCEDURE — 1100F PTFALLS ASSESS-DOCD GE2>/YR: CPT | Performed by: INTERNAL MEDICINE

## 2021-06-23 PROCEDURE — 77030012965 HC NDL HUBR BBMI -A

## 2021-06-23 PROCEDURE — 74011250636 HC RX REV CODE- 250/636: Performed by: INTERNAL MEDICINE

## 2021-06-23 PROCEDURE — 96523 IRRIG DRUG DELIVERY DEVICE: CPT

## 2021-06-23 PROCEDURE — G8399 PT W/DXA RESULTS DOCUMENT: HCPCS | Performed by: INTERNAL MEDICINE

## 2021-06-23 PROCEDURE — 3288F FALL RISK ASSESSMENT DOCD: CPT | Performed by: INTERNAL MEDICINE

## 2021-06-23 PROCEDURE — 3017F COLORECTAL CA SCREEN DOC REV: CPT | Performed by: INTERNAL MEDICINE

## 2021-06-23 PROCEDURE — G8427 DOCREV CUR MEDS BY ELIG CLIN: HCPCS | Performed by: INTERNAL MEDICINE

## 2021-06-23 PROCEDURE — G8417 CALC BMI ABV UP PARAM F/U: HCPCS | Performed by: INTERNAL MEDICINE

## 2021-06-23 RX ORDER — SODIUM CHLORIDE 9 MG/ML
10 INJECTION INTRAMUSCULAR; INTRAVENOUS; SUBCUTANEOUS AS NEEDED
Status: CANCELLED | OUTPATIENT
Start: 2021-08-03

## 2021-06-23 RX ORDER — LORAZEPAM 2 MG/ML
0.5 INJECTION INTRAMUSCULAR
Status: CANCELLED
Start: 2021-07-06

## 2021-06-23 RX ORDER — DIPHENHYDRAMINE HYDROCHLORIDE 50 MG/ML
25 INJECTION, SOLUTION INTRAMUSCULAR; INTRAVENOUS AS NEEDED
Status: CANCELLED | OUTPATIENT
Start: 2021-08-03

## 2021-06-23 RX ORDER — HEPARIN 100 UNIT/ML
300-500 SYRINGE INTRAVENOUS AS NEEDED
Status: CANCELLED
Start: 2021-08-17

## 2021-06-23 RX ORDER — DEXAMETHASONE SODIUM PHOSPHATE 100 MG/10ML
10 INJECTION INTRAMUSCULAR; INTRAVENOUS ONCE
Status: CANCELLED
Start: 2021-08-10 | End: 2021-08-11

## 2021-06-23 RX ORDER — ONDANSETRON 2 MG/ML
8 INJECTION INTRAMUSCULAR; INTRAVENOUS ONCE
Status: CANCELLED | OUTPATIENT
Start: 2021-07-13 | End: 2021-07-14

## 2021-06-23 RX ORDER — ACETAMINOPHEN 325 MG/1
650 TABLET ORAL AS NEEDED
Status: CANCELLED | OUTPATIENT
Start: 2021-07-06

## 2021-06-23 RX ORDER — DIPHENHYDRAMINE HYDROCHLORIDE 50 MG/ML
50 INJECTION, SOLUTION INTRAMUSCULAR; INTRAVENOUS AS NEEDED
Status: CANCELLED
Start: 2021-07-06

## 2021-06-23 RX ORDER — HYDROCORTISONE SODIUM SUCCINATE 100 MG/2ML
100 INJECTION, POWDER, FOR SOLUTION INTRAMUSCULAR; INTRAVENOUS AS NEEDED
Status: CANCELLED | OUTPATIENT
Start: 2021-08-10

## 2021-06-23 RX ORDER — SODIUM CHLORIDE 9 MG/ML
25 INJECTION, SOLUTION INTRAVENOUS CONTINUOUS
Status: CANCELLED | OUTPATIENT
Start: 2021-07-06

## 2021-06-23 RX ORDER — HEPARIN 100 UNIT/ML
300-500 SYRINGE INTRAVENOUS AS NEEDED
Status: CANCELLED | OUTPATIENT
Start: 2021-07-06

## 2021-06-23 RX ORDER — SODIUM CHLORIDE 9 MG/ML
25 INJECTION, SOLUTION INTRAVENOUS CONTINUOUS
Status: CANCELLED | OUTPATIENT
Start: 2021-07-13

## 2021-06-23 RX ORDER — HEPARIN 100 UNIT/ML
300-500 SYRINGE INTRAVENOUS AS NEEDED
Status: CANCELLED
Start: 2021-07-20

## 2021-06-23 RX ORDER — ONDANSETRON 2 MG/ML
8 INJECTION INTRAMUSCULAR; INTRAVENOUS AS NEEDED
Status: CANCELLED | OUTPATIENT
Start: 2021-08-17

## 2021-06-23 RX ORDER — EPINEPHRINE 1 MG/ML
0.3 INJECTION, SOLUTION, CONCENTRATE INTRAVENOUS AS NEEDED
Status: CANCELLED | OUTPATIENT
Start: 2021-08-17

## 2021-06-23 RX ORDER — SODIUM CHLORIDE 9 MG/ML
25 INJECTION, SOLUTION INTRAVENOUS CONTINUOUS
Status: CANCELLED | OUTPATIENT
Start: 2021-08-10

## 2021-06-23 RX ORDER — HEPARIN 100 UNIT/ML
500 SYRINGE INTRAVENOUS AS NEEDED
Status: DISCONTINUED | OUTPATIENT
Start: 2021-06-23 | End: 2021-06-24 | Stop reason: HOSPADM

## 2021-06-23 RX ORDER — DIPHENHYDRAMINE HYDROCHLORIDE 50 MG/ML
25 INJECTION, SOLUTION INTRAMUSCULAR; INTRAVENOUS
Status: CANCELLED
Start: 2021-07-20

## 2021-06-23 RX ORDER — DEXAMETHASONE SODIUM PHOSPHATE 4 MG/ML
10 INJECTION, SOLUTION INTRA-ARTICULAR; INTRALESIONAL; INTRAMUSCULAR; INTRAVENOUS; SOFT TISSUE ONCE
Status: CANCELLED | OUTPATIENT
Start: 2021-08-03 | End: 2021-08-04

## 2021-06-23 RX ORDER — ALBUTEROL SULFATE 0.83 MG/ML
2.5 SOLUTION RESPIRATORY (INHALATION) AS NEEDED
Status: CANCELLED
Start: 2021-07-13

## 2021-06-23 RX ORDER — DIPHENHYDRAMINE HYDROCHLORIDE 50 MG/ML
25 INJECTION, SOLUTION INTRAMUSCULAR; INTRAVENOUS
Status: CANCELLED
Start: 2021-08-17

## 2021-06-23 RX ORDER — SODIUM CHLORIDE 9 MG/ML
10 INJECTION INTRAMUSCULAR; INTRAVENOUS; SUBCUTANEOUS AS NEEDED
Status: CANCELLED | OUTPATIENT
Start: 2021-07-20

## 2021-06-23 RX ORDER — ONDANSETRON 2 MG/ML
8 INJECTION INTRAMUSCULAR; INTRAVENOUS AS NEEDED
Status: CANCELLED | OUTPATIENT
Start: 2021-07-20

## 2021-06-23 RX ORDER — DEXAMETHASONE SODIUM PHOSPHATE 100 MG/10ML
10 INJECTION INTRAMUSCULAR; INTRAVENOUS ONCE
Status: CANCELLED
Start: 2021-07-20 | End: 2021-07-21

## 2021-06-23 RX ORDER — ONDANSETRON 2 MG/ML
8 INJECTION INTRAMUSCULAR; INTRAVENOUS AS NEEDED
Status: CANCELLED | OUTPATIENT
Start: 2021-07-13

## 2021-06-23 RX ORDER — SODIUM CHLORIDE 9 MG/ML
10 INJECTION INTRAMUSCULAR; INTRAVENOUS; SUBCUTANEOUS AS NEEDED
Status: CANCELLED | OUTPATIENT
Start: 2021-07-06

## 2021-06-23 RX ORDER — DEXAMETHASONE SODIUM PHOSPHATE 4 MG/ML
10 INJECTION, SOLUTION INTRA-ARTICULAR; INTRALESIONAL; INTRAMUSCULAR; INTRAVENOUS; SOFT TISSUE ONCE
Status: CANCELLED | OUTPATIENT
Start: 2021-07-06 | End: 2021-07-07

## 2021-06-23 RX ORDER — EPINEPHRINE 1 MG/ML
0.3 INJECTION, SOLUTION, CONCENTRATE INTRAVENOUS AS NEEDED
Status: CANCELLED | OUTPATIENT
Start: 2021-07-13

## 2021-06-23 RX ORDER — SODIUM CHLORIDE 0.9 % (FLUSH) 0.9 %
10 SYRINGE (ML) INJECTION AS NEEDED
Status: CANCELLED | OUTPATIENT
Start: 2021-07-06

## 2021-06-23 RX ORDER — SODIUM CHLORIDE 0.9 % (FLUSH) 0.9 %
10 SYRINGE (ML) INJECTION AS NEEDED
Status: CANCELLED
Start: 2021-08-10

## 2021-06-23 RX ORDER — ALBUTEROL SULFATE 0.83 MG/ML
2.5 SOLUTION RESPIRATORY (INHALATION) AS NEEDED
Status: CANCELLED
Start: 2021-07-20

## 2021-06-23 RX ORDER — ONDANSETRON 2 MG/ML
8 INJECTION INTRAMUSCULAR; INTRAVENOUS ONCE
Status: CANCELLED | OUTPATIENT
Start: 2021-08-03 | End: 2021-08-04

## 2021-06-23 RX ORDER — DIPHENHYDRAMINE HYDROCHLORIDE 50 MG/ML
50 INJECTION, SOLUTION INTRAMUSCULAR; INTRAVENOUS AS NEEDED
Status: CANCELLED
Start: 2021-08-17

## 2021-06-23 RX ORDER — HYDROCORTISONE SODIUM SUCCINATE 100 MG/2ML
100 INJECTION, POWDER, FOR SOLUTION INTRAMUSCULAR; INTRAVENOUS AS NEEDED
Status: CANCELLED | OUTPATIENT
Start: 2021-08-03

## 2021-06-23 RX ORDER — LORAZEPAM 2 MG/ML
0.5 INJECTION INTRAMUSCULAR
Status: CANCELLED
Start: 2021-08-10

## 2021-06-23 RX ORDER — SODIUM CHLORIDE 0.9 % (FLUSH) 0.9 %
5-10 SYRINGE (ML) INJECTION AS NEEDED
Status: DISCONTINUED | OUTPATIENT
Start: 2021-06-23 | End: 2021-06-24 | Stop reason: HOSPADM

## 2021-06-23 RX ORDER — DIPHENHYDRAMINE HYDROCHLORIDE 50 MG/ML
25 INJECTION, SOLUTION INTRAMUSCULAR; INTRAVENOUS AS NEEDED
Status: CANCELLED
Start: 2021-07-13

## 2021-06-23 RX ORDER — DIPHENHYDRAMINE HYDROCHLORIDE 50 MG/ML
25 INJECTION, SOLUTION INTRAMUSCULAR; INTRAVENOUS
Status: CANCELLED
Start: 2021-08-03

## 2021-06-23 RX ORDER — LORAZEPAM 2 MG/ML
0.5 INJECTION INTRAMUSCULAR
Status: CANCELLED
Start: 2021-07-13

## 2021-06-23 RX ORDER — DIPHENHYDRAMINE HYDROCHLORIDE 50 MG/ML
50 INJECTION, SOLUTION INTRAMUSCULAR; INTRAVENOUS AS NEEDED
Status: CANCELLED
Start: 2021-08-10

## 2021-06-23 RX ORDER — SODIUM CHLORIDE 0.9 % (FLUSH) 0.9 %
10 SYRINGE (ML) INJECTION AS NEEDED
Status: CANCELLED
Start: 2021-07-13

## 2021-06-23 RX ORDER — DIPHENHYDRAMINE HYDROCHLORIDE 50 MG/ML
50 INJECTION, SOLUTION INTRAMUSCULAR; INTRAVENOUS AS NEEDED
Status: CANCELLED
Start: 2021-07-13

## 2021-06-23 RX ORDER — SODIUM CHLORIDE 9 MG/ML
10 INJECTION INTRAMUSCULAR; INTRAVENOUS; SUBCUTANEOUS AS NEEDED
Status: CANCELLED | OUTPATIENT
Start: 2021-07-13

## 2021-06-23 RX ORDER — LORAZEPAM 2 MG/ML
0.5 INJECTION INTRAMUSCULAR
Status: CANCELLED
Start: 2021-07-20

## 2021-06-23 RX ORDER — SODIUM CHLORIDE 9 MG/ML
25 INJECTION, SOLUTION INTRAVENOUS CONTINUOUS
Status: CANCELLED | OUTPATIENT
Start: 2021-07-20

## 2021-06-23 RX ORDER — SODIUM CHLORIDE 0.9 % (FLUSH) 0.9 %
10 SYRINGE (ML) INJECTION AS NEEDED
Status: CANCELLED
Start: 2021-08-17

## 2021-06-23 RX ORDER — SODIUM CHLORIDE 9 MG/ML
25 INJECTION, SOLUTION INTRAVENOUS CONTINUOUS
Status: CANCELLED | OUTPATIENT
Start: 2021-08-03

## 2021-06-23 RX ORDER — HYDROCORTISONE SODIUM SUCCINATE 100 MG/2ML
100 INJECTION, POWDER, FOR SOLUTION INTRAMUSCULAR; INTRAVENOUS AS NEEDED
Status: CANCELLED | OUTPATIENT
Start: 2021-07-06

## 2021-06-23 RX ORDER — EPINEPHRINE 1 MG/ML
0.3 INJECTION, SOLUTION, CONCENTRATE INTRAVENOUS AS NEEDED
Status: CANCELLED | OUTPATIENT
Start: 2021-07-06

## 2021-06-23 RX ORDER — HEPARIN 100 UNIT/ML
300-500 SYRINGE INTRAVENOUS AS NEEDED
Status: CANCELLED
Start: 2021-08-10

## 2021-06-23 RX ORDER — LORAZEPAM 2 MG/ML
0.5 INJECTION INTRAMUSCULAR
Status: CANCELLED
Start: 2021-08-17

## 2021-06-23 RX ORDER — ONDANSETRON 2 MG/ML
8 INJECTION INTRAMUSCULAR; INTRAVENOUS AS NEEDED
Status: CANCELLED | OUTPATIENT
Start: 2021-08-10

## 2021-06-23 RX ORDER — ACETAMINOPHEN 325 MG/1
650 TABLET ORAL AS NEEDED
Status: CANCELLED
Start: 2021-07-13

## 2021-06-23 RX ORDER — DIPHENHYDRAMINE HYDROCHLORIDE 50 MG/ML
25 INJECTION, SOLUTION INTRAMUSCULAR; INTRAVENOUS AS NEEDED
Status: CANCELLED
Start: 2021-07-20

## 2021-06-23 RX ORDER — ALBUTEROL SULFATE 0.83 MG/ML
2.5 SOLUTION RESPIRATORY (INHALATION) AS NEEDED
Status: CANCELLED
Start: 2021-08-03

## 2021-06-23 RX ORDER — EPINEPHRINE 1 MG/ML
0.3 INJECTION, SOLUTION, CONCENTRATE INTRAVENOUS AS NEEDED
Status: CANCELLED | OUTPATIENT
Start: 2021-08-03

## 2021-06-23 RX ORDER — ALBUTEROL SULFATE 0.83 MG/ML
2.5 SOLUTION RESPIRATORY (INHALATION) AS NEEDED
Status: CANCELLED
Start: 2021-08-10

## 2021-06-23 RX ORDER — SODIUM CHLORIDE 9 MG/ML
10 INJECTION INTRAMUSCULAR; INTRAVENOUS; SUBCUTANEOUS AS NEEDED
Status: CANCELLED | OUTPATIENT
Start: 2021-08-10

## 2021-06-23 RX ORDER — ACETAMINOPHEN 325 MG/1
650 TABLET ORAL AS NEEDED
Status: CANCELLED
Start: 2021-07-20

## 2021-06-23 RX ORDER — ACETAMINOPHEN 325 MG/1
650 TABLET ORAL AS NEEDED
Status: CANCELLED | OUTPATIENT
Start: 2021-08-03

## 2021-06-23 RX ORDER — ONDANSETRON 2 MG/ML
8 INJECTION INTRAMUSCULAR; INTRAVENOUS ONCE
Status: CANCELLED | OUTPATIENT
Start: 2021-07-20 | End: 2021-07-21

## 2021-06-23 RX ORDER — DIPHENHYDRAMINE HYDROCHLORIDE 50 MG/ML
25 INJECTION, SOLUTION INTRAMUSCULAR; INTRAVENOUS AS NEEDED
Status: CANCELLED | OUTPATIENT
Start: 2021-07-06

## 2021-06-23 RX ORDER — DIPHENHYDRAMINE HYDROCHLORIDE 50 MG/ML
25 INJECTION, SOLUTION INTRAMUSCULAR; INTRAVENOUS AS NEEDED
Status: CANCELLED
Start: 2021-08-10

## 2021-06-23 RX ORDER — SODIUM CHLORIDE 0.9 % (FLUSH) 0.9 %
10 SYRINGE (ML) INJECTION AS NEEDED
Status: CANCELLED | OUTPATIENT
Start: 2021-08-03

## 2021-06-23 RX ORDER — SODIUM CHLORIDE 9 MG/ML
10 INJECTION INTRAMUSCULAR; INTRAVENOUS; SUBCUTANEOUS AS NEEDED
Status: CANCELLED | OUTPATIENT
Start: 2021-08-17

## 2021-06-23 RX ORDER — ALBUTEROL SULFATE 0.83 MG/ML
2.5 SOLUTION RESPIRATORY (INHALATION) AS NEEDED
Status: CANCELLED
Start: 2021-07-06

## 2021-06-23 RX ORDER — ONDANSETRON 2 MG/ML
8 INJECTION INTRAMUSCULAR; INTRAVENOUS ONCE
Status: CANCELLED | OUTPATIENT
Start: 2021-08-10 | End: 2021-08-11

## 2021-06-23 RX ORDER — HYDROCORTISONE SODIUM SUCCINATE 100 MG/2ML
100 INJECTION, POWDER, FOR SOLUTION INTRAMUSCULAR; INTRAVENOUS AS NEEDED
Status: CANCELLED | OUTPATIENT
Start: 2021-08-17

## 2021-06-23 RX ORDER — ONDANSETRON 2 MG/ML
8 INJECTION INTRAMUSCULAR; INTRAVENOUS AS NEEDED
Status: CANCELLED | OUTPATIENT
Start: 2021-08-03

## 2021-06-23 RX ORDER — DIPHENHYDRAMINE HYDROCHLORIDE 50 MG/ML
25 INJECTION, SOLUTION INTRAMUSCULAR; INTRAVENOUS
Status: CANCELLED
Start: 2021-08-10

## 2021-06-23 RX ORDER — DIPHENHYDRAMINE HYDROCHLORIDE 50 MG/ML
25 INJECTION, SOLUTION INTRAMUSCULAR; INTRAVENOUS
Status: CANCELLED
Start: 2021-07-06

## 2021-06-23 RX ORDER — ONDANSETRON 2 MG/ML
8 INJECTION INTRAMUSCULAR; INTRAVENOUS ONCE
Status: CANCELLED | OUTPATIENT
Start: 2021-08-17 | End: 2021-08-18

## 2021-06-23 RX ORDER — HYDROCORTISONE SODIUM SUCCINATE 100 MG/2ML
100 INJECTION, POWDER, FOR SOLUTION INTRAMUSCULAR; INTRAVENOUS AS NEEDED
Status: CANCELLED | OUTPATIENT
Start: 2021-07-13

## 2021-06-23 RX ORDER — ACETAMINOPHEN 325 MG/1
650 TABLET ORAL AS NEEDED
Status: CANCELLED
Start: 2021-08-10

## 2021-06-23 RX ORDER — DIPHENHYDRAMINE HYDROCHLORIDE 50 MG/ML
25 INJECTION, SOLUTION INTRAMUSCULAR; INTRAVENOUS AS NEEDED
Status: CANCELLED
Start: 2021-08-17

## 2021-06-23 RX ORDER — EPINEPHRINE 1 MG/ML
0.3 INJECTION, SOLUTION, CONCENTRATE INTRAVENOUS AS NEEDED
Status: CANCELLED | OUTPATIENT
Start: 2021-08-10

## 2021-06-23 RX ORDER — HYDROCORTISONE SODIUM SUCCINATE 100 MG/2ML
100 INJECTION, POWDER, FOR SOLUTION INTRAMUSCULAR; INTRAVENOUS AS NEEDED
Status: CANCELLED | OUTPATIENT
Start: 2021-07-20

## 2021-06-23 RX ORDER — ONDANSETRON 2 MG/ML
8 INJECTION INTRAMUSCULAR; INTRAVENOUS AS NEEDED
Status: CANCELLED | OUTPATIENT
Start: 2021-07-06

## 2021-06-23 RX ORDER — HEPARIN 100 UNIT/ML
300-500 SYRINGE INTRAVENOUS AS NEEDED
Status: CANCELLED | OUTPATIENT
Start: 2021-08-03

## 2021-06-23 RX ORDER — DIPHENHYDRAMINE HYDROCHLORIDE 50 MG/ML
50 INJECTION, SOLUTION INTRAMUSCULAR; INTRAVENOUS AS NEEDED
Status: CANCELLED
Start: 2021-07-20

## 2021-06-23 RX ORDER — EPINEPHRINE 1 MG/ML
0.3 INJECTION, SOLUTION, CONCENTRATE INTRAVENOUS AS NEEDED
Status: CANCELLED | OUTPATIENT
Start: 2021-07-20

## 2021-06-23 RX ORDER — HEPARIN 100 UNIT/ML
300-500 SYRINGE INTRAVENOUS AS NEEDED
Status: CANCELLED
Start: 2021-07-13

## 2021-06-23 RX ORDER — ONDANSETRON 2 MG/ML
8 INJECTION INTRAMUSCULAR; INTRAVENOUS ONCE
Status: CANCELLED | OUTPATIENT
Start: 2021-07-06 | End: 2021-07-07

## 2021-06-23 RX ORDER — DEXAMETHASONE SODIUM PHOSPHATE 100 MG/10ML
10 INJECTION INTRAMUSCULAR; INTRAVENOUS ONCE
Status: CANCELLED
Start: 2021-07-13 | End: 2021-07-14

## 2021-06-23 RX ORDER — ACETAMINOPHEN 325 MG/1
650 TABLET ORAL AS NEEDED
Status: CANCELLED
Start: 2021-08-17

## 2021-06-23 RX ORDER — DEXAMETHASONE SODIUM PHOSPHATE 100 MG/10ML
10 INJECTION INTRAMUSCULAR; INTRAVENOUS ONCE
Status: CANCELLED
Start: 2021-08-17 | End: 2021-08-18

## 2021-06-23 RX ORDER — LORAZEPAM 2 MG/ML
0.5 INJECTION INTRAMUSCULAR
Status: CANCELLED
Start: 2021-08-03

## 2021-06-23 RX ORDER — DIPHENHYDRAMINE HYDROCHLORIDE 50 MG/ML
50 INJECTION, SOLUTION INTRAMUSCULAR; INTRAVENOUS AS NEEDED
Status: CANCELLED
Start: 2021-08-03

## 2021-06-23 RX ORDER — DIPHENHYDRAMINE HYDROCHLORIDE 50 MG/ML
25 INJECTION, SOLUTION INTRAMUSCULAR; INTRAVENOUS
Status: CANCELLED
Start: 2021-07-13

## 2021-06-23 RX ORDER — SODIUM CHLORIDE 0.9 % (FLUSH) 0.9 %
10 SYRINGE (ML) INJECTION AS NEEDED
Status: CANCELLED
Start: 2021-07-20

## 2021-06-23 RX ORDER — SODIUM CHLORIDE 9 MG/ML
25 INJECTION, SOLUTION INTRAVENOUS CONTINUOUS
Status: CANCELLED | OUTPATIENT
Start: 2021-08-17

## 2021-06-23 RX ORDER — ALBUTEROL SULFATE 0.83 MG/ML
2.5 SOLUTION RESPIRATORY (INHALATION) AS NEEDED
Status: CANCELLED
Start: 2021-08-17

## 2021-06-23 RX ADMIN — Medication 500 UNITS: at 12:15

## 2021-06-23 RX ADMIN — Medication 10 ML: at 12:15

## 2021-06-23 NOTE — PROGRESS NOTES
[facility] Progress Note    Date: 2021    Name: Gris Bell    MRN: [id number]         : 1955    Monthly Port flush     Ms. Ba was assessed and education was provided. Ambulatory, accompanied by her daughter. Seen by Dr. Louisa Carreon and will start Chemotherapy 2nd week in July. Ms. Ba's vitals were reviewed and patient was observed for 5 minutes prior to procedure. Visit Vitals  BP (!) 84/55   Pulse 75   Temp 98.8 °F (37.1 °C)   Resp 16   Ht 5' 4\" (1.626 m)   Wt 77.7 kg (171 lb 6.4 oz)   SpO2 96%   Breastfeeding No   BMI 29.42 kg/m²       Mediport was accessed with 20 gauge, short ferguson(s) after chloroprep.    right chest, single    Blood return: NO    Flushed 10 of NS followed by Heparin 500u    Ferguson needle(s) removed. Band-Aid applied. Ms. Jeannette Williamson tolerated the procedure, and had no complaints. Site without redness, pain or swelling. Ms. Jeannette Williamson was discharged from [place] [address] in stable condition at 12:25. She is to return the 2nd week in July to start chemotherapy, appointment has not been made at this time at.     Marcy Foley RN  2021  12:41 PM

## 2021-06-23 NOTE — PROGRESS NOTES
Reason for Visit:   Wanda Alonzo a 34 X. o. female who is seen for pancreatic adenocarcinoma     Treatment History:   Dx: Pancreatic Adenocarcinoma--May 2020--B2Q2Am--[id number] of splenic vein and superior mesenteric vein  Tx: mFOLFIRINOX--first cycle 5/26/2020, second cycle 6/10/2020, dose reduced 15% cycle 3 on 6/30/2020--delayed due to severe side effects--switched to Gemcitabine/Abraxane--Cycle #1 7/29/2002, Cycle #2 9/2/2020, Cycle #3 10/7/2020, Cycle #4 11/4/2020. Neoadjuvant Radiation with Dr Cam Alfred ending 12/18/2021. Distal Pancreatectomy, Splenectomy, and Civa Sheet with Dr Celeste Connolly on 3/17/2021. Adjuvant radiation with Dr Cam Alfred. Goal: Prolong life--Palliative    History of Present Illness:   Found peritoneal carcinomatosis on scans at [facility]. CA 19-9 elevated. Some nausea. Some pain in back and upper abd--hydromorphone helps. No constipation.     Past Medical History:   Diagnosis Date    Adenocarcinoma of pancreas ([facility] [address] 05/13/2020    Dr Sarah Wan biopsy via EUS    Diabetes ([facility] [address]     GERD (gastroesophageal reflux disease)     Hernia of abdominal cavity     Subxyphoid hernia    Hypertension     Inflammatory polyarthropathy (HCC)     Joint pain     Joint swelling     Menopause     Ocular nevus of left eye     Pancreatitis     Tracheal stenosis       Past Surgical History:   Procedure Laterality Date    HX CARPAL TUNNEL RELEASE Bilateral     HX COLONOSCOPY      HX HYSTERECTOMY      HX KNEE ARTHROSCOPY Right     HX KNEE REPLACEMENT Right     partial    HX LAP CHOLECYSTECTOMY      HX TONSILLECTOMY      HX VASCULAR ACCESS        Social History     Tobacco Use    Smoking status: Never Smoker    Smokeless tobacco: Never Used   Substance Use Topics    Alcohol use: No     Alcohol/week: 0.0 standard drinks      Family History   Problem Relation Age of Onset    Heart Disease Mother     Heart Attack Mother     Cancer Father         lung    Diabetes Sister      Current Outpatient Medications   Medication Sig    HumaLOG KwikPen Insulin 100 unit/mL kwikpen patient on sliding scale    methylphenidate HCl (Ritalin) 5 mg tablet Take 1 Tablet by mouth three (3) times daily. Max Daily Amount: 15 mg.  LORazepam (ATIVAN) 1 mg tablet Take 1 Tablet by mouth every six (6) hours as needed for Anxiety. Max Daily Amount: 4 mg. Indications: nausea and vomiting caused by cancer drugs    DULoxetine (CYMBALTA) 20 mg capsule Take 1 Capsule by mouth two (2) times a day.  multivit-min/folic XINE/CKE481 (ALIVE WOMEN'S GUMMY VITAMIN PO) Take 2 Each by mouth daily.  Blood-Glucose Transmitter (Dexcom G6 Transmitter) fadi Use as directed to monitor blood glucose as directed    Blood-Glucose Sensor (Dexcom G6 Sensor) fadi Use as directed to monitor blood glucose as directed    Blood-Glucose Meter,Continuous (Dexcom G6 ) misc Use as directed to monitor blood glucose as directed    gabapentin (NEURONTIN) 300 mg capsule Take 2 Caps by mouth three (3) times daily. Max Daily Amount: 1,800 mg. Indications: neuropathic pain    potassium chloride (K-DUR, KLOR-CON) 20 mEq tablet Take 1 Tab by mouth two (2) times a day.  insulin glargine U-300 conc (Toujeo SoloStar U-300 Insulin) 300 unit/mL (1.5 mL) inpn pen INJECT 55 UNITS ONCE DAILY AT BEDTIME - DOSE INCREASED (Patient taking differently: 15 Units. Sliding scale 4 units in morning, 2-4 units with dinner)    hydrocortisone (ANUSOL-HC) 25 mg supp Insert 1 Suppository into rectum nightly. (Patient not taking: Reported on 6/11/2021)    famotidine (Pepcid) 20 mg tablet Take 20 mg by mouth two (2) times a day. (Patient not taking: Reported on 6/11/2021)    sennosides (Senna) 8.6 mg cap Take 2 Tabs by mouth nightly.  polyethylene glycol (Miralax) 17 gram/dose powder Take 17 g by mouth daily as needed.  (Patient not taking: Reported on 6/11/2021)    carvediloL (COREG) 25 mg tablet TAKE 1 TABLET BY MOUTH TWICE DAILY (Patient taking differently: Take 12.5 mg by mouth two (2) times daily as needed.)    HYDROmorphone (Dilaudid) 2 mg tablet Take 1 mg by mouth two (2) times a day. takes 3 tabs  by mouth, taking BID and PRN    lipase-protease-amylase (Creon) 12,000-38,000 -60,000 unit capsule Take 3 Caps by mouth three (3) times daily (with meals). Indications: exocrine pancreatic insufficiency (Patient taking differently: Take 2 Caps by mouth three (3) times daily (with meals). 2 caps with meal and 1 with snacks  Indications: exocrine pancreatic insufficiency)    lidocaine-prilocaine (EMLA) topical cream Apply  to affected area as needed for Pain (pain ). Apply a thin layer over port site prior to accessing port    diphenhydrAMINE (BENADRYL) 25 mg tablet Take 25 mg by mouth nightly as needed for Itching or Skin Irritation.  nystatin (MYCOSTATIN) powder Apply 1 g to affected area three (3) times daily.  promethazine (PHENERGAN) 25 mg tablet Take 1 Tab by mouth every six (6) hours as needed for Nausea.  ondansetron (ZOFRAN ODT) 4 mg disintegrating tablet Take 1 Tab by mouth every eight (8) hours as needed for Nausea or Vomiting.  diphenoxylate-atropine (LomotiL) 2.5-0.025 mg per tablet Take 2 Tabs by mouth four (4) times daily as needed for Diarrhea. Max Daily Amount: 8 Tabs.  prochlorperazine (COMPAZINE) 10 mg tablet Take 0.5 Tabs by mouth every six (6) hours as needed for Nausea.  acetaminophen (Acetaminophen Extra Strength) 500 mg tablet Take 1,000 mg by mouth daily as needed.  Needle, Disp, ndle 1 Each by Does Not Apply route two (2) times daily as needed for Other. (Patient not taking: Reported on 6/11/2021)     No current facility-administered medications for this visit.      Facility-Administered Medications Ordered in Other Visits   Medication Dose Route Frequency    sodium chloride (NS) flush 5-10 mL  5-10 mL IntraVENous PRN    heparin (porcine) pf 500 Units  500 Units IntraVENous PRN      No Known Allergies Review of Systems: A complete review of systems was obtained, negative except as described above. Physical Exam:   There were no vitals taken for this visit. ECOG PS: 1  General: No distress  Eyes: PERRLA, anicteric sclerae  HENT: Atraumatic, OP clear  Neck: Supple  Lymphatic: No cervical, supraclavicular, or inguinal adenopathy  Respiratory: CTAB, normal respiratory effort  CV: Normal rate, regular rhythm, no murmurs, no peripheral edema  GI: Soft, nontender, nondistended, no masses, no hepatomegaly, no splenomegaly  MS: Normal gait and station. Digits without clubbing or cyanosis. Skin: No rashes, ecchymoses, or petechiae. Normal temperature, turgor, and texture. Psych: Alert, oriented, appropriate affect, normal judgment/insight    Results:     Lab Results   Component Value Date/Time    WBC 7.5 04/07/2021 11:05 AM    HGB 10.3 (L) 04/07/2021 11:05 AM    HCT 32.7 (L) 04/07/2021 11:05 AM    PLATELET 113 (H) 20/42/2150 11:05 AM    MCV 90.6 04/07/2021 11:05 AM    ABS. NEUTROPHILS 4.4 04/07/2021 11:05 AM     Lab Results   Component Value Date/Time    Sodium 136 04/07/2021 11:05 AM    Potassium 4.2 04/07/2021 11:05 AM    Chloride 100 04/07/2021 11:05 AM    CO2 25 04/07/2021 11:05 AM    Glucose 339 (H) 04/07/2021 11:05 AM    BUN 12 04/07/2021 11:05 AM    Creatinine 0.87 04/07/2021 11:05 AM    GFR est AA >60 04/07/2021 11:05 AM    GFR est non-AA >60 04/07/2021 11:05 AM    Calcium 9.5 04/07/2021 11:05 AM    Glucose (POC) 192 (H) 07/15/2020 11:56 AM    Creatinine (POC) 0.7 02/06/2013 10:21 AM     Lab Results   Component Value Date/Time    Bilirubin, total 0.3 04/07/2021 11:05 AM    ALT (SGPT) 19 04/07/2021 11:05 AM    Alk.  phosphatase 105 04/07/2021 11:05 AM    Protein, total 7.3 04/07/2021 11:05 AM    Albumin 3.2 (L) 04/07/2021 11:05 AM    Globulin 4.1 (H) 04/07/2021 11:05 AM     CT A/P 4/30/2020  IMPRESSION: Suspect neoplastic pancreatic mass versus atypical focal  pancreatitis with splenic and superior mesenteric vein occlusion. Consider  further evaluation with endoscopic ultrasound at which time biopsy could  potentially be performed.     CT Chest 5/21/2020  IMPRESSION:  1. No evidence of pulmonary metastatic disease. No evidence of mediastinal or  hilar lymphadenopathy. No pleural effusions identified. 2. No definite evidence of central pulmonary embolic disease. 3. Presence of a mass lesion involving the pancreatic head/body. 4. Evidence of fatty infiltration involving the liver. Presence of focal  low-density areas within the liver compatible with cysts. Surgical absence of  the gallbladder.     CT A/P 6/19/2020  IMPRESSION:  Pancreatitis with ascites favored. Discrete pancreatic mass is not identified. No biliary dilatation or definite arterial or venous thrombosis. No liver  metastases. Fat-containing ventral hernia  Nonobstructing left renal calculus  Retrolisthesis of L2 and L3.     CT C/A/P 10/12/2020  IMPRESSION:  1. Interval improvement in appearance of the pancreas (see discussion above). 2. Normal sized liver. Stable appearance of the previously described areas of  decreased attenuation within liver. 3. Surgical absence of the gallbladder. 4. Evidence of splenomegaly. 5. Normal sized kidneys. Presence of small, nonobstructing calculi within the  left kidney. No evidence of hydronephrotic change. 6. Suboptimal distention of the urinary bladder. 7. Presence of a small, fat-containing ventral hernia in the mid abdominal  region.     Path: 5/8/2020  CYTOLOGIC INTERPRETATION:   Pancreas, EUS-guided fine needle aspiration and cell blocks: Adenocarcinoma     Path: 3/17/2021  No residual malignancy in pancrease, 0/29 lymph nodes.   2-3mm focus metastatic adenocarcinoma within hernia sac     Assessment:   1) Pancreatic Adenocarcinoma--Unresectable  2) Neoplasm Pain/Neuropathic Pain  3) Fatigue  4) Nausea  5) DM  6) Nutrition  7) Diarrhea  8) Hypotension     Plan:   1) Completed neoadjuvant chemotherapy. Radiation with Dr Rosanne Duke. Definitive resection with Dr Sharpe [place] focus of adenocarcinoma in ventral hernia. Now peritoneal disease/mets. She does want palliative chemotherapy with Gemcitaine/;Abraxane. Will start first week July. Discussed palliative nature and agrees to proceed.    2) S/p celiac block--pain controlled.       3) Methylphenidate 5mg bid--watch for anxiety and insomnia (normally only taking dose in am).       4) Resolved.    5) Defer to Dr Castillo Vance for further management.       6) On pancreatic enzymes--weight has stabilized.    7) Chemo related--hopefully with not recurr.  Taking senna/miralax to prevent constipation. 8) Would take carvedilol at half dose.       Signed By: Roberto Angelucci, MD

## 2021-06-23 NOTE — PROGRESS NOTES
Manolo Chung is a 72 y.o. female here for f/u pancreatic cancer. Patient c/o pain in back, \"belly\" and  lower abdomen, she rates the pain 4/10. Patient has noted she has increase in tiredness. Patient has also noted some vomiting, not daily. On the days she vomits it can be multiple times on those days. Patient has poor appetite. Key Oncology Meds             ondansetron (ZOFRAN ODT) 4 mg disintegrating tablet (Taking) Take 1 Tab by mouth every eight (8) hours as needed for Nausea or Vomiting. prochlorperazine (COMPAZINE) 10 mg tablet Take 0.5 Tabs by mouth every six (6) hours as needed for Nausea. [facility] Pain Meds             HYDROmorphone (Dilaudid) 2 mg tablet (Taking) Take 0.5 mg by mouth two (2) times a day. takes 3 tabs  by mouth, taking BID and PRN    acetaminophen (Acetaminophen Extra Strength) 500 mg tablet (Taking) Take 1,000 mg by mouth daily as needed.         Chemotherapy Flowsheet 11/11/2020   Cycle C 5   Date 11/11/2020   Drug / Regimen HELD   Dosage -   Time Up -   Time Down -   Pre Hydration -   Pre Meds -   Notes -

## 2021-06-24 ENCOUNTER — APPOINTMENT (OUTPATIENT)
Dept: GENERAL RADIOLOGY | Age: 66
End: 2021-06-24
Attending: EMERGENCY MEDICINE
Payer: MEDICARE

## 2021-06-24 ENCOUNTER — HOSPITAL ENCOUNTER (EMERGENCY)
Age: 66
Discharge: HOME | End: 2021-06-24
Attending: EMERGENCY MEDICINE
Payer: MEDICARE

## 2021-06-24 VITALS
DIASTOLIC BLOOD PRESSURE: 67 MMHG | RESPIRATION RATE: 18 BRPM | BODY MASS INDEX: 28.32 KG/M2 | SYSTOLIC BLOOD PRESSURE: 124 MMHG | HEART RATE: 70 BPM | WEIGHT: 165 LBS | OXYGEN SATURATION: 95 % | TEMPERATURE: 98.7 F

## 2021-06-24 DIAGNOSIS — C25.9 PRIMARY PANCREATIC ADENOCARCINOMA (HCC): ICD-10-CM

## 2021-06-24 DIAGNOSIS — R50.81 FEVER IN OTHER DISEASES: Primary | ICD-10-CM

## 2021-06-24 LAB
ALBUMIN SERPL-MCNC: 3.1 G/DL (ref 3.5–5)
ALBUMIN/GLOB SERPL: 0.7 {RATIO} (ref 1.1–2.2)
ALP SERPL-CCNC: 83 U/L (ref 45–117)
ALT SERPL-CCNC: 16 U/L (ref 12–78)
ANION GAP SERPL CALC-SCNC: 7 MMOL/L (ref 5–15)
APPEARANCE UR: CLEAR
AST SERPL-CCNC: 16 U/L (ref 15–37)
BACTERIA URNS QL MICRO: ABNORMAL /HPF
BASOPHILS # BLD: 0 K/UL (ref 0–0.1)
BASOPHILS NFR BLD: 0 % (ref 0–1)
BILIRUB SERPL-MCNC: 0.5 MG/DL (ref 0.2–1)
BILIRUB UR QL: NEGATIVE
BUN SERPL-MCNC: 13 MG/DL (ref 6–20)
BUN/CREAT SERPL: 14 (ref 12–20)
CALCIUM SERPL-MCNC: 9 MG/DL (ref 8.5–10.1)
CHLORIDE SERPL-SCNC: 96 MMOL/L (ref 97–108)
CO2 SERPL-SCNC: 29 MMOL/L (ref 21–32)
COLOR UR: ABNORMAL
CREAT SERPL-MCNC: 0.91 MG/DL (ref 0.55–1.02)
DIFFERENTIAL METHOD BLD: ABNORMAL
EOSINOPHIL # BLD: 0 K/UL (ref 0–0.4)
EOSINOPHIL NFR BLD: 0 % (ref 0–7)
EPITH CASTS URNS QL MICRO: ABNORMAL /LPF
ERYTHROCYTE [DISTWIDTH] IN BLOOD BY AUTOMATED COUNT: 15.3 % (ref 11.5–14.5)
GLOBULIN SER CALC-MCNC: 4.3 G/DL (ref 2–4)
GLUCOSE SERPL-MCNC: 164 MG/DL (ref 65–100)
GLUCOSE UR STRIP.AUTO-MCNC: 100 MG/DL
HCT VFR BLD AUTO: 35.4 % (ref 35–47)
HGB BLD-MCNC: 11.5 G/DL (ref 11.5–16)
HGB UR QL STRIP: ABNORMAL
IMM GRANULOCYTES # BLD AUTO: 0 K/UL (ref 0–0.04)
IMM GRANULOCYTES NFR BLD AUTO: 0 % (ref 0–0.5)
KETONES UR QL STRIP.AUTO: NEGATIVE MG/DL
LACTATE SERPL-SCNC: 0.9 MMOL/L (ref 0.4–2)
LEUKOCYTE ESTERASE UR QL STRIP.AUTO: NEGATIVE
LYMPHOCYTES # BLD: 2.4 K/UL (ref 0.8–3.5)
LYMPHOCYTES NFR BLD: 19 % (ref 12–49)
MCH RBC QN AUTO: 28 PG (ref 26–34)
MCHC RBC AUTO-ENTMCNC: 32.5 G/DL (ref 30–36.5)
MCV RBC AUTO: 86.1 FL (ref 80–99)
MONOCYTES # BLD: 1.5 K/UL (ref 0–1)
MONOCYTES NFR BLD: 12 % (ref 5–13)
NEUTS BAND NFR BLD MANUAL: 1 %
NEUTS SEG # BLD: 8.7 K/UL (ref 1.8–8)
NEUTS SEG NFR BLD: 68 % (ref 32–75)
NITRITE UR QL STRIP.AUTO: NEGATIVE
NRBC # BLD: 0 K/UL (ref 0–0.01)
NRBC BLD-RTO: 0 PER 100 WBC
PH UR STRIP: 6 [PH] (ref 5–8)
PLATELET # BLD AUTO: 294 K/UL (ref 150–400)
PMV BLD AUTO: 10.5 FL (ref 8.9–12.9)
POTASSIUM SERPL-SCNC: 3.5 MMOL/L (ref 3.5–5.1)
PROT SERPL-MCNC: 7.4 G/DL (ref 6.4–8.2)
PROT UR STRIP-MCNC: NEGATIVE MG/DL
RBC # BLD AUTO: 4.11 M/UL (ref 3.8–5.2)
RBC #/AREA URNS HPF: ABNORMAL /HPF (ref 0–5)
RBC MORPH BLD: ABNORMAL
SODIUM SERPL-SCNC: 132 MMOL/L (ref 136–145)
SP GR UR REFRACTOMETRY: 1.01 (ref 1–1.03)
TROPONIN I SERPL-MCNC: <0.05 NG/ML
UA: UC IF INDICATED,UAUC: ABNORMAL
UROBILINOGEN UR QL STRIP.AUTO: 0.2 EU/DL (ref 0.2–1)
WBC # BLD AUTO: 12.6 K/UL (ref 3.6–11)
WBC URNS QL MICRO: ABNORMAL /HPF (ref 0–4)

## 2021-06-24 PROCEDURE — 84484 ASSAY OF TROPONIN QUANT: CPT

## 2021-06-24 PROCEDURE — 71045 X-RAY EXAM CHEST 1 VIEW: CPT

## 2021-06-24 PROCEDURE — 80053 COMPREHEN METABOLIC PANEL: CPT

## 2021-06-24 PROCEDURE — 96365 THER/PROPH/DIAG IV INF INIT: CPT

## 2021-06-24 PROCEDURE — 36415 COLL VENOUS BLD VENIPUNCTURE: CPT

## 2021-06-24 PROCEDURE — 74011000258 HC RX REV CODE- 258: Performed by: EMERGENCY MEDICINE

## 2021-06-24 PROCEDURE — 96367 TX/PROPH/DG ADDL SEQ IV INF: CPT

## 2021-06-24 PROCEDURE — 83605 ASSAY OF LACTIC ACID: CPT

## 2021-06-24 PROCEDURE — 99284 EMERGENCY DEPT VISIT MOD MDM: CPT

## 2021-06-24 PROCEDURE — 74011250636 HC RX REV CODE- 250/636: Performed by: EMERGENCY MEDICINE

## 2021-06-24 PROCEDURE — 85025 COMPLETE CBC W/AUTO DIFF WBC: CPT

## 2021-06-24 PROCEDURE — 81001 URINALYSIS AUTO W/SCOPE: CPT

## 2021-06-24 PROCEDURE — 93005 ELECTROCARDIOGRAM TRACING: CPT

## 2021-06-24 PROCEDURE — 87040 BLOOD CULTURE FOR BACTERIA: CPT

## 2021-06-24 RX ORDER — LEVOFLOXACIN 5 MG/ML
750 INJECTION, SOLUTION INTRAVENOUS EVERY 24 HOURS
Status: DISCONTINUED | OUTPATIENT
Start: 2021-06-24 | End: 2021-06-24 | Stop reason: HOSPADM

## 2021-06-24 RX ORDER — SODIUM CHLORIDE 0.9 % (FLUSH) 0.9 %
5-10 SYRINGE (ML) INJECTION AS NEEDED
Status: DISCONTINUED | OUTPATIENT
Start: 2021-06-24 | End: 2021-06-24 | Stop reason: HOSPADM

## 2021-06-24 RX ORDER — HEPARIN 100 UNIT/ML
300 SYRINGE INTRAVENOUS
Status: DISCONTINUED | OUTPATIENT
Start: 2021-06-24 | End: 2021-06-24

## 2021-06-24 RX ORDER — LEVOFLOXACIN 500 MG/1
500 TABLET, FILM COATED ORAL DAILY
Qty: 7 TABLET | Refills: 0 | Status: SHIPPED | OUTPATIENT
Start: 2021-06-24 | End: 2021-12-16 | Stop reason: ALTCHOICE

## 2021-06-24 RX ADMIN — LEVOFLOXACIN 750 MG: 5 INJECTION, SOLUTION INTRAVENOUS at 17:50

## 2021-06-24 RX ADMIN — SODIUM CHLORIDE 1000 ML: 9 INJECTION, SOLUTION INTRAVENOUS at 16:37

## 2021-06-24 RX ADMIN — CEFEPIME HYDROCHLORIDE 2 G: 2 INJECTION, POWDER, FOR SOLUTION INTRAVENOUS at 16:36

## 2021-06-24 RX ADMIN — SODIUM CHLORIDE 1000 ML: 9 INJECTION, SOLUTION INTRAVENOUS at 16:23

## 2021-06-24 NOTE — ED TRIAGE NOTES
Pt arrived via [address] with her daughter with complaint of fever since yesterday. Pt also complains of feeling cold and having chills. Pt reports over the weekend she had N/V/D but that has subsided. Pt complains of back pain 4/10. Pt is awake alert and oriented x 4. Speaking in full complete sentences  NAD  Pt educated on ER flow.   Pt took tylenol at 1330

## 2021-06-24 NOTE — ED PROVIDER NOTES
[facility]  EMERGENCY DEPARTMENT HISTORY AND PHYSICAL EXAM         Date of Service: 6/24/2021   Patient Name: Shellie Schirmer   YOB: 1955  Medical Record Number: [id number]    History of Presenting Illness     Chief Complaint   Patient presents with    Fever        History Provided By:  patient    Additional History:   Shellie Schirmer is a 72 y.o. female who presents ambulatory to the ED with cc of fever that began yesterday with lethargy, diaphoresis, chills, and generalized weakness. Pt has a H/O metastatic pancreatic cancer; currently receiving XRT and scheduled to start palliative chemo next week. Denies CP, SOB, cough. Has had nausea without vomiting, and diarrhea related to treatments. Pt spoke to Dr. Paolo Branch, her oncologist, who recommended she come to ED for septic workup. There are no other complaints, changes or physical findings at this time.     Primary Care Provider: Kenya Masterson MD   Specialist:    Past History     Past Medical History:   Past Medical History:   Diagnosis Date    Adenocarcinoma of pancreas ([facility] [address] 05/13/2020    Dr Jillian Short biopsy via EUS    Diabetes ([facility] [address]     GERD (gastroesophageal reflux disease)     Hernia of abdominal cavity     Subxyphoid hernia    Hypertension     Inflammatory polyarthropathy (HCC)     Joint pain     Joint swelling     Menopause     Ocular nevus of left eye     Pancreatitis     Tracheal stenosis         Past Surgical History:   Past Surgical History:   Procedure Laterality Date    HX CARPAL TUNNEL RELEASE Bilateral     HX COLONOSCOPY      HX HYSTERECTOMY      HX KNEE ARTHROSCOPY Right     HX KNEE REPLACEMENT Right     partial    HX LAP CHOLECYSTECTOMY      HX TONSILLECTOMY      HX VASCULAR ACCESS          Family History:   Family History   Problem Relation Age of Onset    Heart Disease Mother     Heart Attack Mother     Cancer Father         lung    Diabetes Sister         Social History: Social History     Tobacco Use    Smoking status: Never Smoker    Smokeless tobacco: Never Used   Substance Use Topics    Alcohol use: No     Alcohol/week: 0.0 standard drinks    Drug use: No        Allergies:   No Known Allergies     Review of Systems   Review of Systems   Constitutional: Positive for activity change, chills and fever. Negative for appetite change. HENT: Negative for congestion. Eyes: Negative for visual disturbance. Respiratory: Negative for cough, shortness of breath and wheezing. Cardiovascular: Negative for chest pain, palpitations and leg swelling. Gastrointestinal: Positive for diarrhea and nausea. Negative for abdominal pain and vomiting. Genitourinary: Negative for dysuria, frequency and urgency. Musculoskeletal: Negative for back pain, joint swelling, myalgias and neck stiffness. Skin: Negative for rash. Neurological: Positive for weakness. Negative for dizziness, syncope and headaches. Hematological: Negative for adenopathy. Psychiatric/Behavioral: Negative for behavioral problems and dysphoric mood. Physical Exam  Physical Exam  Vitals and nursing note reviewed. Constitutional:       General: She is not in acute distress. Appearance: She is well-developed. HENT:      Head: Normocephalic and atraumatic. Eyes:      General: No scleral icterus. Conjunctiva/sclera: Conjunctivae normal.      Pupils: Pupils are equal, round, and reactive to light. Cardiovascular:      Rate and Rhythm: Normal rate and regular rhythm. Heart sounds: No murmur heard. No gallop. Pulmonary:      Effort: Pulmonary effort is normal. No respiratory distress. Breath sounds: No stridor. No wheezing or rales. Comments: Power Port left chest wall  Abdominal:      General: Bowel sounds are normal. There is no distension. Palpations: Abdomen is soft. There is no mass. Tenderness: There is abdominal tenderness. There is no guarding or rebound. Comments: Minimal diffuse abdominal TTP   Musculoskeletal:         General: Normal range of motion. Cervical back: Normal range of motion and neck supple. Lymphadenopathy:      Cervical: No cervical adenopathy. Skin:     General: Skin is warm and dry. Findings: No erythema or rash. Neurological:      Mental Status: She is alert and oriented to person, place, and time. Cranial Nerves: No cranial nerve deficit. Coordination: Coordination normal.         Medical Decision Making   I am the first provider for this patient. I reviewed the vital signs, available nursing notes, past medical history, past surgical history, family history and social history. Old Medical Records: Previous Oncology notes     Provider Notes:   DDX: Neutropenic fever, sepsis, SIRS, UTI, PNA     ED Course:  3:25 PM   Initial assessment performed. The patients presenting problems have been discussed, and they are in agreement with the care plan formulated and outlined with them. I have encouraged them to ask questions as they arise throughout their visit. Progress Notes:  7:15 PM  Normal lactate and VS, WBC minimally elevated. Feels much better after fluid. Wants to go home. Pt has had Zosyn and Levaquin IV; will D/C on Levaquin PO, F/O Oncology.   Sonia Dose., MD      Procedures:   Procedures    Diagnostic Study Results   Labs -      Recent Results (from the past 12 hour(s))   METABOLIC PANEL, COMPREHENSIVE    Collection Time: 06/24/21  3:40 PM   Result Value Ref Range    Sodium 132 (L) 136 - 145 mmol/L    Potassium 3.5 3.5 - 5.1 mmol/L    Chloride 96 (L) 97 - 108 mmol/L    CO2 29 21 - 32 mmol/L    Anion gap 7 5 - 15 mmol/L    Glucose 164 (H) 65 - 100 mg/dL    BUN 13 6 - 20 MG/DL    Creatinine 0.91 0.55 - 1.02 MG/DL    BUN/Creatinine ratio 14 12 - 20      GFR est AA >60 >60 ml/min/1.73m2    GFR est non-AA >60 >60 ml/min/1.73m2    Calcium 9.0 8.5 - 10.1 MG/DL    Bilirubin, total 0.5 0.2 - 1.0 MG/DL ALT (SGPT) 16 12 - 78 U/L    AST (SGOT) 16 15 - 37 U/L    Alk. phosphatase 83 45 - 117 U/L    Protein, total 7.4 6.4 - 8.2 g/dL    Albumin 3.1 (L) 3.5 - 5.0 g/dL    Globulin 4.3 (H) 2.0 - 4.0 g/dL    A-G Ratio 0.7 (L) 1.1 - 2.2     CBC WITH AUTOMATED DIFF    Collection Time: 06/24/21  3:40 PM   Result Value Ref Range    WBC 12.6 (H) 3.6 - 11.0 K/uL    RBC 4.11 3.80 - 5.20 M/uL    HGB 11.5 11.5 - 16.0 g/dL    HCT 35.4 35.0 - 47.0 %    MCV 86.1 80.0 - 99.0 FL    MCH 28.0 26.0 - 34.0 PG    MCHC 32.5 30.0 - 36.5 g/dL    RDW 15.3 (H) 11.5 - 14.5 %    PLATELET 851 719 - 577 K/uL    MPV 10.5 8.9 - 12.9 FL    NRBC 0.0 0  WBC    ABSOLUTE NRBC 0.00 0.00 - 0.01 K/uL    NEUTROPHILS 68 32 - 75 %    BAND NEUTROPHILS 1 %    LYMPHOCYTES 19 12 - 49 %    MONOCYTES 12 5 - 13 %    EOSINOPHILS 0 0 - 7 %    BASOPHILS 0 0 - 1 %    IMMATURE GRANULOCYTES 0 0.0 - 0.5 %    ABS. NEUTROPHILS 8.7 (H) 1.8 - 8.0 K/UL    ABS. LYMPHOCYTES 2.4 0.8 - 3.5 K/UL    ABS. MONOCYTES 1.5 (H) 0.0 - 1.0 K/UL    ABS. EOSINOPHILS 0.0 0.0 - 0.4 K/UL    ABS. BASOPHILS 0.0 0.0 - 0.1 K/UL    ABS. IMM.  GRANS. 0.0 0.00 - 0.04 K/UL    DF MANUAL      RBC COMMENTS ANISOCYTOSIS  1+       TROPONIN I    Collection Time: 06/24/21  3:40 PM   Result Value Ref Range    Troponin-I, Qt. <0.05 <0.05 ng/mL   LACTIC ACID    Collection Time: 06/24/21  3:40 PM   Result Value Ref Range    Lactic acid 0.9 0.4 - 2.0 MMOL/L   EKG, 12 LEAD, INITIAL    Collection Time: 06/24/21  4:08 PM   Result Value Ref Range    Ventricular Rate 72 BPM    Atrial Rate 72 BPM    P-R Interval 166 ms    QRS Duration 112 ms    Q-T Interval 398 ms    QTC Calculation (Bezet) 435 ms    Calculated P Axis 4 degrees    Calculated R Axis -6 degrees    Calculated T Axis 23 degrees    Diagnosis       Normal sinus rhythm  Incomplete right bundle branch block  Borderline ECG  When compared with ECG of 14-JUL-2020 04:21,  No significant change was found     URINALYSIS W/ REFLEX CULTURE    Collection Time: 06/24/21 4:44 PM    Specimen: Urine   Result Value Ref Range    Color YELLOW/STRAW      Appearance CLEAR CLEAR      Specific gravity 1.009 1.003 - 1.030      pH (UA) 6.0 5.0 - 8.0      Protein Negative NEG mg/dL    Glucose 100 (A) NEG mg/dL    Ketone Negative NEG mg/dL    Bilirubin Negative NEG      Blood TRACE (A) NEG      Urobilinogen 0.2 0.2 - 1.0 EU/dL    Nitrites Negative NEG      Leukocyte Esterase Negative NEG      WBC 0-4 0 - 4 /hpf    RBC 0-5 0 - 5 /hpf    Epithelial cells MODERATE (A) FEW /lpf    Bacteria 1+ (A) NEG /hpf    UA:UC IF INDICATED CULTURE NOT INDICATED BY UA RESULT CNI         Radiologic Studies -  The following have been ordered and reviewed:  XR CHEST PORT   Final Result   No acute findings        CT Results  (Last 48 hours)    None        CXR Results  (Last 48 hours)               06/24/21 1531  XR CHEST PORT Final result    Impression:  No acute findings       Narrative:  EXAM: XR CHEST PORT       INDICATION: Eval for Infiltrate       COMPARISON: 7/10/2020       FINDINGS: A portable AP radiograph of the chest was obtained at 1527 hours. A   left subclavian portacatheter is again shown terminating at the confluence of   the brachycephalic veins. The lungs and pleural margins appear clear. Cardiac,   mediastinal and hilar contours are within normal limits. Mild-moderate thoracic   spine degenerative changes are again shown. Vital Signs-Reviewed the patient's vital signs. Patient Vitals for the past 12 hrs:   Temp Pulse Resp BP SpO2   06/24/21 1807 98.7 °F (37.1 °C) 77 18 (!) 127/59 95 %   06/24/21 1518 99.1 °F (37.3 °C) 84 18 (!) 138/58 98 %       EKG interpretation: (Preliminary)  Rhythm: normal sinus rhythm; and regular . Rate (approx.): 72; Axis: normal; OK interval: normal; QRS interval: normal ; ST/T wave: normal; Other findings: incomplete RBBB.     Medications Given in the ED:  Medications   sodium chloride (NS) flush 5-10 mL (has no administration in time range)   sodium chloride 0.9 % bolus infusion 1,000 mL (0 mL IntraVENous IV Completed 6/24/21 1739)     Followed by   sodium chloride 0.9 % bolus infusion 1,000 mL (0 mL IntraVENous IV Completed 6/24/21 1852)     Followed by   sodium chloride 0.9 % bolus infusion 244 mL (has no administration in time range)   cefepime (MAXIPIME) 2 g in 0.9% sodium chloride (MBP/ADV) 100 mL MBP (0 g IntraVENous IV Completed 6/24/21 1739)   levoFLOXacin (LEVAQUIN) 750 mg in D5W IVPB (0 mg IntraVENous IV Completed 6/24/21 1852)   heparin (porcine) pf 300 Units (has no administration in time range)       Diagnosis:  Clinical Impression:   1. Fever in other diseases    2. Primary pancreatic adenocarcinoma [facility])         Plan:  1:   Follow-up Information     Follow up With Specialties Details Why Lizz Levine MD Hematology and Oncology  As needed [address] [place] [address]  [phone]            2:   Current Discharge Medication List      START taking these medications    Details   levoFLOXacin (Levaquin) 500 mg tablet Take 1 Tablet by mouth daily. Qty: 7 Tablet, Refills: 0  Start date: 6/24/2021           Return to ED if worse. Disposition:  Home  _______________________________   Attestations: This note was performed by Vida Saenz MD in its entirety.   _______________________________

## 2021-06-25 NOTE — PROGRESS NOTES
Geronimo Gonzales. States she is feeling much better at this time. No reoccurrence of fever.  Tolerating ABX without issues

## 2021-06-30 LAB
BACTERIA SPEC CULT: NORMAL
BACTERIA SPEC CULT: NORMAL
SERVICE CMNT-IMP: NORMAL
SERVICE CMNT-IMP: NORMAL

## 2021-07-01 LAB
ATRIAL RATE: 72 BPM
CALCULATED P AXIS, ECG09: 4 DEGREES
CALCULATED R AXIS, ECG10: -6 DEGREES
CALCULATED T AXIS, ECG11: 23 DEGREES
DIAGNOSIS, 93000: NORMAL
P-R INTERVAL, ECG05: 166 MS
Q-T INTERVAL, ECG07: 398 MS
QRS DURATION, ECG06: 112 MS
QTC CALCULATION (BEZET), ECG08: 435 MS
VENTRICULAR RATE, ECG03: 72 BPM

## 2021-07-04 DIAGNOSIS — C25.9 MALIGNANT NEOPLASM OF PANCREAS, UNSPECIFIED LOCATION OF MALIGNANCY (HCC): ICD-10-CM

## 2021-07-06 ENCOUNTER — HOSPITAL ENCOUNTER (OUTPATIENT)
Dept: INFUSION THERAPY | Age: 66
Discharge: HOME | End: 2021-07-06
Payer: MEDICARE

## 2021-07-06 ENCOUNTER — HOSPITAL ENCOUNTER (OUTPATIENT)
Dept: GENERAL RADIOLOGY | Age: 66
Discharge: HOME | End: 2021-07-06
Attending: INTERNAL MEDICINE
Payer: MEDICARE

## 2021-07-06 VITALS
WEIGHT: 172.4 LBS | RESPIRATION RATE: 19 BRPM | DIASTOLIC BLOOD PRESSURE: 70 MMHG | BODY MASS INDEX: 29.43 KG/M2 | HEART RATE: 79 BPM | TEMPERATURE: 98.7 F | OXYGEN SATURATION: 97 % | HEIGHT: 64 IN | SYSTOLIC BLOOD PRESSURE: 162 MMHG

## 2021-07-06 DIAGNOSIS — C25.9 ADENOCARCINOMA OF PANCREAS (HCC): Primary | ICD-10-CM

## 2021-07-06 LAB
ALBUMIN SERPL-MCNC: 3.3 G/DL (ref 3.5–5)
ALBUMIN/GLOB SERPL: 0.8 {RATIO} (ref 1.1–2.2)
ALP SERPL-CCNC: 136 U/L (ref 45–117)
ALT SERPL-CCNC: 19 U/L (ref 12–78)
ANION GAP SERPL CALC-SCNC: 6 MMOL/L (ref 5–15)
AST SERPL-CCNC: 14 U/L (ref 15–37)
BASOPHILS # BLD: 0 K/UL (ref 0–0.1)
BASOPHILS NFR BLD: 0 % (ref 0–1)
BILIRUB SERPL-MCNC: 0.3 MG/DL (ref 0.2–1)
BUN SERPL-MCNC: 14 MG/DL (ref 6–20)
BUN/CREAT SERPL: 18 (ref 12–20)
CALCIUM SERPL-MCNC: 9.4 MG/DL (ref 8.5–10.1)
CHLORIDE SERPL-SCNC: 99 MMOL/L (ref 97–108)
CO2 SERPL-SCNC: 32 MMOL/L (ref 21–32)
CREAT SERPL-MCNC: 0.8 MG/DL (ref 0.55–1.02)
DIFFERENTIAL METHOD BLD: ABNORMAL
EOSINOPHIL # BLD: 0 K/UL (ref 0–0.4)
EOSINOPHIL NFR BLD: 0 % (ref 0–7)
ERYTHROCYTE [DISTWIDTH] IN BLOOD BY AUTOMATED COUNT: 15.6 % (ref 11.5–14.5)
GLOBULIN SER CALC-MCNC: 4.2 G/DL (ref 2–4)
GLUCOSE SERPL-MCNC: 293 MG/DL (ref 65–100)
HCT VFR BLD AUTO: 36.4 % (ref 35–47)
HGB BLD-MCNC: 11.5 G/DL (ref 11.5–16)
IMM GRANULOCYTES # BLD AUTO: 0 K/UL (ref 0–0.04)
IMM GRANULOCYTES NFR BLD AUTO: 0 % (ref 0–0.5)
LYMPHOCYTES # BLD: 1.8 K/UL (ref 0.8–3.5)
LYMPHOCYTES NFR BLD: 19 % (ref 12–49)
MCH RBC QN AUTO: 27.5 PG (ref 26–34)
MCHC RBC AUTO-ENTMCNC: 31.6 G/DL (ref 30–36.5)
MCV RBC AUTO: 87.1 FL (ref 80–99)
MONOCYTES # BLD: 1.3 K/UL (ref 0–1)
MONOCYTES NFR BLD: 14 % (ref 5–13)
NEUTS SEG # BLD: 6.4 K/UL (ref 1.8–8)
NEUTS SEG NFR BLD: 67 % (ref 32–75)
NRBC # BLD: 0 K/UL (ref 0–0.01)
NRBC BLD-RTO: 0 PER 100 WBC
PLATELET # BLD AUTO: 663 K/UL (ref 150–400)
PMV BLD AUTO: 9.5 FL (ref 8.9–12.9)
POTASSIUM SERPL-SCNC: 3.9 MMOL/L (ref 3.5–5.1)
PROT SERPL-MCNC: 7.5 G/DL (ref 6.4–8.2)
RBC # BLD AUTO: 4.18 M/UL (ref 3.8–5.2)
RBC MORPH BLD: ABNORMAL
SODIUM SERPL-SCNC: 137 MMOL/L (ref 136–145)
WBC # BLD AUTO: 9.5 K/UL (ref 3.6–11)

## 2021-07-06 PROCEDURE — 74011250636 HC RX REV CODE- 250/636: Performed by: INTERNAL MEDICINE

## 2021-07-06 PROCEDURE — 74011000258 HC RX REV CODE- 258: Performed by: INTERNAL MEDICINE

## 2021-07-06 PROCEDURE — 85025 COMPLETE CBC W/AUTO DIFF WBC: CPT

## 2021-07-06 PROCEDURE — 96375 TX/PRO/DX INJ NEW DRUG ADDON: CPT

## 2021-07-06 PROCEDURE — 36415 COLL VENOUS BLD VENIPUNCTURE: CPT

## 2021-07-06 PROCEDURE — 77030012965 HC NDL HUBR BBMI -A

## 2021-07-06 PROCEDURE — 76000 FLUOROSCOPY <1 HR PHYS/QHP: CPT

## 2021-07-06 PROCEDURE — 96413 CHEMO IV INFUSION 1 HR: CPT

## 2021-07-06 PROCEDURE — 96417 CHEMO IV INFUS EACH ADDL SEQ: CPT

## 2021-07-06 PROCEDURE — 74011000636 HC RX REV CODE- 636: Performed by: INTERNAL MEDICINE

## 2021-07-06 PROCEDURE — 80053 COMPREHEN METABOLIC PANEL: CPT

## 2021-07-06 RX ORDER — HEPARIN 100 UNIT/ML
300-500 SYRINGE INTRAVENOUS AS NEEDED
Status: ACTIVE | OUTPATIENT
Start: 2021-07-06 | End: 2021-07-06

## 2021-07-06 RX ORDER — ONDANSETRON 2 MG/ML
8 INJECTION INTRAMUSCULAR; INTRAVENOUS AS NEEDED
Status: ACTIVE | OUTPATIENT
Start: 2021-07-06 | End: 2021-07-06

## 2021-07-06 RX ORDER — SODIUM CHLORIDE 0.9 % (FLUSH) 0.9 %
10 SYRINGE (ML) INJECTION AS NEEDED
Status: ACTIVE | OUTPATIENT
Start: 2021-07-06 | End: 2021-07-06

## 2021-07-06 RX ORDER — ACETAMINOPHEN 325 MG/1
650 TABLET ORAL AS NEEDED
Status: ACTIVE | OUTPATIENT
Start: 2021-07-06 | End: 2021-07-06

## 2021-07-06 RX ORDER — ONDANSETRON 2 MG/ML
8 INJECTION INTRAMUSCULAR; INTRAVENOUS ONCE
Status: COMPLETED | OUTPATIENT
Start: 2021-07-06 | End: 2021-07-06

## 2021-07-06 RX ORDER — SODIUM CHLORIDE 9 MG/ML
25 INJECTION, SOLUTION INTRAVENOUS CONTINUOUS
Status: DISPENSED | OUTPATIENT
Start: 2021-07-06 | End: 2021-07-06

## 2021-07-06 RX ORDER — SODIUM CHLORIDE 9 MG/ML
10 INJECTION INTRAMUSCULAR; INTRAVENOUS; SUBCUTANEOUS AS NEEDED
Status: ACTIVE | OUTPATIENT
Start: 2021-07-06 | End: 2021-07-06

## 2021-07-06 RX ORDER — DEXAMETHASONE SODIUM PHOSPHATE 4 MG/ML
10 INJECTION, SOLUTION INTRA-ARTICULAR; INTRALESIONAL; INTRAMUSCULAR; INTRAVENOUS; SOFT TISSUE ONCE
Status: COMPLETED | OUTPATIENT
Start: 2021-07-06 | End: 2021-07-06

## 2021-07-06 RX ORDER — DIPHENHYDRAMINE HYDROCHLORIDE 50 MG/ML
25 INJECTION, SOLUTION INTRAMUSCULAR; INTRAVENOUS AS NEEDED
Status: ACTIVE | OUTPATIENT
Start: 2021-07-06 | End: 2021-07-06

## 2021-07-06 RX ADMIN — SODIUM CHLORIDE 25 ML/HR: 9 INJECTION, SOLUTION INTRAVENOUS at 11:40

## 2021-07-06 RX ADMIN — IOPAMIDOL 10 ML: 612 INJECTION, SOLUTION INTRAVENOUS at 10:40

## 2021-07-06 RX ADMIN — GEMCITABINE 1656 MG: 38 INJECTION, SOLUTION INTRAVENOUS at 12:50

## 2021-07-06 RX ADMIN — DEXAMETHASONE SODIUM PHOSPHATE 10 MG: 4 INJECTION, SOLUTION INTRAMUSCULAR; INTRAVENOUS at 11:44

## 2021-07-06 RX ADMIN — Medication 500 UNITS: at 13:33

## 2021-07-06 RX ADMIN — PACLITAXEL 184 MG: 100 INJECTION, POWDER, LYOPHILIZED, FOR SUSPENSION INTRAVENOUS at 12:15

## 2021-07-06 RX ADMIN — ONDANSETRON 8 MG: 2 INJECTION INTRAMUSCULAR; INTRAVENOUS at 11:41

## 2021-07-06 NOTE — PROGRESS NOTES
[facility] Progress Note    Date: 2021    Name: Junaid Edwards    MRN: [id number]         : 1955    Ms. Ba Arrived ambulatory and in no distress for cycle 1 of Abraxane / Gemzar  regimen. Assessment was completed, no acute issues at this time, no new complaints voiced. Continues with lower abdominal discomfort , low back dull aching and intermittent right shoulder pain. Reports Dilaudid controls her discomfort. VAD accessed without difficulty, unable to draw labs from port. Sent to xray for dye study; while there blood returned in port . Chemotherapy Flowsheet 2021   Cycle C 1    Date 2021   Drug / Regimen Abraxane/ Gemzar   Dosage -   Time Up -   Time Down -   Pre Hydration -   Pre Meds Zofran, decadron   Notes -             Ms. Ba's vitals were reviewed. Visit Vitals  BP (!) 162/70 (BP 1 Location: Left upper arm, BP Patient Position: Sitting)   Pulse 79   Temp 98.7 °F (37.1 °C)   Resp 19   Ht 5' 4\" (1.626 m)   Wt 78.2 kg (172 lb 6.4 oz)   SpO2 97%   Breastfeeding No   BMI 29.59 kg/m²       Lab results were obtained and reviewed. Recent Results (from the past 12 hour(s))   CBC WITH AUTOMATED DIFF    Collection Time: 21  9:40 AM   Result Value Ref Range    WBC 9.5 3.6 - 11.0 K/uL    RBC 4.18 3.80 - 5.20 M/uL    HGB 11.5 11.5 - 16.0 g/dL    HCT 36.4 35.0 - 47.0 %    MCV 87.1 80.0 - 99.0 FL    MCH 27.5 26.0 - 34.0 PG    MCHC 31.6 30.0 - 36.5 g/dL    RDW 15.6 (H) 11.5 - 14.5 %    PLATELET 206 (H) 466 - 400 K/uL    MPV 9.5 8.9 - 12.9 FL    NRBC 0.0 0  WBC    ABSOLUTE NRBC 0.00 0.00 - 0.01 K/uL    NEUTROPHILS 67 32 - 75 %    LYMPHOCYTES 19 12 - 49 %    MONOCYTES 14 (H) 5 - 13 %    EOSINOPHILS 0 0 - 7 %    BASOPHILS 0 0 - 1 %    IMMATURE GRANULOCYTES 0 0.0 - 0.5 %    ABS. NEUTROPHILS 6.4 1.8 - 8.0 K/UL    ABS. LYMPHOCYTES 1.8 0.8 - 3.5 K/UL    ABS. MONOCYTES 1.3 (H) 0.0 - 1.0 K/UL    ABS. EOSINOPHILS 0.0 0.0 - 0.4 K/UL    ABS. BASOPHILS 0.0 0.0 - 0.1 K/UL    ABS. IMM.  GRANS. 0.0 0.00 - 0.04 K/UL    DF MANUAL      RBC COMMENTS ANISOCYTOSIS  1+       METABOLIC PANEL, COMPREHENSIVE    Collection Time: 07/06/21  9:40 AM   Result Value Ref Range    Sodium 137 136 - 145 mmol/L    Potassium 3.9 3.5 - 5.1 mmol/L    Chloride 99 97 - 108 mmol/L    CO2 32 21 - 32 mmol/L    Anion gap 6 5 - 15 mmol/L    Glucose 293 (H) 65 - 100 mg/dL    BUN 14 6 - 20 MG/DL    Creatinine 0.80 0.55 - 1.02 MG/DL    BUN/Creatinine ratio 18 12 - 20      GFR est AA >60 >60 ml/min/1.73m2    GFR est non-AA >60 >60 ml/min/1.73m2    Calcium 9.4 8.5 - 10.1 MG/DL    Bilirubin, total 0.3 0.2 - 1.0 MG/DL    ALT (SGPT) 19 12 - 78 U/L    AST (SGOT) 14 (L) 15 - 37 U/L    Alk. phosphatase 136 (H) 45 - 117 U/L    Protein, total 7.5 6.4 - 8.2 g/dL    Albumin 3.3 (L) 3.5 - 5.0 g/dL    Globulin 4.2 (H) 2.0 - 4.0 g/dL    A-G Ratio 0.8 (L) 1.1 - 2.2         Pre-medications of Decadron 10mg  and Zofran 8mg   were administered as ordered and Abraxane was initiated and infused over 30 minutes. At completion , blood verified at port . Gemzar was then infused over 30 minutes. Ms. Tanvir Weaver tolerated treatment well and was discharged from [place] [address] in stable condition at 1340. She is to return on July 13th  for her next chemo appointment.     Mando Baker RN  July 6, 2021

## 2021-07-06 NOTE — PROGRESS NOTES
Problem: Pain  Goal: *Control of Pain  Outcome: Progressing Towards Goal     Problem: Pain  Goal: *Control of Pain  Outcome: Progressing Towards Goal

## 2021-07-06 NOTE — DISCHARGE INSTRUCTIONS
Patient Education   Patient Education   Gemcitabine (By injection)   Gemcitabine ([id number])  Used alone or in combination with other medicines to treat breast cancer, ovarian cancer, non-small cell lung cancer, and cancer of the pancreas. Brand Name(s): Gemcitabine Novaplus, Gemzar, PremierPro Rx Gemcitabine   There may be other brand names for this medicine. When This Medicine Should Not Be Used: You should not receive this medicine if you have had an allergic reaction to gemcitabine, or if you are pregnant. How to Use This Medicine:   Injectable  · Medicines used to treat cancer are very strong and can have many side effects. Before receiving this medicine, make sure you understand all the risks and benefits. It is important for you to work closely with your doctor during your treatment. · Your doctor will prescribe your dose and schedule. This medicine is given through a needle placed in a vein. · You will receive this medicine while you are in a hospital or cancer treatment center. A nurse or other trained health professional will give you this medicine. · If this medicine gets on your skin, wash the area with soap and water, and tell your caregiver. If you get the medicine in your eyes, nose, or mouth, rinse the area with large amounts of water, and tell your caregiver. If a dose is missed:   · Call your doctor or pharmacist for instructions. Drugs and Foods to Avoid:   Ask your doctor or pharmacist before using any other medicine, including over-the-counter medicines, vitamins, and herbal products. · This medicine may interfere with vaccines. Ask your doctor before you get a flu shot or any other vaccines. Warnings While Using This Medicine:   · It is not safe to take this medicine during pregnancy. It could harm an unborn baby. Tell your doctor right away if you become pregnant.   · Make sure your doctor knows if you are breastfeeding, or if you have lung disease, kidney disease, or liver disease. · Cancer medicines can cause nausea and vomiting in most people, sometimes even after receiving medicines to prevent it. Ask your doctor or nurse about other ways to control these side effects. · This medicine may make you bleed, bruise, or get infections more easily. Take precautions to prevent illness and injury. Wash your hands often. · Your doctor will do lab tests at regular visits to check on the effects of this medicine. Keep all appointments. Possible Side Effects While Using This Medicine:   Call your doctor right away if you notice any of these side effects:  · Allergic reaction: Itching or hives, swelling in your face or hands, swelling or tingling in your mouth or throat, chest tightness, trouble breathing  · Chest pain. · Dark urine, decrease in how much or how often you urinate. · Fast, slow, or irregular heartbeat. · Fever, chills, cough, sore throat, and body aches. · Lightheadedness or fainting. · Nausea, vomiting, severe itching, bleeding from your gums or nose. · Numbness or weakness in your arm or leg, or on one side of your body. · Pain, itching, burning, swelling, or a lump under your skin where the needle is placed. · Pinpoint red or purple spots on your skin. · Rapid weight gain. · Sudden or severe headache, problems with vision, hearing, speech, or walking. · Swelling in your hands, ankles, or feet. · Tingling, or burning pain in your hands, arms, legs, or feet. · Trouble breathing. · Unusual bleeding, bruising, or weakness. If you notice these less serious side effects, talk with your doctor:   · Diarrhea, constipation, loss of appetite. · Drowsiness. · Hair loss. · Mild headache. · Muscle, joint, or bone pain. · Skin rash or itching. · Sores or white patches on your lips, mouth, or throat. If you notice other side effects that you think are caused by this medicine, tell your doctor. Call your doctor for medical advice about side effects.  You may report side effects to FDA at 1-697-FDA-2654  © 2017 [facility] Information is for End User's use only and may not be sold, redistributed or otherwise used for commercial purposes. The above information is an  only. It is not intended as medical advice for individual conditions or treatments. Talk to your doctor, nurse or pharmacist before following any medical regimen to see if it is safe and effective for you. Paclitaxel Protein-bound (Abraxane) - (By injection)   Why this medicine is used:   Treats cancer. Contact a nurse or doctor right away if you have:  · Trouble breathing, dry cough  · Unusual bleeding, bruising, or weakness  · Numbness, tingling, or burning pain in your hands, arms, legs, or feet  · Fever, chills, cough, sore throat, or body aches  · Nausea, vomiting, diarrhea, extreme thirst     Common side effects:  · Hair loss  · Joint or muscle pain  © 2017 [facility] Information is for End User's use only and may not be sold, redistributed or otherwise used for commercial purposes.

## 2021-07-07 RX ORDER — GABAPENTIN 300 MG/1
600 CAPSULE ORAL 3 TIMES DAILY
Qty: 180 CAPSULE | Refills: 3 | Status: SHIPPED | OUTPATIENT
Start: 2021-07-07 | End: 2021-11-11

## 2021-07-13 ENCOUNTER — HOSPITAL ENCOUNTER (OUTPATIENT)
Dept: INFUSION THERAPY | Age: 66
Discharge: HOME | End: 2021-07-13
Payer: MEDICARE

## 2021-07-13 ENCOUNTER — TELEPHONE (OUTPATIENT)
Dept: FAMILY MEDICINE CLINIC | Age: 66
End: 2021-07-13

## 2021-07-13 VITALS
WEIGHT: 172.4 LBS | RESPIRATION RATE: 19 BRPM | SYSTOLIC BLOOD PRESSURE: 141 MMHG | TEMPERATURE: 99 F | HEIGHT: 64 IN | BODY MASS INDEX: 29.43 KG/M2 | OXYGEN SATURATION: 99 % | HEART RATE: 89 BPM | DIASTOLIC BLOOD PRESSURE: 73 MMHG

## 2021-07-13 DIAGNOSIS — C25.9 ADENOCARCINOMA OF PANCREAS (HCC): Primary | ICD-10-CM

## 2021-07-13 LAB
ALBUMIN SERPL-MCNC: 3.4 G/DL (ref 3.5–5)
ALBUMIN/GLOB SERPL: 1 {RATIO} (ref 1.1–2.2)
ALP SERPL-CCNC: 104 U/L (ref 45–117)
ALT SERPL-CCNC: 22 U/L (ref 12–78)
ANION GAP SERPL CALC-SCNC: 7 MMOL/L (ref 5–15)
AST SERPL-CCNC: 16 U/L (ref 15–37)
BASOPHILS # BLD: 0 K/UL (ref 0–0.1)
BASOPHILS NFR BLD: 1 % (ref 0–1)
BILIRUB SERPL-MCNC: 0.4 MG/DL (ref 0.2–1)
BUN SERPL-MCNC: 9 MG/DL (ref 6–20)
BUN/CREAT SERPL: 14 (ref 12–20)
CALCIUM SERPL-MCNC: 9.5 MG/DL (ref 8.5–10.1)
CHLORIDE SERPL-SCNC: 102 MMOL/L (ref 97–108)
CO2 SERPL-SCNC: 31 MMOL/L (ref 21–32)
CREAT SERPL-MCNC: 0.66 MG/DL (ref 0.55–1.02)
DIFFERENTIAL METHOD BLD: ABNORMAL
EOSINOPHIL # BLD: 0.2 K/UL (ref 0–0.4)
EOSINOPHIL NFR BLD: 4 % (ref 0–7)
ERYTHROCYTE [DISTWIDTH] IN BLOOD BY AUTOMATED COUNT: 15.4 % (ref 11.5–14.5)
GLOBULIN SER CALC-MCNC: 3.3 G/DL (ref 2–4)
GLUCOSE SERPL-MCNC: 131 MG/DL (ref 65–100)
HCT VFR BLD AUTO: 33.8 % (ref 35–47)
HGB BLD-MCNC: 10.8 G/DL (ref 11.5–16)
IMM GRANULOCYTES # BLD AUTO: 0 K/UL (ref 0–0.04)
IMM GRANULOCYTES NFR BLD AUTO: 1 % (ref 0–0.5)
LYMPHOCYTES # BLD: 2.4 K/UL (ref 0.8–3.5)
LYMPHOCYTES NFR BLD: 45 % (ref 12–49)
MCH RBC QN AUTO: 27.6 PG (ref 26–34)
MCHC RBC AUTO-ENTMCNC: 32 G/DL (ref 30–36.5)
MCV RBC AUTO: 86.4 FL (ref 80–99)
MONOCYTES # BLD: 0.6 K/UL (ref 0–1)
MONOCYTES NFR BLD: 11 % (ref 5–13)
NEUTS SEG # BLD: 2.1 K/UL (ref 1.8–8)
NEUTS SEG NFR BLD: 40 % (ref 32–75)
NRBC # BLD: 0 K/UL (ref 0–0.01)
NRBC BLD-RTO: 0 PER 100 WBC
PLATELET # BLD AUTO: 98 K/UL (ref 150–400)
PMV BLD AUTO: 12.6 FL (ref 8.9–12.9)
POTASSIUM SERPL-SCNC: 4.2 MMOL/L (ref 3.5–5.1)
PROT SERPL-MCNC: 6.7 G/DL (ref 6.4–8.2)
RBC # BLD AUTO: 3.91 M/UL (ref 3.8–5.2)
SODIUM SERPL-SCNC: 140 MMOL/L (ref 136–145)
WBC # BLD AUTO: 5.4 K/UL (ref 3.6–11)

## 2021-07-13 PROCEDURE — 36415 COLL VENOUS BLD VENIPUNCTURE: CPT

## 2021-07-13 PROCEDURE — 80053 COMPREHEN METABOLIC PANEL: CPT

## 2021-07-13 PROCEDURE — 96375 TX/PRO/DX INJ NEW DRUG ADDON: CPT

## 2021-07-13 PROCEDURE — 96417 CHEMO IV INFUS EACH ADDL SEQ: CPT

## 2021-07-13 PROCEDURE — 85025 COMPLETE CBC W/AUTO DIFF WBC: CPT

## 2021-07-13 PROCEDURE — 74011000258 HC RX REV CODE- 258: Performed by: INTERNAL MEDICINE

## 2021-07-13 PROCEDURE — 77030012965 HC NDL HUBR BBMI -A

## 2021-07-13 PROCEDURE — 96413 CHEMO IV INFUSION 1 HR: CPT

## 2021-07-13 PROCEDURE — 74011250636 HC RX REV CODE- 250/636: Performed by: INTERNAL MEDICINE

## 2021-07-13 RX ORDER — SODIUM CHLORIDE 9 MG/ML
25 INJECTION, SOLUTION INTRAVENOUS CONTINUOUS
Status: DISCONTINUED | OUTPATIENT
Start: 2021-07-13 | End: 2021-07-14 | Stop reason: HOSPADM

## 2021-07-13 RX ORDER — DIPHENHYDRAMINE HYDROCHLORIDE 50 MG/ML
25 INJECTION, SOLUTION INTRAMUSCULAR; INTRAVENOUS AS NEEDED
Status: DISCONTINUED | OUTPATIENT
Start: 2021-07-13 | End: 2021-07-14 | Stop reason: HOSPADM

## 2021-07-13 RX ORDER — SODIUM CHLORIDE 9 MG/ML
10 INJECTION INTRAMUSCULAR; INTRAVENOUS; SUBCUTANEOUS AS NEEDED
Status: DISCONTINUED | OUTPATIENT
Start: 2021-07-13 | End: 2021-07-14 | Stop reason: HOSPADM

## 2021-07-13 RX ORDER — ACETAMINOPHEN 325 MG/1
650 TABLET ORAL AS NEEDED
Status: DISCONTINUED | OUTPATIENT
Start: 2021-07-13 | End: 2021-07-14 | Stop reason: HOSPADM

## 2021-07-13 RX ORDER — ONDANSETRON 2 MG/ML
8 INJECTION INTRAMUSCULAR; INTRAVENOUS ONCE
Status: COMPLETED | OUTPATIENT
Start: 2021-07-13 | End: 2021-07-13

## 2021-07-13 RX ORDER — ONDANSETRON 2 MG/ML
8 INJECTION INTRAMUSCULAR; INTRAVENOUS AS NEEDED
Status: DISCONTINUED | OUTPATIENT
Start: 2021-07-13 | End: 2021-07-14 | Stop reason: HOSPADM

## 2021-07-13 RX ORDER — DEXAMETHASONE SODIUM PHOSPHATE 4 MG/ML
10 INJECTION, SOLUTION INTRA-ARTICULAR; INTRALESIONAL; INTRAMUSCULAR; INTRAVENOUS; SOFT TISSUE ONCE
Status: COMPLETED | OUTPATIENT
Start: 2021-07-13 | End: 2021-07-13

## 2021-07-13 RX ORDER — HEPARIN 100 UNIT/ML
300-500 SYRINGE INTRAVENOUS AS NEEDED
Status: DISCONTINUED | OUTPATIENT
Start: 2021-07-13 | End: 2021-07-14 | Stop reason: HOSPADM

## 2021-07-13 RX ADMIN — GEMCITABINE 1656 MG: 38 INJECTION, SOLUTION INTRAVENOUS at 14:41

## 2021-07-13 RX ADMIN — ONDANSETRON 8 MG: 2 INJECTION INTRAMUSCULAR; INTRAVENOUS at 13:13

## 2021-07-13 RX ADMIN — DEXAMETHASONE SODIUM PHOSPHATE 10 MG: 4 INJECTION, SOLUTION INTRAMUSCULAR; INTRAVENOUS at 13:17

## 2021-07-13 RX ADMIN — Medication 500 UNITS: at 15:26

## 2021-07-13 RX ADMIN — PACLITAXEL 184 MG: 100 INJECTION, POWDER, LYOPHILIZED, FOR SUSPENSION INTRAVENOUS at 13:48

## 2021-07-13 RX ADMIN — SODIUM CHLORIDE 25 ML/HR: 9 INJECTION, SOLUTION INTRAVENOUS at 13:10

## 2021-07-13 NOTE — DISCHARGE INSTRUCTIONS
Patient Education        Constipation: Care Instructions  Your Care Instructions     Constipation means that you have a hard time passing stools (bowel movements). People pass stools from 3 times a day to once every 3 days. What is normal for you may be different. Constipation may occur with pain in the rectum and cramping. The pain may get worse when you try to pass stools. Sometimes there are small amounts of bright red blood on toilet paper or the surface of stools. This is because of enlarged veins near the rectum (hemorrhoids). A few changes in your diet and lifestyle may help you avoid ongoing constipation. Your doctor may also prescribe medicine to help loosen your stool. Some medicines can cause constipation. These include pain medicines and antidepressants. Tell your doctor about all the medicines you take. Your doctor may want to make a medicine change to ease your symptoms. Follow-up care is a key part of your treatment and safety. Be sure to make and go to all appointments, and call your doctor if you are having problems. It's also a good idea to know your test results and keep a list of the medicines you take. How can you care for yourself at home? · Drink plenty of fluids. If you have kidney, heart, or liver disease and have to limit fluids, talk with your doctor before you increase the amount of fluids you drink. · Include high-fiber foods in your diet each day. These include fruits, vegetables, beans, and whole grains. · Get at least 30 minutes of exercise on most days of the week. Walking is a good choice. You also may want to do other activities, such as running, swimming, cycling, or playing tennis or team sports. · Take a fiber supplement, such as Citrucel or Metamucil, every day. Read and follow all instructions on the label. · Schedule time each day for a bowel movement. A daily routine may help. Take your time having your bowel movement.   · Support your feet with a small step stool when you sit on the toilet. This helps flex your hips and places your pelvis in a squatting position. · Your doctor may recommend an over-the-counter laxative to relieve your constipation. Examples are Milk of Magnesia and MiraLax. Read and follow all instructions on the label. Do not use laxatives on a long-term basis. When should you call for help? Call your doctor now or seek immediate medical care if:    · You have new or worse belly pain.     · You have new or worse nausea or vomiting.     · You have blood in your stools. Watch closely for changes in your health, and be sure to contact your doctor if:    · Your constipation is getting worse.     · You do not get better as expected. Where can you learn more? Go to http://www.joy.com/  Enter P343 in the search box to learn more about \"Constipation: Care Instructions. \"  Current as of: February 26, 2020               Content Version: 12.8  © 2006-2021 [employer]. Care instructions adapted under license by [employer] (which disclaims liability or warranty for this information). If you have questions about a medical condition or this instruction, always ask your healthcare professional. [address] any warranty or liability for your use of this information.

## 2021-07-13 NOTE — PROGRESS NOTES
Problem: Chemotherapy Treatment  Goal: *Chemotherapy regimen followed  Outcome: Resolved/Met  Goal: *Hemodynamically stable  Outcome: Resolved/Met  Goal: *Tolerating diet  Outcome: Resolved/Met     Problem: Neutropenia  Goal: *Verbalizes and demonstrates neutropenic precautions  Outcome: Resolved/Met  Goal: *Verbalizes understanding and describes prescribed diet  Outcome: Resolved/Met     Problem: Infection - Risk of, Central Venous Catheter-Associated Bloodstream Infection  Goal: *Absence of infection signs and symptoms  Outcome: Resolved/Met     Problem: Pain  Goal: *Control of Pain  Outcome: Resolved/Met     Problem: Anemia Care Plan (Adult and Pediatric)  Goal: *Labs within defined limits  Outcome: Resolved/Met  Goal: *Tolerates increased activity  Outcome: Resolved/Met     Problem: Constipation - Risk of  Goal: *Prevention of constipation  Outcome: Resolved/Met

## 2021-07-13 NOTE — PROGRESS NOTES
[facility] Progress Note    Date: 2021    Name: Nikolai Vides    MRN: [id number]         : 1955    Ms. Ba Arrived ambulatory and in no distress for cycle 5 day 8 of Abraxane/Gemcitabine regimen. Assessment was completed, no acute issues at this time. Ms. Reena Elena voiced she had experienced some constipation, reviewed and gave written information on constipation. Pain is controlled with pain medication, at times feels some lower abdomen and back discomfort with some pressure. Port accessed without difficulty, labs drawn and in process. Chemotherapy Flowsheet 2021   Cycle C5 D8   Date 2021   Drug / Regimen Abraxane/Gemcitabine   Dosage see MAR   Time Up 1348   Time Down 1516   Pre Hydration -   Pre Meds Zofran,Decadron   Notes -       Ms. Ba's vitals were reviewed. Visit Vitals  BP (!) 141/73 (BP 1 Location: Left upper arm, BP Patient Position: Sitting)   Pulse 89   Temp 99 °F (37.2 °C)   Resp 19   Ht 5' 4\" (1.626 m)   Wt 78.2 kg (172 lb 6.4 oz)   SpO2 99%   Breastfeeding No   BMI 29.59 kg/m²       Lab results were obtained and reviewed. Spoke with Dr. Rj Mace regarding platelet count today. Okay given to treat. Recent Results (from the past 12 hour(s))   CBC WITH AUTOMATED DIFF    Collection Time: 21 12:20 PM   Result Value Ref Range    WBC 5.4 3.6 - 11.0 K/uL    RBC 3.91 3.80 - 5.20 M/uL    HGB 10.8 (L) 11.5 - 16.0 g/dL    HCT 33.8 (L) 35.0 - 47.0 %    MCV 86.4 80.0 - 99.0 FL    MCH 27.6 26.0 - 34.0 PG    MCHC 32.0 30.0 - 36.5 g/dL    RDW 15.4 (H) 11.5 - 14.5 %    PLATELET 98 (L) 683 - 400 K/uL    MPV 12.6 8.9 - 12.9 FL    NRBC 0.0 0  WBC    ABSOLUTE NRBC 0.00 0.00 - 0.01 K/uL    NEUTROPHILS 40 32 - 75 %    LYMPHOCYTES 45 12 - 49 %    MONOCYTES 11 5 - 13 %    EOSINOPHILS 4 0 - 7 %    BASOPHILS 1 0 - 1 %    IMMATURE GRANULOCYTES 1 (H) 0.0 - 0.5 %    ABS. NEUTROPHILS 2.1 1.8 - 8.0 K/UL    ABS. LYMPHOCYTES 2.4 0.8 - 3.5 K/UL    ABS. MONOCYTES 0.6 0.0 - 1.0 K/UL    ABS. EOSINOPHILS 0.2 0.0 - 0.4 K/UL    ABS. BASOPHILS 0.0 0.0 - 0.1 K/UL    ABS. IMM. GRANS. 0.0 0.00 - 0.04 K/UL    DF AUTOMATED     METABOLIC PANEL, COMPREHENSIVE    Collection Time: 07/13/21 12:20 PM   Result Value Ref Range    Sodium 140 136 - 145 mmol/L    Potassium 4.2 3.5 - 5.1 mmol/L    Chloride 102 97 - 108 mmol/L    CO2 31 21 - 32 mmol/L    Anion gap 7 5 - 15 mmol/L    Glucose 131 (H) 65 - 100 mg/dL    BUN 9 6 - 20 MG/DL    Creatinine 0.66 0.55 - 1.02 MG/DL    BUN/Creatinine ratio 14 12 - 20      GFR est AA >60 >60 ml/min/1.73m2    GFR est non-AA >60 >60 ml/min/1.73m2    Calcium 9.5 8.5 - 10.1 MG/DL    Bilirubin, total 0.4 0.2 - 1.0 MG/DL    ALT (SGPT) 22 12 - 78 U/L    AST (SGOT) 16 15 - 37 U/L    Alk. phosphatase 104 45 - 117 U/L    Protein, total 6.7 6.4 - 8.2 g/dL    Albumin 3.4 (L) 3.5 - 5.0 g/dL    Globulin 3.3 2.0 - 4.0 g/dL    A-G Ratio 1.0 (L) 1.1 - 2.2         Pre-medications  were administered as ordered and chemotherapy was initiated. 13:10  NS mainline  13:13   Zofran 8mg IVP  13:17  Decadron  10 mg IVP  13:48 - 14:27 Abraxane 184 mg IVPB  14:41 - 15:16 Gemcitabine 1,656 mg IVPB     Ms. Ba's Port was de-accessed with positive blood return. Patient needs to be lying flat with left arm raised to obtain blood from [facility]. Ms. Evelyn Chan tolerated treatment well and was discharged from [place] [address] in stable condition at 15:40. She is to return on July 20 at 11:00 for her next appointment.     Keron Alfonso RN  July 13, 2021

## 2021-07-13 NOTE — TELEPHONE ENCOUNTER
Spoke to [employer] patient is having issues with the Medicare approval for Dexicom sensor. Attempted to clarify from notes needed information. [facility] states [facility] should make a decision by tomorrow morning at opening. Called local [facility] and informed Gustavo Partida of updates.  Will check this week.         ----- Message from Darius Hernandez sent at 7/13/2021  4:08 PM EDT -----  Regarding: FW: Dr. Ronel Alexandre: [phone]    ----- Message -----  From: Smith [place]: 7/13/2021   3:34 PM EDT  To: [facility] Front Office  Subject: Dr. Chante Parker first and last name:Ariel, [employer] pharmacy      Reason for call:Ariel would like a call back to check on the status of the pts Dexcom paperwork      Callback required yes/no and why:Yes update      Best contact number(s):[phone]      Details to clarify the request:n/a      Ryan Silva

## 2021-07-20 ENCOUNTER — HOSPITAL ENCOUNTER (OUTPATIENT)
Dept: INFUSION THERAPY | Age: 66
Discharge: HOME | End: 2021-07-20
Payer: MEDICARE

## 2021-07-20 VITALS
RESPIRATION RATE: 19 BRPM | OXYGEN SATURATION: 97 % | DIASTOLIC BLOOD PRESSURE: 72 MMHG | BODY MASS INDEX: 28.89 KG/M2 | SYSTOLIC BLOOD PRESSURE: 157 MMHG | HEART RATE: 81 BPM | HEIGHT: 64 IN | WEIGHT: 169.2 LBS | TEMPERATURE: 98.3 F

## 2021-07-20 DIAGNOSIS — C25.9 ADENOCARCINOMA OF PANCREAS (HCC): Primary | ICD-10-CM

## 2021-07-20 LAB
ALBUMIN SERPL-MCNC: 3.2 G/DL (ref 3.5–5)
ALBUMIN/GLOB SERPL: 0.9 {RATIO} (ref 1.1–2.2)
ALP SERPL-CCNC: 105 U/L (ref 45–117)
ALT SERPL-CCNC: 30 U/L (ref 12–78)
ANION GAP SERPL CALC-SCNC: 6 MMOL/L (ref 5–15)
AST SERPL-CCNC: 17 U/L (ref 15–37)
BASOPHILS # BLD: 0 K/UL (ref 0–0.1)
BASOPHILS NFR BLD: 0 % (ref 0–1)
BILIRUB SERPL-MCNC: 0.2 MG/DL (ref 0.2–1)
BUN SERPL-MCNC: 13 MG/DL (ref 6–20)
BUN/CREAT SERPL: 19 (ref 12–20)
CALCIUM SERPL-MCNC: 9 MG/DL (ref 8.5–10.1)
CHLORIDE SERPL-SCNC: 104 MMOL/L (ref 97–108)
CO2 SERPL-SCNC: 29 MMOL/L (ref 21–32)
CREAT SERPL-MCNC: 0.7 MG/DL (ref 0.55–1.02)
DIFFERENTIAL METHOD BLD: ABNORMAL
EOSINOPHIL # BLD: 0 K/UL (ref 0–0.4)
EOSINOPHIL NFR BLD: 0 % (ref 0–7)
ERYTHROCYTE [DISTWIDTH] IN BLOOD BY AUTOMATED COUNT: 15.1 % (ref 11.5–14.5)
GLOBULIN SER CALC-MCNC: 3.6 G/DL (ref 2–4)
GLUCOSE SERPL-MCNC: 254 MG/DL (ref 65–100)
HCT VFR BLD AUTO: 31.4 % (ref 35–47)
HGB BLD-MCNC: 10 G/DL (ref 11.5–16)
IMM GRANULOCYTES # BLD AUTO: 0 K/UL (ref 0–0.04)
IMM GRANULOCYTES NFR BLD AUTO: 0 % (ref 0–0.5)
LYMPHOCYTES # BLD: 2 K/UL (ref 0.8–3.5)
LYMPHOCYTES NFR BLD: 53 % (ref 12–49)
MCH RBC QN AUTO: 27.6 PG (ref 26–34)
MCHC RBC AUTO-ENTMCNC: 31.8 G/DL (ref 30–36.5)
MCV RBC AUTO: 86.7 FL (ref 80–99)
MONOCYTES # BLD: 0.1 K/UL (ref 0–1)
MONOCYTES NFR BLD: 3 % (ref 5–13)
NEUTS SEG # BLD: 1.6 K/UL (ref 1.8–8)
NEUTS SEG NFR BLD: 44 % (ref 32–75)
NRBC # BLD: 0.02 K/UL (ref 0–0.01)
NRBC BLD-RTO: 0.5 PER 100 WBC
PLATELET # BLD AUTO: 154 K/UL (ref 150–400)
PMV BLD AUTO: 10.9 FL (ref 8.9–12.9)
POTASSIUM SERPL-SCNC: 4 MMOL/L (ref 3.5–5.1)
PROT SERPL-MCNC: 6.8 G/DL (ref 6.4–8.2)
RBC # BLD AUTO: 3.62 M/UL (ref 3.8–5.2)
RBC MORPH BLD: ABNORMAL
SODIUM SERPL-SCNC: 139 MMOL/L (ref 136–145)
WBC # BLD AUTO: 3.7 K/UL (ref 3.6–11)

## 2021-07-20 PROCEDURE — 96375 TX/PRO/DX INJ NEW DRUG ADDON: CPT

## 2021-07-20 PROCEDURE — 96413 CHEMO IV INFUSION 1 HR: CPT

## 2021-07-20 PROCEDURE — 74011250636 HC RX REV CODE- 250/636: Performed by: INTERNAL MEDICINE

## 2021-07-20 PROCEDURE — 96417 CHEMO IV INFUS EACH ADDL SEQ: CPT

## 2021-07-20 PROCEDURE — 77030012965 HC NDL HUBR BBMI -A

## 2021-07-20 PROCEDURE — 36415 COLL VENOUS BLD VENIPUNCTURE: CPT

## 2021-07-20 PROCEDURE — 74011000258 HC RX REV CODE- 258: Performed by: INTERNAL MEDICINE

## 2021-07-20 PROCEDURE — 80053 COMPREHEN METABOLIC PANEL: CPT

## 2021-07-20 PROCEDURE — 85025 COMPLETE CBC W/AUTO DIFF WBC: CPT

## 2021-07-20 RX ORDER — DEXAMETHASONE SODIUM PHOSPHATE 4 MG/ML
10 INJECTION, SOLUTION INTRA-ARTICULAR; INTRALESIONAL; INTRAMUSCULAR; INTRAVENOUS; SOFT TISSUE ONCE
Status: COMPLETED | OUTPATIENT
Start: 2021-07-20 | End: 2021-07-20

## 2021-07-20 RX ORDER — ACETAMINOPHEN 325 MG/1
650 TABLET ORAL AS NEEDED
Status: DISCONTINUED | OUTPATIENT
Start: 2021-07-20 | End: 2021-07-21 | Stop reason: HOSPADM

## 2021-07-20 RX ORDER — DIPHENHYDRAMINE HYDROCHLORIDE 50 MG/ML
25 INJECTION, SOLUTION INTRAMUSCULAR; INTRAVENOUS AS NEEDED
Status: DISCONTINUED | OUTPATIENT
Start: 2021-07-20 | End: 2021-07-21 | Stop reason: HOSPADM

## 2021-07-20 RX ORDER — SODIUM CHLORIDE 9 MG/ML
25 INJECTION, SOLUTION INTRAVENOUS CONTINUOUS
Status: DISCONTINUED | OUTPATIENT
Start: 2021-07-20 | End: 2021-07-21 | Stop reason: HOSPADM

## 2021-07-20 RX ORDER — ONDANSETRON 2 MG/ML
8 INJECTION INTRAMUSCULAR; INTRAVENOUS ONCE
Status: COMPLETED | OUTPATIENT
Start: 2021-07-20 | End: 2021-07-20

## 2021-07-20 RX ORDER — HEPARIN 100 UNIT/ML
300-500 SYRINGE INTRAVENOUS AS NEEDED
Status: DISCONTINUED | OUTPATIENT
Start: 2021-07-20 | End: 2021-07-21 | Stop reason: HOSPADM

## 2021-07-20 RX ORDER — ONDANSETRON 2 MG/ML
8 INJECTION INTRAMUSCULAR; INTRAVENOUS AS NEEDED
Status: DISCONTINUED | OUTPATIENT
Start: 2021-07-20 | End: 2021-07-21 | Stop reason: HOSPADM

## 2021-07-20 RX ORDER — SODIUM CHLORIDE 9 MG/ML
10 INJECTION INTRAMUSCULAR; INTRAVENOUS; SUBCUTANEOUS AS NEEDED
Status: DISCONTINUED | OUTPATIENT
Start: 2021-07-20 | End: 2021-07-21 | Stop reason: HOSPADM

## 2021-07-20 RX ADMIN — SODIUM CHLORIDE 10 ML: 9 INJECTION INTRAMUSCULAR; INTRAVENOUS; SUBCUTANEOUS at 15:13

## 2021-07-20 RX ADMIN — PACLITAXEL 184 MG: 100 INJECTION, POWDER, LYOPHILIZED, FOR SUSPENSION INTRAVENOUS at 13:41

## 2021-07-20 RX ADMIN — ONDANSETRON 8 MG: 2 INJECTION INTRAMUSCULAR; INTRAVENOUS at 13:05

## 2021-07-20 RX ADMIN — SODIUM CHLORIDE 25 ML/HR: 9 INJECTION, SOLUTION INTRAVENOUS at 13:01

## 2021-07-20 RX ADMIN — GEMCITABINE 1656 MG: 38 INJECTION, SOLUTION INTRAVENOUS at 14:24

## 2021-07-20 RX ADMIN — Medication 500 UNITS: at 15:13

## 2021-07-20 RX ADMIN — DEXAMETHASONE SODIUM PHOSPHATE 10 MG: 4 INJECTION, SOLUTION INTRAMUSCULAR; INTRAVENOUS at 13:11

## 2021-07-20 NOTE — PROGRESS NOTES
[facility] Progress Note    Date: 2021    Name: Raheel Silva    MRN: [id number]         : 1955    Ms. Ba Arrived ambulatory and in no distress for cycle 5 day 15 of Abraxane/Gemcitabine regimen. Assessment was completed, no acute issues at this time, no new complaints voiced. Appetite is variable, has some soreness of tongue and roof of mouth, spicy foods burn. Discussed using warm salt water or baking soda rinses, a soft tooth brush and avoiding spicy foods. States she has used magic mouthwash in the past and would rather not use that. Has pre-existing neuropathy to feet and hands, states she feels slightly more numbness just to fingertips. States she has fatigue, pain well controlled today. Port accessed without difficulty, labs drawn and in process. Brisk blood return obtained with patient in flat position. Chemotherapy Flowsheet 2021   Cycle C5 D15   Date 2021   Drug / Regimen Abraxane/Gemcitabine   Dosage 184 mg/ 1656 mg   Time Up -   Time Down -   Pre Hydration -   Pre Meds Zofran 8 mg IV, Decadron 10 mg IV   Notes -       Ms. Ba's vitals were reviewed. Visit Vitals  BP (!) 157/72 (BP 1 Location: Left upper arm, BP Patient Position: Sitting)   Pulse 81   Temp 98.3 °F (36.8 °C)   Resp 19   Ht 5' 4\" (1.626 m)   Wt 76.7 kg (169 lb 3.2 oz)   SpO2 97%   BMI 29.04 kg/m²       Lab results were obtained and reviewed.   Recent Results (from the past 12 hour(s))   CBC WITH AUTOMATED DIFF    Collection Time: 21 11:30 AM   Result Value Ref Range    WBC 3.7 3.6 - 11.0 K/uL    RBC 3.62 (L) 3.80 - 5.20 M/uL    HGB 10.0 (L) 11.5 - 16.0 g/dL    HCT 31.4 (L) 35.0 - 47.0 %    MCV 86.7 80.0 - 99.0 FL    MCH 27.6 26.0 - 34.0 PG    MCHC 31.8 30.0 - 36.5 g/dL    RDW 15.1 (H) 11.5 - 14.5 %    PLATELET 812 351 - 050 K/uL    MPV 10.9 8.9 - 12.9 FL    NRBC 0.5 (H) 0  WBC    ABSOLUTE NRBC 0.02 (H) 0.00 - 0.01 K/uL    NEUTROPHILS 44 32 - 75 %    LYMPHOCYTES 53 (H) 12 - 49 %    MONOCYTES 3 (L) 5 - 13 %    EOSINOPHILS 0 0 - 7 %    BASOPHILS 0 0 - 1 %    IMMATURE GRANULOCYTES 0 0.0 - 0.5 %    ABS. NEUTROPHILS 1.6 (L) 1.8 - 8.0 K/UL    ABS. LYMPHOCYTES 2.0 0.8 - 3.5 K/UL    ABS. MONOCYTES 0.1 0.0 - 1.0 K/UL    ABS. EOSINOPHILS 0.0 0.0 - 0.4 K/UL    ABS. BASOPHILS 0.0 0.0 - 0.1 K/UL    ABS. IMM. GRANS. 0.0 0.00 - 0.04 K/UL    DF MANUAL      RBC COMMENTS ANISOCYTOSIS  1+        RBC COMMENTS POIKILOCYTOSIS  3+        RBC COMMENTS SCHISTOCYTES  1+       METABOLIC PANEL, COMPREHENSIVE    Collection Time: 07/20/21 11:30 AM   Result Value Ref Range    Sodium 139 136 - 145 mmol/L    Potassium 4.0 3.5 - 5.1 mmol/L    Chloride 104 97 - 108 mmol/L    CO2 29 21 - 32 mmol/L    Anion gap 6 5 - 15 mmol/L    Glucose 254 (H) 65 - 100 mg/dL    BUN 13 6 - 20 MG/DL    Creatinine 0.70 0.55 - 1.02 MG/DL    BUN/Creatinine ratio 19 12 - 20      GFR est AA >60 >60 ml/min/1.73m2    GFR est non-AA >60 >60 ml/min/1.73m2    Calcium 9.0 8.5 - 10.1 MG/DL    Bilirubin, total 0.2 0.2 - 1.0 MG/DL    ALT (SGPT) 30 12 - 78 U/L    AST (SGOT) 17 15 - 37 U/L    Alk. phosphatase 105 45 - 117 U/L    Protein, total 6.8 6.4 - 8.2 g/dL    Albumin 3.2 (L) 3.5 - 5.0 g/dL    Globulin 3.6 2.0 - 4.0 g/dL    A-G Ratio 0.9 (L) 1.1 - 2.2         Pre-medications  were administered as ordered and chemotherapy was initiated.  ml main line established. Zofran 8 mg IVP given. Decadron 10 mg IVP given. Abraxane 184 mg IV given. Gemcitabine 1656 mg IV given  Port flushed and de-accessed post administration and site intact. Ms. Parrish Pichardo tolerated treatment well and was discharged from [address] in stable condition at 1515. She is to return on August 3 at 1100 for her next appointment.     Ernie Perez, VERITO  July 20, 2021

## 2021-07-20 NOTE — PROGRESS NOTES
Problem: Chemotherapy Treatment  Goal: *Chemotherapy regimen followed  Outcome: Resolved/Met  Goal: *Hemodynamically stable  Outcome: Resolved/Met  Goal: *Tolerating diet  Outcome: Resolved/Met     Problem: Infection - Risk of, Central Venous Catheter-Associated Bloodstream Infection  Goal: *Absence of infection signs and symptoms  Outcome: Resolved/Met     Problem: Patient Education:  Go to Education Activity  Goal: Patient/Family Education  Outcome: Resolved/Met

## 2021-08-03 ENCOUNTER — HOSPITAL ENCOUNTER (OUTPATIENT)
Dept: INFUSION THERAPY | Age: 66
Discharge: HOME | End: 2021-08-03
Payer: MEDICARE

## 2021-08-03 VITALS
BODY MASS INDEX: 29.74 KG/M2 | HEART RATE: 88 BPM | SYSTOLIC BLOOD PRESSURE: 150 MMHG | TEMPERATURE: 98.7 F | OXYGEN SATURATION: 98 % | HEIGHT: 64 IN | RESPIRATION RATE: 19 BRPM | WEIGHT: 174.2 LBS | DIASTOLIC BLOOD PRESSURE: 74 MMHG

## 2021-08-03 DIAGNOSIS — C25.9 ADENOCARCINOMA OF PANCREAS (HCC): Primary | ICD-10-CM

## 2021-08-03 LAB
ALBUMIN SERPL-MCNC: 3.2 G/DL (ref 3.5–5)
ALBUMIN/GLOB SERPL: 0.9 {RATIO} (ref 1.1–2.2)
ALP SERPL-CCNC: 106 U/L (ref 45–117)
ALT SERPL-CCNC: 24 U/L (ref 12–78)
ANION GAP SERPL CALC-SCNC: 8 MMOL/L (ref 5–15)
AST SERPL-CCNC: 16 U/L (ref 15–37)
BASOPHILS # BLD: 0.1 K/UL (ref 0–0.1)
BASOPHILS NFR BLD: 2 % (ref 0–1)
BILIRUB SERPL-MCNC: 0.2 MG/DL (ref 0.2–1)
BUN SERPL-MCNC: 11 MG/DL (ref 6–20)
BUN/CREAT SERPL: 17 (ref 12–20)
CALCIUM SERPL-MCNC: 9 MG/DL (ref 8.5–10.1)
CHLORIDE SERPL-SCNC: 104 MMOL/L (ref 97–108)
CO2 SERPL-SCNC: 29 MMOL/L (ref 21–32)
CREAT SERPL-MCNC: 0.65 MG/DL (ref 0.55–1.02)
DIFFERENTIAL METHOD BLD: ABNORMAL
EOSINOPHIL # BLD: 0.1 K/UL (ref 0–0.4)
EOSINOPHIL NFR BLD: 2 % (ref 0–7)
ERYTHROCYTE [DISTWIDTH] IN BLOOD BY AUTOMATED COUNT: 17.6 % (ref 11.5–14.5)
GLOBULIN SER CALC-MCNC: 3.4 G/DL (ref 2–4)
GLUCOSE SERPL-MCNC: 116 MG/DL (ref 65–100)
HCT VFR BLD AUTO: 32.5 % (ref 35–47)
HGB BLD-MCNC: 10.3 G/DL (ref 11.5–16)
IMM GRANULOCYTES # BLD AUTO: 0.1 K/UL (ref 0–0.04)
IMM GRANULOCYTES NFR BLD AUTO: 1 % (ref 0–0.5)
LYMPHOCYTES # BLD: 2.4 K/UL (ref 0.8–3.5)
LYMPHOCYTES NFR BLD: 36 % (ref 12–49)
MCH RBC QN AUTO: 28.1 PG (ref 26–34)
MCHC RBC AUTO-ENTMCNC: 31.7 G/DL (ref 30–36.5)
MCV RBC AUTO: 88.6 FL (ref 80–99)
MONOCYTES # BLD: 1.7 K/UL (ref 0–1)
MONOCYTES NFR BLD: 25 % (ref 5–13)
NEUTS SEG # BLD: 2.2 K/UL (ref 1.8–8)
NEUTS SEG NFR BLD: 34 % (ref 32–75)
NRBC # BLD: 0 K/UL (ref 0–0.01)
NRBC BLD-RTO: 0 PER 100 WBC
PLATELET # BLD AUTO: 739 K/UL (ref 150–400)
PMV BLD AUTO: 9.8 FL (ref 8.9–12.9)
POTASSIUM SERPL-SCNC: 3.7 MMOL/L (ref 3.5–5.1)
PROT SERPL-MCNC: 6.6 G/DL (ref 6.4–8.2)
RBC # BLD AUTO: 3.67 M/UL (ref 3.8–5.2)
RBC MORPH BLD: ABNORMAL
RBC MORPH BLD: ABNORMAL
SODIUM SERPL-SCNC: 141 MMOL/L (ref 136–145)
WBC # BLD AUTO: 6.6 K/UL (ref 3.6–11)

## 2021-08-03 PROCEDURE — 74011000258 HC RX REV CODE- 258: Performed by: INTERNAL MEDICINE

## 2021-08-03 PROCEDURE — 96375 TX/PRO/DX INJ NEW DRUG ADDON: CPT

## 2021-08-03 PROCEDURE — 77030012965 HC NDL HUBR BBMI -A

## 2021-08-03 PROCEDURE — 85025 COMPLETE CBC W/AUTO DIFF WBC: CPT

## 2021-08-03 PROCEDURE — 96417 CHEMO IV INFUS EACH ADDL SEQ: CPT

## 2021-08-03 PROCEDURE — 80053 COMPREHEN METABOLIC PANEL: CPT

## 2021-08-03 PROCEDURE — 96413 CHEMO IV INFUSION 1 HR: CPT

## 2021-08-03 PROCEDURE — 36415 COLL VENOUS BLD VENIPUNCTURE: CPT

## 2021-08-03 PROCEDURE — 86301 IMMUNOASSAY TUMOR CA 19-9: CPT

## 2021-08-03 PROCEDURE — 74011250636 HC RX REV CODE- 250/636: Performed by: INTERNAL MEDICINE

## 2021-08-03 RX ORDER — SODIUM CHLORIDE 9 MG/ML
25 INJECTION, SOLUTION INTRAVENOUS CONTINUOUS
Status: DISCONTINUED | OUTPATIENT
Start: 2021-08-03 | End: 2021-08-04 | Stop reason: HOSPADM

## 2021-08-03 RX ORDER — DIPHENHYDRAMINE HYDROCHLORIDE 50 MG/ML
25 INJECTION, SOLUTION INTRAMUSCULAR; INTRAVENOUS AS NEEDED
Status: DISCONTINUED | OUTPATIENT
Start: 2021-08-03 | End: 2021-08-04 | Stop reason: HOSPADM

## 2021-08-03 RX ORDER — ONDANSETRON 2 MG/ML
8 INJECTION INTRAMUSCULAR; INTRAVENOUS AS NEEDED
Status: DISCONTINUED | OUTPATIENT
Start: 2021-08-03 | End: 2021-08-04 | Stop reason: HOSPADM

## 2021-08-03 RX ORDER — ONDANSETRON 2 MG/ML
8 INJECTION INTRAMUSCULAR; INTRAVENOUS ONCE
Status: COMPLETED | OUTPATIENT
Start: 2021-08-03 | End: 2021-08-03

## 2021-08-03 RX ORDER — SODIUM CHLORIDE 0.9 % (FLUSH) 0.9 %
10 SYRINGE (ML) INJECTION AS NEEDED
Status: DISCONTINUED | OUTPATIENT
Start: 2021-08-03 | End: 2021-08-04 | Stop reason: HOSPADM

## 2021-08-03 RX ORDER — ACETAMINOPHEN 325 MG/1
650 TABLET ORAL AS NEEDED
Status: DISCONTINUED | OUTPATIENT
Start: 2021-08-03 | End: 2021-08-04 | Stop reason: HOSPADM

## 2021-08-03 RX ORDER — HEPARIN 100 UNIT/ML
300-500 SYRINGE INTRAVENOUS AS NEEDED
Status: DISCONTINUED | OUTPATIENT
Start: 2021-08-03 | End: 2021-08-04 | Stop reason: HOSPADM

## 2021-08-03 RX ORDER — DEXAMETHASONE SODIUM PHOSPHATE 4 MG/ML
10 INJECTION, SOLUTION INTRA-ARTICULAR; INTRALESIONAL; INTRAMUSCULAR; INTRAVENOUS; SOFT TISSUE ONCE
Status: COMPLETED | OUTPATIENT
Start: 2021-08-03 | End: 2021-08-03

## 2021-08-03 RX ADMIN — Medication 10 ML: at 14:27

## 2021-08-03 RX ADMIN — Medication 500 UNITS: at 14:27

## 2021-08-03 RX ADMIN — SODIUM CHLORIDE 25 ML/HR: 9 INJECTION, SOLUTION INTRAVENOUS at 12:30

## 2021-08-03 RX ADMIN — GEMCITABINE 1656 MG: 38 INJECTION, SOLUTION INTRAVENOUS at 13:48

## 2021-08-03 RX ADMIN — PACLITAXEL 184 MG: 100 INJECTION, POWDER, LYOPHILIZED, FOR SUSPENSION INTRAVENOUS at 13:02

## 2021-08-03 RX ADMIN — DEXAMETHASONE SODIUM PHOSPHATE 10 MG: 4 INJECTION, SOLUTION INTRAMUSCULAR; INTRAVENOUS at 12:37

## 2021-08-03 RX ADMIN — ONDANSETRON 8 MG: 2 INJECTION INTRAMUSCULAR; INTRAVENOUS at 12:35

## 2021-08-03 NOTE — DISCHARGE INSTRUCTIONS

## 2021-08-03 NOTE — PROGRESS NOTES
Brijesh Norris is a 77 y.o. female here for f/u pancreatic cancer. Patient was seen in ED on 6/24/2021 for Fever. Patient received her C6D1 chemo  on 8/3/2021. Patient states the day after chemo she is uncomfortable in lower abdomen, and down in to rectum, states it is like being on her period. She continues to have constant pain in her lower back rates 3-4, she has developed sciatic pain in L hip down into leg. Key Oncology Meds             ondansetron (ZOFRAN ODT) 4 mg disintegrating tablet Take 1 Tab by mouth every eight (8) hours as needed for Nausea or Vomiting. prochlorperazine (COMPAZINE) 10 mg tablet Take 0.5 Tabs by mouth every six (6) hours as needed for Nausea. Key Pain Meds             HYDROmorphone (Dilaudid) 2 mg tablet Take 0.5 mg by mouth two (2) times a day. takes 3 tabs  by mouth, taking BID and PRN    acetaminophen (Acetaminophen Extra Strength) 500 mg tablet Take 1,000 mg by mouth daily as needed.         Chemotherapy Flowsheet 7/20/2021   Cycle C5 D15   Date 7/20/2021   Drug / Regimen Abraxane/Gemcitabine   Dosage 184 mg/ 1656 mg   Time Up -   Time Down -   Pre Hydration -   Pre Meds Zofran 8 mg IV, Decadron 10 mg IV   Notes -

## 2021-08-03 NOTE — PROGRESS NOTES
[facility] Progress Note    Date: August 3, 2021    Name: Krish Vyas    MRN: [id number]         : 1955    Ms. Ba Arrived ambulatory and in no distress for cycle 6 day 1 of Abraxane/Gemcitabine regimen. Assessment was completed. Patient complains of the right side of her mouth numb, she feels like she's drooling. She is not drooling. She also reports radiating pain down her left leg from her left hip, especially with ambulation. She describes it as sciatica type pain and rates it at a #5 (0-10) scale. Continues to have chronic lower back pain. She reports pressure type sensation to her abdomen the day following chemotherapy then resolves. She also has been experiencing lower leg cramps. Port a Cat[place] accessed without difficulty, blood return absent. Flushes easily. Labs drawn and in process. Chemotherapy Flowsheet 8/3/2021   Cycle C6D1   Date 8/3/2021   Drug / Regimen Abraxane/Gemcitabine   Dosage see MAR   Time Up 1302   Time Down 1426   Pre Hydration -   Pre Meds Franki 8 mg IVP/Decadron 10 mg IVP   Notes -       Ms. Ba's vitals were reviewed. Visit Vitals  BP (!) 150/74 (BP 1 Location: Left upper arm, BP Patient Position: Sitting)   Pulse 88   Temp 98.7 °F (37.1 °C)   Resp 19   Ht 5' 4\" (1.626 m)   Wt 79 kg (174 lb 3.2 oz)   SpO2 98%   Breastfeeding No   BMI 29.90 kg/m²       Lab results were obtained and reviewed.   Recent Results (from the past 12 hour(s))   CBC WITH AUTOMATED DIFF    Collection Time: 21 11:32 AM   Result Value Ref Range    WBC 6.6 3.6 - 11.0 K/uL    RBC 3.67 (L) 3.80 - 5.20 M/uL    HGB 10.3 (L) 11.5 - 16.0 g/dL    HCT 32.5 (L) 35.0 - 47.0 %    MCV 88.6 80.0 - 99.0 FL    MCH 28.1 26.0 - 34.0 PG    MCHC 31.7 30.0 - 36.5 g/dL    RDW 17.6 (H) 11.5 - 14.5 %    PLATELET 744 (H) 308 - 400 K/uL    MPV 9.8 8.9 - 12.9 FL    NRBC 0.0 0  WBC    ABSOLUTE NRBC 0.00 0.00 - 0.01 K/uL    NEUTROPHILS 34 32 - 75 %    LYMPHOCYTES 36 12 - 49 %    MONOCYTES 25 (H) 5 - 13 %    EOSINOPHILS 2 0 - 7 %    BASOPHILS 2 (H) 0 - 1 %    IMMATURE GRANULOCYTES 1 (H) 0.0 - 0.5 %    ABS. NEUTROPHILS 2.2 1.8 - 8.0 K/UL    ABS. LYMPHOCYTES 2.4 0.8 - 3.5 K/UL    ABS. MONOCYTES 1.7 (H) 0.0 - 1.0 K/UL    ABS. EOSINOPHILS 0.1 0.0 - 0.4 K/UL    ABS. BASOPHILS 0.1 0.0 - 0.1 K/UL    ABS. IMM. GRANS. 0.1 (H) 0.00 - 0.04 K/UL    DF AUTOMATED      RBC COMMENTS ANISOCYTOSIS  1+        RBC COMMENTS POIKILOCYTOSIS  1+       METABOLIC PANEL, COMPREHENSIVE    Collection Time: 08/03/21 11:32 AM   Result Value Ref Range    Sodium 141 136 - 145 mmol/L    Potassium 3.7 3.5 - 5.1 mmol/L    Chloride 104 97 - 108 mmol/L    CO2 29 21 - 32 mmol/L    Anion gap 8 5 - 15 mmol/L    Glucose 116 (H) 65 - 100 mg/dL    BUN 11 6 - 20 MG/DL    Creatinine 0.65 0.55 - 1.02 MG/DL    BUN/Creatinine ratio 17 12 - 20      GFR est AA >60 >60 ml/min/1.73m2    GFR est non-AA >60 >60 ml/min/1.73m2    Calcium 9.0 8.5 - 10.1 MG/DL    Bilirubin, total 0.2 0.2 - 1.0 MG/DL    ALT (SGPT) 24 12 - 78 U/L    AST (SGOT) 16 15 - 37 U/L    Alk. phosphatase 106 45 - 117 U/L    Protein, total 6.6 6.4 - 8.2 g/dL    Albumin 3.2 (L) 3.5 - 5.0 g/dL    Globulin 3.4 2.0 - 4.0 g/dL    A-G Ratio 0.9 (L) 1.1 - 2.2        ml IV free flowing. Zofran 8 mg IVP. Dexamethasone 10 mg IVP. NS to [facility].    1302: Abraxane 184 mg IV initiated to infuse over 30 minutes via pump. 1348: Gemcitabine 1656 mg IV initiated to infuse over 30 minutes via pump. 1426: Gemcitabine infusion completed and discontinued. Line and port flushed, needle removed and band aid to site. Site without redness, swelling or pain. Ms. Eliza Nicole tolerated treatment well and was discharged from [place] [id number] in stable condition at 1435. She is to return on August 10. 2021 at 1100 for her next appointment.     Candi Cordova RN  August 3, 2021

## 2021-08-04 ENCOUNTER — OFFICE VISIT (OUTPATIENT)
Dept: ONCOLOGY | Age: 66
End: 2021-08-04
Payer: MEDICARE

## 2021-08-04 ENCOUNTER — HOSPITAL ENCOUNTER (OUTPATIENT)
Dept: INFUSION THERAPY | Age: 66
Discharge: HOME | End: 2021-08-04
Payer: MEDICARE

## 2021-08-04 VITALS
RESPIRATION RATE: 16 BRPM | HEART RATE: 90 BPM | BODY MASS INDEX: 29.88 KG/M2 | HEIGHT: 64 IN | SYSTOLIC BLOOD PRESSURE: 116 MMHG | TEMPERATURE: 98.8 F | OXYGEN SATURATION: 96 % | WEIGHT: 175 LBS | DIASTOLIC BLOOD PRESSURE: 61 MMHG

## 2021-08-04 DIAGNOSIS — E61.1 IRON DEFICIENCY: ICD-10-CM

## 2021-08-04 DIAGNOSIS — R30.0 DYSURIA: ICD-10-CM

## 2021-08-04 DIAGNOSIS — C25.9 ADENOCARCINOMA OF PANCREAS (HCC): ICD-10-CM

## 2021-08-04 DIAGNOSIS — E61.1 IRON DEFICIENCY: Primary | ICD-10-CM

## 2021-08-04 LAB
CANCER AG19-9 SERPL-ACNC: 119 U/ML (ref 0–35)
FERRITIN SERPL-MCNC: 199 NG/ML (ref 8–252)
IRON SATN MFR SERPL: 70 % (ref 20–50)
IRON SERPL-MCNC: 228 UG/DL (ref 50–170)
TIBC SERPL-MCNC: 327 UG/DL (ref 250–450)

## 2021-08-04 PROCEDURE — 82728 ASSAY OF FERRITIN: CPT

## 2021-08-04 PROCEDURE — 1100F PTFALLS ASSESS-DOCD GE2>/YR: CPT | Performed by: INTERNAL MEDICINE

## 2021-08-04 PROCEDURE — 36415 COLL VENOUS BLD VENIPUNCTURE: CPT

## 2021-08-04 PROCEDURE — 1090F PRES/ABSN URINE INCON ASSESS: CPT | Performed by: INTERNAL MEDICINE

## 2021-08-04 PROCEDURE — G8417 CALC BMI ABV UP PARAM F/U: HCPCS | Performed by: INTERNAL MEDICINE

## 2021-08-04 PROCEDURE — 3017F COLORECTAL CA SCREEN DOC REV: CPT | Performed by: INTERNAL MEDICINE

## 2021-08-04 PROCEDURE — 3288F FALL RISK ASSESSMENT DOCD: CPT | Performed by: INTERNAL MEDICINE

## 2021-08-04 PROCEDURE — G8399 PT W/DXA RESULTS DOCUMENT: HCPCS | Performed by: INTERNAL MEDICINE

## 2021-08-04 PROCEDURE — 99214 OFFICE O/P EST MOD 30 MIN: CPT | Performed by: INTERNAL MEDICINE

## 2021-08-04 PROCEDURE — G8536 NO DOC ELDER MAL SCRN: HCPCS | Performed by: INTERNAL MEDICINE

## 2021-08-04 PROCEDURE — 83540 ASSAY OF IRON: CPT

## 2021-08-04 PROCEDURE — G8510 SCR DEP NEG, NO PLAN REQD: HCPCS | Performed by: INTERNAL MEDICINE

## 2021-08-04 PROCEDURE — G8754 DIAS BP LESS 90: HCPCS | Performed by: INTERNAL MEDICINE

## 2021-08-04 PROCEDURE — G8752 SYS BP LESS 140: HCPCS | Performed by: INTERNAL MEDICINE

## 2021-08-04 PROCEDURE — G8427 DOCREV CUR MEDS BY ELIG CLIN: HCPCS | Performed by: INTERNAL MEDICINE

## 2021-08-04 RX ORDER — NYSTATIN 100000 [USP'U]/G
1 POWDER TOPICAL 3 TIMES DAILY
Qty: 60 G | Refills: 1 | Status: ON HOLD | OUTPATIENT
Start: 2021-08-04 | End: 2022-07-23

## 2021-08-04 RX ORDER — LIDOCAINE AND PRILOCAINE 25; 25 MG/G; MG/G
CREAM TOPICAL AS NEEDED
Qty: 30 G | Refills: 5 | Status: ON HOLD | OUTPATIENT
Start: 2021-08-04 | End: 2022-07-23

## 2021-08-04 NOTE — PROGRESS NOTES
Reason for Visit:   Asim Ellison a 56 I. o. female who is seen for pancreatic adenocarcinoma     Treatment History:   Dx: Pancreatic Adenocarcinoma--May 2020--[id number]--uwkdvjivagm of splenic vein and superior mesenteric vein  Tx: mFOLFIRINOX--first cycle 5/26/2020, second cycle 6/10/2020, dose reduced 15% cycle 3 on 6/30/2020--delayed due to severe side effects--switched to Gemcitabine/Abraxane--Cycle #1 7/29/2002, Cycle #2 9/2/2020, Cycle #3 10/7/2020, Cycle #4 11/4/2020. Neoadjuvant Radiation with Dr Sadaf Ely ending 12/18/2021. Distal Pancreatectomy, Splenectomy, and Civa Sheet with Dr Jeffry Jacob on 3/17/2021. Adjuvant radiation with Dr Sadaf Ely. Restart Gemcitabine/abraxane Cycle #1 7/6/2021, Cycle #2 8/3/2021  Goal: Prolong life--Palliative    History of Present Illness:   Doing ok, no major issue, brought in graph of BG. Mood up today due to the dexamethasone. Has decent energy levels. Takes nap when tired. Weight is up a little. Pain in mid abd--had prior--hydromorphone helping. No constipation. Sciatica pain.     Past Medical History:   Diagnosis Date    Adenocarcinoma of pancreas ([facility] [address] 05/13/2020    Dr Miguel Green biopsy via EUS    Diabetes ([facility] [address]     GERD (gastroesophageal reflux disease)     Hernia of abdominal cavity     Subxyphoid hernia    Hypertension     Inflammatory polyarthropathy (HCC)     Joint pain     Joint swelling     Menopause     Ocular nevus of left eye     Pancreatitis     Tracheal stenosis       Past Surgical History:   Procedure Laterality Date    HX CARPAL TUNNEL RELEASE Bilateral     HX COLONOSCOPY      HX HYSTERECTOMY      HX KNEE ARTHROSCOPY Right     HX KNEE REPLACEMENT Right     partial    HX LAP CHOLECYSTECTOMY      HX TONSILLECTOMY      HX VASCULAR ACCESS        Social History     Tobacco Use    Smoking status: Never Smoker    Smokeless tobacco: Never Used   Substance Use Topics    Alcohol use: No     Alcohol/week: 0.0 standard drinks      Family History   Problem Relation Age of Onset    Heart Disease Mother     Heart Attack Mother     Cancer Father         lung    Diabetes Sister      Current Outpatient Medications   Medication Sig    gabapentin (NEURONTIN) 300 mg capsule Take 2 Caps by mouth three (3) times daily. Max Daily Amount: 1,800 mg. Indications: neuropathic pain    HumaLOG KwikPen Insulin 100 unit/mL kwikpen patient on sliding scale    methylphenidate HCl (Ritalin) 5 mg tablet Take 1 Tablet by mouth three (3) times daily. Max Daily Amount: 15 mg.  LORazepam (ATIVAN) 1 mg tablet Take 1 Tablet by mouth every six (6) hours as needed for Anxiety. Max Daily Amount: 4 mg. Indications: nausea and vomiting caused by cancer drugs (Patient taking differently: Take 1 mg by mouth nightly. Indications: nausea and vomiting caused by cancer drugs)    DULoxetine (CYMBALTA) 20 mg capsule Take 1 Capsule by mouth two (2) times a day.  multivit-min/folic CKYY/SSF774 (ALIVE WOMEN'S GUMMY VITAMIN PO) Take 2 Each by mouth daily.  insulin glargine U-300 conc (Toujeo SoloStar U-300 Insulin) 300 unit/mL (1.5 mL) inpn pen INJECT 55 UNITS ONCE DAILY AT BEDTIME - DOSE INCREASED (Patient taking differently: 18-25 Units by SubCUTAneous route nightly. 18 units at night)    hydrocortisone (ANUSOL-HC) 25 mg supp Insert 1 Suppository into rectum nightly.  sennosides (Senna) 8.6 mg cap Take 1-2 Tablets by mouth two (2) times a day.  HYDROmorphone (Dilaudid) 2 mg tablet Take 0.5 mg by mouth two (2) times a day. takes 3 tabs  by mouth, taking BID and PRN    lipase-protease-amylase (Creon) 12,000-38,000 -60,000 unit capsule Take 3 Caps by mouth three (3) times daily (with meals). Indications: exocrine pancreatic insufficiency (Patient taking differently: Take 2 Caps by mouth three (3) times daily (with meals).  2 caps with meal and 1 with snacks  Indications: exocrine pancreatic insufficiency)    lidocaine-prilocaine (EMLA) topical cream Apply  to affected area as needed for Pain (pain ). Apply a thin layer over port site prior to accessing port    diphenhydrAMINE (BENADRYL) 25 mg tablet Take 25 mg by mouth nightly as needed for Itching or Skin Irritation.  nystatin (MYCOSTATIN) powder Apply 1 g to affected area three (3) times daily.  ondansetron (ZOFRAN ODT) 4 mg disintegrating tablet Take 1 Tab by mouth every eight (8) hours as needed for Nausea or Vomiting.  acetaminophen (Acetaminophen Extra Strength) 500 mg tablet Take 1,000 mg by mouth daily as needed.  levoFLOXacin (Levaquin) 500 mg tablet Take 1 Tablet by mouth daily. (Patient not taking: Reported on 7/13/2021)    Blood-Glucose Transmitter (Dexcom G6 Transmitter) fadi Use as directed to monitor blood glucose as directed (Patient not taking: Reported on 8/4/2021)    Blood-Glucose Sensor (Dexcom G6 Sensor) fadi Use as directed to monitor blood glucose as directed (Patient not taking: Reported on 8/4/2021)    Blood-Glucose Meter,Continuous (Dexcom G6 ) misc Use as directed to monitor blood glucose as directed (Patient not taking: Reported on 8/4/2021)    potassium chloride (K-DUR, KLOR-CON) 20 mEq tablet Take 1 Tab by mouth two (2) times a day. (Patient not taking: Reported on 7/13/2021)    famotidine (Pepcid) 20 mg tablet Take 20 mg by mouth two (2) times a day. (Patient not taking: Reported on 6/11/2021)    polyethylene glycol (Miralax) 17 gram/dose powder Take 17 g by mouth daily as needed. (Patient not taking: Reported on 8/4/2021)    carvediloL (COREG) 25 mg tablet TAKE 1 TABLET BY MOUTH TWICE DAILY (Patient not taking: Reported on 7/13/2021)    promethazine (PHENERGAN) 25 mg tablet Take 1 Tab by mouth every six (6) hours as needed for Nausea. (Patient not taking: Reported on 6/23/2021)    diphenoxylate-atropine (LomotiL) 2.5-0.025 mg per tablet Take 2 Tabs by mouth four (4) times daily as needed for Diarrhea. Max Daily Amount: 8 Tabs.  (Patient not taking: Reported on 6/23/2021)    prochlorperazine (COMPAZINE) 10 mg tablet Take 0.5 Tabs by mouth every six (6) hours as needed for Nausea. (Patient not taking: Reported on 6/23/2021)    Needle, Disp, ndle 1 Each by Does Not Apply route two (2) times daily as needed for Other. (Patient not taking: Reported on 8/4/2021)     No current facility-administered medications for this visit. No Known Allergies     Review of Systems: A complete review of systems was obtained, negative except as described above. Physical Exam:     Visit Vitals  /61   Pulse 90   Temp 98.8 °F (37.1 °C) (Oral)   Resp 16   Ht 5' 4\" (1.626 m)   Wt 175 lb (79.4 kg)   SpO2 96%   BMI 30.04 kg/m²     ECOG PS: 0  General: No distress  Eyes: PERRLA, anicteric sclerae  HENT: Atraumatic, OP clear  Neck: Supple  Lymphatic: No cervical, supraclavicular, or inguinal adenopathy  Respiratory: CTAB, normal respiratory effort  CV: Normal rate, regular rhythm, no murmurs, no peripheral edema  GI: Soft, nontender, nondistended, no masses, no hepatomegaly, no splenomegaly  MS: Normal gait and station. Digits without clubbing or cyanosis. Skin: No rashes, ecchymoses, or petechiae. Normal temperature, turgor, and texture. Psych: Alert, oriented, appropriate affect, normal judgment/insight    Results:     Lab Results   Component Value Date/Time    WBC 6.6 08/03/2021 11:32 AM    HGB 10.3 (L) 08/03/2021 11:32 AM    HCT 32.5 (L) 08/03/2021 11:32 AM    PLATELET 219 (H) 61/23/5194 11:32 AM    MCV 88.6 08/03/2021 11:32 AM    ABS.  NEUTROPHILS 2.2 08/03/2021 11:32 AM     Lab Results   Component Value Date/Time    Sodium 141 08/03/2021 11:32 AM    Potassium 3.7 08/03/2021 11:32 AM    Chloride 104 08/03/2021 11:32 AM    CO2 29 08/03/2021 11:32 AM    Glucose 116 (H) 08/03/2021 11:32 AM    BUN 11 08/03/2021 11:32 AM    Creatinine 0.65 08/03/2021 11:32 AM    GFR est AA >60 08/03/2021 11:32 AM    GFR est non-AA >60 08/03/2021 11:32 AM    Calcium 9.0 08/03/2021 11:32 AM    Glucose (POC) 192 (H) 07/15/2020 11:56 AM    Creatinine (POC) 0.7 02/06/2013 10:21 AM     Lab Results   Component Value Date/Time    Bilirubin, total 0.2 08/03/2021 11:32 AM    ALT (SGPT) 24 08/03/2021 11:32 AM    Alk. phosphatase 106 08/03/2021 11:32 AM    Protein, total 6.6 08/03/2021 11:32 AM    Albumin 3.2 (L) 08/03/2021 11:32 AM    Globulin 3.4 08/03/2021 11:32 AM       CT A/P 4/30/2020  IMPRESSION: Suspect neoplastic pancreatic mass versus atypical focal  pancreatitis with splenic and superior mesenteric vein occlusion. Consider  further evaluation with endoscopic ultrasound at which time biopsy could  potentially be performed.     CT Chest 5/21/2020  IMPRESSION:  1. No evidence of pulmonary metastatic disease. No evidence of mediastinal or  hilar lymphadenopathy. No pleural effusions identified. 2. No definite evidence of central pulmonary embolic disease. 3. Presence of a mass lesion involving the pancreatic head/body. 4. Evidence of fatty infiltration involving the liver. Presence of focal  low-density areas within the liver compatible with cysts. Surgical absence of  the gallbladder.     CT A/P 6/19/2020  IMPRESSION:  Pancreatitis with ascites favored. Discrete pancreatic mass is not identified. No biliary dilatation or definite arterial or venous thrombosis. No liver  metastases. Fat-containing ventral hernia  Nonobstructing left renal calculus  Retrolisthesis of L2 and L3.     CT C/A/P 10/12/2020  IMPRESSION:  1. Interval improvement in appearance of the pancreas (see discussion above). 2. Normal sized liver. Stable appearance of the previously described areas of  decreased attenuation within liver. 3. Surgical absence of the gallbladder. 4. Evidence of splenomegaly. 5. Normal sized kidneys. Presence of small, nonobstructing calculi within the  left kidney. No evidence of hydronephrotic change. 6. Suboptimal distention of the urinary bladder.   7. Presence of a small, fat-containing ventral hernia in the mid abdominal  region.     Path: 5/8/2020  CYTOLOGIC INTERPRETATION:   Pancreas, EUS-guided fine needle aspiration and cell blocks: Adenocarcinoma     Path: 3/17/2021  No residual malignancy in pancrease, 0/29 lymph nodes. 2-3mm focus metastatic adenocarcinoma within hernia sac     Assessment:   1) Pancreatic Adenocarcinoma--Unresectable  2) Neoplasm Pain/Neuropathic Pain  3) Fatigue  4) Nausea  5) DM  6) Nutrition  7) Diarrhea  8) Hypotension     Plan:   1) Completed neoadjuvant chemotherapy. Radiation with Dr Junaid Buchanan. Definitive resection with Dr Torsten Solomon focus of adenocarcinoma in ventral hernia. Now peritoneal disease/mets. She does want palliative chemotherapy with Gemcitaine/Abraxane. Will continue---get scans after total 3 cycles--need to have [facility] scans loaded so our Radiologist can compare.       2) S/p celiac block--pain controlled.       3) Methylphenidate 5mg bid--has helped.       4) Resolved.    5) Defer to Dr Paulie Alford for further management.       6) On pancreatic enzymes--weight has stabilized.    7) Chemo related--hopefully with not recurr.  Taking senna/miralax to prevent constipation.     8) Resolved currently      Signed By: Evonne Whitney MD

## 2021-08-05 ENCOUNTER — OFFICE VISIT (OUTPATIENT)
Dept: ONCOLOGY | Age: 66
End: 2021-08-05
Payer: MEDICARE

## 2021-08-05 ENCOUNTER — HOSPITAL ENCOUNTER (OUTPATIENT)
Dept: CT IMAGING | Age: 66
Discharge: HOME | End: 2021-08-05
Attending: INTERNAL MEDICINE
Payer: MEDICARE

## 2021-08-05 ENCOUNTER — HOSPITAL ENCOUNTER (OUTPATIENT)
Dept: INFUSION THERAPY | Age: 66
Discharge: HOME | End: 2021-08-05
Payer: MEDICARE

## 2021-08-05 VITALS
BODY MASS INDEX: 30.44 KG/M2 | WEIGHT: 178.3 LBS | HEART RATE: 95 BPM | SYSTOLIC BLOOD PRESSURE: 114 MMHG | TEMPERATURE: 100.3 F | RESPIRATION RATE: 16 BRPM | HEIGHT: 64 IN | OXYGEN SATURATION: 95 % | DIASTOLIC BLOOD PRESSURE: 75 MMHG

## 2021-08-05 VITALS
BODY MASS INDEX: 30.61 KG/M2 | WEIGHT: 178.3 LBS | TEMPERATURE: 98.5 F | DIASTOLIC BLOOD PRESSURE: 60 MMHG | OXYGEN SATURATION: 95 % | SYSTOLIC BLOOD PRESSURE: 129 MMHG | HEART RATE: 80 BPM | RESPIRATION RATE: 16 BRPM

## 2021-08-05 DIAGNOSIS — C25.9 ADENOCARCINOMA OF PANCREAS (HCC): ICD-10-CM

## 2021-08-05 DIAGNOSIS — C25.9 MALIGNANT NEOPLASM OF PANCREAS, UNSPECIFIED LOCATION OF MALIGNANCY (HCC): ICD-10-CM

## 2021-08-05 DIAGNOSIS — C25.9 MALIGNANT NEOPLASM OF PANCREAS, UNSPECIFIED LOCATION OF MALIGNANCY (HCC): Primary | ICD-10-CM

## 2021-08-05 LAB
ALBUMIN SERPL-MCNC: 3.3 G/DL (ref 3.5–5)
ALBUMIN/GLOB SERPL: 1 {RATIO} (ref 1.1–2.2)
ALP SERPL-CCNC: 87 U/L (ref 45–117)
ALT SERPL-CCNC: 32 U/L (ref 12–78)
ANION GAP SERPL CALC-SCNC: 8 MMOL/L (ref 5–15)
AST SERPL-CCNC: 30 U/L (ref 15–37)
BASOPHILS # BLD: 0 K/UL (ref 0–0.1)
BASOPHILS NFR BLD: 0 % (ref 0–1)
BILIRUB DIRECT SERPL-MCNC: 0.1 MG/DL (ref 0–0.2)
BILIRUB SERPL-MCNC: 0.6 MG/DL (ref 0.2–1)
BUN SERPL-MCNC: 17 MG/DL (ref 6–20)
BUN/CREAT SERPL: 16 (ref 12–20)
CALCIUM SERPL-MCNC: 8.8 MG/DL (ref 8.5–10.1)
CHLORIDE SERPL-SCNC: 100 MMOL/L (ref 97–108)
CO2 SERPL-SCNC: 31 MMOL/L (ref 21–32)
CREAT SERPL-MCNC: 1.07 MG/DL (ref 0.55–1.02)
DIFFERENTIAL METHOD BLD: ABNORMAL
EOSINOPHIL # BLD: 0 K/UL (ref 0–0.4)
EOSINOPHIL NFR BLD: 0 % (ref 0–7)
ERYTHROCYTE [DISTWIDTH] IN BLOOD BY AUTOMATED COUNT: 17.7 % (ref 11.5–14.5)
GLOBULIN SER CALC-MCNC: 3.3 G/DL (ref 2–4)
GLUCOSE SERPL-MCNC: 179 MG/DL (ref 65–100)
HCT VFR BLD AUTO: 32.3 % (ref 35–47)
HGB BLD-MCNC: 10.4 G/DL (ref 11.5–16)
IMM GRANULOCYTES # BLD AUTO: 0 K/UL (ref 0–0.04)
IMM GRANULOCYTES NFR BLD AUTO: 0 % (ref 0–0.5)
LACTATE SERPL-SCNC: 1 MMOL/L (ref 0.4–2)
LYMPHOCYTES # BLD: 1.2 K/UL (ref 0.8–3.5)
LYMPHOCYTES NFR BLD: 11 % (ref 12–49)
MCH RBC QN AUTO: 28.1 PG (ref 26–34)
MCHC RBC AUTO-ENTMCNC: 32.2 G/DL (ref 30–36.5)
MCV RBC AUTO: 87.3 FL (ref 80–99)
MONOCYTES # BLD: 0.3 K/UL (ref 0–1)
MONOCYTES NFR BLD: 3 % (ref 5–13)
NEUTS SEG # BLD: 9.3 K/UL (ref 1.8–8)
NEUTS SEG NFR BLD: 86 % (ref 32–75)
NRBC # BLD: 0 K/UL (ref 0–0.01)
NRBC BLD-RTO: 0 PER 100 WBC
PLATELET # BLD AUTO: 660 K/UL (ref 150–400)
PMV BLD AUTO: 10.4 FL (ref 8.9–12.9)
POTASSIUM SERPL-SCNC: 4.1 MMOL/L (ref 3.5–5.1)
PROT SERPL-MCNC: 6.6 G/DL (ref 6.4–8.2)
RBC # BLD AUTO: 3.7 M/UL (ref 3.8–5.2)
RBC MORPH BLD: ABNORMAL
SODIUM SERPL-SCNC: 139 MMOL/L (ref 136–145)
WBC # BLD AUTO: 10.8 K/UL (ref 3.6–11)

## 2021-08-05 PROCEDURE — 96361 HYDRATE IV INFUSION ADD-ON: CPT

## 2021-08-05 PROCEDURE — G8427 DOCREV CUR MEDS BY ELIG CLIN: HCPCS | Performed by: INTERNAL MEDICINE

## 2021-08-05 PROCEDURE — 85025 COMPLETE CBC W/AUTO DIFF WBC: CPT

## 2021-08-05 PROCEDURE — G8510 SCR DEP NEG, NO PLAN REQD: HCPCS | Performed by: INTERNAL MEDICINE

## 2021-08-05 PROCEDURE — 83605 ASSAY OF LACTIC ACID: CPT

## 2021-08-05 PROCEDURE — 80076 HEPATIC FUNCTION PANEL: CPT

## 2021-08-05 PROCEDURE — G8536 NO DOC ELDER MAL SCRN: HCPCS | Performed by: INTERNAL MEDICINE

## 2021-08-05 PROCEDURE — 1090F PRES/ABSN URINE INCON ASSESS: CPT | Performed by: INTERNAL MEDICINE

## 2021-08-05 PROCEDURE — 3017F COLORECTAL CA SCREEN DOC REV: CPT | Performed by: INTERNAL MEDICINE

## 2021-08-05 PROCEDURE — 74011000636 HC RX REV CODE- 636: Performed by: INTERNAL MEDICINE

## 2021-08-05 PROCEDURE — G8417 CALC BMI ABV UP PARAM F/U: HCPCS | Performed by: INTERNAL MEDICINE

## 2021-08-05 PROCEDURE — G8754 DIAS BP LESS 90: HCPCS | Performed by: INTERNAL MEDICINE

## 2021-08-05 PROCEDURE — 74011250636 HC RX REV CODE- 250/636: Performed by: INTERNAL MEDICINE

## 2021-08-05 PROCEDURE — 36415 COLL VENOUS BLD VENIPUNCTURE: CPT

## 2021-08-05 PROCEDURE — 99214 OFFICE O/P EST MOD 30 MIN: CPT | Performed by: INTERNAL MEDICINE

## 2021-08-05 PROCEDURE — 74177 CT ABD & PELVIS W/CONTRAST: CPT

## 2021-08-05 PROCEDURE — 3288F FALL RISK ASSESSMENT DOCD: CPT | Performed by: INTERNAL MEDICINE

## 2021-08-05 PROCEDURE — 77030012965 HC NDL HUBR BBMI -A

## 2021-08-05 PROCEDURE — 80048 BASIC METABOLIC PNL TOTAL CA: CPT

## 2021-08-05 PROCEDURE — G8399 PT W/DXA RESULTS DOCUMENT: HCPCS | Performed by: INTERNAL MEDICINE

## 2021-08-05 PROCEDURE — G8752 SYS BP LESS 140: HCPCS | Performed by: INTERNAL MEDICINE

## 2021-08-05 PROCEDURE — 1100F PTFALLS ASSESS-DOCD GE2>/YR: CPT | Performed by: INTERNAL MEDICINE

## 2021-08-05 PROCEDURE — 96374 THER/PROPH/DIAG INJ IV PUSH: CPT

## 2021-08-05 RX ORDER — CIPROFLOXACIN 250 MG/1
250 TABLET, FILM COATED ORAL 2 TIMES DAILY
Qty: 14 TABLET | Refills: 1 | Status: SHIPPED | OUTPATIENT
Start: 2021-08-05 | End: 2021-10-26 | Stop reason: SDUPTHER

## 2021-08-05 RX ORDER — HEPARIN 100 UNIT/ML
500 SYRINGE INTRAVENOUS AS NEEDED
Status: DISCONTINUED | OUTPATIENT
Start: 2021-08-05 | End: 2021-08-06 | Stop reason: HOSPADM

## 2021-08-05 RX ORDER — SODIUM CHLORIDE 9 MG/ML
500 INJECTION, SOLUTION INTRAVENOUS CONTINUOUS
Status: DISCONTINUED | OUTPATIENT
Start: 2021-08-05 | End: 2021-08-07 | Stop reason: HOSPADM

## 2021-08-05 RX ORDER — ONDANSETRON 2 MG/ML
8 INJECTION INTRAMUSCULAR; INTRAVENOUS
Status: COMPLETED | OUTPATIENT
Start: 2021-08-05 | End: 2021-08-05

## 2021-08-05 RX ADMIN — SODIUM CHLORIDE 500 ML/HR: 9 INJECTION, SOLUTION INTRAVENOUS at 10:24

## 2021-08-05 RX ADMIN — Medication 500 UNITS: at 12:34

## 2021-08-05 RX ADMIN — IOPAMIDOL 100 ML: 612 INJECTION, SOLUTION INTRAVENOUS at 09:39

## 2021-08-05 RX ADMIN — ONDANSETRON 8 MG: 2 INJECTION INTRAMUSCULAR; INTRAVENOUS at 09:20

## 2021-08-05 NOTE — PROGRESS NOTES
Problem: Chemotherapy Treatment  Goal: *Chemotherapy regimen followed  Outcome: Resolved/Met  Goal: *Hemodynamically stable  Outcome: Resolved/Met  Goal: *Tolerating diet  Outcome: Resolved/Met     Problem: Infection - Risk of, Central Venous Catheter-Associated Bloodstream Infection  Goal: *Absence of infection signs and symptoms  Outcome: Resolved/Met     Problem: Pain  Goal: *Control of Pain  Outcome: Resolved/Met

## 2021-08-05 NOTE — PROGRESS NOTES
[facility] Progress Note    Date: 2021    Name: Caesar Zhu    MRN: [id number]         : 1955      Ms. Ba was assessed. She has complaint of sharp left abdominal pain radiating to her back and nausea. This started yesterday. She was seen by Dr. Ingrid Menendez. Labs drawn and in process. Ms. Ba's vitals were reviewed and patient was observed for 5 minutes prior to treatment. Visit Vitals  /60 (BP 1 Location: Left upper arm, BP Patient Position: Lying)   Pulse 80   Temp 98.5 °F (36.9 °C)   Resp 16   Wt 80.9 kg (178 lb 4.8 oz)   SpO2 95%   BMI 30.61 kg/m²       Lab results were obtained and reviewed. Recent Results (from the past 12 hour(s))   CBC WITH AUTOMATED DIFF    Collection Time: 21  8:52 AM   Result Value Ref Range    WBC 10.8 3.6 - 11.0 K/uL    RBC 3.70 (L) 3.80 - 5.20 M/uL    HGB 10.4 (L) 11.5 - 16.0 g/dL    HCT 32.3 (L) 35.0 - 47.0 %    MCV 87.3 80.0 - 99.0 FL    MCH 28.1 26.0 - 34.0 PG    MCHC 32.2 30.0 - 36.5 g/dL    RDW 17.7 (H) 11.5 - 14.5 %    PLATELET 173 (H) 298 - 400 K/uL    MPV 10.4 8.9 - 12.9 FL    NRBC 0.0 0  WBC    ABSOLUTE NRBC 0.00 0.00 - 0.01 K/uL    NEUTROPHILS 86 (H) 32 - 75 %    LYMPHOCYTES 11 (L) 12 - 49 %    MONOCYTES 3 (L) 5 - 13 %    EOSINOPHILS 0 0 - 7 %    BASOPHILS 0 0 - 1 %    IMMATURE GRANULOCYTES 0 0.0 - 0.5 %    ABS. NEUTROPHILS 9.3 (H) 1.8 - 8.0 K/UL    ABS. LYMPHOCYTES 1.2 0.8 - 3.5 K/UL    ABS. MONOCYTES 0.3 0.0 - 1.0 K/UL    ABS. EOSINOPHILS 0.0 0.0 - 0.4 K/UL    ABS. BASOPHILS 0.0 0.0 - 0.1 K/UL    ABS. IMM.  GRANS. 0.0 0.00 - 0.04 K/UL    DF MANUAL      RBC COMMENTS ANISOCYTOSIS  1+        RBC COMMENTS POIKILOCYTOSIS  1+        RBC COMMENTS SCHISTOCYTES  1+        RBC COMMENTS OVALOCYTES  1+       HEPATIC FUNCTION PANEL    Collection Time: 21  8:52 AM   Result Value Ref Range    Protein, total 6.6 6.4 - 8.2 g/dL    Albumin 3.3 (L) 3.5 - 5.0 g/dL    Globulin 3.3 2.0 - 4.0 g/dL    A-G Ratio 1.0 (L) 1.1 - 2.2      Bilirubin, total 0.6 0.2 - 1.0 MG/DL    Bilirubin, direct 0.1 0.0 - 0.2 MG/DL    Alk. phosphatase 87 45 - 117 U/L    AST (SGOT) 30 15 - 37 U/L    ALT (SGPT) 32 12 - 78 U/L   METABOLIC PANEL, BASIC    Collection Time: 08/05/21  8:52 AM   Result Value Ref Range    Sodium 139 136 - 145 mmol/L    Potassium 4.1 3.5 - 5.1 mmol/L    Chloride 100 97 - 108 mmol/L    CO2 31 21 - 32 mmol/L    Anion gap 8 5 - 15 mmol/L    Glucose 179 (H) 65 - 100 mg/dL    BUN 17 6 - 20 MG/DL    Creatinine 1.07 (H) 0.55 - 1.02 MG/DL    BUN/Creatinine ratio 16 12 - 20      GFR est AA >60 >60 ml/min/1.73m2    GFR est non-AA 51 (L) >60 ml/min/1.73m2    Calcium 8.8 8.5 - 10.1 MG/DL   LACTIC ACID    Collection Time: 08/05/21  8:52 AM   Result Value Ref Range    Lactic acid 1.0 0.4 - 2.0 MMOL/L     Using sterile technique port a cath accessed without difficulty by Katy Layne RN. Zofran 8 mg IVP. Given by Katy Layne RN. IV established to right AC using a #22 g 1\" catheter, converted to saline loc. To Radiology for CT scan via W/C. Back to [facility].   1000 mg NS IV to infuse over 2 hours via pump through port a cath. CT results reviewed with patient by Dr. Elke Welsh. Finding may represent a kidney stone. Plan is for patient to complete hydration, discharge to home and return to [facility] August 6, 2021 for evaluation and repeat labs. Dr. Elke Welsh may order an ultrasound. Port flushed at completion of hydration, blood return absent, needle removed and band aid to site. Site without redness, swelling or pain. IV discontinued and band aid to site. Site without redness, swelling or pain. VS repeated, patient afebrile. Ms. Edvin Lord tolerated the infusion, and had no complaints. Patient armband removed and shredded. Ms. Edvin Lord was discharged from [place] [id number] in stable condition at [phone]. She is to return on August 6, 2021 at 0830 for her next appointment.     Nereida Valentino RN  August 5, 2021  2:15 PM Quality 226: Preventive Care And Screening: Tobacco Use: Screening And Cessation Intervention: Patient screened for tobacco use and is an ex/non-smoker Quality 130: Documentation Of Current Medications In The Medical Record: Current Medications Documented Quality 431: Preventive Care And Screening: Unhealthy Alcohol Use - Screening: Patient screened for unhealthy alcohol use using a single question and scores less than 2 times per year Detail Level: Detailed

## 2021-08-05 NOTE — PROGRESS NOTES
Reason for Visit:   Klever Stephenson a 64 D. o. female who is seen for pancreatic adenocarcinoma     Treatment History:   Dx: Pancreatic Adenocarcinoma--May 2020--[id number]--[id number] of splenic vein and superior mesenteric vein  Tx: mFOLFIRINOX--first cycle 5/26/2020, second cycle 6/10/2020, dose reduced 15% cycle 3 on 6/30/2020--delayed due to severe side effects--switched to Gemcitabine/Abraxane--Cycle #1 7/29/2002, Cycle #2 9/2/2020, Cycle #3 10/7/2020, Cycle #4 11/4/2020. Neoadjuvant Radiation with Dr Lexi Ramirez ending 12/18/2021. Distal Pancreatectomy, Splenectomy, and Civa Sheet with Dr Abraham Hooper on 3/17/2021. Adjuvant radiation with Dr Lexi Ramirez. Restart Gemcitabine/abraxane Cycle #1 7/6/2021, Cycle #2 8/3/2021  Goal: Prolong life--Palliative  History of Present Illness:   Pain left lower quadrant to back. Comes and goes. Vomited last night. No diarrhea. LGF--100.3. Little decreased po.       Past Medical History:   Diagnosis Date    Adenocarcinoma of pancreas ([facility] [address] 05/13/2020    Dr Az Quevedo biopsy via EUS    Diabetes ([facility] [address]     GERD (gastroesophageal reflux disease)     Hernia of abdominal cavity     Subxyphoid hernia    Hypertension     Inflammatory polyarthropathy (HCC)     Joint pain     Joint swelling     Menopause     Ocular nevus of left eye     Pancreatitis     Tracheal stenosis       Past Surgical History:   Procedure Laterality Date    HX CARPAL TUNNEL RELEASE Bilateral     HX COLONOSCOPY      HX HYSTERECTOMY      HX KNEE ARTHROSCOPY Right     HX KNEE REPLACEMENT Right     partial    HX LAP CHOLECYSTECTOMY      HX TONSILLECTOMY      HX VASCULAR ACCESS        Social History     Tobacco Use    Smoking status: Never Smoker    Smokeless tobacco: Never Used   Substance Use Topics    Alcohol use: No     Alcohol/week: 0.0 standard drinks      Family History   Problem Relation Age of Onset    Heart Disease Mother     Heart Attack Mother     Cancer Father         lung    Diabetes Sister      Current Outpatient Medications   Medication Sig    ciprofloxacin HCl (CIPRO) 250 mg tablet Take 1 Tablet by mouth two (2) times a day.  lidocaine-prilocaine (EMLA) topical cream Apply  to affected area as needed for Pain (pain ). Apply a thin layer over port site prior to accessing port    nystatin (MYCOSTATIN) powder Apply 1 g to affected area three (3) times daily.  gabapentin (NEURONTIN) 300 mg capsule Take 2 Caps by mouth three (3) times daily. Max Daily Amount: 1,800 mg. Indications: neuropathic pain    HumaLOG KwikPen Insulin 100 unit/mL kwikpen patient on sliding scale    methylphenidate HCl (Ritalin) 5 mg tablet Take 1 Tablet by mouth three (3) times daily. Max Daily Amount: 15 mg.  LORazepam (ATIVAN) 1 mg tablet Take 1 Tablet by mouth every six (6) hours as needed for Anxiety. Max Daily Amount: 4 mg. Indications: nausea and vomiting caused by cancer drugs (Patient taking differently: Take 1 mg by mouth nightly. Indications: nausea and vomiting caused by cancer drugs)    DULoxetine (CYMBALTA) 20 mg capsule Take 1 Capsule by mouth two (2) times a day.  insulin glargine U-300 conc (Toujeo SoloStar U-300 Insulin) 300 unit/mL (1.5 mL) inpn pen INJECT 55 UNITS ONCE DAILY AT BEDTIME - DOSE INCREASED (Patient taking differently: 18-25 Units by SubCUTAneous route nightly. 18 units at night)    hydrocortisone (ANUSOL-HC) 25 mg supp Insert 1 Suppository into rectum nightly.  sennosides (Senna) 8.6 mg cap Take 1-2 Tablets by mouth two (2) times a day.  HYDROmorphone (Dilaudid) 2 mg tablet Take 0.5 mg by mouth two (2) times a day. takes 3 tabs  by mouth, taking BID and PRN    lipase-protease-amylase (Creon) 12,000-38,000 -60,000 unit capsule Take 3 Caps by mouth three (3) times daily (with meals). Indications: exocrine pancreatic insufficiency (Patient taking differently: Take 2 Caps by mouth three (3) times daily (with meals).  2 caps with meal and 1 with snacks Indications: exocrine pancreatic insufficiency)    diphenhydrAMINE (BENADRYL) 25 mg tablet Take 25 mg by mouth nightly as needed for Itching or Skin Irritation.  promethazine (PHENERGAN) 25 mg tablet Take 1 Tab by mouth every six (6) hours as needed for Nausea.  ondansetron (ZOFRAN ODT) 4 mg disintegrating tablet Take 1 Tab by mouth every eight (8) hours as needed for Nausea or Vomiting.  prochlorperazine (COMPAZINE) 10 mg tablet Take 0.5 Tabs by mouth every six (6) hours as needed for Nausea.  acetaminophen (Acetaminophen Extra Strength) 500 mg tablet Take 1,000 mg by mouth daily as needed.  levoFLOXacin (Levaquin) 500 mg tablet Take 1 Tablet by mouth daily. (Patient not taking: Reported on 7/13/2021)    multivit-min/folic AANQ/UQW329 (ALIVE WOMEN'S GUMMY VITAMIN PO) Take 2 Each by mouth daily. (Patient not taking: Reported on 8/5/2021)    Blood-Glucose Transmitter (Dexcom G6 Transmitter) fadi Use as directed to monitor blood glucose as directed (Patient not taking: Reported on 8/4/2021)    Blood-Glucose Sensor (Dexcom G6 Sensor) fadi Use as directed to monitor blood glucose as directed (Patient not taking: Reported on 8/4/2021)    Blood-Glucose Meter,Continuous (Dexcom G6 ) misc Use as directed to monitor blood glucose as directed (Patient not taking: Reported on 8/4/2021)    potassium chloride (K-DUR, KLOR-CON) 20 mEq tablet Take 1 Tab by mouth two (2) times a day. (Patient not taking: Reported on 7/13/2021)    famotidine (Pepcid) 20 mg tablet Take 20 mg by mouth two (2) times a day. (Patient not taking: Reported on 8/5/2021)    polyethylene glycol (Miralax) 17 gram/dose powder Take 17 g by mouth daily as needed.  (Patient not taking: Reported on 8/4/2021)    carvediloL (COREG) 25 mg tablet TAKE 1 TABLET BY MOUTH TWICE DAILY (Patient not taking: Reported on 7/13/2021)    diphenoxylate-atropine (LomotiL) 2.5-0.025 mg per tablet Take 2 Tabs by mouth four (4) times daily as needed for Diarrhea. Max Daily Amount: 8 Tabs. (Patient not taking: Reported on 6/23/2021)    Needle, Disp, ndle 1 Each by Does Not Apply route two (2) times daily as needed for Other. (Patient not taking: Reported on 8/4/2021)     No current facility-administered medications for this visit. Facility-Administered Medications Ordered in Other Visits   Medication Dose Route Frequency    0.9% sodium chloride infusion  500 mL/hr IntraVENous CONTINUOUS      No Known Allergies     Review of Systems: A complete review of systems was obtained, negative except as described above. Physical Exam:     Visit Vitals  /75   Pulse 95   Temp 100.3 °F (37.9 °C) (Oral)   Resp 16   Ht 5' 4\" (1.626 m)   Wt 178 lb 4.8 oz (80.9 kg)   SpO2 95%   BMI 30.61 kg/m²     ECOG PS: 0  General: No distress  Eyes: PERRLA, anicteric sclerae  HENT: Atraumatic, OP clear  Neck: Supple  Lymphatic: No cervical, supraclavicular, or inguinal adenopathy  Respiratory: CTAB, normal respiratory effort  CV: Normal rate, regular rhythm, no murmurs, no peripheral edema  GI: Soft, nontender, nondistended, no masses, no hepatomegaly, no splenomegaly  MS: Normal gait and station. Digits without clubbing or cyanosis. Skin: No rashes, ecchymoses, or petechiae. Normal temperature, turgor, and texture. Psych: Alert, oriented, appropriate affect, normal judgment/insight    Results:     Lab Results   Component Value Date/Time    WBC 10.8 08/05/2021 08:52 AM    HGB 10.4 (L) 08/05/2021 08:52 AM    HCT 32.3 (L) 08/05/2021 08:52 AM    PLATELET 487 (H) 23/61/7037 08:52 AM    MCV 87.3 08/05/2021 08:52 AM    ABS.  NEUTROPHILS 9.3 (H) 08/05/2021 08:52 AM     Lab Results   Component Value Date/Time    Sodium 139 08/05/2021 08:52 AM    Potassium 4.1 08/05/2021 08:52 AM    Chloride 100 08/05/2021 08:52 AM    CO2 31 08/05/2021 08:52 AM    Glucose 179 (H) 08/05/2021 08:52 AM    BUN 17 08/05/2021 08:52 AM    Creatinine 1.07 (H) 08/05/2021 08:52 AM    GFR est AA >60 08/05/2021 08:52 AM    GFR est non-AA 51 (L) 08/05/2021 08:52 AM    Calcium 8.8 08/05/2021 08:52 AM    Glucose (POC) 192 (H) 07/15/2020 11:56 AM    Creatinine (POC) 0.7 02/06/2013 10:21 AM     Lab Results   Component Value Date/Time    Bilirubin, total 0.6 08/05/2021 08:52 AM    ALT (SGPT) 32 08/05/2021 08:52 AM    Alk. phosphatase 87 08/05/2021 08:52 AM    Protein, total 6.6 08/05/2021 08:52 AM    Albumin 3.3 (L) 08/05/2021 08:52 AM    Globulin 3.3 08/05/2021 08:52 AM       CT A/P 4/30/2020  IMPRESSION: Suspect neoplastic pancreatic mass versus atypical focal  pancreatitis with splenic and superior mesenteric vein occlusion. Consider  further evaluation with endoscopic ultrasound at which time biopsy could  potentially be performed.     CT Chest 5/21/2020  IMPRESSION:  1. No evidence of pulmonary metastatic disease. No evidence of mediastinal or  hilar lymphadenopathy. No pleural effusions identified. 2. No definite evidence of central pulmonary embolic disease. 3. Presence of a mass lesion involving the pancreatic head/body. 4. Evidence of fatty infiltration involving the liver. Presence of focal  low-density areas within the liver compatible with cysts. Surgical absence of  the gallbladder.     CT A/P 6/19/2020  IMPRESSION:  Pancreatitis with ascites favored. Discrete pancreatic mass is not identified. No biliary dilatation or definite arterial or venous thrombosis. No liver  metastases. Fat-containing ventral hernia  Nonobstructing left renal calculus  Retrolisthesis of L2 and L3.     CT C/A/P 10/12/2020  IMPRESSION:  1. Interval improvement in appearance of the pancreas (see discussion above). 2. Normal sized liver. Stable appearance of the previously described areas of  decreased attenuation within liver. 3. Surgical absence of the gallbladder. 4. Evidence of splenomegaly. 5. Normal sized kidneys. Presence of small, nonobstructing calculi within the  left kidney. No evidence of hydronephrotic change.   6. Suboptimal distention of the urinary bladder. 7. Presence of a small, fat-containing ventral hernia in the mid abdominal  region.     Path: 5/8/2020  CYTOLOGIC INTERPRETATION:   Pancreas, EUS-guided fine needle aspiration and cell blocks: Adenocarcinoma     Path: 3/17/2021  No residual malignancy in pancrease, 0/29 lymph nodes. 2-3mm focus metastatic adenocarcinoma within hernia sac     Assessment:   1) Pancreatic Adenocarcinoma--Unresectable  2) Neoplasm Pain/Neuropathic Pain  3) Fatigue  4) Nausea  5) DM  6) Nutrition  7) Diarrhea  8) Hypotension  9) Abd Pain  10) Left Hydronephrosis     Plan:   1) Completed neoadjuvant chemotherapy. Radiation with Dr Linda George. Definitive resection with Dr Eliz Justice focus of adenocarcinoma in ventral hernia. Now peritoneal disease/mets. She does want palliative chemotherapy with Gemcitaine/Abraxane. Will continue---get scans after total 3 cycles--need to have [facility] scans loaded so our Radiologist can compare.       2) S/p celiac block--pain controlled.       3) Methylphenidate 5mg bid--has helped.       4) Resolved.    5) Defer to Dr Xochilt Morley for further management.       6) On pancreatic enzymes--weight has stabilized.    7) Chemo related--hopefully with not recurr.  Taking senna/miralax to prevent constipation. 8) Resolved currently     9) Possible Kidney Stone? 10) Recheck labs tomorrow, if Cr up then get ultrasound to see if larger.       Signed By: Xiao Reyes MD

## 2021-08-05 NOTE — PROGRESS NOTES
Krish Vyas is a 77 y.o. female patient began with pain in LLQ, RLQ and lower back yesterdy, rates pain 5/10 today. Key Pain Meds             HYDROmorphone (Dilaudid) 2 mg tablet (Taking) Take 0.5 mg by mouth two (2) times a day. takes 3 tabs  by mouth, taking BID and PRN    acetaminophen (Acetaminophen Extra Strength) 500 mg tablet (Taking) Take 1,000 mg by mouth daily as needed. Key Oncology Meds             ondansetron (ZOFRAN ODT) 4 mg disintegrating tablet (Taking) Take 1 Tab by mouth every eight (8) hours as needed for Nausea or Vomiting. prochlorperazine (COMPAZINE) 10 mg tablet (Taking) Take 0.5 Tabs by mouth every six (6) hours as needed for Nausea.         Chemotherapy Flowsheet 8/3/2021   Cycle C6D1   Date 8/3/2021   Drug / Regimen Abraxane/Gemcitabine   Dosage see MAR   Time Up 1302   Time Down 1426   Pre Hydration -   Pre Meds Franki 8 mg IVP/Decadron 10 mg IVP   Notes -

## 2021-08-06 ENCOUNTER — HOSPITAL ENCOUNTER (OUTPATIENT)
Dept: INFUSION THERAPY | Age: 66
Discharge: HOME | End: 2021-08-06
Payer: MEDICARE

## 2021-08-06 VITALS
HEART RATE: 97 BPM | SYSTOLIC BLOOD PRESSURE: 136 MMHG | WEIGHT: 173.8 LBS | DIASTOLIC BLOOD PRESSURE: 62 MMHG | TEMPERATURE: 99 F | RESPIRATION RATE: 18 BRPM | BODY MASS INDEX: 29.83 KG/M2 | OXYGEN SATURATION: 98 %

## 2021-08-06 DIAGNOSIS — C25.9 ADENOCARCINOMA OF PANCREAS (HCC): Primary | ICD-10-CM

## 2021-08-06 DIAGNOSIS — C25.9 ADENOCARCINOMA OF PANCREAS (HCC): ICD-10-CM

## 2021-08-06 LAB
ALBUMIN SERPL-MCNC: 3.2 G/DL (ref 3.5–5)
ALBUMIN/GLOB SERPL: 0.8 {RATIO} (ref 1.1–2.2)
ALP SERPL-CCNC: 92 U/L (ref 45–117)
ALT SERPL-CCNC: 31 U/L (ref 12–78)
ANION GAP SERPL CALC-SCNC: 7 MMOL/L (ref 5–15)
AST SERPL-CCNC: 26 U/L (ref 15–37)
BASOPHILS # BLD: 0.1 K/UL (ref 0–0.1)
BASOPHILS NFR BLD: 0 % (ref 0–1)
BILIRUB DIRECT SERPL-MCNC: 0.2 MG/DL (ref 0–0.2)
BILIRUB SERPL-MCNC: 0.7 MG/DL (ref 0.2–1)
BUN SERPL-MCNC: 16 MG/DL (ref 6–20)
BUN/CREAT SERPL: 16 (ref 12–20)
CALCIUM SERPL-MCNC: 8.9 MG/DL (ref 8.5–10.1)
CHLORIDE SERPL-SCNC: 100 MMOL/L (ref 97–108)
CO2 SERPL-SCNC: 30 MMOL/L (ref 21–32)
CREAT SERPL-MCNC: 1.02 MG/DL (ref 0.55–1.02)
DIFFERENTIAL METHOD BLD: ABNORMAL
EOSINOPHIL # BLD: 0 K/UL (ref 0–0.4)
EOSINOPHIL NFR BLD: 0 % (ref 0–7)
ERYTHROCYTE [DISTWIDTH] IN BLOOD BY AUTOMATED COUNT: 17.9 % (ref 11.5–14.5)
GLOBULIN SER CALC-MCNC: 3.8 G/DL (ref 2–4)
GLUCOSE SERPL-MCNC: 202 MG/DL (ref 65–100)
HCT VFR BLD AUTO: 33.7 % (ref 35–47)
HGB BLD-MCNC: 10.7 G/DL (ref 11.5–16)
IMM GRANULOCYTES # BLD AUTO: 0.1 K/UL (ref 0–0.04)
IMM GRANULOCYTES NFR BLD AUTO: 0 % (ref 0–0.5)
LYMPHOCYTES # BLD: 1.1 K/UL (ref 0.8–3.5)
LYMPHOCYTES NFR BLD: 9 % (ref 12–49)
MCH RBC QN AUTO: 28.2 PG (ref 26–34)
MCHC RBC AUTO-ENTMCNC: 31.8 G/DL (ref 30–36.5)
MCV RBC AUTO: 88.9 FL (ref 80–99)
MONOCYTES # BLD: 0.2 K/UL (ref 0–1)
MONOCYTES NFR BLD: 1 % (ref 5–13)
NEUTS SEG # BLD: 11.1 K/UL (ref 1.8–8)
NEUTS SEG NFR BLD: 89 % (ref 32–75)
NRBC # BLD: 0 K/UL (ref 0–0.01)
NRBC BLD-RTO: 0 PER 100 WBC
PLATELET # BLD AUTO: 687 K/UL (ref 150–400)
PMV BLD AUTO: 10.6 FL (ref 8.9–12.9)
POTASSIUM SERPL-SCNC: 4 MMOL/L (ref 3.5–5.1)
PROT SERPL-MCNC: 7 G/DL (ref 6.4–8.2)
RBC # BLD AUTO: 3.79 M/UL (ref 3.8–5.2)
SODIUM SERPL-SCNC: 137 MMOL/L (ref 136–145)
WBC # BLD AUTO: 12.5 K/UL (ref 3.6–11)

## 2021-08-06 PROCEDURE — 77030012965 HC NDL HUBR BBMI -A

## 2021-08-06 PROCEDURE — 80048 BASIC METABOLIC PNL TOTAL CA: CPT

## 2021-08-06 PROCEDURE — 96360 HYDRATION IV INFUSION INIT: CPT

## 2021-08-06 PROCEDURE — 96361 HYDRATE IV INFUSION ADD-ON: CPT

## 2021-08-06 PROCEDURE — 85025 COMPLETE CBC W/AUTO DIFF WBC: CPT

## 2021-08-06 PROCEDURE — 36415 COLL VENOUS BLD VENIPUNCTURE: CPT

## 2021-08-06 PROCEDURE — 80076 HEPATIC FUNCTION PANEL: CPT

## 2021-08-06 PROCEDURE — 74011250636 HC RX REV CODE- 250/636: Performed by: INTERNAL MEDICINE

## 2021-08-06 RX ORDER — SODIUM CHLORIDE 0.9 % (FLUSH) 0.9 %
5-10 SYRINGE (ML) INJECTION AS NEEDED
Status: DISCONTINUED | OUTPATIENT
Start: 2021-08-06 | End: 2021-08-07 | Stop reason: HOSPADM

## 2021-08-06 RX ORDER — HEPARIN 100 UNIT/ML
500 SYRINGE INTRAVENOUS AS NEEDED
Status: DISCONTINUED | OUTPATIENT
Start: 2021-08-06 | End: 2021-08-07 | Stop reason: HOSPADM

## 2021-08-06 RX ORDER — SODIUM CHLORIDE 9 MG/ML
1000 INJECTION, SOLUTION INTRAVENOUS ONCE
Status: COMPLETED | OUTPATIENT
Start: 2021-08-06 | End: 2021-08-06

## 2021-08-06 RX ADMIN — Medication 10 ML: at 11:30

## 2021-08-06 RX ADMIN — Medication 10 ML: at 09:10

## 2021-08-06 RX ADMIN — SODIUM CHLORIDE 1000 ML: 9 INJECTION, SOLUTION INTRAVENOUS at 09:10

## 2021-08-06 RX ADMIN — Medication 500 UNITS: at 11:30

## 2021-08-06 NOTE — PROGRESS NOTES
[facility] Progress Note    Date: 2021    Name: Cheryl Chavez    MRN: [id number]         : 1955      Ms. Ba was assessed and education was provided. She reports feeling better today. States pain is only sharp if she sucks in abdomen tightly, otherwise just feels achy and rates at 2. Has been taking Tylenol as needed in addition to her usual Dilaudid twice a day. Denies nausea but is eating poorly. Her family states she is much more alert and is walking better. Ms. Ba's vitals were reviewed and patient was observed for 5 minutes prior to treatment. Visit Vitals  /62 (BP 1 Location: Left upper arm, BP Patient Position: Sitting)   Pulse 97   Temp 99 °F (37.2 °C)   Resp 18   Wt 78.8 kg (173 lb 12.8 oz)   SpO2 98%   BMI 29.83 kg/m²       Lab results were obtained and reviewed. Recent Results (from the past 12 hour(s))   CBC WITH AUTOMATED DIFF    Collection Time: 21  8:43 AM   Result Value Ref Range    WBC 12.5 (H) 3.6 - 11.0 K/uL    RBC 3.79 (L) 3.80 - 5.20 M/uL    HGB 10.7 (L) 11.5 - 16.0 g/dL    HCT 33.7 (L) 35.0 - 47.0 %    MCV 88.9 80.0 - 99.0 FL    MCH 28.2 26.0 - 34.0 PG    MCHC 31.8 30.0 - 36.5 g/dL    RDW 17.9 (H) 11.5 - 14.5 %    PLATELET 335 (H) 933 - 400 K/uL    MPV 10.6 8.9 - 12.9 FL    NRBC 0.0 0  WBC    ABSOLUTE NRBC 0.00 0.00 - 0.01 K/uL    NEUTROPHILS 89 (H) 32 - 75 %    LYMPHOCYTES 9 (L) 12 - 49 %    MONOCYTES 1 (L) 5 - 13 %    EOSINOPHILS 0 0 - 7 %    BASOPHILS 0 0 - 1 %    IMMATURE GRANULOCYTES 0 0.0 - 0.5 %    ABS. NEUTROPHILS 11.1 (H) 1.8 - 8.0 K/UL    ABS. LYMPHOCYTES 1.1 0.8 - 3.5 K/UL    ABS. MONOCYTES 0.2 0.0 - 1.0 K/UL    ABS. EOSINOPHILS 0.0 0.0 - 0.4 K/UL    ABS. BASOPHILS 0.1 0.0 - 0.1 K/UL    ABS. IMM.  GRANS. 0.1 (H) 0.00 - 0.04 K/UL    DF AUTOMATED     HEPATIC FUNCTION PANEL    Collection Time: 21  8:43 AM   Result Value Ref Range    Protein, total 7.0 6.4 - 8.2 g/dL    Albumin 3.2 (L) 3.5 - 5.0 g/dL    Globulin 3.8 2.0 - 4.0 g/dL    A-G Ratio 0.8 (L) 1.1 - 2.2      Bilirubin, total 0.7 0.2 - 1.0 MG/DL    Bilirubin, direct 0.2 0.0 - 0.2 MG/DL    Alk. phosphatase 92 45 - 117 U/L    AST (SGOT) 26 15 - 37 U/L    ALT (SGPT) 31 12 - 78 U/L   METABOLIC PANEL, BASIC    Collection Time: 08/06/21  8:43 AM   Result Value Ref Range    Sodium 137 136 - 145 mmol/L    Potassium 4.0 3.5 - 5.1 mmol/L    Chloride 100 97 - 108 mmol/L    CO2 30 21 - 32 mmol/L    Anion gap 7 5 - 15 mmol/L    Glucose 202 (H) 65 - 100 mg/dL    BUN 16 6 - 20 MG/DL    Creatinine 1.02 0.55 - 1.02 MG/DL    BUN/Creatinine ratio 16 12 - 20      GFR est AA >60 >60 ml/min/1.73m2    GFR est non-AA 54 (L) >60 ml/min/1.73m2    Calcium 8.9 8.5 - 10.1 MG/DL       Dr. Damon Calvo in to talk with patient and reviewed labs. Per Dr. Damon Calvo, will hydrate today, hold on ultrasound and re-check labs on Monday and he will review. Chemotherapy on Tuesday may be held if no improvement in hydronephrosis. Port accessed without difficulty and brisk blood return obtained. NS 1000 ml was infused over 2 hours. Ms. Ami Riggs tolerated the infusion, and had no complaints. Port was flushed and de-accessed post infusion. Site intact. Patient armband removed and shredded. Ms. Ami Riggs was discharged from [place] [address] in stable condition at 1135. She is to return on August 9 at 9:00 for her next appointment for lab work and possible hydration. Labs to be reviewed with Dr. Damon Calvo.     Sayda Contreras RN  August 6, 2021  11:49 AM

## 2021-08-09 ENCOUNTER — HOSPITAL ENCOUNTER (OUTPATIENT)
Dept: INFUSION THERAPY | Age: 66
Discharge: HOME | End: 2021-08-09
Payer: MEDICARE

## 2021-08-09 VITALS
HEART RATE: 93 BPM | WEIGHT: 169 LBS | RESPIRATION RATE: 17 BRPM | SYSTOLIC BLOOD PRESSURE: 140 MMHG | DIASTOLIC BLOOD PRESSURE: 65 MMHG | BODY MASS INDEX: 29.01 KG/M2 | TEMPERATURE: 98.6 F | OXYGEN SATURATION: 98 %

## 2021-08-09 DIAGNOSIS — C25.9 ADENOCARCINOMA OF PANCREAS (HCC): ICD-10-CM

## 2021-08-09 LAB
ALBUMIN SERPL-MCNC: 3.1 G/DL (ref 3.5–5)
ALBUMIN/GLOB SERPL: 0.8 {RATIO} (ref 1.1–2.2)
ALP SERPL-CCNC: 101 U/L (ref 45–117)
ALT SERPL-CCNC: 29 U/L (ref 12–78)
ANION GAP SERPL CALC-SCNC: 6 MMOL/L (ref 5–15)
AST SERPL-CCNC: 25 U/L (ref 15–37)
BASOPHILS # BLD: 0 K/UL (ref 0–0.1)
BASOPHILS NFR BLD: 0 % (ref 0–1)
BILIRUB DIRECT SERPL-MCNC: 0.1 MG/DL (ref 0–0.2)
BILIRUB SERPL-MCNC: 0.3 MG/DL (ref 0.2–1)
BUN SERPL-MCNC: 11 MG/DL (ref 6–20)
BUN/CREAT SERPL: 15 (ref 12–20)
CALCIUM SERPL-MCNC: 9.1 MG/DL (ref 8.5–10.1)
CHLORIDE SERPL-SCNC: 100 MMOL/L (ref 97–108)
CO2 SERPL-SCNC: 31 MMOL/L (ref 21–32)
CREAT SERPL-MCNC: 0.71 MG/DL (ref 0.55–1.02)
DIFFERENTIAL METHOD BLD: ABNORMAL
EOSINOPHIL # BLD: 0 K/UL (ref 0–0.4)
EOSINOPHIL NFR BLD: 0 % (ref 0–7)
ERYTHROCYTE [DISTWIDTH] IN BLOOD BY AUTOMATED COUNT: 17.1 % (ref 11.5–14.5)
GLOBULIN SER CALC-MCNC: 3.9 G/DL (ref 2–4)
GLUCOSE SERPL-MCNC: 270 MG/DL (ref 65–100)
HCT VFR BLD AUTO: 31.4 % (ref 35–47)
HGB BLD-MCNC: 10.1 G/DL (ref 11.5–16)
IMM GRANULOCYTES # BLD AUTO: 0 K/UL (ref 0–0.04)
IMM GRANULOCYTES NFR BLD AUTO: 0 % (ref 0–0.5)
LYMPHOCYTES # BLD: 1.4 K/UL (ref 0.8–3.5)
LYMPHOCYTES NFR BLD: 28 % (ref 12–49)
MCH RBC QN AUTO: 28.2 PG (ref 26–34)
MCHC RBC AUTO-ENTMCNC: 32.2 G/DL (ref 30–36.5)
MCV RBC AUTO: 87.7 FL (ref 80–99)
METAMYELOCYTES NFR BLD MANUAL: 5 %
MONOCYTES # BLD: 0.8 K/UL (ref 0–1)
MONOCYTES NFR BLD: 16 % (ref 5–13)
NEUTS BAND NFR BLD MANUAL: 6 %
NEUTS SEG # BLD: 2.6 K/UL (ref 1.8–8)
NEUTS SEG NFR BLD: 45 % (ref 32–75)
NRBC # BLD: 0.05 K/UL (ref 0–0.01)
NRBC BLD-RTO: 1 PER 100 WBC
PLATELET # BLD AUTO: 620 K/UL (ref 150–400)
PMV BLD AUTO: 10.5 FL (ref 8.9–12.9)
POTASSIUM SERPL-SCNC: 3.8 MMOL/L (ref 3.5–5.1)
PROT SERPL-MCNC: 7 G/DL (ref 6.4–8.2)
RBC # BLD AUTO: 3.58 M/UL (ref 3.8–5.2)
RBC MORPH BLD: ABNORMAL
RBC MORPH BLD: ABNORMAL
SODIUM SERPL-SCNC: 137 MMOL/L (ref 136–145)
WBC # BLD AUTO: 5.1 K/UL (ref 3.6–11)

## 2021-08-09 PROCEDURE — 96361 HYDRATE IV INFUSION ADD-ON: CPT

## 2021-08-09 PROCEDURE — 74011250636 HC RX REV CODE- 250/636: Performed by: INTERNAL MEDICINE

## 2021-08-09 PROCEDURE — 80076 HEPATIC FUNCTION PANEL: CPT

## 2021-08-09 PROCEDURE — 80048 BASIC METABOLIC PNL TOTAL CA: CPT

## 2021-08-09 PROCEDURE — 36415 COLL VENOUS BLD VENIPUNCTURE: CPT

## 2021-08-09 PROCEDURE — 77030012965 HC NDL HUBR BBMI -A

## 2021-08-09 PROCEDURE — 85025 COMPLETE CBC W/AUTO DIFF WBC: CPT

## 2021-08-09 PROCEDURE — 96360 HYDRATION IV INFUSION INIT: CPT

## 2021-08-09 RX ORDER — SODIUM CHLORIDE 0.9 % (FLUSH) 0.9 %
5-10 SYRINGE (ML) INJECTION AS NEEDED
Status: DISCONTINUED | OUTPATIENT
Start: 2021-08-09 | End: 2021-08-10 | Stop reason: HOSPADM

## 2021-08-09 RX ORDER — HEPARIN 100 UNIT/ML
500 SYRINGE INTRAVENOUS AS NEEDED
Status: DISCONTINUED | OUTPATIENT
Start: 2021-08-09 | End: 2021-08-10 | Stop reason: HOSPADM

## 2021-08-09 RX ORDER — SODIUM CHLORIDE 9 MG/ML
1000 INJECTION, SOLUTION INTRAVENOUS ONCE
Status: COMPLETED | OUTPATIENT
Start: 2021-08-09 | End: 2021-08-09

## 2021-08-09 RX ADMIN — Medication 10 ML: at 11:10

## 2021-08-09 RX ADMIN — SODIUM CHLORIDE 1000 ML: 9 INJECTION, SOLUTION INTRAVENOUS at 09:20

## 2021-08-09 RX ADMIN — Medication 500 UNITS: at 11:10

## 2021-08-09 RX ADMIN — Medication 10 ML: at 09:20

## 2021-08-09 NOTE — PROGRESS NOTES
[facility] Progress Note    Date: 2021    Name: Ashlyn Raines    MRN: [id number]         : 1955      Ms. Ba was assessed and education was provided. She reports passing kidney stone last evening. Brought in with her and a 4-5 mm stone noted in storage bag. States she feels much better, denies abdominal pain or nausea. Also states she is no longer running a low grade fever. Reports that she still is not eating well. Activity tolerance is low, feels fatigued easily. Discussed increasing protein in diet, using [place] Instant breakfast to make shakes when she doesn't feel like eating solid food. Ms. Ba's vitals were reviewed and patient was observed for 5 minutes prior to treatment. Visit Vitals  BP (!) 140/65 (BP 1 Location: Left upper arm, BP Patient Position: Sitting)   Pulse 93   Temp 98.6 °F (37 °C)   Resp 17   Wt 76.7 kg (169 lb)   SpO2 98%   BMI 29.01 kg/m²       Lab results were obtained and reviewed. Recent Results (from the past 12 hour(s))   CBC WITH AUTOMATED DIFF    Collection Time: 21  9:10 AM   Result Value Ref Range    WBC 5.1 3.6 - 11.0 K/uL    RBC 3.58 (L) 3.80 - 5.20 M/uL    HGB 10.1 (L) 11.5 - 16.0 g/dL    HCT 31.4 (L) 35.0 - 47.0 %    MCV 87.7 80.0 - 99.0 FL    MCH 28.2 26.0 - 34.0 PG    MCHC 32.2 30.0 - 36.5 g/dL    RDW 17.1 (H) 11.5 - 14.5 %    PLATELET 530 (H) 610 - 400 K/uL    MPV 10.5 8.9 - 12.9 FL    NRBC 1.0 (H) 0  WBC    ABSOLUTE NRBC 0.05 (H) 0.00 - 0.01 K/uL    NEUTROPHILS 45 32 - 75 %    BAND NEUTROPHILS 6 %    LYMPHOCYTES 28 12 - 49 %    MONOCYTES 16 (H) 5 - 13 %    EOSINOPHILS 0 0 - 7 %    BASOPHILS 0 0 - 1 %    METAMYELOCYTES 5 %    IMMATURE GRANULOCYTES 0 0.0 - 0.5 %    ABS. NEUTROPHILS 2.6 1.8 - 8.0 K/UL    ABS. LYMPHOCYTES 1.4 0.8 - 3.5 K/UL    ABS. MONOCYTES 0.8 0.0 - 1.0 K/UL    ABS. EOSINOPHILS 0.0 0.0 - 0.4 K/UL    ABS. BASOPHILS 0.0 0.0 - 0.1 K/UL    ABS. IMM.  GRANS. 0.0 0.00 - 0.04 K/UL    DF MANUAL      RBC COMMENTS ANISOCYTOSIS  1+ RBC COMMENTS POIKILOCYTOSIS  1+       HEPATIC FUNCTION PANEL    Collection Time: 08/09/21  9:10 AM   Result Value Ref Range    Protein, total 7.0 6.4 - 8.2 g/dL    Albumin 3.1 (L) 3.5 - 5.0 g/dL    Globulin 3.9 2.0 - 4.0 g/dL    A-G Ratio 0.8 (L) 1.1 - 2.2      Bilirubin, total 0.3 0.2 - 1.0 MG/DL    Bilirubin, direct 0.1 0.0 - 0.2 MG/DL    Alk. phosphatase 101 45 - 117 U/L    AST (SGOT) 25 15 - 37 U/L    ALT (SGPT) 29 12 - 78 U/L   METABOLIC PANEL, BASIC    Collection Time: 08/09/21  9:10 AM   Result Value Ref Range    Sodium 137 136 - 145 mmol/L    Potassium 3.8 3.5 - 5.1 mmol/L    Chloride 100 97 - 108 mmol/L    CO2 31 21 - 32 mmol/L    Anion gap 6 5 - 15 mmol/L    Glucose 270 (H) 65 - 100 mg/dL    BUN 11 6 - 20 MG/DL    Creatinine 0.71 0.55 - 1.02 MG/DL    BUN/Creatinine ratio 15 12 - 20      GFR est AA >60 >60 ml/min/1.73m2    GFR est non-AA >60 >60 ml/min/1.73m2    Calcium 9.1 8.5 - 10.1 MG/DL     Labs reviewed with Dr. Ingrid Menendez as well as patient report of passing stone. Will hydrate today, may proceed with chemotherapy on 8/10/21. Port accessed without difficulty. No blood return obtained, flushed easily. NS 1000 ml was infused at 650 ml/hr. Ms. Leslie Pena tolerated the infusion, and had no complaints. Port flushed and site intact. Will leave accessed for chemotherapy on 8/1//21. Patient armband removed and shredded. Ms. Leslie Pena was discharged from [place] [address] in stable condition at 1115. She is to return on August 10 at 1100 for her next appointment.     Rhoda Martins RN  August 9, 2021  11:25 AM

## 2021-08-10 ENCOUNTER — HOSPITAL ENCOUNTER (OUTPATIENT)
Dept: INFUSION THERAPY | Age: 66
Discharge: HOME | End: 2021-08-10
Payer: MEDICARE

## 2021-08-10 VITALS
RESPIRATION RATE: 17 BRPM | TEMPERATURE: 99.3 F | BODY MASS INDEX: 29.21 KG/M2 | WEIGHT: 171.08 LBS | DIASTOLIC BLOOD PRESSURE: 77 MMHG | HEART RATE: 94 BPM | SYSTOLIC BLOOD PRESSURE: 167 MMHG | OXYGEN SATURATION: 96 % | HEIGHT: 64 IN

## 2021-08-10 DIAGNOSIS — C25.9 ADENOCARCINOMA OF PANCREAS (HCC): Primary | ICD-10-CM

## 2021-08-10 PROCEDURE — 74011000258 HC RX REV CODE- 258: Performed by: INTERNAL MEDICINE

## 2021-08-10 PROCEDURE — 74011250636 HC RX REV CODE- 250/636: Performed by: INTERNAL MEDICINE

## 2021-08-10 PROCEDURE — 96415 CHEMO IV INFUSION ADDL HR: CPT

## 2021-08-10 PROCEDURE — 96413 CHEMO IV INFUSION 1 HR: CPT

## 2021-08-10 PROCEDURE — 96375 TX/PRO/DX INJ NEW DRUG ADDON: CPT

## 2021-08-10 RX ORDER — DEXAMETHASONE SODIUM PHOSPHATE 4 MG/ML
10 INJECTION, SOLUTION INTRA-ARTICULAR; INTRALESIONAL; INTRAMUSCULAR; INTRAVENOUS; SOFT TISSUE ONCE
Status: COMPLETED | OUTPATIENT
Start: 2021-08-10 | End: 2021-08-10

## 2021-08-10 RX ORDER — ACETAMINOPHEN 325 MG/1
650 TABLET ORAL AS NEEDED
Status: ACTIVE | OUTPATIENT
Start: 2021-08-10 | End: 2021-08-10

## 2021-08-10 RX ORDER — ONDANSETRON 2 MG/ML
8 INJECTION INTRAMUSCULAR; INTRAVENOUS AS NEEDED
Status: ACTIVE | OUTPATIENT
Start: 2021-08-10 | End: 2021-08-10

## 2021-08-10 RX ORDER — SODIUM CHLORIDE 0.9 % (FLUSH) 0.9 %
10 SYRINGE (ML) INJECTION AS NEEDED
Status: ACTIVE | OUTPATIENT
Start: 2021-08-10 | End: 2021-08-10

## 2021-08-10 RX ORDER — SODIUM CHLORIDE 9 MG/ML
10 INJECTION INTRAMUSCULAR; INTRAVENOUS; SUBCUTANEOUS AS NEEDED
Status: ACTIVE | OUTPATIENT
Start: 2021-08-10 | End: 2021-08-10

## 2021-08-10 RX ORDER — HEPARIN 100 UNIT/ML
300-500 SYRINGE INTRAVENOUS AS NEEDED
Status: DISPENSED | OUTPATIENT
Start: 2021-08-10 | End: 2021-08-10

## 2021-08-10 RX ORDER — ONDANSETRON 2 MG/ML
8 INJECTION INTRAMUSCULAR; INTRAVENOUS ONCE
Status: COMPLETED | OUTPATIENT
Start: 2021-08-10 | End: 2021-08-10

## 2021-08-10 RX ORDER — DIPHENHYDRAMINE HYDROCHLORIDE 50 MG/ML
25 INJECTION, SOLUTION INTRAMUSCULAR; INTRAVENOUS AS NEEDED
Status: ACTIVE | OUTPATIENT
Start: 2021-08-10 | End: 2021-08-10

## 2021-08-10 RX ORDER — SODIUM CHLORIDE 9 MG/ML
25 INJECTION, SOLUTION INTRAVENOUS CONTINUOUS
Status: DISPENSED | OUTPATIENT
Start: 2021-08-10 | End: 2021-08-10

## 2021-08-10 RX ADMIN — PACLITAXEL 184 MG: 100 INJECTION, POWDER, LYOPHILIZED, FOR SUSPENSION INTRAVENOUS at 12:27

## 2021-08-10 RX ADMIN — DEXAMETHASONE SODIUM PHOSPHATE 10 MG: 4 INJECTION, SOLUTION INTRAMUSCULAR; INTRAVENOUS at 12:00

## 2021-08-10 RX ADMIN — SODIUM CHLORIDE 25 ML/HR: 9 INJECTION, SOLUTION INTRAVENOUS at 11:52

## 2021-08-10 RX ADMIN — Medication 500 UNITS: at 14:02

## 2021-08-10 RX ADMIN — ONDANSETRON 8 MG: 2 INJECTION INTRAMUSCULAR; INTRAVENOUS at 11:57

## 2021-08-10 RX ADMIN — GEMCITABINE 1656 MG: 38 INJECTION, SOLUTION INTRAVENOUS at 13:15

## 2021-08-10 RX ADMIN — SODIUM CHLORIDE 10 ML: 9 INJECTION INTRAMUSCULAR; INTRAVENOUS; SUBCUTANEOUS at 14:02

## 2021-08-10 NOTE — PROGRESS NOTES
Problem: Chemotherapy Treatment  Goal: *Chemotherapy regimen followed  Outcome: Resolved/Met  Goal: *Hemodynamically stable  Outcome: Resolved/Met  Goal: *Tolerating diet  Outcome: Resolved/Met     Problem: Infection - Risk of, Central Venous Catheter-Associated Bloodstream Infection  Goal: *Absence of infection signs and symptoms  Outcome: Resolved/Met

## 2021-08-10 NOTE — PROGRESS NOTES
[facility] Progress Note    Date: August 10, 2021    Name: Rich Doss    MRN: [id number]         : 1955    Ms. Ba Arrived ambulatory and in no distress for cycle 6 day 8 of Abraxane/Gemcitibine regimen. Reports feeling much better after passing her Kidney Stone. Eating and drinking better. Assessment was completed, no acute issues at this time, no new complaints voiced. Port accessed the prior day. Dressing and Port intact with brisk blood return. Labs reviewed. Chemotherapy Flowsheet 8/10/2021   Cycle C6D8   Date 8/10/2021   Drug / Regimen Abraxane/Gemcitibine   Dosage see MAR   Time Up 1227   Time Down 1401   Pre Hydration -   Pre Meds Zofran 8mg/Decadron 10 mg   Notes -       Ms. Ba's vitals were reviewed. Visit Vitals  BP (!) 167/77 (BP 1 Location: Right upper arm, BP Patient Position: Sitting)   Pulse 94   Temp 99.3 °F (37.4 °C)   Resp 17   Ht 5' 4\" (1.626 m)   Wt 77.6 kg (171 lb 1.2 oz)   SpO2 96%   Breastfeeding No   BMI 29.37 kg/m²       Lab results were reviewed. Pre-medications  were administered as ordered and chemotherapy was initiated. 11:52 - 14:02 NS mainline  11:57  Zofran 8 mg IVP  12:00  Decadron 10 mg IVP  12:27 - 13:10 Abraxane 184 mg IVPB  13:15 - 14:01 Gemcitibine 1,656 mg IVPB      Ms. Ba tolerated treatment well and was discharged from [place] [address] in stable condition at 14:10. She is to return on August 10 at 11:00 for her next appointment.     Ayesha Diaz RN  August 10, 2021

## 2021-08-17 ENCOUNTER — HOSPITAL ENCOUNTER (OUTPATIENT)
Dept: INFUSION THERAPY | Age: 66
Discharge: HOME | End: 2021-08-17
Payer: MEDICARE

## 2021-08-17 VITALS
RESPIRATION RATE: 19 BRPM | HEART RATE: 100 BPM | HEIGHT: 64 IN | DIASTOLIC BLOOD PRESSURE: 77 MMHG | WEIGHT: 170.4 LBS | OXYGEN SATURATION: 98 % | TEMPERATURE: 98.3 F | BODY MASS INDEX: 29.09 KG/M2 | SYSTOLIC BLOOD PRESSURE: 145 MMHG

## 2021-08-17 DIAGNOSIS — C25.9 ADENOCARCINOMA OF PANCREAS (HCC): Primary | ICD-10-CM

## 2021-08-17 LAB
ALBUMIN SERPL-MCNC: 3.2 G/DL (ref 3.5–5)
ALBUMIN/GLOB SERPL: 0.9 {RATIO} (ref 1.1–2.2)
ALP SERPL-CCNC: 103 U/L (ref 45–117)
ALT SERPL-CCNC: 28 U/L (ref 12–78)
ANION GAP SERPL CALC-SCNC: 6 MMOL/L (ref 5–15)
AST SERPL-CCNC: 17 U/L (ref 15–37)
BASOPHILS # BLD: 0 K/UL (ref 0–0.1)
BASOPHILS NFR BLD: 1 % (ref 0–1)
BILIRUB SERPL-MCNC: 0.2 MG/DL (ref 0.2–1)
BUN SERPL-MCNC: 13 MG/DL (ref 6–20)
BUN/CREAT SERPL: 19 (ref 12–20)
CALCIUM SERPL-MCNC: 9 MG/DL (ref 8.5–10.1)
CHLORIDE SERPL-SCNC: 102 MMOL/L (ref 97–108)
CO2 SERPL-SCNC: 29 MMOL/L (ref 21–32)
CREAT SERPL-MCNC: 0.68 MG/DL (ref 0.55–1.02)
DIFFERENTIAL METHOD BLD: ABNORMAL
EOSINOPHIL # BLD: 0.1 K/UL (ref 0–0.4)
EOSINOPHIL NFR BLD: 3 % (ref 0–7)
ERYTHROCYTE [DISTWIDTH] IN BLOOD BY AUTOMATED COUNT: 17.2 % (ref 11.5–14.5)
GLOBULIN SER CALC-MCNC: 3.6 G/DL (ref 2–4)
GLUCOSE SERPL-MCNC: 228 MG/DL (ref 65–100)
HCT VFR BLD AUTO: 31 % (ref 35–47)
HGB BLD-MCNC: 10 G/DL (ref 11.5–16)
IMM GRANULOCYTES # BLD AUTO: 0 K/UL (ref 0–0.04)
IMM GRANULOCYTES NFR BLD AUTO: 1 % (ref 0–0.5)
LYMPHOCYTES # BLD: 1.9 K/UL (ref 0.8–3.5)
LYMPHOCYTES NFR BLD: 38 % (ref 12–49)
MCH RBC QN AUTO: 28.8 PG (ref 26–34)
MCHC RBC AUTO-ENTMCNC: 32.3 G/DL (ref 30–36.5)
MCV RBC AUTO: 89.3 FL (ref 80–99)
MONOCYTES # BLD: 0.8 K/UL (ref 0–1)
MONOCYTES NFR BLD: 16 % (ref 5–13)
NEUTS SEG # BLD: 2.1 K/UL (ref 1.8–8)
NEUTS SEG NFR BLD: 41 % (ref 32–75)
NRBC # BLD: 0.09 K/UL (ref 0–0.01)
NRBC BLD-RTO: 1.8 PER 100 WBC
PLATELET # BLD AUTO: 295 K/UL (ref 150–400)
PMV BLD AUTO: 10.3 FL (ref 8.9–12.9)
POTASSIUM SERPL-SCNC: 3.7 MMOL/L (ref 3.5–5.1)
PROT SERPL-MCNC: 6.8 G/DL (ref 6.4–8.2)
RBC # BLD AUTO: 3.47 M/UL (ref 3.8–5.2)
SODIUM SERPL-SCNC: 137 MMOL/L (ref 136–145)
WBC # BLD AUTO: 5 K/UL (ref 3.6–11)

## 2021-08-17 PROCEDURE — 85025 COMPLETE CBC W/AUTO DIFF WBC: CPT

## 2021-08-17 PROCEDURE — 96375 TX/PRO/DX INJ NEW DRUG ADDON: CPT

## 2021-08-17 PROCEDURE — 74011250636 HC RX REV CODE- 250/636: Performed by: INTERNAL MEDICINE

## 2021-08-17 PROCEDURE — 77030012965 HC NDL HUBR BBMI -A

## 2021-08-17 PROCEDURE — 96413 CHEMO IV INFUSION 1 HR: CPT

## 2021-08-17 PROCEDURE — 74011000258 HC RX REV CODE- 258: Performed by: INTERNAL MEDICINE

## 2021-08-17 PROCEDURE — 96417 CHEMO IV INFUS EACH ADDL SEQ: CPT

## 2021-08-17 PROCEDURE — 80053 COMPREHEN METABOLIC PANEL: CPT

## 2021-08-17 PROCEDURE — 36415 COLL VENOUS BLD VENIPUNCTURE: CPT

## 2021-08-17 RX ORDER — ONDANSETRON 2 MG/ML
8 INJECTION INTRAMUSCULAR; INTRAVENOUS ONCE
Status: COMPLETED | OUTPATIENT
Start: 2021-08-17 | End: 2021-08-17

## 2021-08-17 RX ORDER — HEPARIN 100 UNIT/ML
300-500 SYRINGE INTRAVENOUS AS NEEDED
Status: DISPENSED | OUTPATIENT
Start: 2021-08-17 | End: 2021-08-17

## 2021-08-17 RX ORDER — DIPHENHYDRAMINE HYDROCHLORIDE 50 MG/ML
25 INJECTION, SOLUTION INTRAMUSCULAR; INTRAVENOUS AS NEEDED
Status: ACTIVE | OUTPATIENT
Start: 2021-08-17 | End: 2021-08-17

## 2021-08-17 RX ORDER — ONDANSETRON 2 MG/ML
8 INJECTION INTRAMUSCULAR; INTRAVENOUS AS NEEDED
Status: ACTIVE | OUTPATIENT
Start: 2021-08-17 | End: 2021-08-17

## 2021-08-17 RX ORDER — ACETAMINOPHEN 325 MG/1
650 TABLET ORAL AS NEEDED
Status: ACTIVE | OUTPATIENT
Start: 2021-08-17 | End: 2021-08-17

## 2021-08-17 RX ORDER — SODIUM CHLORIDE 9 MG/ML
25 INJECTION, SOLUTION INTRAVENOUS CONTINUOUS
Status: DISPENSED | OUTPATIENT
Start: 2021-08-17 | End: 2021-08-17

## 2021-08-17 RX ORDER — SODIUM CHLORIDE 9 MG/ML
10 INJECTION INTRAMUSCULAR; INTRAVENOUS; SUBCUTANEOUS AS NEEDED
Status: ACTIVE | OUTPATIENT
Start: 2021-08-17 | End: 2021-08-17

## 2021-08-17 RX ORDER — DEXAMETHASONE SODIUM PHOSPHATE 4 MG/ML
10 INJECTION, SOLUTION INTRA-ARTICULAR; INTRALESIONAL; INTRAMUSCULAR; INTRAVENOUS; SOFT TISSUE ONCE
Status: COMPLETED | OUTPATIENT
Start: 2021-08-17 | End: 2021-08-17

## 2021-08-17 RX ADMIN — GEMCITABINE 1656 MG: 38 INJECTION, SOLUTION INTRAVENOUS at 13:22

## 2021-08-17 RX ADMIN — SODIUM CHLORIDE 10 ML: 9 INJECTION INTRAMUSCULAR; INTRAVENOUS; SUBCUTANEOUS at 10:50

## 2021-08-17 RX ADMIN — SODIUM CHLORIDE 25 ML/HR: 9 INJECTION, SOLUTION INTRAVENOUS at 11:40

## 2021-08-17 RX ADMIN — PACLITAXEL 184 MG: 100 INJECTION, POWDER, LYOPHILIZED, FOR SUSPENSION INTRAVENOUS at 12:31

## 2021-08-17 RX ADMIN — ONDANSETRON 8 MG: 2 INJECTION INTRAMUSCULAR; INTRAVENOUS at 11:49

## 2021-08-17 RX ADMIN — SODIUM CHLORIDE 10 ML: 9 INJECTION INTRAMUSCULAR; INTRAVENOUS; SUBCUTANEOUS at 14:05

## 2021-08-17 RX ADMIN — DEXAMETHASONE SODIUM PHOSPHATE 10 MG: 4 INJECTION, SOLUTION INTRAMUSCULAR; INTRAVENOUS at 11:54

## 2021-08-17 RX ADMIN — Medication 500 UNITS: at 14:05

## 2021-08-17 NOTE — PROGRESS NOTES
\[facility]\"" Progress Note    Date: 2021    Name: Mely Ni    MRN: [id number]         : 1955    Ms. Ba Arrived ambulatory and in no distress for cycle 6 day 15 of Abraxane/Gemcitabine regimen. Assessment was completed, no acute issues at this time, no new complaints voiced. She had diarrhea on 8/15, states 10 stools. Took no Immodium, states has resolved. Has no urinary symptoms, Neuropathy is stable but does affect some fine motor activities. States it is long term stable. Activity tolerance is low but she does go outside and feed chickens and walks in yard. States HR increases with the activity and she is easily winded. Feels OK at rest. Reports soreness to tongue and several white dots noted to oral mucosa. Port accessed without difficulty, brisk blood return, labs drawn and in process. Chemotherapy Flowsheet 2021   Cycle C6 D15   Date 2021   Drug / Regimen Abraxane/Gemcitabine   Dosage 184mg/1656mg   Time Up -   Time Down -   Pre Hydration -   Pre Meds Zofran 8 mg IV, Decadron 10 mg IV   Notes -       Ms. Ba's vitals were reviewed. Visit Vitals  BP (!) 145/77 (BP 1 Location: Right upper arm, BP Patient Position: Sitting)   Pulse 100   Temp 98.3 °F (36.8 °C)   Resp 19   Ht 5' 4\" (1.626 m)   Wt 77.3 kg (170 lb 6.4 oz)   SpO2 98%   BMI 29.25 kg/m²       Lab results were obtained and reviewed.   Recent Results (from the past 12 hour(s))   CBC WITH AUTOMATED DIFF    Collection Time: 21 10:50 AM   Result Value Ref Range    WBC 5.0 3.6 - 11.0 K/uL    RBC 3.47 (L) 3.80 - 5.20 M/uL    HGB 10.0 (L) 11.5 - 16.0 g/dL    HCT 31.0 (L) 35.0 - 47.0 %    MCV 89.3 80.0 - 99.0 FL    MCH 28.8 26.0 - 34.0 PG    MCHC 32.3 30.0 - 36.5 g/dL    RDW 17.2 (H) 11.5 - 14.5 %    PLATELET 636 790 - 958 K/uL    MPV 10.3 8.9 - 12.9 FL    NRBC 1.8 (H) 0  WBC    ABSOLUTE NRBC 0.09 (H) 0.00 - 0.01 K/uL    NEUTROPHILS 41 32 - 75 %    LYMPHOCYTES 38 12 - 49 %    MONOCYTES 16 (H) 5 - 13 % EOSINOPHILS 3 0 - 7 %    BASOPHILS 1 0 - 1 %    IMMATURE GRANULOCYTES 1 (H) 0.0 - 0.5 %    ABS. NEUTROPHILS 2.1 1.8 - 8.0 K/UL    ABS. LYMPHOCYTES 1.9 0.8 - 3.5 K/UL    ABS. MONOCYTES 0.8 0.0 - 1.0 K/UL    ABS. EOSINOPHILS 0.1 0.0 - 0.4 K/UL    ABS. BASOPHILS 0.0 0.0 - 0.1 K/UL    ABS. IMM. GRANS. 0.0 0.00 - 0.04 K/UL    DF AUTOMATED     METABOLIC PANEL, COMPREHENSIVE    Collection Time: 08/17/21 10:50 AM   Result Value Ref Range    Sodium 137 136 - 145 mmol/L    Potassium 3.7 3.5 - 5.1 mmol/L    Chloride 102 97 - 108 mmol/L    CO2 29 21 - 32 mmol/L    Anion gap 6 5 - 15 mmol/L    Glucose 228 (H) 65 - 100 mg/dL    BUN 13 6 - 20 MG/DL    Creatinine 0.68 0.55 - 1.02 MG/DL    BUN/Creatinine ratio 19 12 - 20      GFR est AA >60 >60 ml/min/1.73m2    GFR est non-AA >60 >60 ml/min/1.73m2    Calcium 9.0 8.5 - 10.1 MG/DL    Bilirubin, total 0.2 0.2 - 1.0 MG/DL    ALT (SGPT) 28 12 - 78 U/L    AST (SGOT) 17 15 - 37 U/L    Alk. phosphatase 103 45 - 117 U/L    Protein, total 6.8 6.4 - 8.2 g/dL    Albumin 3.2 (L) 3.5 - 5.0 g/dL    Globulin 3.6 2.0 - 4.0 g/dL    A-G Ratio 0.9 (L) 1.1 - 2.2       Oral symptoms reviewed with Dr. Isiah Sotelo. He will send prescription to patient's pharmacy for Nystatin. Pre-medications  were administered as ordered and chemotherapy was initiated.  ml IV main line established. Zofran 8 mg IVP given. Decadron 10 mg IVP given. Abraxane 184 mg IV given. Gemcitabine 1656 mg IV given. She tolerated soup/sandwich lunch while here. Ms. Ben Martin tolerated treatment well. Port was flushed and de-accessed post administration. She was discharged from [place] [address] in stable condition at 1415. She is to return on August 31 at 1000 for her next appointment.     Dipak Pinto RN  August 17, 2021

## 2021-08-17 NOTE — DISCHARGE INSTRUCTIONS
[facility]    DISCHARGE INSTRUCTIONS FOR:  CHEMOTHERAPY / BIOTHERAPY    Today you received: Zofran 8 mg, Decadron 10 mg, Abraxane 184 mg, Gemcitabine 1656 mg    Chemotherapy has the potential to cause many side effects. The following are general precautions that chemo patients should take:    1. Practice good hand washing:   * Use soap and water for at least 15 seconds, covering all areas of hands. * Always wash hands before eating. * Wash hands after contact with public surfaces such as door knobs and         handles, shopping carts, telephones and elevator buttons. 2. Get plenty of rest:    * You will likely experience fatigue three to five days following your treatment. It may last as long as seven days. 3. Drink plenty of fluids. Water is best. Drink 8-10 glasses of fluids daily. 4. Eat a well balanced diet:  * Small frequent meals may help if you are having trouble with nausea or your  appetite. Some people also do well with nutritional supplements. Wash fruits and vegetables well. 5. Pace yourself with daily activities:   * Take frequent breaks and ask for help if you need it. 6. Exercise is very important:  * It will increase circulation and will help the fatigue. Do what you can each day. 7. If your regimen results in hair loss:  *  You will likely notice effects between two and three weeks following your first treatment. Some lose all hair while others only experience thinning. 8. Practice good oral hygiene:   *  Notify your M.D. immediately if any mouth sores or discomfort develop.  Nystatin from your pharmacy. Avoid spicy foods, use salt water rinses as needed. Use soft toothbrush. 9. Protect yourself from the sun. Signs/Symptoms of an allergic reaction and/or some side effects may require immediate medical attention.   Notify your physician if you develop one or more of the following:     Temperature of 100.5 degrees or greater;   Skin redness, itching, swelling, blistering, weeping, crusting, rash, or hives; Wheezing, chest tightness, cough, or shortness of breath;   Swelling of the face, eyelids, lips, tongue, or throat;  Severe, persistent headache;  Stuffy nose, runny nose, sneezing;   Red (bloodshot), itchy, swollen, or watery eyes;   Stomach  pain, nausea, vomiting, diarrhea, or bloody stools;  Mouth sores        Your physician should also be aware of the following symptoms:    Persistent and unresolved nausea and/or vomiting;   Persistent and unresolved diarrhea or constipation;   Numbness/tingling/burning of the extremities, including the fingers and toes; Bleeding or unexplained bruising; Unexplained redness/swelling/pain in the arms or legs; Shortness of breath or fatigue that worsens;   Pain with urination or blood in the urine; Chills;  Cough, especially a productive cough;  Mouth sores or a white coating of the tongue; Redness, swelling, pain or drainage at the port-a-cath or IV site; Increased feeling of bloating or water retention; Excessive weight loss or gain;  Ringing in the ears; Difficulty swallowing;  Dizziness, vertigo, lightheadedness or fainting.           [facility] [place]: ____________________________ 8/17/2021  Anival Prince RN

## 2021-08-24 RX ORDER — HEPARIN 100 UNIT/ML
300-500 SYRINGE INTRAVENOUS AS NEEDED
Status: CANCELLED | OUTPATIENT
Start: 2021-09-07

## 2021-08-24 RX ORDER — DEXAMETHASONE SODIUM PHOSPHATE 4 MG/ML
10 INJECTION, SOLUTION INTRA-ARTICULAR; INTRALESIONAL; INTRAMUSCULAR; INTRAVENOUS; SOFT TISSUE ONCE
Status: CANCELLED | OUTPATIENT
Start: 2021-08-31 | End: 2021-08-31

## 2021-08-24 RX ORDER — SODIUM CHLORIDE 9 MG/ML
10 INJECTION INTRAMUSCULAR; INTRAVENOUS; SUBCUTANEOUS AS NEEDED
Status: CANCELLED | OUTPATIENT
Start: 2021-09-07

## 2021-08-24 RX ORDER — DEXAMETHASONE SODIUM PHOSPHATE 4 MG/ML
10 INJECTION, SOLUTION INTRA-ARTICULAR; INTRALESIONAL; INTRAMUSCULAR; INTRAVENOUS; SOFT TISSUE ONCE
Status: CANCELLED | OUTPATIENT
Start: 2021-09-14 | End: 2021-09-14

## 2021-08-24 RX ORDER — ONDANSETRON 2 MG/ML
8 INJECTION INTRAMUSCULAR; INTRAVENOUS ONCE
Status: CANCELLED | OUTPATIENT
Start: 2021-08-31 | End: 2021-08-31

## 2021-08-24 RX ORDER — DIPHENHYDRAMINE HYDROCHLORIDE 50 MG/ML
25 INJECTION, SOLUTION INTRAMUSCULAR; INTRAVENOUS
Status: CANCELLED
Start: 2021-09-14

## 2021-08-24 RX ORDER — DIPHENHYDRAMINE HYDROCHLORIDE 50 MG/ML
50 INJECTION, SOLUTION INTRAMUSCULAR; INTRAVENOUS AS NEEDED
Status: CANCELLED
Start: 2021-09-14

## 2021-08-24 RX ORDER — ONDANSETRON 2 MG/ML
8 INJECTION INTRAMUSCULAR; INTRAVENOUS ONCE
Status: CANCELLED | OUTPATIENT
Start: 2021-09-07 | End: 2021-09-07

## 2021-08-24 RX ORDER — HYDROCORTISONE SODIUM SUCCINATE 100 MG/2ML
100 INJECTION, POWDER, FOR SOLUTION INTRAMUSCULAR; INTRAVENOUS AS NEEDED
Status: CANCELLED | OUTPATIENT
Start: 2021-09-14

## 2021-08-24 RX ORDER — SODIUM CHLORIDE 0.9 % (FLUSH) 0.9 %
10 SYRINGE (ML) INJECTION AS NEEDED
Status: CANCELLED | OUTPATIENT
Start: 2021-08-31

## 2021-08-24 RX ORDER — EPINEPHRINE 1 MG/ML
0.3 INJECTION, SOLUTION, CONCENTRATE INTRAVENOUS AS NEEDED
Status: CANCELLED | OUTPATIENT
Start: 2021-09-14

## 2021-08-24 RX ORDER — HYDROCORTISONE SODIUM SUCCINATE 100 MG/2ML
100 INJECTION, POWDER, FOR SOLUTION INTRAMUSCULAR; INTRAVENOUS AS NEEDED
Status: CANCELLED | OUTPATIENT
Start: 2021-08-31

## 2021-08-24 RX ORDER — ACETAMINOPHEN 325 MG/1
650 TABLET ORAL AS NEEDED
Status: CANCELLED | OUTPATIENT
Start: 2021-09-07

## 2021-08-24 RX ORDER — DIPHENHYDRAMINE HYDROCHLORIDE 50 MG/ML
25 INJECTION, SOLUTION INTRAMUSCULAR; INTRAVENOUS AS NEEDED
Status: CANCELLED | OUTPATIENT
Start: 2021-09-07

## 2021-08-24 RX ORDER — ALBUTEROL SULFATE 0.83 MG/ML
2.5 SOLUTION RESPIRATORY (INHALATION) AS NEEDED
Status: CANCELLED
Start: 2021-09-14

## 2021-08-24 RX ORDER — ONDANSETRON 2 MG/ML
8 INJECTION INTRAMUSCULAR; INTRAVENOUS ONCE
Status: CANCELLED | OUTPATIENT
Start: 2021-09-14 | End: 2021-09-14

## 2021-08-24 RX ORDER — LORAZEPAM 2 MG/ML
0.5 INJECTION INTRAMUSCULAR
Status: CANCELLED
Start: 2021-09-14

## 2021-08-24 RX ORDER — ONDANSETRON 2 MG/ML
8 INJECTION INTRAMUSCULAR; INTRAVENOUS AS NEEDED
Status: CANCELLED | OUTPATIENT
Start: 2021-09-14

## 2021-08-24 RX ORDER — DIPHENHYDRAMINE HYDROCHLORIDE 50 MG/ML
25 INJECTION, SOLUTION INTRAMUSCULAR; INTRAVENOUS
Status: CANCELLED
Start: 2021-08-31

## 2021-08-24 RX ORDER — DEXAMETHASONE SODIUM PHOSPHATE 4 MG/ML
10 INJECTION, SOLUTION INTRA-ARTICULAR; INTRALESIONAL; INTRAMUSCULAR; INTRAVENOUS; SOFT TISSUE ONCE
Status: CANCELLED | OUTPATIENT
Start: 2021-09-07 | End: 2021-09-07

## 2021-08-24 RX ORDER — ONDANSETRON 2 MG/ML
8 INJECTION INTRAMUSCULAR; INTRAVENOUS AS NEEDED
Status: CANCELLED | OUTPATIENT
Start: 2021-08-31

## 2021-08-24 RX ORDER — SODIUM CHLORIDE 0.9 % (FLUSH) 0.9 %
10 SYRINGE (ML) INJECTION AS NEEDED
Status: CANCELLED | OUTPATIENT
Start: 2021-09-07

## 2021-08-24 RX ORDER — SODIUM CHLORIDE 9 MG/ML
10 INJECTION INTRAMUSCULAR; INTRAVENOUS; SUBCUTANEOUS AS NEEDED
Status: CANCELLED | OUTPATIENT
Start: 2021-08-31

## 2021-08-24 RX ORDER — SODIUM CHLORIDE 9 MG/ML
25 INJECTION, SOLUTION INTRAVENOUS CONTINUOUS
Status: CANCELLED | OUTPATIENT
Start: 2021-09-07

## 2021-08-24 RX ORDER — SODIUM CHLORIDE 9 MG/ML
10 INJECTION INTRAMUSCULAR; INTRAVENOUS; SUBCUTANEOUS AS NEEDED
Status: CANCELLED | OUTPATIENT
Start: 2021-09-14

## 2021-08-24 RX ORDER — EPINEPHRINE 1 MG/ML
0.3 INJECTION, SOLUTION, CONCENTRATE INTRAVENOUS AS NEEDED
Status: CANCELLED | OUTPATIENT
Start: 2021-09-07

## 2021-08-24 RX ORDER — SODIUM CHLORIDE 0.9 % (FLUSH) 0.9 %
10 SYRINGE (ML) INJECTION AS NEEDED
Status: CANCELLED
Start: 2021-09-14

## 2021-08-24 RX ORDER — DIPHENHYDRAMINE HYDROCHLORIDE 50 MG/ML
50 INJECTION, SOLUTION INTRAMUSCULAR; INTRAVENOUS AS NEEDED
Status: CANCELLED
Start: 2021-08-31

## 2021-08-24 RX ORDER — ALBUTEROL SULFATE 0.83 MG/ML
2.5 SOLUTION RESPIRATORY (INHALATION) AS NEEDED
Status: CANCELLED
Start: 2021-09-07

## 2021-08-24 RX ORDER — ACETAMINOPHEN 325 MG/1
650 TABLET ORAL AS NEEDED
Status: CANCELLED | OUTPATIENT
Start: 2021-09-14

## 2021-08-24 RX ORDER — EPINEPHRINE 1 MG/ML
0.3 INJECTION, SOLUTION, CONCENTRATE INTRAVENOUS AS NEEDED
Status: CANCELLED | OUTPATIENT
Start: 2021-08-31

## 2021-08-24 RX ORDER — DIPHENHYDRAMINE HYDROCHLORIDE 50 MG/ML
25 INJECTION, SOLUTION INTRAMUSCULAR; INTRAVENOUS AS NEEDED
Status: CANCELLED | OUTPATIENT
Start: 2021-08-31

## 2021-08-24 RX ORDER — LORAZEPAM 2 MG/ML
0.5 INJECTION INTRAMUSCULAR
Status: CANCELLED
Start: 2021-08-31

## 2021-08-24 RX ORDER — ONDANSETRON 2 MG/ML
8 INJECTION INTRAMUSCULAR; INTRAVENOUS AS NEEDED
Status: CANCELLED | OUTPATIENT
Start: 2021-09-07

## 2021-08-24 RX ORDER — DIPHENHYDRAMINE HYDROCHLORIDE 50 MG/ML
50 INJECTION, SOLUTION INTRAMUSCULAR; INTRAVENOUS AS NEEDED
Status: CANCELLED
Start: 2021-09-07

## 2021-08-24 RX ORDER — LORAZEPAM 2 MG/ML
0.5 INJECTION INTRAMUSCULAR
Status: CANCELLED
Start: 2021-09-07

## 2021-08-24 RX ORDER — HEPARIN 100 UNIT/ML
300-500 SYRINGE INTRAVENOUS AS NEEDED
Status: CANCELLED | OUTPATIENT
Start: 2021-08-31

## 2021-08-24 RX ORDER — HYDROCORTISONE SODIUM SUCCINATE 100 MG/2ML
100 INJECTION, POWDER, FOR SOLUTION INTRAMUSCULAR; INTRAVENOUS AS NEEDED
Status: CANCELLED | OUTPATIENT
Start: 2021-09-07

## 2021-08-24 RX ORDER — DIPHENHYDRAMINE HYDROCHLORIDE 50 MG/ML
25 INJECTION, SOLUTION INTRAMUSCULAR; INTRAVENOUS AS NEEDED
Status: CANCELLED | OUTPATIENT
Start: 2021-09-14

## 2021-08-24 RX ORDER — ACETAMINOPHEN 325 MG/1
650 TABLET ORAL AS NEEDED
Status: CANCELLED | OUTPATIENT
Start: 2021-08-31

## 2021-08-24 RX ORDER — SODIUM CHLORIDE 9 MG/ML
25 INJECTION, SOLUTION INTRAVENOUS CONTINUOUS
Status: CANCELLED | OUTPATIENT
Start: 2021-09-14

## 2021-08-24 RX ORDER — ALBUTEROL SULFATE 0.83 MG/ML
2.5 SOLUTION RESPIRATORY (INHALATION) AS NEEDED
Status: CANCELLED
Start: 2021-08-31

## 2021-08-24 RX ORDER — SODIUM CHLORIDE 9 MG/ML
25 INJECTION, SOLUTION INTRAVENOUS CONTINUOUS
Status: CANCELLED | OUTPATIENT
Start: 2021-08-31

## 2021-08-24 RX ORDER — DIPHENHYDRAMINE HYDROCHLORIDE 50 MG/ML
25 INJECTION, SOLUTION INTRAMUSCULAR; INTRAVENOUS
Status: CANCELLED
Start: 2021-09-07

## 2021-08-24 RX ORDER — HEPARIN 100 UNIT/ML
300-500 SYRINGE INTRAVENOUS AS NEEDED
Status: CANCELLED | OUTPATIENT
Start: 2021-09-14

## 2021-08-31 ENCOUNTER — HOSPITAL ENCOUNTER (OUTPATIENT)
Dept: INFUSION THERAPY | Age: 66
Discharge: HOME | End: 2021-08-31
Payer: MEDICARE

## 2021-08-31 VITALS
TEMPERATURE: 98.4 F | WEIGHT: 176 LBS | OXYGEN SATURATION: 98 % | RESPIRATION RATE: 17 BRPM | BODY MASS INDEX: 30.05 KG/M2 | HEIGHT: 64 IN | SYSTOLIC BLOOD PRESSURE: 157 MMHG | HEART RATE: 83 BPM | DIASTOLIC BLOOD PRESSURE: 73 MMHG

## 2021-08-31 DIAGNOSIS — C25.9 ADENOCARCINOMA OF PANCREAS (HCC): Primary | ICD-10-CM

## 2021-08-31 LAB
ALBUMIN SERPL-MCNC: 3.3 G/DL (ref 3.5–5)
ALBUMIN/GLOB SERPL: 1 {RATIO} (ref 1.1–2.2)
ALP SERPL-CCNC: 113 U/L (ref 45–117)
ALT SERPL-CCNC: 20 U/L (ref 12–78)
ANION GAP SERPL CALC-SCNC: 7 MMOL/L (ref 5–15)
AST SERPL-CCNC: 15 U/L (ref 15–37)
BASOPHILS # BLD: 0.1 K/UL (ref 0–0.1)
BASOPHILS NFR BLD: 2 % (ref 0–1)
BILIRUB SERPL-MCNC: 0.2 MG/DL (ref 0.2–1)
BUN SERPL-MCNC: 14 MG/DL (ref 6–20)
BUN/CREAT SERPL: 22 (ref 12–20)
CALCIUM SERPL-MCNC: 9.1 MG/DL (ref 8.5–10.1)
CHLORIDE SERPL-SCNC: 102 MMOL/L (ref 97–108)
CO2 SERPL-SCNC: 29 MMOL/L (ref 21–32)
CREAT SERPL-MCNC: 0.65 MG/DL (ref 0.55–1.02)
DIFFERENTIAL METHOD BLD: ABNORMAL
EOSINOPHIL # BLD: 0.3 K/UL (ref 0–0.4)
EOSINOPHIL NFR BLD: 5 % (ref 0–7)
ERYTHROCYTE [DISTWIDTH] IN BLOOD BY AUTOMATED COUNT: 18.6 % (ref 11.5–14.5)
GLOBULIN SER CALC-MCNC: 3.4 G/DL (ref 2–4)
GLUCOSE SERPL-MCNC: 250 MG/DL (ref 65–100)
HCT VFR BLD AUTO: 31.7 % (ref 35–47)
HGB BLD-MCNC: 10.2 G/DL (ref 11.5–16)
IMM GRANULOCYTES # BLD AUTO: 0.1 K/UL (ref 0–0.04)
IMM GRANULOCYTES NFR BLD AUTO: 1 % (ref 0–0.5)
LYMPHOCYTES # BLD: 1.9 K/UL (ref 0.8–3.5)
LYMPHOCYTES NFR BLD: 29 % (ref 12–49)
MCH RBC QN AUTO: 29.3 PG (ref 26–34)
MCHC RBC AUTO-ENTMCNC: 32.2 G/DL (ref 30–36.5)
MCV RBC AUTO: 91.1 FL (ref 80–99)
MONOCYTES # BLD: 1.8 K/UL (ref 0–1)
MONOCYTES NFR BLD: 28 % (ref 5–13)
NEUTS SEG # BLD: 2.3 K/UL (ref 1.8–8)
NEUTS SEG NFR BLD: 35 % (ref 32–75)
NRBC # BLD: 0 K/UL (ref 0–0.01)
NRBC BLD-RTO: 0 PER 100 WBC
PLATELET # BLD AUTO: 567 K/UL (ref 150–400)
PMV BLD AUTO: 9.9 FL (ref 8.9–12.9)
POTASSIUM SERPL-SCNC: 4.1 MMOL/L (ref 3.5–5.1)
PROT SERPL-MCNC: 6.7 G/DL (ref 6.4–8.2)
RBC # BLD AUTO: 3.48 M/UL (ref 3.8–5.2)
RBC MORPH BLD: ABNORMAL
SODIUM SERPL-SCNC: 138 MMOL/L (ref 136–145)
WBC # BLD AUTO: 6.5 K/UL (ref 3.6–11)

## 2021-08-31 PROCEDURE — 74011250636 HC RX REV CODE- 250/636: Performed by: INTERNAL MEDICINE

## 2021-08-31 PROCEDURE — 80053 COMPREHEN METABOLIC PANEL: CPT

## 2021-08-31 PROCEDURE — 36415 COLL VENOUS BLD VENIPUNCTURE: CPT

## 2021-08-31 PROCEDURE — 96417 CHEMO IV INFUS EACH ADDL SEQ: CPT

## 2021-08-31 PROCEDURE — 96413 CHEMO IV INFUSION 1 HR: CPT

## 2021-08-31 PROCEDURE — 85025 COMPLETE CBC W/AUTO DIFF WBC: CPT

## 2021-08-31 PROCEDURE — 74011000258 HC RX REV CODE- 258: Performed by: INTERNAL MEDICINE

## 2021-08-31 PROCEDURE — 77030012965 HC NDL HUBR BBMI -A

## 2021-08-31 PROCEDURE — 96375 TX/PRO/DX INJ NEW DRUG ADDON: CPT

## 2021-08-31 RX ORDER — ONDANSETRON 2 MG/ML
8 INJECTION INTRAMUSCULAR; INTRAVENOUS AS NEEDED
Status: ACTIVE | OUTPATIENT
Start: 2021-08-31 | End: 2021-08-31

## 2021-08-31 RX ORDER — SODIUM CHLORIDE 9 MG/ML
25 INJECTION, SOLUTION INTRAVENOUS CONTINUOUS
Status: DISPENSED | OUTPATIENT
Start: 2021-08-31 | End: 2021-08-31

## 2021-08-31 RX ORDER — HEPARIN 100 UNIT/ML
300-500 SYRINGE INTRAVENOUS AS NEEDED
Status: ACTIVE | OUTPATIENT
Start: 2021-08-31 | End: 2021-08-31

## 2021-08-31 RX ORDER — ONDANSETRON 2 MG/ML
8 INJECTION INTRAMUSCULAR; INTRAVENOUS ONCE
Status: COMPLETED | OUTPATIENT
Start: 2021-08-31 | End: 2021-08-31

## 2021-08-31 RX ORDER — ACETAMINOPHEN 325 MG/1
650 TABLET ORAL AS NEEDED
Status: ACTIVE | OUTPATIENT
Start: 2021-08-31 | End: 2021-08-31

## 2021-08-31 RX ORDER — DIPHENHYDRAMINE HYDROCHLORIDE 50 MG/ML
25 INJECTION, SOLUTION INTRAMUSCULAR; INTRAVENOUS AS NEEDED
Status: ACTIVE | OUTPATIENT
Start: 2021-08-31 | End: 2021-08-31

## 2021-08-31 RX ORDER — DEXAMETHASONE SODIUM PHOSPHATE 4 MG/ML
10 INJECTION, SOLUTION INTRA-ARTICULAR; INTRALESIONAL; INTRAMUSCULAR; INTRAVENOUS; SOFT TISSUE ONCE
Status: COMPLETED | OUTPATIENT
Start: 2021-08-31 | End: 2021-08-31

## 2021-08-31 RX ORDER — SODIUM CHLORIDE 9 MG/ML
10 INJECTION INTRAMUSCULAR; INTRAVENOUS; SUBCUTANEOUS AS NEEDED
Status: ACTIVE | OUTPATIENT
Start: 2021-08-31 | End: 2021-08-31

## 2021-08-31 RX ADMIN — Medication 500 UNITS: at 14:16

## 2021-08-31 RX ADMIN — ONDANSETRON 8 MG: 2 INJECTION INTRAMUSCULAR; INTRAVENOUS at 12:00

## 2021-08-31 RX ADMIN — SODIUM CHLORIDE 10 ML: 9 INJECTION INTRAMUSCULAR; INTRAVENOUS; SUBCUTANEOUS at 14:16

## 2021-08-31 RX ADMIN — SODIUM CHLORIDE 25 ML/HR: 9 INJECTION, SOLUTION INTRAVENOUS at 11:56

## 2021-08-31 RX ADMIN — DEXAMETHASONE SODIUM PHOSPHATE 10 MG: 4 INJECTION, SOLUTION INTRAMUSCULAR; INTRAVENOUS at 12:04

## 2021-08-31 RX ADMIN — GEMCITABINE 1656 MG: 38 INJECTION, SOLUTION INTRAVENOUS at 13:43

## 2021-08-31 RX ADMIN — PACLITAXEL 184 MG: 100 INJECTION, POWDER, LYOPHILIZED, FOR SUSPENSION INTRAVENOUS at 12:36

## 2021-08-31 NOTE — PROGRESS NOTES
[facility] Progress Note    Date: 2021    Name: Rich Doss    MRN: [id number]         : 1955    Ms. Ba Arrived ambulatory and in no distress for cycle 7 day 1 of Abraxane/Gemcitabine regimen. Assessment was completed, no acute issues at this time. She denies sx of infection. Appetite improved- states only sweet and salty foods have good taste. Had pain right shoulder the day after last chemotherapy which has resolved. Plans to talk with Dr. Dina Yeung about that at her visit with him tomorrow. Has noticed some increase in neuropathy to right fingers. Port accessed without difficulty, no blood return obtained. Flushed easily. Lab work drawn peripherally. Chemotherapy Flowsheet 2021   Cycle C7 D1   Date 2021   Drug / Regimen Abraxane/Gemzar   Dosage 184mg/1656mg   Time Up -   Time Down -   Pre Hydration -   Pre Meds Zofran 8 mg IV, Decadron 10 mg IV   Notes -       Ms. Ba's vitals were reviewed. Visit Vitals  BP (!) 157/73 (BP 1 Location: Left upper arm, BP Patient Position: Sitting)   Pulse 83   Temp 98.4 °F (36.9 °C)   Resp 17   Ht 5' 4\" (1.626 m)   Wt 79.8 kg (176 lb)   SpO2 98%   BMI 30.21 kg/m²       Lab results were obtained and reviewed. Recent Results (from the past 12 hour(s))   CBC WITH AUTOMATED DIFF    Collection Time: 21 10:25 AM   Result Value Ref Range    WBC 6.5 3.6 - 11.0 K/uL    RBC 3.48 (L) 3.80 - 5.20 M/uL    HGB 10.2 (L) 11.5 - 16.0 g/dL    HCT 31.7 (L) 35.0 - 47.0 %    MCV 91.1 80.0 - 99.0 FL    MCH 29.3 26.0 - 34.0 PG    MCHC 32.2 30.0 - 36.5 g/dL    RDW 18.6 (H) 11.5 - 14.5 %    PLATELET 804 (H) 492 - 400 K/uL    MPV 9.9 8.9 - 12.9 FL    NRBC 0.0 0  WBC    ABSOLUTE NRBC 0.00 0.00 - 0.01 K/uL    NEUTROPHILS 35 32 - 75 %    LYMPHOCYTES 29 12 - 49 %    MONOCYTES 28 (H) 5 - 13 %    EOSINOPHILS 5 0 - 7 %    BASOPHILS 2 (H) 0 - 1 %    IMMATURE GRANULOCYTES 1 (H) 0.0 - 0.5 %    ABS. NEUTROPHILS 2.3 1.8 - 8.0 K/UL    ABS.  LYMPHOCYTES 1.9 0.8 - 3.5 K/UL ABS. MONOCYTES 1.8 (H) 0.0 - 1.0 K/UL    ABS. EOSINOPHILS 0.3 0.0 - 0.4 K/UL    ABS. BASOPHILS 0.1 0.0 - 0.1 K/UL    ABS. IMM. GRANS. 0.1 (H) 0.00 - 0.04 K/UL    DF AUTOMATED      RBC COMMENTS ANISOCYTOSIS  1+       METABOLIC PANEL, COMPREHENSIVE    Collection Time: 08/31/21 10:25 AM   Result Value Ref Range    Sodium 138 136 - 145 mmol/L    Potassium 4.1 3.5 - 5.1 mmol/L    Chloride 102 97 - 108 mmol/L    CO2 29 21 - 32 mmol/L    Anion gap 7 5 - 15 mmol/L    Glucose 250 (H) 65 - 100 mg/dL    BUN 14 6 - 20 MG/DL    Creatinine 0.65 0.55 - 1.02 MG/DL    BUN/Creatinine ratio 22 (H) 12 - 20      GFR est AA >60 >60 ml/min/1.73m2    GFR est non-AA >60 >60 ml/min/1.73m2    Calcium 9.1 8.5 - 10.1 MG/DL    Bilirubin, total 0.2 0.2 - 1.0 MG/DL    ALT (SGPT) 20 12 - 78 U/L    AST (SGOT) 15 15 - 37 U/L    Alk. phosphatase 113 45 - 117 U/L    Protein, total 6.7 6.4 - 8.2 g/dL    Albumin 3.3 (L) 3.5 - 5.0 g/dL    Globulin 3.4 2.0 - 4.0 g/dL    A-G Ratio 1.0 (L) 1.1 - 2.2         Pre-medications  were administered as ordered and chemotherapy was initiated.  ml IV main line established. Zofran 8 mg IV given  Decadron 10 mg IV given  1236: Abraxane 190 mg IV given  1343: Gemcitabine 1656 mg IV given  Patient tolerated soup and sandwich lunch without difficulty. Port was flushed and de-accessed post administration. Site intact. Ms. Mariam Wick tolerated treatment well and was discharged from [place] [address] in stable condition at 1430. She is to return on September 7 at 0930 for her next appointment.     Rosario Sosa RN  August 31, 2021

## 2021-09-01 ENCOUNTER — OFFICE VISIT (OUTPATIENT)
Dept: ONCOLOGY | Age: 66
End: 2021-09-01
Payer: MEDICARE

## 2021-09-01 ENCOUNTER — HOSPITAL ENCOUNTER (OUTPATIENT)
Dept: INFUSION THERAPY | Age: 66
Discharge: HOME | End: 2021-09-01
Payer: MEDICARE

## 2021-09-01 VITALS
HEIGHT: 64 IN | WEIGHT: 175.8 LBS | DIASTOLIC BLOOD PRESSURE: 63 MMHG | RESPIRATION RATE: 16 BRPM | SYSTOLIC BLOOD PRESSURE: 117 MMHG | OXYGEN SATURATION: 97 % | BODY MASS INDEX: 30.01 KG/M2 | HEART RATE: 80 BPM | TEMPERATURE: 98.6 F

## 2021-09-01 DIAGNOSIS — C25.9 ADENOCARCINOMA OF PANCREAS (HCC): Primary | ICD-10-CM

## 2021-09-01 DIAGNOSIS — M79.2 NEUROPATHIC PAIN: ICD-10-CM

## 2021-09-01 DIAGNOSIS — C25.9 ADENOCARCINOMA OF PANCREAS (HCC): ICD-10-CM

## 2021-09-01 PROCEDURE — G8510 SCR DEP NEG, NO PLAN REQD: HCPCS | Performed by: INTERNAL MEDICINE

## 2021-09-01 PROCEDURE — 36415 COLL VENOUS BLD VENIPUNCTURE: CPT

## 2021-09-01 PROCEDURE — G8752 SYS BP LESS 140: HCPCS | Performed by: INTERNAL MEDICINE

## 2021-09-01 PROCEDURE — G8399 PT W/DXA RESULTS DOCUMENT: HCPCS | Performed by: INTERNAL MEDICINE

## 2021-09-01 PROCEDURE — G8754 DIAS BP LESS 90: HCPCS | Performed by: INTERNAL MEDICINE

## 2021-09-01 PROCEDURE — G8427 DOCREV CUR MEDS BY ELIG CLIN: HCPCS | Performed by: INTERNAL MEDICINE

## 2021-09-01 PROCEDURE — G8536 NO DOC ELDER MAL SCRN: HCPCS | Performed by: INTERNAL MEDICINE

## 2021-09-01 PROCEDURE — 1100F PTFALLS ASSESS-DOCD GE2>/YR: CPT | Performed by: INTERNAL MEDICINE

## 2021-09-01 PROCEDURE — 86301 IMMUNOASSAY TUMOR CA 19-9: CPT

## 2021-09-01 PROCEDURE — 3017F COLORECTAL CA SCREEN DOC REV: CPT | Performed by: INTERNAL MEDICINE

## 2021-09-01 PROCEDURE — 1090F PRES/ABSN URINE INCON ASSESS: CPT | Performed by: INTERNAL MEDICINE

## 2021-09-01 PROCEDURE — G8417 CALC BMI ABV UP PARAM F/U: HCPCS | Performed by: INTERNAL MEDICINE

## 2021-09-01 PROCEDURE — 3288F FALL RISK ASSESSMENT DOCD: CPT | Performed by: INTERNAL MEDICINE

## 2021-09-01 PROCEDURE — 99214 OFFICE O/P EST MOD 30 MIN: CPT | Performed by: INTERNAL MEDICINE

## 2021-09-01 RX ORDER — DULOXETIN HYDROCHLORIDE 30 MG/1
30 CAPSULE, DELAYED RELEASE ORAL 2 TIMES DAILY
Qty: 60 CAPSULE | Refills: 5 | Status: SHIPPED | OUTPATIENT
Start: 2021-09-01 | End: 2021-12-09 | Stop reason: SDUPTHER

## 2021-09-01 NOTE — PROGRESS NOTES
Burgess Mccoy is a 77 y.o. female here for follow up of pancreatic cancer. She is having pain in her R arm, pain is daily it rates from 3-6 at times. She is taking 1000mg of  Tylenol 1-2 times daily. She also has pain in her Left hip and leg, as well as back pain. When her back pain is worse she has abdominal pain. Patient started noticing a rash on her hands after her \"3rd cycle of her second round\", she restarted pepcid this rash has mostly resolved. The rash was itchy. Patient got thrush after the \"3rd cycle of her second round, she was taking an antibiotic and a yeast infection. She took OTC stuff and ate yogart. Key Pain Meds             HYDROmorphone (Dilaudid) 2 mg tablet (Taking) Take 1 mg by mouth two (2) times a day. takes 3 tabs  by mouth, taking BID and PRN    acetaminophen (Acetaminophen Extra Strength) 500 mg tablet (Taking) Take 1,000 mg by mouth daily as needed. Key Oncology Meds             ondansetron (ZOFRAN ODT) 4 mg disintegrating tablet (Taking) Take 1 Tab by mouth every eight (8) hours as needed for Nausea or Vomiting. prochlorperazine (COMPAZINE) 10 mg tablet Take 0.5 Tabs by mouth every six (6) hours as needed for Nausea.         Chemotherapy Flowsheet 8/31/2021   Cycle C7 D1   Date 8/31/2021   Drug / Regimen Abraxane/Gemzar   Dosage 184mg/1656mg   Time Up 1236   Time Down (No Data)   Pre Hydration -   Pre Meds Zofran 8 mg IV, Decadron 10 mg IV   Notes -

## 2021-09-01 NOTE — PROGRESS NOTES
Reason for Visit:   Tiffanie Vásquez a 35 S. o. female who is seen for pancreatic adenocarcinoma     Treatment History:   Dx: Pancreatic Adenocarcinoma--May 2020--G6L6Mn--icipmyfeevy of splenic vein and superior mesenteric vein  Tx: mFOLFIRINOX--first cycle 5/26/2020, second cycle 6/10/2020, dose reduced 15% cycle 3 on 6/30/2020--delayed due to severe side effects--switched to Gemcitabine/Abraxane--Cycle #1 7/29/2002, Cycle #2 9/2/2020, Cycle #3 10/7/2020, Cycle #4 11/4/2020. Neoadjuvant Radiation with Dr Carlton Lopez ending 12/18/2021. Distal Pancreatectomy, Splenectomy, and Civa Sheet with Dr Dimitri Isaacs on 3/17/2021. Adjuvant radiation with Dr Carlton Lopez. Restart Gemcitabine/abraxane Cycle #1 7/6/2021, Cycle #2 8/3/2021, Cycle #3 8/31/2021  Goal: Prolong life--Palliative  History of Present Illness:   Doing very well, no big changes. Some emotional periods but overall ok.   Problems with neuropathy--having troubles fingers--sometimes can't complete tasks    Past Medical History:   Diagnosis Date    Adenocarcinoma of pancreas ([facility] [address] 05/13/2020    Dr Lluvia Mckeon biopsy via EUS    Diabetes ([facility] [address]     GERD (gastroesophageal reflux disease)     Hernia of abdominal cavity     Subxyphoid hernia    Hypertension     Inflammatory polyarthropathy (HCC)     Joint pain     Joint swelling     Menopause     Ocular nevus of left eye     Pancreatitis     Tracheal stenosis       Past Surgical History:   Procedure Laterality Date    HX CARPAL TUNNEL RELEASE Bilateral     HX COLONOSCOPY      HX HYSTERECTOMY      HX KNEE ARTHROSCOPY Right     HX KNEE REPLACEMENT Right     partial    HX LAP CHOLECYSTECTOMY      HX TONSILLECTOMY      HX VASCULAR ACCESS        Social History     Tobacco Use    Smoking status: Never Smoker    Smokeless tobacco: Never Used   Substance Use Topics    Alcohol use: No     Alcohol/week: 0.0 standard drinks      Family History   Problem Relation Age of Onset    Heart Disease Mother     Heart Attack Mother     Cancer Father         lung    Diabetes Sister      Current Outpatient Medications   Medication Sig    lidocaine-prilocaine (EMLA) topical cream Apply  to affected area as needed for Pain (pain ). Apply a thin layer over port site prior to accessing port    nystatin (MYCOSTATIN) powder Apply 1 g to affected area three (3) times daily.  gabapentin (NEURONTIN) 300 mg capsule Take 2 Caps by mouth three (3) times daily. Max Daily Amount: 1,800 mg. Indications: neuropathic pain    HumaLOG KwikPen Insulin 100 unit/mL kwikpen patient on sliding scale    methylphenidate HCl (Ritalin) 5 mg tablet Take 1 Tablet by mouth three (3) times daily. Max Daily Amount: 15 mg.  LORazepam (ATIVAN) 1 mg tablet Take 1 Tablet by mouth every six (6) hours as needed for Anxiety. Max Daily Amount: 4 mg. Indications: nausea and vomiting caused by cancer drugs (Patient taking differently: Take 1 mg by mouth nightly. Indications: nausea and vomiting caused by cancer drugs)    DULoxetine (CYMBALTA) 20 mg capsule Take 1 Capsule by mouth two (2) times a day.  multivit-min/folic HQUA/ONH970 (ALIVE WOMEN'S GUMMY VITAMIN PO) Take 2 Each by mouth daily.  insulin glargine U-300 conc (Toujeo SoloStar U-300 Insulin) 300 unit/mL (1.5 mL) inpn pen INJECT 55 UNITS ONCE DAILY AT BEDTIME - DOSE INCREASED (Patient taking differently: 18-40 Units by SubCUTAneous route nightly. 18 units at night)    hydrocortisone (ANUSOL-HC) 25 mg supp Insert 1 Suppository into rectum nightly.  famotidine (Pepcid) 20 mg tablet Take 20 mg by mouth two (2) times a day.  sennosides (Senna) 8.6 mg cap Take 1-2 Tablets by mouth two (2) times a day.  polyethylene glycol (Miralax) 17 gram/dose powder Take 17 g by mouth daily as needed.  HYDROmorphone (Dilaudid) 2 mg tablet Take 1 mg by mouth two (2) times a day.  takes 3 tabs  by mouth, taking BID and PRN    lipase-protease-amylase (Creon) 12,000-38,000 -60,000 unit capsule Take 3 Caps by mouth three (3) times daily (with meals). Indications: exocrine pancreatic insufficiency (Patient taking differently: Take 2 Caps by mouth three (3) times daily (with meals). 2 caps with meal and 1 with snacks  Indications: exocrine pancreatic insufficiency)    diphenhydrAMINE (BENADRYL) 25 mg tablet Take 25 mg by mouth nightly as needed for Itching or Skin Irritation.  ondansetron (ZOFRAN ODT) 4 mg disintegrating tablet Take 1 Tab by mouth every eight (8) hours as needed for Nausea or Vomiting.  acetaminophen (Acetaminophen Extra Strength) 500 mg tablet Take 1,000 mg by mouth daily as needed.  ciprofloxacin HCl (CIPRO) 250 mg tablet Take 1 Tablet by mouth two (2) times a day. (Patient not taking: Reported on 9/1/2021)    levoFLOXacin (Levaquin) 500 mg tablet Take 1 Tablet by mouth daily. (Patient not taking: Reported on 7/13/2021)    Blood-Glucose Transmitter (Dexcom G6 Transmitter) fadi Use as directed to monitor blood glucose as directed (Patient not taking: Reported on 8/4/2021)    Blood-Glucose Sensor (Dexcom G6 Sensor) fadi Use as directed to monitor blood glucose as directed (Patient not taking: Reported on 8/4/2021)    Blood-Glucose Meter,Continuous (Dexcom G6 ) misc Use as directed to monitor blood glucose as directed (Patient not taking: Reported on 8/4/2021)    potassium chloride (K-DUR, KLOR-CON) 20 mEq tablet Take 1 Tab by mouth two (2) times a day. (Patient not taking: Reported on 7/13/2021)    carvediloL (COREG) 25 mg tablet TAKE 1 TABLET BY MOUTH TWICE DAILY (Patient not taking: Reported on 7/13/2021)    promethazine (PHENERGAN) 25 mg tablet Take 1 Tab by mouth every six (6) hours as needed for Nausea. (Patient not taking: Reported on 9/1/2021)    diphenoxylate-atropine (LomotiL) 2.5-0.025 mg per tablet Take 2 Tabs by mouth four (4) times daily as needed for Diarrhea. Max Daily Amount: 8 Tabs.  (Patient not taking: Reported on 6/23/2021)    prochlorperazine (COMPAZINE) 10 mg tablet Take 0.5 Tabs by mouth every six (6) hours as needed for Nausea. (Patient not taking: Reported on 9/1/2021)    Needle, Disp, ndle 1 Each by Does Not Apply route two (2) times daily as needed for Other. (Patient not taking: Reported on 8/4/2021)     No current facility-administered medications for this visit. No Known Allergies     Review of Systems: A complete review of systems was obtained, negative except as described above. Physical Exam:     Visit Vitals  /63   Pulse 80   Temp 98.6 °F (37 °C) (Oral)   Resp 16   Ht 5' 4\" (1.626 m)   Wt 175 lb 12.8 oz (79.7 kg)   SpO2 97%   BMI 30.18 kg/m²     ECOG PS: 1  General: No distress  Eyes: PERRLA, anicteric sclerae  HENT: Atraumatic, OP clear  Neck: Supple  Lymphatic: No cervical, supraclavicular, or inguinal adenopathy  Respiratory: CTAB, normal respiratory effort  CV: Normal rate, regular rhythm, no murmurs, no peripheral edema  GI: Soft, nontender, nondistended, no masses, no hepatomegaly, no splenomegaly  MS: Normal gait and station. Digits without clubbing or cyanosis. Skin: No rashes, ecchymoses, or petechiae. Normal temperature, turgor, and texture. Psych: Alert, oriented, appropriate affect, normal judgment/insight    Results:     Lab Results   Component Value Date/Time    WBC 6.5 08/31/2021 10:25 AM    HGB 10.2 (L) 08/31/2021 10:25 AM    HCT 31.7 (L) 08/31/2021 10:25 AM    PLATELET 158 (H) 42/54/1016 10:25 AM    MCV 91.1 08/31/2021 10:25 AM    ABS.  NEUTROPHILS 2.3 08/31/2021 10:25 AM     Lab Results   Component Value Date/Time    Sodium 138 08/31/2021 10:25 AM    Potassium 4.1 08/31/2021 10:25 AM    Chloride 102 08/31/2021 10:25 AM    CO2 29 08/31/2021 10:25 AM    Glucose 250 (H) 08/31/2021 10:25 AM    BUN 14 08/31/2021 10:25 AM    Creatinine 0.65 08/31/2021 10:25 AM    GFR est AA >60 08/31/2021 10:25 AM    GFR est non-AA >60 08/31/2021 10:25 AM    Calcium 9.1 08/31/2021 10:25 AM    Glucose (POC) 192 (H) 07/15/2020 11:56 AM Creatinine (POC) 0.7 02/06/2013 10:21 AM     Lab Results   Component Value Date/Time    Bilirubin, total 0.2 08/31/2021 10:25 AM    ALT (SGPT) 20 08/31/2021 10:25 AM    Alk. phosphatase 113 08/31/2021 10:25 AM    Protein, total 6.7 08/31/2021 10:25 AM    Albumin 3.3 (L) 08/31/2021 10:25 AM    Globulin 3.4 08/31/2021 10:25 AM       CT A/P 4/30/2020  IMPRESSION: Suspect neoplastic pancreatic mass versus atypical focal  pancreatitis with splenic and superior mesenteric vein occlusion. Consider  further evaluation with endoscopic ultrasound at which time biopsy could  potentially be performed.     CT Chest 5/21/2020  IMPRESSION:  1. No evidence of pulmonary metastatic disease. No evidence of mediastinal or  hilar lymphadenopathy. No pleural effusions identified. 2. No definite evidence of central pulmonary embolic disease. 3. Presence of a mass lesion involving the pancreatic head/body. 4. Evidence of fatty infiltration involving the liver. Presence of focal  low-density areas within the liver compatible with cysts. Surgical absence of  the gallbladder.     CT A/P 6/19/2020  IMPRESSION:  Pancreatitis with ascites favored. Discrete pancreatic mass is not identified. No biliary dilatation or definite arterial or venous thrombosis. No liver  metastases. Fat-containing ventral hernia  Nonobstructing left renal calculus  Retrolisthesis of L2 and L3.     CT C/A/P 10/12/2020  IMPRESSION:  1. Interval improvement in appearance of the pancreas (see discussion above). 2. Normal sized liver. Stable appearance of the previously described areas of  decreased attenuation within liver. 3. Surgical absence of the gallbladder. 4. Evidence of splenomegaly. 5. Normal sized kidneys. Presence of small, nonobstructing calculi within the  left kidney. No evidence of hydronephrotic change. 6. Suboptimal distention of the urinary bladder.   7. Presence of a small, fat-containing ventral hernia in the mid abdominal  region.     Path: 5/8/2020  CYTOLOGIC INTERPRETATION:   Pancreas, EUS-guided fine needle aspiration and cell blocks: Adenocarcinoma     Path: 3/17/2021  No residual malignancy in pancrease, 0/29 lymph nodes. 2-3mm focus metastatic adenocarcinoma within hernia sac     Assessment:   1) Pancreatic Adenocarcinoma--Unresectable  2) Neoplasm Pain/Neuropathic Pain  3) Fatigue  4) Nausea  5) DM  6) Nutrition  7) Diarrhea  8) Hypotension  9) Abd Pain  10) Left Hydronephrosis     Plan:   1) Completed neoadjuvant chemotherapy. Radiation with Dr Mady Black. Definitive resection with Dr Stefania Norris focus of adenocarcinoma in ventral hernia. Now peritoneal disease/mets. She does want palliative chemotherapy with Gemcitaine/Abraxane. Will continue---get scans--need to have [facility] scans loaded so our Radiologist can compare.       2) S/p celiac block--pain in abd controlled. Having increased neuropathy from chemotherapy--will increase Cymbalta to 30mg bid from 20mg bid--can help with mood as well.     3) Methylphenidate 5mg bid--has helped.       4) Resolved.    5) Defer to Dr Yoav Pantoja for further management.       6) On pancreatic enzymes--weight has stabilized.    7) Chemo related--hopefully with not recurr.  Taking senna/miralax to prevent constipation. 8) Resolved currently     9) Passed large kidney stone    10) Renal intact--had significant diuresis after passing stone.   Likely resovled        Signed By: Louise Medrano MD

## 2021-09-02 LAB — CANCER AG19-9 SERPL-ACNC: 79 U/ML (ref 0–35)

## 2021-09-03 DIAGNOSIS — F32.A DEPRESSION, UNSPECIFIED DEPRESSION TYPE: ICD-10-CM

## 2021-09-03 RX ORDER — METHYLPHENIDATE HYDROCHLORIDE 5 MG/1
5 TABLET ORAL 3 TIMES DAILY
Qty: 90 TABLET | Refills: 0 | Status: SHIPPED | OUTPATIENT
Start: 2021-09-03 | End: 2021-11-24 | Stop reason: SDUPTHER

## 2021-09-07 ENCOUNTER — TELEPHONE (OUTPATIENT)
Dept: ONCOLOGY | Age: 66
End: 2021-09-07

## 2021-09-07 ENCOUNTER — HOSPITAL ENCOUNTER (OUTPATIENT)
Dept: INFUSION THERAPY | Age: 66
Discharge: HOME | End: 2021-09-07
Payer: MEDICARE

## 2021-09-07 ENCOUNTER — PATIENT MESSAGE (OUTPATIENT)
Dept: FAMILY MEDICINE CLINIC | Age: 66
End: 2021-09-07

## 2021-09-07 VITALS
WEIGHT: 176.6 LBS | TEMPERATURE: 98.4 F | RESPIRATION RATE: 17 BRPM | BODY MASS INDEX: 30.31 KG/M2 | SYSTOLIC BLOOD PRESSURE: 151 MMHG | HEART RATE: 78 BPM | OXYGEN SATURATION: 96 % | DIASTOLIC BLOOD PRESSURE: 70 MMHG

## 2021-09-07 DIAGNOSIS — C25.9 ADENOCARCINOMA OF PANCREAS (HCC): Primary | ICD-10-CM

## 2021-09-07 LAB
ALBUMIN SERPL-MCNC: 3.1 G/DL (ref 3.5–5)
ALBUMIN/GLOB SERPL: 0.9 {RATIO} (ref 1.1–2.2)
ALP SERPL-CCNC: 102 U/L (ref 45–117)
ALT SERPL-CCNC: 26 U/L (ref 12–78)
ANION GAP SERPL CALC-SCNC: 7 MMOL/L (ref 5–15)
AST SERPL-CCNC: 17 U/L (ref 15–37)
BASOPHILS # BLD: 0.1 K/UL (ref 0–0.1)
BASOPHILS NFR BLD: 1 % (ref 0–1)
BILIRUB SERPL-MCNC: 0.2 MG/DL (ref 0.2–1)
BUN SERPL-MCNC: 13 MG/DL (ref 6–20)
BUN/CREAT SERPL: 16 (ref 12–20)
CALCIUM SERPL-MCNC: 8.7 MG/DL (ref 8.5–10.1)
CHLORIDE SERPL-SCNC: 102 MMOL/L (ref 97–108)
CO2 SERPL-SCNC: 29 MMOL/L (ref 21–32)
CREAT SERPL-MCNC: 0.83 MG/DL (ref 0.55–1.02)
DIFFERENTIAL METHOD BLD: ABNORMAL
EOSINOPHIL # BLD: 0.1 K/UL (ref 0–0.4)
EOSINOPHIL NFR BLD: 1 % (ref 0–7)
ERYTHROCYTE [DISTWIDTH] IN BLOOD BY AUTOMATED COUNT: 18.1 % (ref 11.5–14.5)
GLOBULIN SER CALC-MCNC: 3.5 G/DL (ref 2–4)
GLUCOSE SERPL-MCNC: 261 MG/DL (ref 65–100)
HCT VFR BLD AUTO: 30 % (ref 35–47)
HGB BLD-MCNC: 9.7 G/DL (ref 11.5–16)
IMM GRANULOCYTES # BLD AUTO: 0.1 K/UL (ref 0–0.04)
IMM GRANULOCYTES NFR BLD AUTO: 1 % (ref 0–0.5)
LYMPHOCYTES # BLD: 1.8 K/UL (ref 0.8–3.5)
LYMPHOCYTES NFR BLD: 31 % (ref 12–49)
MCH RBC QN AUTO: 29.8 PG (ref 26–34)
MCHC RBC AUTO-ENTMCNC: 32.3 G/DL (ref 30–36.5)
MCV RBC AUTO: 92 FL (ref 80–99)
MONOCYTES # BLD: 1.3 K/UL (ref 0–1)
MONOCYTES NFR BLD: 22 % (ref 5–13)
NEUTS SEG # BLD: 2.5 K/UL (ref 1.8–8)
NEUTS SEG NFR BLD: 44 % (ref 32–75)
NRBC # BLD: 0.06 K/UL (ref 0–0.01)
NRBC BLD-RTO: 1 PER 100 WBC
PLATELET # BLD AUTO: 536 K/UL (ref 150–400)
PMV BLD AUTO: 10.3 FL (ref 8.9–12.9)
POTASSIUM SERPL-SCNC: 4.2 MMOL/L (ref 3.5–5.1)
PROT SERPL-MCNC: 6.6 G/DL (ref 6.4–8.2)
RBC # BLD AUTO: 3.26 M/UL (ref 3.8–5.2)
RBC MORPH BLD: ABNORMAL
SODIUM SERPL-SCNC: 138 MMOL/L (ref 136–145)
WBC # BLD AUTO: 5.9 K/UL (ref 3.6–11)

## 2021-09-07 PROCEDURE — 96417 CHEMO IV INFUS EACH ADDL SEQ: CPT

## 2021-09-07 PROCEDURE — 96375 TX/PRO/DX INJ NEW DRUG ADDON: CPT

## 2021-09-07 PROCEDURE — 96413 CHEMO IV INFUSION 1 HR: CPT

## 2021-09-07 PROCEDURE — 80053 COMPREHEN METABOLIC PANEL: CPT

## 2021-09-07 PROCEDURE — 74011000258 HC RX REV CODE- 258: Performed by: INTERNAL MEDICINE

## 2021-09-07 PROCEDURE — 74011250636 HC RX REV CODE- 250/636: Performed by: INTERNAL MEDICINE

## 2021-09-07 PROCEDURE — 85025 COMPLETE CBC W/AUTO DIFF WBC: CPT

## 2021-09-07 PROCEDURE — 77030012965 HC NDL HUBR BBMI -A

## 2021-09-07 PROCEDURE — 36415 COLL VENOUS BLD VENIPUNCTURE: CPT

## 2021-09-07 RX ORDER — DIPHENHYDRAMINE HYDROCHLORIDE 50 MG/ML
25 INJECTION, SOLUTION INTRAMUSCULAR; INTRAVENOUS AS NEEDED
Status: ACTIVE | OUTPATIENT
Start: 2021-09-07 | End: 2021-09-07

## 2021-09-07 RX ORDER — ONDANSETRON 2 MG/ML
8 INJECTION INTRAMUSCULAR; INTRAVENOUS AS NEEDED
Status: ACTIVE | OUTPATIENT
Start: 2021-09-07 | End: 2021-09-07

## 2021-09-07 RX ORDER — PANCRELIPASE 60000; 12000; 38000 [USP'U]/1; [USP'U]/1; [USP'U]/1
3 CAPSULE, DELAYED RELEASE PELLETS ORAL
Qty: 240 CAPSULE | Refills: 5 | Status: SHIPPED | OUTPATIENT
Start: 2021-09-07 | End: 2021-12-07

## 2021-09-07 RX ORDER — DEXAMETHASONE SODIUM PHOSPHATE 4 MG/ML
10 INJECTION, SOLUTION INTRA-ARTICULAR; INTRALESIONAL; INTRAMUSCULAR; INTRAVENOUS; SOFT TISSUE ONCE
Status: COMPLETED | OUTPATIENT
Start: 2021-09-07 | End: 2021-09-07

## 2021-09-07 RX ORDER — SODIUM CHLORIDE 0.9 % (FLUSH) 0.9 %
10 SYRINGE (ML) INJECTION AS NEEDED
Status: ACTIVE | OUTPATIENT
Start: 2021-09-07 | End: 2021-09-07

## 2021-09-07 RX ORDER — SODIUM CHLORIDE 9 MG/ML
10 INJECTION INTRAMUSCULAR; INTRAVENOUS; SUBCUTANEOUS AS NEEDED
Status: ACTIVE | OUTPATIENT
Start: 2021-09-07 | End: 2021-09-07

## 2021-09-07 RX ORDER — HEPARIN 100 UNIT/ML
300-500 SYRINGE INTRAVENOUS AS NEEDED
Status: ACTIVE | OUTPATIENT
Start: 2021-09-07 | End: 2021-09-07

## 2021-09-07 RX ORDER — ACETAMINOPHEN 325 MG/1
650 TABLET ORAL AS NEEDED
Status: ACTIVE | OUTPATIENT
Start: 2021-09-07 | End: 2021-09-07

## 2021-09-07 RX ORDER — SODIUM CHLORIDE 9 MG/ML
25 INJECTION, SOLUTION INTRAVENOUS CONTINUOUS
Status: DISPENSED | OUTPATIENT
Start: 2021-09-07 | End: 2021-09-07

## 2021-09-07 RX ORDER — ONDANSETRON 2 MG/ML
8 INJECTION INTRAMUSCULAR; INTRAVENOUS ONCE
Status: COMPLETED | OUTPATIENT
Start: 2021-09-07 | End: 2021-09-07

## 2021-09-07 RX ORDER — PANCRELIPASE 60000; 12000; 38000 [USP'U]/1; [USP'U]/1; [USP'U]/1
2 CAPSULE, DELAYED RELEASE PELLETS ORAL
Qty: 240 CAPSULE | Refills: 5 | Status: ON HOLD | OUTPATIENT
Start: 2021-09-07 | End: 2022-07-23

## 2021-09-07 RX ADMIN — GEMCITABINE 1656 MG: 38 INJECTION, SOLUTION INTRAVENOUS at 12:19

## 2021-09-07 RX ADMIN — Medication 500 UNITS: at 13:01

## 2021-09-07 RX ADMIN — ONDANSETRON 8 MG: 2 INJECTION INTRAMUSCULAR; INTRAVENOUS at 11:09

## 2021-09-07 RX ADMIN — SODIUM CHLORIDE 10 ML: 9 INJECTION INTRAMUSCULAR; INTRAVENOUS; SUBCUTANEOUS at 13:01

## 2021-09-07 RX ADMIN — DEXAMETHASONE SODIUM PHOSPHATE 10 MG: 4 INJECTION, SOLUTION INTRAMUSCULAR; INTRAVENOUS at 11:03

## 2021-09-07 RX ADMIN — SODIUM CHLORIDE 25 ML/HR: 9 INJECTION, SOLUTION INTRAVENOUS at 11:01

## 2021-09-07 RX ADMIN — PACLITAXEL 184 MG: 100 INJECTION, POWDER, LYOPHILIZED, FOR SUSPENSION INTRAVENOUS at 11:33

## 2021-09-07 NOTE — TELEPHONE ENCOUNTER
Patient in [facility] today, reviewed lab results per DR Kent's note. Patient verbalized understanding.

## 2021-09-07 NOTE — PROGRESS NOTES
[facility] Progress Note    Date: 2021    Name: Galina Prather    MRN: [id number]         : 1955    Ms. Ba Arrived ambulatory and in no distress for cycle 7 D 8 of Abraxane/ Gemzar  regimen. Assessment was completed, no acute issues at this time, no new complaints voiced. Continues with neuropathy in hands and feet. Feels the increase in Cymbalta dose is helping with this. VAD accessed without difficulty, labs drawn and in process. Chemotherapy Flowsheet 2021   Cycle C 7 D 8   Date 2021   Drug / Regimen Abraxane/ Gemzar   Dosage 184mg/ 1656 mg   Time Up see MAR   Time Down -   Pre Hydration -   Pre Meds Zofran 8 mg/ Decadron 10 mg   Notes -           Ms. Ba's vitals were reviewed. Visit Vitals  BP (!) 151/70 (BP 1 Location: Left upper arm, BP Patient Position: Sitting)   Pulse 78   Temp 98.4 °F (36.9 °C)   Resp 17   Ht (P) 5' 4\" (1.626 m)   Wt 80.1 kg (176 lb 9.6 oz)   SpO2 96%   Breastfeeding No   BMI (P) 30.31 kg/m²       Lab results were obtained and reviewed. Recent Results (from the past 12 hour(s))   CBC WITH AUTOMATED DIFF    Collection Time: 21  9:45 AM   Result Value Ref Range    WBC 5.9 3.6 - 11.0 K/uL    RBC 3.26 (L) 3.80 - 5.20 M/uL    HGB 9.7 (L) 11.5 - 16.0 g/dL    HCT 30.0 (L) 35.0 - 47.0 %    MCV 92.0 80.0 - 99.0 FL    MCH 29.8 26.0 - 34.0 PG    MCHC 32.3 30.0 - 36.5 g/dL    RDW 18.1 (H) 11.5 - 14.5 %    PLATELET 350 (H) 227 - 400 K/uL    MPV 10.3 8.9 - 12.9 FL    NRBC 1.0 (H) 0  WBC    ABSOLUTE NRBC 0.06 (H) 0.00 - 0.01 K/uL    NEUTROPHILS 44 32 - 75 %    LYMPHOCYTES 31 12 - 49 %    MONOCYTES 22 (H) 5 - 13 %    EOSINOPHILS 1 0 - 7 %    BASOPHILS 1 0 - 1 %    IMMATURE GRANULOCYTES 1 (H) 0.0 - 0.5 %    ABS. NEUTROPHILS 2.5 1.8 - 8.0 K/UL    ABS. LYMPHOCYTES 1.8 0.8 - 3.5 K/UL    ABS. MONOCYTES 1.3 (H) 0.0 - 1.0 K/UL    ABS. EOSINOPHILS 0.1 0.0 - 0.4 K/UL    ABS. BASOPHILS 0.1 0.0 - 0.1 K/UL    ABS. IMM.  GRANS. 0.1 (H) 0.00 - 0.04 K/UL    DF AUTOMATED RBC COMMENTS ANISOCYTOSIS  1+       METABOLIC PANEL, COMPREHENSIVE    Collection Time: 09/07/21  9:45 AM   Result Value Ref Range    Sodium 138 136 - 145 mmol/L    Potassium 4.2 3.5 - 5.1 mmol/L    Chloride 102 97 - 108 mmol/L    CO2 29 21 - 32 mmol/L    Anion gap 7 5 - 15 mmol/L    Glucose 261 (H) 65 - 100 mg/dL    BUN 13 6 - 20 MG/DL    Creatinine 0.83 0.55 - 1.02 MG/DL    BUN/Creatinine ratio 16 12 - 20      GFR est AA >60 >60 ml/min/1.73m2    GFR est non-AA >60 >60 ml/min/1.73m2    Calcium 8.7 8.5 - 10.1 MG/DL    Bilirubin, total 0.2 0.2 - 1.0 MG/DL    ALT (SGPT) 26 12 - 78 U/L    AST (SGOT) 17 15 - 37 U/L    Alk. phosphatase 102 45 - 117 U/L    Protein, total 6.6 6.4 - 8.2 g/dL    Albumin 3.1 (L) 3.5 - 5.0 g/dL    Globulin 3.5 2.0 - 4.0 g/dL    A-G Ratio 0.9 (L) 1.1 - 2.2         Pre-medications of Decadron 10 mg and Zofran 8 mg  were administered as ordered and chemotherapy was initiated. Abraxane was infused over 30 minutes . Blood verified at port . Gemzar was then infused over 30 minutes. At completion port was flushed with 10 ml NS and 5 ml heparin. Needle removed. Ms. Ben Martin tolerated treatment well and was discharged from [place] [address] in stable condition at 1310. She is to return on 9/ 14 for her C 7 D 15 appointment.     Estephanie Hall RN  September 7, 2021

## 2021-09-07 NOTE — TELEPHONE ENCOUNTER
----- Message from Cody Montiel MD sent at 9/3/2021  9:32 AM EDT -----  CA 19-9 falling--please inform

## 2021-09-14 ENCOUNTER — HOSPITAL ENCOUNTER (OUTPATIENT)
Dept: INFUSION THERAPY | Age: 66
Discharge: HOME | End: 2021-09-14
Payer: MEDICARE

## 2021-09-14 VITALS
DIASTOLIC BLOOD PRESSURE: 82 MMHG | HEART RATE: 89 BPM | BODY MASS INDEX: 29.84 KG/M2 | RESPIRATION RATE: 18 BRPM | SYSTOLIC BLOOD PRESSURE: 162 MMHG | OXYGEN SATURATION: 99 % | WEIGHT: 174.8 LBS | TEMPERATURE: 98.7 F | HEIGHT: 64 IN

## 2021-09-14 DIAGNOSIS — C25.9 ADENOCARCINOMA OF PANCREAS (HCC): Primary | ICD-10-CM

## 2021-09-14 DIAGNOSIS — C25.9 MALIGNANT NEOPLASM OF PANCREAS, UNSPECIFIED LOCATION OF MALIGNANCY (HCC): Primary | ICD-10-CM

## 2021-09-14 LAB
ALBUMIN SERPL-MCNC: 3.4 G/DL (ref 3.5–5)
ALBUMIN/GLOB SERPL: 1 {RATIO} (ref 1.1–2.2)
ALP SERPL-CCNC: 92 U/L (ref 45–117)
ALT SERPL-CCNC: 30 U/L (ref 12–78)
ANION GAP SERPL CALC-SCNC: 9 MMOL/L (ref 5–15)
AST SERPL-CCNC: 20 U/L (ref 15–37)
BASOPHILS # BLD: 0 K/UL (ref 0–0.1)
BASOPHILS NFR BLD: 0 % (ref 0–1)
BILIRUB SERPL-MCNC: 0.3 MG/DL (ref 0.2–1)
BUN SERPL-MCNC: 12 MG/DL (ref 6–20)
BUN/CREAT SERPL: 20 (ref 12–20)
CALCIUM SERPL-MCNC: 9.2 MG/DL (ref 8.5–10.1)
CHLORIDE SERPL-SCNC: 102 MMOL/L (ref 97–108)
CO2 SERPL-SCNC: 28 MMOL/L (ref 21–32)
CREAT SERPL-MCNC: 0.59 MG/DL (ref 0.55–1.02)
DIFFERENTIAL METHOD BLD: ABNORMAL
EOSINOPHIL # BLD: 0.1 K/UL (ref 0–0.4)
EOSINOPHIL NFR BLD: 2 % (ref 0–7)
ERYTHROCYTE [DISTWIDTH] IN BLOOD BY AUTOMATED COUNT: 18.1 % (ref 11.5–14.5)
GLOBULIN SER CALC-MCNC: 3.5 G/DL (ref 2–4)
GLUCOSE SERPL-MCNC: 205 MG/DL (ref 65–100)
HCT VFR BLD AUTO: 31.2 % (ref 35–47)
HGB BLD-MCNC: 10.2 G/DL (ref 11.5–16)
IMM GRANULOCYTES # BLD AUTO: 0 K/UL (ref 0–0.04)
IMM GRANULOCYTES NFR BLD AUTO: 0 % (ref 0–0.5)
LYMPHOCYTES # BLD: 1.4 K/UL (ref 0.8–3.5)
LYMPHOCYTES NFR BLD: 39 % (ref 12–49)
MCH RBC QN AUTO: 30.2 PG (ref 26–34)
MCHC RBC AUTO-ENTMCNC: 32.7 G/DL (ref 30–36.5)
MCV RBC AUTO: 92.3 FL (ref 80–99)
MONOCYTES # BLD: 0.7 K/UL (ref 0–1)
MONOCYTES NFR BLD: 20 % (ref 5–13)
NEUTS SEG # BLD: 1.3 K/UL (ref 1.8–8)
NEUTS SEG NFR BLD: 37 % (ref 32–75)
NRBC # BLD: 0.1 K/UL (ref 0–0.01)
NRBC BLD-RTO: 2.8 PER 100 WBC
OTHER CELLS NFR BLD MANUAL: 2 %
PLATELET # BLD AUTO: 249 K/UL (ref 150–400)
PMV BLD AUTO: 11 FL (ref 8.9–12.9)
POTASSIUM SERPL-SCNC: 3.9 MMOL/L (ref 3.5–5.1)
PROT SERPL-MCNC: 6.9 G/DL (ref 6.4–8.2)
RBC # BLD AUTO: 3.38 M/UL (ref 3.8–5.2)
RBC MORPH BLD: ABNORMAL
RBC MORPH BLD: ABNORMAL
SODIUM SERPL-SCNC: 139 MMOL/L (ref 136–145)
WBC # BLD AUTO: 3.6 K/UL (ref 3.6–11)

## 2021-09-14 PROCEDURE — 96417 CHEMO IV INFUS EACH ADDL SEQ: CPT

## 2021-09-14 PROCEDURE — 74011250636 HC RX REV CODE- 250/636: Performed by: INTERNAL MEDICINE

## 2021-09-14 PROCEDURE — 85025 COMPLETE CBC W/AUTO DIFF WBC: CPT

## 2021-09-14 PROCEDURE — 36415 COLL VENOUS BLD VENIPUNCTURE: CPT

## 2021-09-14 PROCEDURE — 96413 CHEMO IV INFUSION 1 HR: CPT

## 2021-09-14 PROCEDURE — 80053 COMPREHEN METABOLIC PANEL: CPT

## 2021-09-14 PROCEDURE — 77030012965 HC NDL HUBR BBMI -A

## 2021-09-14 PROCEDURE — 74011000258 HC RX REV CODE- 258: Performed by: INTERNAL MEDICINE

## 2021-09-14 PROCEDURE — 96375 TX/PRO/DX INJ NEW DRUG ADDON: CPT

## 2021-09-14 RX ORDER — SODIUM CHLORIDE 9 MG/ML
10 INJECTION INTRAMUSCULAR; INTRAVENOUS; SUBCUTANEOUS AS NEEDED
Status: ACTIVE | OUTPATIENT
Start: 2021-09-14 | End: 2021-09-14

## 2021-09-14 RX ORDER — HYDROMORPHONE HYDROCHLORIDE 2 MG/1
1 TABLET ORAL 2 TIMES DAILY
OUTPATIENT
Start: 2021-09-14

## 2021-09-14 RX ORDER — ONDANSETRON 2 MG/ML
8 INJECTION INTRAMUSCULAR; INTRAVENOUS AS NEEDED
Status: ACTIVE | OUTPATIENT
Start: 2021-09-14 | End: 2021-09-14

## 2021-09-14 RX ORDER — DIPHENHYDRAMINE HYDROCHLORIDE 50 MG/ML
25 INJECTION, SOLUTION INTRAMUSCULAR; INTRAVENOUS AS NEEDED
Status: ACTIVE | OUTPATIENT
Start: 2021-09-14 | End: 2021-09-14

## 2021-09-14 RX ORDER — SODIUM CHLORIDE 9 MG/ML
25 INJECTION, SOLUTION INTRAVENOUS CONTINUOUS
Status: DISPENSED | OUTPATIENT
Start: 2021-09-14 | End: 2021-09-14

## 2021-09-14 RX ORDER — ONDANSETRON 2 MG/ML
8 INJECTION INTRAMUSCULAR; INTRAVENOUS ONCE
Status: COMPLETED | OUTPATIENT
Start: 2021-09-14 | End: 2021-09-14

## 2021-09-14 RX ORDER — ACETAMINOPHEN 325 MG/1
650 TABLET ORAL AS NEEDED
Status: ACTIVE | OUTPATIENT
Start: 2021-09-14 | End: 2021-09-14

## 2021-09-14 RX ORDER — DEXAMETHASONE SODIUM PHOSPHATE 4 MG/ML
10 INJECTION, SOLUTION INTRA-ARTICULAR; INTRALESIONAL; INTRAMUSCULAR; INTRAVENOUS; SOFT TISSUE ONCE
Status: COMPLETED | OUTPATIENT
Start: 2021-09-14 | End: 2021-09-14

## 2021-09-14 RX ORDER — HEPARIN 100 UNIT/ML
300-500 SYRINGE INTRAVENOUS AS NEEDED
Status: DISPENSED | OUTPATIENT
Start: 2021-09-14 | End: 2021-09-14

## 2021-09-14 RX ADMIN — SODIUM CHLORIDE 10 ML: 9 INJECTION INTRAMUSCULAR; INTRAVENOUS; SUBCUTANEOUS at 12:47

## 2021-09-14 RX ADMIN — PACLITAXEL 184 MG: 100 INJECTION, POWDER, LYOPHILIZED, FOR SUSPENSION INTRAVENOUS at 11:05

## 2021-09-14 RX ADMIN — SODIUM CHLORIDE 25 ML/HR: 9 INJECTION, SOLUTION INTRAVENOUS at 10:40

## 2021-09-14 RX ADMIN — ONDANSETRON 8 MG: 2 INJECTION INTRAMUSCULAR; INTRAVENOUS at 10:47

## 2021-09-14 RX ADMIN — DEXAMETHASONE SODIUM PHOSPHATE 10 MG: 4 INJECTION, SOLUTION INTRAMUSCULAR; INTRAVENOUS at 10:52

## 2021-09-14 RX ADMIN — GEMCITABINE 1656 MG: 38 INJECTION, SOLUTION INTRAVENOUS at 12:03

## 2021-09-14 RX ADMIN — Medication 500 UNITS: at 12:49

## 2021-09-14 NOTE — PROGRESS NOTES
[facility] [place] Progress Note    Date: 2021    Name: Chetan Shoemaker    MRN: [id number]         : 1955    Ms. Ba Arrived ambulatory and in no distress for cycle 7 day 15 of Abraxane/Gemcitabine regimen. Assessment was completed, no acute issues at this time, no new complaints voiced. Neuropathy is better with increased dose of Cymbalta, although still present, not as bothersome. Continues to have low back pain which is dull and aching, can be bothersome is she sits for a long period. Has intermittent sharp pain left abdomen. Port accessed without difficulty, no blood return obtained, flushed without difficulty. Labs drawn peripherally right antecubital.     Chemotherapy Flowsheet 2021   Cycle C7 D15   Date 2021   Drug / Regimen Abraxane/Gemcitabine   Dosage 184 mg/1656 mg   Time Up see MAR   Time Down -   Pre Hydration -   Pre Meds Zofran 8 mg IV/Dexamethasone 10 mg IV   Notes -     Ms. Ba's vitals were reviewed. Visit Vitals  BP (!) 162/82 (BP 1 Location: Left upper arm, BP Patient Position: Sitting)   Pulse 89   Temp 98.7 °F (37.1 °C)   Resp 18   Ht 5' 4\" (1.626 m)   Wt 79.3 kg (174 lb 12.8 oz)   SpO2 99%   BMI 30.00 kg/m²       Lab results were obtained and reviewed. Recent Results (from the past 12 hour(s))   CBC WITH AUTOMATED DIFF    Collection Time: 21  9:35 AM   Result Value Ref Range    WBC 3.6 3.6 - 11.0 K/uL    RBC 3.38 (L) 3.80 - 5.20 M/uL    HGB 10.2 (L) 11.5 - 16.0 g/dL    HCT 31.2 (L) 35.0 - 47.0 %    MCV 92.3 80.0 - 99.0 FL    MCH 30.2 26.0 - 34.0 PG    MCHC 32.7 30.0 - 36.5 g/dL    RDW 18.1 (H) 11.5 - 14.5 %    PLATELET 030 229 - 393 K/uL    MPV 11.0 8.9 - 12.9 FL    NRBC 2.8 (H) 0  WBC    ABSOLUTE NRBC 0.10 (H) 0.00 - 0.01 K/uL    NEUTROPHILS 37 32 - 75 %    LYMPHOCYTES 39 12 - 49 %    MONOCYTES 20 (H) 5 - 13 %    EOSINOPHILS 2 0 - 7 %    BASOPHILS 0 0 - 1 %    OTHER CELL 2      IMMATURE GRANULOCYTES 0 0.0 - 0.5 %    ABS.  NEUTROPHILS 1.3 (L) 1.8 - 8.0 K/UL ABS. LYMPHOCYTES 1.4 0.8 - 3.5 K/UL    ABS. MONOCYTES 0.7 0.0 - 1.0 K/UL    ABS. EOSINOPHILS 0.1 0.0 - 0.4 K/UL    ABS. BASOPHILS 0.0 0.0 - 0.1 K/UL    ABS. IMM. GRANS. 0.0 0.00 - 0.04 K/UL    DF AUTOMATED      RBC COMMENTS 2 NRBC SEEN  ANISOCYTOSIS  2+       RBC COMMENTS POIKILOCYTOSIS  1+       METABOLIC PANEL, COMPREHENSIVE    Collection Time: 09/14/21  9:35 AM   Result Value Ref Range    Sodium 139 136 - 145 mmol/L    Potassium 3.9 3.5 - 5.1 mmol/L    Chloride 102 97 - 108 mmol/L    CO2 28 21 - 32 mmol/L    Anion gap 9 5 - 15 mmol/L    Glucose 205 (H) 65 - 100 mg/dL    BUN 12 6 - 20 MG/DL    Creatinine 0.59 0.55 - 1.02 MG/DL    BUN/Creatinine ratio 20 12 - 20      GFR est AA >60 >60 ml/min/1.73m2    GFR est non-AA >60 >60 ml/min/1.73m2    Calcium 9.2 8.5 - 10.1 MG/DL    Bilirubin, total 0.3 0.2 - 1.0 MG/DL    ALT (SGPT) 30 12 - 78 U/L    AST (SGOT) 20 15 - 37 U/L    Alk. phosphatase 92 45 - 117 U/L    Protein, total 6.9 6.4 - 8.2 g/dL    Albumin 3.4 (L) 3.5 - 5.0 g/dL    Globulin 3.5 2.0 - 4.0 g/dL    A-G Ratio 1.0 (L) 1.1 - 2.2         Pre-medications  were administered as ordered and chemotherapy was initiated. 250 ml NS main line established. 1047: Zofran 8 mg IV given  1052: Decadron 10 mg IV given  1105: Abraxane  184 mg IV given  1203: Gemcitabine 1656 mg IV given  Port was flushed at completion and de-accessed. Blood return from port obtained post infusion. Ms. Wild Mejia tolerated treatment well and was discharged from [place] [address] in stable condition at 1250. She is to return on September 28 at 8:30 for her next appointment.     Mery Garcia RN  September 14, 2021

## 2021-09-17 ENCOUNTER — HOSPITAL ENCOUNTER (OUTPATIENT)
Dept: CT IMAGING | Age: 66
Discharge: HOME | End: 2021-09-17
Attending: INTERNAL MEDICINE
Payer: MEDICARE

## 2021-09-17 DIAGNOSIS — C25.9 ADENOCARCINOMA OF PANCREAS (HCC): ICD-10-CM

## 2021-09-17 PROCEDURE — 74011000636 HC RX REV CODE- 636: Performed by: INTERNAL MEDICINE

## 2021-09-17 PROCEDURE — 74177 CT ABD & PELVIS W/CONTRAST: CPT

## 2021-09-17 PROCEDURE — 74011000250 HC RX REV CODE- 250: Performed by: INTERNAL MEDICINE

## 2021-09-17 RX ORDER — BARIUM SULFATE 20 MG/ML
450 SUSPENSION ORAL
Status: COMPLETED | OUTPATIENT
Start: 2021-09-17 | End: 2021-09-17

## 2021-09-17 RX ADMIN — BARIUM SULFATE 450 ML: 20 SUSPENSION ORAL at 08:54

## 2021-09-17 RX ADMIN — IOPAMIDOL 100 ML: 612 INJECTION, SOLUTION INTRAVENOUS at 08:57

## 2021-09-17 RX ADMIN — BARIUM SULFATE 450 ML: 20 SUSPENSION ORAL at 08:57

## 2021-09-20 NOTE — PROGRESS NOTES
Markers are decreasing--this scan compared against scan from October 2020--most recent scan was at [facility]. She is seeing Dr Josias Ross at [facility] next week. Will continue current plan of care.

## 2021-09-21 ENCOUNTER — TELEPHONE (OUTPATIENT)
Dept: ONCOLOGY | Age: 66
End: 2021-09-21

## 2021-09-21 NOTE — TELEPHONE ENCOUNTER
HIPAA verified. Notified patient of scan results per DR Kent's note. Patient verbalized understanding and thanked for call.

## 2021-09-21 NOTE — TELEPHONE ENCOUNTER
----- Message from Kevin Aguayo MD sent at 9/20/2021  9:10 AM EDT -----  Markers are decreasing--this scan compared against scan from October 2020--most recent scan was at [facility]. She is seeing Dr Quyen Balderas at [facility] next week. Will continue current plan of care.

## 2021-09-22 DIAGNOSIS — C25.9 MALIGNANT NEOPLASM OF PANCREAS, UNSPECIFIED LOCATION OF MALIGNANCY (HCC): Primary | ICD-10-CM

## 2021-09-22 RX ORDER — ACETAMINOPHEN 325 MG/1
650 TABLET ORAL AS NEEDED
Status: CANCELLED | OUTPATIENT
Start: 2021-10-05

## 2021-09-22 RX ORDER — SODIUM CHLORIDE 9 MG/ML
25 INJECTION, SOLUTION INTRAVENOUS CONTINUOUS
Status: CANCELLED | OUTPATIENT
Start: 2021-10-12

## 2021-09-22 RX ORDER — DEXAMETHASONE SODIUM PHOSPHATE 4 MG/ML
10 INJECTION, SOLUTION INTRA-ARTICULAR; INTRALESIONAL; INTRAMUSCULAR; INTRAVENOUS; SOFT TISSUE ONCE
Status: CANCELLED | OUTPATIENT
Start: 2021-09-28 | End: 2021-09-28

## 2021-09-22 RX ORDER — DIPHENHYDRAMINE HYDROCHLORIDE 50 MG/ML
50 INJECTION, SOLUTION INTRAMUSCULAR; INTRAVENOUS AS NEEDED
Status: CANCELLED
Start: 2021-09-28

## 2021-09-22 RX ORDER — LORAZEPAM 2 MG/ML
0.5 INJECTION INTRAMUSCULAR
Status: CANCELLED
Start: 2021-10-12

## 2021-09-22 RX ORDER — SODIUM CHLORIDE 0.9 % (FLUSH) 0.9 %
10 SYRINGE (ML) INJECTION AS NEEDED
Status: CANCELLED | OUTPATIENT
Start: 2021-09-28

## 2021-09-22 RX ORDER — SODIUM CHLORIDE 9 MG/ML
10 INJECTION INTRAMUSCULAR; INTRAVENOUS; SUBCUTANEOUS AS NEEDED
Status: CANCELLED | OUTPATIENT
Start: 2021-10-05

## 2021-09-22 RX ORDER — ACETAMINOPHEN 325 MG/1
650 TABLET ORAL AS NEEDED
Status: CANCELLED | OUTPATIENT
Start: 2021-09-28

## 2021-09-22 RX ORDER — EPINEPHRINE 1 MG/ML
0.3 INJECTION, SOLUTION, CONCENTRATE INTRAVENOUS AS NEEDED
Status: CANCELLED | OUTPATIENT
Start: 2021-10-05

## 2021-09-22 RX ORDER — ONDANSETRON 2 MG/ML
8 INJECTION INTRAMUSCULAR; INTRAVENOUS AS NEEDED
Status: CANCELLED | OUTPATIENT
Start: 2021-09-28

## 2021-09-22 RX ORDER — SODIUM CHLORIDE 9 MG/ML
10 INJECTION INTRAMUSCULAR; INTRAVENOUS; SUBCUTANEOUS AS NEEDED
Status: CANCELLED | OUTPATIENT
Start: 2021-10-12

## 2021-09-22 RX ORDER — DEXAMETHASONE SODIUM PHOSPHATE 4 MG/ML
10 INJECTION, SOLUTION INTRA-ARTICULAR; INTRALESIONAL; INTRAMUSCULAR; INTRAVENOUS; SOFT TISSUE ONCE
Status: CANCELLED | OUTPATIENT
Start: 2021-10-05 | End: 2021-10-05

## 2021-09-22 RX ORDER — DIPHENHYDRAMINE HYDROCHLORIDE 50 MG/ML
50 INJECTION, SOLUTION INTRAMUSCULAR; INTRAVENOUS AS NEEDED
Status: CANCELLED
Start: 2021-10-12

## 2021-09-22 RX ORDER — DEXAMETHASONE SODIUM PHOSPHATE 4 MG/ML
10 INJECTION, SOLUTION INTRA-ARTICULAR; INTRALESIONAL; INTRAMUSCULAR; INTRAVENOUS; SOFT TISSUE ONCE
Status: CANCELLED | OUTPATIENT
Start: 2021-10-12 | End: 2021-10-12

## 2021-09-22 RX ORDER — SODIUM CHLORIDE 0.9 % (FLUSH) 0.9 %
10 SYRINGE (ML) INJECTION AS NEEDED
Status: CANCELLED
Start: 2021-10-12

## 2021-09-22 RX ORDER — ONDANSETRON 2 MG/ML
8 INJECTION INTRAMUSCULAR; INTRAVENOUS AS NEEDED
Status: CANCELLED | OUTPATIENT
Start: 2021-10-05

## 2021-09-22 RX ORDER — SODIUM CHLORIDE 9 MG/ML
10 INJECTION INTRAMUSCULAR; INTRAVENOUS; SUBCUTANEOUS AS NEEDED
Status: CANCELLED | OUTPATIENT
Start: 2021-09-28

## 2021-09-22 RX ORDER — DIPHENHYDRAMINE HYDROCHLORIDE 50 MG/ML
25 INJECTION, SOLUTION INTRAMUSCULAR; INTRAVENOUS AS NEEDED
Status: CANCELLED | OUTPATIENT
Start: 2021-09-28

## 2021-09-22 RX ORDER — DIPHENHYDRAMINE HYDROCHLORIDE 50 MG/ML
50 INJECTION, SOLUTION INTRAMUSCULAR; INTRAVENOUS AS NEEDED
Status: CANCELLED
Start: 2021-10-05

## 2021-09-22 RX ORDER — ALBUTEROL SULFATE 0.83 MG/ML
2.5 SOLUTION RESPIRATORY (INHALATION) AS NEEDED
Status: CANCELLED
Start: 2021-10-05

## 2021-09-22 RX ORDER — ONDANSETRON 2 MG/ML
8 INJECTION INTRAMUSCULAR; INTRAVENOUS AS NEEDED
Status: CANCELLED | OUTPATIENT
Start: 2021-10-12

## 2021-09-22 RX ORDER — EPINEPHRINE 1 MG/ML
0.3 INJECTION, SOLUTION, CONCENTRATE INTRAVENOUS AS NEEDED
Status: CANCELLED | OUTPATIENT
Start: 2021-10-12

## 2021-09-22 RX ORDER — DIPHENHYDRAMINE HYDROCHLORIDE 50 MG/ML
25 INJECTION, SOLUTION INTRAMUSCULAR; INTRAVENOUS AS NEEDED
Status: CANCELLED | OUTPATIENT
Start: 2021-10-12

## 2021-09-22 RX ORDER — HYDROCORTISONE SODIUM SUCCINATE 100 MG/2ML
100 INJECTION, POWDER, FOR SOLUTION INTRAMUSCULAR; INTRAVENOUS AS NEEDED
Status: CANCELLED | OUTPATIENT
Start: 2021-10-05

## 2021-09-22 RX ORDER — DIPHENHYDRAMINE HYDROCHLORIDE 50 MG/ML
25 INJECTION, SOLUTION INTRAMUSCULAR; INTRAVENOUS
Status: CANCELLED
Start: 2021-09-28

## 2021-09-22 RX ORDER — DIPHENHYDRAMINE HYDROCHLORIDE 50 MG/ML
25 INJECTION, SOLUTION INTRAMUSCULAR; INTRAVENOUS AS NEEDED
Status: CANCELLED | OUTPATIENT
Start: 2021-10-05

## 2021-09-22 RX ORDER — LORAZEPAM 2 MG/ML
0.5 INJECTION INTRAMUSCULAR
Status: CANCELLED
Start: 2021-10-05

## 2021-09-22 RX ORDER — ALBUTEROL SULFATE 0.83 MG/ML
2.5 SOLUTION RESPIRATORY (INHALATION) AS NEEDED
Status: CANCELLED
Start: 2021-10-12

## 2021-09-22 RX ORDER — ACETAMINOPHEN 325 MG/1
650 TABLET ORAL AS NEEDED
Status: CANCELLED | OUTPATIENT
Start: 2021-10-12

## 2021-09-22 RX ORDER — ONDANSETRON 2 MG/ML
8 INJECTION INTRAMUSCULAR; INTRAVENOUS ONCE
Status: CANCELLED | OUTPATIENT
Start: 2021-09-28 | End: 2021-09-28

## 2021-09-22 RX ORDER — SODIUM CHLORIDE 0.9 % (FLUSH) 0.9 %
10 SYRINGE (ML) INJECTION AS NEEDED
Status: CANCELLED | OUTPATIENT
Start: 2021-10-05

## 2021-09-22 RX ORDER — HEPARIN 100 UNIT/ML
300-500 SYRINGE INTRAVENOUS AS NEEDED
Status: CANCELLED | OUTPATIENT
Start: 2021-10-05

## 2021-09-22 RX ORDER — LORAZEPAM 2 MG/ML
0.5 INJECTION INTRAMUSCULAR
Status: CANCELLED
Start: 2021-09-28

## 2021-09-22 RX ORDER — HYDROCORTISONE SODIUM SUCCINATE 100 MG/2ML
100 INJECTION, POWDER, FOR SOLUTION INTRAMUSCULAR; INTRAVENOUS AS NEEDED
Status: CANCELLED | OUTPATIENT
Start: 2021-09-28

## 2021-09-22 RX ORDER — ALBUTEROL SULFATE 0.83 MG/ML
2.5 SOLUTION RESPIRATORY (INHALATION) AS NEEDED
Status: CANCELLED
Start: 2021-09-28

## 2021-09-22 RX ORDER — DIPHENHYDRAMINE HYDROCHLORIDE 50 MG/ML
25 INJECTION, SOLUTION INTRAMUSCULAR; INTRAVENOUS
Status: CANCELLED
Start: 2021-10-05

## 2021-09-22 RX ORDER — HEPARIN 100 UNIT/ML
300-500 SYRINGE INTRAVENOUS AS NEEDED
Status: CANCELLED | OUTPATIENT
Start: 2021-10-12

## 2021-09-22 RX ORDER — HYDROMORPHONE HYDROCHLORIDE 2 MG/1
2 TABLET ORAL
Qty: 60 TABLET | Refills: 0 | Status: SHIPPED | OUTPATIENT
Start: 2021-09-22 | End: 2021-10-22

## 2021-09-22 RX ORDER — SODIUM CHLORIDE 9 MG/ML
25 INJECTION, SOLUTION INTRAVENOUS CONTINUOUS
Status: CANCELLED | OUTPATIENT
Start: 2021-10-05

## 2021-09-22 RX ORDER — HEPARIN 100 UNIT/ML
300-500 SYRINGE INTRAVENOUS AS NEEDED
Status: CANCELLED | OUTPATIENT
Start: 2021-09-28

## 2021-09-22 RX ORDER — SODIUM CHLORIDE 9 MG/ML
25 INJECTION, SOLUTION INTRAVENOUS CONTINUOUS
Status: CANCELLED | OUTPATIENT
Start: 2021-09-28

## 2021-09-22 RX ORDER — EPINEPHRINE 1 MG/ML
0.3 INJECTION, SOLUTION, CONCENTRATE INTRAVENOUS AS NEEDED
Status: CANCELLED | OUTPATIENT
Start: 2021-09-28

## 2021-09-22 RX ORDER — ONDANSETRON 2 MG/ML
8 INJECTION INTRAMUSCULAR; INTRAVENOUS ONCE
Status: CANCELLED | OUTPATIENT
Start: 2021-10-05 | End: 2021-10-05

## 2021-09-22 RX ORDER — ONDANSETRON 2 MG/ML
8 INJECTION INTRAMUSCULAR; INTRAVENOUS ONCE
Status: CANCELLED | OUTPATIENT
Start: 2021-10-12 | End: 2021-10-12

## 2021-09-22 RX ORDER — HYDROCORTISONE SODIUM SUCCINATE 100 MG/2ML
100 INJECTION, POWDER, FOR SOLUTION INTRAMUSCULAR; INTRAVENOUS AS NEEDED
Status: CANCELLED | OUTPATIENT
Start: 2021-10-12

## 2021-09-22 RX ORDER — DIPHENHYDRAMINE HYDROCHLORIDE 50 MG/ML
25 INJECTION, SOLUTION INTRAMUSCULAR; INTRAVENOUS
Status: CANCELLED
Start: 2021-10-12

## 2021-09-23 ENCOUNTER — VIRTUAL VISIT (OUTPATIENT)
Dept: PALLATIVE CARE | Age: 66
End: 2021-09-23
Payer: MEDICARE

## 2021-09-23 DIAGNOSIS — G47.00 INSOMNIA, UNSPECIFIED TYPE: ICD-10-CM

## 2021-09-23 DIAGNOSIS — K86.81 EXOCRINE PANCREATIC INSUFFICIENCY: Chronic | ICD-10-CM

## 2021-09-23 DIAGNOSIS — E11.65 UNCONTROLLED TYPE 2 DIABETES MELLITUS WITH HYPERGLYCEMIA (HCC): ICD-10-CM

## 2021-09-23 DIAGNOSIS — G62.0 CHEMOTHERAPY-INDUCED NEUROPATHY (HCC): ICD-10-CM

## 2021-09-23 DIAGNOSIS — R10.84 ABDOMINAL PAIN, GENERALIZED: Primary | ICD-10-CM

## 2021-09-23 DIAGNOSIS — T45.1X5A CHEMOTHERAPY-INDUCED NEUROPATHY (HCC): ICD-10-CM

## 2021-09-23 DIAGNOSIS — R45.89 DEPRESSED MOOD: ICD-10-CM

## 2021-09-23 DIAGNOSIS — C25.9 ADENOCARCINOMA OF PANCREAS (HCC): ICD-10-CM

## 2021-09-23 PROCEDURE — G8756 NO BP MEASURE DOC: HCPCS | Performed by: FAMILY MEDICINE

## 2021-09-23 PROCEDURE — 2022F DILAT RTA XM EVC RTNOPTHY: CPT | Performed by: FAMILY MEDICINE

## 2021-09-23 PROCEDURE — G8427 DOCREV CUR MEDS BY ELIG CLIN: HCPCS | Performed by: FAMILY MEDICINE

## 2021-09-23 PROCEDURE — 3288F FALL RISK ASSESSMENT DOCD: CPT | Performed by: FAMILY MEDICINE

## 2021-09-23 PROCEDURE — G8417 CALC BMI ABV UP PARAM F/U: HCPCS | Performed by: FAMILY MEDICINE

## 2021-09-23 PROCEDURE — 1090F PRES/ABSN URINE INCON ASSESS: CPT | Performed by: FAMILY MEDICINE

## 2021-09-23 PROCEDURE — 99214 OFFICE O/P EST MOD 30 MIN: CPT | Performed by: FAMILY MEDICINE

## 2021-09-23 PROCEDURE — G8536 NO DOC ELDER MAL SCRN: HCPCS | Performed by: FAMILY MEDICINE

## 2021-09-23 PROCEDURE — G8399 PT W/DXA RESULTS DOCUMENT: HCPCS | Performed by: FAMILY MEDICINE

## 2021-09-23 PROCEDURE — 1100F PTFALLS ASSESS-DOCD GE2>/YR: CPT | Performed by: FAMILY MEDICINE

## 2021-09-23 PROCEDURE — 3017F COLORECTAL CA SCREEN DOC REV: CPT | Performed by: FAMILY MEDICINE

## 2021-09-23 PROCEDURE — 3046F HEMOGLOBIN A1C LEVEL >9.0%: CPT | Performed by: FAMILY MEDICINE

## 2021-09-23 PROCEDURE — G8510 SCR DEP NEG, NO PLAN REQD: HCPCS | Performed by: FAMILY MEDICINE

## 2021-09-23 NOTE — PATIENT INSTRUCTIONS
Caesar Zhu    It was a pleasure to see you for a virtual visit today. Below is the plan we discussed.      -Cancer:  -You restarted chemotherapy in July. Currently you are taking it once weekly on Tuesday for three weeks followed by one week off. You are finishing your 3rd cycle.  -You have lost your hair since starting chemotherapy.  -The dexamethasone makes you feel a bit anxious/hyper. Sometimes you can feel sad. You also have low energy for a couple days after chemotherapy.  -Sometimes you get a rash on your arms--you use pepcid, benadryl, and hydrocortisone cream for this with improvement.     -Back and lower abdominal pain pain  -Your pain is pretty well controlled with your current regimen. You have this mild tolerable uncomfortable feeling.  -You are taking dilaudid 1 mg in the morning and 1 mg in the afternoon at 4 PM. This is working well for you.  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     -Insomnia  -You have been taking Ativan 0.5 mg at bedtime which is very helpful and so you want to continue the same.  -You also take benadryl at times at night  -You still can sometimes wake a few times at night. You don't think it is because of pain, though you note that your right arm pain can bother you at times.  -You said that tylenol does seem to help, so you can continue to do that. You can also try your as needed dilaudid if the tylenol is not helpful.    -Fatigue  -Of note, you also take methylphenidate 5 mg twice daily as prescribed by Dr. Ingrid Menendez. We will continue to monitor to see if this may need to be adjusted (if the afternoon dose is impacting your sleep).    -Diarrhea   -You are on Creon. You do still sometimes have diarrhea.      -Uncontrolled diabetes  -Your Hemoglobin A1c was greater than 9 in June. You are working with your PCP on insulin doses. You are working to adjust your insulin doses when you take your chemotherapy and dexamethasone.  Uncontrolled diabetes can also exacerbate neuropathy. -Appetite  -You don't have too much of an appetite (worse on chemo weeks), but you try to make yourself eat a good dinner.     -Chemotherapy-induced neuropathy  -It is the worst on your feet and your right hand fingertips. You will drop things at times.  -Gabapentin 600 mg 3 times a day, total 1800 mg/day. This is also being prescribed by Dr. Adelfo Pantoja.  -Cymbalta was recently increased to 30 mg 2 times a day. This was a couple weeks ago after discussing with Dr. Adelfo Pantoja. You do think it seems to be helping some.    -Mood  -You think you are having more good days than bad and that overall you are doing very well. You can feel down at times during your chemo weeks. -You have an awesome support system: , daughter and her family.  -You would be interested in meeting with our team  Yvonne--I will ask her to reach out to you. We will see you back in 3 months virtually.

## 2021-09-23 NOTE — PROGRESS NOTES
Palliative Medicine Office Visit  Palliative Medicine Nurse Check In  [phone]    Patient Name: Ayala Miller  YOB: 1955      Date of Office Visit: 9/23/2021    Patient states: \"  \"    From Check In Sheet (scanned in Media):  Has a medical provider talked with you about cardiopulmonary resuscitation (CPR)? [x] Yes   [] No   [] Unable to obtain    Nurse reminder to complete or update ACP FlowSheet:    Is ACP on the Problem List?    [x] Yes    [] No  IF ACP Document is ON FILE; Nurse to place ACP on Problem List     Is there an ACP Note in Chart Review/Note? [x] Yes    [] No   If NO: ALERT PROVIDER       Primary Decision MakerRay Jud Patel Daughter - [phone]    Secondary Decision Maker: Consuelo Taylor - [phone]  Advance Care Planning 9/23/2021   Patient's Healthcare Decision Maker is: Named in scanned ACP document   Confirm Advance Directive Yes, on file   Patient Would Like to Complete Advance Directive -   Does the patient have other document types Power of          Is there anything that we should know about you as a person in order to provide you the best care possible? Have you been to the ER, urgent care clinic since your last visit? [x] Yes   [] No   [] Unable to obtain    Have you been hospitalized since your last visit? [] Yes   [x] No   [] Unable to obtain    Have you seen or consulted any other health care providers outside of the [facility] since your last visit?    [] Yes   [x] No   [] Unable to obtain    Functional status (describe):         Last BM: 9/23/2021     accessed (date): 9/23/2021    Bottle review (for opioid pain medication):  Medication 1:   Date filled:   Directions:   # filled:   # left:   # pills taking per day:  Last dose taken:    Medication 2:   Date filled:   Directions:   # filled:   # left:   # pills taking per day:  Last dose taken:    Medication 3:   Date filled:   Directions: # filled:   # left:   # pills taking per day:  Last dose taken:    Medication 4:   Date filled:   Directions:   # filled:   # left:   # pills taking per day:  Last dose taken:

## 2021-09-23 NOTE — PROGRESS NOTES
Palliative Medicine Outpatient Services  [place]: [phone] ([phone])    Patient Name: Cheryl Chavez  YOB: 1955    Date of Current Visit: 09/23/21  Location of Current Visit:    [] [facility] Office  [] [facility] Office  [] [id number] [facility]  [] Home  [x]Synchronous A/V virtual visit    Date of Initial Visit: 6/15/2020  Referral from: Dr. Cosby Patient  Primary Care Physician: Shivam Moore MD      SUMMARY:   Cheryl Chavez is a 77y.o. year old with a  history of uncontrolled diabetes with an A1c of 9.2, newly diagnosed inoperable pancreatic cancer, who was referred to Palliative Medicine by Dr. Cosby Patient for management of intractable symptoms and psychosocial support. 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 CT on June 19 shows pancreatitis with some ascites, pancreatic mass has disappeared. Completed 3 doses of 5-FU, started Gemzar Abraxane on 7/29/2020     CA 19 normal, patient s/p exploratory  laparoscopy at [facility] with Dr. Candice Laws and Dr. Isabel Mattson as part of clinical trial on March 17, 2021. Current treatment:  Gemcitaine/Abraxane. PALLIATIVE DIAGNOSES:       ICD-10-CM ICD-9-CM    1. Abdominal pain, generalized  R10.84 789.07    2. Adenocarcinoma of pancreas (HCC)  C25.9 157.9    3. Insomnia, unspecified type  G47.00 780.52    4. Uncontrolled type 2 diabetes mellitus with hyperglycemia (HCC)  E11.65 250.02    5. Exocrine pancreatic insufficiency  K86.81 577.8    6. Chemotherapy-induced neuropathy (HCC)  G62.0 357.6     T45.1X5A E933.1    7.  Depressed mood  R45.89 799.29           PLAN:     Patient Instructions     Cheryl Chavez    It was a pleasure to see you for a virtual visit today. Below is the plan we discussed.      -Cancer:  -You restarted chemotherapy in July. Currently you are taking it once weekly on Tuesday for three weeks followed by one week off. You are finishing your 3rd cycle.  -You have lost your hair since starting chemotherapy.  -The dexamethasone makes you feel a bit anxious/hyper. Sometimes you can feel sad. You also have low energy for a couple days after chemotherapy.  -Sometimes you get a rash on your arms--you use pepcid, benadryl, and hydrocortisone cream for this with improvement.     -Back and lower abdominal pain pain  -Your pain is pretty well controlled with your current regimen. You have this mild tolerable uncomfortable feeling.  -You are taking dilaudid 1 mg in the morning and 1 mg in the afternoon at 4 PM. This is working well for you.  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     -Insomnia  -You have been taking Ativan 0.5 mg at bedtime which is very helpful and so you want to continue the same.  -You also take benadryl at times at night  -You still can sometimes wake a few times at night. You don't think it is because of pain, though you note that your right arm pain can bother you at times.  -You said that tylenol does seem to help, so you can continue to do that. You can also try your as needed dilaudid if the tylenol is not helpful.    -Fatigue  -Of note, you also take methylphenidate 5 mg twice daily as prescribed by Dr. Lizeth Woodall. We will continue to monitor to see if this may need to be adjusted (if the afternoon dose is impacting your sleep).    -Diarrhea   -You are on Creon. You do still sometimes have diarrhea.      -Uncontrolled diabetes  -Your Hemoglobin A1c was greater than 9 in June. You are working with your PCP on insulin doses. You are working to adjust your insulin doses when you take your chemotherapy and dexamethasone.  Uncontrolled diabetes can also exacerbate neuropathy. -Appetite  -You don't have too much of an appetite (worse on chemo weeks), but you try to make yourself eat a good dinner.     -Chemotherapy-induced neuropathy  -It is the worst on your feet and your right hand fingertips. You will drop things at times.  -Gabapentin 600 mg 3 times a day, total 1800 mg/day. This is also being prescribed by Dr. Damon Calvo.  -Cymbalta was recently increased to 30 mg 2 times a day. This was a couple weeks ago after discussing with Dr. Damon Calvo. You do think it seems to be helping some.    -Mood  -You think you are having more good days than bad and that overall you are doing very well. You can feel down at times during your chemo weeks.   -You have an awesome support system: , daughter and her family.  -You would be interested in meeting with our team  Yvonne--I will ask her to reach out to you.          GOALS OF CARE / TREATMENT PREFERENCES:   [====Goals of Care====]  GOALS OF CARE:  Patient / health care proxy stated goals: See Patient Instructions / Summary    TREATMENT PREFERENCES:   Code Status:  [x] Attempt Resuscitation       [] Do Not Attempt Resuscitation    Advance Care Planning:  [x] [facility] Interdisciplinary Team has updated the ACP Navigator with Decision Maker and Patient Capacity      Primary Decision MakerFallon Camejo - Daughter - [phone]    Secondary Decision Maker: Irasema Conde - Daughter - [phone]  Advance Care Planning 9/23/2021   Patient's Healthcare Decision Maker is: Named in scanned ACP document   Confirm Advance Directive Yes, on file   Patient Would Like to Complete Advance Directive -   Does the patient have other document types Power of        Other:  (If patient appropriate for POST, consider using PALLPOST smart phrase here)    The palliative care team has discussed with patient / health care proxy about goals of care / treatment preferences for patient.  [====Goals of Care====] PRESCRIPTIONS GIVEN:     No orders of the defined types were placed in this encounter. FOLLOW UP:     Future Appointments   Date Time Provider Louis Campuzano   9/28/2021  8:30 AM [facility] INF MACI 2 [address]   9/29/2021 11:20 AM Joel Rendon MD [facility]/KELL[facility]   10/5/2021  8:30 AM [facility] INF MACI 3 [address]   10/12/2021  8:30 AM [facility] INF MACI 3 [address]   12/16/2021  1:30 PM Linh Szymanski MD [facility] AMB           PHYSICIANS INVOLVED IN CARE:   Patient Care Team:  Israel Ervin MD as PCP - General (Internal Medicine)  Israel Ervin [facility]CP [facility]  Tamar Alaniz MD (General Surgery)  Shayla Jara MD (Hematology and Oncology)  Joel Rendon MD as Physician (Hematology and Oncology)  Linh Szymanski MD as Physician (Palliative Medicine)       HISTORY:   Reviewed patient-completed ESAS and advance care planning form. Reviewed patient record in prescription monitoring program.    CHIEF COMPLAINT:   Chief Complaint   Patient presents with    Other     Neuropathy       HPI/SUBJECTIVE:    The patient is: [x] Verbal / [] Nonverbal    Patient seen today virtually. She is sitting in her car. Feels all things considered she is doing okay with more good days than bad days. _____     Patient seen today virtually along with her daughter. Leopoldo Guzmán is in very good spirits, talked at length about her chickens. She is having some word finding difficulty every now and then and attributes it to chemo brain. She had a rough few days after taking the meningitis shot but she recovered from it slowly. She also went a few days without Cymbalta which caused severe withdrawal which she is very aware of now and will take Cymbalta on a regular basis. Neuropathy remains in both hands and feet but current doses of gabapentin Cymbalta are helping her. She has been taking Dilaudid 1 mg 2 times a day for fear of pain more than anything.   There are days that she has more back pain especially when she is more active and we talked about her taking Tylenol instead of Dilaudid for that. We will also wean her off of Dilaudid. She is anxious about the next set of scans which may show cancer recurrence and that she may have to go back on chemo. She is excepting of this for now.  --------  Patient seen today along with her daughter Sandra Flynn. Both are very tearful and stressed from patient's new diagnosis. Alan Soto tells me that she ignored her abdominal pain for several months and now she is paying for it. She is determined to do everything she can to live as long as possible but is very realistic about her outcome. She is struggling with abdominal pain and is not taking medications as prescribed. Her most significant complaint is profound fatigue. But from what she is sharing, she is eating little to nothing every day, drinking less than 10 ounces of water per day. Her daughter forces her to eat some applesauce with her meds which is about the only calories she gets per day. She sleeps most of the time. She is identified that the first 6 days of the chemotherapy is the worst but she seems to recover from it a little bit after and eats a little bit. She struggles with diarrhea after chemotherapy but constipation once the diarrhea dissipates. 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 Daughter is overwhelmed because of the illness of her mother.     Clinical Pain Assessment (nonverbal scale for nonverbal patients):   [++++ Clinical Pain Assessment++++]  [++++Pain Severity++++]: Pain: 3  [++++Pain Character++++]: cramping  [++++Pain Duration++++]: month  [++++Pain Effect++++]: functional and emotional  [++++Pain Factors++++]: none in particular  [++++Pain Frequency++++]: constant  [++++Pain Location++++]: upper abdomen  [++++ Clinical Pain Assessment++++]       FUNCTIONAL ASSESSMENT:     Palliative Performance Scale (PPS):  PPS: 70       PSYCHOSOCIAL/SPIRITUAL SCREENING:     Any spiritual / Restoration concerns:  [] Yes /  [x] No    Caregiver Burnout:  [] Yes /  [x] No /  [] No Caregiver Present      Anticipatory grief assessment:   [x] Normal  / [] Maladaptive       ESAS Anxiety: Anxiety: 2    ESAS Depression: Depression: 1       REVIEW OF SYSTEMS:     The following systems were [x] reviewed / [] unable to be reviewed  Systems: constitutional, ears/nose/mouth/throat, respiratory, gastrointestinal, genitourinary, musculoskeletal, integumentary, neurologic, psychiatric, endocrine. Positive findings noted below. Modified ESAS Completed by: provider   Fatigue: 5 Drowsiness: 2   Depression: 1 Pain: 3   Anxiety: 2 Nausea: 0   Anorexia: 4 Dyspnea: 3   Best Well-Being: 3 Constipation: Yes   Other Problem (Comment): 0          PHYSICAL EXAM:     Wt Readings from Last 3 Encounters:   09/14/21 174 lb 12.8 oz (79.3 kg)   09/07/21 176 lb 9.6 oz (80.1 kg)   09/01/21 175 lb 12.8 oz (79.7 kg)     There were no vitals taken for this visit.   Last bowel movement: See Nursing Note    Constitutional    [x] Appears well-developed and well-nourished in no apparent distress    [] Abnormal:  Mental status  [x] Alert and awake  [x] Oriented to person/place/time  [x] Able to follow commands  [] Abnormal:   Eyes  [x] EOM normal   [x] Sclera normal   [x] No visible ocular discharge  [] Abnormal:   HENT  [x] Normocephalic, atraumatic  [x] Mouth/Throat: Moist mucous membranes   [x] External Ears normal  [] Abnormal:  Oral thrush resolved  Neck  [x] No visualized mass  [] Abnormal:  Pulmonary/Chest   [x] Respiratory effort normal  [x] No visualized signs of difficulty breathing or respiratory distress  [] Abnormal:  Musculoskeletal  [x] Normal gait with no signs of ataxia  [x] Normal range of motion of neck  [] Abnormal:  Neurological:   [x] No facial asymmetry (Cranial nerve 7 motor function)  [x] No gaze palsy  [] Abnormal:   Skin  [x] No significant exanthematous lesions or discoloration noted on facial skin  [] Abnormal:                                  Psychiatric  [x] Normal affect  [x] No hallucinations  [] Abnormal:    Other pertinent observable physical exam findings:    Due to this being a TeleHealth evaluation, many elements of the physical examination are unable to be assessed. HISTORY:     Past Medical History:   Diagnosis Date    [facility]    Dr Nel Farfan biopsy via EUS    [facility]     GERD (gastroesophageal reflux disease)     Hernia of abdominal cavity     Subxyphoid hernia    Hypertension     Inflammatory polyarthropathy (HCC)     Joint pain     Joint swelling     Menopause     Ocular nevus of left eye     Pancreatitis     Tracheal stenosis       Past Surgical History:   Procedure Laterality Date    HX CARPAL TUNNEL RELEASE Bilateral     HX COLONOSCOPY      HX HYSTERECTOMY      HX KNEE ARTHROSCOPY Right     HX KNEE REPLACEMENT Right     partial    HX LAP CHOLECYSTECTOMY      HX TONSILLECTOMY      HX VASCULAR ACCESS        Family History   Problem Relation Age of Onset    Heart Disease Mother     Heart Attack Mother     Cancer Father         lung    Diabetes Sister       History reviewed, no pertinent family history. Social History     Tobacco Use    Smoking status: Never Smoker    Smokeless tobacco: Never Used   Substance Use Topics    Alcohol use: No     Alcohol/week: 0.0 standard drinks     No Known Allergies   Current Outpatient Medications   Medication Sig    HYDROmorphone (DILAUDID) 2 mg tablet Take 1 Tablet by mouth every twelve (12) hours as needed for Pain for up to 30 days. Max Daily Amount: 4 mg.  lipase-protease-amylase (Creon) 12,000-38,000 -60,000 unit capsule Take 2 Capsules by mouth three (3) times daily (with meals).  2 caps with meal and 1 with snacks  Indications: exocrine pancreatic insufficiency    lipase-protease-amylase (Creon) 12,000-38,000 -60,000 unit capsule Take 3 Capsules by mouth three (3) times daily (with meals). Indications: exocrine pancreatic insufficiency    methylphenidate HCl (Ritalin) 5 mg tablet Take 1 Tablet by mouth three (3) times daily. Max Daily Amount: 15 mg.    DULoxetine (CYMBALTA) 30 mg capsule Take 1 Capsule by mouth two (2) times a day. Indications: neuropathic pain    lidocaine-prilocaine (EMLA) topical cream Apply  to affected area as needed for Pain (pain ). Apply a thin layer over port site prior to accessing port    nystatin (MYCOSTATIN) powder Apply 1 g to affected area three (3) times daily.  gabapentin (NEURONTIN) 300 mg capsule Take 2 Caps by mouth three (3) times daily. Max Daily Amount: 1,800 mg. Indications: neuropathic pain    HumaLOG KwikPen Insulin 100 unit/mL kwikpen patient on sliding scale    LORazepam (ATIVAN) 1 mg tablet Take 1 Tablet by mouth every six (6) hours as needed for Anxiety. Max Daily Amount: 4 mg. Indications: nausea and vomiting caused by cancer drugs (Patient taking differently: Take 1 mg by mouth nightly. Indications: nausea and vomiting caused by cancer drugs)    multivit-min/folic YDRW/WBJ589 (ALIVE WOMEN'S GUMMY VITAMIN PO) Take 2 Each by mouth daily.  Blood-Glucose Transmitter (Dexcom G6 Transmitter) fadi Use as directed to monitor blood glucose as directed    Blood-Glucose Sensor (Dexcom G6 Sensor) fadi Use as directed to monitor blood glucose as directed    Blood-Glucose Meter,Continuous (Dexcom G6 ) misc Use as directed to monitor blood glucose as directed    insulin glargine U-300 conc (Toujeo SoloStar U-300 Insulin) 300 unit/mL (1.5 mL) inpn pen INJECT 55 UNITS ONCE DAILY AT BEDTIME - DOSE INCREASED (Patient taking differently: 18-40 Units by SubCUTAneous route nightly. 18 units at night)    hydrocortisone (ANUSOL-HC) 25 mg supp Insert 1 Suppository into rectum nightly.  famotidine (Pepcid) 20 mg tablet Take 20 mg by mouth two (2) times a day.     sennosides (Senna) 8.6 mg cap Take 1-2 Tablets by mouth two (2) times a day.  polyethylene glycol (Miralax) 17 gram/dose powder Take 17 g by mouth daily as needed.  diphenhydrAMINE (BENADRYL) 25 mg tablet Take 25 mg by mouth nightly as needed for Itching or Skin Irritation.  promethazine (PHENERGAN) 25 mg tablet Take 1 Tab by mouth every six (6) hours as needed for Nausea.  ondansetron (ZOFRAN ODT) 4 mg disintegrating tablet Take 1 Tab by mouth every eight (8) hours as needed for Nausea or Vomiting.  diphenoxylate-atropine (LomotiL) 2.5-0.025 mg per tablet Take 2 Tabs by mouth four (4) times daily as needed for Diarrhea. Max Daily Amount: 8 Tabs.  prochlorperazine (COMPAZINE) 10 mg tablet Take 0.5 Tabs by mouth every six (6) hours as needed for Nausea.  acetaminophen (Acetaminophen Extra Strength) 500 mg tablet Take 1,000 mg by mouth daily as needed.  Needle, Disp, ndle 1 Each by Does Not Apply route two (2) times daily as needed for Other.  ciprofloxacin HCl (CIPRO) 250 mg tablet Take 1 Tablet by mouth two (2) times a day. (Patient not taking: Reported on 9/23/2021)    levoFLOXacin (Levaquin) 500 mg tablet Take 1 Tablet by mouth daily. (Patient not taking: Reported on 9/23/2021)    potassium chloride (K-DUR, KLOR-CON) 20 mEq tablet Take 1 Tab by mouth two (2) times a day. (Patient not taking: Reported on 9/23/2021)    carvediloL (COREG) 25 mg tablet TAKE 1 TABLET BY MOUTH TWICE DAILY (Patient not taking: Reported on 9/23/2021)     No current facility-administered medications for this visit.           LAB DATA REVIEWED:     Lab Results   Component Value Date/Time    WBC 3.6 09/14/2021 09:35 AM    HGB 10.2 (L) 09/14/2021 09:35 AM    PLATELET 478 98/01/8297 09:35 AM     Lab Results   Component Value Date/Time    Sodium 139 09/14/2021 09:35 AM    Potassium 3.9 09/14/2021 09:35 AM    Chloride 102 09/14/2021 09:35 AM    CO2 28 09/14/2021 09:35 AM    BUN 12 09/14/2021 09:35 AM    Creatinine 0.59 09/14/2021 09:35 AM    Calcium 9.2 09/14/2021 09:35 AM    Magnesium 1.8 04/07/2021 11:05 AM    Phosphorus 3.0 07/15/2020 05:35 AM      Lab Results   Component Value Date/Time    Alk. phosphatase 92 09/14/2021 09:35 AM    Protein, total 6.9 09/14/2021 09:35 AM    Albumin 3.4 (L) 09/14/2021 09:35 AM    Globulin 3.5 09/14/2021 09:35 AM     No results found for: INR, PTMR, PTP, PT1, PT2, APTT, INREXT, INREXT   Lab Results   Component Value Date/Time    Iron 228 (H) 08/04/2021 09:40 AM    TIBC 327 08/04/2021 09:40 AM    Iron % saturation 70 (H) 08/04/2021 09:40 AM    Ferritin 199 08/04/2021 09:40 AM        9/17/2021 CT C/A/P:  IMPRESSION  New peritoneal implants identified in the anterior pelvis  Interval splenectomy and repair of the ventral hernia  Interval thickening of the GE junction and circumferential thickening of the  pylorus.  This may represent gastritis but correlation with endoscopy or upper GI  Recommended        CONTROLLED SUBSTANCES SAFETY ASSESSMENT (IF ON CONTROLLED SUBSTANCES):     Reviewed opioid safety handout:  [x] Yes   [] No  24 hour opioid dose >150mg morphine equivalent/day:  [] Yes   [x] No  Benzodiazepines:  [] Yes   [x] No  Sleep apnea:  [] Yes   [x] No  Urine Toxicology Testing within last 6 months:  [] Yes   [x] No  History of or new aberrant medication taking behaviors:  [] Yes   [x] No  Has Narcan been prescribed [x] Yes   [] No          Total time: 35 minutes  Counseling / coordination time: over 50%  > 50% counseling / coordination?: yes, as summarized within this note    Consent:  He and/or health care decision maker is aware that that he may receive a bill for this telehealth service, depending on his insurance coverage, and has provided verbal consent to proceed: Yes    CPT Codes 54361-34823 for Established Patients may apply to this Telehealth Visit  Pursuant to the emergency declaration under the [address] and the [place] [address] waiver authority and the Coronavirus Preparedness and Response Supplemental Appropriations Act, this Virtual  Visit was conducted, with patient's consent, to reduce the patient's risk of exposure to COVID-19 and provide continuity of care for an established patient. Services were provided through a video synchronous discussion virtually to substitute for in-person clinic visit.

## 2021-09-27 DIAGNOSIS — E11.21 TYPE 2 DIABETES MELLITUS WITH NEPHROPATHY (HCC): Primary | ICD-10-CM

## 2021-09-27 NOTE — PROGRESS NOTES
Chetan Shoemaker is a 77 y.o. female here for follow up of pancreatic cancer. Patient had C8D1 chemo on 9/28/2021. Patient has a low grade temp today 99.5, she c/o pain in lower back, L leg and R arm. Patient has some sciatic pain down into L leg at times. Key Oncology Meds             ondansetron (ZOFRAN ODT) 4 mg disintegrating tablet Take 1 Tab by mouth every eight (8) hours as needed for Nausea or Vomiting. prochlorperazine (COMPAZINE) 10 mg tablet Take 0.5 Tabs by mouth every six (6) hours as needed for Nausea. Key Pain Meds             HYDROmorphone (DILAUDID) 2 mg tablet Take 1 Tablet by mouth every twelve (12) hours as needed for Pain for up to 30 days. Max Daily Amount: 4 mg.    acetaminophen (Acetaminophen Extra Strength) 500 mg tablet Take 1,000 mg by mouth daily as needed.         Chemotherapy Flowsheet 9/14/2021   Cycle C7 D15   Date 9/14/2021   Drug / Regimen Abraxane/Gemcitabine   Dosage 184 mg/1656 mg   Time Up see MAR   Time Down -   Pre Hydration -   Pre Meds Zofran 8 mg IV/Dexamethasone 10 mg IV   Notes -

## 2021-09-28 ENCOUNTER — HOSPITAL ENCOUNTER (OUTPATIENT)
Dept: INFUSION THERAPY | Age: 66
Discharge: HOME | End: 2021-09-28
Payer: MEDICARE

## 2021-09-28 VITALS
SYSTOLIC BLOOD PRESSURE: 150 MMHG | OXYGEN SATURATION: 96 % | TEMPERATURE: 98.4 F | HEART RATE: 89 BPM | RESPIRATION RATE: 17 BRPM | DIASTOLIC BLOOD PRESSURE: 72 MMHG | WEIGHT: 179.6 LBS | HEIGHT: 64 IN | BODY MASS INDEX: 30.66 KG/M2

## 2021-09-28 DIAGNOSIS — C25.9 ADENOCARCINOMA OF PANCREAS (HCC): Primary | ICD-10-CM

## 2021-09-28 LAB
ALBUMIN SERPL-MCNC: 3.4 G/DL (ref 3.5–5)
ALBUMIN/GLOB SERPL: 1 {RATIO} (ref 1.1–2.2)
ALP SERPL-CCNC: 101 U/L (ref 45–117)
ALT SERPL-CCNC: 24 U/L (ref 12–78)
ANION GAP SERPL CALC-SCNC: 7 MMOL/L (ref 5–15)
AST SERPL-CCNC: 22 U/L (ref 15–37)
BASOPHILS # BLD: 0.1 K/UL (ref 0–0.1)
BASOPHILS NFR BLD: 1 % (ref 0–1)
BILIRUB SERPL-MCNC: 0.3 MG/DL (ref 0.2–1)
BUN SERPL-MCNC: 13 MG/DL (ref 6–20)
BUN/CREAT SERPL: 17 (ref 12–20)
CALCIUM SERPL-MCNC: 9.1 MG/DL (ref 8.5–10.1)
CHLORIDE SERPL-SCNC: 100 MMOL/L (ref 97–108)
CO2 SERPL-SCNC: 29 MMOL/L (ref 21–32)
CREAT SERPL-MCNC: 0.75 MG/DL (ref 0.55–1.02)
DIFFERENTIAL METHOD BLD: ABNORMAL
EOSINOPHIL # BLD: 0.2 K/UL (ref 0–0.4)
EOSINOPHIL NFR BLD: 3 % (ref 0–7)
ERYTHROCYTE [DISTWIDTH] IN BLOOD BY AUTOMATED COUNT: 18.4 % (ref 11.5–14.5)
GLOBULIN SER CALC-MCNC: 3.5 G/DL (ref 2–4)
GLUCOSE SERPL-MCNC: 219 MG/DL (ref 65–100)
HCT VFR BLD AUTO: 33.4 % (ref 35–47)
HGB BLD-MCNC: 10.6 G/DL (ref 11.5–16)
IMM GRANULOCYTES # BLD AUTO: 0 K/UL (ref 0–0.04)
IMM GRANULOCYTES NFR BLD AUTO: 0 % (ref 0–0.5)
LYMPHOCYTES # BLD: 1.7 K/UL (ref 0.8–3.5)
LYMPHOCYTES NFR BLD: 26 % (ref 12–49)
MCH RBC QN AUTO: 30 PG (ref 26–34)
MCHC RBC AUTO-ENTMCNC: 31.7 G/DL (ref 30–36.5)
MCV RBC AUTO: 94.6 FL (ref 80–99)
MONOCYTES # BLD: 1.8 K/UL (ref 0–1)
MONOCYTES NFR BLD: 27 % (ref 5–13)
NEUTS SEG # BLD: 2.9 K/UL (ref 1.8–8)
NEUTS SEG NFR BLD: 43 % (ref 32–75)
NRBC # BLD: 0 K/UL (ref 0–0.01)
NRBC BLD-RTO: 0 PER 100 WBC
PLATELET # BLD AUTO: 615 K/UL (ref 150–400)
PMV BLD AUTO: 10.5 FL (ref 8.9–12.9)
POTASSIUM SERPL-SCNC: 4.3 MMOL/L (ref 3.5–5.1)
PROT SERPL-MCNC: 6.9 G/DL (ref 6.4–8.2)
RBC # BLD AUTO: 3.53 M/UL (ref 3.8–5.2)
RBC MORPH BLD: ABNORMAL
SODIUM SERPL-SCNC: 136 MMOL/L (ref 136–145)
WBC # BLD AUTO: 6.7 K/UL (ref 3.6–11)

## 2021-09-28 PROCEDURE — 74011250636 HC RX REV CODE- 250/636: Performed by: INTERNAL MEDICINE

## 2021-09-28 PROCEDURE — 74011000258 HC RX REV CODE- 258: Performed by: INTERNAL MEDICINE

## 2021-09-28 PROCEDURE — 80053 COMPREHEN METABOLIC PANEL: CPT

## 2021-09-28 PROCEDURE — 77030012965 HC NDL HUBR BBMI -A

## 2021-09-28 PROCEDURE — 96413 CHEMO IV INFUSION 1 HR: CPT

## 2021-09-28 PROCEDURE — 36415 COLL VENOUS BLD VENIPUNCTURE: CPT

## 2021-09-28 PROCEDURE — 85025 COMPLETE CBC W/AUTO DIFF WBC: CPT

## 2021-09-28 PROCEDURE — 96417 CHEMO IV INFUS EACH ADDL SEQ: CPT

## 2021-09-28 PROCEDURE — 96375 TX/PRO/DX INJ NEW DRUG ADDON: CPT

## 2021-09-28 RX ORDER — SODIUM CHLORIDE 0.9 % (FLUSH) 0.9 %
10 SYRINGE (ML) INJECTION AS NEEDED
Status: ACTIVE | OUTPATIENT
Start: 2021-09-28 | End: 2021-09-28

## 2021-09-28 RX ORDER — ACETAMINOPHEN 325 MG/1
650 TABLET ORAL AS NEEDED
Status: ACTIVE | OUTPATIENT
Start: 2021-09-28 | End: 2021-09-28

## 2021-09-28 RX ORDER — DIPHENHYDRAMINE HYDROCHLORIDE 50 MG/ML
25 INJECTION, SOLUTION INTRAMUSCULAR; INTRAVENOUS AS NEEDED
Status: ACTIVE | OUTPATIENT
Start: 2021-09-28 | End: 2021-09-28

## 2021-09-28 RX ORDER — ONDANSETRON 2 MG/ML
8 INJECTION INTRAMUSCULAR; INTRAVENOUS ONCE
Status: COMPLETED | OUTPATIENT
Start: 2021-09-28 | End: 2021-09-28

## 2021-09-28 RX ORDER — ONDANSETRON 2 MG/ML
8 INJECTION INTRAMUSCULAR; INTRAVENOUS AS NEEDED
Status: ACTIVE | OUTPATIENT
Start: 2021-09-28 | End: 2021-09-28

## 2021-09-28 RX ORDER — DEXAMETHASONE SODIUM PHOSPHATE 4 MG/ML
10 INJECTION, SOLUTION INTRA-ARTICULAR; INTRALESIONAL; INTRAMUSCULAR; INTRAVENOUS; SOFT TISSUE ONCE
Status: COMPLETED | OUTPATIENT
Start: 2021-09-28 | End: 2021-09-28

## 2021-09-28 RX ORDER — SODIUM CHLORIDE 9 MG/ML
25 INJECTION, SOLUTION INTRAVENOUS CONTINUOUS
Status: DISPENSED | OUTPATIENT
Start: 2021-09-28 | End: 2021-09-28

## 2021-09-28 RX ORDER — HEPARIN 100 UNIT/ML
300-500 SYRINGE INTRAVENOUS AS NEEDED
Status: DISPENSED | OUTPATIENT
Start: 2021-09-28 | End: 2021-09-28

## 2021-09-28 RX ADMIN — Medication 10 ML: at 11:59

## 2021-09-28 RX ADMIN — ONDANSETRON 8 MG: 2 INJECTION INTRAMUSCULAR; INTRAVENOUS at 09:45

## 2021-09-28 RX ADMIN — Medication 500 UNITS: at 11:59

## 2021-09-28 RX ADMIN — GEMCITABINE 1656 MG: 38 INJECTION, SOLUTION INTRAVENOUS at 11:08

## 2021-09-28 RX ADMIN — PACLITAXEL 184 MG: 100 INJECTION, POWDER, LYOPHILIZED, FOR SUSPENSION INTRAVENOUS at 10:20

## 2021-09-28 RX ADMIN — SODIUM CHLORIDE 25 ML/HR: 9 INJECTION, SOLUTION INTRAVENOUS at 09:39

## 2021-09-28 RX ADMIN — DEXAMETHASONE SODIUM PHOSPHATE 10 MG: 4 INJECTION, SOLUTION INTRAMUSCULAR; INTRAVENOUS at 09:41

## 2021-09-28 NOTE — PROGRESS NOTES
[facility] Progress Note    Date: 2021    Name: Blanca Mast    MRN: [id number]         : 1955    Ms. Ba Arrived ambulatory and in no distress for cycle 8 day 1 of Abraxane/Gemcitabine regimen. Assessment was completed, no acute issues at this time, no new complaints voiced. Continues to have neuropathy to fingertips and feet. Worse to right fingertips, neuropathy is unchanged. Chronic pain to lower back radiating to hip and left leg, controls with home pain medication and rest.  Port a Cath  accessed without difficulty, labs drawn and in process. Chemotherapy Flowsheet 2021   Cycle C8D1   Date 2021   Drug / Regimen Abraxane/Gemcitabine   Dosage 184 mg/1656 mg   Time Up -   Time Down -   Pre Hydration -   Pre Meds Zofran 8 mg IV/Dexamethasone 10 mg IV   Notes -         Ms. Ba's vitals were reviewed. Visit Vitals  BP (!) 150/72 (BP 1 Location: Left upper arm, BP Patient Position: Sitting)   Pulse 89   Temp 98.4 °F (36.9 °C)   Resp 17   Ht 5' 4\" (1.626 m)   Wt 81.5 kg (179 lb 9.6 oz)   SpO2 96%   BMI 30.83 kg/m²       Lab results were obtained and reviewed. Recent Results (from the past 12 hour(s))   CBC WITH AUTOMATED DIFF    Collection Time: 21  8:35 AM   Result Value Ref Range    WBC 6.7 3.6 - 11.0 K/uL    RBC 3.53 (L) 3.80 - 5.20 M/uL    HGB 10.6 (L) 11.5 - 16.0 g/dL    HCT 33.4 (L) 35.0 - 47.0 %    MCV 94.6 80.0 - 99.0 FL    MCH 30.0 26.0 - 34.0 PG    MCHC 31.7 30.0 - 36.5 g/dL    RDW 18.4 (H) 11.5 - 14.5 %    PLATELET 263 (H) 217 - 400 K/uL    MPV 10.5 8.9 - 12.9 FL    NRBC 0.0 0  WBC    ABSOLUTE NRBC 0.00 0.00 - 0.01 K/uL    NEUTROPHILS 43 32 - 75 %    LYMPHOCYTES 26 12 - 49 %    MONOCYTES 27 (H) 5 - 13 %    EOSINOPHILS 3 0 - 7 %    BASOPHILS 1 0 - 1 %    IMMATURE GRANULOCYTES 0 0.0 - 0.5 %    ABS. NEUTROPHILS 2.9 1.8 - 8.0 K/UL    ABS. LYMPHOCYTES 1.7 0.8 - 3.5 K/UL    ABS. MONOCYTES 1.8 (H) 0.0 - 1.0 K/UL    ABS. EOSINOPHILS 0.2 0.0 - 0.4 K/UL    ABS. BASOPHILS 0.1 0.0 - 0.1 K/UL    ABS. IMM. GRANS. 0.0 0.00 - 0.04 K/UL    DF AUTOMATED      RBC COMMENTS ANISOCYTOSIS  1+       METABOLIC PANEL, COMPREHENSIVE    Collection Time: 09/28/21  8:35 AM   Result Value Ref Range    Sodium 136 136 - 145 mmol/L    Potassium 4.3 3.5 - 5.1 mmol/L    Chloride 100 97 - 108 mmol/L    CO2 29 21 - 32 mmol/L    Anion gap 7 5 - 15 mmol/L    Glucose 219 (H) 65 - 100 mg/dL    BUN 13 6 - 20 MG/DL    Creatinine 0.75 0.55 - 1.02 MG/DL    BUN/Creatinine ratio 17 12 - 20      GFR est AA >60 >60 ml/min/1.73m2    GFR est non-AA >60 >60 ml/min/1.73m2    Calcium 9.1 8.5 - 10.1 MG/DL    Bilirubin, total 0.3 0.2 - 1.0 MG/DL    ALT (SGPT) 24 12 - 78 U/L    AST (SGOT) 22 15 - 37 U/L    Alk. phosphatase 101 45 - 117 U/L    Protein, total 6.9 6.4 - 8.2 g/dL    Albumin 3.4 (L) 3.5 - 5.0 g/dL    Globulin 3.5 2.0 - 4.0 g/dL    A-G Ratio 1.0 (L) 1.1 - 2.2       NS at [facility]. Zofran 8 mg IVP. Dexamethasone 10 mg IVP. 1020: Abraxane 184 mg IV initiated to infuse over 30 minutes via pump. 1108: Gemcitabine 1656 mg IV initiated to infuse over 30 minutes via pump. 1140: Gemcitabine infusion completed and discontinued. Line and port flushed, brisk blood return, needle removed and band aid to site. Site without redness, swelling or pain. Ms. Socorro Arreaga tolerated treatment well and was discharged from [place] [address] in stable condition at 1205. She is to return on October 5, 2021 at 0830 for her next appointment.     Dragan Ovalle RN  September 28, 2021

## 2021-09-28 NOTE — DISCHARGE INSTRUCTIONS

## 2021-09-29 ENCOUNTER — OFFICE VISIT (OUTPATIENT)
Dept: ONCOLOGY | Age: 66
End: 2021-09-29
Payer: MEDICARE

## 2021-09-29 VITALS
TEMPERATURE: 99.5 F | WEIGHT: 177.8 LBS | DIASTOLIC BLOOD PRESSURE: 73 MMHG | OXYGEN SATURATION: 98 % | BODY MASS INDEX: 30.35 KG/M2 | RESPIRATION RATE: 16 BRPM | HEART RATE: 79 BPM | SYSTOLIC BLOOD PRESSURE: 134 MMHG | HEIGHT: 64 IN

## 2021-09-29 DIAGNOSIS — C25.9 ADENOCARCINOMA OF PANCREAS (HCC): Primary | ICD-10-CM

## 2021-09-29 PROCEDURE — G8427 DOCREV CUR MEDS BY ELIG CLIN: HCPCS | Performed by: INTERNAL MEDICINE

## 2021-09-29 PROCEDURE — 3288F FALL RISK ASSESSMENT DOCD: CPT | Performed by: INTERNAL MEDICINE

## 2021-09-29 PROCEDURE — 1100F PTFALLS ASSESS-DOCD GE2>/YR: CPT | Performed by: INTERNAL MEDICINE

## 2021-09-29 PROCEDURE — G8536 NO DOC ELDER MAL SCRN: HCPCS | Performed by: INTERNAL MEDICINE

## 2021-09-29 PROCEDURE — G8754 DIAS BP LESS 90: HCPCS | Performed by: INTERNAL MEDICINE

## 2021-09-29 PROCEDURE — G8752 SYS BP LESS 140: HCPCS | Performed by: INTERNAL MEDICINE

## 2021-09-29 PROCEDURE — G8399 PT W/DXA RESULTS DOCUMENT: HCPCS | Performed by: INTERNAL MEDICINE

## 2021-09-29 PROCEDURE — 99214 OFFICE O/P EST MOD 30 MIN: CPT | Performed by: INTERNAL MEDICINE

## 2021-09-29 PROCEDURE — 3017F COLORECTAL CA SCREEN DOC REV: CPT | Performed by: INTERNAL MEDICINE

## 2021-09-29 PROCEDURE — G8510 SCR DEP NEG, NO PLAN REQD: HCPCS | Performed by: INTERNAL MEDICINE

## 2021-09-29 PROCEDURE — G8417 CALC BMI ABV UP PARAM F/U: HCPCS | Performed by: INTERNAL MEDICINE

## 2021-09-29 PROCEDURE — 1090F PRES/ABSN URINE INCON ASSESS: CPT | Performed by: INTERNAL MEDICINE

## 2021-09-29 NOTE — PROGRESS NOTES
Reason for Visit:   Clint Davis a 43 I. o. female who is seen for pancreatic adenocarcinoma     Treatment History:   Dx: Pancreatic Adenocarcinoma--May 2020--L3I5Gy--yqqxlgglted of splenic vein and superior mesenteric vein  Tx: mFOLFIRINOX--first cycle 5/26/2020, second cycle 6/10/2020, dose reduced 15% cycle 3 on 6/30/2020--delayed due to severe side effects--switched to Gemcitabine/Abraxane--Cycle #1 7/29/2002, Cycle #2 9/2/2020, Cycle #3 10/7/2020, Cycle #4 11/4/2020. Neoadjuvant Radiation with Dr Tomasz Herbert ending 12/18/2021. Distal Pancreatectomy, Splenectomy, and Civa Sheet with Dr Quyen Henry on 3/17/2021. Adjuvant radiation with Dr Tomasz Herbert. Restart Gemcitabine/abraxane Cycle #1 7/6/2021, Cycle #2 8/3/2021, Cycle #3 8/31/2021, Cycle #4 9/28/2021  Goal: Prolong life--Palliative  History of Present Illness:   Doing very well. No changes. Was seen by Dr Tomasz Herbert at [address] [place] [address] and felt the scans were improving compared to prior. This corresponds to the improvement in her ca 19-9.       Past Medical History:   Diagnosis Date    Adenocarcinoma of pancreas ([facility] [address] 05/13/2020    Dr Glenna Youssef biopsy via EUS    Diabetes ([facility] [address]     GERD (gastroesophageal reflux disease)     Hernia of abdominal cavity     Subxyphoid hernia    Hypertension     Inflammatory polyarthropathy (HCC)     Joint pain     Joint swelling     Menopause     Ocular nevus of left eye     Pancreatitis     Tracheal stenosis       Past Surgical History:   Procedure Laterality Date    HX CARPAL TUNNEL RELEASE Bilateral     HX COLONOSCOPY      HX HYSTERECTOMY      HX KNEE ARTHROSCOPY Right     HX KNEE REPLACEMENT Right     partial    HX LAP CHOLECYSTECTOMY      HX TONSILLECTOMY      HX VASCULAR ACCESS        Social History     Tobacco Use    Smoking status: Never Smoker    Smokeless tobacco: Never Used   Substance Use Topics    Alcohol use: No     Alcohol/week: 0.0 standard drinks      Family History   Problem Relation Age of Onset    Heart Disease Mother     Heart Attack Mother     Cancer Father         lung    Diabetes Sister      Current Outpatient Medications   Medication Sig    HYDROmorphone (DILAUDID) 2 mg tablet Take 1 Tablet by mouth every twelve (12) hours as needed for Pain for up to 30 days. Max Daily Amount: 4 mg. (Patient taking differently: Take 1 mg by mouth two (2) times a day.)    lipase-protease-amylase (Creon) 12,000-38,000 -60,000 unit capsule Take 2 Capsules by mouth three (3) times daily (with meals). 2 caps with meal and 1 with snacks  Indications: exocrine pancreatic insufficiency    methylphenidate HCl (Ritalin) 5 mg tablet Take 1 Tablet by mouth three (3) times daily. Max Daily Amount: 15 mg.    DULoxetine (CYMBALTA) 30 mg capsule Take 1 Capsule by mouth two (2) times a day. Indications: neuropathic pain    lidocaine-prilocaine (EMLA) topical cream Apply  to affected area as needed for Pain (pain ). Apply a thin layer over port site prior to accessing port    nystatin (MYCOSTATIN) powder Apply 1 g to affected area three (3) times daily.  gabapentin (NEURONTIN) 300 mg capsule Take 2 Caps by mouth three (3) times daily. Max Daily Amount: 1,800 mg. Indications: neuropathic pain    HumaLOG KwikPen Insulin 100 unit/mL kwikpen patient on sliding scale    LORazepam (ATIVAN) 1 mg tablet Take 1 Tablet by mouth every six (6) hours as needed for Anxiety. Max Daily Amount: 4 mg. Indications: nausea and vomiting caused by cancer drugs (Patient taking differently: Take 1 mg by mouth nightly. Indications: nausea and vomiting caused by cancer drugs)    multivit-min/folic PUTD/CRV515 (ALIVE WOMEN'S GUMMY VITAMIN PO) Take 2 Each by mouth daily.  insulin glargine U-300 conc (Toujeo SoloStar U-300 Insulin) 300 unit/mL (1.5 mL) inpn pen INJECT 55 UNITS ONCE DAILY AT BEDTIME - DOSE INCREASED (Patient taking differently: 18-40 Units by SubCUTAneous route nightly.  18 units at night)    hydrocortisone (ANUSOL-HC) 25 mg supp Insert 1 Suppository into rectum nightly.  famotidine (Pepcid) 20 mg tablet Take 20 mg by mouth two (2) times a day.  sennosides (Senna) 8.6 mg cap Take 1-2 Tablets by mouth two (2) times a day.  polyethylene glycol (Miralax) 17 gram/dose powder Take 17 g by mouth daily as needed.  diphenhydrAMINE (BENADRYL) 25 mg tablet Take 25 mg by mouth nightly as needed for Itching or Skin Irritation.  promethazine (PHENERGAN) 25 mg tablet Take 1 Tab by mouth every six (6) hours as needed for Nausea.  ondansetron (ZOFRAN ODT) 4 mg disintegrating tablet Take 1 Tab by mouth every eight (8) hours as needed for Nausea or Vomiting.  diphenoxylate-atropine (LomotiL) 2.5-0.025 mg per tablet Take 2 Tabs by mouth four (4) times daily as needed for Diarrhea. Max Daily Amount: 8 Tabs.  prochlorperazine (COMPAZINE) 10 mg tablet Take 0.5 Tabs by mouth every six (6) hours as needed for Nausea.  acetaminophen (Acetaminophen Extra Strength) 500 mg tablet Take 1,000 mg by mouth daily as needed.  lipase-protease-amylase (Creon) 12,000-38,000 -60,000 unit capsule Take 3 Capsules by mouth three (3) times daily (with meals). Indications: exocrine pancreatic insufficiency (Patient not taking: Reported on 9/29/2021)    ciprofloxacin HCl (CIPRO) 250 mg tablet Take 1 Tablet by mouth two (2) times a day. (Patient not taking: Reported on 9/23/2021)    levoFLOXacin (Levaquin) 500 mg tablet Take 1 Tablet by mouth daily.  (Patient not taking: Reported on 9/23/2021)    Blood-Glucose Transmitter (Dexcom G6 Transmitter) fadi Use as directed to monitor blood glucose as directed (Patient not taking: Reported on 9/29/2021)    Blood-Glucose Sensor (Dexcom G6 Sensor) fadi Use as directed to monitor blood glucose as directed (Patient not taking: Reported on 9/29/2021)    Blood-Glucose Meter,Continuous (Dexcom G6 ) misc Use as directed to monitor blood glucose as directed (Patient not taking: Reported on 9/29/2021)    potassium chloride (K-DUR, KLOR-CON) 20 mEq tablet Take 1 Tab by mouth two (2) times a day. (Patient not taking: Reported on 9/23/2021)    carvediloL (COREG) 25 mg tablet TAKE 1 TABLET BY MOUTH TWICE DAILY (Patient not taking: Reported on 9/23/2021)    Needle, Disp, ndle 1 Each by Does Not Apply route two (2) times daily as needed for Other. (Patient not taking: Reported on 9/29/2021)     No current facility-administered medications for this visit. No Known Allergies     Review of Systems: A complete review of systems was obtained, negative except as described above. Physical Exam:     Visit Vitals  /73   Pulse 79   Temp 99.5 °F (37.5 °C) (Oral)   Resp 16   Ht 5' 4\" (1.626 m)   Wt 177 lb 12.8 oz (80.6 kg)   SpO2 98%   BMI 30.52 kg/m²     ECOG PS: 0  General: No distress  Eyes: PERRLA, anicteric sclerae  HENT: Atraumatic, OP clear  Neck: Supple  Lymphatic: No cervical, supraclavicular, or inguinal adenopathy  Respiratory: CTAB, normal respiratory effort  CV: Normal rate, regular rhythm, no murmurs, no peripheral edema  GI: Soft, nontender, nondistended, no masses, no hepatomegaly, no splenomegaly  MS: Normal gait and station. Digits without clubbing or cyanosis. Skin: No rashes, ecchymoses, or petechiae. Normal temperature, turgor, and texture. Psych: Alert, oriented, appropriate affect, normal judgment/insight    Results:     Lab Results   Component Value Date/Time    WBC 6.7 09/28/2021 08:35 AM    HGB 10.6 (L) 09/28/2021 08:35 AM    HCT 33.4 (L) 09/28/2021 08:35 AM    PLATELET 472 (H) 90/60/5165 08:35 AM    MCV 94.6 09/28/2021 08:35 AM    ABS.  NEUTROPHILS 2.9 09/28/2021 08:35 AM     Lab Results   Component Value Date/Time    Sodium 136 09/28/2021 08:35 AM    Potassium 4.3 09/28/2021 08:35 AM    Chloride 100 09/28/2021 08:35 AM    CO2 29 09/28/2021 08:35 AM    Glucose 219 (H) 09/28/2021 08:35 AM    BUN 13 09/28/2021 08:35 AM    Creatinine 0.75 09/28/2021 08:35 AM    GFR est AA >60 09/28/2021 08:35 AM    GFR est non-AA >60 09/28/2021 08:35 AM    Calcium 9.1 09/28/2021 08:35 AM    Glucose (POC) 192 (H) 07/15/2020 11:56 AM    Creatinine (POC) 0.7 02/06/2013 10:21 AM     Lab Results   Component Value Date/Time    Bilirubin, total 0.3 09/28/2021 08:35 AM    ALT (SGPT) 24 09/28/2021 08:35 AM    Alk. phosphatase 101 09/28/2021 08:35 AM    Protein, total 6.9 09/28/2021 08:35 AM    Albumin 3.4 (L) 09/28/2021 08:35 AM    Globulin 3.5 09/28/2021 08:35 AM       CT A/P 4/30/2020  IMPRESSION: Suspect neoplastic pancreatic mass versus atypical focal  pancreatitis with splenic and superior mesenteric vein occlusion. Consider  further evaluation with endoscopic ultrasound at which time biopsy could  potentially be performed.     CT Chest 5/21/2020  IMPRESSION:  1. No evidence of pulmonary metastatic disease. No evidence of mediastinal or  hilar lymphadenopathy. No pleural effusions identified. 2. No definite evidence of central pulmonary embolic disease. 3. Presence of a mass lesion involving the pancreatic head/body. 4. Evidence of fatty infiltration involving the liver. Presence of focal  low-density areas within the liver compatible with cysts. Surgical absence of  the gallbladder.     CT A/P 6/19/2020  IMPRESSION:  Pancreatitis with ascites favored. Discrete pancreatic mass is not identified. No biliary dilatation or definite arterial or venous thrombosis. No liver  metastases. Fat-containing ventral hernia  Nonobstructing left renal calculus  Retrolisthesis of L2 and L3.     CT C/A/P 10/12/2020  IMPRESSION:  1. Interval improvement in appearance of the pancreas (see discussion above). 2. Normal sized liver. Stable appearance of the previously described areas of  decreased attenuation within liver. 3. Surgical absence of the gallbladder. 4. Evidence of splenomegaly. 5. Normal sized kidneys. Presence of small, nonobstructing calculi within the  left kidney. No evidence of hydronephrotic change.   6. Suboptimal distention of the urinary bladder. 7. Presence of a small, fat-containing ventral hernia in the mid abdominal  Region. CT C/A/P 9/17/2021  IMPRESSION  New peritoneal implants identified in the anterior pelvis  Interval splenectomy and repair of the ventral hernia  Interval thickening of the GE junction and circumferential thickening of the  pylorus. This may represent gastritis but correlation with endoscopy or upper GI  recommended     Path: 5/8/2020  CYTOLOGIC INTERPRETATION:   Pancreas, EUS-guided fine needle aspiration and cell blocks: Adenocarcinoma     Path: 3/17/2021  No residual malignancy in pancrease, 0/29 lymph nodes. 2-3mm focus metastatic adenocarcinoma within hernia sac     Assessment:   1) Pancreatic Adenocarcinoma--Unresectable  2) Neoplasm Pain/Neuropathic Pain  3) Fatigue  4) Nausea  5) DM  6) Nutrition  7) Diarrhea  8) Hypotension  9) Abd Pain  10) Left Hydronephrosis     Plan:   1) Completed neoadjuvant chemotherapy. Radiation with Dr Lucía Locke. Definitive resection with Dr Sierra Kelly focus of adenocarcinoma in ventral hernia. Now peritoneal disease/mets. She does want palliative chemotherapy with Gemcitaine/Abraxane. Will continue.       2) S/p celiac block--pain in abd controlled. Having increased neuropathy from chemotherapy-- increased Cymbalta to 30mg bid and this has helped. Does not want to increase further at this time--wants to see if more time will improve her response     3) Methylphenidate 5mg bid--has helped.       4) Resolved.    5) Defer to Dr Herbert Stands for further management.       6) On pancreatic enzymes--weight has stabilized.    7) Chemo related--hopefully with not recurr.  Taking senna/miralax to prevent constipation. 8) Resolved currently     9) Passed large kidney stone    10) Renal intact--had significant diuresis after passing stone.   Likely resovled      Signed By: Angel Urbano MD

## 2021-10-05 ENCOUNTER — HOSPITAL ENCOUNTER (OUTPATIENT)
Dept: INFUSION THERAPY | Age: 66
Discharge: HOME | End: 2021-10-05
Payer: MEDICARE

## 2021-10-05 VITALS
TEMPERATURE: 98.7 F | BODY MASS INDEX: 30.04 KG/M2 | HEART RATE: 89 BPM | WEIGHT: 175.93 LBS | SYSTOLIC BLOOD PRESSURE: 161 MMHG | RESPIRATION RATE: 18 BRPM | HEIGHT: 64 IN | DIASTOLIC BLOOD PRESSURE: 67 MMHG | OXYGEN SATURATION: 99 %

## 2021-10-05 DIAGNOSIS — C25.9 ADENOCARCINOMA OF PANCREAS (HCC): Primary | ICD-10-CM

## 2021-10-05 LAB
ALBUMIN SERPL-MCNC: 3.2 G/DL (ref 3.5–5)
ALBUMIN/GLOB SERPL: 1 {RATIO} (ref 1.1–2.2)
ALP SERPL-CCNC: 99 U/L (ref 45–117)
ALT SERPL-CCNC: 26 U/L (ref 12–78)
ANION GAP SERPL CALC-SCNC: 6 MMOL/L (ref 5–15)
AST SERPL-CCNC: 21 U/L (ref 15–37)
BASOPHILS # BLD: 0 K/UL (ref 0–0.1)
BASOPHILS NFR BLD: 0 % (ref 0–1)
BILIRUB SERPL-MCNC: 0.2 MG/DL (ref 0.2–1)
BUN SERPL-MCNC: 11 MG/DL (ref 6–20)
BUN/CREAT SERPL: 15 (ref 12–20)
CALCIUM SERPL-MCNC: 8.8 MG/DL (ref 8.5–10.1)
CHLORIDE SERPL-SCNC: 104 MMOL/L (ref 97–108)
CO2 SERPL-SCNC: 29 MMOL/L (ref 21–32)
CREAT SERPL-MCNC: 0.74 MG/DL (ref 0.55–1.02)
DIFFERENTIAL METHOD BLD: ABNORMAL
EOSINOPHIL # BLD: 0 K/UL (ref 0–0.4)
EOSINOPHIL NFR BLD: 0 % (ref 0–7)
ERYTHROCYTE [DISTWIDTH] IN BLOOD BY AUTOMATED COUNT: 17.4 % (ref 11.5–14.5)
GLOBULIN SER CALC-MCNC: 3.3 G/DL (ref 2–4)
GLUCOSE SERPL-MCNC: 179 MG/DL (ref 65–100)
HCT VFR BLD AUTO: 31 % (ref 35–47)
HGB BLD-MCNC: 10.1 G/DL (ref 11.5–16)
IMM GRANULOCYTES # BLD AUTO: 0 K/UL (ref 0–0.04)
IMM GRANULOCYTES NFR BLD AUTO: 0 % (ref 0–0.5)
LYMPHOCYTES # BLD: 1.7 K/UL (ref 0.8–3.5)
LYMPHOCYTES NFR BLD: 35 % (ref 12–49)
MCH RBC QN AUTO: 30.8 PG (ref 26–34)
MCHC RBC AUTO-ENTMCNC: 32.6 G/DL (ref 30–36.5)
MCV RBC AUTO: 94.5 FL (ref 80–99)
METAMYELOCYTES NFR BLD MANUAL: 3 %
MONOCYTES # BLD: 1.1 K/UL (ref 0–1)
MONOCYTES NFR BLD: 23 % (ref 5–13)
NEUTS SEG # BLD: 1.9 K/UL (ref 1.8–8)
NEUTS SEG NFR BLD: 39 % (ref 32–75)
NRBC # BLD: 0.06 K/UL (ref 0–0.01)
NRBC BLD-RTO: 1.3 PER 100 WBC
PLATELET # BLD AUTO: 527 K/UL (ref 150–400)
PMV BLD AUTO: 10.2 FL (ref 8.9–12.9)
POTASSIUM SERPL-SCNC: 3.9 MMOL/L (ref 3.5–5.1)
PROT SERPL-MCNC: 6.5 G/DL (ref 6.4–8.2)
RBC # BLD AUTO: 3.28 M/UL (ref 3.8–5.2)
RBC MORPH BLD: ABNORMAL
SODIUM SERPL-SCNC: 139 MMOL/L (ref 136–145)
WBC # BLD AUTO: 4.8 K/UL (ref 3.6–11)

## 2021-10-05 PROCEDURE — 96413 CHEMO IV INFUSION 1 HR: CPT

## 2021-10-05 PROCEDURE — 96375 TX/PRO/DX INJ NEW DRUG ADDON: CPT

## 2021-10-05 PROCEDURE — 80053 COMPREHEN METABOLIC PANEL: CPT

## 2021-10-05 PROCEDURE — 77030012965 HC NDL HUBR BBMI -A

## 2021-10-05 PROCEDURE — 96417 CHEMO IV INFUS EACH ADDL SEQ: CPT

## 2021-10-05 PROCEDURE — 85025 COMPLETE CBC W/AUTO DIFF WBC: CPT

## 2021-10-05 PROCEDURE — 36415 COLL VENOUS BLD VENIPUNCTURE: CPT

## 2021-10-05 PROCEDURE — 74011000258 HC RX REV CODE- 258: Performed by: INTERNAL MEDICINE

## 2021-10-05 PROCEDURE — 96415 CHEMO IV INFUSION ADDL HR: CPT

## 2021-10-05 PROCEDURE — 74011250636 HC RX REV CODE- 250/636: Performed by: INTERNAL MEDICINE

## 2021-10-05 RX ORDER — SODIUM CHLORIDE 0.9 % (FLUSH) 0.9 %
10 SYRINGE (ML) INJECTION AS NEEDED
Status: ACTIVE | OUTPATIENT
Start: 2021-10-05 | End: 2021-10-05

## 2021-10-05 RX ORDER — ONDANSETRON 2 MG/ML
8 INJECTION INTRAMUSCULAR; INTRAVENOUS AS NEEDED
Status: ACTIVE | OUTPATIENT
Start: 2021-10-05 | End: 2021-10-05

## 2021-10-05 RX ORDER — SODIUM CHLORIDE 9 MG/ML
25 INJECTION, SOLUTION INTRAVENOUS CONTINUOUS
Status: DISPENSED | OUTPATIENT
Start: 2021-10-05 | End: 2021-10-05

## 2021-10-05 RX ORDER — ONDANSETRON 2 MG/ML
8 INJECTION INTRAMUSCULAR; INTRAVENOUS ONCE
Status: COMPLETED | OUTPATIENT
Start: 2021-10-05 | End: 2021-10-05

## 2021-10-05 RX ORDER — SODIUM CHLORIDE 9 MG/ML
10 INJECTION INTRAMUSCULAR; INTRAVENOUS; SUBCUTANEOUS AS NEEDED
Status: ACTIVE | OUTPATIENT
Start: 2021-10-05 | End: 2021-10-05

## 2021-10-05 RX ORDER — DIPHENHYDRAMINE HYDROCHLORIDE 50 MG/ML
25 INJECTION, SOLUTION INTRAMUSCULAR; INTRAVENOUS AS NEEDED
Status: ACTIVE | OUTPATIENT
Start: 2021-10-05 | End: 2021-10-05

## 2021-10-05 RX ORDER — ACETAMINOPHEN 325 MG/1
650 TABLET ORAL AS NEEDED
Status: ACTIVE | OUTPATIENT
Start: 2021-10-05 | End: 2021-10-05

## 2021-10-05 RX ORDER — HEPARIN 100 UNIT/ML
300-500 SYRINGE INTRAVENOUS AS NEEDED
Status: DISPENSED | OUTPATIENT
Start: 2021-10-05 | End: 2021-10-05

## 2021-10-05 RX ORDER — DEXAMETHASONE SODIUM PHOSPHATE 4 MG/ML
10 INJECTION, SOLUTION INTRA-ARTICULAR; INTRALESIONAL; INTRAMUSCULAR; INTRAVENOUS; SOFT TISSUE ONCE
Status: COMPLETED | OUTPATIENT
Start: 2021-10-05 | End: 2021-10-05

## 2021-10-05 RX ADMIN — ONDANSETRON 8 MG: 2 INJECTION INTRAMUSCULAR; INTRAVENOUS at 10:50

## 2021-10-05 RX ADMIN — GEMCITABINE 1656 MG: 38 INJECTION, SOLUTION INTRAVENOUS at 11:52

## 2021-10-05 RX ADMIN — PACLITAXEL 184 MG: 100 INJECTION, POWDER, LYOPHILIZED, FOR SUSPENSION INTRAVENOUS at 11:04

## 2021-10-05 RX ADMIN — SODIUM CHLORIDE 25 ML/HR: 9 INJECTION, SOLUTION INTRAVENOUS at 10:12

## 2021-10-05 RX ADMIN — DEXAMETHASONE SODIUM PHOSPHATE 10 MG: 4 INJECTION, SOLUTION INTRAMUSCULAR; INTRAVENOUS at 10:53

## 2021-10-05 NOTE — DISCHARGE INSTRUCTIONS

## 2021-10-05 NOTE — PROGRESS NOTES
[facility] Progress Note    Date: 2021    Name: Bernabe Castañeda    MRN: [id number]         : 1955    Ms. Ba Arrived ambulatory and in no distress for cycle 8 day 8 of Abraxane/Gemcitabine regimen. Assessment was completed, no acute issues at this time, no new complaints voiced. Patient continues to experience numbness and tingling in bilateral hands and feet. She is managing her chronic back pain. Port accessed without difficulty, labs drawn and in process. Chemotherapy Flowsheet 10/5/2021   Cycle C8D8   Date 10/5/2021   Drug / Regimen Abraxane/Gemcitabine   Dosage 184 mg/1,656 mg   Time Up 1104   Time Down 1226   Pre Hydration -   Pre Meds Zofran 8 mg/Decadron 10 mg   Notes -       Ms. Ba's vitals were reviewed. Visit Vitals  BP (!) 161/67 (BP 1 Location: Left upper arm, BP Patient Position: Sitting)   Pulse 89   Temp 98.7 °F (37.1 °C)   Resp 18   Ht 5' 4\" (1.626 m)   Wt 79.8 kg (175 lb 14.8 oz)   SpO2 99%   Breastfeeding No   BMI 30.20 kg/m²       Lab results were obtained and reviewed. Recent Results (from the past 12 hour(s))   CBC WITH AUTOMATED DIFF    Collection Time: 10/05/21  9:00 AM   Result Value Ref Range    WBC 4.8 3.6 - 11.0 K/uL    RBC 3.28 (L) 3.80 - 5.20 M/uL    HGB 10.1 (L) 11.5 - 16.0 g/dL    HCT 31.0 (L) 35.0 - 47.0 %    MCV 94.5 80.0 - 99.0 FL    MCH 30.8 26.0 - 34.0 PG    MCHC 32.6 30.0 - 36.5 g/dL    RDW 17.4 (H) 11.5 - 14.5 %    PLATELET 244 (H) 524 - 400 K/uL    MPV 10.2 8.9 - 12.9 FL    NRBC 1.3 (H) 0  WBC    ABSOLUTE NRBC 0.06 (H) 0.00 - 0.01 K/uL    NEUTROPHILS 39 32 - 75 %    LYMPHOCYTES 35 12 - 49 %    MONOCYTES 23 (H) 5 - 13 %    EOSINOPHILS 0 0 - 7 %    BASOPHILS 0 0 - 1 %    METAMYELOCYTES 3 %    IMMATURE GRANULOCYTES 0 0.0 - 0.5 %    ABS. NEUTROPHILS 1.9 1.8 - 8.0 K/UL    ABS. LYMPHOCYTES 1.7 0.8 - 3.5 K/UL    ABS. MONOCYTES 1.1 (H) 0.0 - 1.0 K/UL    ABS. EOSINOPHILS 0.0 0.0 - 0.4 K/UL    ABS. BASOPHILS 0.0 0.0 - 0.1 K/UL    ABS. IMM.  GRANS. 0.0 0.00 - 0.04 K/UL    DF MANUAL      RBC COMMENTS 1NRBC SEEN  ANISOCYTOSIS  1+       RBC COMMENTS SCHISTOCYTES  2+        RBC COMMENTS OVALOCYTES  1+       METABOLIC PANEL, COMPREHENSIVE    Collection Time: 10/05/21  9:00 AM   Result Value Ref Range    Sodium 139 136 - 145 mmol/L    Potassium 3.9 3.5 - 5.1 mmol/L    Chloride 104 97 - 108 mmol/L    CO2 29 21 - 32 mmol/L    Anion gap 6 5 - 15 mmol/L    Glucose 179 (H) 65 - 100 mg/dL    BUN 11 6 - 20 MG/DL    Creatinine 0.74 0.55 - 1.02 MG/DL    BUN/Creatinine ratio 15 12 - 20      GFR est AA >60 >60 ml/min/1.73m2    GFR est non-AA >60 >60 ml/min/1.73m2    Calcium 8.8 8.5 - 10.1 MG/DL    Bilirubin, total 0.2 0.2 - 1.0 MG/DL    ALT (SGPT) 26 12 - 78 U/L    AST (SGOT) 21 15 - 37 U/L    Alk. phosphatase 99 45 - 117 U/L    Protein, total 6.5 6.4 - 8.2 g/dL    Albumin 3.2 (L) 3.5 - 5.0 g/dL    Globulin 3.3 2.0 - 4.0 g/dL    A-G Ratio 1.0 (L) 1.1 - 2.2         Pre-medications  were administered as ordered and chemotherapy was initiated. 10:12 - 12:26 NS mainline  10:50  Zofran 8 mg IVP  10:53  Decadron 10 mg IVP  11:04 - 11:50 Abraxane 184 mg infusion  11:52 - 12:26 Gemcitabine 1,656 mg infusion        Ms. Ba tolerated treatment well and was discharged from [place] [address] in stable condition at 12:40. She is to return on October 12 at 08:30 for her next appointment.     Gordon Bolton RN  October 5, 2021

## 2021-10-05 NOTE — PROGRESS NOTES
Problem: Chemotherapy Treatment  Goal: *Chemotherapy regimen followed  Outcome: Resolved/Met  Goal: *Hemodynamically stable  Outcome: Resolved/Met  Goal: *Tolerating diet  Outcome: Resolved/Met     Problem: Neutropenia  Goal: *Verbalizes and demonstrates neutropenic precautions  Outcome: Resolved/Met  Goal: *Verbalizes understanding and describes prescribed diet  Outcome: Resolved/Met     Problem: Infection - Risk of, Central Venous Catheter-Associated Bloodstream Infection  Goal: *Absence of infection signs and symptoms  Outcome: Resolved/Met     Problem: Patient Education:  Go to Education Activity  Goal: Patient/Family Education  Outcome: Resolved/Met

## 2021-10-12 ENCOUNTER — HOSPITAL ENCOUNTER (OUTPATIENT)
Dept: INFUSION THERAPY | Age: 66
Discharge: HOME | End: 2021-10-12
Payer: MEDICARE

## 2021-10-12 VITALS
DIASTOLIC BLOOD PRESSURE: 65 MMHG | BODY MASS INDEX: 29.81 KG/M2 | WEIGHT: 174.6 LBS | RESPIRATION RATE: 18 BRPM | HEIGHT: 64 IN | SYSTOLIC BLOOD PRESSURE: 141 MMHG | TEMPERATURE: 97.3 F | OXYGEN SATURATION: 98 % | HEART RATE: 95 BPM

## 2021-10-12 DIAGNOSIS — C25.9 ADENOCARCINOMA OF PANCREAS (HCC): Primary | ICD-10-CM

## 2021-10-12 LAB
ALBUMIN SERPL-MCNC: 3.3 G/DL (ref 3.5–5)
ALBUMIN/GLOB SERPL: 1 {RATIO} (ref 1.1–2.2)
ALP SERPL-CCNC: 85 U/L (ref 45–117)
ALT SERPL-CCNC: 26 U/L (ref 12–78)
ANION GAP SERPL CALC-SCNC: 8 MMOL/L (ref 5–15)
APPEARANCE UR: CLEAR
AST SERPL-CCNC: 25 U/L (ref 15–37)
BACTERIA URNS QL MICRO: NEGATIVE /HPF
BASOPHILS # BLD: 0 K/UL (ref 0–0.1)
BASOPHILS NFR BLD: 1 % (ref 0–1)
BILIRUB SERPL-MCNC: 0.3 MG/DL (ref 0.2–1)
BILIRUB UR QL: NEGATIVE
BUN SERPL-MCNC: 10 MG/DL (ref 6–20)
BUN/CREAT SERPL: 14 (ref 12–20)
CALCIUM SERPL-MCNC: 9.1 MG/DL (ref 8.5–10.1)
CHLORIDE SERPL-SCNC: 103 MMOL/L (ref 97–108)
CO2 SERPL-SCNC: 29 MMOL/L (ref 21–32)
COLOR UR: ABNORMAL
CREAT SERPL-MCNC: 0.71 MG/DL (ref 0.55–1.02)
DIFFERENTIAL METHOD BLD: ABNORMAL
EOSINOPHIL # BLD: 0.1 K/UL (ref 0–0.4)
EOSINOPHIL NFR BLD: 2 % (ref 0–7)
EPITH CASTS URNS QL MICRO: ABNORMAL /LPF
ERYTHROCYTE [DISTWIDTH] IN BLOOD BY AUTOMATED COUNT: 16.7 % (ref 11.5–14.5)
GLOBULIN SER CALC-MCNC: 3.3 G/DL (ref 2–4)
GLUCOSE SERPL-MCNC: 218 MG/DL (ref 65–100)
GLUCOSE UR STRIP.AUTO-MCNC: 100 MG/DL
HCT VFR BLD AUTO: 32.3 % (ref 35–47)
HGB BLD-MCNC: 10.4 G/DL (ref 11.5–16)
HGB UR QL STRIP: NEGATIVE
IMM GRANULOCYTES # BLD AUTO: 0 K/UL (ref 0–0.04)
IMM GRANULOCYTES NFR BLD AUTO: 0 % (ref 0–0.5)
KETONES UR QL STRIP.AUTO: ABNORMAL MG/DL
LEUKOCYTE ESTERASE UR QL STRIP.AUTO: NEGATIVE
LYMPHOCYTES # BLD: 0.9 K/UL (ref 0.8–3.5)
LYMPHOCYTES NFR BLD: 30 % (ref 12–49)
MCH RBC QN AUTO: 30.2 PG (ref 26–34)
MCHC RBC AUTO-ENTMCNC: 32.2 G/DL (ref 30–36.5)
MCV RBC AUTO: 93.9 FL (ref 80–99)
MONOCYTES # BLD: 0.6 K/UL (ref 0–1)
MONOCYTES NFR BLD: 19 % (ref 5–13)
NEUTS SEG # BLD: 1.5 K/UL (ref 1.8–8)
NEUTS SEG NFR BLD: 48 % (ref 32–75)
NITRITE UR QL STRIP.AUTO: NEGATIVE
NRBC # BLD: 0.03 K/UL (ref 0–0.01)
NRBC BLD-RTO: 1 PER 100 WBC
PH UR STRIP: 5.5 [PH] (ref 5–8)
PLATELET # BLD AUTO: 219 K/UL (ref 150–400)
PMV BLD AUTO: 11.3 FL (ref 8.9–12.9)
POTASSIUM SERPL-SCNC: 3.8 MMOL/L (ref 3.5–5.1)
PROT SERPL-MCNC: 6.6 G/DL (ref 6.4–8.2)
PROT UR STRIP-MCNC: NEGATIVE MG/DL
RBC # BLD AUTO: 3.44 M/UL (ref 3.8–5.2)
RBC #/AREA URNS HPF: ABNORMAL /HPF (ref 0–5)
SODIUM SERPL-SCNC: 140 MMOL/L (ref 136–145)
SP GR UR REFRACTOMETRY: >1.03 (ref 1–1.03)
UA: UC IF INDICATED,UAUC: ABNORMAL
UROBILINOGEN UR QL STRIP.AUTO: 0.2 EU/DL (ref 0.2–1)
WBC # BLD AUTO: 3.1 K/UL (ref 3.6–11)
WBC URNS QL MICRO: ABNORMAL /HPF (ref 0–4)

## 2021-10-12 PROCEDURE — 96361 HYDRATE IV INFUSION ADD-ON: CPT

## 2021-10-12 PROCEDURE — 96360 HYDRATION IV INFUSION INIT: CPT

## 2021-10-12 PROCEDURE — 80053 COMPREHEN METABOLIC PANEL: CPT

## 2021-10-12 PROCEDURE — 74011250636 HC RX REV CODE- 250/636: Performed by: INTERNAL MEDICINE

## 2021-10-12 PROCEDURE — 77030012965 HC NDL HUBR BBMI -A

## 2021-10-12 PROCEDURE — 36415 COLL VENOUS BLD VENIPUNCTURE: CPT

## 2021-10-12 PROCEDURE — 81001 URINALYSIS AUTO W/SCOPE: CPT

## 2021-10-12 PROCEDURE — 85025 COMPLETE CBC W/AUTO DIFF WBC: CPT

## 2021-10-12 RX ORDER — SODIUM CHLORIDE 9 MG/ML
1000 INJECTION, SOLUTION INTRAVENOUS ONCE
Status: COMPLETED | OUTPATIENT
Start: 2021-10-12 | End: 2021-10-12

## 2021-10-12 RX ORDER — SODIUM CHLORIDE 0.9 % (FLUSH) 0.9 %
5-10 SYRINGE (ML) INJECTION AS NEEDED
Status: DISCONTINUED | OUTPATIENT
Start: 2021-10-12 | End: 2021-10-13 | Stop reason: HOSPADM

## 2021-10-12 RX ORDER — HEPARIN 100 UNIT/ML
500 SYRINGE INTRAVENOUS AS NEEDED
Status: DISCONTINUED | OUTPATIENT
Start: 2021-10-12 | End: 2021-10-13 | Stop reason: HOSPADM

## 2021-10-12 RX ADMIN — Medication 500 UNITS: at 11:24

## 2021-10-12 RX ADMIN — Medication 10 ML: at 11:24

## 2021-10-12 RX ADMIN — SODIUM CHLORIDE 1000 ML: 9 INJECTION, SOLUTION INTRAVENOUS at 09:15

## 2021-10-12 NOTE — PROGRESS NOTES
[facility] Progress Note    Date: 2021    Name: Kisha Vickers    MRN: [id number]         : 1955    Ms. Ba Arrived ambulatory and in no distress for cycle 8 day 15 of Abraxane/Gemcitabine regimen. Assessment was completed. She complains of increased pain, waistline left/mid back and mid abdomen. States this pain became more acute Thursday 10/7. Is more sharp and more persistent than her usual pain. She is tearful at times when discussing it. Has continued with Dilaudid 1 mg twice a day and used lidocaine patches and Tylenol for breakthrough, although she reports taking one extra dose of dilaudid due to the severity of the pain. States that pain medication \"takes the edge off\" but doesn't relieve the pain. Bowel movements are normal, occasionally loose but is not eating well. Has not eaten this AM. Has had periodic episodes of nausea some reflux. States she feels her urine stream is slower than usual, denies dysuria. Port accessed without difficulty, no blood return, flushed easily, labs drawn peripherally and in process. U/A obtained. Chemotherapy Flowsheet 10/5/2021   Cycle C8D8   Date 10/5/2021   Drug / Regimen Abraxane/Gemcitabine   Dosage 184 mg/1,656 mg   Time Up 1104   Time Down 1226   Pre Hydration -   Pre Meds Zofran 8 mg/Decadron 10 mg   Notes -       Ms. Ba's vitals were reviewed. Visit Vitals  BP (!) 141/65 (BP 1 Location: Left upper arm, BP Patient Position: Semi fowlers)   Pulse 95   Temp 97.3 °F (36.3 °C)   Resp 18   Ht 5' 4\" (1.626 m)   Wt 79.2 kg (174 lb 9.6 oz)   SpO2 98%   BMI 29.97 kg/m²       Lab results were obtained and reviewed.   Recent Results (from the past 12 hour(s))   CBC WITH AUTOMATED DIFF    Collection Time: 10/12/21  8:50 AM   Result Value Ref Range    WBC 3.1 (L) 3.6 - 11.0 K/uL    RBC 3.44 (L) 3.80 - 5.20 M/uL    HGB 10.4 (L) 11.5 - 16.0 g/dL    HCT 32.3 (L) 35.0 - 47.0 %    MCV 93.9 80.0 - 99.0 FL    MCH 30.2 26.0 - 34.0 PG    MCHC 32.2 30.0 - 36.5 g/dL RDW 16.7 (H) 11.5 - 14.5 %    PLATELET 557 316 - 372 K/uL    MPV 11.3 8.9 - 12.9 FL    NRBC 1.0 (H) 0  WBC    ABSOLUTE NRBC 0.03 (H) 0.00 - 0.01 K/uL    NEUTROPHILS 48 32 - 75 %    LYMPHOCYTES 30 12 - 49 %    MONOCYTES 19 (H) 5 - 13 %    EOSINOPHILS 2 0 - 7 %    BASOPHILS 1 0 - 1 %    IMMATURE GRANULOCYTES 0 0.0 - 0.5 %    ABS. NEUTROPHILS 1.5 (L) 1.8 - 8.0 K/UL    ABS. LYMPHOCYTES 0.9 0.8 - 3.5 K/UL    ABS. MONOCYTES 0.6 0.0 - 1.0 K/UL    ABS. EOSINOPHILS 0.1 0.0 - 0.4 K/UL    ABS. BASOPHILS 0.0 0.0 - 0.1 K/UL    ABS. IMM. GRANS. 0.0 0.00 - 0.04 K/UL    DF AUTOMATED     METABOLIC PANEL, COMPREHENSIVE    Collection Time: 10/12/21  8:50 AM   Result Value Ref Range    Sodium 140 136 - 145 mmol/L    Potassium 3.8 3.5 - 5.1 mmol/L    Chloride 103 97 - 108 mmol/L    CO2 29 21 - 32 mmol/L    Anion gap 8 5 - 15 mmol/L    Glucose 218 (H) 65 - 100 mg/dL    BUN 10 6 - 20 MG/DL    Creatinine 0.71 0.55 - 1.02 MG/DL    BUN/Creatinine ratio 14 12 - 20      GFR est AA >60 >60 ml/min/1.73m2    GFR est non-AA >60 >60 ml/min/1.73m2    Calcium 9.1 8.5 - 10.1 MG/DL    Bilirubin, total 0.3 0.2 - 1.0 MG/DL    ALT (SGPT) 26 12 - 78 U/L    AST (SGOT) 25 15 - 37 U/L    Alk.  phosphatase 85 45 - 117 U/L    Protein, total 6.6 6.4 - 8.2 g/dL    Albumin 3.3 (L) 3.5 - 5.0 g/dL    Globulin 3.3 2.0 - 4.0 g/dL    A-G Ratio 1.0 (L) 1.1 - 2.2     URINALYSIS W/ REFLEX CULTURE    Collection Time: 10/12/21  9:00 AM    Specimen: Urine   Result Value Ref Range    Color YELLOW/STRAW      Appearance CLEAR CLEAR      Specific gravity >1.030 (H) 1.003 - 1.030    pH (UA) 5.5 5.0 - 8.0      Protein Negative NEG mg/dL    Glucose 100 (A) NEG mg/dL    Ketone TRACE (A) NEG mg/dL    Bilirubin Negative NEG      Blood Negative NEG      Urobilinogen 0.2 0.2 - 1.0 EU/dL    Nitrites Negative NEG      Leukocyte Esterase Negative NEG      WBC 0-4 0 - 4 /hpf    RBC 0-5 0 - 5 /hpf    Epithelial cells FEW FEW /lpf    Bacteria Negative NEG /hpf    UA:UC IF INDICATED CULTURE NOT INDICATED BY UA RESULT CNI       Patient's symptoms and lab work reviewed with Dr. Almas Emerson. Per Dr. Almas Emerson, Ms. Ba should increase the Dilaudid to every 3-4 hours as needed for pain. He states she may have gastritis but would like to see how she does with the increased dose and if no better in 2 days to let him know. He also instructed that she may have chemo today but if she wants to hold it that will be OK. Will hydrate today as well. Relayed this information to patient and she voices understanding. She plans to take Pepcid more regularly- states she has been taking it periodically. She would like to hold chemo today. She wants to wait to take Dilaudid after she gets home because she is driving. NS 1000 ml IV given over 2 hours. Port flushed and de-accessed post infusion. Ms. Linh Nuñez tolerated treatment well and was discharged from [place] [address] in stable condition at 1130. She is to return on October 26 at 8:30 for her next appointment. She appeared calm upon leaving, stated she felt better having a plan. Confirms that she will call by Thursday if not feeling better.     Onel Kline RN  October 12, 2021

## 2021-10-18 RX ORDER — EPINEPHRINE 1 MG/ML
0.3 INJECTION, SOLUTION, CONCENTRATE INTRAVENOUS AS NEEDED
Status: CANCELLED | OUTPATIENT
Start: 2021-10-26

## 2021-10-18 RX ORDER — SODIUM CHLORIDE 9 MG/ML
10 INJECTION INTRAMUSCULAR; INTRAVENOUS; SUBCUTANEOUS AS NEEDED
Status: CANCELLED | OUTPATIENT
Start: 2021-10-26

## 2021-10-18 RX ORDER — LORAZEPAM 2 MG/ML
0.5 INJECTION INTRAMUSCULAR
Status: CANCELLED
Start: 2021-10-26

## 2021-10-18 RX ORDER — DIPHENHYDRAMINE HYDROCHLORIDE 50 MG/ML
25 INJECTION, SOLUTION INTRAMUSCULAR; INTRAVENOUS
Status: CANCELLED
Start: 2021-10-26

## 2021-10-18 RX ORDER — ONDANSETRON 2 MG/ML
8 INJECTION INTRAMUSCULAR; INTRAVENOUS ONCE
Status: CANCELLED | OUTPATIENT
Start: 2021-10-26 | End: 2021-10-26

## 2021-10-18 RX ORDER — SODIUM CHLORIDE 0.9 % (FLUSH) 0.9 %
10 SYRINGE (ML) INJECTION AS NEEDED
Status: CANCELLED | OUTPATIENT
Start: 2021-10-26

## 2021-10-18 RX ORDER — HYDROCORTISONE SODIUM SUCCINATE 100 MG/2ML
100 INJECTION, POWDER, FOR SOLUTION INTRAMUSCULAR; INTRAVENOUS AS NEEDED
Status: CANCELLED | OUTPATIENT
Start: 2021-10-26

## 2021-10-18 RX ORDER — ONDANSETRON 2 MG/ML
8 INJECTION INTRAMUSCULAR; INTRAVENOUS AS NEEDED
Status: CANCELLED | OUTPATIENT
Start: 2021-11-23

## 2021-10-18 RX ORDER — DIPHENHYDRAMINE HYDROCHLORIDE 50 MG/ML
25 INJECTION, SOLUTION INTRAMUSCULAR; INTRAVENOUS AS NEEDED
Status: CANCELLED | OUTPATIENT
Start: 2021-10-26

## 2021-10-18 RX ORDER — ONDANSETRON 2 MG/ML
8 INJECTION INTRAMUSCULAR; INTRAVENOUS ONCE
Status: CANCELLED | OUTPATIENT
Start: 2021-11-23 | End: 2021-11-09

## 2021-10-18 RX ORDER — HYDROCORTISONE SODIUM SUCCINATE 100 MG/2ML
100 INJECTION, POWDER, FOR SOLUTION INTRAMUSCULAR; INTRAVENOUS AS NEEDED
Status: CANCELLED | OUTPATIENT
Start: 2021-11-16

## 2021-10-18 RX ORDER — SODIUM CHLORIDE 9 MG/ML
10 INJECTION INTRAMUSCULAR; INTRAVENOUS; SUBCUTANEOUS AS NEEDED
Status: CANCELLED | OUTPATIENT
Start: 2021-11-23

## 2021-10-18 RX ORDER — ALBUTEROL SULFATE 0.83 MG/ML
2.5 SOLUTION RESPIRATORY (INHALATION) AS NEEDED
Status: CANCELLED
Start: 2021-10-26

## 2021-10-18 RX ORDER — DIPHENHYDRAMINE HYDROCHLORIDE 50 MG/ML
50 INJECTION, SOLUTION INTRAMUSCULAR; INTRAVENOUS AS NEEDED
Status: CANCELLED
Start: 2021-10-26

## 2021-10-18 RX ORDER — EPINEPHRINE 1 MG/ML
0.3 INJECTION, SOLUTION, CONCENTRATE INTRAVENOUS AS NEEDED
Status: CANCELLED | OUTPATIENT
Start: 2021-11-23

## 2021-10-18 RX ORDER — SODIUM CHLORIDE 9 MG/ML
10 INJECTION INTRAMUSCULAR; INTRAVENOUS; SUBCUTANEOUS AS NEEDED
Status: CANCELLED | OUTPATIENT
Start: 2021-11-16

## 2021-10-18 RX ORDER — DIPHENHYDRAMINE HYDROCHLORIDE 50 MG/ML
25 INJECTION, SOLUTION INTRAMUSCULAR; INTRAVENOUS
Status: CANCELLED
Start: 2021-11-16

## 2021-10-18 RX ORDER — EPINEPHRINE 1 MG/ML
0.3 INJECTION, SOLUTION, CONCENTRATE INTRAVENOUS AS NEEDED
Status: CANCELLED | OUTPATIENT
Start: 2021-11-16

## 2021-10-18 RX ORDER — SODIUM CHLORIDE 9 MG/ML
25 INJECTION, SOLUTION INTRAVENOUS CONTINUOUS
Status: CANCELLED | OUTPATIENT
Start: 2021-11-23

## 2021-10-18 RX ORDER — ACETAMINOPHEN 325 MG/1
650 TABLET ORAL AS NEEDED
Status: CANCELLED | OUTPATIENT
Start: 2021-10-26

## 2021-10-18 RX ORDER — HEPARIN 100 UNIT/ML
300-500 SYRINGE INTRAVENOUS AS NEEDED
Status: CANCELLED | OUTPATIENT
Start: 2021-10-26

## 2021-10-18 RX ORDER — SODIUM CHLORIDE 9 MG/ML
25 INJECTION, SOLUTION INTRAVENOUS CONTINUOUS
Status: CANCELLED | OUTPATIENT
Start: 2021-11-16

## 2021-10-18 RX ORDER — DIPHENHYDRAMINE HYDROCHLORIDE 50 MG/ML
25 INJECTION, SOLUTION INTRAMUSCULAR; INTRAVENOUS AS NEEDED
Status: CANCELLED | OUTPATIENT
Start: 2021-11-16

## 2021-10-18 RX ORDER — DEXAMETHASONE SODIUM PHOSPHATE 4 MG/ML
10 INJECTION, SOLUTION INTRA-ARTICULAR; INTRALESIONAL; INTRAMUSCULAR; INTRAVENOUS; SOFT TISSUE ONCE
Status: CANCELLED | OUTPATIENT
Start: 2021-11-23 | End: 2021-11-09

## 2021-10-18 RX ORDER — DIPHENHYDRAMINE HYDROCHLORIDE 50 MG/ML
25 INJECTION, SOLUTION INTRAMUSCULAR; INTRAVENOUS AS NEEDED
Status: CANCELLED | OUTPATIENT
Start: 2021-11-23

## 2021-10-18 RX ORDER — ONDANSETRON 2 MG/ML
8 INJECTION INTRAMUSCULAR; INTRAVENOUS AS NEEDED
Status: CANCELLED | OUTPATIENT
Start: 2021-11-16

## 2021-10-18 RX ORDER — ONDANSETRON 2 MG/ML
8 INJECTION INTRAMUSCULAR; INTRAVENOUS ONCE
Status: CANCELLED | OUTPATIENT
Start: 2021-11-16 | End: 2021-11-02

## 2021-10-18 RX ORDER — HYDROCORTISONE SODIUM SUCCINATE 100 MG/2ML
100 INJECTION, POWDER, FOR SOLUTION INTRAMUSCULAR; INTRAVENOUS AS NEEDED
Status: CANCELLED | OUTPATIENT
Start: 2021-11-23

## 2021-10-18 RX ORDER — ACETAMINOPHEN 325 MG/1
650 TABLET ORAL AS NEEDED
Status: CANCELLED | OUTPATIENT
Start: 2021-11-16

## 2021-10-18 RX ORDER — DIPHENHYDRAMINE HYDROCHLORIDE 50 MG/ML
25 INJECTION, SOLUTION INTRAMUSCULAR; INTRAVENOUS
Status: CANCELLED
Start: 2021-11-23

## 2021-10-18 RX ORDER — ALBUTEROL SULFATE 0.83 MG/ML
2.5 SOLUTION RESPIRATORY (INHALATION) AS NEEDED
Status: CANCELLED
Start: 2021-11-16

## 2021-10-18 RX ORDER — ACETAMINOPHEN 325 MG/1
650 TABLET ORAL AS NEEDED
Status: CANCELLED | OUTPATIENT
Start: 2021-11-23

## 2021-10-18 RX ORDER — ALBUTEROL SULFATE 0.83 MG/ML
2.5 SOLUTION RESPIRATORY (INHALATION) AS NEEDED
Status: CANCELLED
Start: 2021-11-23

## 2021-10-18 RX ORDER — SODIUM CHLORIDE 9 MG/ML
25 INJECTION, SOLUTION INTRAVENOUS CONTINUOUS
Status: CANCELLED | OUTPATIENT
Start: 2021-10-26

## 2021-10-18 RX ORDER — LORAZEPAM 2 MG/ML
0.5 INJECTION INTRAMUSCULAR
Status: CANCELLED
Start: 2021-11-23

## 2021-10-18 RX ORDER — SODIUM CHLORIDE 0.9 % (FLUSH) 0.9 %
10 SYRINGE (ML) INJECTION AS NEEDED
Status: CANCELLED
Start: 2021-11-23

## 2021-10-18 RX ORDER — HEPARIN 100 UNIT/ML
300-500 SYRINGE INTRAVENOUS AS NEEDED
Status: CANCELLED | OUTPATIENT
Start: 2021-11-16

## 2021-10-18 RX ORDER — DIPHENHYDRAMINE HYDROCHLORIDE 50 MG/ML
50 INJECTION, SOLUTION INTRAMUSCULAR; INTRAVENOUS AS NEEDED
Status: CANCELLED
Start: 2021-11-16

## 2021-10-18 RX ORDER — SODIUM CHLORIDE 0.9 % (FLUSH) 0.9 %
10 SYRINGE (ML) INJECTION AS NEEDED
Status: CANCELLED | OUTPATIENT
Start: 2021-11-16

## 2021-10-18 RX ORDER — HEPARIN 100 UNIT/ML
300-500 SYRINGE INTRAVENOUS AS NEEDED
Status: CANCELLED | OUTPATIENT
Start: 2021-11-23

## 2021-10-18 RX ORDER — LORAZEPAM 2 MG/ML
0.5 INJECTION INTRAMUSCULAR
Status: CANCELLED
Start: 2021-11-16

## 2021-10-18 RX ORDER — DEXAMETHASONE SODIUM PHOSPHATE 4 MG/ML
10 INJECTION, SOLUTION INTRA-ARTICULAR; INTRALESIONAL; INTRAMUSCULAR; INTRAVENOUS; SOFT TISSUE ONCE
Status: CANCELLED | OUTPATIENT
Start: 2021-10-26 | End: 2021-10-26

## 2021-10-18 RX ORDER — DIPHENHYDRAMINE HYDROCHLORIDE 50 MG/ML
50 INJECTION, SOLUTION INTRAMUSCULAR; INTRAVENOUS AS NEEDED
Status: CANCELLED
Start: 2021-11-23

## 2021-10-18 RX ORDER — ONDANSETRON 2 MG/ML
8 INJECTION INTRAMUSCULAR; INTRAVENOUS AS NEEDED
Status: CANCELLED | OUTPATIENT
Start: 2021-10-26

## 2021-10-18 RX ORDER — DEXAMETHASONE SODIUM PHOSPHATE 4 MG/ML
10 INJECTION, SOLUTION INTRA-ARTICULAR; INTRALESIONAL; INTRAMUSCULAR; INTRAVENOUS; SOFT TISSUE ONCE
Status: CANCELLED | OUTPATIENT
Start: 2021-11-16 | End: 2021-11-02

## 2021-10-20 ENCOUNTER — TELEPHONE (OUTPATIENT)
Dept: PALLATIVE CARE | Age: 66
End: 2021-10-20

## 2021-10-20 NOTE — TELEPHONE ENCOUNTER
[address] [facility]  Palliative Medicine   [phone]    Outpatient Palliative Social Work Note    Content  [] Introduction to Palliative SW  [x] Counseling or Psychosocial Support  [x] Resource Referral   [] Advance Care Planning    Location  [] Clinic:   [] OPIC:   [] Virtual Visit  [x] Telephone Call    Pursuant to the emergency declaration under the [address] [place] [address] and the [employer] and [employer] Act, this virtual visit was conducted with clients consent, to reduce the risk of exposure to COVID-19 and provide necessary mental health intervention. Services were provided virtually through a video synchronous confidential discussion to substitute for in-person sessions. Narrative  SW reached out to patient following referral from palliative medicine physician Dr. Flavio Parks. Patient amenable to speaking with SW about whole-person wellbeing and options for support. Patient provided the story of her cancer, from diagnosis on April 30, 2020 with pancreatic cancer, through rounds of chemotherapy, radiation, surgery, clinical trials, up to present day palliative chemotherapy. She discussed her close relationship with her dear  who requires care for encephalopathy which impacts his frontal lobe functioning. She has recently purchased a house with enough space for 7 family members to be together towards the end of her life, understanding there is no perfect prognosis, and so far prognostic estimates have underestimated her lifespan. She endorses doing well with strong family support, yet also notes that she has been quite tearful and feeling low the past 2 weeks. She expressed her jennifer that \"God is going to take care of me regardless of what my physical body does\", that she is a spiritual person, and she will have a place to go when she dies.  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 She endorses feeling pain that seems to be different from her cancer pain, especially given results on scans in September. SW provided reflective listening, empathy, and validation. SW shared with patient the multifaceted presentation of grief and this SW's perspective that existential distress impacting the broader picture--though she has the many lines and shapes together, she does not yet have a cohesive painting. SW suggested whole-person holistic care resources to include End of Life  support as well as Mindfulness Based Stress Reduction. SW to email patient these resources directly. SW reinforced own role and availability for support. Plan  SW to follow up in 1 week. Patient to reach out as needed.        Preethi Torres, [facility]   Palliative Medicine   Supervisee in Social Work  [employer]® [address]  [phone]    Time in: 3:00pm  Time out: 3:45pm

## 2021-10-25 DIAGNOSIS — C77.2 PANCREATIC CANCER METASTASIZED TO INTRA-ABDOMINAL LYMPH NODE (HCC): Primary | ICD-10-CM

## 2021-10-25 DIAGNOSIS — C25.9 PANCREATIC CANCER METASTASIZED TO INTRA-ABDOMINAL LYMPH NODE (HCC): Primary | ICD-10-CM

## 2021-10-25 DIAGNOSIS — C25.9 ADENOCARCINOMA OF PANCREAS (HCC): Primary | ICD-10-CM

## 2021-10-26 ENCOUNTER — HOSPITAL ENCOUNTER (OUTPATIENT)
Dept: CT IMAGING | Age: 66
Discharge: HOME | End: 2021-10-26
Attending: INTERNAL MEDICINE
Payer: MEDICARE

## 2021-10-26 ENCOUNTER — HOSPITAL ENCOUNTER (OUTPATIENT)
Dept: INFUSION THERAPY | Age: 66
Discharge: HOME | End: 2021-10-26
Payer: MEDICARE

## 2021-10-26 VITALS
TEMPERATURE: 98.2 F | WEIGHT: 174.6 LBS | HEIGHT: 64 IN | HEART RATE: 107 BPM | RESPIRATION RATE: 16 BRPM | OXYGEN SATURATION: 98 % | BODY MASS INDEX: 29.81 KG/M2 | DIASTOLIC BLOOD PRESSURE: 88 MMHG | SYSTOLIC BLOOD PRESSURE: 172 MMHG

## 2021-10-26 DIAGNOSIS — C77.2 PANCREATIC CANCER METASTASIZED TO INTRA-ABDOMINAL LYMPH NODE (HCC): ICD-10-CM

## 2021-10-26 DIAGNOSIS — C25.9 PANCREATIC CANCER METASTASIZED TO INTRA-ABDOMINAL LYMPH NODE (HCC): ICD-10-CM

## 2021-10-26 DIAGNOSIS — C25.9 ADENOCARCINOMA OF PANCREAS (HCC): ICD-10-CM

## 2021-10-26 LAB
ALBUMIN SERPL-MCNC: 3.2 G/DL (ref 3.5–5)
ALBUMIN/GLOB SERPL: 0.9 {RATIO} (ref 1.1–2.2)
ALP SERPL-CCNC: 114 U/L (ref 45–117)
ALT SERPL-CCNC: 20 U/L (ref 12–78)
ANION GAP SERPL CALC-SCNC: 8 MMOL/L (ref 5–15)
APPEARANCE UR: ABNORMAL
AST SERPL-CCNC: 15 U/L (ref 15–37)
BACTERIA URNS QL MICRO: ABNORMAL /HPF
BASOPHILS # BLD: 0.1 K/UL (ref 0–0.1)
BASOPHILS NFR BLD: 1 % (ref 0–1)
BILIRUB DIRECT SERPL-MCNC: 0.1 MG/DL (ref 0–0.2)
BILIRUB SERPL-MCNC: 0.3 MG/DL (ref 0.2–1)
BILIRUB UR QL: NEGATIVE
BUN SERPL-MCNC: 11 MG/DL (ref 6–20)
BUN/CREAT SERPL: 15 (ref 12–20)
CALCIUM SERPL-MCNC: 8.8 MG/DL (ref 8.5–10.1)
CHLORIDE SERPL-SCNC: 101 MMOL/L (ref 97–108)
CO2 SERPL-SCNC: 29 MMOL/L (ref 21–32)
COLOR UR: ABNORMAL
CREAT SERPL-MCNC: 0.74 MG/DL (ref 0.55–1.02)
DIFFERENTIAL METHOD BLD: ABNORMAL
EOSINOPHIL # BLD: 0.4 K/UL (ref 0–0.4)
EOSINOPHIL NFR BLD: 3 % (ref 0–7)
EPITH CASTS URNS QL MICRO: ABNORMAL /LPF
ERYTHROCYTE [DISTWIDTH] IN BLOOD BY AUTOMATED COUNT: 15.8 % (ref 11.5–14.5)
GLOBULIN SER CALC-MCNC: 3.7 G/DL (ref 2–4)
GLUCOSE SERPL-MCNC: 267 MG/DL (ref 65–100)
GLUCOSE UR STRIP.AUTO-MCNC: 250 MG/DL
HCT VFR BLD AUTO: 34.9 % (ref 35–47)
HGB BLD-MCNC: 11.1 G/DL (ref 11.5–16)
HGB UR QL STRIP: ABNORMAL
IMM GRANULOCYTES # BLD AUTO: 0 K/UL (ref 0–0.04)
IMM GRANULOCYTES NFR BLD AUTO: 0 % (ref 0–0.5)
KETONES UR QL STRIP.AUTO: ABNORMAL MG/DL
LEUKOCYTE ESTERASE UR QL STRIP.AUTO: ABNORMAL
LYMPHOCYTES # BLD: 2.1 K/UL (ref 0.8–3.5)
LYMPHOCYTES NFR BLD: 17 % (ref 12–49)
MCH RBC QN AUTO: 29.8 PG (ref 26–34)
MCHC RBC AUTO-ENTMCNC: 31.8 G/DL (ref 30–36.5)
MCV RBC AUTO: 93.8 FL (ref 80–99)
MONOCYTES # BLD: 2.4 K/UL (ref 0–1)
MONOCYTES NFR BLD: 20 % (ref 5–13)
MUCOUS THREADS URNS QL MICRO: ABNORMAL /LPF
NEUTS SEG # BLD: 7.2 K/UL (ref 1.8–8)
NEUTS SEG NFR BLD: 59 % (ref 32–75)
NITRITE UR QL STRIP.AUTO: NEGATIVE
NRBC # BLD: 0 K/UL (ref 0–0.01)
NRBC BLD-RTO: 0 PER 100 WBC
PH UR STRIP: 6 [PH] (ref 5–8)
PLATELET # BLD AUTO: 553 K/UL (ref 150–400)
PMV BLD AUTO: 9.8 FL (ref 8.9–12.9)
POTASSIUM SERPL-SCNC: 3.7 MMOL/L (ref 3.5–5.1)
PROT SERPL-MCNC: 6.9 G/DL (ref 6.4–8.2)
PROT UR STRIP-MCNC: >300 MG/DL
RBC # BLD AUTO: 3.72 M/UL (ref 3.8–5.2)
RBC #/AREA URNS HPF: ABNORMAL /HPF (ref 0–5)
RBC MORPH BLD: ABNORMAL
RBC MORPH BLD: ABNORMAL
SODIUM SERPL-SCNC: 138 MMOL/L (ref 136–145)
SP GR UR REFRACTOMETRY: 1.02 (ref 1–1.03)
UROBILINOGEN UR QL STRIP.AUTO: 0.2 EU/DL (ref 0.2–1)
WBC # BLD AUTO: 12.2 K/UL (ref 3.6–11)
WBC URNS QL MICRO: >100 /HPF (ref 0–4)

## 2021-10-26 PROCEDURE — 87086 URINE CULTURE/COLONY COUNT: CPT

## 2021-10-26 PROCEDURE — 86301 IMMUNOASSAY TUMOR CA 19-9: CPT

## 2021-10-26 PROCEDURE — 87077 CULTURE AEROBIC IDENTIFY: CPT

## 2021-10-26 PROCEDURE — 85025 COMPLETE CBC W/AUTO DIFF WBC: CPT

## 2021-10-26 PROCEDURE — 80076 HEPATIC FUNCTION PANEL: CPT

## 2021-10-26 PROCEDURE — 77030012965 HC NDL HUBR BBMI -A

## 2021-10-26 PROCEDURE — 80048 BASIC METABOLIC PNL TOTAL CA: CPT

## 2021-10-26 PROCEDURE — 74011000636 HC RX REV CODE- 636: Performed by: INTERNAL MEDICINE

## 2021-10-26 PROCEDURE — 71260 CT THORAX DX C+: CPT

## 2021-10-26 PROCEDURE — 87186 SC STD MICRODIL/AGAR DIL: CPT

## 2021-10-26 PROCEDURE — 81001 URINALYSIS AUTO W/SCOPE: CPT

## 2021-10-26 PROCEDURE — 36415 COLL VENOUS BLD VENIPUNCTURE: CPT

## 2021-10-26 PROCEDURE — 36591 DRAW BLOOD OFF VENOUS DEVICE: CPT

## 2021-10-26 PROCEDURE — 74011250636 HC RX REV CODE- 250/636: Performed by: INTERNAL MEDICINE

## 2021-10-26 RX ORDER — HEPARIN 100 UNIT/ML
500 SYRINGE INTRAVENOUS AS NEEDED
Status: DISCONTINUED | OUTPATIENT
Start: 2021-10-26 | End: 2021-10-27 | Stop reason: HOSPADM

## 2021-10-26 RX ORDER — CIPROFLOXACIN 250 MG/1
250 TABLET, FILM COATED ORAL 2 TIMES DAILY
Qty: 14 TABLET | Refills: 1 | Status: SHIPPED | OUTPATIENT
Start: 2021-10-26 | End: 2021-10-28 | Stop reason: ALTCHOICE

## 2021-10-26 RX ORDER — SODIUM CHLORIDE 0.9 % (FLUSH) 0.9 %
5-10 SYRINGE (ML) INJECTION AS NEEDED
Status: DISCONTINUED | OUTPATIENT
Start: 2021-10-26 | End: 2021-10-27 | Stop reason: HOSPADM

## 2021-10-26 RX ADMIN — IOPAMIDOL 100 ML: 612 INJECTION, SOLUTION INTRAVENOUS at 10:21

## 2021-10-26 RX ADMIN — Medication 500 UNITS: at 12:01

## 2021-10-26 RX ADMIN — Medication 10 ML: at 12:00

## 2021-10-26 RX ADMIN — Medication 10 ML: at 08:55

## 2021-10-26 RX ADMIN — IOHEXOL 50 ML: 240 INJECTION, SOLUTION INTRATHECAL; INTRAVASCULAR; INTRAVENOUS; ORAL at 09:10

## 2021-10-26 NOTE — PROGRESS NOTES
Problem: Infection - Risk of, Central Venous Catheter-Associated Bloodstream Infection  Goal: *Absence of infection signs and symptoms  Outcome: Resolved/Met     Problem: Patient Education:  Go to Education Activity  Goal: Patient/Family Education  Outcome: Resolved/Met

## 2021-10-26 NOTE — PROGRESS NOTES
[facility] Progress Note    Date: 2021    Name: Tessie Kumari    MRN: [id number]         : 1955    Ms. Ba Arrived ambulatory and in no distress for cycle 9 day 1 of Abraxane/Gemcitabine regimen. Assessment was completed, She reports pain has been better managed, is taking Dilaudid 1 mg three times a day and Ativan twice a day. She continues to have persistent lower abdominal and lower back pain and some left flank discomfort. She continues to have slow stream of urine, complains of frequency and increasing cloudiness of urine with blood streaked mucous noted as well. States she had an episode yesterday of uncontrolled and unexpected incontinence in car with a large amount of urine. Port accessed without difficulty, labs drawn and in process. Orders have been placed by Dr. Rani Maravilla for lab work, UA and CT scan today. Per patient, the plan is to hold off on Chemotherapy today until results of CT and labs are reviewed. Urine specimen to lab- noted to be cloudy, lyndsay in color and has streaks of mucous noted. Also received oral contrast for CT which will be done around 1030. Chemotherapy Flowsheet 10/12/2021   Cycle C8 D15   Date 10/12/2021   Drug / Regimen Chemo held, will not make this dose up. Dosage -   Time Up -   Time Down -   Pre Hydration -   Pre Meds -   Notes -     Ms. Ba's vitals were reviewed. Visit Vitals  BP (!) 172/88 (BP 1 Location: Left upper arm, BP Patient Position: Sitting)   Pulse (!) 107   Temp 98.2 °F (36.8 °C)   Resp 16   Ht 5' 4\" (1.626 m)   Wt 79.2 kg (174 lb 9.6 oz)   SpO2 98%   BMI 29.97 kg/m²       Lab results were obtained and reviewed.   Recent Results (from the past 12 hour(s))   CBC WITH AUTOMATED DIFF    Collection Time: 10/26/21  8:55 AM   Result Value Ref Range    WBC 12.2 (H) 3.6 - 11.0 K/uL    RBC 3.72 (L) 3.80 - 5.20 M/uL    HGB 11.1 (L) 11.5 - 16.0 g/dL    HCT 34.9 (L) 35.0 - 47.0 %    MCV 93.8 80.0 - 99.0 FL    MCH 29.8 26.0 - 34.0 PG    MCHC 31.8 30.0 - 36.5 g/dL    RDW 15.8 (H) 11.5 - 14.5 %    PLATELET 719 (H) 191 - 400 K/uL    MPV 9.8 8.9 - 12.9 FL    NRBC 0.0 0  WBC    ABSOLUTE NRBC 0.00 0.00 - 0.01 K/uL    NEUTROPHILS 59 32 - 75 %    LYMPHOCYTES 17 12 - 49 %    MONOCYTES 20 (H) 5 - 13 %    EOSINOPHILS 3 0 - 7 %    BASOPHILS 1 0 - 1 %    IMMATURE GRANULOCYTES 0 0.0 - 0.5 %    ABS. NEUTROPHILS 7.2 1.8 - 8.0 K/UL    ABS. LYMPHOCYTES 2.1 0.8 - 3.5 K/UL    ABS. MONOCYTES 2.4 (H) 0.0 - 1.0 K/UL    ABS. EOSINOPHILS 0.4 0.0 - 0.4 K/UL    ABS. BASOPHILS 0.1 0.0 - 0.1 K/UL    ABS. IMM. GRANS. 0.0 0.00 - 0.04 K/UL    DF AUTOMATED      RBC COMMENTS ANISOCYTOSIS  2+        RBC COMMENTS POIKILOCYTOSIS  1+       HEPATIC FUNCTION PANEL    Collection Time: 10/26/21  8:55 AM   Result Value Ref Range    Protein, total 6.9 6.4 - 8.2 g/dL    Albumin 3.2 (L) 3.5 - 5.0 g/dL    Globulin 3.7 2.0 - 4.0 g/dL    A-G Ratio 0.9 (L) 1.1 - 2.2      Bilirubin, total 0.3 0.2 - 1.0 MG/DL    Bilirubin, direct 0.1 0.0 - 0.2 MG/DL    Alk.  phosphatase 114 45 - 117 U/L    AST (SGOT) 15 15 - 37 U/L    ALT (SGPT) 20 12 - 78 U/L   METABOLIC PANEL, BASIC    Collection Time: 10/26/21  8:55 AM   Result Value Ref Range    Sodium 138 136 - 145 mmol/L    Potassium 3.7 3.5 - 5.1 mmol/L    Chloride 101 97 - 108 mmol/L    CO2 29 21 - 32 mmol/L    Anion gap 8 5 - 15 mmol/L    Glucose 267 (H) 65 - 100 mg/dL    BUN 11 6 - 20 MG/DL    Creatinine 0.74 0.55 - 1.02 MG/DL    BUN/Creatinine ratio 15 12 - 20      GFR est AA >60 >60 ml/min/1.73m2    GFR est non-AA >60 >60 ml/min/1.73m2    Calcium 8.8 8.5 - 10.1 MG/DL   URINALYSIS W/ RFLX MICROSCOPIC    Collection Time: 10/26/21  9:30 AM   Result Value Ref Range    Color YELLOW/STRAW      Appearance CLOUDY (A) CLEAR      Specific gravity 1.025 1.003 - 1.030      pH (UA) 6.0 5.0 - 8.0      Protein >300 (A) NEG mg/dL    Glucose 250 (A) NEG mg/dL    Ketone TRACE (A) NEG mg/dL    Bilirubin Negative NEG      Blood LARGE (A) NEG      Urobilinogen 0.2 0.2 - 1.0 EU/dL Nitrites Negative NEG      Leukocyte Esterase TRACE (A) NEG     URINE MICROSCOPIC ONLY    Collection Time: 10/26/21  9:30 AM   Result Value Ref Range    WBC >100 (H) 0 - 4 /hpf    RBC 10-20 0 - 5 /hpf    Epithelial cells FEW FEW /lpf    Bacteria 1+ (A) NEG /hpf    Mucus 1+ /lpf     CT completed, labs completed and reviewed by Dr. Arthur Vazquez. Per Dr. Arthur Vazquez, he will send in script for Cipro to patient's pharmacy. Will hold chemo today. He plans to review the CA-19-9 and see patient on 10/27/21 for evaluation. Ms. Lebron Bear agrees with this plan. Port flushed and de-accessed and site intact. Ms. Lebron Bear  was discharged from [place] [address] in stable condition at 1205. She is to return on November 2 at 8:30 for her next appointment. She is aware to  her prescription from pharmacy.     Dallas Shay RN  October 26, 2021

## 2021-10-27 ENCOUNTER — HOSPITAL ENCOUNTER (OUTPATIENT)
Dept: INFUSION THERAPY | Age: 66
Discharge: HOME | End: 2021-10-27

## 2021-10-27 ENCOUNTER — OFFICE VISIT (OUTPATIENT)
Dept: ONCOLOGY | Age: 66
End: 2021-10-27
Payer: MEDICARE

## 2021-10-27 VITALS
BODY MASS INDEX: 29.71 KG/M2 | DIASTOLIC BLOOD PRESSURE: 63 MMHG | RESPIRATION RATE: 16 BRPM | TEMPERATURE: 98.3 F | WEIGHT: 174 LBS | HEART RATE: 95 BPM | OXYGEN SATURATION: 95 % | HEIGHT: 64 IN | SYSTOLIC BLOOD PRESSURE: 120 MMHG

## 2021-10-27 DIAGNOSIS — G89.3 NEOPLASM RELATED PAIN: Primary | ICD-10-CM

## 2021-10-27 LAB — CANCER AG19-9 SERPL-ACNC: 39 U/ML (ref 0–35)

## 2021-10-27 PROCEDURE — G8510 SCR DEP NEG, NO PLAN REQD: HCPCS | Performed by: INTERNAL MEDICINE

## 2021-10-27 PROCEDURE — 1090F PRES/ABSN URINE INCON ASSESS: CPT | Performed by: INTERNAL MEDICINE

## 2021-10-27 PROCEDURE — G8417 CALC BMI ABV UP PARAM F/U: HCPCS | Performed by: INTERNAL MEDICINE

## 2021-10-27 PROCEDURE — G8399 PT W/DXA RESULTS DOCUMENT: HCPCS | Performed by: INTERNAL MEDICINE

## 2021-10-27 PROCEDURE — 3288F FALL RISK ASSESSMENT DOCD: CPT | Performed by: INTERNAL MEDICINE

## 2021-10-27 PROCEDURE — G8752 SYS BP LESS 140: HCPCS | Performed by: INTERNAL MEDICINE

## 2021-10-27 PROCEDURE — G8754 DIAS BP LESS 90: HCPCS | Performed by: INTERNAL MEDICINE

## 2021-10-27 PROCEDURE — 99214 OFFICE O/P EST MOD 30 MIN: CPT | Performed by: INTERNAL MEDICINE

## 2021-10-27 PROCEDURE — 3017F COLORECTAL CA SCREEN DOC REV: CPT | Performed by: INTERNAL MEDICINE

## 2021-10-27 PROCEDURE — G8536 NO DOC ELDER MAL SCRN: HCPCS | Performed by: INTERNAL MEDICINE

## 2021-10-27 PROCEDURE — 1100F PTFALLS ASSESS-DOCD GE2>/YR: CPT | Performed by: INTERNAL MEDICINE

## 2021-10-27 PROCEDURE — G8427 DOCREV CUR MEDS BY ELIG CLIN: HCPCS | Performed by: INTERNAL MEDICINE

## 2021-10-27 RX ORDER — ONDANSETRON 2 MG/ML
8 INJECTION INTRAMUSCULAR; INTRAVENOUS AS NEEDED
Status: CANCELLED | OUTPATIENT
Start: 2021-11-02

## 2021-10-27 RX ORDER — DEXAMETHASONE SODIUM PHOSPHATE 4 MG/ML
10 INJECTION, SOLUTION INTRA-ARTICULAR; INTRALESIONAL; INTRAMUSCULAR; INTRAVENOUS; SOFT TISSUE ONCE
Status: CANCELLED | OUTPATIENT
Start: 2021-11-02 | End: 2021-11-02

## 2021-10-27 RX ORDER — EPINEPHRINE 1 MG/ML
0.3 INJECTION, SOLUTION, CONCENTRATE INTRAVENOUS AS NEEDED
Status: CANCELLED | OUTPATIENT
Start: 2021-11-02

## 2021-10-27 RX ORDER — ACETAMINOPHEN 325 MG/1
650 TABLET ORAL AS NEEDED
Status: CANCELLED | OUTPATIENT
Start: 2021-11-02

## 2021-10-27 RX ORDER — HEPARIN 100 UNIT/ML
300-500 SYRINGE INTRAVENOUS AS NEEDED
Status: CANCELLED | OUTPATIENT
Start: 2021-11-02

## 2021-10-27 RX ORDER — METRONIDAZOLE 500 MG/1
500 TABLET ORAL 3 TIMES DAILY
Qty: 30 TABLET | Refills: 1 | Status: SHIPPED | OUTPATIENT
Start: 2021-10-27 | End: 2021-10-28 | Stop reason: ALTCHOICE

## 2021-10-27 RX ORDER — DIPHENHYDRAMINE HYDROCHLORIDE 50 MG/ML
50 INJECTION, SOLUTION INTRAMUSCULAR; INTRAVENOUS AS NEEDED
Status: CANCELLED
Start: 2021-11-02

## 2021-10-27 RX ORDER — SODIUM CHLORIDE 0.9 % (FLUSH) 0.9 %
10 SYRINGE (ML) INJECTION AS NEEDED
Status: CANCELLED | OUTPATIENT
Start: 2021-11-02

## 2021-10-27 RX ORDER — LORAZEPAM 2 MG/ML
0.5 INJECTION INTRAMUSCULAR
Status: CANCELLED
Start: 2021-11-02

## 2021-10-27 RX ORDER — HYDROCORTISONE SODIUM SUCCINATE 100 MG/2ML
100 INJECTION, POWDER, FOR SOLUTION INTRAMUSCULAR; INTRAVENOUS AS NEEDED
Status: CANCELLED | OUTPATIENT
Start: 2021-11-02

## 2021-10-27 RX ORDER — SODIUM CHLORIDE 9 MG/ML
10 INJECTION INTRAMUSCULAR; INTRAVENOUS; SUBCUTANEOUS AS NEEDED
Status: CANCELLED | OUTPATIENT
Start: 2021-11-02

## 2021-10-27 RX ORDER — ALBUTEROL SULFATE 0.83 MG/ML
2.5 SOLUTION RESPIRATORY (INHALATION) AS NEEDED
Status: CANCELLED
Start: 2021-11-02

## 2021-10-27 RX ORDER — DIPHENHYDRAMINE HYDROCHLORIDE 50 MG/ML
25 INJECTION, SOLUTION INTRAMUSCULAR; INTRAVENOUS AS NEEDED
Status: CANCELLED | OUTPATIENT
Start: 2021-11-02

## 2021-10-27 RX ORDER — SODIUM CHLORIDE 9 MG/ML
25 INJECTION, SOLUTION INTRAVENOUS CONTINUOUS
Status: CANCELLED | OUTPATIENT
Start: 2021-11-02

## 2021-10-27 RX ORDER — DIPHENHYDRAMINE HYDROCHLORIDE 50 MG/ML
25 INJECTION, SOLUTION INTRAMUSCULAR; INTRAVENOUS
Status: CANCELLED
Start: 2021-11-02

## 2021-10-27 RX ORDER — HYDROMORPHONE HYDROCHLORIDE 2 MG/1
2 TABLET ORAL
Qty: 90 TABLET | Refills: 0 | Status: SHIPPED | OUTPATIENT
Start: 2021-10-27 | End: 2021-11-11

## 2021-10-27 RX ORDER — ONDANSETRON 2 MG/ML
8 INJECTION INTRAMUSCULAR; INTRAVENOUS ONCE
Status: CANCELLED | OUTPATIENT
Start: 2021-11-02 | End: 2021-11-02

## 2021-10-27 NOTE — PROGRESS NOTES
Bernabe Castañeda is a 77 y.o. female here for follow up of metastatic pancreatic cancer. Patient states over the past 2 weeks she has been crying a lot, she has been in a lot of back pain, (this has resolved), Patient states she just felt bad at last visit and opted to not take her chemo at that time, she still felt bad for 2 weeks after, she was having difficulty urinating. She came into [facility] yesterday she had testing done. This past Monday she started urinating mucus and was still having difficulty, she was in her car and had the urge to urinate, she had bout of incontinance. Now she is urinating large \"puss balls\". She started CIPRO and pyridium yesterday. Key Oncology Meds             ondansetron (ZOFRAN ODT) 4 mg disintegrating tablet Take 1 Tab by mouth every eight (8) hours as needed for Nausea or Vomiting. prochlorperazine (COMPAZINE) 10 mg tablet Take 0.5 Tabs by mouth every six (6) hours as needed for Nausea. Key Pain Meds             acetaminophen (Acetaminophen Extra Strength) 500 mg tablet Take 1,000 mg by mouth daily as needed.         Chemotherapy Flowsheet 10/26/2021   Cycle C9 D1   Date 10/26/2021   Drug / Regimen HELD   Dosage -   Time Up -   Time Down -   Pre Hydration -   Pre Meds -   Notes -

## 2021-10-27 NOTE — PROGRESS NOTES
Reason for Visit:   Erica Munoz a 89 H. o. female who is seen for pancreatic adenocarcinoma     Treatment History:   Dx: Pancreatic Adenocarcinoma--May 2020--[id number]--poklziovdhq of splenic vein and superior mesenteric vein  Tx: mFOLFIRINOX--first cycle 5/26/2020, second cycle 6/10/2020, dose reduced 15% cycle 3 on 6/30/2020--delayed due to severe side effects--switched to Gemcitabine/Abraxane--Cycle #1 7/29/2002, Cycle #2 9/2/2020, Cycle #3 10/7/2020, Cycle #4 11/4/2020. Neoadjuvant Radiation with Dr Prabha Diaz ending 12/18/2021. Distal Pancreatectomy, Splenectomy, and Civa Sheet with Dr Danny Aguilar on 3/17/2021. Adjuvant radiation with Dr Prabha Diaz. Restart Gemcitabine/abraxane Cycle #1 7/6/2021, Cycle #2 8/3/2021, Cycle #3 8/31/2021, Cycle #4 9/28/2021  Goal: Prolong life--Palliative    History of Present Illness:   Problems with flank and lower abd pain. Had UTI seen yesterday. CT Scan abd did not show etiology but did show inflammation around the painful area. It did mention peritoneal disease. There was no mention of new disease/abscess/kidney stone. I started Cipro yesterday--only had two pills. Having mucous/puss from urine. Faint blood at times. Does not think this is fistula. Also notes massive urinary incontinence x 1. Has been urinating fairly normally since.       Past Medical History:   Diagnosis Date    Adenocarcinoma of pancreas ([facility] [address] 05/13/2020    Dr Keli Garduno biopsy via EUS    Diabetes ([facility] [address]     GERD (gastroesophageal reflux disease)     Hernia of abdominal cavity     Subxyphoid hernia    Hypertension     Inflammatory polyarthropathy (HCC)     Joint pain     Joint swelling     Menopause     Ocular nevus of left eye     Pancreatitis     Tracheal stenosis       Past Surgical History:   Procedure Laterality Date    HX CARPAL TUNNEL RELEASE Bilateral     HX COLONOSCOPY      HX HYSTERECTOMY      HX KNEE ARTHROSCOPY Right     HX KNEE REPLACEMENT Right     partial    HX LAP CHOLECYSTECTOMY      HX TONSILLECTOMY      HX VASCULAR ACCESS        Social History     Tobacco Use    Smoking status: Never Smoker    Smokeless tobacco: Never Used   Substance Use Topics    Alcohol use: No     Alcohol/week: 0.0 standard drinks      Family History   Problem Relation Age of Onset    Heart Disease Mother     Heart Attack Mother     Cancer Father         lung    Diabetes Sister      Current Outpatient Medications   Medication Sig    ciprofloxacin HCl (CIPRO) 250 mg tablet Take 1 Tablet by mouth two (2) times a day.  lipase-protease-amylase (Creon) 12,000-38,000 -60,000 unit capsule Take 2 Capsules by mouth three (3) times daily (with meals). 2 caps with meal and 1 with snacks  Indications: exocrine pancreatic insufficiency    lipase-protease-amylase (Creon) 12,000-38,000 -60,000 unit capsule Take 3 Capsules by mouth three (3) times daily (with meals). Indications: exocrine pancreatic insufficiency (Patient not taking: Reported on 9/29/2021)    methylphenidate HCl (Ritalin) 5 mg tablet Take 1 Tablet by mouth three (3) times daily. Max Daily Amount: 15 mg.    DULoxetine (CYMBALTA) 30 mg capsule Take 1 Capsule by mouth two (2) times a day. Indications: neuropathic pain    lidocaine-prilocaine (EMLA) topical cream Apply  to affected area as needed for Pain (pain ). Apply a thin layer over port site prior to accessing port    nystatin (MYCOSTATIN) powder Apply 1 g to affected area three (3) times daily.  gabapentin (NEURONTIN) 300 mg capsule Take 2 Caps by mouth three (3) times daily. Max Daily Amount: 1,800 mg. Indications: neuropathic pain    levoFLOXacin (Levaquin) 500 mg tablet Take 1 Tablet by mouth daily. (Patient not taking: Reported on 9/23/2021)    HumaLOG KwikPen Insulin 100 unit/mL kwikpen patient on sliding scale    LORazepam (ATIVAN) 1 mg tablet Take 1 Tablet by mouth every six (6) hours as needed for Anxiety. Max Daily Amount: 4 mg.  Indications: nausea and vomiting caused by cancer drugs (Patient taking differently: Take 1 mg by mouth two (2) times a day. Indications: nausea and vomiting caused by cancer drugs)    multivit-min/folic [id number]/ZBG697 (ALIVE WOMEN'S GUMMY VITAMIN PO) Take 2 Each by mouth daily.  Blood-Glucose Transmitter (Dexcom G6 Transmitter) fadi Use as directed to monitor blood glucose as directed (Patient not taking: Reported on 9/29/2021)    Blood-Glucose Sensor (Dexcom G6 Sensor) fadi Use as directed to monitor blood glucose as directed (Patient not taking: Reported on 9/29/2021)    Blood-Glucose Meter,Continuous (Dexcom G6 ) misc Use as directed to monitor blood glucose as directed (Patient not taking: Reported on 9/29/2021)    potassium chloride (K-DUR, KLOR-CON) 20 mEq tablet Take 1 Tab by mouth two (2) times a day. (Patient not taking: Reported on 9/23/2021)    insulin glargine U-300 conc (Toujeo SoloStar U-300 Insulin) 300 unit/mL (1.5 mL) inpn pen INJECT 55 UNITS ONCE DAILY AT BEDTIME - DOSE INCREASED (Patient taking differently: 18-40 Units by SubCUTAneous route nightly. 18 units at night)    hydrocortisone (ANUSOL-HC) 25 mg supp Insert 1 Suppository into rectum nightly.  famotidine (Pepcid) 20 mg tablet Take 20 mg by mouth two (2) times a day.  sennosides (Senna) 8.6 mg cap Take 1-2 Tablets by mouth two (2) times a day.  polyethylene glycol (Miralax) 17 gram/dose powder Take 17 g by mouth daily as needed.  carvediloL (COREG) 25 mg tablet TAKE 1 TABLET BY MOUTH TWICE DAILY (Patient not taking: Reported on 9/23/2021)    diphenhydrAMINE (BENADRYL) 25 mg tablet Take 25 mg by mouth nightly as needed for Itching or Skin Irritation.  promethazine (PHENERGAN) 25 mg tablet Take 1 Tab by mouth every six (6) hours as needed for Nausea.  ondansetron (ZOFRAN ODT) 4 mg disintegrating tablet Take 1 Tab by mouth every eight (8) hours as needed for Nausea or Vomiting.     diphenoxylate-atropine (LomotiL) 2.5-0.025 mg per tablet Take 2 Tabs by mouth four (4) times daily as needed for Diarrhea. Max Daily Amount: 8 Tabs.  prochlorperazine (COMPAZINE) 10 mg tablet Take 0.5 Tabs by mouth every six (6) hours as needed for Nausea.  acetaminophen (Acetaminophen Extra Strength) 500 mg tablet Take 1,000 mg by mouth daily as needed.  Needle, Disp, ndle 1 Each by Does Not Apply route two (2) times daily as needed for Other. (Patient not taking: Reported on 9/29/2021)     No current facility-administered medications for this visit. Facility-Administered Medications Ordered in Other Visits   Medication Dose Route Frequency    sodium chloride (NS) flush 5-10 mL  5-10 mL IntraVENous PRN    heparin (porcine) pf 500 Units  500 Units IntraVENous PRN      No Known Allergies     Review of Systems: A complete review of systems was obtained, negative except as described above. Physical Exam:     Visit Vitals  Pulse 95   Temp 98.3 °F (36.8 °C) (Oral)   Resp 16   Ht 5' 4\" (1.626 m)   Wt 174 lb (78.9 kg)   SpO2 95%   BMI 29.87 kg/m²     ECOG PS: 1  General: No distress  Eyes: PERRLA, anicteric sclerae  HENT: Atraumatic, OP clear  Neck: Supple  Lymphatic: No cervical, supraclavicular, or inguinal adenopathy  Respiratory: CTAB, normal respiratory effort  CV: Normal rate, regular rhythm, no murmurs, no peripheral edema  GI: Soft, nontender, nondistended, no masses, no hepatomegaly, no splenomegaly  MS: Normal gait and station. Digits without clubbing or cyanosis. Skin: No rashes, ecchymoses, or petechiae. Normal temperature, turgor, and texture. Psych: Alert, oriented, appropriate affect, normal judgment/insight    Results:     Lab Results   Component Value Date/Time    WBC 12.2 (H) 10/26/2021 08:55 AM    HGB 11.1 (L) 10/26/2021 08:55 AM    HCT 34.9 (L) 10/26/2021 08:55 AM    PLATELET 783 (H) 94/35/6115 08:55 AM    MCV 93.8 10/26/2021 08:55 AM    ABS.  NEUTROPHILS 7.2 10/26/2021 08:55 AM     Lab Results   Component Value Date/Time    Sodium 138 10/26/2021 08:55 AM    Potassium 3.7 10/26/2021 08:55 AM    Chloride 101 10/26/2021 08:55 AM    CO2 29 10/26/2021 08:55 AM    Glucose 267 (H) 10/26/2021 08:55 AM    BUN 11 10/26/2021 08:55 AM    Creatinine 0.74 10/26/2021 08:55 AM    GFR est AA >60 10/26/2021 08:55 AM    GFR est non-AA >60 10/26/2021 08:55 AM    Calcium 8.8 10/26/2021 08:55 AM    Glucose (POC) 192 (H) 07/15/2020 11:56 AM    Creatinine (POC) 0.7 02/06/2013 10:21 AM     Lab Results   Component Value Date/Time    Bilirubin, total 0.3 10/26/2021 08:55 AM    ALT (SGPT) 20 10/26/2021 08:55 AM    Alk. phosphatase 114 10/26/2021 08:55 AM    Protein, total 6.9 10/26/2021 08:55 AM    Albumin 3.2 (L) 10/26/2021 08:55 AM    Globulin 3.7 10/26/2021 08:55 AM       CT A/P 4/30/2020  IMPRESSION: Suspect neoplastic pancreatic mass versus atypical focal  pancreatitis with splenic and superior mesenteric vein occlusion. Consider  further evaluation with endoscopic ultrasound at which time biopsy could  potentially be performed.     CT Chest 5/21/2020  IMPRESSION:  1. No evidence of pulmonary metastatic disease. No evidence of mediastinal or  hilar lymphadenopathy. No pleural effusions identified. 2. No definite evidence of central pulmonary embolic disease. 3. Presence of a mass lesion involving the pancreatic head/body. 4. Evidence of fatty infiltration involving the liver. Presence of focal  low-density areas within the liver compatible with cysts. Surgical absence of  the gallbladder.     CT A/P 6/19/2020  IMPRESSION:  Pancreatitis with ascites favored. Discrete pancreatic mass is not identified. No biliary dilatation or definite arterial or venous thrombosis. No liver  metastases. Fat-containing ventral hernia  Nonobstructing left renal calculus  Retrolisthesis of L2 and L3.     CT C/A/P 10/12/2020  IMPRESSION:  1. Interval improvement in appearance of the pancreas (see discussion above). 2. Normal sized liver.  Stable appearance of the previously described areas of  decreased attenuation within liver. 3. Surgical absence of the gallbladder. 4. Evidence of splenomegaly. 5. Normal sized kidneys. Presence of small, nonobstructing calculi within the  left kidney. No evidence of hydronephrotic change. 6. Suboptimal distention of the urinary bladder. 7. Presence of a small, fat-containing ventral hernia in the mid abdominal  Region. CT C/A/P 9/17/2021  IMPRESSION  New peritoneal implants identified in the anterior pelvis  Interval splenectomy and repair of the ventral hernia  Interval thickening of the GE junction and circumferential thickening of the  pylorus. This may represent gastritis but correlation with endoscopy or upper GI  recommended     Path: 5/8/2020  CYTOLOGIC INTERPRETATION:   Pancreas, EUS-guided fine needle aspiration and cell blocks: Adenocarcinoma     Path: 3/17/2021  No residual malignancy in pancrease, 0/29 lymph nodes. 2-3mm focus metastatic adenocarcinoma within hernia sac     Assessment:   1) Pancreatic Adenocarcinoma--Unresectable  2) Neoplasm Pain/Neuropathic Pain  3) Fatigue  4) Nausea  5) DM  6) Nutrition  7) Diarrhea  8) Hypotension  9) Abd Pain  10) Left Hydronephrosis  11) UTI     Plan:   1) Completed neoadjuvant chemotherapy. Radiation with Dr Gino Shell. Definitive resection with Dr Gabriella Lama focus of adenocarcinoma in ventral hernia. Now peritoneal disease/mets. She is doing well with palliative chemotherapy with Gemcitaine/Abraxane. Markers are dropping--continue once uti is treated.    2) S/p celiac block--pain in abd controlled. Having increased neuropathy from chemotherapy-- increased Cymbalta to 30mg bid and this has helped. Does not want to increase further at this time--wants to see if more time will improve her response     3) Methylphenidate 5mg bid--has helped.       4) Resolved.    5) Defer to Dr Storm Galloway for further management.       6) On pancreatic enzymes--weight has stabilized.      7) Chemo related--hopefully with not recurr.  Taking senna/miralax to prevent constipation. 8) Resolved currently     9) Treating for possible intraabdominal infection with cipro/metronidazole. Scans show stable disease in abd.      10) Renal intact--had significant diuresis after passing stone. Likely resovled      11) Cipro--watch for the large volume incontinence again--this will need to be evaluated further if continues.     Signed By: Jewel Musa MD

## 2021-10-28 LAB
BACTERIA SPEC CULT: ABNORMAL
CC UR VC: ABNORMAL
SERVICE CMNT-IMP: ABNORMAL

## 2021-10-28 RX ORDER — SULFAMETHOXAZOLE AND TRIMETHOPRIM 800; 160 MG/1; MG/1
1 TABLET ORAL 2 TIMES DAILY
Qty: 20 TABLET | Refills: 0 | Status: SHIPPED | OUTPATIENT
Start: 2021-10-28 | End: 2021-11-16

## 2021-10-28 NOTE — PROGRESS NOTES
Hospital Medicine Daily Progress Note    Date of Service  10/28/2021    Chief Complaint  Luis Kellogg is a 70 y.o. male admitted 10/7/2021 with bilateral leg wounds    Hospital Course  This is a 70 year old male with PMHx of hyperlipidemia, type 2 diabetes with A1c of 9.1, obesity, CAD, hx MI,PAD, history of lower extremity osteomyelitis and recurrent LLE cellulitis who was admitted for sepsis secondary to BLE diabetic, nonhealing wounds and cellulitis.    Patient has known history of uncontrolled type 2 diabetes, he has had prior admissions for cellulitis and osteomyelitis.  Patient does have history of PAD.  Venous dopplers negative for DVT and arterial Dopplers ordered.  X-ray of the left tib-fib, no concern for osteomyelitis.  Wound care consulted. LPS consulted.    Wound cultures from March 20, 2021, noted E. Faecalis sensitive to ampicillin, Cipro, Levaquin and vancomycin.  Patient is currently on vancomycin only as per wound culture results.    S/p revascularization and debridement of lower extremity wounds  Wound vac in place  Wound culture: GBS and MRSA      Interval Problem Update  His leg pain is ok  I Discussed with Dr. Ardon and social work.  They will work on obtaining outpatient wound care supplies so patient can do wound care management at home.  Per social work it may take several days to get wound care supplies, in the interim wound VAC will be placed while patient is still inpatient      I have personally seen and examined the patient at bedside. I discussed the plan of care with patient, bedside RN, charge RN, , pharmacy and wound care.    POC discussed with Wound Care Dr. Ardon. He will reassess tomorrow and determine if wound vac can come off or if additional surgical debridement is required.    Consultants/Specialty  vascular surgery and LPS, wound care     Code Status  Full Code    Disposition  Patient is medically cleared pending wound care coordination.   Anticipate  Palliative Medicine Office Visit  Palliative Medicine Nurse Check In  [phone]    Patient Name: Malick Bella  YOB: 1955      Date of Office Visit: 7/29/2020    Patient states: \"  \"    From Check In Sheet (scanned in Media):  Has a medical provider talked with you about cardiopulmonary resuscitation (CPR)? [x] Yes   [] No   [] Unable to obtain    Nurse reminder to complete or update ACP FlowSheet:    Is ACP on the Problem List?    [x] Yes    [] No  IF ACP Document is ON FILE; Nurse to place ACP on Problem List     Is there an ACP Note in Chart Review/Note? [x] Yes    [] No   If NO: ALERT PROVIDER       Primary Decision MakerThepraneeth Patel Daughter - [phone]  Advance Care Planning 7/29/2020   Patient's Healthcare Decision Maker is: Named in scanned ACP document   Confirm Advance Directive Yes, on file   Patient Would Like to Complete Advance Directive -   Does the patient have other document types Power of          Is there anything that we should know about you as a person in order to provide you the best care possible? Have you been to the ER, urgent care clinic since your last visit? [x] Yes   [] No   [] Unable to obtain    Have you been hospitalized since your last visit? [x] Yes   [] No   [] Unable to obtain    Have you seen or consulted any other health care providers outside of the [address] [place] [address] since your last visit?    [] Yes   [x] No   [] Unable to obtain    Functional status (describe):         Last BM: 7/27/2020     accessed (date):  7/29/2020    Bottle review (for opioid pain medication):  Medication 1:   Date filled:   Directions:   # filled:   # left:   # pills taking per day:  Last dose taken:    Medication 2:   Date filled:   Directions:   # filled:   # left:   # pills taking per day:  Last dose taken:    Medication 3:   Date filled:   Directions:   # filled:   # left:   # pills taking per day:  Last dose taken:    Medication 4:   Date filled:   Directions:   # filled:   # left:   # pills taking per day:  Last dose taken: discharge to to home with close outpatient follow-up.- pt doesn't want SNF and no HH available in [place]  I have placed the appropriate orders for post-discharge needs.    Review of Systems  Review of Systems   Constitutional: Negative for chills and fever.   Respiratory: Negative for cough and shortness of breath.    Cardiovascular: Negative for chest pain and palpitations.   Gastrointestinal: Negative for abdominal pain, nausea and vomiting.   Genitourinary: Negative for dysuria and urgency.   Neurological: Negative for dizziness and headaches.   All other systems reviewed and are negative.    Physical Exam  Temp:  [35.9 °C (96.6 °F)-36.5 °C (97.7 °F)] 35.9 °C (96.6 °F)  Pulse:  [60-86] 81  Resp:  [16-18] 18  BP: (100-112)/(65-85) 108/72  SpO2:  [90 %-94 %] 93 %    Physical Exam  Vitals and nursing note reviewed.   Constitutional:       General: He is not in acute distress.     Appearance: He is obese.   Eyes:      Comments: Right eye exotropia   Cardiovascular:      Rate and Rhythm: Normal rate and regular rhythm.   Pulmonary:      Effort: Pulmonary effort is normal. No respiratory distress.      Breath sounds: Normal breath sounds.   Abdominal:      General: Abdomen is protuberant. Bowel sounds are normal. There is distension.   Genitourinary:     Comments: No nino  Musculoskeletal:         General: Deformity (Left toes 2-4 amputated) present.      Cervical back: Neck supple. No muscular tenderness.      Right lower leg: No edema.      Left lower leg: No edema.   Skin:     General: Skin is warm and dry.      Findings: Lesion (Multiple scattered plaques on BLE are nontender, nonerythematous) present.      Comments: Wound vac +   Neurological:      Mental Status: He is alert.      Comments: Appropriately conversant   Psychiatric:         Attention and Perception: Attention and perception normal.         Mood and Affect: Mood and affect normal.         Speech: Speech normal.         Behavior: Behavior is slowed.  Behavior is cooperative.         Thought Content: Thought content normal.         Cognition and Memory: Cognition and memory normal.         Judgment: Judgment normal.       Fluids    Intake/Output Summary (Last 24 hours) at 10/28/2021 1451  Last data filed at 10/28/2021 0610  Gross per 24 hour   Intake 1610 ml   Output --   Net 1610 ml       Laboratory  Recent Labs     10/28/21  0556   WBC 5.9   RBC 4.41*   HEMOGLOBIN 12.8*   HEMATOCRIT 39.3*   MCV 89.1   MCH 29.0   MCHC 32.6*   RDW 45.1   PLATELETCT 283   MPV 9.4     Recent Labs     10/26/21  0504 10/27/21  0404 10/28/21  0556   SODIUM 135 134* 135   POTASSIUM 4.3 4.1 3.8   CHLORIDE 104 100 100   CO2 22 24 25   GLUCOSE 132* 187* 142*   BUN 21 17 19   CREATININE 0.78 0.82 0.71   CALCIUM 9.8 9.7 10.0                   Imaging  CT-CTA AORTA-RO WITH & W/O-POST PROCESS   Final Result      1.  Occlusion of the distal right femoral artery with reconstitution distally.      2.  Calcified plaque in small diameter of the tibial and peroneal arteries bilaterally makes it difficult to evaluate for degree of stenosis and occlusion.      3.  Plaque within the left femoral artery without evidence of occlusion.      4.  Aortic atherosclerosis without aneurysm.      5.  Diverticulosis without evidence of diverticulitis.      6.  Hepatic steatosis.      DX-FOOT-COMPLETE 3+ LEFT   Final Result         1.  Disuse osteoporosis of the left foot.      2. Large subcutaneous ulcer on the dorsal surface of left foot.      3. Previous amputations of the left second through fourth toes.      DX-FOOT-COMPLETE 3+ RIGHT   Final Result      1.  No fracture or dislocation of RIGHT foot.   2.  Periarticular osteopenia suggests inflammatory arthropathy.   3.  No soft tissue gas or focal bony destruction however osteomyelitis is not excluded by plain film.   4.  Severe degenerative change of ankle joint.      DX-TIBIA AND FIBULA RIGHT   Final Result      No evidence of fracture or dislocation or  acute osteomyelitis.      US-EXTREMITY ARTERY LOWER BILAT   Final Result      US-MIA SINGLE LEVEL BILAT   Final Result      US-EXTREMITY VENOUS LOWER UNILAT LEFT   Final Result      DX-TIBIA AND FIBULA LEFT   Final Result         1.  No acute traumatic bony injury.   2.  Soft tissue erosion of the posterior mid calf, corresponding with history of soft tissue ulcers.      IR-EXTREMITY ANGIOGRAM-BILATERAL    (Results Pending)        Assessment/Plan  * Diabetic infection of left foot (HCC)- (present on admission)  Assessment & Plan  Patient has known history of uncontrolled type 2 diabetes, he has had prior admissions for cellulitis and osteomyelitis.  Patient does have history of PAD.  Venous dopplers negative for DVT   Arterial Dopplers - inflow arterial insufficiency in lower extremities bilaterally  X-ray of the left tib-fib, no concern for osteomyelitis.    Wound care consulted. LPS following.  S/p revasculazization, debridement on 10/12    Wound culture MRSA, GBS  Completed 14-day course of keflex and bactrim    Postprocedural hypotension  Assessment & Plan  Asymptomatic, after stenting  Lisinopril and lasix held  Carvedilol restarted at half-dose due to h/o CAD  No indication for strict BP control, PRN antihypertensives discontinued      Diabetic infection of right foot (HCC)- (present on admission)  Assessment & Plan  As per left lower extremity plan    Sepsis- (present on admission)  Assessment & Plan  This is Sepsis Present on admission  SIRS criteria identified on my evaluation include: Tachycardia, with heart rate greater than 90 BPM, Tachypnea, with respirations greater than 20 per minute and Bandemia, greater than 10% bands  Source is diabetic infected ulcers and cellulitis of Lower extremities  Sepsis protocol initiated  Fluid resuscitation ordered per protocol  IV antibiotics as appropriate for source of sepsis  While organ dysfunction may be noted elsewhere in this problem list or in the chart, degree  of organ dysfunction does not meet CMS criteria for severe sepsis    BCx NGTD  Wounds are superficial and intervened on for revascularization  Wound Cx +MRSA  Completed 14-day course of antibiotics for leg wounds        Drug-induced constipation- (present on admission)  Assessment & Plan  Due to opiates for pain  Scheduled daily dulcolax per patient preference  Bowel protocol    Hyponatremia- (present on admission)  Assessment & Plan  Mild  Repeat AM BMP    Uncontrolled type 2 diabetes mellitus (HCC)- (present on admission)  Assessment & Plan  A1c 9.1  POCT + SSI  Glargine held on admission, restart at half-dose for glucose monitoring prior to discharge  BG goal <200 for wound-healing    Coronary artery disease involving native coronary artery- (present on admission)  Assessment & Plan  Continue DAPT after stenting  Restarted atorvastatin (max dose as tolerated) and carvedilol (half-dose due to borderline BP)      Peripheral artery disease (HCC) - (present on admission)  Assessment & Plan  Patient does have history of PAD, previous MIA was performed in June 2021, showed mild to moderate disease. Repeat MIA noted significant worsening disease.    Vascular surgery consulted,s/p CTA today, plans for revascularization prior to surgical intervention for the wounds, of LLE 10/11/2021    S/p OR 10/12 for revascularization  Continue DAPT and high-intensity atorvastatin after stent, follow up with Vascular Surgery    Chronic low back pain- (present on admission)  Assessment & Plan  On percocet PTA  Continue oxycodone PRN for post-procedure and chronic pain    Peripheral neuropathy (HCC)- (present on admission)  Assessment & Plan  Due to diabetic neuropathy and vascular disease  Continue gabapentin    Hyperlipidemia- (present on admission)  Assessment & Plan  Maximal tolerated statin due to PAD / CAD       VTE prophylaxis: SCDs/TEDs and enoxaparin ppx    I have performed a physical exam and reviewed and updated ROS and Plan  today (10/28/2021). In review of yesterday's note (10/27/2021), there are no changes except as documented above.

## 2021-10-29 ENCOUNTER — HOSPITAL ENCOUNTER (OUTPATIENT)
Dept: INFUSION THERAPY | Age: 66
Discharge: HOME | End: 2021-10-29
Payer: MEDICARE

## 2021-10-29 VITALS
HEART RATE: 98 BPM | TEMPERATURE: 96.6 F | OXYGEN SATURATION: 92 % | BODY MASS INDEX: 29.71 KG/M2 | HEIGHT: 64 IN | SYSTOLIC BLOOD PRESSURE: 186 MMHG | RESPIRATION RATE: 19 BRPM | WEIGHT: 174 LBS | DIASTOLIC BLOOD PRESSURE: 85 MMHG

## 2021-10-29 LAB
ALBUMIN SERPL-MCNC: 3.2 G/DL (ref 3.5–5)
ALBUMIN/GLOB SERPL: 1 {RATIO} (ref 1.1–2.2)
ALP SERPL-CCNC: 132 U/L (ref 45–117)
ALT SERPL-CCNC: 22 U/L (ref 12–78)
ANION GAP SERPL CALC-SCNC: 7 MMOL/L (ref 5–15)
AST SERPL-CCNC: 17 U/L (ref 15–37)
BASOPHILS # BLD: 0.1 K/UL (ref 0–0.1)
BASOPHILS NFR BLD: 1 % (ref 0–1)
BILIRUB SERPL-MCNC: 0.2 MG/DL (ref 0.2–1)
BUN SERPL-MCNC: 13 MG/DL (ref 6–20)
BUN/CREAT SERPL: 15 (ref 12–20)
CALCIUM SERPL-MCNC: 8.6 MG/DL (ref 8.5–10.1)
CHLORIDE SERPL-SCNC: 99 MMOL/L (ref 97–108)
CO2 SERPL-SCNC: 29 MMOL/L (ref 21–32)
CREAT SERPL-MCNC: 0.87 MG/DL (ref 0.55–1.02)
DIFFERENTIAL METHOD BLD: ABNORMAL
EOSINOPHIL # BLD: 0.3 K/UL (ref 0–0.4)
EOSINOPHIL NFR BLD: 2 % (ref 0–7)
ERYTHROCYTE [DISTWIDTH] IN BLOOD BY AUTOMATED COUNT: 15.7 % (ref 11.5–14.5)
GLOBULIN SER CALC-MCNC: 3.1 G/DL (ref 2–4)
GLUCOSE SERPL-MCNC: 465 MG/DL (ref 65–100)
HCT VFR BLD AUTO: 34.1 % (ref 35–47)
HGB BLD-MCNC: 10.7 G/DL (ref 11.5–16)
IMM GRANULOCYTES # BLD AUTO: 0.1 K/UL (ref 0–0.04)
IMM GRANULOCYTES NFR BLD AUTO: 0 % (ref 0–0.5)
LACTATE SERPL-SCNC: 1.6 MMOL/L (ref 0.4–2)
LYMPHOCYTES # BLD: 2 K/UL (ref 0.8–3.5)
LYMPHOCYTES NFR BLD: 16 % (ref 12–49)
MCH RBC QN AUTO: 29.5 PG (ref 26–34)
MCHC RBC AUTO-ENTMCNC: 31.4 G/DL (ref 30–36.5)
MCV RBC AUTO: 93.9 FL (ref 80–99)
MONOCYTES # BLD: 1.9 K/UL (ref 0–1)
MONOCYTES NFR BLD: 15 % (ref 5–13)
NEUTS SEG # BLD: 8.1 K/UL (ref 1.8–8)
NEUTS SEG NFR BLD: 66 % (ref 32–75)
NRBC # BLD: 0 K/UL (ref 0–0.01)
NRBC BLD-RTO: 0 PER 100 WBC
PLATELET # BLD AUTO: 594 K/UL (ref 150–400)
PMV BLD AUTO: 10.4 FL (ref 8.9–12.9)
POTASSIUM SERPL-SCNC: 4.2 MMOL/L (ref 3.5–5.1)
PROT SERPL-MCNC: 6.3 G/DL (ref 6.4–8.2)
RBC # BLD AUTO: 3.63 M/UL (ref 3.8–5.2)
SODIUM SERPL-SCNC: 135 MMOL/L (ref 136–145)
WBC # BLD AUTO: 12.4 K/UL (ref 3.6–11)

## 2021-10-29 PROCEDURE — 77030012965 HC NDL HUBR BBMI -A

## 2021-10-29 PROCEDURE — 36591 DRAW BLOOD OFF VENOUS DEVICE: CPT

## 2021-10-29 PROCEDURE — 96361 HYDRATE IV INFUSION ADD-ON: CPT

## 2021-10-29 PROCEDURE — 83605 ASSAY OF LACTIC ACID: CPT

## 2021-10-29 PROCEDURE — 96365 THER/PROPH/DIAG IV INF INIT: CPT

## 2021-10-29 PROCEDURE — 36415 COLL VENOUS BLD VENIPUNCTURE: CPT

## 2021-10-29 PROCEDURE — 85025 COMPLETE CBC W/AUTO DIFF WBC: CPT

## 2021-10-29 PROCEDURE — 80053 COMPREHEN METABOLIC PANEL: CPT

## 2021-10-29 PROCEDURE — 74011250636 HC RX REV CODE- 250/636: Performed by: INTERNAL MEDICINE

## 2021-10-29 PROCEDURE — 74011000258 HC RX REV CODE- 258: Performed by: INTERNAL MEDICINE

## 2021-10-29 RX ORDER — SODIUM CHLORIDE 9 MG/ML
500 INJECTION, SOLUTION INTRAVENOUS ONCE
Status: COMPLETED | OUTPATIENT
Start: 2021-10-30 | End: 2021-10-30

## 2021-10-29 RX ORDER — HEPARIN 100 UNIT/ML
500 SYRINGE INTRAVENOUS AS NEEDED
Status: DISCONTINUED | OUTPATIENT
Start: 2021-10-29 | End: 2021-10-30 | Stop reason: HOSPADM

## 2021-10-29 RX ORDER — SODIUM CHLORIDE 9 MG/ML
500 INJECTION, SOLUTION INTRAVENOUS CONTINUOUS
Status: DISPENSED | OUTPATIENT
Start: 2021-10-29 | End: 2021-10-29

## 2021-10-29 RX ORDER — SODIUM CHLORIDE 0.9 % (FLUSH) 0.9 %
5-10 SYRINGE (ML) INJECTION EVERY 8 HOURS
Status: DISCONTINUED | OUTPATIENT
Start: 2021-10-29 | End: 2021-10-31 | Stop reason: HOSPADM

## 2021-10-29 RX ADMIN — CEFTRIAXONE SODIUM 1 G: 1 INJECTION, POWDER, FOR SOLUTION INTRAMUSCULAR; INTRAVENOUS at 09:51

## 2021-10-29 RX ADMIN — Medication 500 UNITS: at 10:54

## 2021-10-29 RX ADMIN — SODIUM CHLORIDE 500 ML/HR: 9 INJECTION, SOLUTION INTRAVENOUS at 08:50

## 2021-10-29 NOTE — DISCHARGE INSTRUCTIONS
URINARY TRACT INFECTION DISCHARGE INSTRUCTIONS    Name: Cristina Austin  YOB: 1955  Primary Diagnosis: Active Problems:    * No active hospital problems. *      Introduction: You came to the hospital because you had one or some of the following symptoms: pain, tenderness in the bladder area, fever, frequent or painful urination, burning on urination or uterine irritability. Based on your evaluation, you have been diagnosed with a bladder infection. Prescribed medication and instructions follow to treat the infection. You are now ready to be sent home. In general, you should remember:     Empty your bladder at least every 2-3 hours. An over distended bladder can lead to infection.  Wipe front to back after urination, avoid wiping back to front as you may transfer bacteria from the rectum to the urethra (opening to the bladder).  Drink 8-10 glasses of water daily.  Take medication as prescribed. Take all of the medicine even though you start to feel better.  Know that bladder infections are the most common cause of pre-term labor. General:     ***    Diet/Diet Restrictions:      {OB DIET [facility]:[id number]}    Physical Activity / Restrictions / Safety:     As tolerated. Other:        Discharge Instructions/ Special Treatment/ Home Care Needs:     Call your provider if:   Your symptoms dont improve 24-36 hours after starting medication.  Pelvic pressure continues after 24-36 hours of treatment with medication.  You develop low backache along with pelvic pressure.  You develop cramps or contractions-more frequent than 8 in one hour or four in twenty minutes.  Your baby is not moving as much as usual-at least 4 fetal movements in 1 hour after drinking and resting on your side for one hour.    Other:          labor instructions:    Uterine cramping (menstrual-like cramps, intermittent or constant   Uterine contractions every 10-15 minutes or more frequently   Low abdominal pressure ( pelvic pressure)   Dull low backache (intermittent or constant)   Increase or change in vaginal discharge   Feeling that the baby is \"pushing down\"   Abdominal cramping with or without diarrhea  If any of these symptoms are experienced, stop what you are doing, lie down on your side, drink two to three glasses of water and wait one hour. If the symptoms persist or get worse, call your provider. Pain Management:         Discharge Checklist-NURSING TO COMPLETE:     Date and Time of Discharge: Date: 10/29/2021 Time: 10:02 AM    Return of:   Dental Appliance:    Vision: Visual Aid: Glasses  Hearing Aid:    Michael Amador:    Clothing:    Other Valuables:    Valuables sent to safe:      Prescription Given: {yes RZ:257486}  Medication Instruction Sheet(s), including side effects, provided: {yes no:649000}    Accompanied By: {OB ACCOMPANIED QU:[id number]}    Mode of Transportation:    Discharge Disposition: Patient Education      Ceftriaxone (Rocephin, Novaplus cefTRIAXone, [employer] Choice cefTRIAXone) - (By injection)   Why this medicine is used:   Treats infections. Contact a nurse or doctor right away if you have:  · Blistering, peeling, red skin rash  · Severe or bloody diarrhea  · Loss of appetite, stomach pain, yellow skin or eyes  · Dark urine or pale stools  · Nausea, vomiting     Common side effects:  · Vaginal itching or discharge  · Pain, redness, swelling where the needle is placed  © 2017 [address] is for End User's use only and may not be sold, redistributed or otherwise used for commercial purposes. I have had the opportunity to make my options or choices for discharge. I have received and understand these instructions.

## 2021-10-29 NOTE — PROGRESS NOTES
Problem: Infection - Risk of, Central Venous Catheter-Associated Bloodstream Infection  Goal: *Absence of infection signs and symptoms  Outcome: Resolved/Met     Problem: Pain  Goal: *Control of Pain  Outcome: Resolved/Met

## 2021-10-29 NOTE — PROGRESS NOTES
[facility] Progress Note    Date: 2021    Name: Arnulfo Corona    MRN: [id number]         : 1955    Ms. Ba was assessed for continuing issues with bladder infection and pancreatic cancer. States \" I feel awful- my stomach hurts all over , even to my back . \" Taking p o Tylenol and Pyridium. Orders received from Dr. Taylor Denise . Port accessed using 20 gauge 1 inch sandoval . Labs drawn . Ms. Ba's vitals were reviewed. Visit Vitals  BP (!) 186/85 (BP 1 Location: Left upper arm, BP Patient Position: Sitting)   Pulse 98   Temp (!) 96.6 °F (35.9 °C)   Resp 19   Ht 5' 4\" (1.626 m)   Wt 78.9 kg (174 lb)   SpO2 92%   Breastfeeding No   BMI 29.87 kg/m²       Lab results were obtained and reviewed. Recent Results (from the past 12 hour(s))   CBC WITH AUTOMATED DIFF    Collection Time: 10/29/21  8:45 AM   Result Value Ref Range    WBC 12.4 (H) 3.6 - 11.0 K/uL    RBC 3.63 (L) 3.80 - 5.20 M/uL    HGB 10.7 (L) 11.5 - 16.0 g/dL    HCT 34.1 (L) 35.0 - 47.0 %    MCV 93.9 80.0 - 99.0 FL    MCH 29.5 26.0 - 34.0 PG    MCHC 31.4 30.0 - 36.5 g/dL    RDW 15.7 (H) 11.5 - 14.5 %    PLATELET 816 (H) 134 - 400 K/uL    MPV 10.4 8.9 - 12.9 FL    NRBC 0.0 0  WBC    ABSOLUTE NRBC 0.00 0.00 - 0.01 K/uL    NEUTROPHILS 66 32 - 75 %    LYMPHOCYTES 16 12 - 49 %    MONOCYTES 15 (H) 5 - 13 %    EOSINOPHILS 2 0 - 7 %    BASOPHILS 1 0 - 1 %    IMMATURE GRANULOCYTES 0 0.0 - 0.5 %    ABS. NEUTROPHILS 8.1 (H) 1.8 - 8.0 K/UL    ABS. LYMPHOCYTES 2.0 0.8 - 3.5 K/UL    ABS. MONOCYTES 1.9 (H) 0.0 - 1.0 K/UL    ABS. EOSINOPHILS 0.3 0.0 - 0.4 K/UL    ABS. BASOPHILS 0.1 0.0 - 0.1 K/UL    ABS. IMM.  GRANS. 0.1 (H) 0.00 - 0.04 K/UL    DF AUTOMATED     METABOLIC PANEL, COMPREHENSIVE    Collection Time: 10/29/21  8:45 AM   Result Value Ref Range    Sodium 135 (L) 136 - 145 mmol/L    Potassium 4.2 3.5 - 5.1 mmol/L    Chloride 99 97 - 108 mmol/L    CO2 29 21 - 32 mmol/L    Anion gap 7 5 - 15 mmol/L    Glucose 465 (H) 65 - 100 mg/dL    BUN 13 6 - 20 MG/DL    Creatinine 0.87 0.55 - 1.02 MG/DL    BUN/Creatinine ratio 15 12 - 20      GFR est AA >60 >60 ml/min/1.73m2    GFR est non-AA >60 >60 ml/min/1.73m2    Calcium 8.6 8.5 - 10.1 MG/DL    Bilirubin, total 0.2 0.2 - 1.0 MG/DL    ALT (SGPT) 22 12 - 78 U/L    AST (SGOT) 17 15 - 37 U/L    Alk. phosphatase 132 (H) 45 - 117 U/L    Protein, total 6.3 (L) 6.4 - 8.2 g/dL    Albumin 3.2 (L) 3.5 - 5.0 g/dL    Globulin 3.1 2.0 - 4.0 g/dL    A-G Ratio 1.0 (L) 1.1 - 2.2     LACTIC ACID    Collection Time: 10/29/21  8:45 AM   Result Value Ref Range    Lactic acid 1.6 0.4 - 2.0 MMOL/L   I liter of NS       []  Vancomycin     []  Invanz     []  Cubicin     [x]  Rocephin    was infused over 30 minutes . Ms. Ba tolerated infusion, and had no complaints at this time. Patient 's armband removed and shredded. Port needle flushed with heparin. Needle removed from intact site. Ms. Sebas Coffey was discharged from [place] [address] in stable condition at 1100  She is to return tomorrow, Sat. Oct 30  For antibiotic appointment,  if discomfort continues from bladder infection. Next chemo postponed until 11/9 .     Mitch Preciado RN  October 29, 2021  1:46 PM

## 2021-10-30 ENCOUNTER — HOSPITAL ENCOUNTER (OUTPATIENT)
Dept: INFUSION THERAPY | Age: 66
Discharge: HOME | End: 2021-10-30
Payer: MEDICARE

## 2021-10-30 VITALS
DIASTOLIC BLOOD PRESSURE: 80 MMHG | RESPIRATION RATE: 17 BRPM | TEMPERATURE: 97.6 F | SYSTOLIC BLOOD PRESSURE: 148 MMHG | HEART RATE: 81 BPM | OXYGEN SATURATION: 96 %

## 2021-10-30 DIAGNOSIS — C25.9 ADENOCARCINOMA OF PANCREAS (HCC): Primary | ICD-10-CM

## 2021-10-30 PROCEDURE — 77030012965 HC NDL HUBR BBMI -A

## 2021-10-30 PROCEDURE — 96365 THER/PROPH/DIAG IV INF INIT: CPT

## 2021-10-30 PROCEDURE — 74011250636 HC RX REV CODE- 250/636: Performed by: INTERNAL MEDICINE

## 2021-10-30 PROCEDURE — 96361 HYDRATE IV INFUSION ADD-ON: CPT

## 2021-10-30 PROCEDURE — 74011000258 HC RX REV CODE- 258: Performed by: INTERNAL MEDICINE

## 2021-10-30 RX ORDER — HEPARIN 100 UNIT/ML
500 SYRINGE INTRAVENOUS AS NEEDED
Status: DISCONTINUED | OUTPATIENT
Start: 2021-10-30 | End: 2021-10-31 | Stop reason: HOSPADM

## 2021-10-30 RX ADMIN — CEFTRIAXONE SODIUM 1 G: 1 INJECTION, POWDER, FOR SOLUTION INTRAMUSCULAR; INTRAVENOUS at 10:15

## 2021-10-30 RX ADMIN — SODIUM CHLORIDE 500 ML: 9 INJECTION, SOLUTION INTRAVENOUS at 10:09

## 2021-10-30 RX ADMIN — Medication 500 UNITS: at 11:02

## 2021-10-30 NOTE — PROGRESS NOTES
[facility] Progress Note    Date: 2021    Name: Dimple Tucker    MRN: [id number]         : 1955    Ms. Ba was assessed and reported she is feeling better. Less pain in abdomen and was able to sleep last night. VAD accessed with 20 gauge  1 inch sandoval . Blood return. Needle secured in place . Ms. Ba's vitals were reviewed. Visit Vitals  BP (!) 148/80 (BP 1 Location: Left upper arm, BP Patient Position: Sitting)   Pulse 81   Temp 97.6 °F (36.4 °C)   Resp 17   SpO2 96%   Breastfeeding No       500 ml NS infused . []  Vancomycin     []  Invanz     []  Cubicin     [x]  Rocephin    was infused over 30 minutes . Ms. Ba tolerated infusion, and had no complaints at this time. Patient 's armband removed and shredded. Port flushed with 5 ml heparin. Needle  removed. Ms. Dontae Puente was discharged from [place] [address] in stable condition at   She is to return on   for her next chemo  appointment.     Steven Garcia RN  2021  11:13 AM

## 2021-10-30 NOTE — PROGRESS NOTES
Problem: Pain  Goal: *Control of Pain  Outcome: Resolved/Met     Problem: Patient Education:  Go to Education Activity  Goal: Patient/Family Education  Outcome: Resolved/Met

## 2021-11-01 RX ORDER — SODIUM CHLORIDE 9 MG/ML
25 INJECTION, SOLUTION INTRAVENOUS CONTINUOUS
Status: CANCELLED | OUTPATIENT
Start: 2021-11-09

## 2021-11-01 RX ORDER — HYDROCORTISONE SODIUM SUCCINATE 100 MG/2ML
100 INJECTION, POWDER, FOR SOLUTION INTRAMUSCULAR; INTRAVENOUS AS NEEDED
Status: CANCELLED | OUTPATIENT
Start: 2021-11-09

## 2021-11-01 RX ORDER — HEPARIN 100 UNIT/ML
300-500 SYRINGE INTRAVENOUS AS NEEDED
Status: CANCELLED | OUTPATIENT
Start: 2021-11-09

## 2021-11-01 RX ORDER — DIPHENHYDRAMINE HYDROCHLORIDE 50 MG/ML
25 INJECTION, SOLUTION INTRAMUSCULAR; INTRAVENOUS
Status: CANCELLED
Start: 2021-11-09

## 2021-11-01 RX ORDER — ALBUTEROL SULFATE 0.83 MG/ML
2.5 SOLUTION RESPIRATORY (INHALATION) AS NEEDED
Status: CANCELLED
Start: 2021-11-09

## 2021-11-01 RX ORDER — EPINEPHRINE 1 MG/ML
0.3 INJECTION, SOLUTION, CONCENTRATE INTRAVENOUS AS NEEDED
Status: CANCELLED | OUTPATIENT
Start: 2021-11-09

## 2021-11-01 RX ORDER — SODIUM CHLORIDE 0.9 % (FLUSH) 0.9 %
10 SYRINGE (ML) INJECTION AS NEEDED
Status: CANCELLED | OUTPATIENT
Start: 2021-11-09

## 2021-11-01 RX ORDER — ACETAMINOPHEN 325 MG/1
650 TABLET ORAL AS NEEDED
Status: CANCELLED | OUTPATIENT
Start: 2021-11-09

## 2021-11-01 RX ORDER — DIPHENHYDRAMINE HYDROCHLORIDE 50 MG/ML
50 INJECTION, SOLUTION INTRAMUSCULAR; INTRAVENOUS AS NEEDED
Status: CANCELLED
Start: 2021-11-09

## 2021-11-01 RX ORDER — LORAZEPAM 2 MG/ML
0.5 INJECTION INTRAMUSCULAR
Status: CANCELLED
Start: 2021-11-09

## 2021-11-01 RX ORDER — DIPHENHYDRAMINE HYDROCHLORIDE 50 MG/ML
25 INJECTION, SOLUTION INTRAMUSCULAR; INTRAVENOUS AS NEEDED
Status: CANCELLED | OUTPATIENT
Start: 2021-11-09

## 2021-11-01 RX ORDER — SODIUM CHLORIDE 9 MG/ML
10 INJECTION INTRAMUSCULAR; INTRAVENOUS; SUBCUTANEOUS AS NEEDED
Status: CANCELLED | OUTPATIENT
Start: 2021-11-09

## 2021-11-01 RX ORDER — ONDANSETRON 2 MG/ML
8 INJECTION INTRAMUSCULAR; INTRAVENOUS ONCE
Status: CANCELLED | OUTPATIENT
Start: 2021-11-09 | End: 2021-11-09

## 2021-11-01 RX ORDER — DEXAMETHASONE SODIUM PHOSPHATE 4 MG/ML
10 INJECTION, SOLUTION INTRA-ARTICULAR; INTRALESIONAL; INTRAMUSCULAR; INTRAVENOUS; SOFT TISSUE ONCE
Status: CANCELLED | OUTPATIENT
Start: 2021-11-09 | End: 2021-11-09

## 2021-11-01 RX ORDER — ONDANSETRON 2 MG/ML
8 INJECTION INTRAMUSCULAR; INTRAVENOUS AS NEEDED
Status: CANCELLED | OUTPATIENT
Start: 2021-11-09

## 2021-11-02 ENCOUNTER — HOSPITAL ENCOUNTER (OUTPATIENT)
Dept: INFUSION THERAPY | Age: 66
Discharge: HOME | End: 2021-11-02

## 2021-11-09 ENCOUNTER — HOSPITAL ENCOUNTER (OUTPATIENT)
Dept: INFUSION THERAPY | Age: 66
Discharge: HOME | End: 2021-11-09
Payer: MEDICARE

## 2021-11-09 VITALS
DIASTOLIC BLOOD PRESSURE: 83 MMHG | SYSTOLIC BLOOD PRESSURE: 160 MMHG | WEIGHT: 175.27 LBS | RESPIRATION RATE: 17 BRPM | HEART RATE: 105 BPM | OXYGEN SATURATION: 98 % | TEMPERATURE: 98.9 F | BODY MASS INDEX: 29.92 KG/M2 | HEIGHT: 64 IN

## 2021-11-09 DIAGNOSIS — C25.9 ADENOCARCINOMA OF PANCREAS (HCC): Primary | ICD-10-CM

## 2021-11-09 LAB
ALBUMIN SERPL-MCNC: 3.4 G/DL (ref 3.5–5)
ALBUMIN/GLOB SERPL: 0.9 {RATIO} (ref 1.1–2.2)
ALP SERPL-CCNC: 134 U/L (ref 45–117)
ALT SERPL-CCNC: 23 U/L (ref 12–78)
ANION GAP SERPL CALC-SCNC: 7 MMOL/L (ref 5–15)
AST SERPL-CCNC: 17 U/L (ref 15–37)
BASOPHILS # BLD: 0.1 K/UL (ref 0–0.1)
BASOPHILS NFR BLD: 1 % (ref 0–1)
BILIRUB SERPL-MCNC: 0.2 MG/DL (ref 0.2–1)
BUN SERPL-MCNC: 12 MG/DL (ref 6–20)
BUN/CREAT SERPL: 14 (ref 12–20)
CALCIUM SERPL-MCNC: 9.1 MG/DL (ref 8.5–10.1)
CHLORIDE SERPL-SCNC: 102 MMOL/L (ref 97–108)
CO2 SERPL-SCNC: 28 MMOL/L (ref 21–32)
CREAT SERPL-MCNC: 0.85 MG/DL (ref 0.55–1.02)
DIFFERENTIAL METHOD BLD: ABNORMAL
EOSINOPHIL # BLD: 0.4 K/UL (ref 0–0.4)
EOSINOPHIL NFR BLD: 5 % (ref 0–7)
ERYTHROCYTE [DISTWIDTH] IN BLOOD BY AUTOMATED COUNT: 15.8 % (ref 11.5–14.5)
EST. AVERAGE GLUCOSE BLD GHB EST-MCNC: 212 MG/DL
GLOBULIN SER CALC-MCNC: 3.8 G/DL (ref 2–4)
GLUCOSE SERPL-MCNC: 225 MG/DL (ref 65–100)
HBA1C MFR BLD: 9 % (ref 4–5.6)
HCT VFR BLD AUTO: 35.7 % (ref 35–47)
HGB BLD-MCNC: 11.2 G/DL (ref 11.5–16)
IMM GRANULOCYTES # BLD AUTO: 0 K/UL (ref 0–0.04)
IMM GRANULOCYTES NFR BLD AUTO: 0 % (ref 0–0.5)
LYMPHOCYTES # BLD: 2.3 K/UL (ref 0.8–3.5)
LYMPHOCYTES NFR BLD: 25 % (ref 12–49)
MCH RBC QN AUTO: 28.9 PG (ref 26–34)
MCHC RBC AUTO-ENTMCNC: 31.4 G/DL (ref 30–36.5)
MCV RBC AUTO: 92.2 FL (ref 80–99)
MONOCYTES # BLD: 1.3 K/UL (ref 0–1)
MONOCYTES NFR BLD: 14 % (ref 5–13)
NEUTS SEG # BLD: 5.1 K/UL (ref 1.8–8)
NEUTS SEG NFR BLD: 55 % (ref 32–75)
NRBC # BLD: 0 K/UL (ref 0–0.01)
NRBC BLD-RTO: 0 PER 100 WBC
PLATELET # BLD AUTO: 466 K/UL (ref 150–400)
PMV BLD AUTO: 10.3 FL (ref 8.9–12.9)
POTASSIUM SERPL-SCNC: 4.1 MMOL/L (ref 3.5–5.1)
PROT SERPL-MCNC: 7.2 G/DL (ref 6.4–8.2)
RBC # BLD AUTO: 3.87 M/UL (ref 3.8–5.2)
SODIUM SERPL-SCNC: 137 MMOL/L (ref 136–145)
WBC # BLD AUTO: 9.2 K/UL (ref 3.6–11)

## 2021-11-09 PROCEDURE — 80053 COMPREHEN METABOLIC PANEL: CPT

## 2021-11-09 PROCEDURE — 77030012965 HC NDL HUBR BBMI -A

## 2021-11-09 PROCEDURE — 74011000258 HC RX REV CODE- 258: Performed by: INTERNAL MEDICINE

## 2021-11-09 PROCEDURE — 74011250636 HC RX REV CODE- 250/636: Performed by: INTERNAL MEDICINE

## 2021-11-09 PROCEDURE — 96375 TX/PRO/DX INJ NEW DRUG ADDON: CPT

## 2021-11-09 PROCEDURE — 83036 HEMOGLOBIN GLYCOSYLATED A1C: CPT

## 2021-11-09 PROCEDURE — 85025 COMPLETE CBC W/AUTO DIFF WBC: CPT

## 2021-11-09 PROCEDURE — 36415 COLL VENOUS BLD VENIPUNCTURE: CPT

## 2021-11-09 PROCEDURE — 96413 CHEMO IV INFUSION 1 HR: CPT

## 2021-11-09 PROCEDURE — 96417 CHEMO IV INFUS EACH ADDL SEQ: CPT

## 2021-11-09 RX ORDER — LORAZEPAM 2 MG/ML
0.5 INJECTION INTRAMUSCULAR
Status: CANCELLED
Start: 2021-12-07

## 2021-11-09 RX ORDER — DIPHENHYDRAMINE HYDROCHLORIDE 50 MG/ML
25 INJECTION, SOLUTION INTRAMUSCULAR; INTRAVENOUS
Status: CANCELLED
Start: 2021-12-07

## 2021-11-09 RX ORDER — ONDANSETRON 2 MG/ML
8 INJECTION INTRAMUSCULAR; INTRAVENOUS AS NEEDED
Status: ACTIVE | OUTPATIENT
Start: 2021-11-09 | End: 2021-11-09

## 2021-11-09 RX ORDER — SODIUM CHLORIDE 9 MG/ML
25 INJECTION, SOLUTION INTRAVENOUS CONTINUOUS
Status: CANCELLED | OUTPATIENT
Start: 2021-12-21

## 2021-11-09 RX ORDER — DIPHENHYDRAMINE HYDROCHLORIDE 50 MG/ML
25 INJECTION, SOLUTION INTRAMUSCULAR; INTRAVENOUS AS NEEDED
Status: CANCELLED | OUTPATIENT
Start: 2021-12-14

## 2021-11-09 RX ORDER — DIPHENHYDRAMINE HYDROCHLORIDE 50 MG/ML
25 INJECTION, SOLUTION INTRAMUSCULAR; INTRAVENOUS AS NEEDED
Status: CANCELLED | OUTPATIENT
Start: 2021-12-07

## 2021-11-09 RX ORDER — ALBUTEROL SULFATE 0.83 MG/ML
2.5 SOLUTION RESPIRATORY (INHALATION) AS NEEDED
Status: CANCELLED
Start: 2021-12-07

## 2021-11-09 RX ORDER — ONDANSETRON 2 MG/ML
8 INJECTION INTRAMUSCULAR; INTRAVENOUS AS NEEDED
Status: CANCELLED | OUTPATIENT
Start: 2021-12-14

## 2021-11-09 RX ORDER — DIPHENHYDRAMINE HYDROCHLORIDE 50 MG/ML
25 INJECTION, SOLUTION INTRAMUSCULAR; INTRAVENOUS AS NEEDED
Status: ACTIVE | OUTPATIENT
Start: 2021-11-09 | End: 2021-11-09

## 2021-11-09 RX ORDER — DEXAMETHASONE SODIUM PHOSPHATE 4 MG/ML
10 INJECTION, SOLUTION INTRA-ARTICULAR; INTRALESIONAL; INTRAMUSCULAR; INTRAVENOUS; SOFT TISSUE ONCE
Status: CANCELLED | OUTPATIENT
Start: 2021-12-14 | End: 2021-12-14

## 2021-11-09 RX ORDER — ONDANSETRON 2 MG/ML
8 INJECTION INTRAMUSCULAR; INTRAVENOUS ONCE
Status: CANCELLED | OUTPATIENT
Start: 2021-12-21 | End: 2021-12-21

## 2021-11-09 RX ORDER — LORAZEPAM 2 MG/ML
0.5 INJECTION INTRAMUSCULAR
Status: CANCELLED
Start: 2021-12-14

## 2021-11-09 RX ORDER — ONDANSETRON 2 MG/ML
8 INJECTION INTRAMUSCULAR; INTRAVENOUS ONCE
Status: COMPLETED | OUTPATIENT
Start: 2021-11-09 | End: 2021-11-09

## 2021-11-09 RX ORDER — SODIUM CHLORIDE 0.9 % (FLUSH) 0.9 %
10 SYRINGE (ML) INJECTION AS NEEDED
Status: CANCELLED | OUTPATIENT
Start: 2021-12-14

## 2021-11-09 RX ORDER — DIPHENHYDRAMINE HYDROCHLORIDE 50 MG/ML
50 INJECTION, SOLUTION INTRAMUSCULAR; INTRAVENOUS AS NEEDED
Status: CANCELLED
Start: 2021-12-14

## 2021-11-09 RX ORDER — SODIUM CHLORIDE 9 MG/ML
25 INJECTION, SOLUTION INTRAVENOUS CONTINUOUS
Status: CANCELLED | OUTPATIENT
Start: 2021-12-07

## 2021-11-09 RX ORDER — EPINEPHRINE 1 MG/ML
0.3 INJECTION, SOLUTION, CONCENTRATE INTRAVENOUS AS NEEDED
Status: CANCELLED | OUTPATIENT
Start: 2021-12-07

## 2021-11-09 RX ORDER — ONDANSETRON 2 MG/ML
8 INJECTION INTRAMUSCULAR; INTRAVENOUS ONCE
Status: CANCELLED | OUTPATIENT
Start: 2021-12-07 | End: 2021-12-07

## 2021-11-09 RX ORDER — ACETAMINOPHEN 325 MG/1
650 TABLET ORAL AS NEEDED
Status: CANCELLED | OUTPATIENT
Start: 2021-12-21

## 2021-11-09 RX ORDER — DIPHENHYDRAMINE HYDROCHLORIDE 50 MG/ML
25 INJECTION, SOLUTION INTRAMUSCULAR; INTRAVENOUS
Status: CANCELLED
Start: 2021-12-14

## 2021-11-09 RX ORDER — ALBUTEROL SULFATE 0.83 MG/ML
2.5 SOLUTION RESPIRATORY (INHALATION) AS NEEDED
Status: CANCELLED
Start: 2021-12-14

## 2021-11-09 RX ORDER — SODIUM CHLORIDE 9 MG/ML
25 INJECTION, SOLUTION INTRAVENOUS CONTINUOUS
Status: DISPENSED | OUTPATIENT
Start: 2021-11-09 | End: 2021-11-09

## 2021-11-09 RX ORDER — SODIUM CHLORIDE 9 MG/ML
10 INJECTION INTRAMUSCULAR; INTRAVENOUS; SUBCUTANEOUS AS NEEDED
Status: CANCELLED | OUTPATIENT
Start: 2021-12-14

## 2021-11-09 RX ORDER — HEPARIN 100 UNIT/ML
300-500 SYRINGE INTRAVENOUS AS NEEDED
Status: CANCELLED | OUTPATIENT
Start: 2021-12-21

## 2021-11-09 RX ORDER — HEPARIN 100 UNIT/ML
500 SYRINGE INTRAVENOUS AS NEEDED
Status: DISCONTINUED | OUTPATIENT
Start: 2021-11-09 | End: 2021-11-10 | Stop reason: HOSPADM

## 2021-11-09 RX ORDER — DIPHENHYDRAMINE HYDROCHLORIDE 50 MG/ML
50 INJECTION, SOLUTION INTRAMUSCULAR; INTRAVENOUS AS NEEDED
Status: CANCELLED
Start: 2021-12-21

## 2021-11-09 RX ORDER — SODIUM CHLORIDE 9 MG/ML
10 INJECTION INTRAMUSCULAR; INTRAVENOUS; SUBCUTANEOUS AS NEEDED
Status: CANCELLED | OUTPATIENT
Start: 2021-12-21

## 2021-11-09 RX ORDER — ALBUTEROL SULFATE 0.83 MG/ML
2.5 SOLUTION RESPIRATORY (INHALATION) AS NEEDED
Status: CANCELLED
Start: 2021-12-21

## 2021-11-09 RX ORDER — SODIUM CHLORIDE 0.9 % (FLUSH) 0.9 %
10 SYRINGE (ML) INJECTION AS NEEDED
Status: CANCELLED | OUTPATIENT
Start: 2021-12-07

## 2021-11-09 RX ORDER — EPINEPHRINE 1 MG/ML
0.3 INJECTION, SOLUTION, CONCENTRATE INTRAVENOUS AS NEEDED
Status: CANCELLED | OUTPATIENT
Start: 2021-12-21

## 2021-11-09 RX ORDER — ONDANSETRON 2 MG/ML
8 INJECTION INTRAMUSCULAR; INTRAVENOUS AS NEEDED
Status: CANCELLED | OUTPATIENT
Start: 2021-12-07

## 2021-11-09 RX ORDER — HYDROCORTISONE SODIUM SUCCINATE 100 MG/2ML
100 INJECTION, POWDER, FOR SOLUTION INTRAMUSCULAR; INTRAVENOUS AS NEEDED
Status: CANCELLED | OUTPATIENT
Start: 2021-12-14

## 2021-11-09 RX ORDER — ONDANSETRON 2 MG/ML
8 INJECTION INTRAMUSCULAR; INTRAVENOUS AS NEEDED
Status: CANCELLED | OUTPATIENT
Start: 2021-12-21

## 2021-11-09 RX ORDER — HEPARIN 100 UNIT/ML
300-500 SYRINGE INTRAVENOUS AS NEEDED
Status: CANCELLED | OUTPATIENT
Start: 2021-12-07

## 2021-11-09 RX ORDER — DIPHENHYDRAMINE HYDROCHLORIDE 50 MG/ML
50 INJECTION, SOLUTION INTRAMUSCULAR; INTRAVENOUS AS NEEDED
Status: CANCELLED
Start: 2021-12-07

## 2021-11-09 RX ORDER — SODIUM CHLORIDE 9 MG/ML
10 INJECTION INTRAMUSCULAR; INTRAVENOUS; SUBCUTANEOUS AS NEEDED
Status: CANCELLED | OUTPATIENT
Start: 2021-12-07

## 2021-11-09 RX ORDER — EPINEPHRINE 1 MG/ML
0.3 INJECTION, SOLUTION, CONCENTRATE INTRAVENOUS AS NEEDED
Status: CANCELLED | OUTPATIENT
Start: 2021-12-14

## 2021-11-09 RX ORDER — HYDROCORTISONE SODIUM SUCCINATE 100 MG/2ML
100 INJECTION, POWDER, FOR SOLUTION INTRAMUSCULAR; INTRAVENOUS AS NEEDED
Status: CANCELLED | OUTPATIENT
Start: 2021-12-07

## 2021-11-09 RX ORDER — LORAZEPAM 2 MG/ML
0.5 INJECTION INTRAMUSCULAR
Status: CANCELLED
Start: 2021-12-21

## 2021-11-09 RX ORDER — HEPARIN 100 UNIT/ML
300-500 SYRINGE INTRAVENOUS AS NEEDED
Status: CANCELLED | OUTPATIENT
Start: 2021-12-14

## 2021-11-09 RX ORDER — HYDROCORTISONE SODIUM SUCCINATE 100 MG/2ML
100 INJECTION, POWDER, FOR SOLUTION INTRAMUSCULAR; INTRAVENOUS AS NEEDED
Status: CANCELLED | OUTPATIENT
Start: 2021-12-21

## 2021-11-09 RX ORDER — ACETAMINOPHEN 325 MG/1
650 TABLET ORAL AS NEEDED
Status: CANCELLED | OUTPATIENT
Start: 2021-12-07

## 2021-11-09 RX ORDER — ACETAMINOPHEN 325 MG/1
650 TABLET ORAL AS NEEDED
Status: ACTIVE | OUTPATIENT
Start: 2021-11-09 | End: 2021-11-09

## 2021-11-09 RX ORDER — ACETAMINOPHEN 325 MG/1
650 TABLET ORAL AS NEEDED
Status: CANCELLED | OUTPATIENT
Start: 2021-12-14

## 2021-11-09 RX ORDER — DIPHENHYDRAMINE HYDROCHLORIDE 50 MG/ML
25 INJECTION, SOLUTION INTRAMUSCULAR; INTRAVENOUS
Status: CANCELLED
Start: 2021-12-21

## 2021-11-09 RX ORDER — DIPHENHYDRAMINE HYDROCHLORIDE 50 MG/ML
25 INJECTION, SOLUTION INTRAMUSCULAR; INTRAVENOUS AS NEEDED
Status: CANCELLED | OUTPATIENT
Start: 2021-12-21

## 2021-11-09 RX ORDER — SODIUM CHLORIDE 0.9 % (FLUSH) 0.9 %
10 SYRINGE (ML) INJECTION AS NEEDED
Status: CANCELLED
Start: 2021-12-21

## 2021-11-09 RX ORDER — ONDANSETRON 2 MG/ML
8 INJECTION INTRAMUSCULAR; INTRAVENOUS ONCE
Status: CANCELLED | OUTPATIENT
Start: 2021-12-14 | End: 2021-12-14

## 2021-11-09 RX ORDER — DEXAMETHASONE SODIUM PHOSPHATE 4 MG/ML
10 INJECTION, SOLUTION INTRA-ARTICULAR; INTRALESIONAL; INTRAMUSCULAR; INTRAVENOUS; SOFT TISSUE ONCE
Status: COMPLETED | OUTPATIENT
Start: 2021-11-09 | End: 2021-11-09

## 2021-11-09 RX ORDER — DEXAMETHASONE SODIUM PHOSPHATE 4 MG/ML
10 INJECTION, SOLUTION INTRA-ARTICULAR; INTRALESIONAL; INTRAMUSCULAR; INTRAVENOUS; SOFT TISSUE ONCE
Status: CANCELLED | OUTPATIENT
Start: 2021-12-21 | End: 2021-12-21

## 2021-11-09 RX ORDER — SODIUM CHLORIDE 9 MG/ML
25 INJECTION, SOLUTION INTRAVENOUS CONTINUOUS
Status: CANCELLED | OUTPATIENT
Start: 2021-12-14

## 2021-11-09 RX ORDER — DEXAMETHASONE SODIUM PHOSPHATE 4 MG/ML
10 INJECTION, SOLUTION INTRA-ARTICULAR; INTRALESIONAL; INTRAMUSCULAR; INTRAVENOUS; SOFT TISSUE ONCE
Status: CANCELLED | OUTPATIENT
Start: 2021-12-07 | End: 2021-12-07

## 2021-11-09 RX ADMIN — PACLITAXEL 184 MG: 100 INJECTION, POWDER, LYOPHILIZED, FOR SUSPENSION INTRAVENOUS at 10:48

## 2021-11-09 RX ADMIN — SODIUM CHLORIDE 25 ML/HR: 9 INJECTION, SOLUTION INTRAVENOUS at 10:09

## 2021-11-09 RX ADMIN — DEXAMETHASONE SODIUM PHOSPHATE 10 MG: 4 INJECTION, SOLUTION INTRAMUSCULAR; INTRAVENOUS at 10:12

## 2021-11-09 RX ADMIN — ONDANSETRON 8 MG: 2 INJECTION INTRAMUSCULAR; INTRAVENOUS at 10:18

## 2021-11-09 RX ADMIN — GEMCITABINE 1656 MG: 38 INJECTION, SOLUTION INTRAVENOUS at 11:34

## 2021-11-09 RX ADMIN — Medication 500 UNITS: at 12:15

## 2021-11-09 NOTE — PROGRESS NOTES
[facility] Progress Note    Date: 2021    Name: Bernabe Castañeda    MRN: [id number]         : 1955    Ms. Ba Arrived ambulatory and in no distress for cycle 9 day of Gemzar \ Abraxane  regimen. Assessment was completed, no acute issues at this time, no new complaints voiced. VAD  accessed without difficulty, labs drawn and in process. Chemotherapy Flowsheet 2021   Cycle C 9 D 1   Date 2021   Drug / Regimen -   Dosage -   Time Up -   Time Down -   Pre Hydration -   Pre Meds -   Notes -           Ms. Ba's vitals were reviewed. Visit Vitals  BP (!) 160/83 (BP 1 Location: Left upper arm, BP Patient Position: Sitting)   Pulse (!) 105   Temp 98.9 °F (37.2 °C)   Resp 17   Ht 5' 4\" (1.626 m)   Wt 79.5 kg (175 lb 4.3 oz)   SpO2 98%   Breastfeeding No   BMI 30.08 kg/m²       Lab results were obtained and reviewed. Recent Results (from the past 12 hour(s))   CBC WITH AUTOMATED DIFF    Collection Time: 21  8:45 AM   Result Value Ref Range    WBC 9.2 3.6 - 11.0 K/uL    RBC 3.87 3.80 - 5.20 M/uL    HGB 11.2 (L) 11.5 - 16.0 g/dL    HCT 35.7 35.0 - 47.0 %    MCV 92.2 80.0 - 99.0 FL    MCH 28.9 26.0 - 34.0 PG    MCHC 31.4 30.0 - 36.5 g/dL    RDW 15.8 (H) 11.5 - 14.5 %    PLATELET 762 (H) 141 - 400 K/uL    MPV 10.3 8.9 - 12.9 FL    NRBC 0.0 0  WBC    ABSOLUTE NRBC 0.00 0.00 - 0.01 K/uL    NEUTROPHILS 55 32 - 75 %    LYMPHOCYTES 25 12 - 49 %    MONOCYTES 14 (H) 5 - 13 %    EOSINOPHILS 5 0 - 7 %    BASOPHILS 1 0 - 1 %    IMMATURE GRANULOCYTES 0 0.0 - 0.5 %    ABS. NEUTROPHILS 5.1 1.8 - 8.0 K/UL    ABS. LYMPHOCYTES 2.3 0.8 - 3.5 K/UL    ABS. MONOCYTES 1.3 (H) 0.0 - 1.0 K/UL    ABS. EOSINOPHILS 0.4 0.0 - 0.4 K/UL    ABS. BASOPHILS 0.1 0.0 - 0.1 K/UL    ABS. IMM.  GRANS. 0.0 0.00 - 0.04 K/UL    DF AUTOMATED     METABOLIC PANEL, COMPREHENSIVE    Collection Time: 21  8:45 AM   Result Value Ref Range    Sodium 137 136 - 145 mmol/L    Potassium 4.1 3.5 - 5.1 mmol/L    Chloride 102 97 - 108 mmol/L    CO2 28 21 - 32 mmol/L    Anion gap 7 5 - 15 mmol/L    Glucose 225 (H) 65 - 100 mg/dL    BUN 12 6 - 20 MG/DL    Creatinine 0.85 0.55 - 1.02 MG/DL    BUN/Creatinine ratio 14 12 - 20      GFR est AA >60 >60 ml/min/1.73m2    GFR est non-AA >60 >60 ml/min/1.73m2    Calcium 9.1 8.5 - 10.1 MG/DL    Bilirubin, total 0.2 0.2 - 1.0 MG/DL    ALT (SGPT) 23 12 - 78 U/L    AST (SGOT) 17 15 - 37 U/L    Alk. phosphatase 134 (H) 45 - 117 U/L    Protein, total 7.2 6.4 - 8.2 g/dL    Albumin 3.4 (L) 3.5 - 5.0 g/dL    Globulin 3.8 2.0 - 4.0 g/dL    A-G Ratio 0.9 (L) 1.1 - 2.2     HEMOGLOBIN A1C WITH EAG    Collection Time: 11/09/21  8:45 AM   Result Value Ref Range    Hemoglobin A1c 9.0 (H) 4.0 - 5.6 %    Est. average glucose 212 mg/dL       Pre-medications of Decadron and Zofran  were administered as ordered and chemotherapy was initiated. Abraxane was administered over 30 minutes. Blood return verified at port . Gemzar was then hung to infuse over 30 minutes. At completion port flushed with 10 ml NS and 5 ml heparin. Needle removed from intact site. Ms. Brunner Lipshyam tolerated treatment well and was discharged from [place] [address] in stable condition at 1220  She is to return on Nov 16  for her next chemo appointment.     Georgana Hodgkin, RN  November 9, 2021

## 2021-11-11 DIAGNOSIS — C25.9 MALIGNANT NEOPLASM OF PANCREAS, UNSPECIFIED LOCATION OF MALIGNANCY (HCC): ICD-10-CM

## 2021-11-11 RX ORDER — GABAPENTIN 300 MG/1
CAPSULE ORAL
Qty: 180 CAPSULE | Refills: 3 | Status: SHIPPED | OUTPATIENT
Start: 2021-11-11 | End: 2022-06-10

## 2021-11-11 RX ORDER — GABAPENTIN 300 MG/1
CAPSULE ORAL
Qty: 180 CAPSULE | Refills: 3 | Status: SHIPPED | OUTPATIENT
Start: 2021-11-11 | End: 2021-11-11 | Stop reason: SDUPTHER

## 2021-11-16 ENCOUNTER — HOSPITAL ENCOUNTER (OUTPATIENT)
Dept: INFUSION THERAPY | Age: 66
Discharge: HOME | End: 2021-11-16
Payer: MEDICARE

## 2021-11-16 VITALS
OXYGEN SATURATION: 97 % | RESPIRATION RATE: 18 BRPM | WEIGHT: 173.6 LBS | HEIGHT: 64 IN | SYSTOLIC BLOOD PRESSURE: 173 MMHG | BODY MASS INDEX: 29.64 KG/M2 | DIASTOLIC BLOOD PRESSURE: 87 MMHG | TEMPERATURE: 98.6 F | HEART RATE: 103 BPM

## 2021-11-16 DIAGNOSIS — C25.9 ADENOCARCINOMA OF PANCREAS (HCC): Primary | ICD-10-CM

## 2021-11-16 LAB
ALBUMIN SERPL-MCNC: 3.2 G/DL (ref 3.5–5)
ALBUMIN/GLOB SERPL: 0.9 {RATIO} (ref 1.1–2.2)
ALP SERPL-CCNC: 97 U/L (ref 45–117)
ALT SERPL-CCNC: 29 U/L (ref 12–78)
ANION GAP SERPL CALC-SCNC: 9 MMOL/L (ref 5–15)
AST SERPL-CCNC: 22 U/L (ref 15–37)
BASOPHILS # BLD: 0 K/UL (ref 0–0.1)
BASOPHILS NFR BLD: 1 % (ref 0–1)
BILIRUB SERPL-MCNC: 0.2 MG/DL (ref 0.2–1)
BUN SERPL-MCNC: 12 MG/DL (ref 6–20)
BUN/CREAT SERPL: 16 (ref 12–20)
CALCIUM SERPL-MCNC: 9.1 MG/DL (ref 8.5–10.1)
CHLORIDE SERPL-SCNC: 102 MMOL/L (ref 97–108)
CO2 SERPL-SCNC: 30 MMOL/L (ref 21–32)
CREAT SERPL-MCNC: 0.76 MG/DL (ref 0.55–1.02)
DIFFERENTIAL METHOD BLD: ABNORMAL
EOSINOPHIL # BLD: 0.1 K/UL (ref 0–0.4)
EOSINOPHIL NFR BLD: 2 % (ref 0–7)
ERYTHROCYTE [DISTWIDTH] IN BLOOD BY AUTOMATED COUNT: 15 % (ref 11.5–14.5)
GLOBULIN SER CALC-MCNC: 3.7 G/DL (ref 2–4)
GLUCOSE SERPL-MCNC: 76 MG/DL (ref 65–100)
HCT VFR BLD AUTO: 33.1 % (ref 35–47)
HGB BLD-MCNC: 10.7 G/DL (ref 11.5–16)
IMM GRANULOCYTES # BLD AUTO: 0 K/UL (ref 0–0.04)
IMM GRANULOCYTES NFR BLD AUTO: 0 % (ref 0–0.5)
LYMPHOCYTES # BLD: 3.5 K/UL (ref 0.8–3.5)
LYMPHOCYTES NFR BLD: 57 % (ref 12–49)
MCH RBC QN AUTO: 29.8 PG (ref 26–34)
MCHC RBC AUTO-ENTMCNC: 32.3 G/DL (ref 30–36.5)
MCV RBC AUTO: 92.2 FL (ref 80–99)
MONOCYTES # BLD: 1.2 K/UL (ref 0–1)
MONOCYTES NFR BLD: 19 % (ref 5–13)
NEUTS SEG # BLD: 1.3 K/UL (ref 1.8–8)
NEUTS SEG NFR BLD: 21 % (ref 32–75)
NRBC # BLD: 0.02 K/UL (ref 0–0.01)
NRBC BLD-RTO: 0.3 PER 100 WBC
PLATELET # BLD AUTO: 304 K/UL (ref 150–400)
PMV BLD AUTO: 10.5 FL (ref 8.9–12.9)
POTASSIUM SERPL-SCNC: 3.3 MMOL/L (ref 3.5–5.1)
PROT SERPL-MCNC: 6.9 G/DL (ref 6.4–8.2)
RBC # BLD AUTO: 3.59 M/UL (ref 3.8–5.2)
SODIUM SERPL-SCNC: 141 MMOL/L (ref 136–145)
WBC # BLD AUTO: 6.2 K/UL (ref 3.6–11)

## 2021-11-16 PROCEDURE — 96375 TX/PRO/DX INJ NEW DRUG ADDON: CPT

## 2021-11-16 PROCEDURE — 74011000258 HC RX REV CODE- 258: Performed by: INTERNAL MEDICINE

## 2021-11-16 PROCEDURE — 74011250636 HC RX REV CODE- 250/636: Performed by: INTERNAL MEDICINE

## 2021-11-16 PROCEDURE — 80053 COMPREHEN METABOLIC PANEL: CPT

## 2021-11-16 PROCEDURE — 77030012965 HC NDL HUBR BBMI -A

## 2021-11-16 PROCEDURE — 96417 CHEMO IV INFUS EACH ADDL SEQ: CPT

## 2021-11-16 PROCEDURE — 85025 COMPLETE CBC W/AUTO DIFF WBC: CPT

## 2021-11-16 PROCEDURE — 36415 COLL VENOUS BLD VENIPUNCTURE: CPT

## 2021-11-16 PROCEDURE — 96413 CHEMO IV INFUSION 1 HR: CPT

## 2021-11-16 RX ORDER — SODIUM CHLORIDE 9 MG/ML
25 INJECTION, SOLUTION INTRAVENOUS CONTINUOUS
Status: DISPENSED | OUTPATIENT
Start: 2021-11-16 | End: 2021-11-16

## 2021-11-16 RX ORDER — ONDANSETRON 2 MG/ML
8 INJECTION INTRAMUSCULAR; INTRAVENOUS AS NEEDED
Status: ACTIVE | OUTPATIENT
Start: 2021-11-16 | End: 2021-11-16

## 2021-11-16 RX ORDER — ONDANSETRON 2 MG/ML
8 INJECTION INTRAMUSCULAR; INTRAVENOUS ONCE
Status: DISPENSED | OUTPATIENT
Start: 2021-11-16 | End: 2021-11-16

## 2021-11-16 RX ORDER — DIPHENHYDRAMINE HYDROCHLORIDE 50 MG/ML
25 INJECTION, SOLUTION INTRAMUSCULAR; INTRAVENOUS AS NEEDED
Status: ACTIVE | OUTPATIENT
Start: 2021-11-16 | End: 2021-11-16

## 2021-11-16 RX ORDER — DEXAMETHASONE SODIUM PHOSPHATE 4 MG/ML
10 INJECTION, SOLUTION INTRA-ARTICULAR; INTRALESIONAL; INTRAMUSCULAR; INTRAVENOUS; SOFT TISSUE ONCE
Status: COMPLETED | OUTPATIENT
Start: 2021-11-16 | End: 2021-11-16

## 2021-11-16 RX ORDER — SODIUM CHLORIDE 9 MG/ML
10 INJECTION INTRAMUSCULAR; INTRAVENOUS; SUBCUTANEOUS AS NEEDED
Status: ACTIVE | OUTPATIENT
Start: 2021-11-16 | End: 2021-11-16

## 2021-11-16 RX ORDER — HEPARIN 100 UNIT/ML
300-500 SYRINGE INTRAVENOUS AS NEEDED
Status: DISPENSED | OUTPATIENT
Start: 2021-11-16 | End: 2021-11-16

## 2021-11-16 RX ORDER — ACETAMINOPHEN 325 MG/1
650 TABLET ORAL AS NEEDED
Status: ACTIVE | OUTPATIENT
Start: 2021-11-16 | End: 2021-11-16

## 2021-11-16 RX ADMIN — SODIUM CHLORIDE 10 ML: 9 INJECTION INTRAMUSCULAR; INTRAVENOUS; SUBCUTANEOUS at 12:55

## 2021-11-16 RX ADMIN — ONDANSETRON 8 MG: 2 INJECTION INTRAMUSCULAR; INTRAVENOUS at 09:53

## 2021-11-16 RX ADMIN — Medication 500 UNITS: at 12:55

## 2021-11-16 RX ADMIN — DEXAMETHASONE SODIUM PHOSPHATE 10 MG: 4 INJECTION, SOLUTION INTRAMUSCULAR; INTRAVENOUS at 09:57

## 2021-11-16 RX ADMIN — SODIUM CHLORIDE 25 ML/HR: 9 INJECTION, SOLUTION INTRAVENOUS at 09:53

## 2021-11-16 RX ADMIN — PACLITAXEL 184 MG: 100 INJECTION, POWDER, LYOPHILIZED, FOR SUSPENSION INTRAVENOUS at 11:20

## 2021-11-16 RX ADMIN — SODIUM CHLORIDE 10 ML: 9 INJECTION INTRAMUSCULAR; INTRAVENOUS; SUBCUTANEOUS at 08:45

## 2021-11-16 RX ADMIN — GEMCITABINE HYDROCHLORIDE 1656 MG: 2 INJECTION, SOLUTION INTRAVENOUS at 12:10

## 2021-11-16 NOTE — PROGRESS NOTES
[facility] Progress Note    Date: 2021    Name: Mundo Armstrong    MRN: [id number]         : 1955    Ms. Ba Arrived ambulatory and in no distress for cycle 9 day 8 of Abraxane/Gemcitabine regimen. Assessment was completed, no acute issues at this time, no new complaints voiced. She reports feeling nervous and tearful last week after chemotherapy. States she reported that to Dr. Georgeann Cabot and she plans to take an ativan mid-day in addition to the morning and evening dose at least the day of and the day after chemo because of the decadron. Port accessed without difficulty, labs drawn and in process. Chemotherapy Flowsheet 2021   Cycle C9 D8   Date 2021   Drug / Regimen Abraxane/Gemcitabine   Dosage 184 mg/1656 mg   Time Up 1120   Time Down 1250   Pre Hydration -   Pre Meds zofran 8 mg IV, Decadron 10 mg IV   Notes -       Ms. Ba's vitals were reviewed. Visit Vitals  BP (!) 173/87 (BP 1 Location: Left upper arm, BP Patient Position: Sitting)   Pulse (!) 103   Temp 98.6 °F (37 °C)   Resp 18   Ht 5' 4\" (1.626 m)   Wt 78.7 kg (173 lb 9.6 oz)   SpO2 97%   BMI 29.80 kg/m²       Lab results were obtained and reviewed. Recent Results (from the past 12 hour(s))   CBC WITH AUTOMATED DIFF    Collection Time: 21  8:45 AM   Result Value Ref Range    WBC 6.2 3.6 - 11.0 K/uL    RBC 3.59 (L) 3.80 - 5.20 M/uL    HGB 10.7 (L) 11.5 - 16.0 g/dL    HCT 33.1 (L) 35.0 - 47.0 %    MCV 92.2 80.0 - 99.0 FL    MCH 29.8 26.0 - 34.0 PG    MCHC 32.3 30.0 - 36.5 g/dL    RDW 15.0 (H) 11.5 - 14.5 %    PLATELET 952 244 - 399 K/uL    MPV 10.5 8.9 - 12.9 FL    NRBC 0.3 (H) 0  WBC    ABSOLUTE NRBC 0.02 (H) 0.00 - 0.01 K/uL    NEUTROPHILS 21 (L) 32 - 75 %    LYMPHOCYTES 57 (H) 12 - 49 %    MONOCYTES 19 (H) 5 - 13 %    EOSINOPHILS 2 0 - 7 %    BASOPHILS 1 0 - 1 %    IMMATURE GRANULOCYTES 0 0.0 - 0.5 %    ABS. NEUTROPHILS 1.3 (L) 1.8 - 8.0 K/UL    ABS. LYMPHOCYTES 3.5 0.8 - 3.5 K/UL    ABS.  MONOCYTES 1.2 (H) 0.0 - 1.0 K/UL    ABS. EOSINOPHILS 0.1 0.0 - 0.4 K/UL    ABS. BASOPHILS 0.0 0.0 - 0.1 K/UL    ABS. IMM. GRANS. 0.0 0.00 - 0.04 K/UL    DF AUTOMATED     METABOLIC PANEL, COMPREHENSIVE    Collection Time: 11/16/21  8:45 AM   Result Value Ref Range    Sodium 141 136 - 145 mmol/L    Potassium 3.3 (L) 3.5 - 5.1 mmol/L    Chloride 102 97 - 108 mmol/L    CO2 30 21 - 32 mmol/L    Anion gap 9 5 - 15 mmol/L    Glucose 76 65 - 100 mg/dL    BUN 12 6 - 20 MG/DL    Creatinine 0.76 0.55 - 1.02 MG/DL    BUN/Creatinine ratio 16 12 - 20      GFR est AA >60 >60 ml/min/1.73m2    GFR est non-AA >60 >60 ml/min/1.73m2    Calcium 9.1 8.5 - 10.1 MG/DL    Bilirubin, total 0.2 0.2 - 1.0 MG/DL    ALT (SGPT) 29 12 - 78 U/L    AST (SGOT) 22 15 - 37 U/L    Alk. phosphatase 97 45 - 117 U/L    Protein, total 6.9 6.4 - 8.2 g/dL    Albumin 3.2 (L) 3.5 - 5.0 g/dL    Globulin 3.7 2.0 - 4.0 g/dL    A-G Ratio 0.9 (L) 1.1 - 2.2         Pre-medications  were administered as ordered and chemotherapy was initiated. 250 ml NS IV main line established. Zofran 8 mg IV given. Decadron 10 mg IV given. 1120: Abraxane 184 mg IV given. 1210: Gemcitabine 1656 mg IV given. Port was flushed and de-accessed post-administration and site intact. Ms. Norma Layne tolerated treatment well and was discharged from [place] [address] in stable condition at 1300. She is to return on November 23 at 8:30 for her next appointment.     Abril Bland RN  November 16, 2021

## 2021-11-23 ENCOUNTER — APPOINTMENT (OUTPATIENT)
Dept: INFUSION THERAPY | Age: 66
End: 2021-11-23
Payer: MEDICARE

## 2021-11-23 ENCOUNTER — HOSPITAL ENCOUNTER (OUTPATIENT)
Dept: INFUSION THERAPY | Age: 66
Discharge: HOME | End: 2021-11-23
Payer: MEDICARE

## 2021-11-23 VITALS
OXYGEN SATURATION: 98 % | BODY MASS INDEX: 29.91 KG/M2 | HEIGHT: 64 IN | WEIGHT: 175.2 LBS | RESPIRATION RATE: 18 BRPM | TEMPERATURE: 98.6 F | SYSTOLIC BLOOD PRESSURE: 154 MMHG | DIASTOLIC BLOOD PRESSURE: 73 MMHG | HEART RATE: 90 BPM

## 2021-11-23 DIAGNOSIS — C25.9 ADENOCARCINOMA OF PANCREAS (HCC): Primary | ICD-10-CM

## 2021-11-23 LAB
ALBUMIN SERPL-MCNC: 3.1 G/DL (ref 3.5–5)
ALBUMIN/GLOB SERPL: 0.9 {RATIO} (ref 1.1–2.2)
ALP SERPL-CCNC: 98 U/L (ref 45–117)
ALT SERPL-CCNC: 29 U/L (ref 12–78)
ANION GAP SERPL CALC-SCNC: 5 MMOL/L (ref 5–15)
AST SERPL-CCNC: 18 U/L (ref 15–37)
BASOPHILS # BLD: 0 K/UL (ref 0–0.1)
BASOPHILS NFR BLD: 0 % (ref 0–1)
BILIRUB SERPL-MCNC: 0.2 MG/DL (ref 0.2–1)
BUN SERPL-MCNC: 10 MG/DL (ref 6–20)
BUN/CREAT SERPL: 14 (ref 12–20)
CALCIUM SERPL-MCNC: 8.8 MG/DL (ref 8.5–10.1)
CHLORIDE SERPL-SCNC: 105 MMOL/L (ref 97–108)
CO2 SERPL-SCNC: 30 MMOL/L (ref 21–32)
CREAT SERPL-MCNC: 0.73 MG/DL (ref 0.55–1.02)
DIFFERENTIAL METHOD BLD: ABNORMAL
EOSINOPHIL # BLD: 0 K/UL (ref 0–0.4)
EOSINOPHIL NFR BLD: 0 % (ref 0–7)
ERYTHROCYTE [DISTWIDTH] IN BLOOD BY AUTOMATED COUNT: 15.6 % (ref 11.5–14.5)
GLOBULIN SER CALC-MCNC: 3.3 G/DL (ref 2–4)
GLUCOSE SERPL-MCNC: 196 MG/DL (ref 65–100)
HCT VFR BLD AUTO: 31.3 % (ref 35–47)
HGB BLD-MCNC: 9.9 G/DL (ref 11.5–16)
IMM GRANULOCYTES # BLD AUTO: 0 K/UL (ref 0–0.04)
IMM GRANULOCYTES NFR BLD AUTO: 0 % (ref 0–0.5)
LYMPHOCYTES # BLD: 1.4 K/UL (ref 0.8–3.5)
LYMPHOCYTES NFR BLD: 57 % (ref 12–49)
MCH RBC QN AUTO: 29.6 PG (ref 26–34)
MCHC RBC AUTO-ENTMCNC: 31.6 G/DL (ref 30–36.5)
MCV RBC AUTO: 93.4 FL (ref 80–99)
MONOCYTES # BLD: 0.2 K/UL (ref 0–1)
MONOCYTES NFR BLD: 6 % (ref 5–13)
NEUTS BAND NFR BLD MANUAL: 1 %
NEUTS SEG # BLD: 1 K/UL (ref 1.8–8)
NEUTS SEG NFR BLD: 36 % (ref 32–75)
NRBC # BLD: 0.03 K/UL (ref 0–0.01)
NRBC BLD-RTO: 1.2 PER 100 WBC
PLATELET # BLD AUTO: 211 K/UL (ref 150–400)
PMV BLD AUTO: 10.6 FL (ref 8.9–12.9)
POTASSIUM SERPL-SCNC: 3.9 MMOL/L (ref 3.5–5.1)
PROT SERPL-MCNC: 6.4 G/DL (ref 6.4–8.2)
RBC # BLD AUTO: 3.35 M/UL (ref 3.8–5.2)
RBC MORPH BLD: ABNORMAL
SODIUM SERPL-SCNC: 140 MMOL/L (ref 136–145)
WBC # BLD AUTO: 2.6 K/UL (ref 3.6–11)

## 2021-11-23 PROCEDURE — 36415 COLL VENOUS BLD VENIPUNCTURE: CPT

## 2021-11-23 PROCEDURE — 96417 CHEMO IV INFUS EACH ADDL SEQ: CPT

## 2021-11-23 PROCEDURE — 80053 COMPREHEN METABOLIC PANEL: CPT

## 2021-11-23 PROCEDURE — 74011000258 HC RX REV CODE- 258: Performed by: INTERNAL MEDICINE

## 2021-11-23 PROCEDURE — 96375 TX/PRO/DX INJ NEW DRUG ADDON: CPT

## 2021-11-23 PROCEDURE — 85025 COMPLETE CBC W/AUTO DIFF WBC: CPT

## 2021-11-23 PROCEDURE — 77030012965 HC NDL HUBR BBMI -A

## 2021-11-23 PROCEDURE — 74011250636 HC RX REV CODE- 250/636: Performed by: INTERNAL MEDICINE

## 2021-11-23 PROCEDURE — 96413 CHEMO IV INFUSION 1 HR: CPT

## 2021-11-23 RX ORDER — ONDANSETRON 2 MG/ML
8 INJECTION INTRAMUSCULAR; INTRAVENOUS ONCE
Status: COMPLETED | OUTPATIENT
Start: 2021-11-23 | End: 2021-11-23

## 2021-11-23 RX ORDER — ACETAMINOPHEN 325 MG/1
650 TABLET ORAL AS NEEDED
Status: ACTIVE | OUTPATIENT
Start: 2021-11-23 | End: 2021-11-23

## 2021-11-23 RX ORDER — DEXAMETHASONE SODIUM PHOSPHATE 4 MG/ML
10 INJECTION, SOLUTION INTRA-ARTICULAR; INTRALESIONAL; INTRAMUSCULAR; INTRAVENOUS; SOFT TISSUE ONCE
Status: COMPLETED | OUTPATIENT
Start: 2021-11-23 | End: 2021-11-23

## 2021-11-23 RX ORDER — ONDANSETRON 2 MG/ML
8 INJECTION INTRAMUSCULAR; INTRAVENOUS AS NEEDED
Status: ACTIVE | OUTPATIENT
Start: 2021-11-23 | End: 2021-11-23

## 2021-11-23 RX ORDER — SODIUM CHLORIDE 9 MG/ML
10 INJECTION INTRAMUSCULAR; INTRAVENOUS; SUBCUTANEOUS AS NEEDED
Status: ACTIVE | OUTPATIENT
Start: 2021-11-23 | End: 2021-11-23

## 2021-11-23 RX ORDER — DIPHENHYDRAMINE HYDROCHLORIDE 50 MG/ML
25 INJECTION, SOLUTION INTRAMUSCULAR; INTRAVENOUS AS NEEDED
Status: ACTIVE | OUTPATIENT
Start: 2021-11-23 | End: 2021-11-23

## 2021-11-23 RX ORDER — HEPARIN 100 UNIT/ML
300-500 SYRINGE INTRAVENOUS AS NEEDED
Status: DISPENSED | OUTPATIENT
Start: 2021-11-23 | End: 2021-11-23

## 2021-11-23 RX ORDER — SODIUM CHLORIDE 9 MG/ML
25 INJECTION, SOLUTION INTRAVENOUS CONTINUOUS
Status: DISPENSED | OUTPATIENT
Start: 2021-11-23 | End: 2021-11-23

## 2021-11-23 RX ADMIN — SODIUM CHLORIDE 10 ML: 9 INJECTION INTRAMUSCULAR; INTRAVENOUS; SUBCUTANEOUS at 12:48

## 2021-11-23 RX ADMIN — PACLITAXEL 184 MG: 100 INJECTION, POWDER, LYOPHILIZED, FOR SUSPENSION INTRAVENOUS at 10:52

## 2021-11-23 RX ADMIN — GEMCITABINE 1656 MG: 38 INJECTION, SOLUTION INTRAVENOUS at 12:00

## 2021-11-23 RX ADMIN — Medication 500 UNITS: at 12:48

## 2021-11-23 RX ADMIN — SODIUM CHLORIDE 10 ML: 9 INJECTION INTRAMUSCULAR; INTRAVENOUS; SUBCUTANEOUS at 08:35

## 2021-11-23 RX ADMIN — ONDANSETRON 8 MG: 2 INJECTION INTRAMUSCULAR; INTRAVENOUS at 09:56

## 2021-11-23 RX ADMIN — SODIUM CHLORIDE 25 ML/HR: 9 INJECTION, SOLUTION INTRAVENOUS at 09:55

## 2021-11-23 RX ADMIN — DEXAMETHASONE SODIUM PHOSPHATE 10 MG: 4 INJECTION, SOLUTION INTRAMUSCULAR; INTRAVENOUS at 10:00

## 2021-11-23 NOTE — PROGRESS NOTES
[facility] Progress Note    Date: 2021    Name: Jese Villar    MRN: [id number]         : 1955    Ms. Ba Arrived ambulatory and in no distress for cycle 9 day 15 of Abraxane and Gemcitabine regimen. Assessment was completed, no acute issues at this time, no new complaints voiced. States she has had a good week, denies any weepy episodes this week and has not taken a mid-day dose of ativan. Eating well. Nonnie Adarsh accessed without difficulty, labs drawn and in process. Chemotherapy Flowsheet 2021   Cycle C9 D15   Date 2021   Drug / Regimen Abrazane/Gemcitabine   Dosage 184 mg/1656 mg   Time Up 1052   Time Down 1239   Pre Hydration -   Pre Meds zofran 8 mg IV, decadron 10 mg IV   Notes -        Ms. Ba's vitals were reviewed. Visit Vitals  BP (!) 154/73 (BP 1 Location: Left upper arm, BP Patient Position: Sitting)   Pulse 90   Temp 98.6 °F (37 °C)   Resp 18   Ht 5' 4\" (1.626 m)   Wt 79.5 kg (175 lb 3.2 oz)   SpO2 98%   BMI 30.07 kg/m²       Lab results were obtained and reviewed. Recent Results (from the past 12 hour(s))   CBC WITH AUTOMATED DIFF    Collection Time: 21  8:35 AM   Result Value Ref Range    WBC 2.6 (L) 3.6 - 11.0 K/uL    RBC 3.35 (L) 3.80 - 5.20 M/uL    HGB 9.9 (L) 11.5 - 16.0 g/dL    HCT 31.3 (L) 35.0 - 47.0 %    MCV 93.4 80.0 - 99.0 FL    MCH 29.6 26.0 - 34.0 PG    MCHC 31.6 30.0 - 36.5 g/dL    RDW 15.6 (H) 11.5 - 14.5 %    PLATELET 144 131 - 164 K/uL    MPV 10.6 8.9 - 12.9 FL    NRBC 1.2 (H) 0  WBC    ABSOLUTE NRBC 0.03 (H) 0.00 - 0.01 K/uL    NEUTROPHILS 36 32 - 75 %    BAND NEUTROPHILS 1 %    LYMPHOCYTES 57 (H) 12 - 49 %    MONOCYTES 6 5 - 13 %    EOSINOPHILS 0 0 - 7 %    BASOPHILS 0 0 - 1 %    IMMATURE GRANULOCYTES 0 0.0 - 0.5 %    ABS. NEUTROPHILS 1.0 (L) 1.8 - 8.0 K/UL    ABS. LYMPHOCYTES 1.4 0.8 - 3.5 K/UL    ABS. MONOCYTES 0.2 0.0 - 1.0 K/UL    ABS. EOSINOPHILS 0.0 0.0 - 0.4 K/UL    ABS. BASOPHILS 0.0 0.0 - 0.1 K/UL    ABS. IMM.  GRANS. 0.0 0.00 - 0.04 K/UL    DF MANUAL      RBC COMMENTS ANISOCYTOSIS  1+        RBC COMMENTS OVALOCYTES  1+        RBC COMMENTS POIKILOCYTOSIS  1+       METABOLIC PANEL, COMPREHENSIVE    Collection Time: 11/23/21  8:35 AM   Result Value Ref Range    Sodium 140 136 - 145 mmol/L    Potassium 3.9 3.5 - 5.1 mmol/L    Chloride 105 97 - 108 mmol/L    CO2 30 21 - 32 mmol/L    Anion gap 5 5 - 15 mmol/L    Glucose 196 (H) 65 - 100 mg/dL    BUN 10 6 - 20 MG/DL    Creatinine 0.73 0.55 - 1.02 MG/DL    BUN/Creatinine ratio 14 12 - 20      GFR est AA >60 >60 ml/min/1.73m2    GFR est non-AA >60 >60 ml/min/1.73m2    Calcium 8.8 8.5 - 10.1 MG/DL    Bilirubin, total 0.2 0.2 - 1.0 MG/DL    ALT (SGPT) 29 12 - 78 U/L    AST (SGOT) 18 15 - 37 U/L    Alk. phosphatase 98 45 - 117 U/L    Protein, total 6.4 6.4 - 8.2 g/dL    Albumin 3.1 (L) 3.5 - 5.0 g/dL    Globulin 3.3 2.0 - 4.0 g/dL    A-G Ratio 0.9 (L) 1.1 - 2.2         Pre-medications  were administered as ordered and chemotherapy was initiated. 250 ml NS IV main line established. Zofran 8 mg IVP given  Decadron 10 mg IVP given  1052: Abraxane 184 mg IV given  1200: Gemcitabine 1656 mg IV given  Port was flushed and de-accessed post administration and site intact. Ms. Adeline Escamilla tolerated treatment well. She tolerated soup and sandwich lunch well. She was discharged from [place] [address] in stable condition at 1250. She is to return on December 7 at 8:30 for her next chemotherapy appointment.      Rylee Villar RN  November 23, 2021

## 2021-11-24 DIAGNOSIS — F32.A DEPRESSION, UNSPECIFIED DEPRESSION TYPE: ICD-10-CM

## 2021-11-26 RX ORDER — METHYLPHENIDATE HYDROCHLORIDE 5 MG/1
5 TABLET ORAL 3 TIMES DAILY
Qty: 90 TABLET | Refills: 0 | Status: SHIPPED | OUTPATIENT
Start: 2021-11-26 | End: 2022-02-23

## 2021-11-29 ENCOUNTER — HOSPITAL ENCOUNTER (OUTPATIENT)
Dept: INFUSION THERAPY | Age: 66
Discharge: HOME | End: 2021-11-29
Payer: MEDICARE

## 2021-11-29 VITALS
HEART RATE: 91 BPM | TEMPERATURE: 98.5 F | SYSTOLIC BLOOD PRESSURE: 151 MMHG | DIASTOLIC BLOOD PRESSURE: 80 MMHG | BODY MASS INDEX: 30.08 KG/M2 | HEIGHT: 64 IN | WEIGHT: 176.2 LBS | RESPIRATION RATE: 17 BRPM | OXYGEN SATURATION: 99 %

## 2021-11-29 LAB
APPEARANCE UR: CLEAR
BACTERIA URNS QL MICRO: ABNORMAL /HPF
BILIRUB UR QL: NEGATIVE
CAOX CRY URNS QL MICRO: ABNORMAL
COLOR UR: ABNORMAL
EPITH CASTS URNS QL MICRO: ABNORMAL /LPF
GLUCOSE UR STRIP.AUTO-MCNC: 100 MG/DL
HGB UR QL STRIP: NEGATIVE
KETONES UR QL STRIP.AUTO: NEGATIVE MG/DL
LEUKOCYTE ESTERASE UR QL STRIP.AUTO: NEGATIVE
NITRITE UR QL STRIP.AUTO: NEGATIVE
PH UR STRIP: 5.5 [PH] (ref 5–8)
PROT UR STRIP-MCNC: NEGATIVE MG/DL
RBC #/AREA URNS HPF: ABNORMAL /HPF (ref 0–5)
SP GR UR REFRACTOMETRY: 1.02 (ref 1–1.03)
UA: UC IF INDICATED,UAUC: ABNORMAL
UROBILINOGEN UR QL STRIP.AUTO: 0.2 EU/DL (ref 0.2–1)
WBC URNS QL MICRO: ABNORMAL /HPF (ref 0–4)

## 2021-11-29 PROCEDURE — 81001 URINALYSIS AUTO W/SCOPE: CPT

## 2021-11-29 NOTE — PROGRESS NOTES
Dr. Tyrel Barnhart notified of U/A results. No new orders received. Patient notified of results and that Dr. Tyrel Barnhart did not order any further treatment. Patient verbalized understanding.

## 2021-11-29 NOTE — PROGRESS NOTES
Patient arrived ambulatory and in no distress to the [facility]. VS stable. She reports lower abdominal discomfort and a fever on November 26, 2021. Afebrile today. Order received for urine sample for U/A. Urine sample collected and sent to lab. Patient discharged in stable condition.

## 2021-11-30 ENCOUNTER — APPOINTMENT (OUTPATIENT)
Dept: INFUSION THERAPY | Age: 66
End: 2021-11-30
Payer: MEDICARE

## 2021-12-07 ENCOUNTER — HOSPITAL ENCOUNTER (OUTPATIENT)
Dept: INFUSION THERAPY | Age: 66
Discharge: HOME | End: 2021-12-07
Payer: MEDICARE

## 2021-12-07 VITALS
TEMPERATURE: 98.4 F | WEIGHT: 179 LBS | RESPIRATION RATE: 18 BRPM | DIASTOLIC BLOOD PRESSURE: 82 MMHG | BODY MASS INDEX: 30.56 KG/M2 | HEIGHT: 64 IN | SYSTOLIC BLOOD PRESSURE: 176 MMHG | HEART RATE: 91 BPM

## 2021-12-07 DIAGNOSIS — C25.9 ADENOCARCINOMA OF PANCREAS (HCC): Primary | ICD-10-CM

## 2021-12-07 LAB
ALBUMIN SERPL-MCNC: 3 G/DL (ref 3.5–5)
ALBUMIN/GLOB SERPL: 0.9 {RATIO} (ref 1.1–2.2)
ALP SERPL-CCNC: 123 U/L (ref 45–117)
ALT SERPL-CCNC: 25 U/L (ref 12–78)
ANION GAP SERPL CALC-SCNC: 8 MMOL/L (ref 5–15)
AST SERPL-CCNC: 20 U/L (ref 15–37)
BASOPHILS # BLD: 0.1 K/UL (ref 0–0.1)
BASOPHILS NFR BLD: 2 % (ref 0–1)
BILIRUB SERPL-MCNC: 0.2 MG/DL (ref 0.2–1)
BUN SERPL-MCNC: 10 MG/DL (ref 6–20)
BUN/CREAT SERPL: 13 (ref 12–20)
CALCIUM SERPL-MCNC: 8.5 MG/DL (ref 8.5–10.1)
CHLORIDE SERPL-SCNC: 101 MMOL/L (ref 97–108)
CO2 SERPL-SCNC: 29 MMOL/L (ref 21–32)
CREAT SERPL-MCNC: 0.78 MG/DL (ref 0.55–1.02)
DIFFERENTIAL METHOD BLD: ABNORMAL
EOSINOPHIL # BLD: 0.2 K/UL (ref 0–0.4)
EOSINOPHIL NFR BLD: 4 % (ref 0–7)
ERYTHROCYTE [DISTWIDTH] IN BLOOD BY AUTOMATED COUNT: 16.5 % (ref 11.5–14.5)
GLOBULIN SER CALC-MCNC: 3.3 G/DL (ref 2–4)
GLUCOSE SERPL-MCNC: 334 MG/DL (ref 65–100)
HCT VFR BLD AUTO: 32.2 % (ref 35–47)
HGB BLD-MCNC: 10.2 G/DL (ref 11.5–16)
IMM GRANULOCYTES # BLD AUTO: 0 K/UL (ref 0–0.04)
IMM GRANULOCYTES NFR BLD AUTO: 0 % (ref 0–0.5)
LYMPHOCYTES # BLD: 2.1 K/UL (ref 0.8–3.5)
LYMPHOCYTES NFR BLD: 36 % (ref 12–49)
MCH RBC QN AUTO: 29.7 PG (ref 26–34)
MCHC RBC AUTO-ENTMCNC: 31.7 G/DL (ref 30–36.5)
MCV RBC AUTO: 93.6 FL (ref 80–99)
MONOCYTES # BLD: 2 K/UL (ref 0–1)
MONOCYTES NFR BLD: 34 % (ref 5–13)
NEUTS SEG # BLD: 1.4 K/UL (ref 1.8–8)
NEUTS SEG NFR BLD: 24 % (ref 32–75)
NRBC # BLD: 0.02 K/UL (ref 0–0.01)
NRBC BLD-RTO: 0.3 PER 100 WBC
PLATELET # BLD AUTO: 722 K/UL (ref 150–400)
PLATELET COMMENTS,PCOM: ABNORMAL
PMV BLD AUTO: 9.7 FL (ref 8.9–12.9)
POTASSIUM SERPL-SCNC: 4.1 MMOL/L (ref 3.5–5.1)
PROT SERPL-MCNC: 6.3 G/DL (ref 6.4–8.2)
RBC # BLD AUTO: 3.44 M/UL (ref 3.8–5.2)
RBC MORPH BLD: ABNORMAL
SODIUM SERPL-SCNC: 138 MMOL/L (ref 136–145)
WBC # BLD AUTO: 5.8 K/UL (ref 3.6–11)

## 2021-12-07 PROCEDURE — 96417 CHEMO IV INFUS EACH ADDL SEQ: CPT

## 2021-12-07 PROCEDURE — 96375 TX/PRO/DX INJ NEW DRUG ADDON: CPT

## 2021-12-07 PROCEDURE — 96413 CHEMO IV INFUSION 1 HR: CPT

## 2021-12-07 PROCEDURE — 77030012965 HC NDL HUBR BBMI -A

## 2021-12-07 PROCEDURE — 74011250636 HC RX REV CODE- 250/636: Performed by: INTERNAL MEDICINE

## 2021-12-07 PROCEDURE — 80053 COMPREHEN METABOLIC PANEL: CPT

## 2021-12-07 PROCEDURE — 36415 COLL VENOUS BLD VENIPUNCTURE: CPT

## 2021-12-07 PROCEDURE — 85025 COMPLETE CBC W/AUTO DIFF WBC: CPT

## 2021-12-07 PROCEDURE — 74011000258 HC RX REV CODE- 258: Performed by: INTERNAL MEDICINE

## 2021-12-07 RX ORDER — SODIUM CHLORIDE 9 MG/ML
10 INJECTION INTRAMUSCULAR; INTRAVENOUS; SUBCUTANEOUS AS NEEDED
Status: ACTIVE | OUTPATIENT
Start: 2021-12-07 | End: 2021-12-07

## 2021-12-07 RX ORDER — ACETAMINOPHEN 325 MG/1
650 TABLET ORAL AS NEEDED
Status: ACTIVE | OUTPATIENT
Start: 2021-12-07 | End: 2021-12-07

## 2021-12-07 RX ORDER — DEXAMETHASONE SODIUM PHOSPHATE 4 MG/ML
10 INJECTION, SOLUTION INTRA-ARTICULAR; INTRALESIONAL; INTRAMUSCULAR; INTRAVENOUS; SOFT TISSUE ONCE
Status: COMPLETED | OUTPATIENT
Start: 2021-12-07 | End: 2021-12-07

## 2021-12-07 RX ORDER — ONDANSETRON 2 MG/ML
8 INJECTION INTRAMUSCULAR; INTRAVENOUS AS NEEDED
Status: ACTIVE | OUTPATIENT
Start: 2021-12-07 | End: 2021-12-07

## 2021-12-07 RX ORDER — DIPHENHYDRAMINE HYDROCHLORIDE 50 MG/ML
25 INJECTION, SOLUTION INTRAMUSCULAR; INTRAVENOUS AS NEEDED
Status: ACTIVE | OUTPATIENT
Start: 2021-12-07 | End: 2021-12-07

## 2021-12-07 RX ORDER — HEPARIN 100 UNIT/ML
300-500 SYRINGE INTRAVENOUS AS NEEDED
Status: DISPENSED | OUTPATIENT
Start: 2021-12-07 | End: 2021-12-07

## 2021-12-07 RX ORDER — SODIUM CHLORIDE 9 MG/ML
25 INJECTION, SOLUTION INTRAVENOUS CONTINUOUS
Status: DISPENSED | OUTPATIENT
Start: 2021-12-07 | End: 2021-12-07

## 2021-12-07 RX ORDER — ONDANSETRON 2 MG/ML
8 INJECTION INTRAMUSCULAR; INTRAVENOUS ONCE
Status: COMPLETED | OUTPATIENT
Start: 2021-12-07 | End: 2021-12-07

## 2021-12-07 RX ADMIN — SODIUM CHLORIDE 25 ML/HR: 9 INJECTION, SOLUTION INTRAVENOUS at 10:10

## 2021-12-07 RX ADMIN — GEMCITABINE 1656 MG: 38 INJECTION, SOLUTION INTRAVENOUS at 11:38

## 2021-12-07 RX ADMIN — ONDANSETRON 8 MG: 2 INJECTION INTRAMUSCULAR; INTRAVENOUS at 10:22

## 2021-12-07 RX ADMIN — DEXAMETHASONE SODIUM PHOSPHATE 10 MG: 4 INJECTION, SOLUTION INTRAMUSCULAR; INTRAVENOUS at 10:14

## 2021-12-07 RX ADMIN — SODIUM CHLORIDE 10 ML: 9 INJECTION INTRAMUSCULAR; INTRAVENOUS; SUBCUTANEOUS at 12:30

## 2021-12-07 RX ADMIN — PACLITAXEL 184 MG: 100 INJECTION, POWDER, LYOPHILIZED, FOR SUSPENSION INTRAVENOUS at 10:35

## 2021-12-07 RX ADMIN — SODIUM CHLORIDE 10 ML: 9 INJECTION INTRAMUSCULAR; INTRAVENOUS; SUBCUTANEOUS at 08:50

## 2021-12-07 RX ADMIN — Medication 500 UNITS: at 12:30

## 2021-12-07 NOTE — PROGRESS NOTES
[facility] Progress Note    Date: 2021    Name: Harriet Foy    MRN: [id number]         : 1955    Ms. Ba Arrived ambulatory and in no distress for cycle 10 day 1 of Abraxane/Gemcitabine regimen. Assessment was completed, no acute issues at this time, no new complaints voiced. States pain is very well controlled. Is eating well. Port accessed without difficulty, labs drawn and in process. Chemotherapy Flowsheet 2021   Cycle C10 D1   Date 2021   Drug / Regimen Abraxane/Gemcitabine   Dosage 184 mg/1656 mg   Time Up 1035   Time Down -   Pre Hydration -   Pre Meds zofran 8 mg IV, decadron 10 mg IV   Notes -     Ms. Ba's vitals were reviewed. Visit Vitals  BP (!) 176/82 (BP 1 Location: Right upper arm, BP Patient Position: Sitting)   Pulse 91   Temp 98.4 °F (36.9 °C)   Resp 18   Ht 5' 4\" (1.626 m)   Wt 81.2 kg (179 lb)   Breastfeeding No   BMI 30.73 kg/m²       Lab results were obtained and reviewed. Recent Results (from the past 12 hour(s))   CBC WITH AUTOMATED DIFF    Collection Time: 21  8:50 AM   Result Value Ref Range    WBC 5.8 3.6 - 11.0 K/uL    RBC 3.44 (L) 3.80 - 5.20 M/uL    HGB 10.2 (L) 11.5 - 16.0 g/dL    HCT 32.2 (L) 35.0 - 47.0 %    MCV 93.6 80.0 - 99.0 FL    MCH 29.7 26.0 - 34.0 PG    MCHC 31.7 30.0 - 36.5 g/dL    RDW 16.5 (H) 11.5 - 14.5 %    PLATELET 531 (H) 989 - 400 K/uL    MPV 9.7 8.9 - 12.9 FL    NRBC 0.3 (H) 0  WBC    ABSOLUTE NRBC 0.02 (H) 0.00 - 0.01 K/uL    NEUTROPHILS 24 (L) 32 - 75 %    LYMPHOCYTES 36 12 - 49 %    MONOCYTES 34 (H) 5 - 13 %    EOSINOPHILS 4 0 - 7 %    BASOPHILS 2 (H) 0 - 1 %    IMMATURE GRANULOCYTES 0 0.0 - 0.5 %    ABS. NEUTROPHILS 1.4 (L) 1.8 - 8.0 K/UL    ABS. LYMPHOCYTES 2.1 0.8 - 3.5 K/UL    ABS. MONOCYTES 2.0 (H) 0.0 - 1.0 K/UL    ABS. EOSINOPHILS 0.2 0.0 - 0.4 K/UL    ABS. BASOPHILS 0.1 0.0 - 0.1 K/UL    ABS. IMM.  GRANS. 0.0 0.00 - 0.04 K/UL    DF AUTOMATED      PLATELET COMMENTS Increased Platelets      RBC COMMENTS ANISOCYTOSIS  1+        RBC COMMENTS POIKILOCYTOSIS  1+        RBC COMMENTS HYPOCHROMIA  1+        RBC COMMENTS ACANTHOCYTES  1+  POLYCHROMASIA  1+       METABOLIC PANEL, COMPREHENSIVE    Collection Time: 12/07/21  8:50 AM   Result Value Ref Range    Sodium 138 136 - 145 mmol/L    Potassium 4.1 3.5 - 5.1 mmol/L    Chloride 101 97 - 108 mmol/L    CO2 29 21 - 32 mmol/L    Anion gap 8 5 - 15 mmol/L    Glucose 334 (H) 65 - 100 mg/dL    BUN 10 6 - 20 MG/DL    Creatinine 0.78 0.55 - 1.02 MG/DL    BUN/Creatinine ratio 13 12 - 20      GFR est AA >60 >60 ml/min/1.73m2    GFR est non-AA >60 >60 ml/min/1.73m2    Calcium 8.5 8.5 - 10.1 MG/DL    Bilirubin, total 0.2 0.2 - 1.0 MG/DL    ALT (SGPT) 25 12 - 78 U/L    AST (SGOT) 20 15 - 37 U/L    Alk. phosphatase 123 (H) 45 - 117 U/L    Protein, total 6.3 (L) 6.4 - 8.2 g/dL    Albumin 3.0 (L) 3.5 - 5.0 g/dL    Globulin 3.3 2.0 - 4.0 g/dL    A-G Ratio 0.9 (L) 1.1 - 2.2         Pre-medications  were administered as ordered and chemotherapy was initiated.  ml IV main line established. Decadron 10 mg IVP given  Zofran 8 mg IVP given  1035: Abraxane 184 mg IV given  1138: Gemcitabine 1656 mg IV given  Port was flushed and de-accessed post administration. Site intact. Ms. Chantale Moreno tolerated treatment well and was discharged from [place] [address] in stable condition at 1235. She is to return on December 14 at 8:30 for her next appointment.     Andrea Mauro RN  December 7, 2021

## 2021-12-09 ENCOUNTER — OFFICE VISIT (OUTPATIENT)
Dept: ONCOLOGY | Age: 66
End: 2021-12-09
Payer: MEDICARE

## 2021-12-09 VITALS
HEIGHT: 64 IN | RESPIRATION RATE: 16 BRPM | TEMPERATURE: 99 F | HEART RATE: 85 BPM | DIASTOLIC BLOOD PRESSURE: 62 MMHG | OXYGEN SATURATION: 98 % | BODY MASS INDEX: 30.46 KG/M2 | SYSTOLIC BLOOD PRESSURE: 131 MMHG | WEIGHT: 178.4 LBS

## 2021-12-09 DIAGNOSIS — M79.2 NEUROPATHIC PAIN: ICD-10-CM

## 2021-12-09 PROCEDURE — G8510 SCR DEP NEG, NO PLAN REQD: HCPCS | Performed by: INTERNAL MEDICINE

## 2021-12-09 PROCEDURE — G8399 PT W/DXA RESULTS DOCUMENT: HCPCS | Performed by: INTERNAL MEDICINE

## 2021-12-09 PROCEDURE — G8752 SYS BP LESS 140: HCPCS | Performed by: INTERNAL MEDICINE

## 2021-12-09 PROCEDURE — 99214 OFFICE O/P EST MOD 30 MIN: CPT | Performed by: INTERNAL MEDICINE

## 2021-12-09 PROCEDURE — 1101F PT FALLS ASSESS-DOCD LE1/YR: CPT | Performed by: INTERNAL MEDICINE

## 2021-12-09 PROCEDURE — 3017F COLORECTAL CA SCREEN DOC REV: CPT | Performed by: INTERNAL MEDICINE

## 2021-12-09 PROCEDURE — 1090F PRES/ABSN URINE INCON ASSESS: CPT | Performed by: INTERNAL MEDICINE

## 2021-12-09 PROCEDURE — G8536 NO DOC ELDER MAL SCRN: HCPCS | Performed by: INTERNAL MEDICINE

## 2021-12-09 PROCEDURE — G8754 DIAS BP LESS 90: HCPCS | Performed by: INTERNAL MEDICINE

## 2021-12-09 PROCEDURE — G8417 CALC BMI ABV UP PARAM F/U: HCPCS | Performed by: INTERNAL MEDICINE

## 2021-12-09 PROCEDURE — G8427 DOCREV CUR MEDS BY ELIG CLIN: HCPCS | Performed by: INTERNAL MEDICINE

## 2021-12-09 RX ORDER — DULOXETIN HYDROCHLORIDE 30 MG/1
90 CAPSULE, DELAYED RELEASE ORAL DAILY
Qty: 90 CAPSULE | Refills: 5 | Status: SHIPPED | OUTPATIENT
Start: 2021-12-09 | End: 2022-06-02

## 2021-12-09 NOTE — PROGRESS NOTES
Hebert Sadler is a 77 y.o. female here for follow up for Pancreatic cancer. Patient states the second day after chemo she will run a fever. Last cycle she had a fever of 101.8, it lasted for less than 24 hours. Today patient has a temp of 99.0. Patient feels she is always dehydrated. Patient feels off balance a lot, she cannot even feel her feet at times, other times she has numbness and tingling. She feels her hands are better with the cymbalta    Key Oncology Meds             ondansetron (ZOFRAN ODT) 4 mg disintegrating tablet Take 1 Tab by mouth every eight (8) hours as needed for Nausea or Vomiting. prochlorperazine (COMPAZINE) 10 mg tablet Take 0.5 Tabs by mouth every six (6) hours as needed for Nausea. Key Pain Meds             acetaminophen (Acetaminophen Extra Strength) 500 mg tablet Take 1,000 mg by mouth daily as needed.         Chemotherapy Flowsheet 12/7/2021   Cycle C10 D1   Date 12/7/2021   Drug / Regimen Abraxane/Gemcitabine   Dosage 184 mg/1656 mg   Time Up 1035   Time Down 1215   Pre Hydration -   Pre Meds zofran 8 mg IV, decadron 10 mg IV   Notes -

## 2021-12-09 NOTE — PROGRESS NOTES
Reason for Visit:   Tim Larose a 48 P. o. female who is seen for pancreatic adenocarcinoma     Treatment History:   Dx: Pancreatic Adenocarcinoma--May 2020--[id number]--[id number] of splenic vein and superior mesenteric vein  Tx: mFOLFIRINOX--first cycle 5/26/2020, second cycle 6/10/2020, dose reduced 15% cycle 3 on 6/30/2020--delayed due to severe side effects--switched to Gemcitabine/Abraxane--Cycle #1 7/29/2002, Cycle #2 9/2/2020, Cycle #3 10/7/2020, Cycle #4 11/4/2020. Neoadjuvant Radiation with Dr Jazmyne Mayfield ending 12/18/2021. Distal Pancreatectomy, Splenectomy, and Civa Sheet with Dr Bhavna Abernathy on 3/17/2021. Adjuvant radiation with Dr Jazmyne Mayfield. Restart Gemcitabine/abraxane Cycle #1 7/6/2021, Cycle #2 8/3/2021, Cycle #3 8/31/2021, Cycle #4 9/28/2021  Goal: Prolong life--Palliative  History of Present Illness:   DOign well, feeling ok, lots of pain in the feet--neuropathy--feels burning--trouble walking due to the pain. Increase of cymbalta helped (now 60mg daily). Taking hydromorphone sparingly and it helps some. Trouble sleeping--frequent wakenings--sometimes due to the pain.     Past Medical History:   Diagnosis Date    Adenocarcinoma of pancreas ([facility] [address] 05/13/2020    Dr Evens Chen biopsy via EUS    Diabetes ([facility] [address]     GERD (gastroesophageal reflux disease)     Hernia of abdominal cavity     Subxyphoid hernia    Hypertension     Inflammatory polyarthropathy (HCC)     Joint pain     Joint swelling     Menopause     Ocular nevus of left eye     Pancreatitis     Tracheal stenosis       Past Surgical History:   Procedure Laterality Date    HX CARPAL TUNNEL RELEASE Bilateral     HX COLONOSCOPY      HX HYSTERECTOMY      HX KNEE ARTHROSCOPY Right     HX KNEE REPLACEMENT Right     partial    HX LAP CHOLECYSTECTOMY      HX TONSILLECTOMY      HX VASCULAR ACCESS        Social History     Tobacco Use    Smoking status: Never Smoker    Smokeless tobacco: Never Used   Substance Use Topics    Alcohol use: No     Alcohol/week: 0.0 standard drinks      Family History   Problem Relation Age of Onset    Heart Disease Mother     Heart Attack Mother     Cancer Father         lung    Diabetes Sister      Current Outpatient Medications   Medication Sig    methylphenidate HCl (Ritalin) 5 mg tablet Take 1 Tablet by mouth three (3) times daily. Max Daily Amount: 15 mg.    Toujeo SoloStar U-300 Insulin 300 unit/mL (1.5 mL) inpn pen INJECT 55 UNITS ONCE DAILY AT BEDTIME - DOSE INCREASED    gabapentin (NEURONTIN) 300 mg capsule TAKE 2 CAPSULES BY MOUTH THREE TIMES DAILY    lipase-protease-amylase (Creon) 12,000-38,000 -60,000 unit capsule Take 2 Capsules by mouth three (3) times daily (with meals). 2 caps with meal and 1 with snacks  Indications: exocrine pancreatic insufficiency    DULoxetine (CYMBALTA) 30 mg capsule Take 1 Capsule by mouth two (2) times a day. Indications: neuropathic pain    lidocaine-prilocaine (EMLA) topical cream Apply  to affected area as needed for Pain (pain ). Apply a thin layer over port site prior to accessing port    nystatin (MYCOSTATIN) powder Apply 1 g to affected area three (3) times daily.  levoFLOXacin (Levaquin) 500 mg tablet Take 1 Tablet by mouth daily. (Patient not taking: Reported on 9/23/2021)    HumaLOG KwikPen Insulin 100 unit/mL kwikpen patient on sliding scale    LORazepam (ATIVAN) 1 mg tablet Take 1 Tablet by mouth every six (6) hours as needed for Anxiety. Max Daily Amount: 4 mg. Indications: nausea and vomiting caused by cancer drugs (Patient taking differently: Take 1 mg by mouth two (2) times a day.  Indications: nausea and vomiting caused by cancer drugs)    Blood-Glucose Transmitter (Dexcom G6 Transmitter) fadi Use as directed to monitor blood glucose as directed (Patient not taking: Reported on 9/29/2021)    Blood-Glucose Sensor (Dexcom G6 Sensor) fadi Use as directed to monitor blood glucose as directed (Patient not taking: Reported on 9/29/2021)    Blood-Glucose Meter,Continuous (Dexcom G6 ) misc Use as directed to monitor blood glucose as directed (Patient not taking: Reported on 9/29/2021)    potassium chloride (K-DUR, KLOR-CON) 20 mEq tablet Take 1 Tab by mouth two (2) times a day. (Patient not taking: Reported on 9/23/2021)    hydrocortisone (ANUSOL-HC) 25 mg supp Insert 1 Suppository into rectum nightly.  famotidine (Pepcid) 20 mg tablet Take 20 mg by mouth two (2) times a day.  sennosides (Senna) 8.6 mg cap Take 1-2 Tablets by mouth two (2) times a day.  polyethylene glycol (Miralax) 17 gram/dose powder Take 17 g by mouth daily as needed.  carvediloL (COREG) 25 mg tablet TAKE 1 TABLET BY MOUTH TWICE DAILY (Patient not taking: Reported on 9/23/2021)    diphenhydrAMINE (BENADRYL) 25 mg tablet Take 25 mg by mouth nightly as needed for Itching or Skin Irritation.  promethazine (PHENERGAN) 25 mg tablet Take 1 Tab by mouth every six (6) hours as needed for Nausea.  ondansetron (ZOFRAN ODT) 4 mg disintegrating tablet Take 1 Tab by mouth every eight (8) hours as needed for Nausea or Vomiting.  diphenoxylate-atropine (LomotiL) 2.5-0.025 mg per tablet Take 2 Tabs by mouth four (4) times daily as needed for Diarrhea. Max Daily Amount: 8 Tabs.  prochlorperazine (COMPAZINE) 10 mg tablet Take 0.5 Tabs by mouth every six (6) hours as needed for Nausea.  acetaminophen (Acetaminophen Extra Strength) 500 mg tablet Take 1,000 mg by mouth daily as needed.  Needle, Disp, ndle 1 Each by Does Not Apply route two (2) times daily as needed for Other. (Patient not taking: Reported on 9/29/2021)     No current facility-administered medications for this visit. No Known Allergies     Review of Systems: A complete review of systems was obtained, negative except as described above. Physical Exam:   There were no vitals taken for this visit.   ECOG PS: 1  General: No distress  Eyes: PERRLA, anicteric sclerae  HENT: Atraumatic, OP clear  Neck: Supple  Lymphatic: No cervical, supraclavicular, or inguinal adenopathy  Respiratory: CTAB, normal respiratory effort  CV: Normal rate, regular rhythm, no murmurs, no peripheral edema  GI: Soft, nontender, nondistended, no masses, no hepatomegaly, no splenomegaly  MS: Normal gait and station. Digits without clubbing or cyanosis. Skin: No rashes, ecchymoses, or petechiae. Normal temperature, turgor, and texture. Psych: Alert, oriented, appropriate affect, normal judgment/insight    Results:     Lab Results   Component Value Date/Time    WBC 5.8 12/07/2021 08:50 AM    HGB 10.2 (L) 12/07/2021 08:50 AM    HCT 32.2 (L) 12/07/2021 08:50 AM    PLATELET 789 (H) 43/11/7738 08:50 AM    MCV 93.6 12/07/2021 08:50 AM    ABS. NEUTROPHILS 1.4 (L) 12/07/2021 08:50 AM     Lab Results   Component Value Date/Time    Sodium 138 12/07/2021 08:50 AM    Potassium 4.1 12/07/2021 08:50 AM    Chloride 101 12/07/2021 08:50 AM    CO2 29 12/07/2021 08:50 AM    Glucose 334 (H) 12/07/2021 08:50 AM    BUN 10 12/07/2021 08:50 AM    Creatinine 0.78 12/07/2021 08:50 AM    GFR est AA >60 12/07/2021 08:50 AM    GFR est non-AA >60 12/07/2021 08:50 AM    Calcium 8.5 12/07/2021 08:50 AM    Glucose (POC) 192 (H) 07/15/2020 11:56 AM    Creatinine (POC) 0.7 02/06/2013 10:21 AM     Lab Results   Component Value Date/Time    Bilirubin, total 0.2 12/07/2021 08:50 AM    ALT (SGPT) 25 12/07/2021 08:50 AM    Alk. phosphatase 123 (H) 12/07/2021 08:50 AM    Protein, total 6.3 (L) 12/07/2021 08:50 AM    Albumin 3.0 (L) 12/07/2021 08:50 AM    Globulin 3.3 12/07/2021 08:50 AM       CT A/P 4/30/2020  IMPRESSION: Suspect neoplastic pancreatic mass versus atypical focal  pancreatitis with splenic and superior mesenteric vein occlusion. Consider  further evaluation with endoscopic ultrasound at which time biopsy could  potentially be performed.     CT Chest 5/21/2020  IMPRESSION:  1. No evidence of pulmonary metastatic disease.  No evidence of mediastinal or  hilar lymphadenopathy. No pleural effusions identified. 2. No definite evidence of central pulmonary embolic disease. 3. Presence of a mass lesion involving the pancreatic head/body. 4. Evidence of fatty infiltration involving the liver. Presence of focal  low-density areas within the liver compatible with cysts. Surgical absence of  the gallbladder.     CT A/P 6/19/2020  IMPRESSION:  Pancreatitis with ascites favored. Discrete pancreatic mass is not identified. No biliary dilatation or definite arterial or venous thrombosis. No liver  metastases. Fat-containing ventral hernia  Nonobstructing left renal calculus  Retrolisthesis of L2 and L3.     CT C/A/P 10/12/2020  IMPRESSION:  1. Interval improvement in appearance of the pancreas (see discussion above). 2. Normal sized liver. Stable appearance of the previously described areas of  decreased attenuation within liver. 3. Surgical absence of the gallbladder. 4. Evidence of splenomegaly. 5. Normal sized kidneys. Presence of small, nonobstructing calculi within the  left kidney. No evidence of hydronephrotic change. 6. Suboptimal distention of the urinary bladder. 7. Presence of a small, fat-containing ventral hernia in the mid abdominal  Region. CT C/A/P 9/17/2021  IMPRESSION  New peritoneal implants identified in the anterior pelvis  Interval splenectomy and repair of the ventral hernia  Interval thickening of the GE junction and circumferential thickening of the  pylorus. This may represent gastritis but correlation with endoscopy or upper GI  recommended     Path: 5/8/2020  CYTOLOGIC INTERPRETATION:   Pancreas, EUS-guided fine needle aspiration and cell blocks: Adenocarcinoma     Path: 3/17/2021  No residual malignancy in pancrease, 0/29 lymph nodes.   2-3mm focus metastatic adenocarcinoma within hernia sac     Assessment:   1) Pancreatic Adenocarcinoma--Unresectable  2) Neoplasm Pain/Neuropathic Pain  3) Fatigue  4) Nausea  5) DM  6) Nutrition  7) Diarrhea  8) Hypotension  9) Abd Pain  10) Left Hydronephrosis  11) Anxiety/Insomnia     Plan:   1) Completed neoadjuvant chemotherapy. Radiation with Dr Sylvia Retana. Definitive resection with Dr Tiara Shah focus of adenocarcinoma in ventral hernia. Now peritoneal disease/mets. She is doing well with palliative chemotherapy with Gemcitaine/Abraxane. Markers are dropping.       2) S/p celiac block--pain in abd controlled. Having increased neuropathy from chemotherapy-- increase Cymbalta to 90mg. Will also increase the hydromorphone from 1mg prn to 2mg prn--can use at bedtime and 3-4 times daily if needed. Watch for OIC. Need her up and around so if opiate required to accomplish then ok.      3) Methylphenidate 5mg bid--has helped.       4) Resolved.    5) Defer to Dr Mario Collins for further management.       6) On pancreatic enzymes--weight has stabilized.    7) Chemo related--hopefully with not recurr.  Taking senna/miralax to prevent constipation. 8) Resolved currently     9) Treating for possible intraabdominal infection with cipro/metronidazole. Scans show stable disease in abd.      10) Renal intact--had significant diuresis after passing stone.   Likely resovled      11) Taken lorazepam at bedtime--ok to increase but be cautious as we are increasing the opiate--combination of benzo/opiate can increase risk of respiratory depression      Signed By: Jewel Musa MD

## 2021-12-14 ENCOUNTER — HOSPITAL ENCOUNTER (OUTPATIENT)
Dept: INFUSION THERAPY | Age: 66
Discharge: HOME | End: 2021-12-14
Payer: MEDICARE

## 2021-12-14 VITALS
SYSTOLIC BLOOD PRESSURE: 167 MMHG | RESPIRATION RATE: 18 BRPM | HEART RATE: 93 BPM | WEIGHT: 178.2 LBS | BODY MASS INDEX: 30.42 KG/M2 | TEMPERATURE: 98 F | HEIGHT: 64 IN | DIASTOLIC BLOOD PRESSURE: 82 MMHG

## 2021-12-14 DIAGNOSIS — C25.9 ADENOCARCINOMA OF PANCREAS (HCC): Primary | ICD-10-CM

## 2021-12-14 LAB
ALBUMIN SERPL-MCNC: 3 G/DL (ref 3.5–5)
ALBUMIN/GLOB SERPL: 0.9 {RATIO} (ref 1.1–2.2)
ALP SERPL-CCNC: 113 U/L (ref 45–117)
ALT SERPL-CCNC: 34 U/L (ref 12–78)
ANION GAP SERPL CALC-SCNC: 10 MMOL/L (ref 5–15)
AST SERPL-CCNC: 20 U/L (ref 15–37)
BASOPHILS # BLD: 0 K/UL (ref 0–0.1)
BASOPHILS NFR BLD: 1 % (ref 0–1)
BILIRUB SERPL-MCNC: 0.2 MG/DL (ref 0.2–1)
BUN SERPL-MCNC: 16 MG/DL (ref 6–20)
BUN/CREAT SERPL: 22 (ref 12–20)
CALCIUM SERPL-MCNC: 8.7 MG/DL (ref 8.5–10.1)
CHLORIDE SERPL-SCNC: 100 MMOL/L (ref 97–108)
CO2 SERPL-SCNC: 28 MMOL/L (ref 21–32)
CREAT SERPL-MCNC: 0.73 MG/DL (ref 0.55–1.02)
DIFFERENTIAL METHOD BLD: ABNORMAL
EOSINOPHIL # BLD: 0 K/UL (ref 0–0.4)
EOSINOPHIL NFR BLD: 1 % (ref 0–7)
ERYTHROCYTE [DISTWIDTH] IN BLOOD BY AUTOMATED COUNT: 16.2 % (ref 11.5–14.5)
GLOBULIN SER CALC-MCNC: 3.4 G/DL (ref 2–4)
GLUCOSE SERPL-MCNC: 283 MG/DL (ref 65–100)
HCT VFR BLD AUTO: 32.3 % (ref 35–47)
HGB BLD-MCNC: 10.1 G/DL (ref 11.5–16)
IMM GRANULOCYTES # BLD AUTO: 0 K/UL (ref 0–0.04)
IMM GRANULOCYTES NFR BLD AUTO: 1 % (ref 0–0.5)
LYMPHOCYTES # BLD: 1.6 K/UL (ref 0.8–3.5)
LYMPHOCYTES NFR BLD: 36 % (ref 12–49)
MCH RBC QN AUTO: 29.1 PG (ref 26–34)
MCHC RBC AUTO-ENTMCNC: 31.3 G/DL (ref 30–36.5)
MCV RBC AUTO: 93.1 FL (ref 80–99)
MONOCYTES # BLD: 1 K/UL (ref 0–1)
MONOCYTES NFR BLD: 22 % (ref 5–13)
NEUTS SEG # BLD: 1.8 K/UL (ref 1.8–8)
NEUTS SEG NFR BLD: 39 % (ref 32–75)
NRBC # BLD: 0.04 K/UL (ref 0–0.01)
NRBC BLD-RTO: 0.9 PER 100 WBC
PLATELET # BLD AUTO: 543 K/UL (ref 150–400)
PLATELET COMMENTS,PCOM: ABNORMAL
PMV BLD AUTO: 10.8 FL (ref 8.9–12.9)
POTASSIUM SERPL-SCNC: 4.4 MMOL/L (ref 3.5–5.1)
PROT SERPL-MCNC: 6.4 G/DL (ref 6.4–8.2)
RBC # BLD AUTO: 3.47 M/UL (ref 3.8–5.2)
RBC MORPH BLD: ABNORMAL
SODIUM SERPL-SCNC: 138 MMOL/L (ref 136–145)
WBC # BLD AUTO: 4.4 K/UL (ref 3.6–11)

## 2021-12-14 PROCEDURE — 36415 COLL VENOUS BLD VENIPUNCTURE: CPT

## 2021-12-14 PROCEDURE — 80053 COMPREHEN METABOLIC PANEL: CPT

## 2021-12-14 PROCEDURE — 85025 COMPLETE CBC W/AUTO DIFF WBC: CPT

## 2021-12-14 PROCEDURE — 74011250636 HC RX REV CODE- 250/636: Performed by: INTERNAL MEDICINE

## 2021-12-14 PROCEDURE — 74011000258 HC RX REV CODE- 258: Performed by: INTERNAL MEDICINE

## 2021-12-14 PROCEDURE — 96375 TX/PRO/DX INJ NEW DRUG ADDON: CPT

## 2021-12-14 PROCEDURE — 96413 CHEMO IV INFUSION 1 HR: CPT

## 2021-12-14 PROCEDURE — 96417 CHEMO IV INFUS EACH ADDL SEQ: CPT

## 2021-12-14 PROCEDURE — 77030012965 HC NDL HUBR BBMI -A

## 2021-12-14 RX ORDER — HEPARIN 100 UNIT/ML
300-500 SYRINGE INTRAVENOUS AS NEEDED
Status: DISPENSED | OUTPATIENT
Start: 2021-12-14 | End: 2021-12-14

## 2021-12-14 RX ORDER — ONDANSETRON 2 MG/ML
8 INJECTION INTRAMUSCULAR; INTRAVENOUS AS NEEDED
Status: ACTIVE | OUTPATIENT
Start: 2021-12-14 | End: 2021-12-14

## 2021-12-14 RX ORDER — HYDROMORPHONE HYDROCHLORIDE 2 MG/1
TABLET ORAL
COMMUNITY
End: 2021-12-27 | Stop reason: SDUPTHER

## 2021-12-14 RX ORDER — DIPHENHYDRAMINE HYDROCHLORIDE 50 MG/ML
25 INJECTION, SOLUTION INTRAMUSCULAR; INTRAVENOUS AS NEEDED
Status: ACTIVE | OUTPATIENT
Start: 2021-12-14 | End: 2021-12-14

## 2021-12-14 RX ORDER — ACETAMINOPHEN 325 MG/1
650 TABLET ORAL AS NEEDED
Status: ACTIVE | OUTPATIENT
Start: 2021-12-14 | End: 2021-12-14

## 2021-12-14 RX ORDER — SODIUM CHLORIDE 9 MG/ML
25 INJECTION, SOLUTION INTRAVENOUS CONTINUOUS
Status: DISPENSED | OUTPATIENT
Start: 2021-12-14 | End: 2021-12-14

## 2021-12-14 RX ORDER — DEXAMETHASONE SODIUM PHOSPHATE 4 MG/ML
10 INJECTION, SOLUTION INTRA-ARTICULAR; INTRALESIONAL; INTRAMUSCULAR; INTRAVENOUS; SOFT TISSUE ONCE
Status: COMPLETED | OUTPATIENT
Start: 2021-12-14 | End: 2021-12-14

## 2021-12-14 RX ORDER — SODIUM CHLORIDE 9 MG/ML
10 INJECTION INTRAMUSCULAR; INTRAVENOUS; SUBCUTANEOUS AS NEEDED
Status: ACTIVE | OUTPATIENT
Start: 2021-12-14 | End: 2021-12-14

## 2021-12-14 RX ORDER — ONDANSETRON 2 MG/ML
8 INJECTION INTRAMUSCULAR; INTRAVENOUS ONCE
Status: COMPLETED | OUTPATIENT
Start: 2021-12-14 | End: 2021-12-14

## 2021-12-14 RX ADMIN — ONDANSETRON 8 MG: 2 INJECTION INTRAMUSCULAR; INTRAVENOUS at 10:25

## 2021-12-14 RX ADMIN — SODIUM CHLORIDE 25 ML/HR: 9 INJECTION, SOLUTION INTRAVENOUS at 10:24

## 2021-12-14 RX ADMIN — DEXAMETHASONE SODIUM PHOSPHATE 10 MG: 4 INJECTION, SOLUTION INTRAMUSCULAR; INTRAVENOUS at 10:28

## 2021-12-14 RX ADMIN — SODIUM CHLORIDE 10 ML: 9 INJECTION INTRAMUSCULAR; INTRAVENOUS; SUBCUTANEOUS at 08:40

## 2021-12-14 RX ADMIN — Medication 500 UNITS: at 12:08

## 2021-12-14 RX ADMIN — SODIUM CHLORIDE 10 ML: 9 INJECTION INTRAMUSCULAR; INTRAVENOUS; SUBCUTANEOUS at 12:08

## 2021-12-14 RX ADMIN — PACLITAXEL 184 MG: 100 INJECTION, POWDER, LYOPHILIZED, FOR SUSPENSION INTRAVENOUS at 10:43

## 2021-12-14 RX ADMIN — GEMCITABINE 1656 MG: 38 INJECTION, SOLUTION INTRAVENOUS at 11:26

## 2021-12-14 NOTE — PROGRESS NOTES
[facility] Progress Note    Date: 2021    Name: Suresh Rodrigues    MRN: [id number]         : 1955    Ms. Ba Arrived ambulatory and in no distress for cycle 10 day 8 of Gemcitabine/Abraxane regimen. Assessment was completed, no acute issues at this time. States adjustments to medication have helped some with neuropathy and pain but instead of taking Dilaudid 1.5 mg Three times a day, she is taking 1.5 mg AM and PM and 1 mg mid day because she gets so sleepy during the day. Is sleep a bit better but still up about 3 times at night. Occasionally has a sharp pain right flank which is new but she has discussed with Dr. Kathy Fontenot. Port accessed without difficulty, labs drawn and in process. Chemotherapy Flowsheet 2021   Cycle C10 D8   Date 2021   Drug / Regimen Abraxane/Gemcitibine   Dosage 184 mg/1656 mg   Time Up 1043   Time Down 1203   Pre Hydration -   Pre Meds zofran 8 mg IV, decadron 10 mg   Notes -       Ms. Ba's vitals were reviewed. Visit Vitals  BP (!) 167/82 (BP 1 Location: Right upper arm, BP Patient Position: Sitting)   Pulse 93   Temp 98 °F (36.7 °C)   Resp 18   Ht 5' 4\" (1.626 m)   Wt 80.8 kg (178 lb 3.2 oz)   BMI 30.59 kg/m²       Lab results were obtained and reviewed. Recent Results (from the past 12 hour(s))   CBC WITH AUTOMATED DIFF    Collection Time: 21  8:40 AM   Result Value Ref Range    WBC 4.4 3.6 - 11.0 K/uL    RBC 3.47 (L) 3.80 - 5.20 M/uL    HGB 10.1 (L) 11.5 - 16.0 g/dL    HCT 32.3 (L) 35.0 - 47.0 %    MCV 93.1 80.0 - 99.0 FL    MCH 29.1 26.0 - 34.0 PG    MCHC 31.3 30.0 - 36.5 g/dL    RDW 16.2 (H) 11.5 - 14.5 %    PLATELET 006 (H) 903 - 400 K/uL    MPV 10.8 8.9 - 12.9 FL    NRBC 0.9 (H) 0  WBC    ABSOLUTE NRBC 0.04 (H) 0.00 - 0.01 K/uL    NEUTROPHILS 39 32 - 75 %    LYMPHOCYTES 36 12 - 49 %    MONOCYTES 22 (H) 5 - 13 %    EOSINOPHILS 1 0 - 7 %    BASOPHILS 1 0 - 1 %    IMMATURE GRANULOCYTES 1 (H) 0.0 - 0.5 %    ABS.  NEUTROPHILS 1.8 1.8 - 8.0 K/UL    ABS. LYMPHOCYTES 1.6 0.8 - 3.5 K/UL    ABS. MONOCYTES 1.0 0.0 - 1.0 K/UL    ABS. EOSINOPHILS 0.0 0.0 - 0.4 K/UL    ABS. BASOPHILS 0.0 0.0 - 0.1 K/UL    ABS. IMM. GRANS. 0.0 0.00 - 0.04 K/UL    DF AUTOMATED      PLATELET COMMENTS ATYPICAL LYMPHOCYTES PRESENT      RBC COMMENTS ANISOCYTOSIS  1+        RBC COMMENTS POIKILOCYTOSIS  1+        RBC COMMENTS NRBC PRESENT    METABOLIC PANEL, COMPREHENSIVE    Collection Time: 12/14/21  8:40 AM   Result Value Ref Range    Sodium 138 136 - 145 mmol/L    Potassium 4.4 3.5 - 5.1 mmol/L    Chloride 100 97 - 108 mmol/L    CO2 28 21 - 32 mmol/L    Anion gap 10 5 - 15 mmol/L    Glucose 283 (H) 65 - 100 mg/dL    BUN 16 6 - 20 MG/DL    Creatinine 0.73 0.55 - 1.02 MG/DL    BUN/Creatinine ratio 22 (H) 12 - 20      GFR est AA >60 >60 ml/min/1.73m2    GFR est non-AA >60 >60 ml/min/1.73m2    Calcium 8.7 8.5 - 10.1 MG/DL    Bilirubin, total 0.2 0.2 - 1.0 MG/DL    ALT (SGPT) 34 12 - 78 U/L    AST (SGOT) 20 15 - 37 U/L    Alk. phosphatase 113 45 - 117 U/L    Protein, total 6.4 6.4 - 8.2 g/dL    Albumin 3.0 (L) 3.5 - 5.0 g/dL    Globulin 3.4 2.0 - 4.0 g/dL    A-G Ratio 0.9 (L) 1.1 - 2.2         Pre-medications  were administered as ordered and chemotherapy was initiated.  ml IV main line established. Zofran 8 mg IVP given  Decadron 10 mg IVP given  Abraxane 184 mg IV given  Gemcitabine 1656 mg IV given  Port was flushed and de-accessed post administration and site intact. Ms. Paulie Calix tolerated treatment well and was discharged from [place] [address] in stable condition at 1210. She is to return on December 21 at 8:30 for her next appointment.     Stef Newman RN  December 14, 2021

## 2021-12-16 ENCOUNTER — VIRTUAL VISIT (OUTPATIENT)
Dept: PALLATIVE CARE | Age: 66
End: 2021-12-16
Payer: MEDICARE

## 2021-12-16 DIAGNOSIS — G62.0 CHEMOTHERAPY-INDUCED NEUROPATHY (HCC): ICD-10-CM

## 2021-12-16 DIAGNOSIS — R53.83 PROFOUND FATIGUE: ICD-10-CM

## 2021-12-16 DIAGNOSIS — R10.84 ABDOMINAL PAIN, GENERALIZED: ICD-10-CM

## 2021-12-16 DIAGNOSIS — T45.1X5A CHEMOTHERAPY-INDUCED NEUROPATHY (HCC): ICD-10-CM

## 2021-12-16 DIAGNOSIS — C25.9 ADENOCARCINOMA OF PANCREAS (HCC): Primary | ICD-10-CM

## 2021-12-16 PROCEDURE — G8432 DEP SCR NOT DOC, RNG: HCPCS | Performed by: INTERNAL MEDICINE

## 2021-12-16 PROCEDURE — 1101F PT FALLS ASSESS-DOCD LE1/YR: CPT | Performed by: INTERNAL MEDICINE

## 2021-12-16 PROCEDURE — 3017F COLORECTAL CA SCREEN DOC REV: CPT | Performed by: INTERNAL MEDICINE

## 2021-12-16 PROCEDURE — G8399 PT W/DXA RESULTS DOCUMENT: HCPCS | Performed by: INTERNAL MEDICINE

## 2021-12-16 PROCEDURE — 1090F PRES/ABSN URINE INCON ASSESS: CPT | Performed by: INTERNAL MEDICINE

## 2021-12-16 PROCEDURE — G8536 NO DOC ELDER MAL SCRN: HCPCS | Performed by: INTERNAL MEDICINE

## 2021-12-16 PROCEDURE — G8427 DOCREV CUR MEDS BY ELIG CLIN: HCPCS | Performed by: INTERNAL MEDICINE

## 2021-12-16 PROCEDURE — G8756 NO BP MEASURE DOC: HCPCS | Performed by: INTERNAL MEDICINE

## 2021-12-16 PROCEDURE — G8417 CALC BMI ABV UP PARAM F/U: HCPCS | Performed by: INTERNAL MEDICINE

## 2021-12-16 PROCEDURE — 99215 OFFICE O/P EST HI 40 MIN: CPT | Performed by: INTERNAL MEDICINE

## 2021-12-16 NOTE — PATIENT INSTRUCTIONS
Jeffry Reddy    It was a pleasure to see you for a virtual visit today. Below is the plan we discussed.      -Cancer:  -You restarted chemotherapy \. Currently you are taking it once weekly on Tuesday for three weeks followed by one week off. You are finishing your 3rd cycle.  -You have lost your hair since starting chemotherapy.  -The dexamethasone makes you feel a bit anxious/hyper. Sometimes you can feel sad. You also have low energy for a couple days after chemotherapy.  -Sometimes you get a rash on your arms--you use pepcid, benadryl, and hydrocortisone cream for this with improvement.     -Back and lower abdominal pain pain  -Your pain is pretty well controlled with your current regimen. You have this mild tolerable uncomfortable feeling.  -You are taking dilaudid 1 mg in the morning and 1 mg in the afternoon at 4 PM. This is working well for you.  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     -Insomnia  -You have been taking Ativan 0.5 mg at bedtime which is very helpful and so you want to continue the same.  -You also take benadryl at times at night  -You still can sometimes wake a few times at night. You don't think it is because of pain, though you note that your right arm pain can bother you at times.  -You said that tylenol does seem to help, so you can continue to do that. You can also try your as needed dilaudid if the tylenol is not helpful.    -Fatigue  -Of note, you also take methylphenidate 5 mg twice daily as prescribed by Dr. Carlotta Gardner. We will continue to monitor to see if this may need to be adjusted (if the afternoon dose is impacting your sleep).    -Diarrhea   -You are on Creon. You do still sometimes have diarrhea.      -Uncontrolled diabetes  -Your Hemoglobin A1c was greater than 9 in June. You are working with your PCP on insulin doses. You are working to adjust your insulin doses when you take your chemotherapy and dexamethasone.  Uncontrolled diabetes can also exacerbate neuropathy. -Appetite  -You don't have too much of an appetite (worse on chemo weeks), but you try to make yourself eat a good dinner.     -Chemotherapy-induced neuropathy  -It is the worst on your feet and your right hand fingertips. You will drop things at times.  -Gabapentin 600 mg 3 times a day, total 1800 mg/day. This is also being prescribed by Dr. Hernandez Harrison.  -Cymbalta was recently increased to 30 mg 2 times a day. This was a couple weeks ago after discussing with Dr. Hernandez Harrison. You do think it seems to be helping some.    -Mood  -You think you are having more good days than bad and that overall you are doing very well. You can feel down at times during your chemo weeks. -You have an awesome support system: , daughter and her family. We will see you back in 3 months virtually.

## 2021-12-21 ENCOUNTER — HOSPITAL ENCOUNTER (OUTPATIENT)
Dept: INFUSION THERAPY | Age: 66
Discharge: HOME | End: 2021-12-21
Payer: MEDICARE

## 2021-12-21 VITALS
WEIGHT: 175.8 LBS | HEART RATE: 98 BPM | SYSTOLIC BLOOD PRESSURE: 135 MMHG | DIASTOLIC BLOOD PRESSURE: 69 MMHG | BODY MASS INDEX: 30.01 KG/M2 | RESPIRATION RATE: 20 BRPM | HEIGHT: 64 IN

## 2021-12-21 DIAGNOSIS — C25.9 ADENOCARCINOMA OF PANCREAS (HCC): Primary | ICD-10-CM

## 2021-12-21 LAB
ALBUMIN SERPL-MCNC: 2.9 G/DL (ref 3.5–5)
ALBUMIN/GLOB SERPL: 0.8 {RATIO} (ref 1.1–2.2)
ALP SERPL-CCNC: 83 U/L (ref 45–117)
ALT SERPL-CCNC: 31 U/L (ref 12–78)
ANION GAP SERPL CALC-SCNC: 8 MMOL/L (ref 5–15)
AST SERPL-CCNC: 23 U/L (ref 15–37)
BASOPHILS # BLD: 0 K/UL (ref 0–0.1)
BASOPHILS NFR BLD: 0 % (ref 0–1)
BILIRUB SERPL-MCNC: 0.2 MG/DL (ref 0.2–1)
BUN SERPL-MCNC: 9 MG/DL (ref 6–20)
BUN/CREAT SERPL: 12 (ref 12–20)
CALCIUM SERPL-MCNC: 8.8 MG/DL (ref 8.5–10.1)
CHLORIDE SERPL-SCNC: 103 MMOL/L (ref 97–108)
CO2 SERPL-SCNC: 29 MMOL/L (ref 21–32)
CREAT SERPL-MCNC: 0.74 MG/DL (ref 0.55–1.02)
DIFFERENTIAL METHOD BLD: ABNORMAL
EOSINOPHIL # BLD: 0.2 K/UL (ref 0–0.4)
EOSINOPHIL NFR BLD: 4 % (ref 0–7)
ERYTHROCYTE [DISTWIDTH] IN BLOOD BY AUTOMATED COUNT: 16 % (ref 11.5–14.5)
GLOBULIN SER CALC-MCNC: 3.6 G/DL (ref 2–4)
GLUCOSE SERPL-MCNC: 218 MG/DL (ref 65–100)
HCT VFR BLD AUTO: 31.2 % (ref 35–47)
HGB BLD-MCNC: 9.9 G/DL (ref 11.5–16)
IMM GRANULOCYTES # BLD AUTO: 0 K/UL (ref 0–0.04)
IMM GRANULOCYTES NFR BLD AUTO: 1 % (ref 0–0.5)
LYMPHOCYTES # BLD: 1.8 K/UL (ref 0.8–3.5)
LYMPHOCYTES NFR BLD: 29 % (ref 12–49)
MCH RBC QN AUTO: 29.4 PG (ref 26–34)
MCHC RBC AUTO-ENTMCNC: 31.7 G/DL (ref 30–36.5)
MCV RBC AUTO: 92.6 FL (ref 80–99)
MONOCYTES # BLD: 1.1 K/UL (ref 0–1)
MONOCYTES NFR BLD: 18 % (ref 5–13)
NEUTS SEG # BLD: 2.9 K/UL (ref 1.8–8)
NEUTS SEG NFR BLD: 48 % (ref 32–75)
NRBC # BLD: 0.14 K/UL (ref 0–0.01)
NRBC BLD-RTO: 2.3 PER 100 WBC
PLATELET # BLD AUTO: 251 K/UL (ref 150–400)
PMV BLD AUTO: 10.7 FL (ref 8.9–12.9)
POTASSIUM SERPL-SCNC: 3.3 MMOL/L (ref 3.5–5.1)
PROT SERPL-MCNC: 6.5 G/DL (ref 6.4–8.2)
RBC # BLD AUTO: 3.37 M/UL (ref 3.8–5.2)
SODIUM SERPL-SCNC: 140 MMOL/L (ref 136–145)
WBC # BLD AUTO: 6 K/UL (ref 3.6–11)

## 2021-12-21 PROCEDURE — 96360 HYDRATION IV INFUSION INIT: CPT

## 2021-12-21 PROCEDURE — 80053 COMPREHEN METABOLIC PANEL: CPT

## 2021-12-21 PROCEDURE — 77030012965 HC NDL HUBR BBMI -A

## 2021-12-21 PROCEDURE — 74011250636 HC RX REV CODE- 250/636: Performed by: INTERNAL MEDICINE

## 2021-12-21 PROCEDURE — 85025 COMPLETE CBC W/AUTO DIFF WBC: CPT

## 2021-12-21 PROCEDURE — 96361 HYDRATE IV INFUSION ADD-ON: CPT

## 2021-12-21 PROCEDURE — 36415 COLL VENOUS BLD VENIPUNCTURE: CPT

## 2021-12-21 RX ORDER — SODIUM CHLORIDE 9 MG/ML
10 INJECTION INTRAMUSCULAR; INTRAVENOUS; SUBCUTANEOUS AS NEEDED
Status: ACTIVE | OUTPATIENT
Start: 2021-12-21 | End: 2021-12-21

## 2021-12-21 RX ORDER — SODIUM CHLORIDE 9 MG/ML
1000 INJECTION, SOLUTION INTRAVENOUS ONCE
Status: COMPLETED | OUTPATIENT
Start: 2021-12-21 | End: 2021-12-21

## 2021-12-21 RX ORDER — HEPARIN 100 UNIT/ML
300-500 SYRINGE INTRAVENOUS AS NEEDED
Status: DISPENSED | OUTPATIENT
Start: 2021-12-21 | End: 2021-12-21

## 2021-12-21 RX ADMIN — Medication 500 UNITS: at 11:45

## 2021-12-21 RX ADMIN — SODIUM CHLORIDE 10 ML: 9 INJECTION INTRAMUSCULAR; INTRAVENOUS; SUBCUTANEOUS at 11:45

## 2021-12-21 RX ADMIN — SODIUM CHLORIDE 1000 ML: 9 INJECTION, SOLUTION INTRAVENOUS at 10:00

## 2021-12-21 RX ADMIN — SODIUM CHLORIDE 10 ML: 9 INJECTION INTRAMUSCULAR; INTRAVENOUS; SUBCUTANEOUS at 08:55

## 2021-12-21 NOTE — PROGRESS NOTES
[facility] Progress Note    Date: 2021    Name: Cassy Kearney    MRN: [id number]         : 1955    Ms. Ba Arrived ambulatory and in no distress for cycle 10 day 15 of Abraxane/Gemcitabine regimen. Assessment was completed. She arrived tearful. States she hasn't had a good week. Started having diarrhea on 12/15, it was several times a day, contained a lot of mucous and watery at times. She did report this to Dr. Darla Buerger and did not take anything for it until . After that consistency and frequency improved. She states she thought it would clear up and she didn't want to be constipated. Did not eat well last week. She also reports increase in right sided lower flank pain. She is taking Dilaudid 1.5 mg three times a day and that does help. She has felt less steady on her feet due to weakness and neuropathy in feet. Is walking with cane today. Port accessed without difficulty, labs drawn and in process. Chemotherapy Flowsheet 2021   Cycle C10 D15   Date 2021   Drug / Regimen HELD   Dosage -   Time Up -   Time Down -   Pre Hydration -   Pre Meds -   Notes will not make up     Ms. Ba's vitals were reviewed. Visit Vitals  /69 (BP 1 Location: Left upper arm, BP Patient Position: Sitting)   Pulse 98   Resp 20   Ht 5' 4\" (1.626 m)   Wt 79.7 kg (175 lb 12.8 oz)   BMI 30.18 kg/m²       Lab results were obtained and reviewed.   Recent Results (from the past 12 hour(s))   CBC WITH AUTOMATED DIFF    Collection Time: 21  8:55 AM   Result Value Ref Range    WBC 6.0 3.6 - 11.0 K/uL    RBC 3.37 (L) 3.80 - 5.20 M/uL    HGB 9.9 (L) 11.5 - 16.0 g/dL    HCT 31.2 (L) 35.0 - 47.0 %    MCV 92.6 80.0 - 99.0 FL    MCH 29.4 26.0 - 34.0 PG    MCHC 31.7 30.0 - 36.5 g/dL    RDW 16.0 (H) 11.5 - 14.5 %    PLATELET 646 032 - 168 K/uL    MPV 10.7 8.9 - 12.9 FL    NRBC 2.3 (H) 0  WBC    ABSOLUTE NRBC 0.14 (H) 0.00 - 0.01 K/uL    NEUTROPHILS 48 32 - 75 %    LYMPHOCYTES 29 12 - 49 %    MONOCYTES 18 (H) 5 - 13 %    EOSINOPHILS 4 0 - 7 %    BASOPHILS 0 0 - 1 %    IMMATURE GRANULOCYTES 1 (H) 0.0 - 0.5 %    ABS. NEUTROPHILS 2.9 1.8 - 8.0 K/UL    ABS. LYMPHOCYTES 1.8 0.8 - 3.5 K/UL    ABS. MONOCYTES 1.1 (H) 0.0 - 1.0 K/UL    ABS. EOSINOPHILS 0.2 0.0 - 0.4 K/UL    ABS. BASOPHILS 0.0 0.0 - 0.1 K/UL    ABS. IMM. GRANS. 0.0 0.00 - 0.04 K/UL    DF AUTOMATED     METABOLIC PANEL, COMPREHENSIVE    Collection Time: 12/21/21  8:55 AM   Result Value Ref Range    Sodium 140 136 - 145 mmol/L    Potassium 3.3 (L) 3.5 - 5.1 mmol/L    Chloride 103 97 - 108 mmol/L    CO2 29 21 - 32 mmol/L    Anion gap 8 5 - 15 mmol/L    Glucose 218 (H) 65 - 100 mg/dL    BUN 9 6 - 20 MG/DL    Creatinine 0.74 0.55 - 1.02 MG/DL    BUN/Creatinine ratio 12 12 - 20      GFR est AA >60 >60 ml/min/1.73m2    GFR est non-AA >60 >60 ml/min/1.73m2    Calcium 8.8 8.5 - 10.1 MG/DL    Bilirubin, total 0.2 0.2 - 1.0 MG/DL    ALT (SGPT) 31 12 - 78 U/L    AST (SGOT) 23 15 - 37 U/L    Alk. phosphatase 83 45 - 117 U/L    Protein, total 6.5 6.4 - 8.2 g/dL    Albumin 2.9 (L) 3.5 - 5.0 g/dL    Globulin 3.6 2.0 - 4.0 g/dL    A-G Ratio 0.8 (L) 1.1 - 2.2       Symptoms reviewed with Dr. Inocencia Lyman. Plan is to hold chemo today and hydrate 1 liter NS. He wants Mrs. Ba to try Immodium if diarrhea returns and if that not effective she can try 1/2 dose of Lomotil. He also wants her to call for worsening symptoms. Patient voices understanding. NS 1000 ml IV given over 2 hours. Port flushed and de-accessed post administration. Site intact. Ms. Christina Ding tolerated treatment well and was discharged from [place] [address] in stable condition at 1150. She is to return on January 4 at 8:30 for her next appointment.     Rafi Nina RN  December 21, 2021

## 2021-12-22 RX ORDER — ACETAMINOPHEN 325 MG/1
650 TABLET ORAL AS NEEDED
Status: CANCELLED | OUTPATIENT
Start: 2022-01-11

## 2021-12-22 RX ORDER — DIPHENHYDRAMINE HYDROCHLORIDE 50 MG/ML
50 INJECTION, SOLUTION INTRAMUSCULAR; INTRAVENOUS AS NEEDED
Status: CANCELLED
Start: 2022-02-08

## 2021-12-22 RX ORDER — SODIUM CHLORIDE 9 MG/ML
25 INJECTION, SOLUTION INTRAVENOUS CONTINUOUS
Status: CANCELLED | OUTPATIENT
Start: 2022-01-04

## 2021-12-22 RX ORDER — LORAZEPAM 2 MG/ML
0.5 INJECTION INTRAMUSCULAR
Status: CANCELLED
Start: 2022-01-04

## 2021-12-22 RX ORDER — HYDROCORTISONE SODIUM SUCCINATE 100 MG/2ML
100 INJECTION, POWDER, FOR SOLUTION INTRAMUSCULAR; INTRAVENOUS AS NEEDED
Status: CANCELLED | OUTPATIENT
Start: 2022-02-08

## 2021-12-22 RX ORDER — DEXAMETHASONE SODIUM PHOSPHATE 4 MG/ML
10 INJECTION, SOLUTION INTRA-ARTICULAR; INTRALESIONAL; INTRAMUSCULAR; INTRAVENOUS; SOFT TISSUE ONCE
Status: CANCELLED | OUTPATIENT
Start: 2022-01-11 | End: 2022-01-11

## 2021-12-22 RX ORDER — DIPHENHYDRAMINE HYDROCHLORIDE 50 MG/ML
25 INJECTION, SOLUTION INTRAMUSCULAR; INTRAVENOUS AS NEEDED
Status: CANCELLED | OUTPATIENT
Start: 2022-01-11

## 2021-12-22 RX ORDER — DIPHENHYDRAMINE HYDROCHLORIDE 50 MG/ML
25 INJECTION, SOLUTION INTRAMUSCULAR; INTRAVENOUS AS NEEDED
Status: CANCELLED | OUTPATIENT
Start: 2022-01-04

## 2021-12-22 RX ORDER — HEPARIN 100 UNIT/ML
300-500 SYRINGE INTRAVENOUS AS NEEDED
Status: CANCELLED | OUTPATIENT
Start: 2022-02-22

## 2021-12-22 RX ORDER — SODIUM CHLORIDE 0.9 % (FLUSH) 0.9 %
10 SYRINGE (ML) INJECTION AS NEEDED
Status: CANCELLED | OUTPATIENT
Start: 2022-02-08

## 2021-12-22 RX ORDER — LORAZEPAM 2 MG/ML
0.5 INJECTION INTRAMUSCULAR
Status: CANCELLED
Start: 2022-02-08

## 2021-12-22 RX ORDER — LORAZEPAM 2 MG/ML
0.5 INJECTION INTRAMUSCULAR
Status: CANCELLED
Start: 2022-02-15

## 2021-12-22 RX ORDER — ONDANSETRON 2 MG/ML
8 INJECTION INTRAMUSCULAR; INTRAVENOUS ONCE
Status: CANCELLED | OUTPATIENT
Start: 2022-02-22 | End: 2022-02-15

## 2021-12-22 RX ORDER — SODIUM CHLORIDE 9 MG/ML
25 INJECTION, SOLUTION INTRAVENOUS CONTINUOUS
Status: CANCELLED | OUTPATIENT
Start: 2022-02-22

## 2021-12-22 RX ORDER — SODIUM CHLORIDE 9 MG/ML
10 INJECTION INTRAMUSCULAR; INTRAVENOUS; SUBCUTANEOUS AS NEEDED
Status: CANCELLED | OUTPATIENT
Start: 2022-01-25

## 2021-12-22 RX ORDER — EPINEPHRINE 1 MG/ML
0.3 INJECTION, SOLUTION, CONCENTRATE INTRAVENOUS AS NEEDED
Status: CANCELLED | OUTPATIENT
Start: 2022-02-15

## 2021-12-22 RX ORDER — DEXAMETHASONE SODIUM PHOSPHATE 4 MG/ML
10 INJECTION, SOLUTION INTRA-ARTICULAR; INTRALESIONAL; INTRAMUSCULAR; INTRAVENOUS; SOFT TISSUE ONCE
Status: CANCELLED | OUTPATIENT
Start: 2022-01-25 | End: 2022-01-18

## 2021-12-22 RX ORDER — EPINEPHRINE 1 MG/ML
0.3 INJECTION, SOLUTION, CONCENTRATE INTRAVENOUS AS NEEDED
Status: CANCELLED | OUTPATIENT
Start: 2022-02-22

## 2021-12-22 RX ORDER — SODIUM CHLORIDE 9 MG/ML
25 INJECTION, SOLUTION INTRAVENOUS CONTINUOUS
Status: CANCELLED | OUTPATIENT
Start: 2022-01-25

## 2021-12-22 RX ORDER — DIPHENHYDRAMINE HYDROCHLORIDE 50 MG/ML
25 INJECTION, SOLUTION INTRAMUSCULAR; INTRAVENOUS
Status: CANCELLED
Start: 2022-01-11

## 2021-12-22 RX ORDER — DEXAMETHASONE SODIUM PHOSPHATE 4 MG/ML
10 INJECTION, SOLUTION INTRA-ARTICULAR; INTRALESIONAL; INTRAMUSCULAR; INTRAVENOUS; SOFT TISSUE ONCE
Status: CANCELLED | OUTPATIENT
Start: 2022-02-15 | End: 2022-02-08

## 2021-12-22 RX ORDER — ACETAMINOPHEN 325 MG/1
650 TABLET ORAL AS NEEDED
Status: CANCELLED | OUTPATIENT
Start: 2022-01-04

## 2021-12-22 RX ORDER — SODIUM CHLORIDE 0.9 % (FLUSH) 0.9 %
10 SYRINGE (ML) INJECTION AS NEEDED
Status: CANCELLED | OUTPATIENT
Start: 2022-02-15

## 2021-12-22 RX ORDER — DIPHENHYDRAMINE HYDROCHLORIDE 50 MG/ML
25 INJECTION, SOLUTION INTRAMUSCULAR; INTRAVENOUS AS NEEDED
Status: CANCELLED | OUTPATIENT
Start: 2022-02-08

## 2021-12-22 RX ORDER — HYDROCORTISONE SODIUM SUCCINATE 100 MG/2ML
100 INJECTION, POWDER, FOR SOLUTION INTRAMUSCULAR; INTRAVENOUS AS NEEDED
Status: CANCELLED | OUTPATIENT
Start: 2022-01-11

## 2021-12-22 RX ORDER — SODIUM CHLORIDE 0.9 % (FLUSH) 0.9 %
10 SYRINGE (ML) INJECTION AS NEEDED
Status: CANCELLED | OUTPATIENT
Start: 2022-01-04

## 2021-12-22 RX ORDER — HEPARIN 100 UNIT/ML
300-500 SYRINGE INTRAVENOUS AS NEEDED
Status: CANCELLED | OUTPATIENT
Start: 2022-02-15

## 2021-12-22 RX ORDER — DIPHENHYDRAMINE HYDROCHLORIDE 50 MG/ML
50 INJECTION, SOLUTION INTRAMUSCULAR; INTRAVENOUS AS NEEDED
Status: CANCELLED
Start: 2022-01-04

## 2021-12-22 RX ORDER — SODIUM CHLORIDE 9 MG/ML
10 INJECTION INTRAMUSCULAR; INTRAVENOUS; SUBCUTANEOUS AS NEEDED
Status: CANCELLED | OUTPATIENT
Start: 2022-01-04

## 2021-12-22 RX ORDER — DIPHENHYDRAMINE HYDROCHLORIDE 50 MG/ML
25 INJECTION, SOLUTION INTRAMUSCULAR; INTRAVENOUS
Status: CANCELLED
Start: 2022-01-25

## 2021-12-22 RX ORDER — EPINEPHRINE 1 MG/ML
0.3 INJECTION, SOLUTION, CONCENTRATE INTRAVENOUS AS NEEDED
Status: CANCELLED | OUTPATIENT
Start: 2022-01-04

## 2021-12-22 RX ORDER — ONDANSETRON 2 MG/ML
8 INJECTION INTRAMUSCULAR; INTRAVENOUS AS NEEDED
Status: CANCELLED | OUTPATIENT
Start: 2022-02-22

## 2021-12-22 RX ORDER — ALBUTEROL SULFATE 0.83 MG/ML
2.5 SOLUTION RESPIRATORY (INHALATION) AS NEEDED
Status: CANCELLED
Start: 2022-02-15

## 2021-12-22 RX ORDER — SODIUM CHLORIDE 9 MG/ML
10 INJECTION INTRAMUSCULAR; INTRAVENOUS; SUBCUTANEOUS AS NEEDED
Status: CANCELLED | OUTPATIENT
Start: 2022-01-11

## 2021-12-22 RX ORDER — DIPHENHYDRAMINE HYDROCHLORIDE 50 MG/ML
25 INJECTION, SOLUTION INTRAMUSCULAR; INTRAVENOUS
Status: CANCELLED
Start: 2022-01-04

## 2021-12-22 RX ORDER — ONDANSETRON 2 MG/ML
8 INJECTION INTRAMUSCULAR; INTRAVENOUS ONCE
Status: CANCELLED | OUTPATIENT
Start: 2022-02-15 | End: 2022-02-08

## 2021-12-22 RX ORDER — SODIUM CHLORIDE 0.9 % (FLUSH) 0.9 %
10 SYRINGE (ML) INJECTION AS NEEDED
Status: CANCELLED
Start: 2022-02-22

## 2021-12-22 RX ORDER — ONDANSETRON 2 MG/ML
8 INJECTION INTRAMUSCULAR; INTRAVENOUS AS NEEDED
Status: CANCELLED | OUTPATIENT
Start: 2022-02-15

## 2021-12-22 RX ORDER — SODIUM CHLORIDE 9 MG/ML
25 INJECTION, SOLUTION INTRAVENOUS CONTINUOUS
Status: CANCELLED | OUTPATIENT
Start: 2022-02-15

## 2021-12-22 RX ORDER — ALBUTEROL SULFATE 0.83 MG/ML
2.5 SOLUTION RESPIRATORY (INHALATION) AS NEEDED
Status: CANCELLED
Start: 2022-02-22

## 2021-12-22 RX ORDER — HEPARIN 100 UNIT/ML
300-500 SYRINGE INTRAVENOUS AS NEEDED
Status: CANCELLED | OUTPATIENT
Start: 2022-02-08

## 2021-12-22 RX ORDER — ACETAMINOPHEN 325 MG/1
650 TABLET ORAL AS NEEDED
Status: CANCELLED | OUTPATIENT
Start: 2022-02-08

## 2021-12-22 RX ORDER — HYDROCORTISONE SODIUM SUCCINATE 100 MG/2ML
100 INJECTION, POWDER, FOR SOLUTION INTRAMUSCULAR; INTRAVENOUS AS NEEDED
Status: CANCELLED | OUTPATIENT
Start: 2022-02-22

## 2021-12-22 RX ORDER — ACETAMINOPHEN 325 MG/1
650 TABLET ORAL AS NEEDED
Status: CANCELLED | OUTPATIENT
Start: 2022-02-22

## 2021-12-22 RX ORDER — DIPHENHYDRAMINE HYDROCHLORIDE 50 MG/ML
25 INJECTION, SOLUTION INTRAMUSCULAR; INTRAVENOUS AS NEEDED
Status: CANCELLED | OUTPATIENT
Start: 2022-02-22

## 2021-12-22 RX ORDER — EPINEPHRINE 1 MG/ML
0.3 INJECTION, SOLUTION, CONCENTRATE INTRAVENOUS AS NEEDED
Status: CANCELLED | OUTPATIENT
Start: 2022-02-08

## 2021-12-22 RX ORDER — SODIUM CHLORIDE 9 MG/ML
10 INJECTION INTRAMUSCULAR; INTRAVENOUS; SUBCUTANEOUS AS NEEDED
Status: CANCELLED | OUTPATIENT
Start: 2022-02-15

## 2021-12-22 RX ORDER — ALBUTEROL SULFATE 0.83 MG/ML
2.5 SOLUTION RESPIRATORY (INHALATION) AS NEEDED
Status: CANCELLED
Start: 2022-01-11

## 2021-12-22 RX ORDER — DIPHENHYDRAMINE HYDROCHLORIDE 50 MG/ML
25 INJECTION, SOLUTION INTRAMUSCULAR; INTRAVENOUS
Status: CANCELLED
Start: 2022-02-15

## 2021-12-22 RX ORDER — ONDANSETRON 2 MG/ML
8 INJECTION INTRAMUSCULAR; INTRAVENOUS AS NEEDED
Status: CANCELLED | OUTPATIENT
Start: 2022-01-04

## 2021-12-22 RX ORDER — DIPHENHYDRAMINE HYDROCHLORIDE 50 MG/ML
25 INJECTION, SOLUTION INTRAMUSCULAR; INTRAVENOUS AS NEEDED
Status: CANCELLED | OUTPATIENT
Start: 2022-01-25

## 2021-12-22 RX ORDER — ACETAMINOPHEN 325 MG/1
650 TABLET ORAL AS NEEDED
Status: CANCELLED | OUTPATIENT
Start: 2022-01-25

## 2021-12-22 RX ORDER — ONDANSETRON 2 MG/ML
8 INJECTION INTRAMUSCULAR; INTRAVENOUS AS NEEDED
Status: CANCELLED | OUTPATIENT
Start: 2022-01-11

## 2021-12-22 RX ORDER — ONDANSETRON 2 MG/ML
8 INJECTION INTRAMUSCULAR; INTRAVENOUS ONCE
Status: CANCELLED | OUTPATIENT
Start: 2022-01-04 | End: 2022-01-04

## 2021-12-22 RX ORDER — ONDANSETRON 2 MG/ML
8 INJECTION INTRAMUSCULAR; INTRAVENOUS ONCE
Status: CANCELLED | OUTPATIENT
Start: 2022-02-08 | End: 2022-02-01

## 2021-12-22 RX ORDER — SODIUM CHLORIDE 9 MG/ML
25 INJECTION, SOLUTION INTRAVENOUS CONTINUOUS
Status: CANCELLED | OUTPATIENT
Start: 2022-02-08

## 2021-12-22 RX ORDER — DIPHENHYDRAMINE HYDROCHLORIDE 50 MG/ML
50 INJECTION, SOLUTION INTRAMUSCULAR; INTRAVENOUS AS NEEDED
Status: CANCELLED
Start: 2022-01-25

## 2021-12-22 RX ORDER — DEXAMETHASONE SODIUM PHOSPHATE 4 MG/ML
10 INJECTION, SOLUTION INTRA-ARTICULAR; INTRALESIONAL; INTRAMUSCULAR; INTRAVENOUS; SOFT TISSUE ONCE
Status: CANCELLED | OUTPATIENT
Start: 2022-02-08 | End: 2022-02-01

## 2021-12-22 RX ORDER — HYDROCORTISONE SODIUM SUCCINATE 100 MG/2ML
100 INJECTION, POWDER, FOR SOLUTION INTRAMUSCULAR; INTRAVENOUS AS NEEDED
Status: CANCELLED | OUTPATIENT
Start: 2022-01-25

## 2021-12-22 RX ORDER — SODIUM CHLORIDE 9 MG/ML
25 INJECTION, SOLUTION INTRAVENOUS CONTINUOUS
Status: CANCELLED | OUTPATIENT
Start: 2022-01-11

## 2021-12-22 RX ORDER — ONDANSETRON 2 MG/ML
8 INJECTION INTRAMUSCULAR; INTRAVENOUS ONCE
Status: CANCELLED | OUTPATIENT
Start: 2022-01-25 | End: 2022-01-18

## 2021-12-22 RX ORDER — SODIUM CHLORIDE 0.9 % (FLUSH) 0.9 %
10 SYRINGE (ML) INJECTION AS NEEDED
Status: CANCELLED | OUTPATIENT
Start: 2022-01-11

## 2021-12-22 RX ORDER — ALBUTEROL SULFATE 0.83 MG/ML
2.5 SOLUTION RESPIRATORY (INHALATION) AS NEEDED
Status: CANCELLED
Start: 2022-01-25

## 2021-12-22 RX ORDER — HEPARIN 100 UNIT/ML
300-500 SYRINGE INTRAVENOUS AS NEEDED
Status: CANCELLED | OUTPATIENT
Start: 2022-01-11

## 2021-12-22 RX ORDER — EPINEPHRINE 1 MG/ML
0.3 INJECTION, SOLUTION, CONCENTRATE INTRAVENOUS AS NEEDED
Status: CANCELLED | OUTPATIENT
Start: 2022-01-25

## 2021-12-22 RX ORDER — ALBUTEROL SULFATE 0.83 MG/ML
2.5 SOLUTION RESPIRATORY (INHALATION) AS NEEDED
Status: CANCELLED
Start: 2022-02-08

## 2021-12-22 RX ORDER — DIPHENHYDRAMINE HYDROCHLORIDE 50 MG/ML
50 INJECTION, SOLUTION INTRAMUSCULAR; INTRAVENOUS AS NEEDED
Status: CANCELLED
Start: 2022-01-11

## 2021-12-22 RX ORDER — DIPHENHYDRAMINE HYDROCHLORIDE 50 MG/ML
50 INJECTION, SOLUTION INTRAMUSCULAR; INTRAVENOUS AS NEEDED
Status: CANCELLED
Start: 2022-02-22

## 2021-12-22 RX ORDER — DIPHENHYDRAMINE HYDROCHLORIDE 50 MG/ML
25 INJECTION, SOLUTION INTRAMUSCULAR; INTRAVENOUS AS NEEDED
Status: CANCELLED | OUTPATIENT
Start: 2022-02-15

## 2021-12-22 RX ORDER — EPINEPHRINE 1 MG/ML
0.3 INJECTION, SOLUTION, CONCENTRATE INTRAVENOUS AS NEEDED
Status: CANCELLED | OUTPATIENT
Start: 2022-01-11

## 2021-12-22 RX ORDER — DEXAMETHASONE SODIUM PHOSPHATE 4 MG/ML
10 INJECTION, SOLUTION INTRA-ARTICULAR; INTRALESIONAL; INTRAMUSCULAR; INTRAVENOUS; SOFT TISSUE ONCE
Status: CANCELLED | OUTPATIENT
Start: 2022-02-22 | End: 2022-02-15

## 2021-12-22 RX ORDER — DEXAMETHASONE SODIUM PHOSPHATE 4 MG/ML
10 INJECTION, SOLUTION INTRA-ARTICULAR; INTRALESIONAL; INTRAMUSCULAR; INTRAVENOUS; SOFT TISSUE ONCE
Status: CANCELLED | OUTPATIENT
Start: 2022-01-04 | End: 2022-01-04

## 2021-12-22 RX ORDER — HEPARIN 100 UNIT/ML
300-500 SYRINGE INTRAVENOUS AS NEEDED
Status: CANCELLED | OUTPATIENT
Start: 2022-01-25

## 2021-12-22 RX ORDER — ACETAMINOPHEN 325 MG/1
650 TABLET ORAL AS NEEDED
Status: CANCELLED | OUTPATIENT
Start: 2022-02-15

## 2021-12-22 RX ORDER — ONDANSETRON 2 MG/ML
8 INJECTION INTRAMUSCULAR; INTRAVENOUS AS NEEDED
Status: CANCELLED | OUTPATIENT
Start: 2022-01-25

## 2021-12-22 RX ORDER — LORAZEPAM 2 MG/ML
0.5 INJECTION INTRAMUSCULAR
Status: CANCELLED
Start: 2022-02-22

## 2021-12-22 RX ORDER — ONDANSETRON 2 MG/ML
8 INJECTION INTRAMUSCULAR; INTRAVENOUS AS NEEDED
Status: CANCELLED | OUTPATIENT
Start: 2022-02-08

## 2021-12-22 RX ORDER — DIPHENHYDRAMINE HYDROCHLORIDE 50 MG/ML
25 INJECTION, SOLUTION INTRAMUSCULAR; INTRAVENOUS
Status: CANCELLED
Start: 2022-02-08

## 2021-12-22 RX ORDER — DIPHENHYDRAMINE HYDROCHLORIDE 50 MG/ML
25 INJECTION, SOLUTION INTRAMUSCULAR; INTRAVENOUS
Status: CANCELLED
Start: 2022-02-22

## 2021-12-22 RX ORDER — HYDROCORTISONE SODIUM SUCCINATE 100 MG/2ML
100 INJECTION, POWDER, FOR SOLUTION INTRAMUSCULAR; INTRAVENOUS AS NEEDED
Status: CANCELLED | OUTPATIENT
Start: 2022-02-15

## 2021-12-22 RX ORDER — LORAZEPAM 2 MG/ML
0.5 INJECTION INTRAMUSCULAR
Status: CANCELLED
Start: 2022-01-11

## 2021-12-22 RX ORDER — ONDANSETRON 2 MG/ML
8 INJECTION INTRAMUSCULAR; INTRAVENOUS ONCE
Status: CANCELLED | OUTPATIENT
Start: 2022-01-11 | End: 2022-01-11

## 2021-12-22 RX ORDER — ALBUTEROL SULFATE 0.83 MG/ML
2.5 SOLUTION RESPIRATORY (INHALATION) AS NEEDED
Status: CANCELLED
Start: 2022-01-04

## 2021-12-22 RX ORDER — HEPARIN 100 UNIT/ML
300-500 SYRINGE INTRAVENOUS AS NEEDED
Status: CANCELLED | OUTPATIENT
Start: 2022-01-04

## 2021-12-22 RX ORDER — SODIUM CHLORIDE 0.9 % (FLUSH) 0.9 %
10 SYRINGE (ML) INJECTION AS NEEDED
Status: CANCELLED
Start: 2022-01-25

## 2021-12-22 RX ORDER — SODIUM CHLORIDE 9 MG/ML
10 INJECTION INTRAMUSCULAR; INTRAVENOUS; SUBCUTANEOUS AS NEEDED
Status: CANCELLED | OUTPATIENT
Start: 2022-02-08

## 2021-12-22 RX ORDER — DIPHENHYDRAMINE HYDROCHLORIDE 50 MG/ML
50 INJECTION, SOLUTION INTRAMUSCULAR; INTRAVENOUS AS NEEDED
Status: CANCELLED
Start: 2022-02-15

## 2021-12-22 RX ORDER — SODIUM CHLORIDE 9 MG/ML
10 INJECTION INTRAMUSCULAR; INTRAVENOUS; SUBCUTANEOUS AS NEEDED
Status: CANCELLED | OUTPATIENT
Start: 2022-02-22

## 2021-12-22 RX ORDER — LORAZEPAM 2 MG/ML
0.5 INJECTION INTRAMUSCULAR
Status: CANCELLED
Start: 2022-01-25

## 2021-12-22 RX ORDER — HYDROCORTISONE SODIUM SUCCINATE 100 MG/2ML
100 INJECTION, POWDER, FOR SOLUTION INTRAMUSCULAR; INTRAVENOUS AS NEEDED
Status: CANCELLED | OUTPATIENT
Start: 2022-01-04

## 2021-12-27 ENCOUNTER — PATIENT MESSAGE (OUTPATIENT)
Dept: FAMILY MEDICINE CLINIC | Age: 66
End: 2021-12-27

## 2021-12-27 DIAGNOSIS — C25.9 MALIGNANT NEOPLASM OF PANCREAS, UNSPECIFIED LOCATION OF MALIGNANCY (HCC): Primary | ICD-10-CM

## 2021-12-27 DIAGNOSIS — G89.3 NEOPLASM RELATED PAIN: Primary | ICD-10-CM

## 2021-12-27 RX ORDER — HYDROMORPHONE HYDROCHLORIDE 2 MG/1
4 TABLET ORAL
Qty: 90 TABLET | Refills: 0 | Status: SHIPPED | OUTPATIENT
Start: 2021-12-27 | End: 2022-01-10

## 2021-12-28 RX ORDER — HYDROMORPHONE HYDROCHLORIDE 2 MG/1
2 TABLET ORAL
Qty: 90 TABLET | Refills: 0 | Status: SHIPPED | OUTPATIENT
Start: 2021-12-28 | End: 2022-01-27 | Stop reason: SDUPTHER

## 2022-01-04 ENCOUNTER — HOSPITAL ENCOUNTER (OUTPATIENT)
Dept: INFUSION THERAPY | Age: 67
Discharge: HOME | End: 2022-01-04
Payer: MEDICARE

## 2022-01-04 VITALS
HEART RATE: 93 BPM | RESPIRATION RATE: 19 BRPM | BODY MASS INDEX: 30.39 KG/M2 | SYSTOLIC BLOOD PRESSURE: 176 MMHG | HEIGHT: 64 IN | OXYGEN SATURATION: 96 % | WEIGHT: 178 LBS | DIASTOLIC BLOOD PRESSURE: 79 MMHG | TEMPERATURE: 98.9 F

## 2022-01-04 DIAGNOSIS — C25.9 ADENOCARCINOMA OF PANCREAS (HCC): Primary | ICD-10-CM

## 2022-01-04 LAB
ALBUMIN SERPL-MCNC: 3 G/DL (ref 3.5–5)
ALBUMIN/GLOB SERPL: 0.8 {RATIO} (ref 1.1–2.2)
ALP SERPL-CCNC: 103 U/L (ref 45–117)
ALT SERPL-CCNC: 20 U/L (ref 12–78)
ANION GAP SERPL CALC-SCNC: 6 MMOL/L (ref 5–15)
AST SERPL-CCNC: 15 U/L (ref 15–37)
BASOPHILS # BLD: 0.2 K/UL (ref 0–0.1)
BASOPHILS NFR BLD: 2 % (ref 0–1)
BILIRUB SERPL-MCNC: 0.3 MG/DL (ref 0.2–1)
BUN SERPL-MCNC: 15 MG/DL (ref 6–20)
BUN/CREAT SERPL: 18 (ref 12–20)
CALCIUM SERPL-MCNC: 8.7 MG/DL (ref 8.5–10.1)
CHLORIDE SERPL-SCNC: 101 MMOL/L (ref 97–108)
CO2 SERPL-SCNC: 32 MMOL/L (ref 21–32)
CREAT SERPL-MCNC: 0.82 MG/DL (ref 0.55–1.02)
DIFFERENTIAL METHOD BLD: ABNORMAL
EOSINOPHIL # BLD: 1.2 K/UL (ref 0–0.4)
EOSINOPHIL NFR BLD: 14 % (ref 0–7)
ERYTHROCYTE [DISTWIDTH] IN BLOOD BY AUTOMATED COUNT: 16.6 % (ref 11.5–14.5)
GLOBULIN SER CALC-MCNC: 3.7 G/DL (ref 2–4)
GLUCOSE SERPL-MCNC: 261 MG/DL (ref 65–100)
HCT VFR BLD AUTO: 34.6 % (ref 35–47)
HGB BLD-MCNC: 10.8 G/DL (ref 11.5–16)
IMM GRANULOCYTES # BLD AUTO: 0 K/UL (ref 0–0.04)
IMM GRANULOCYTES NFR BLD AUTO: 0 % (ref 0–0.5)
LYMPHOCYTES # BLD: 2 K/UL (ref 0.8–3.5)
LYMPHOCYTES NFR BLD: 23 % (ref 12–49)
MCH RBC QN AUTO: 28.9 PG (ref 26–34)
MCHC RBC AUTO-ENTMCNC: 31.2 G/DL (ref 30–36.5)
MCV RBC AUTO: 92.5 FL (ref 80–99)
MONOCYTES # BLD: 2.1 K/UL (ref 0–1)
MONOCYTES NFR BLD: 24 % (ref 5–13)
NEUTS SEG # BLD: 3.1 K/UL (ref 1.8–8)
NEUTS SEG NFR BLD: 37 % (ref 32–75)
NRBC # BLD: 0 K/UL (ref 0–0.01)
NRBC BLD-RTO: 0 PER 100 WBC
PLATELET # BLD AUTO: 673 K/UL (ref 150–400)
PLATELET COMMENTS,PCOM: ABNORMAL
PMV BLD AUTO: 9.9 FL (ref 8.9–12.9)
POTASSIUM SERPL-SCNC: 4.2 MMOL/L (ref 3.5–5.1)
PROT SERPL-MCNC: 6.7 G/DL (ref 6.4–8.2)
RBC # BLD AUTO: 3.74 M/UL (ref 3.8–5.2)
RBC MORPH BLD: ABNORMAL
SODIUM SERPL-SCNC: 139 MMOL/L (ref 136–145)
WBC # BLD AUTO: 8.6 K/UL (ref 3.6–11)

## 2022-01-04 PROCEDURE — 77030012965 HC NDL HUBR BBMI -A

## 2022-01-04 PROCEDURE — 86301 IMMUNOASSAY TUMOR CA 19-9: CPT

## 2022-01-04 PROCEDURE — 85025 COMPLETE CBC W/AUTO DIFF WBC: CPT

## 2022-01-04 PROCEDURE — 96413 CHEMO IV INFUSION 1 HR: CPT

## 2022-01-04 PROCEDURE — 74011000250 HC RX REV CODE- 250: Performed by: INTERNAL MEDICINE

## 2022-01-04 PROCEDURE — 96375 TX/PRO/DX INJ NEW DRUG ADDON: CPT

## 2022-01-04 PROCEDURE — 96417 CHEMO IV INFUS EACH ADDL SEQ: CPT

## 2022-01-04 PROCEDURE — 74011250636 HC RX REV CODE- 250/636: Performed by: INTERNAL MEDICINE

## 2022-01-04 PROCEDURE — 80053 COMPREHEN METABOLIC PANEL: CPT

## 2022-01-04 PROCEDURE — 36415 COLL VENOUS BLD VENIPUNCTURE: CPT

## 2022-01-04 PROCEDURE — 74011000258 HC RX REV CODE- 258: Performed by: INTERNAL MEDICINE

## 2022-01-04 RX ORDER — ONDANSETRON 2 MG/ML
8 INJECTION INTRAMUSCULAR; INTRAVENOUS AS NEEDED
Status: ACTIVE | OUTPATIENT
Start: 2022-01-04 | End: 2022-01-04

## 2022-01-04 RX ORDER — DEXAMETHASONE SODIUM PHOSPHATE 4 MG/ML
10 INJECTION, SOLUTION INTRA-ARTICULAR; INTRALESIONAL; INTRAMUSCULAR; INTRAVENOUS; SOFT TISSUE ONCE
Status: COMPLETED | OUTPATIENT
Start: 2022-01-04 | End: 2022-01-04

## 2022-01-04 RX ORDER — SODIUM CHLORIDE 9 MG/ML
10 INJECTION INTRAMUSCULAR; INTRAVENOUS; SUBCUTANEOUS AS NEEDED
Status: ACTIVE | OUTPATIENT
Start: 2022-01-04 | End: 2022-01-04

## 2022-01-04 RX ORDER — ONDANSETRON 2 MG/ML
8 INJECTION INTRAMUSCULAR; INTRAVENOUS ONCE
Status: COMPLETED | OUTPATIENT
Start: 2022-01-04 | End: 2022-01-04

## 2022-01-04 RX ORDER — ACETAMINOPHEN 325 MG/1
650 TABLET ORAL AS NEEDED
Status: ACTIVE | OUTPATIENT
Start: 2022-01-04 | End: 2022-01-04

## 2022-01-04 RX ORDER — DIPHENHYDRAMINE HYDROCHLORIDE 50 MG/ML
25 INJECTION, SOLUTION INTRAMUSCULAR; INTRAVENOUS AS NEEDED
Status: ACTIVE | OUTPATIENT
Start: 2022-01-04 | End: 2022-01-04

## 2022-01-04 RX ORDER — HEPARIN 100 UNIT/ML
300-500 SYRINGE INTRAVENOUS AS NEEDED
Status: DISPENSED | OUTPATIENT
Start: 2022-01-04 | End: 2022-01-04

## 2022-01-04 RX ORDER — SODIUM CHLORIDE 9 MG/ML
25 INJECTION, SOLUTION INTRAVENOUS CONTINUOUS
Status: DISPENSED | OUTPATIENT
Start: 2022-01-04 | End: 2022-01-04

## 2022-01-04 RX ADMIN — DEXAMETHASONE SODIUM PHOSPHATE 10 MG: 4 INJECTION, SOLUTION INTRAMUSCULAR; INTRAVENOUS at 10:01

## 2022-01-04 RX ADMIN — Medication 500 UNITS: at 11:50

## 2022-01-04 RX ADMIN — SODIUM CHLORIDE, PRESERVATIVE FREE 10 ML: 5 INJECTION INTRAVENOUS at 08:25

## 2022-01-04 RX ADMIN — SODIUM CHLORIDE 25 ML/HR: 9 INJECTION, SOLUTION INTRAVENOUS at 09:55

## 2022-01-04 RX ADMIN — SODIUM CHLORIDE, PRESERVATIVE FREE 10 ML: 5 INJECTION INTRAVENOUS at 11:50

## 2022-01-04 RX ADMIN — ONDANSETRON 8 MG: 2 INJECTION INTRAMUSCULAR; INTRAVENOUS at 09:57

## 2022-01-04 RX ADMIN — GEMCITABINE 1656 MG: 38 INJECTION, SOLUTION INTRAVENOUS at 11:08

## 2022-01-04 RX ADMIN — PACLITAXEL 184 MG: 100 INJECTION, POWDER, LYOPHILIZED, FOR SUSPENSION INTRAVENOUS at 10:20

## 2022-01-04 NOTE — PROGRESS NOTES
[facility] Progress Note    Date: 2022    Name: Asael Valadez    MRN: [id number]         : 1955    Ms. Ba Arrived ambulatory and in no distress for cycle 11 day 1 of Abraxane/Gemcitabine regimen. Assessment was completed. She reports continued issues with balance and had a fall last week. States if she stands perfectly straight she will fall backwards. Is walking with cane, discussed with patient that walker may provide better support and stability and she confirms that she does have a walker at home. She has pain right shoulder and also neck soreness from fall but denies any difficulty with movement or ROM. Reports loose stools off/on and manages it with Lomotil which is effective. Is eating better than she was 2 weeks ago. Port accessed without difficulty, labs drawn and in process. Chemotherapy Flowsheet 2022   Cycle C11 D1   Date 2022   Drug / Regimen Abraxane/Gemcitabine   Dosage 184 mg/1656 mg   Time Up 1020   Time Down 1140   Pre Hydration -   Pre Meds zofran 8 mg IV, decadron 10 mg IV   Notes -     Ms. Ba's vitals were reviewed. Visit Vitals  BP (!) 176/79 (BP 1 Location: Left upper arm, BP Patient Position: Sitting)   Pulse 93   Temp 98.9 °F (37.2 °C)   Resp 19   Ht 5' 4\" (1.626 m)   Wt 80.7 kg (178 lb)   SpO2 96%   BMI 30.55 kg/m²       Lab results were obtained and reviewed.   Recent Results (from the past 12 hour(s))   CBC WITH AUTOMATED DIFF    Collection Time: 22  8:25 AM   Result Value Ref Range    WBC 8.6 3.6 - 11.0 K/uL    RBC 3.74 (L) 3.80 - 5.20 M/uL    HGB 10.8 (L) 11.5 - 16.0 g/dL    HCT 34.6 (L) 35.0 - 47.0 %    MCV 92.5 80.0 - 99.0 FL    MCH 28.9 26.0 - 34.0 PG    MCHC 31.2 30.0 - 36.5 g/dL    RDW 16.6 (H) 11.5 - 14.5 %    PLATELET 520 (H) 391 - 400 K/uL    MPV 9.9 8.9 - 12.9 FL    NRBC 0.0 0  WBC    ABSOLUTE NRBC 0.00 0.00 - 0.01 K/uL    NEUTROPHILS 37 32 - 75 %    LYMPHOCYTES 23 12 - 49 %    MONOCYTES 24 (H) 5 - 13 %    EOSINOPHILS 14 (H) 0 - 7 % BASOPHILS 2 (H) 0 - 1 %    IMMATURE GRANULOCYTES 0 0.0 - 0.5 %    ABS. NEUTROPHILS 3.1 1.8 - 8.0 K/UL    ABS. LYMPHOCYTES 2.0 0.8 - 3.5 K/UL    ABS. MONOCYTES 2.1 (H) 0.0 - 1.0 K/UL    ABS. EOSINOPHILS 1.2 (H) 0.0 - 0.4 K/UL    ABS. BASOPHILS 0.2 (H) 0.0 - 0.1 K/UL    ABS. IMM. GRANS. 0.0 0.00 - 0.04 K/UL    DF AUTOMATED      PLATELET COMMENTS SMUDGE CELLS SEEN      RBC COMMENTS ANISOCYTOSIS  1+       METABOLIC PANEL, COMPREHENSIVE    Collection Time: 01/04/22  8:25 AM   Result Value Ref Range    Sodium 139 136 - 145 mmol/L    Potassium 4.2 3.5 - 5.1 mmol/L    Chloride 101 97 - 108 mmol/L    CO2 32 21 - 32 mmol/L    Anion gap 6 5 - 15 mmol/L    Glucose 261 (H) 65 - 100 mg/dL    BUN 15 6 - 20 MG/DL    Creatinine 0.82 0.55 - 1.02 MG/DL    BUN/Creatinine ratio 18 12 - 20      GFR est AA >60 >60 ml/min/1.73m2    GFR est non-AA >60 >60 ml/min/1.73m2    Calcium 8.7 8.5 - 10.1 MG/DL    Bilirubin, total 0.3 0.2 - 1.0 MG/DL    ALT (SGPT) 20 12 - 78 U/L    AST (SGOT) 15 15 - 37 U/L    Alk. phosphatase 103 45 - 117 U/L    Protein, total 6.7 6.4 - 8.2 g/dL    Albumin 3.0 (L) 3.5 - 5.0 g/dL    Globulin 3.7 2.0 - 4.0 g/dL    A-G Ratio 0.8 (L) 1.1 - 2.2       Reported worsening balance issues to Dr. Polo Rose, no orders received, he feels this is a type of neuropathy- patient informed. Pre-medications  were administered as ordered and chemotherapy was initiated. 250 ml NS IV main line established. Zofran 8 mg IVP given. Decadron 10 mg IVP given. 1020: Abraxane 184 mg IV given. 1108: Gemcitabine 1656 mg IV given. Port flushed and de-accessed post infusion and site intact. Ms. Robert Vigil tolerated treatment well and was discharged from [place] [address] in stable condition at 1155. She is to return on January 11 at 8:30 for her next appointment.     Lluvia Brown RN  January 4, 2022

## 2022-01-05 DIAGNOSIS — C78.7 PANCREATIC CANCER METASTASIZED TO LIVER (HCC): Primary | ICD-10-CM

## 2022-01-05 DIAGNOSIS — C25.9 PANCREATIC CANCER METASTASIZED TO LIVER (HCC): Primary | ICD-10-CM

## 2022-01-05 LAB — CANCER AG19-9 SERPL-ACNC: 56 U/ML (ref 0–35)

## 2022-01-05 NOTE — PROGRESS NOTES
Telephone call today with Mrs. Ba. Over the past several weeks she has noted increasingly frequent episodes of unsteadiness and as a result has fallen several times. While she has not sustained any fractures, she has experienced some bruising and hit her head fairly hard on one occasion. She did not experience loss of consciousness. She is not dizzy, lightheaded, or short of breath. She simply has episodes of fairly sudden onset of disequilibrium and difficulty with balance. She continues to get chemotherapy. Her CA 19.9 has gone up over the last few weeks from 39-56. She has known metastatic disease. The primary concern today is whether or not she may have a metastasis in her brain. This new symptom has also impaired her mobility to the point where she is needing to use an assistive device. She has not had brain imaging. There is a small possibility of a SDH, but the primary concern would be ruling out mets. If this is normal, then neuropathy is more likely. We would want to get a good look at her posterior structures especially her cerebellum. MRI would be a better exam for this. We will discuss with her oncologist, Dr. Marietta Mc prior to any orders.       Beverly Billing

## 2022-01-10 DIAGNOSIS — C25.9 MALIGNANT NEOPLASM OF PANCREAS, UNSPECIFIED LOCATION OF MALIGNANCY (HCC): Primary | ICD-10-CM

## 2022-01-10 NOTE — PROGRESS NOTES
Palliative Medicine Outpatient Services  [place]: [phone] ([phone]    Patient Name: Melissa Jon  YOB: 1955    Date of Current Visit: 01/10/22  Location of Current Visit:    [] [facility] Office  [] [facility] Office  [] [facility] Office  [] Home  [x]Synchronous A/V virtual visit    Date of Initial Visit: 6/15/2020  Referral from: Dr. Collette Ryan  Primary Care Physician: Delia Petit MD      SUMMARY:   Melissa Jon is a 77y.o. year old with a  history of uncontrolled diabetes with an A1c of 9.2, newly diagnosed inoperable pancreatic cancer, who was referred to Palliative Medicine by Dr. Collette Ryan for management of intractable symptoms and psychosocial support. 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 CT on June 19 shows pancreatitis with some ascites, pancreatic mass has disappeared. Completed 3 doses of 5-FU, started Gemzar Abraxane on 7/29/2020     CA 19 normal, patient s/p exploratory  laparoscopy at [facility] with Dr. Shelley Lopez and Dr. Rambo Mackay as part of clinical trial on March 17, 2021     PALLIATIVE DIAGNOSES:       ICD-10-CM ICD-9-CM    1. Adenocarcinoma of pancreas (HCC)  C25.9 157.9    2. Abdominal pain, generalized  R10.84 789.07    3. Chemotherapy-induced neuropathy (HCC)  G62.0 357.6     T45.1X5A E933.1    4. Profound fatigue  R53.83 780.79           PLAN:     Patient Instructions     Melissa Jon    It was a pleasure to see you for a virtual visit today. Below is the plan we discussed.      -Cancer:  -You restarted chemotherapy \. Currently you are taking it once weekly on Tuesday for three weeks followed by one week off.  You are finishing your 3rd cycle.  -You have lost your hair since starting chemotherapy.  -The dexamethasone makes you feel a bit anxious/hyper. Sometimes you can feel sad. You also have low energy for a couple days after chemotherapy.  -Sometimes you get a rash on your arms--you use pepcid, benadryl, and hydrocortisone cream for this with improvement.     -Back and lower abdominal pain pain  -Your pain is pretty well controlled with your current regimen. You have this mild tolerable uncomfortable feeling.  -You are taking dilaudid 1 mg in the morning and 1 mg in the afternoon at 4 PM. This is working well for you.  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     -Insomnia  -You have been taking Ativan 0.5 mg at bedtime which is very helpful and so you want to continue the same.  -You also take benadryl at times at night  -You still can sometimes wake a few times at night. You don't think it is because of pain, though you note that your right arm pain can bother you at times.  -You said that tylenol does seem to help, so you can continue to do that. You can also try your as needed dilaudid if the tylenol is not helpful.    -Fatigue  -Of note, you also take methylphenidate 5 mg twice daily as prescribed by Dr. Veronica Fletcher. We will continue to monitor to see if this may need to be adjusted (if the afternoon dose is impacting your sleep).    -Diarrhea   -You are on Creon. You do still sometimes have diarrhea.      -Uncontrolled diabetes  -Your Hemoglobin A1c was greater than 9 in June. You are working with your PCP on insulin doses. You are working to adjust your insulin doses when you take your chemotherapy and dexamethasone. Uncontrolled diabetes can also exacerbate neuropathy. -Appetite  -You don't have too much of an appetite (worse on chemo weeks), but you try to make yourself eat a good dinner.     -Chemotherapy-induced neuropathy  -It is the worst on your feet and your right hand fingertips.  You will drop things at times.  -Gabapentin 600 mg 3 times a day, total 1800 mg/day. This is also being prescribed by Dr. Ernie Silva.  -Cymbalta was recently increased to 30 mg 2 times a day. This was a couple weeks ago after discussing with Dr. Ernie Silva. You do think it seems to be helping some.    -Mood  -You think you are having more good days than bad and that overall you are doing very well. You can feel down at times during your chemo weeks. -You have an awesome support system: , daughter and her family. We will see you back in 3 months virtually. GOALS OF CARE / TREATMENT PREFERENCES:   [====Goals of Care====]  GOALS OF CARE:  Patient / health care proxy stated goals: See Patient Instructions / Summary    TREATMENT PREFERENCES:   Code Status:  [x] Attempt Resuscitation       [] Do Not Attempt Resuscitation    Advance Care Planning:  [x] The [facility] Interdisciplinary Team has updated the ACP Navigator with Decision Maker and Patient Capacity      Primary Decision MakerFrankesha Black - Daughter - [phone]    Secondary Decision Maker: Amanda Saida - Daughter - [phone]  Advance Care Planning 1/4/2022   Patient's Healthcare Decision Maker is: Named in scanned ACP document   Confirm Advance Directive Yes, on file   Patient Would Like to Complete Advance Directive -   Does the patient have other document types -       Other:  (If patient appropriate for POST, consider using PALLPOST smart phrase here)    The palliative care team has discussed with patient / health care proxy about goals of care / treatment preferences for patient.  [====Goals of Care====]     PRESCRIPTIONS GIVEN:     No orders of the defined types were placed in this encounter.           FOLLOW UP:     Future Appointments   Date Time Provider Louis Campuzano   1/11/2022  8:30 AM [facility]F [facility]I 2 [address]   1/18/2022  8:30 AM [facility] MACI 3 [address]   1/27/2022  9:00 AM Nikhil Patton MD [facility]/KELLI ROQUE   3/10/2022 11:30 AM Morenita Bello MD [address] [facility] AMB           PHYSICIANS INVOLVED IN CARE:   Patient Care Team:  Cata Baig MD as PCP - General (Internal Medicine)  Cata Baig MD as PCP - [facility] Provider  Riaz Jackson MD (Inactive) (General Surgery)  Kirsten Stark MD (Hematology and Oncology)  Val Power MD as Physician (Hematology and Oncology)  Morenita Bello MD as Physician (Palliative Medicine)       HISTORY:   Reviewed patient-completed ESAS and advance care planning form. Reviewed patient record in prescription monitoring program.    CHIEF COMPLAINT:   Chief Complaint   Patient presents with    Follow-up    Other     Cancer progression    Abdominal Pain       HPI/SUBJECTIVE:    The patient is: [x] Verbal / [] Nonverbal     Patient seen today virtually along with her daughter. Garima Sood is in very good spirits, talked at length about her chickens. She is having some word finding difficulty every now and then and attributes it to chemo brain. She had a rough few days after taking the meningitis shot but she recovered from it slowly. She also went a few days without Cymbalta which caused severe withdrawal which she is very aware of now and will take Cymbalta on a regular basis. Neuropathy remains in both hands and feet but current doses of gabapentin Cymbalta are helping her. She has been taking Dilaudid 1 mg 2 times a day for fear of pain more than anything. There are days that she has more back pain especially when she is more active and we talked about her taking Tylenol instead of Dilaudid for that. We will also wean her off of Dilaudid. She is anxious about the next set of scans which may show cancer recurrence and that she may have to go back on chemo. She is excepting of this for now.  --------  Patient seen today along with her daughter Joann Murcia. Both are very tearful and stressed from patient's new diagnosis.   Garima Sood tells me that she ignored her abdominal pain for several months and now she is paying for it. She is determined to do everything she can to live as long as possible but is very realistic about her outcome. She is struggling with abdominal pain and is not taking medications as prescribed. Her most significant complaint is profound fatigue. But from what she is sharing, she is eating little to nothing every day, drinking less than 10 ounces of water per day. Her daughter forces her to eat some applesauce with her meds which is about the only calories she gets per day. She sleeps most of the time. She is identified that the first 6 days of the chemotherapy is the worst but she seems to recover from it a little bit after and eats a little bit. She struggles with diarrhea after chemotherapy but constipation once the diarrhea dissipates. 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 Daughter is overwhelmed because of the illness of her mother.     Clinical Pain Assessment (nonverbal scale for nonverbal patients):   [++++ Clinical Pain Assessment++++]  [++++Pain Severity++++]: Pain: 4  [++++Pain Character++++]: cramping  [++++Pain Duration++++]: month  [++++Pain Effect++++]: functional and emotional  [++++Pain Factors++++]: none in particular  [++++Pain Frequency++++]: constant  [++++Pain Location++++]: upper abdomen  [++++ Clinical Pain Assessment++++]       FUNCTIONAL ASSESSMENT:     Palliative Performance Scale (PPS):  PPS: 90       PSYCHOSOCIAL/SPIRITUAL SCREENING:     Any spiritual / Restorationist concerns:  [] Yes /  [x] No    Caregiver Burnout:  [] Yes /  [x] No /  [] No Caregiver Present      Anticipatory grief assessment:   [x] Normal  / [] Maladaptive       ESAS Anxiety: Anxiety: 3    ESAS Depression: Depression: 2       REVIEW OF SYSTEMS:     The following systems were [x] reviewed / [] unable to be reviewed  Systems: constitutional, ears/nose/mouth/throat, respiratory, gastrointestinal, genitourinary, musculoskeletal, integumentary, neurologic, psychiatric, endocrine. Positive findings noted below. Modified ESAS Completed by: provider   Fatigue: 7 Drowsiness: 3   Depression: 2 Pain: 4   Anxiety: 3 Nausea: 0   Anorexia: 3 Dyspnea: 0   Best Well-Being: 3 Constipation: No              PHYSICAL EXAM:     Wt Readings from Last 3 Encounters:   01/04/22 178 lb (80.7 kg)   12/21/21 175 lb 12.8 oz (79.7 kg)   12/14/21 178 lb 3.2 oz (80.8 kg)     There were no vitals taken for this visit. Last bowel movement: See Nursing Note    Constitutional    [x] Appears well-developed and well-nourished in no apparent distress    [] Abnormal:  Mental status  [x] Alert and awake  [x] Oriented to person/place/time  [x] Able to follow commands  [] Abnormal:   Eyes  [x] EOM normal   [x] Sclera normal   [x] No visible ocular discharge  [] Abnormal:   HENT  [x] Normocephalic, atraumatic  [x] Mouth/Throat: Moist mucous membranes   [x] External Ears normal  [] Abnormal:  Oral thrush resolved  Neck  [x] No visualized mass  [] Abnormal:  Pulmonary/Chest   [x] Respiratory effort normal  [x] No visualized signs of difficulty breathing or respiratory distress  [] Abnormal:  Musculoskeletal  [x] Normal gait with no signs of ataxia  [x] Normal range of motion of neck  [] Abnormal:  Neurological:   [x] No facial asymmetry (Cranial nerve 7 motor function)  [x] No gaze palsy  [] Abnormal:   Skin  [x] No significant exanthematous lesions or discoloration noted on facial skin  [] Abnormal:                                  Psychiatric  [x] Normal affect  [x] No hallucinations  [] Abnormal:    Abdomen-laparotomy scar appears good, sutures healing well    Other pertinent observable physical exam findings:    Due to this being a TeleHealth evaluation, many elements of the physical examination are unable to be assessed.                HISTORY:     Past Medical History:   Diagnosis Date    Adenocarcinoma of pancreas ([facility] [address] 05/13/2020    Dr Shannan Hay biopsy via EUS    Diabetes ([facility] [address]     GERD (gastroesophageal reflux disease)     Hernia of abdominal cavity     Subxyphoid hernia    Hypertension     Inflammatory polyarthropathy (HCC)     Joint pain     Joint swelling     Menopause     Ocular nevus of left eye     Pancreatitis     Tracheal stenosis       Past Surgical History:   Procedure Laterality Date    HX CARPAL TUNNEL RELEASE Bilateral     HX COLONOSCOPY      HX HYSTERECTOMY      HX KNEE ARTHROSCOPY Right     HX KNEE REPLACEMENT Right     partial    HX LAP CHOLECYSTECTOMY      HX TONSILLECTOMY      HX VASCULAR ACCESS        Family History   Problem Relation Age of Onset    Heart Disease Mother     Heart Attack Mother     Cancer Father         lung    Diabetes Sister       History reviewed, no pertinent family history. Social History     Tobacco Use    Smoking status: Never Smoker    Smokeless tobacco: Never Used   Substance Use Topics    Alcohol use: No     Alcohol/week: 0.0 standard drinks     No Known Allergies   Current Outpatient Medications   Medication Sig    DULoxetine (CYMBALTA) 30 mg capsule Take 3 Capsules by mouth daily. Indications: neuropathic pain    methylphenidate HCl (Ritalin) 5 mg tablet Take 1 Tablet by mouth three (3) times daily. Max Daily Amount: 15 mg.    Toujeo SoloStar U-300 Insulin 300 unit/mL (1.5 mL) inpn pen INJECT 55 UNITS ONCE DAILY AT BEDTIME - DOSE INCREASED (Patient taking differently: 55 Units by SubCUTAneous route. Patient uses differently during around chemo time.)    gabapentin (NEURONTIN) 300 mg capsule TAKE 2 CAPSULES BY MOUTH THREE TIMES DAILY    lipase-protease-amylase (Creon) 12,000-38,000 -60,000 unit capsule Take 2 Capsules by mouth three (3) times daily (with meals). 2 caps with meal and 1 with snacks  Indications: exocrine pancreatic insufficiency    lidocaine-prilocaine (EMLA) topical cream Apply  to affected area as needed for Pain (pain ).  Apply a thin layer over port site prior to accessing port    nystatin (MYCOSTATIN) powder Apply 1 g to affected area three (3) times daily.  HumaLOG KwikPen Insulin 100 unit/mL kwikpen patient on sliding scale    LORazepam (ATIVAN) 1 mg tablet Take 1 Tablet by mouth every six (6) hours as needed for Anxiety. Max Daily Amount: 4 mg. Indications: nausea and vomiting caused by cancer drugs (Patient taking differently: Take 1 mg by mouth three (3) times daily. Indications: nausea and vomiting caused by cancer drugs)    famotidine (Pepcid) 20 mg tablet Take 20 mg by mouth two (2) times a day.  sennosides (Senna) 8.6 mg cap Take 1-2 Tablets by mouth two (2) times a day.  diphenhydrAMINE (BENADRYL) 25 mg tablet Take 50 mg by mouth nightly as needed for Itching or Skin Irritation.  ondansetron (ZOFRAN ODT) 4 mg disintegrating tablet Take 1 Tab by mouth every eight (8) hours as needed for Nausea or Vomiting.  diphenoxylate-atropine (LomotiL) 2.5-0.025 mg per tablet Take 2 Tabs by mouth four (4) times daily as needed for Diarrhea. Max Daily Amount: 8 Tabs.  acetaminophen (Acetaminophen Extra Strength) 500 mg tablet Take 1,000 mg by mouth daily as needed.  HYDROmorphone (Dilaudid) 2 mg tablet Take 1 Tablet by mouth every four (4) hours as needed for Pain for up to 30 days. Max Daily Amount: 12 mg.    HYDROmorphone (Dilaudid) 2 mg tablet Take 2 Tablets by mouth every four (4) hours as needed for Pain for up to 14 days. Max Daily Amount: 24 mg.  potassium chloride (K-DUR, KLOR-CON) 20 mEq tablet Take 1 Tab by mouth two (2) times a day. (Patient not taking: Reported on 9/23/2021)    polyethylene glycol (Miralax) 17 gram/dose powder Take 17 g by mouth daily as needed. (Patient not taking: Reported on 12/9/2021)    promethazine (PHENERGAN) 25 mg tablet Take 1 Tab by mouth every six (6) hours as needed for Nausea.  (Patient not taking: Reported on 12/9/2021)     No current facility-administered medications for this visit.          LAB DATA REVIEWED:     Lab Results   Component Value Date/Time    WBC 8.6 01/04/2022 08:25 AM    HGB 10.8 (L) 01/04/2022 08:25 AM    PLATELET 482 (H) 46/05/5454 08:25 AM     Lab Results   Component Value Date/Time    Sodium 139 01/04/2022 08:25 AM    Potassium 4.2 01/04/2022 08:25 AM    Chloride 101 01/04/2022 08:25 AM    CO2 32 01/04/2022 08:25 AM    BUN 15 01/04/2022 08:25 AM    Creatinine 0.82 01/04/2022 08:25 AM    Calcium 8.7 01/04/2022 08:25 AM    Magnesium 1.8 04/07/2021 11:05 AM    Phosphorus 3.0 07/15/2020 05:35 AM      Lab Results   Component Value Date/Time    Alk.  phosphatase 103 01/04/2022 08:25 AM    Protein, total 6.7 01/04/2022 08:25 AM    Albumin 3.0 (L) 01/04/2022 08:25 AM    Globulin 3.7 01/04/2022 08:25 AM     No results found for: INR, PTMR, PTP, PT1, PT2, APTT, INREXT, INREXT   Lab Results   Component Value Date/Time    Iron 228 (H) 08/04/2021 09:40 AM    TIBC 327 08/04/2021 09:40 AM    Iron % saturation 70 (H) 08/04/2021 09:40 AM    Ferritin 199 08/04/2021 09:40 AM           CONTROLLED SUBSTANCES SAFETY ASSESSMENT (IF ON CONTROLLED SUBSTANCES):     Reviewed opioid safety handout:  [x] Yes   [] No  24 hour opioid dose >150mg morphine equivalent/day:  [] Yes   [x] No  Benzodiazepines:  [] Yes   [x] No  Sleep apnea:  [] Yes   [x] No  Urine Toxicology Testing within last 6 months:  [] Yes   [x] No  History of or new aberrant medication taking behaviors:  [] Yes   [x] No  Has Narcan been prescribed [x] Yes   [] No          Total time: 60 minutes  Counseling / coordination time: 40 minutes  > 50% counseling / coordination?:  Yes    Consent:  He and/or health care decision maker is aware that that he may receive a bill for this telehealth service, depending on his insurance coverage, and has provided verbal consent to proceed: Yes    CPT Codes 24066-75909 for Established Patients may apply to this Telehealth Visit  Pursuant to the emergency declaration under the [address] and the National Emergencies Act, [address] [facility] waiver authority and the [employer] Act, this Virtual  Visit was conducted, with patient's consent, to reduce the patient's risk of exposure to COVID-19 and provide continuity of care for an established patient. Services were provided through a video synchronous discussion virtually to substitute for in-person clinic visit.

## 2022-01-11 ENCOUNTER — HOSPITAL ENCOUNTER (OUTPATIENT)
Dept: INFUSION THERAPY | Age: 67
Discharge: HOME | End: 2022-01-11
Payer: MEDICARE

## 2022-01-11 VITALS
OXYGEN SATURATION: 97 % | HEART RATE: 91 BPM | TEMPERATURE: 99 F | SYSTOLIC BLOOD PRESSURE: 163 MMHG | DIASTOLIC BLOOD PRESSURE: 88 MMHG | RESPIRATION RATE: 17 BRPM | WEIGHT: 172 LBS | BODY MASS INDEX: 29.52 KG/M2

## 2022-01-11 PROCEDURE — 74011000636 HC RX REV CODE- 636: Performed by: INTERNAL MEDICINE

## 2022-01-11 PROCEDURE — M0243 CASIRIVI AND IMDEVI INFUSION: HCPCS

## 2022-01-11 PROCEDURE — 74011000258 HC RX REV CODE- 258: Performed by: INTERNAL MEDICINE

## 2022-01-11 PROCEDURE — 74011000250 HC RX REV CODE- 250: Performed by: INTERNAL MEDICINE

## 2022-01-11 PROCEDURE — 74011250636 HC RX REV CODE- 250/636: Performed by: INTERNAL MEDICINE

## 2022-01-11 RX ORDER — DIPHENHYDRAMINE HYDROCHLORIDE 50 MG/ML
50 INJECTION, SOLUTION INTRAMUSCULAR; INTRAVENOUS
Status: DISCONTINUED | OUTPATIENT
Start: 2022-01-11 | End: 2022-01-13 | Stop reason: HOSPADM

## 2022-01-11 RX ORDER — ACETAMINOPHEN 325 MG/1
650 TABLET ORAL
Status: DISCONTINUED | OUTPATIENT
Start: 2022-01-11 | End: 2022-01-13 | Stop reason: HOSPADM

## 2022-01-11 RX ORDER — HEPARIN 100 UNIT/ML
500 SYRINGE INTRAVENOUS AS NEEDED
Status: DISCONTINUED | OUTPATIENT
Start: 2022-01-11 | End: 2022-01-12 | Stop reason: HOSPADM

## 2022-01-11 RX ORDER — HYDROCORTISONE SODIUM SUCCINATE 100 MG/2ML
100 INJECTION, POWDER, FOR SOLUTION INTRAMUSCULAR; INTRAVENOUS
Status: DISCONTINUED | OUTPATIENT
Start: 2022-01-11 | End: 2022-01-12 | Stop reason: HOSPADM

## 2022-01-11 RX ORDER — SODIUM CHLORIDE 9 MG/ML
25 INJECTION, SOLUTION INTRAVENOUS AS NEEDED
Status: DISCONTINUED | OUTPATIENT
Start: 2022-01-11 | End: 2022-01-13 | Stop reason: HOSPADM

## 2022-01-11 RX ORDER — SODIUM CHLORIDE 0.9 % (FLUSH) 0.9 %
5-10 SYRINGE (ML) INJECTION AS NEEDED
Status: DISCONTINUED | OUTPATIENT
Start: 2022-01-11 | End: 2022-01-12 | Stop reason: HOSPADM

## 2022-01-11 RX ADMIN — Medication 500 UNITS: at 10:32

## 2022-01-11 RX ADMIN — IMDEVIMAB: 300 INJECTION, SOLUTION, CONCENTRATE INTRAVENOUS at 09:05

## 2022-01-11 RX ADMIN — SODIUM CHLORIDE, PRESERVATIVE FREE 10 ML: 5 INJECTION INTRAVENOUS at 10:31

## 2022-01-11 RX ADMIN — SODIUM CHLORIDE 25 ML/HR: 9 INJECTION, SOLUTION INTRAVENOUS at 09:29

## 2022-01-11 NOTE — PROGRESS NOTES
[facility] Progress Note    Date: 2022    Name: Papito Ron    MRN: [id number]         : 1955      Ms. Ba was assessed and education was provided. Written literature on Regen-Cov given to patient to review. Using sterile technique accessed port a cath without difficulty. Ms. Ba's vitals were reviewed and patient was observed for 5 minutes prior to treatment. Visit Vitals  BP (!) 162/93 (BP 1 Location: Right upper arm, BP Patient Position: At rest)   Pulse 95   Temp 98.5 °F (36.9 °C)   Resp 18   Wt 78 kg (172 lb)   SpO2 97%   Breastfeeding No   BMI 29.52 kg/m²     0905: Casirivimab 600 mg/Imdevimab 600 mg IV initiated to infuse over 23 minutes via pump.  0929: Infusion completed and discontinued. VS stable and no adverse reactions noted. Patient will be observed for 60 minutes. NS at [facility]. 1030: VS stable and no adverse reactions noted at completion of observation period. Port flushed, needle removed and band aid to site. Site without redness, swelling or pain. Ms. Diane Hoover tolerated the infusion, and had no complaints. Ms. Diane Hoover was discharged from [place] [address] in stable condition at 1045. She is to return on 2022 at 0830 for her next appointment.     Perla Cortez RN  2022  9:58 AM

## 2022-01-12 ENCOUNTER — PATIENT MESSAGE (OUTPATIENT)
Dept: FAMILY MEDICINE CLINIC | Age: 67
End: 2022-01-12

## 2022-01-12 DIAGNOSIS — C25.9 MALIGNANT NEOPLASM OF PANCREAS, UNSPECIFIED LOCATION OF MALIGNANCY (HCC): ICD-10-CM

## 2022-01-12 RX ORDER — EPINEPHRINE 1 MG/ML
0.3 INJECTION, SOLUTION, CONCENTRATE INTRAVENOUS AS NEEDED
Status: CANCELLED | OUTPATIENT
Start: 2022-01-18

## 2022-01-12 RX ORDER — ONDANSETRON 2 MG/ML
8 INJECTION INTRAMUSCULAR; INTRAVENOUS ONCE
Status: CANCELLED | OUTPATIENT
Start: 2022-01-18 | End: 2022-01-18

## 2022-01-12 RX ORDER — ONDANSETRON 2 MG/ML
8 INJECTION INTRAMUSCULAR; INTRAVENOUS AS NEEDED
Status: CANCELLED | OUTPATIENT
Start: 2022-01-18

## 2022-01-12 RX ORDER — HEPARIN 100 UNIT/ML
300-500 SYRINGE INTRAVENOUS AS NEEDED
Status: CANCELLED | OUTPATIENT
Start: 2022-01-18

## 2022-01-12 RX ORDER — HYDROCORTISONE SODIUM SUCCINATE 100 MG/2ML
100 INJECTION, POWDER, FOR SOLUTION INTRAMUSCULAR; INTRAVENOUS AS NEEDED
Status: CANCELLED | OUTPATIENT
Start: 2022-01-18

## 2022-01-12 RX ORDER — SODIUM CHLORIDE 9 MG/ML
25 INJECTION, SOLUTION INTRAVENOUS CONTINUOUS
Status: CANCELLED | OUTPATIENT
Start: 2022-01-18

## 2022-01-12 RX ORDER — ACETAMINOPHEN 325 MG/1
650 TABLET ORAL AS NEEDED
Status: CANCELLED | OUTPATIENT
Start: 2022-01-18

## 2022-01-12 RX ORDER — SODIUM CHLORIDE 0.9 % (FLUSH) 0.9 %
10 SYRINGE (ML) INJECTION AS NEEDED
Status: CANCELLED | OUTPATIENT
Start: 2022-01-18

## 2022-01-12 RX ORDER — LORAZEPAM 2 MG/ML
0.5 INJECTION INTRAMUSCULAR
Status: CANCELLED
Start: 2022-01-18

## 2022-01-12 RX ORDER — DIPHENHYDRAMINE HYDROCHLORIDE 50 MG/ML
50 INJECTION, SOLUTION INTRAMUSCULAR; INTRAVENOUS AS NEEDED
Status: CANCELLED
Start: 2022-01-18

## 2022-01-12 RX ORDER — SODIUM CHLORIDE 9 MG/ML
10 INJECTION INTRAMUSCULAR; INTRAVENOUS; SUBCUTANEOUS AS NEEDED
Status: CANCELLED | OUTPATIENT
Start: 2022-01-18

## 2022-01-12 RX ORDER — DEXAMETHASONE SODIUM PHOSPHATE 4 MG/ML
10 INJECTION, SOLUTION INTRA-ARTICULAR; INTRALESIONAL; INTRAMUSCULAR; INTRAVENOUS; SOFT TISSUE ONCE
Status: CANCELLED | OUTPATIENT
Start: 2022-01-18 | End: 2022-01-18

## 2022-01-12 RX ORDER — LORAZEPAM 1 MG/1
1 TABLET ORAL
Qty: 90 TABLET | Refills: 2 | Status: SHIPPED | OUTPATIENT
Start: 2022-01-12 | End: 2022-04-13

## 2022-01-12 RX ORDER — DIPHENHYDRAMINE HYDROCHLORIDE 50 MG/ML
25 INJECTION, SOLUTION INTRAMUSCULAR; INTRAVENOUS AS NEEDED
Status: CANCELLED | OUTPATIENT
Start: 2022-01-18

## 2022-01-12 RX ORDER — ALBUTEROL SULFATE 0.83 MG/ML
2.5 SOLUTION RESPIRATORY (INHALATION) AS NEEDED
Status: CANCELLED
Start: 2022-01-18

## 2022-01-12 RX ORDER — DIPHENHYDRAMINE HYDROCHLORIDE 50 MG/ML
25 INJECTION, SOLUTION INTRAMUSCULAR; INTRAVENOUS
Status: CANCELLED
Start: 2022-01-18

## 2022-01-12 NOTE — TELEPHONE ENCOUNTER
From: Papito Ron  To: Shahida Tan MD  Sent: 1/12/2022 9:05 AM EST  Subject: Refill     Good morning everyone! I need a refill on my lorazepam 1 mg.  Thank you Rani Denise

## 2022-01-17 ENCOUNTER — HOSPITAL ENCOUNTER (OUTPATIENT)
Dept: MRI IMAGING | Age: 67
Discharge: HOME | End: 2022-01-17
Attending: INTERNAL MEDICINE
Payer: MEDICARE

## 2022-01-17 DIAGNOSIS — C25.9 PANCREATIC CANCER METASTASIZED TO LIVER (HCC): ICD-10-CM

## 2022-01-17 DIAGNOSIS — C78.7 PANCREATIC CANCER METASTASIZED TO LIVER (HCC): ICD-10-CM

## 2022-01-17 PROCEDURE — 70553 MRI BRAIN STEM W/O & W/DYE: CPT

## 2022-01-17 PROCEDURE — 74011250636 HC RX REV CODE- 250/636: Performed by: INTERNAL MEDICINE

## 2022-01-17 PROCEDURE — A9576 INJ PROHANCE MULTIPACK: HCPCS | Performed by: INTERNAL MEDICINE

## 2022-01-17 RX ADMIN — GADOTERIDOL 16 ML: 279.3 INJECTION, SOLUTION INTRAVENOUS at 09:20

## 2022-01-18 ENCOUNTER — HOSPITAL ENCOUNTER (OUTPATIENT)
Dept: INFUSION THERAPY | Age: 67
Discharge: HOME | End: 2022-01-18
Payer: MEDICARE

## 2022-01-18 VITALS
WEIGHT: 179.4 LBS | OXYGEN SATURATION: 96 % | HEIGHT: 64 IN | DIASTOLIC BLOOD PRESSURE: 81 MMHG | TEMPERATURE: 98.8 F | HEART RATE: 93 BPM | RESPIRATION RATE: 18 BRPM | BODY MASS INDEX: 30.63 KG/M2 | SYSTOLIC BLOOD PRESSURE: 175 MMHG

## 2022-01-18 DIAGNOSIS — C25.9 ADENOCARCINOMA OF PANCREAS (HCC): Primary | ICD-10-CM

## 2022-01-18 LAB
ALBUMIN SERPL-MCNC: 3 G/DL (ref 3.5–5)
ALBUMIN/GLOB SERPL: 0.9 {RATIO} (ref 1.1–2.2)
ALP SERPL-CCNC: 102 U/L (ref 45–117)
ALT SERPL-CCNC: 21 U/L (ref 12–78)
ANION GAP SERPL CALC-SCNC: 8 MMOL/L (ref 5–15)
AST SERPL-CCNC: 19 U/L (ref 15–37)
BASOPHILS # BLD: 0.1 K/UL (ref 0–0.1)
BASOPHILS NFR BLD: 1 % (ref 0–1)
BILIRUB SERPL-MCNC: 0.3 MG/DL (ref 0.2–1)
BUN SERPL-MCNC: 13 MG/DL (ref 6–20)
BUN/CREAT SERPL: 18 (ref 12–20)
CALCIUM SERPL-MCNC: 8.7 MG/DL (ref 8.5–10.1)
CHLORIDE SERPL-SCNC: 100 MMOL/L (ref 97–108)
CO2 SERPL-SCNC: 30 MMOL/L (ref 21–32)
CREAT SERPL-MCNC: 0.72 MG/DL (ref 0.55–1.02)
DIFFERENTIAL METHOD BLD: ABNORMAL
EOSINOPHIL # BLD: 0.7 K/UL (ref 0–0.4)
EOSINOPHIL NFR BLD: 11 % (ref 0–7)
ERYTHROCYTE [DISTWIDTH] IN BLOOD BY AUTOMATED COUNT: 16.4 % (ref 11.5–14.5)
GLOBULIN SER CALC-MCNC: 3.5 G/DL (ref 2–4)
GLUCOSE SERPL-MCNC: 251 MG/DL (ref 65–100)
HCT VFR BLD AUTO: 33.8 % (ref 35–47)
HGB BLD-MCNC: 10.7 G/DL (ref 11.5–16)
IMM GRANULOCYTES # BLD AUTO: 0 K/UL (ref 0–0.04)
IMM GRANULOCYTES NFR BLD AUTO: 0 % (ref 0–0.5)
LYMPHOCYTES # BLD: 2.3 K/UL (ref 0.8–3.5)
LYMPHOCYTES NFR BLD: 36 % (ref 12–49)
MCH RBC QN AUTO: 28.9 PG (ref 26–34)
MCHC RBC AUTO-ENTMCNC: 31.7 G/DL (ref 30–36.5)
MCV RBC AUTO: 91.4 FL (ref 80–99)
MONOCYTES # BLD: 1.4 K/UL (ref 0–1)
MONOCYTES NFR BLD: 22 % (ref 5–13)
NEUTS SEG # BLD: 1.9 K/UL (ref 1.8–8)
NEUTS SEG NFR BLD: 30 % (ref 32–75)
NRBC # BLD: 0 K/UL (ref 0–0.01)
NRBC BLD-RTO: 0 PER 100 WBC
PLATELET # BLD AUTO: 461 K/UL (ref 150–400)
PMV BLD AUTO: 10.2 FL (ref 8.9–12.9)
POTASSIUM SERPL-SCNC: 4 MMOL/L (ref 3.5–5.1)
PROT SERPL-MCNC: 6.5 G/DL (ref 6.4–8.2)
RBC # BLD AUTO: 3.7 M/UL (ref 3.8–5.2)
RBC MORPH BLD: ABNORMAL
RBC MORPH BLD: ABNORMAL
SODIUM SERPL-SCNC: 138 MMOL/L (ref 136–145)
WBC # BLD AUTO: 6.4 K/UL (ref 3.6–11)

## 2022-01-18 PROCEDURE — 85025 COMPLETE CBC W/AUTO DIFF WBC: CPT

## 2022-01-18 PROCEDURE — 96361 HYDRATE IV INFUSION ADD-ON: CPT

## 2022-01-18 PROCEDURE — 36415 COLL VENOUS BLD VENIPUNCTURE: CPT

## 2022-01-18 PROCEDURE — 74011000258 HC RX REV CODE- 258: Performed by: INTERNAL MEDICINE

## 2022-01-18 PROCEDURE — 77030012965 HC NDL HUBR BBMI -A

## 2022-01-18 PROCEDURE — 74011250636 HC RX REV CODE- 250/636: Performed by: INTERNAL MEDICINE

## 2022-01-18 PROCEDURE — 80053 COMPREHEN METABOLIC PANEL: CPT

## 2022-01-18 PROCEDURE — 96417 CHEMO IV INFUS EACH ADDL SEQ: CPT

## 2022-01-18 PROCEDURE — 96413 CHEMO IV INFUSION 1 HR: CPT

## 2022-01-18 PROCEDURE — 96375 TX/PRO/DX INJ NEW DRUG ADDON: CPT

## 2022-01-18 RX ORDER — HEPARIN 100 UNIT/ML
300-500 SYRINGE INTRAVENOUS AS NEEDED
Status: DISPENSED | OUTPATIENT
Start: 2022-01-18 | End: 2022-01-18

## 2022-01-18 RX ORDER — SODIUM CHLORIDE 9 MG/ML
25 INJECTION, SOLUTION INTRAVENOUS CONTINUOUS
Status: DISPENSED | OUTPATIENT
Start: 2022-01-18 | End: 2022-01-18

## 2022-01-18 RX ORDER — SODIUM CHLORIDE 0.9 % (FLUSH) 0.9 %
10 SYRINGE (ML) INJECTION AS NEEDED
Status: ACTIVE | OUTPATIENT
Start: 2022-01-18 | End: 2022-01-18

## 2022-01-18 RX ORDER — SODIUM CHLORIDE 9 MG/ML
1000 INJECTION, SOLUTION INTRAVENOUS AS NEEDED
Status: DISCONTINUED | OUTPATIENT
Start: 2022-01-18 | End: 2022-01-19 | Stop reason: HOSPADM

## 2022-01-18 RX ORDER — ACETAMINOPHEN 325 MG/1
650 TABLET ORAL AS NEEDED
Status: ACTIVE | OUTPATIENT
Start: 2022-01-18 | End: 2022-01-18

## 2022-01-18 RX ORDER — DEXAMETHASONE SODIUM PHOSPHATE 4 MG/ML
10 INJECTION, SOLUTION INTRA-ARTICULAR; INTRALESIONAL; INTRAMUSCULAR; INTRAVENOUS; SOFT TISSUE ONCE
Status: COMPLETED | OUTPATIENT
Start: 2022-01-18 | End: 2022-01-18

## 2022-01-18 RX ORDER — DIPHENHYDRAMINE HYDROCHLORIDE 50 MG/ML
25 INJECTION, SOLUTION INTRAMUSCULAR; INTRAVENOUS AS NEEDED
Status: ACTIVE | OUTPATIENT
Start: 2022-01-18 | End: 2022-01-18

## 2022-01-18 RX ORDER — ONDANSETRON 2 MG/ML
8 INJECTION INTRAMUSCULAR; INTRAVENOUS AS NEEDED
Status: ACTIVE | OUTPATIENT
Start: 2022-01-18 | End: 2022-01-18

## 2022-01-18 RX ORDER — SODIUM CHLORIDE 9 MG/ML
10 INJECTION INTRAMUSCULAR; INTRAVENOUS; SUBCUTANEOUS AS NEEDED
Status: ACTIVE | OUTPATIENT
Start: 2022-01-18 | End: 2022-01-18

## 2022-01-18 RX ORDER — ONDANSETRON 2 MG/ML
8 INJECTION INTRAMUSCULAR; INTRAVENOUS ONCE
Status: COMPLETED | OUTPATIENT
Start: 2022-01-18 | End: 2022-01-18

## 2022-01-18 RX ADMIN — DEXAMETHASONE SODIUM PHOSPHATE 10 MG: 4 INJECTION, SOLUTION INTRAMUSCULAR; INTRAVENOUS at 10:50

## 2022-01-18 RX ADMIN — SODIUM CHLORIDE 1000 ML/HR: 9 INJECTION, SOLUTION INTRAVENOUS at 09:32

## 2022-01-18 RX ADMIN — ONDANSETRON 8 MG: 2 INJECTION INTRAMUSCULAR; INTRAVENOUS at 10:45

## 2022-01-18 RX ADMIN — GEMCITABINE 1656 MG: 38 INJECTION, SOLUTION INTRAVENOUS at 11:55

## 2022-01-18 RX ADMIN — PACLITAXEL 184 MG: 100 INJECTION, POWDER, LYOPHILIZED, FOR SUSPENSION INTRAVENOUS at 11:05

## 2022-01-18 RX ADMIN — Medication 500 UNITS: at 12:30

## 2022-01-18 NOTE — PROGRESS NOTES
[facility] Progress Note    Date: 2022    Name: Jeffry Reddy    MRN: [id number]         : 1955    Ms. Ba Arrived ambulatory and in no distress for cycle 11 day 8 of Abraxane/Gemzar regimen. Assessment was completed, no acute issues at this time, no new complaints voiced. Patient did report a recent fall. Patient requested hydration, new order obtained for 1 liter of NS. Port accessed without difficulty, labs drawn and in process. Chemotherapy Flowsheet 2022   Cycle C11 D8   Date 2022   Drug / Regimen Abraxane/Gemzar   Dosage 184 mg/1656 mg   Time Up -   Time Down -   Pre Hydration 1 liter NS   Pre Meds Zofran 8 mg/Decadron 10 mg   Notes -       Ms. Ba's vitals were reviewed. Visit Vitals  BP (!) 175/81 (BP 1 Location: Right upper arm, BP Patient Position: Sitting)   Pulse 93   Temp 98.8 °F (37.1 °C)   Resp 18   Ht 5' 4\" (1.626 m)   Wt 81.4 kg (179 lb 6.4 oz)   SpO2 96%   Breastfeeding No   BMI 30.79 kg/m²       Lab results were obtained and reviewed. Recent Results (from the past 12 hour(s))   CBC WITH AUTOMATED DIFF    Collection Time: 22  8:58 AM   Result Value Ref Range    WBC 6.4 3.6 - 11.0 K/uL    RBC 3.70 (L) 3.80 - 5.20 M/uL    HGB 10.7 (L) 11.5 - 16.0 g/dL    HCT 33.8 (L) 35.0 - 47.0 %    MCV 91.4 80.0 - 99.0 FL    MCH 28.9 26.0 - 34.0 PG    MCHC 31.7 30.0 - 36.5 g/dL    RDW 16.4 (H) 11.5 - 14.5 %    PLATELET 748 (H) 048 - 400 K/uL    MPV 10.2 8.9 - 12.9 FL    NRBC 0.0 0  WBC    ABSOLUTE NRBC 0.00 0.00 - 0.01 K/uL    NEUTROPHILS 30 (L) 32 - 75 %    LYMPHOCYTES 36 12 - 49 %    MONOCYTES 22 (H) 5 - 13 %    EOSINOPHILS 11 (H) 0 - 7 %    BASOPHILS 1 0 - 1 %    IMMATURE GRANULOCYTES 0 0.0 - 0.5 %    ABS. NEUTROPHILS 1.9 1.8 - 8.0 K/UL    ABS. LYMPHOCYTES 2.3 0.8 - 3.5 K/UL    ABS. MONOCYTES 1.4 (H) 0.0 - 1.0 K/UL    ABS. EOSINOPHILS 0.7 (H) 0.0 - 0.4 K/UL    ABS. BASOPHILS 0.1 0.0 - 0.1 K/UL    ABS. IMM.  GRANS. 0.0 0.00 - 0.04 K/UL    DF AUTOMATED      RBC COMMENTS ANISOCYTOSIS  1+        RBC COMMENTS OVALOCYTES  1+       METABOLIC PANEL, COMPREHENSIVE    Collection Time: 01/18/22  8:58 AM   Result Value Ref Range    Sodium 138 136 - 145 mmol/L    Potassium 4.0 3.5 - 5.1 mmol/L    Chloride 100 97 - 108 mmol/L    CO2 30 21 - 32 mmol/L    Anion gap 8 5 - 15 mmol/L    Glucose 251 (H) 65 - 100 mg/dL    BUN 13 6 - 20 MG/DL    Creatinine 0.72 0.55 - 1.02 MG/DL    BUN/Creatinine ratio 18 12 - 20      GFR est AA >60 >60 ml/min/1.73m2    GFR est non-AA >60 >60 ml/min/1.73m2    Calcium 8.7 8.5 - 10.1 MG/DL    Bilirubin, total 0.3 0.2 - 1.0 MG/DL    ALT (SGPT) 21 12 - 78 U/L    AST (SGOT) 19 15 - 37 U/L    Alk. phosphatase 102 45 - 117 U/L    Protein, total 6.5 6.4 - 8.2 g/dL    Albumin 3.0 (L) 3.5 - 5.0 g/dL    Globulin 3.5 2.0 - 4.0 g/dL    A-G Ratio 0.9 (L) 1.1 - 2.2         Pre-medications  were administered as ordered and chemotherapy was initiated. 09:32  - 10:44 NS 1 liter   10:45  Zofran 8 mg IVP  10:50  Decadron 10 mg IVP  11:05  - 11:52 Abraxane 184 mg  IVPB  11:55  - 12:30 Gemzar 1,656 mg IVPB    Ms. Ba tolerated treatment well and was discharged from [place] [address] in stable condition at 12:40. She is to return on January 25 at 08:30 for her next appointment.     Maxi Alvarado RN  January 18, 2022

## 2022-01-19 ENCOUNTER — TELEPHONE (OUTPATIENT)
Dept: ONCOLOGY | Age: 67
End: 2022-01-19

## 2022-01-21 NOTE — TELEPHONE ENCOUNTER
----- Message from Elisha Cardozo MD sent at 1/19/2022  8:46 AM EST -----  No brain mets, please inform
----- Message from Vidhi Luna MD sent at 1/19/2022  8:46 AM EST -----  No brain mets, please inform
HIPAA verified. Notified patient of MRI results, Patient verbalized understanding. Patient would like to let DR Eliel Coronel know; The for a day or two after chemo she is more \"wabbly\" and uses a cane. The night after chemo if she has missed any cycles, she cries a lot. Please advise.
No

## 2022-01-25 ENCOUNTER — HOSPITAL ENCOUNTER (OUTPATIENT)
Dept: INFUSION THERAPY | Age: 67
Discharge: HOME | End: 2022-01-25
Payer: MEDICARE

## 2022-01-25 VITALS
SYSTOLIC BLOOD PRESSURE: 165 MMHG | HEIGHT: 64 IN | RESPIRATION RATE: 19 BRPM | WEIGHT: 176.8 LBS | DIASTOLIC BLOOD PRESSURE: 76 MMHG | OXYGEN SATURATION: 96 % | HEART RATE: 103 BPM | TEMPERATURE: 98.8 F | BODY MASS INDEX: 30.19 KG/M2

## 2022-01-25 DIAGNOSIS — C25.9 ADENOCARCINOMA OF PANCREAS (HCC): Primary | ICD-10-CM

## 2022-01-25 LAB
ALBUMIN SERPL-MCNC: 3.3 G/DL (ref 3.5–5)
ALBUMIN/GLOB SERPL: 0.9 {RATIO} (ref 1.1–2.2)
ALP SERPL-CCNC: 104 U/L (ref 45–117)
ALT SERPL-CCNC: 28 U/L (ref 12–78)
ANION GAP SERPL CALC-SCNC: 8 MMOL/L (ref 5–15)
AST SERPL-CCNC: 19 U/L (ref 15–37)
BASOPHILS # BLD: 0.1 K/UL (ref 0–0.1)
BASOPHILS NFR BLD: 1 % (ref 0–1)
BILIRUB SERPL-MCNC: 0.2 MG/DL (ref 0.2–1)
BUN SERPL-MCNC: 18 MG/DL (ref 6–20)
BUN/CREAT SERPL: 23 (ref 12–20)
CALCIUM SERPL-MCNC: 8.8 MG/DL (ref 8.5–10.1)
CHLORIDE SERPL-SCNC: 100 MMOL/L (ref 97–108)
CO2 SERPL-SCNC: 30 MMOL/L (ref 21–32)
CREAT SERPL-MCNC: 0.78 MG/DL (ref 0.55–1.02)
DIFFERENTIAL METHOD BLD: ABNORMAL
EOSINOPHIL # BLD: 0.2 K/UL (ref 0–0.4)
EOSINOPHIL NFR BLD: 5 % (ref 0–7)
ERYTHROCYTE [DISTWIDTH] IN BLOOD BY AUTOMATED COUNT: 16.3 % (ref 11.5–14.5)
GLOBULIN SER CALC-MCNC: 3.5 G/DL (ref 2–4)
GLUCOSE SERPL-MCNC: 257 MG/DL (ref 65–100)
HCT VFR BLD AUTO: 33.9 % (ref 35–47)
HGB BLD-MCNC: 10.6 G/DL (ref 11.5–16)
IMM GRANULOCYTES # BLD AUTO: 0 K/UL (ref 0–0.04)
IMM GRANULOCYTES NFR BLD AUTO: 0 % (ref 0–0.5)
LYMPHOCYTES # BLD: 1.8 K/UL (ref 0.8–3.5)
LYMPHOCYTES NFR BLD: 40 % (ref 12–49)
MCH RBC QN AUTO: 28.4 PG (ref 26–34)
MCHC RBC AUTO-ENTMCNC: 31.3 G/DL (ref 30–36.5)
MCV RBC AUTO: 90.9 FL (ref 80–99)
MONOCYTES # BLD: 0.8 K/UL (ref 0–1)
MONOCYTES NFR BLD: 18 % (ref 5–13)
NEUTS SEG # BLD: 1.6 K/UL (ref 1.8–8)
NEUTS SEG NFR BLD: 36 % (ref 32–75)
NRBC # BLD: 0.04 K/UL (ref 0–0.01)
NRBC BLD-RTO: 0.9 PER 100 WBC
PLATELET # BLD AUTO: 463 K/UL (ref 150–400)
PMV BLD AUTO: 10.7 FL (ref 8.9–12.9)
POTASSIUM SERPL-SCNC: 4.3 MMOL/L (ref 3.5–5.1)
PROT SERPL-MCNC: 6.8 G/DL (ref 6.4–8.2)
RBC # BLD AUTO: 3.73 M/UL (ref 3.8–5.2)
SODIUM SERPL-SCNC: 138 MMOL/L (ref 136–145)
WBC # BLD AUTO: 4.6 K/UL (ref 3.6–11)

## 2022-01-25 PROCEDURE — 74011000258 HC RX REV CODE- 258: Performed by: INTERNAL MEDICINE

## 2022-01-25 PROCEDURE — 85025 COMPLETE CBC W/AUTO DIFF WBC: CPT

## 2022-01-25 PROCEDURE — 96417 CHEMO IV INFUS EACH ADDL SEQ: CPT

## 2022-01-25 PROCEDURE — 74011000250 HC RX REV CODE- 250: Performed by: INTERNAL MEDICINE

## 2022-01-25 PROCEDURE — 80053 COMPREHEN METABOLIC PANEL: CPT

## 2022-01-25 PROCEDURE — 96413 CHEMO IV INFUSION 1 HR: CPT

## 2022-01-25 PROCEDURE — 96375 TX/PRO/DX INJ NEW DRUG ADDON: CPT

## 2022-01-25 PROCEDURE — 36415 COLL VENOUS BLD VENIPUNCTURE: CPT

## 2022-01-25 PROCEDURE — 74011250636 HC RX REV CODE- 250/636: Performed by: INTERNAL MEDICINE

## 2022-01-25 PROCEDURE — 77030012965 HC NDL HUBR BBMI -A

## 2022-01-25 RX ORDER — ONDANSETRON 2 MG/ML
8 INJECTION INTRAMUSCULAR; INTRAVENOUS ONCE
Status: COMPLETED | OUTPATIENT
Start: 2022-01-25 | End: 2022-01-25

## 2022-01-25 RX ORDER — ACETAMINOPHEN 325 MG/1
650 TABLET ORAL AS NEEDED
Status: ACTIVE | OUTPATIENT
Start: 2022-01-25 | End: 2022-01-25

## 2022-01-25 RX ORDER — SODIUM CHLORIDE 9 MG/ML
10 INJECTION INTRAMUSCULAR; INTRAVENOUS; SUBCUTANEOUS AS NEEDED
Status: ACTIVE | OUTPATIENT
Start: 2022-01-25 | End: 2022-01-25

## 2022-01-25 RX ORDER — HEPARIN 100 UNIT/ML
300-500 SYRINGE INTRAVENOUS AS NEEDED
Status: DISPENSED | OUTPATIENT
Start: 2022-01-25 | End: 2022-01-25

## 2022-01-25 RX ORDER — DIPHENHYDRAMINE HYDROCHLORIDE 50 MG/ML
25 INJECTION, SOLUTION INTRAMUSCULAR; INTRAVENOUS AS NEEDED
Status: ACTIVE | OUTPATIENT
Start: 2022-01-25 | End: 2022-01-25

## 2022-01-25 RX ORDER — ONDANSETRON 2 MG/ML
8 INJECTION INTRAMUSCULAR; INTRAVENOUS AS NEEDED
Status: ACTIVE | OUTPATIENT
Start: 2022-01-25 | End: 2022-01-25

## 2022-01-25 RX ORDER — DEXAMETHASONE SODIUM PHOSPHATE 100 MG/10ML
10 INJECTION INTRAMUSCULAR; INTRAVENOUS ONCE
Status: COMPLETED | OUTPATIENT
Start: 2022-01-25 | End: 2022-01-25

## 2022-01-25 RX ORDER — SODIUM CHLORIDE 9 MG/ML
25 INJECTION, SOLUTION INTRAVENOUS CONTINUOUS
Status: DISPENSED | OUTPATIENT
Start: 2022-01-25 | End: 2022-01-25

## 2022-01-25 RX ADMIN — Medication 500 UNITS: at 12:17

## 2022-01-25 RX ADMIN — DEXAMETHASONE SODIUM PHOSPHATE 10 MG: 10 INJECTION INTRAMUSCULAR; INTRAVENOUS at 09:51

## 2022-01-25 RX ADMIN — SODIUM CHLORIDE, PRESERVATIVE FREE 10 ML: 5 INJECTION INTRAVENOUS at 12:17

## 2022-01-25 RX ADMIN — GEMCITABINE 1656 MG: 38 INJECTION, SOLUTION INTRAVENOUS at 11:36

## 2022-01-25 RX ADMIN — SODIUM CHLORIDE, PRESERVATIVE FREE 10 ML: 5 INJECTION INTRAVENOUS at 08:40

## 2022-01-25 RX ADMIN — SODIUM CHLORIDE 25 ML/HR: 9 INJECTION, SOLUTION INTRAVENOUS at 09:50

## 2022-01-25 RX ADMIN — PACLITAXEL 184 MG: 100 INJECTION, POWDER, LYOPHILIZED, FOR SUSPENSION INTRAVENOUS at 10:42

## 2022-01-25 RX ADMIN — ONDANSETRON 8 MG: 2 INJECTION INTRAMUSCULAR; INTRAVENOUS at 09:56

## 2022-01-25 NOTE — PROGRESS NOTES
[facility] Progress Note    Date: 2022    Name: Abraham Roe    MRN: [id number]         : 1955    Ms. Ba Arrived ambulatory and in no distress for cycle 11 day 15 of Abraxane/Gemcitabine regimen. Assessment was completed, no acute issues at this time, no new complaints voiced. She reports feeling \"pretty good\" this week. Has some low abdominal pain at times. States she is easily fatigued and \"sleeps a lot\". Port accessed without difficulty, labs drawn and in process. Chemotherapy Flowsheet 2022   Cycle C11 D15   Date 2022   Drug / Regimen Abraxane/Gemcitabine   Dosage 184 mg/1656 mg   Time Up 1042   Time Down 1211   Pre Hydration -   Pre Meds zofran 8 mg IV, Decadron 10 mg   Notes -       Ms. Ba's vitals were reviewed. Visit Vitals  BP (!) 165/76 (BP 1 Location: Right upper arm, BP Patient Position: Sitting)   Pulse (!) 103   Temp 98.8 °F (37.1 °C)   Resp 19   Ht 5' 4\" (1.626 m)   Wt 80.2 kg (176 lb 12.8 oz)   SpO2 96%   BMI 30.35 kg/m²       Lab results were obtained and reviewed. Recent Results (from the past 12 hour(s))   CBC WITH AUTOMATED DIFF    Collection Time: 22  8:40 AM   Result Value Ref Range    WBC 4.6 3.6 - 11.0 K/uL    RBC 3.73 (L) 3.80 - 5.20 M/uL    HGB 10.6 (L) 11.5 - 16.0 g/dL    HCT 33.9 (L) 35.0 - 47.0 %    MCV 90.9 80.0 - 99.0 FL    MCH 28.4 26.0 - 34.0 PG    MCHC 31.3 30.0 - 36.5 g/dL    RDW 16.3 (H) 11.5 - 14.5 %    PLATELET 084 (H) 868 - 400 K/uL    MPV 10.7 8.9 - 12.9 FL    NRBC 0.9 (H) 0  WBC    ABSOLUTE NRBC 0.04 (H) 0.00 - 0.01 K/uL    NEUTROPHILS 36 32 - 75 %    LYMPHOCYTES 40 12 - 49 %    MONOCYTES 18 (H) 5 - 13 %    EOSINOPHILS 5 0 - 7 %    BASOPHILS 1 0 - 1 %    IMMATURE GRANULOCYTES 0 0.0 - 0.5 %    ABS. NEUTROPHILS 1.6 (L) 1.8 - 8.0 K/UL    ABS. LYMPHOCYTES 1.8 0.8 - 3.5 K/UL    ABS. MONOCYTES 0.8 0.0 - 1.0 K/UL    ABS. EOSINOPHILS 0.2 0.0 - 0.4 K/UL    ABS. BASOPHILS 0.1 0.0 - 0.1 K/UL    ABS. IMM.  GRANS. 0.0 0.00 - 0.04 K/UL    DF AUTOMATED     METABOLIC PANEL, COMPREHENSIVE    Collection Time: 01/25/22  8:40 AM   Result Value Ref Range    Sodium 138 136 - 145 mmol/L    Potassium 4.3 3.5 - 5.1 mmol/L    Chloride 100 97 - 108 mmol/L    CO2 30 21 - 32 mmol/L    Anion gap 8 5 - 15 mmol/L    Glucose 257 (H) 65 - 100 mg/dL    BUN 18 6 - 20 MG/DL    Creatinine 0.78 0.55 - 1.02 MG/DL    BUN/Creatinine ratio 23 (H) 12 - 20      GFR est AA >60 >60 ml/min/1.73m2    GFR est non-AA >60 >60 ml/min/1.73m2    Calcium 8.8 8.5 - 10.1 MG/DL    Bilirubin, total 0.2 0.2 - 1.0 MG/DL    ALT (SGPT) 28 12 - 78 U/L    AST (SGOT) 19 15 - 37 U/L    Alk. phosphatase 104 45 - 117 U/L    Protein, total 6.8 6.4 - 8.2 g/dL    Albumin 3.3 (L) 3.5 - 5.0 g/dL    Globulin 3.5 2.0 - 4.0 g/dL    A-G Ratio 0.9 (L) 1.1 - 2.2         Pre-medications  were administered as ordered and chemotherapy was initiated. 250 ml NS IV main line established. Zofran 8 mg IVP given. Decadron 10 mg IVP given. 1042: Abraxane 184 mg IV started to infuse over 30 minutes. 1136: Gemcitabine 1656 mg IV started to infuse over 30 minutes. Port was flushed and de-accessed post administration and site intact. Ms. Willie Torres tolerated treatment well and was discharged from [place] [address] in stable condition at 1220. She is to return on February 8 at 8:30 for her next appointment.     Alan Gracia RN  January 25, 2022

## 2022-01-27 ENCOUNTER — OFFICE VISIT (OUTPATIENT)
Dept: ONCOLOGY | Age: 67
End: 2022-01-27
Payer: MEDICARE

## 2022-01-27 ENCOUNTER — HOSPITAL ENCOUNTER (OUTPATIENT)
Dept: INFUSION THERAPY | Age: 67
End: 2022-01-27

## 2022-01-27 VITALS
BODY MASS INDEX: 30.29 KG/M2 | HEIGHT: 64 IN | DIASTOLIC BLOOD PRESSURE: 81 MMHG | TEMPERATURE: 98.5 F | SYSTOLIC BLOOD PRESSURE: 129 MMHG | WEIGHT: 177.4 LBS | HEART RATE: 88 BPM | RESPIRATION RATE: 16 BRPM | OXYGEN SATURATION: 97 %

## 2022-01-27 DIAGNOSIS — C25.9 MALIGNANT NEOPLASM OF PANCREAS, UNSPECIFIED LOCATION OF MALIGNANCY (HCC): ICD-10-CM

## 2022-01-27 PROCEDURE — 99214 OFFICE O/P EST MOD 30 MIN: CPT | Performed by: INTERNAL MEDICINE

## 2022-01-27 PROCEDURE — G8399 PT W/DXA RESULTS DOCUMENT: HCPCS | Performed by: INTERNAL MEDICINE

## 2022-01-27 PROCEDURE — 1090F PRES/ABSN URINE INCON ASSESS: CPT | Performed by: INTERNAL MEDICINE

## 2022-01-27 PROCEDURE — 3017F COLORECTAL CA SCREEN DOC REV: CPT | Performed by: INTERNAL MEDICINE

## 2022-01-27 PROCEDURE — G8754 DIAS BP LESS 90: HCPCS | Performed by: INTERNAL MEDICINE

## 2022-01-27 PROCEDURE — G8752 SYS BP LESS 140: HCPCS | Performed by: INTERNAL MEDICINE

## 2022-01-27 PROCEDURE — G8510 SCR DEP NEG, NO PLAN REQD: HCPCS | Performed by: INTERNAL MEDICINE

## 2022-01-27 PROCEDURE — G8417 CALC BMI ABV UP PARAM F/U: HCPCS | Performed by: INTERNAL MEDICINE

## 2022-01-27 PROCEDURE — G8536 NO DOC ELDER MAL SCRN: HCPCS | Performed by: INTERNAL MEDICINE

## 2022-01-27 PROCEDURE — G8427 DOCREV CUR MEDS BY ELIG CLIN: HCPCS | Performed by: INTERNAL MEDICINE

## 2022-01-27 PROCEDURE — 1101F PT FALLS ASSESS-DOCD LE1/YR: CPT | Performed by: INTERNAL MEDICINE

## 2022-01-27 NOTE — PROGRESS NOTES
Jonel Patricia is a 77 y.o. female here for follow up of pancreatic cancer. Patient has occasonial pain back and in abdomen, she also has shooting pain in arms, legs, etc at times. Patient takes 1.5 mg dilaudid in the AM, PM and at bedtime. She takes Gabapentin 2 tabs TID,   She is not sleeping well, she is taking  Ativan, benadryl, and dilaudid at bedtime. Key Oncology Meds             ondansetron (ZOFRAN ODT) 4 mg disintegrating tablet Take 1 Tab by mouth every eight (8) hours as needed for Nausea or Vomiting. Key Pain Meds             HYDROmorphone (Dilaudid) 2 mg tablet Take 1 Tablet by mouth every four (4) hours as needed for Pain for up to 30 days. Max Daily Amount: 12 mg.    acetaminophen (Acetaminophen Extra Strength) 500 mg tablet Take 1,000 mg by mouth daily as needed.         Chemotherapy Flowsheet 1/25/2022   Cycle C11 D15   Date 1/25/2022   Drug / Regimen Abraxane/Gemcitabine   Dosage 184 mg/1656 mg   Time Up 1042   Time Down 1211   Pre Hydration -   Pre Meds zofran 8 mg IV, Decadron 10 mg   Notes -

## 2022-01-28 RX ORDER — HYDROMORPHONE HYDROCHLORIDE 2 MG/1
2 TABLET ORAL
Qty: 90 TABLET | Refills: 0 | Status: SHIPPED | OUTPATIENT
Start: 2022-01-28 | End: 2022-02-27

## 2022-02-02 ENCOUNTER — HOSPITAL ENCOUNTER (OUTPATIENT)
Dept: CT IMAGING | Age: 67
Discharge: HOME | End: 2022-02-02
Attending: INTERNAL MEDICINE
Payer: MEDICARE

## 2022-02-02 DIAGNOSIS — C25.9 MALIGNANT NEOPLASM OF PANCREAS, UNSPECIFIED LOCATION OF MALIGNANCY (HCC): ICD-10-CM

## 2022-02-02 PROCEDURE — 74177 CT ABD & PELVIS W/CONTRAST: CPT

## 2022-02-02 PROCEDURE — 74011000636 HC RX REV CODE- 636: Performed by: INTERNAL MEDICINE

## 2022-02-02 PROCEDURE — 74011000250 HC RX REV CODE- 250: Performed by: INTERNAL MEDICINE

## 2022-02-02 RX ORDER — BARIUM SULFATE 20 MG/ML
450 SUSPENSION ORAL
Status: COMPLETED | OUTPATIENT
Start: 2022-02-02 | End: 2022-02-02

## 2022-02-02 RX ADMIN — BARIUM SULFATE 450 ML: 20 SUSPENSION ORAL at 08:25

## 2022-02-02 RX ADMIN — IOPAMIDOL 100 ML: 612 INJECTION, SOLUTION INTRAVENOUS at 08:24

## 2022-02-04 NOTE — PROGRESS NOTES
CT scans show continued improvement in the abd--the peritoneal disease is getting smaller. The scans do show very small nodules in the lung--too small to characterize--not particularly problematic we just need to follow.   Please inform

## 2022-02-07 ENCOUNTER — TELEPHONE (OUTPATIENT)
Dept: ONCOLOGY | Age: 67
End: 2022-02-07

## 2022-02-07 NOTE — TELEPHONE ENCOUNTER
----- Message from Billy Pathak MD sent at 2/4/2022  8:56 AM EST -----  CT scans show continued improvement in the abd--the peritoneal disease is getting smaller. The scans do show very small nodules in the lung--too small to characterize--not particularly problematic we just need to follow.   Please inform

## 2022-02-08 ENCOUNTER — HOSPITAL ENCOUNTER (OUTPATIENT)
Dept: INFUSION THERAPY | Age: 67
Discharge: HOME | End: 2022-02-08
Payer: MEDICARE

## 2022-02-08 VITALS
RESPIRATION RATE: 19 BRPM | OXYGEN SATURATION: 97 % | SYSTOLIC BLOOD PRESSURE: 157 MMHG | DIASTOLIC BLOOD PRESSURE: 89 MMHG | WEIGHT: 179 LBS | BODY MASS INDEX: 30.56 KG/M2 | TEMPERATURE: 98.9 F | HEIGHT: 64 IN | HEART RATE: 85 BPM

## 2022-02-08 DIAGNOSIS — C25.9 ADENOCARCINOMA OF PANCREAS (HCC): Primary | ICD-10-CM

## 2022-02-08 LAB
ALBUMIN SERPL-MCNC: 3.1 G/DL (ref 3.5–5)
ALBUMIN/GLOB SERPL: 0.9 {RATIO} (ref 1.1–2.2)
ALP SERPL-CCNC: 107 U/L (ref 45–117)
ALT SERPL-CCNC: 22 U/L (ref 12–78)
ANION GAP SERPL CALC-SCNC: 7 MMOL/L (ref 5–15)
AST SERPL-CCNC: 15 U/L (ref 15–37)
BASOPHILS # BLD: 0.1 K/UL (ref 0–0.1)
BASOPHILS NFR BLD: 2 % (ref 0–1)
BILIRUB SERPL-MCNC: 0.2 MG/DL (ref 0.2–1)
BUN SERPL-MCNC: 10 MG/DL (ref 6–20)
BUN/CREAT SERPL: 14 (ref 12–20)
CALCIUM SERPL-MCNC: 8.6 MG/DL (ref 8.5–10.1)
CHLORIDE SERPL-SCNC: 102 MMOL/L (ref 97–108)
CO2 SERPL-SCNC: 29 MMOL/L (ref 21–32)
CREAT SERPL-MCNC: 0.7 MG/DL (ref 0.55–1.02)
DIFFERENTIAL METHOD BLD: ABNORMAL
EOSINOPHIL # BLD: 0.3 K/UL (ref 0–0.4)
EOSINOPHIL NFR BLD: 4 % (ref 0–7)
ERYTHROCYTE [DISTWIDTH] IN BLOOD BY AUTOMATED COUNT: 16.3 % (ref 11.5–14.5)
GLOBULIN SER CALC-MCNC: 3.6 G/DL (ref 2–4)
GLUCOSE SERPL-MCNC: 220 MG/DL (ref 65–100)
HCT VFR BLD AUTO: 32.2 % (ref 35–47)
HGB BLD-MCNC: 10.2 G/DL (ref 11.5–16)
IMM GRANULOCYTES # BLD AUTO: 0 K/UL (ref 0–0.04)
IMM GRANULOCYTES NFR BLD AUTO: 0 % (ref 0–0.5)
LYMPHOCYTES # BLD: 1.8 K/UL (ref 0.8–3.5)
LYMPHOCYTES NFR BLD: 28 % (ref 12–49)
MCH RBC QN AUTO: 28.8 PG (ref 26–34)
MCHC RBC AUTO-ENTMCNC: 31.7 G/DL (ref 30–36.5)
MCV RBC AUTO: 91 FL (ref 80–99)
MONOCYTES # BLD: 1.8 K/UL (ref 0–1)
MONOCYTES NFR BLD: 28 % (ref 5–13)
NEUTS SEG # BLD: 2.6 K/UL (ref 1.8–8)
NEUTS SEG NFR BLD: 38 % (ref 32–75)
NRBC # BLD: 0 K/UL (ref 0–0.01)
NRBC BLD-RTO: 0 PER 100 WBC
PLATELET # BLD AUTO: 677 K/UL (ref 150–400)
PMV BLD AUTO: 9.7 FL (ref 8.9–12.9)
POTASSIUM SERPL-SCNC: 3.7 MMOL/L (ref 3.5–5.1)
PROT SERPL-MCNC: 6.7 G/DL (ref 6.4–8.2)
RBC # BLD AUTO: 3.54 M/UL (ref 3.8–5.2)
RBC MORPH BLD: ABNORMAL
SODIUM SERPL-SCNC: 138 MMOL/L (ref 136–145)
WBC # BLD AUTO: 6.6 K/UL (ref 3.6–11)

## 2022-02-08 PROCEDURE — 96413 CHEMO IV INFUSION 1 HR: CPT

## 2022-02-08 PROCEDURE — 85025 COMPLETE CBC W/AUTO DIFF WBC: CPT

## 2022-02-08 PROCEDURE — 96375 TX/PRO/DX INJ NEW DRUG ADDON: CPT

## 2022-02-08 PROCEDURE — 36415 COLL VENOUS BLD VENIPUNCTURE: CPT

## 2022-02-08 PROCEDURE — 77030012965 HC NDL HUBR BBMI -A

## 2022-02-08 PROCEDURE — 74011250636 HC RX REV CODE- 250/636: Performed by: INTERNAL MEDICINE

## 2022-02-08 PROCEDURE — 96417 CHEMO IV INFUS EACH ADDL SEQ: CPT

## 2022-02-08 PROCEDURE — 80053 COMPREHEN METABOLIC PANEL: CPT

## 2022-02-08 PROCEDURE — 74011000258 HC RX REV CODE- 258: Performed by: INTERNAL MEDICINE

## 2022-02-08 RX ORDER — DEXAMETHASONE SODIUM PHOSPHATE 4 MG/ML
10 INJECTION, SOLUTION INTRA-ARTICULAR; INTRALESIONAL; INTRAMUSCULAR; INTRAVENOUS; SOFT TISSUE ONCE
Status: COMPLETED | OUTPATIENT
Start: 2022-02-08 | End: 2022-02-08

## 2022-02-08 RX ORDER — DIPHENHYDRAMINE HYDROCHLORIDE 50 MG/ML
25 INJECTION, SOLUTION INTRAMUSCULAR; INTRAVENOUS AS NEEDED
Status: ACTIVE | OUTPATIENT
Start: 2022-02-08 | End: 2022-02-08

## 2022-02-08 RX ORDER — ACETAMINOPHEN 325 MG/1
650 TABLET ORAL AS NEEDED
Status: ACTIVE | OUTPATIENT
Start: 2022-02-08 | End: 2022-02-08

## 2022-02-08 RX ORDER — SODIUM CHLORIDE 0.9 % (FLUSH) 0.9 %
10 SYRINGE (ML) INJECTION AS NEEDED
Status: ACTIVE | OUTPATIENT
Start: 2022-02-08 | End: 2022-02-08

## 2022-02-08 RX ORDER — SODIUM CHLORIDE 9 MG/ML
25 INJECTION, SOLUTION INTRAVENOUS CONTINUOUS
Status: DISPENSED | OUTPATIENT
Start: 2022-02-08 | End: 2022-02-08

## 2022-02-08 RX ORDER — ONDANSETRON 2 MG/ML
8 INJECTION INTRAMUSCULAR; INTRAVENOUS ONCE
Status: COMPLETED | OUTPATIENT
Start: 2022-02-08 | End: 2022-02-08

## 2022-02-08 RX ORDER — ONDANSETRON 2 MG/ML
8 INJECTION INTRAMUSCULAR; INTRAVENOUS AS NEEDED
Status: ACTIVE | OUTPATIENT
Start: 2022-02-08 | End: 2022-02-08

## 2022-02-08 RX ORDER — HEPARIN 100 UNIT/ML
300-500 SYRINGE INTRAVENOUS AS NEEDED
Status: DISPENSED | OUTPATIENT
Start: 2022-02-08 | End: 2022-02-08

## 2022-02-08 RX ORDER — SODIUM CHLORIDE 9 MG/ML
10 INJECTION INTRAMUSCULAR; INTRAVENOUS; SUBCUTANEOUS AS NEEDED
Status: ACTIVE | OUTPATIENT
Start: 2022-02-08 | End: 2022-02-08

## 2022-02-08 RX ADMIN — DEXAMETHASONE SODIUM PHOSPHATE 10 MG: 4 INJECTION INTRA-ARTICULAR; INTRALESIONAL; INTRAMUSCULAR; INTRAVENOUS; SOFT TISSUE at 10:21

## 2022-02-08 RX ADMIN — ONDANSETRON 8 MG: 2 INJECTION INTRAMUSCULAR; INTRAVENOUS at 10:17

## 2022-02-08 RX ADMIN — SODIUM CHLORIDE 25 ML/HR: 9 INJECTION, SOLUTION INTRAVENOUS at 10:17

## 2022-02-08 RX ADMIN — GEMCITABINE 1656 MG: 38 INJECTION, SOLUTION INTRAVENOUS at 12:00

## 2022-02-08 RX ADMIN — PACLITAXEL 184 MG: 100 INJECTION, POWDER, LYOPHILIZED, FOR SUSPENSION INTRAVENOUS at 11:03

## 2022-02-08 NOTE — PROGRESS NOTES
Problem: Chemotherapy Treatment  Goal: *Chemotherapy regimen followed  Outcome: Resolved/Met  Goal: *Hemodynamically stable  Outcome: Resolved/Met

## 2022-02-08 NOTE — PROGRESS NOTES
[facility] Progress Note    Date: 2022    Name: Sharlene Dove    MRN: [id number]         : 1955    Ms. Ba Arrived ambulatory and in no distress for cycle 12 day 1 of Abraxane/Gemcitabine regimen. Assessment was completed, no acute issues at this time, no new complaints voiced. Port accessed without difficulty, labs drawn and in process. Chemotherapy Flowsheet 2022   Cycle C12 D1   Date 2022   Drug / Regimen Abraxane/Gemcitabine   Dosage 184 mg/1,656 mg   Time Up -   Time Down -   Pre Hydration -   Pre Meds Zofran 8mg/Dexamethasone 10 mg   Notes -       Ms. Ba's vitals were reviewed. Visit Vitals  BP (!) 157/89 (BP 1 Location: Left upper arm, BP Patient Position: Sitting)   Pulse 85   Temp 98.9 °F (37.2 °C)   Resp 19   Ht 5' 4\" (1.626 m)   Wt 81.2 kg (179 lb)   SpO2 97%   Breastfeeding No   BMI 30.73 kg/m²       Lab results were obtained and reviewed. Recent Results (from the past 12 hour(s))   CBC WITH AUTOMATED DIFF    Collection Time: 22  8:55 AM   Result Value Ref Range    WBC 6.6 3.6 - 11.0 K/uL    RBC 3.54 (L) 3.80 - 5.20 M/uL    HGB 10.2 (L) 11.5 - 16.0 g/dL    HCT 32.2 (L) 35.0 - 47.0 %    MCV 91.0 80.0 - 99.0 FL    MCH 28.8 26.0 - 34.0 PG    MCHC 31.7 30.0 - 36.5 g/dL    RDW 16.3 (H) 11.5 - 14.5 %    PLATELET 621 (H) 202 - 400 K/uL    MPV 9.7 8.9 - 12.9 FL    NRBC 0.0 0  WBC    ABSOLUTE NRBC 0.00 0.00 - 0.01 K/uL    NEUTROPHILS 38 32 - 75 %    LYMPHOCYTES 28 12 - 49 %    MONOCYTES 28 (H) 5 - 13 %    EOSINOPHILS 4 0 - 7 %    BASOPHILS 2 (H) 0 - 1 %    IMMATURE GRANULOCYTES 0 0.0 - 0.5 %    ABS. NEUTROPHILS 2.6 1.8 - 8.0 K/UL    ABS. LYMPHOCYTES 1.8 0.8 - 3.5 K/UL    ABS. MONOCYTES 1.8 (H) 0.0 - 1.0 K/UL    ABS. EOSINOPHILS 0.3 0.0 - 0.4 K/UL    ABS. BASOPHILS 0.1 0.0 - 0.1 K/UL    ABS. IMM.  GRANS. 0.0 0.00 - 0.04 K/UL    DF AUTOMATED      RBC COMMENTS ANISOCYTOSIS  1+       METABOLIC PANEL, COMPREHENSIVE    Collection Time: 22  8:55 AM   Result Value Ref Range    Sodium 138 136 - 145 mmol/L    Potassium 3.7 3.5 - 5.1 mmol/L    Chloride 102 97 - 108 mmol/L    CO2 29 21 - 32 mmol/L    Anion gap 7 5 - 15 mmol/L    Glucose 220 (H) 65 - 100 mg/dL    BUN 10 6 - 20 MG/DL    Creatinine 0.70 0.55 - 1.02 MG/DL    BUN/Creatinine ratio 14 12 - 20      GFR est AA >60 >60 ml/min/1.73m2    GFR est non-AA >60 >60 ml/min/1.73m2    Calcium 8.6 8.5 - 10.1 MG/DL    Bilirubin, total 0.2 0.2 - 1.0 MG/DL    ALT (SGPT) 22 12 - 78 U/L    AST (SGOT) 15 15 - 37 U/L    Alk. phosphatase 107 45 - 117 U/L    Protein, total 6.7 6.4 - 8.2 g/dL    Albumin 3.1 (L) 3.5 - 5.0 g/dL    Globulin 3.6 2.0 - 4.0 g/dL    A-G Ratio 0.9 (L) 1.1 - 2.2         Pre-medications  were administered as ordered and chemotherapy was initiated. 10:17 -  NS mainline  10:17  Zofran 8 mg IVP  10:21  Dexamethasone 10 mg IVP  11:03 - 11:55 Paclitaxel 184 mg IVPB  12:00 - 12:34 Gemcitabine 1,656 mg IVPB      Ms. Ba tolerated treatment well and was discharged from [place] [address] in stable condition at 12:50. She is to return on February 15 at 08:30 for her next appointment.     Va Pastor RN  February 8, 2022

## 2022-02-08 NOTE — DISCHARGE INSTRUCTIONS

## 2022-02-10 ENCOUNTER — HOSPITAL ENCOUNTER (OUTPATIENT)
Dept: NUCLEAR MEDICINE | Age: 67
Discharge: HOME | End: 2022-02-10
Attending: INTERNAL MEDICINE
Payer: MEDICARE

## 2022-02-10 DIAGNOSIS — C25.9 MALIGNANT NEOPLASM OF PANCREAS, UNSPECIFIED LOCATION OF MALIGNANCY (HCC): ICD-10-CM

## 2022-02-10 PROCEDURE — 78306 BONE IMAGING WHOLE BODY: CPT

## 2022-02-15 ENCOUNTER — HOSPITAL ENCOUNTER (OUTPATIENT)
Dept: INFUSION THERAPY | Age: 67
Discharge: HOME | End: 2022-02-15
Payer: MEDICARE

## 2022-02-15 VITALS
RESPIRATION RATE: 18 BRPM | BODY MASS INDEX: 30.19 KG/M2 | DIASTOLIC BLOOD PRESSURE: 81 MMHG | SYSTOLIC BLOOD PRESSURE: 175 MMHG | HEART RATE: 95 BPM | WEIGHT: 176.8 LBS | TEMPERATURE: 99 F | HEIGHT: 64 IN | OXYGEN SATURATION: 97 %

## 2022-02-15 DIAGNOSIS — C25.9 ADENOCARCINOMA OF PANCREAS (HCC): Primary | ICD-10-CM

## 2022-02-15 LAB
ALBUMIN SERPL-MCNC: 3 G/DL (ref 3.5–5)
ALBUMIN/GLOB SERPL: 0.8 {RATIO} (ref 1.1–2.2)
ALP SERPL-CCNC: 108 U/L (ref 45–117)
ALT SERPL-CCNC: 27 U/L (ref 12–78)
ANION GAP SERPL CALC-SCNC: 9 MMOL/L (ref 5–15)
AST SERPL-CCNC: 21 U/L (ref 15–37)
BASOPHILS # BLD: 0.1 K/UL (ref 0–0.1)
BASOPHILS NFR BLD: 1 % (ref 0–1)
BILIRUB SERPL-MCNC: 0.2 MG/DL (ref 0.2–1)
BUN SERPL-MCNC: 14 MG/DL (ref 6–20)
BUN/CREAT SERPL: 18 (ref 12–20)
CALCIUM SERPL-MCNC: 8.6 MG/DL (ref 8.5–10.1)
CHLORIDE SERPL-SCNC: 99 MMOL/L (ref 97–108)
CO2 SERPL-SCNC: 28 MMOL/L (ref 21–32)
CREAT SERPL-MCNC: 0.76 MG/DL (ref 0.55–1.02)
DIFFERENTIAL METHOD BLD: ABNORMAL
EOSINOPHIL # BLD: 0.1 K/UL (ref 0–0.4)
EOSINOPHIL NFR BLD: 2 % (ref 0–7)
ERYTHROCYTE [DISTWIDTH] IN BLOOD BY AUTOMATED COUNT: 16.1 % (ref 11.5–14.5)
GLOBULIN SER CALC-MCNC: 3.6 G/DL (ref 2–4)
GLUCOSE SERPL-MCNC: 341 MG/DL (ref 65–100)
HCT VFR BLD AUTO: 31.6 % (ref 35–47)
HGB BLD-MCNC: 10.2 G/DL (ref 11.5–16)
IMM GRANULOCYTES # BLD AUTO: 0.1 K/UL (ref 0–0.04)
IMM GRANULOCYTES NFR BLD AUTO: 1 % (ref 0–0.5)
LYMPHOCYTES # BLD: 1.5 K/UL (ref 0.8–3.5)
LYMPHOCYTES NFR BLD: 23 % (ref 12–49)
MCH RBC QN AUTO: 28.9 PG (ref 26–34)
MCHC RBC AUTO-ENTMCNC: 32.3 G/DL (ref 30–36.5)
MCV RBC AUTO: 89.5 FL (ref 80–99)
MONOCYTES # BLD: 1.4 K/UL (ref 0–1)
MONOCYTES NFR BLD: 21 % (ref 5–13)
NEUTS SEG # BLD: 3.4 K/UL (ref 1.8–8)
NEUTS SEG NFR BLD: 52 % (ref 32–75)
NRBC # BLD: 0.09 K/UL (ref 0–0.01)
NRBC BLD-RTO: 1.4 PER 100 WBC
PLATELET # BLD AUTO: 495 K/UL (ref 150–400)
PLATELET COMMENTS,PCOM: ABNORMAL
PMV BLD AUTO: 10.8 FL (ref 8.9–12.9)
POTASSIUM SERPL-SCNC: 4.3 MMOL/L (ref 3.5–5.1)
PROT SERPL-MCNC: 6.6 G/DL (ref 6.4–8.2)
RBC # BLD AUTO: 3.53 M/UL (ref 3.8–5.2)
RBC MORPH BLD: ABNORMAL
SODIUM SERPL-SCNC: 136 MMOL/L (ref 136–145)
WBC # BLD AUTO: 6.6 K/UL (ref 3.6–11)

## 2022-02-15 PROCEDURE — 74011000250 HC RX REV CODE- 250: Performed by: INTERNAL MEDICINE

## 2022-02-15 PROCEDURE — 85025 COMPLETE CBC W/AUTO DIFF WBC: CPT

## 2022-02-15 PROCEDURE — 80053 COMPREHEN METABOLIC PANEL: CPT

## 2022-02-15 PROCEDURE — 36415 COLL VENOUS BLD VENIPUNCTURE: CPT

## 2022-02-15 PROCEDURE — 74011250636 HC RX REV CODE- 250/636: Performed by: INTERNAL MEDICINE

## 2022-02-15 PROCEDURE — 96375 TX/PRO/DX INJ NEW DRUG ADDON: CPT

## 2022-02-15 PROCEDURE — 96413 CHEMO IV INFUSION 1 HR: CPT

## 2022-02-15 PROCEDURE — 96417 CHEMO IV INFUS EACH ADDL SEQ: CPT

## 2022-02-15 PROCEDURE — 74011000258 HC RX REV CODE- 258: Performed by: INTERNAL MEDICINE

## 2022-02-15 PROCEDURE — 77030012965 HC NDL HUBR BBMI -A

## 2022-02-15 RX ORDER — ONDANSETRON 2 MG/ML
8 INJECTION INTRAMUSCULAR; INTRAVENOUS AS NEEDED
Status: ACTIVE | OUTPATIENT
Start: 2022-02-15 | End: 2022-02-15

## 2022-02-15 RX ORDER — DEXAMETHASONE SODIUM PHOSPHATE 4 MG/ML
10 INJECTION, SOLUTION INTRA-ARTICULAR; INTRALESIONAL; INTRAMUSCULAR; INTRAVENOUS; SOFT TISSUE ONCE
Status: COMPLETED | OUTPATIENT
Start: 2022-02-15 | End: 2022-02-15

## 2022-02-15 RX ORDER — SODIUM CHLORIDE 9 MG/ML
25 INJECTION, SOLUTION INTRAVENOUS CONTINUOUS
Status: DISPENSED | OUTPATIENT
Start: 2022-02-15 | End: 2022-02-15

## 2022-02-15 RX ORDER — ONDANSETRON 2 MG/ML
8 INJECTION INTRAMUSCULAR; INTRAVENOUS ONCE
Status: COMPLETED | OUTPATIENT
Start: 2022-02-15 | End: 2022-02-15

## 2022-02-15 RX ORDER — HEPARIN 100 UNIT/ML
300-500 SYRINGE INTRAVENOUS AS NEEDED
Status: DISPENSED | OUTPATIENT
Start: 2022-02-15 | End: 2022-02-15

## 2022-02-15 RX ORDER — SODIUM CHLORIDE 9 MG/ML
10 INJECTION INTRAMUSCULAR; INTRAVENOUS; SUBCUTANEOUS AS NEEDED
Status: ACTIVE | OUTPATIENT
Start: 2022-02-15 | End: 2022-02-15

## 2022-02-15 RX ORDER — DIPHENHYDRAMINE HYDROCHLORIDE 50 MG/ML
25 INJECTION, SOLUTION INTRAMUSCULAR; INTRAVENOUS AS NEEDED
Status: ACTIVE | OUTPATIENT
Start: 2022-02-15 | End: 2022-02-15

## 2022-02-15 RX ORDER — ACETAMINOPHEN 325 MG/1
650 TABLET ORAL AS NEEDED
Status: ACTIVE | OUTPATIENT
Start: 2022-02-15 | End: 2022-02-15

## 2022-02-15 RX ADMIN — Medication 500 UNITS: at 12:07

## 2022-02-15 RX ADMIN — GEMCITABINE 1656 MG: 38 INJECTION, SOLUTION INTRAVENOUS at 11:20

## 2022-02-15 RX ADMIN — ONDANSETRON 8 MG: 2 INJECTION INTRAMUSCULAR; INTRAVENOUS at 10:03

## 2022-02-15 RX ADMIN — PACLITAXEL 184 MG: 100 INJECTION, POWDER, LYOPHILIZED, FOR SUSPENSION INTRAVENOUS at 10:29

## 2022-02-15 RX ADMIN — DEXAMETHASONE SODIUM PHOSPHATE 10 MG: 4 INJECTION INTRA-ARTICULAR; INTRALESIONAL; INTRAMUSCULAR; INTRAVENOUS; SOFT TISSUE at 09:55

## 2022-02-15 RX ADMIN — SODIUM CHLORIDE 25 ML/HR: 9 INJECTION, SOLUTION INTRAVENOUS at 09:45

## 2022-02-15 RX ADMIN — SODIUM CHLORIDE, PRESERVATIVE FREE 10 ML: 5 INJECTION INTRAVENOUS at 12:05

## 2022-02-15 RX ADMIN — SODIUM CHLORIDE, PRESERVATIVE FREE 10 ML: 5 INJECTION INTRAVENOUS at 08:45

## 2022-02-15 NOTE — PROGRESS NOTES
[facility] Progress Note    Date: February 15, 2022    Name: Kimi Marcus    MRN: [id number]         : 1955    Ms. Ba Arrived ambulatory and in no distress for cycle 12 day 8 of Abraxane/Gemcitabine regimen. Assessment was completed, no acute issues at this time, no new complaints voiced. She reports that fatigue and neuropathy are her most bothersome symptoms. States she only goes where she can ride in wheelchair or only walk a short distance. Uses cane regularly. Still gets up several times a night. States appetite is good, denies nausea. Current pain regimen effective. Port accessed without difficulty, labs drawn and in process. Chemotherapy Flowsheet 2/15/2022   Cycle C12 D8   Date 2/15/2022   Drug / Regimen Abraxane/Gemcitabine   Dosage 184 mg/1656 mg   Time Up -   Time Down -   Pre Hydration -   Pre Meds zofran 8 mg IV, Decadron 10 mg IV   Notes -       Ms. Ba's vitals were reviewed. Visit Vitals  BP (!) 175/81 (BP 1 Location: Left upper arm, BP Patient Position: Sitting)   Pulse 95   Temp 99 °F (37.2 °C)   Resp 18   Ht 5' 4\" (1.626 m)   Wt 80.2 kg (176 lb 12.8 oz)   SpO2 97%   BMI 30.35 kg/m²       Lab results were obtained and reviewed. Recent Results (from the past 12 hour(s))   CBC WITH AUTOMATED DIFF    Collection Time: 02/15/22  8:45 AM   Result Value Ref Range    WBC 6.6 3.6 - 11.0 K/uL    RBC 3.53 (L) 3.80 - 5.20 M/uL    HGB 10.2 (L) 11.5 - 16.0 g/dL    HCT 31.6 (L) 35.0 - 47.0 %    MCV 89.5 80.0 - 99.0 FL    MCH 28.9 26.0 - 34.0 PG    MCHC 32.3 30.0 - 36.5 g/dL    RDW 16.1 (H) 11.5 - 14.5 %    PLATELET 884 (H) 989 - 400 K/uL    MPV 10.8 8.9 - 12.9 FL    NRBC 1.4 (H) 0  WBC    ABSOLUTE NRBC 0.09 (H) 0.00 - 0.01 K/uL    NEUTROPHILS 52 32 - 75 %    LYMPHOCYTES 23 12 - 49 %    MONOCYTES 21 (H) 5 - 13 %    EOSINOPHILS 2 0 - 7 %    BASOPHILS 1 0 - 1 %    IMMATURE GRANULOCYTES 1 (H) 0.0 - 0.5 %    ABS. NEUTROPHILS 3.4 1.8 - 8.0 K/UL    ABS. LYMPHOCYTES 1.5 0.8 - 3.5 K/UL    ABS. MONOCYTES 1.4 (H) 0.0 - 1.0 K/UL    ABS. EOSINOPHILS 0.1 0.0 - 0.4 K/UL    ABS. BASOPHILS 0.1 0.0 - 0.1 K/UL    ABS. IMM. GRANS. 0.1 (H) 0.00 - 0.04 K/UL    DF SMEAR SCANNED      PLATELET COMMENTS Increased Platelets      RBC COMMENTS ANISOCYTOSIS  2+       METABOLIC PANEL, COMPREHENSIVE    Collection Time: 02/15/22  8:45 AM   Result Value Ref Range    Sodium 136 136 - 145 mmol/L    Potassium 4.3 3.5 - 5.1 mmol/L    Chloride 99 97 - 108 mmol/L    CO2 28 21 - 32 mmol/L    Anion gap 9 5 - 15 mmol/L    Glucose 341 (H) 65 - 100 mg/dL    BUN 14 6 - 20 MG/DL    Creatinine 0.76 0.55 - 1.02 MG/DL    BUN/Creatinine ratio 18 12 - 20      GFR est AA >60 >60 ml/min/1.73m2    GFR est non-AA >60 >60 ml/min/1.73m2    Calcium 8.6 8.5 - 10.1 MG/DL    Bilirubin, total 0.2 0.2 - 1.0 MG/DL    ALT (SGPT) 27 12 - 78 U/L    AST (SGOT) 21 15 - 37 U/L    Alk. phosphatase 108 45 - 117 U/L    Protein, total 6.6 6.4 - 8.2 g/dL    Albumin 3.0 (L) 3.5 - 5.0 g/dL    Globulin 3.6 2.0 - 4.0 g/dL    A-G Ratio 0.8 (L) 1.1 - 2.2         Pre-medications  were administered as ordered and chemotherapy was initiated. 250 ml NS IV main line established. Decadron 10 mg IVP given. Zofran 8 mg IVP given. 1029: Abraxane 184 mg IV started to infuse over 30 minutes. 1120: Gemcitabine 1656 mg IV started to infuse over 30 minutes. She had soup and sandwich lunch- eating about 30%. Up to BR several times during infusion without difficulty. Ms. Linda Esparza tolerated treatment well. Port was flushed and de-accessed post administration and site intact. She was discharged from [place] [address] in stable condition at 1215. She is to return on February 22 at 8:30 for her next appointment.     Alfred Ang RN  February 15, 2022

## 2022-02-20 NOTE — PROGRESS NOTES
Ms. Amanda Ahn is a 77 y.o. female who is here for evaluation of   Chief Complaint   Patient presents with    Diabetes     insulin is SSI based on steriod and glucose levels    Pancreatic Cancer   . ASSESSMENT AND PLAN:    1. Malignant neoplasm of body of pancreas [facility])  She is doing phenomenally well under the care of Dr. Azam Maldonado. Functional status is still pretty good. Continue current plans. She and her family have made necessary financial legal arrangements. 2. Chemotherapy-induced neuropathy (HCC)  Nonpharmacologic measures discussed including tights and socks. 3. Uncontrolled type 2 diabetes mellitus with hyperglycemia (HCC)  Given her large dose of steroids, it is very unlikely that her A1c will ever be below 9. She is reluctant to increase her insulin dose higher than 55 units at this time. No orders of the defined types were placed in this encounter. 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 Recent laboratory tests have been encouraging and her imaging including bone scan and cranial MRI are excellent. Her primary concern is for her  who has cognitive impairment and what will happen him after she leaves. She has chemotherapy-induced neuropathy and probable diabetic neuropathy as well contributing to gait impairment. .  She continues to drive. Diabetes has been variably controlled and she is due for A1c.    ROS:  Denies fever, chills, cough, chest pain, SOB,  nausea, vomiting, or diarrhea. Denies wt loss, wt gain, hemoptysis, hematochezia or melena. Physical Examination:    Visit Vitals  /82 (BP 1 Location: Left upper arm, BP Patient Position: Sitting, BP Cuff Size: Adult long)   Pulse 95   Temp 98.2 °F (36.8 °C) (Temporal)   Resp 18   Ht 5' 4\" (1.626 m)   Wt 179 lb 9.6 oz (81.5 kg)   SpO2 98%   BMI 30.83 kg/m²      General:  Alert, cooperative, no distress. Alopecia   Head:  Normocephalic, without obvious abnormality, atraumatic. Eyes:  Conjunctivae/corneas clear. Pupils equal, round, reactive to light. Extraocular movements intact. Lungs:   Clear to auscultation bilaterally. Faint crackles in the right lung base posteriorly consistent with atelectasis   Chest wall:  No tenderness or deformity. Cardiac:  RRR   Abdomen:   Soft, non-tender. Bowel sounds normal. No masses. No organomegaly. Extremities: Extremities normal, atraumatic, no cyanosis or edema. Pulses: 2+ and symmetric all extremities. Skin: Skin color, texture, turgor normal. No rashes or lesions. Lymph nodes: Cervical, supraclavicular, and axillary nodes normal.   Neurologic: CNII-XII intact. Normal strength, sensation, and reflexes throughout. On this date 02/21/2022 I have spent 30 minutes reviewing previous notes, test results and face to face with the patient discussing the diagnosis and importance of compliance with the treatment plan as well as documenting on the day of the visit.     Jabier Laws MD FACP    (signed electronically) on 2/21/2022 at 8:09 AM

## 2022-02-21 ENCOUNTER — OFFICE VISIT (OUTPATIENT)
Dept: FAMILY MEDICINE CLINIC | Age: 67
End: 2022-02-21
Payer: MEDICARE

## 2022-02-21 VITALS
OXYGEN SATURATION: 98 % | TEMPERATURE: 98.2 F | HEART RATE: 95 BPM | DIASTOLIC BLOOD PRESSURE: 82 MMHG | RESPIRATION RATE: 18 BRPM | SYSTOLIC BLOOD PRESSURE: 136 MMHG | BODY MASS INDEX: 30.66 KG/M2 | WEIGHT: 179.6 LBS | HEIGHT: 64 IN

## 2022-02-21 DIAGNOSIS — T45.1X5A CHEMOTHERAPY-INDUCED NEUROPATHY (HCC): ICD-10-CM

## 2022-02-21 DIAGNOSIS — G62.0 CHEMOTHERAPY-INDUCED NEUROPATHY (HCC): ICD-10-CM

## 2022-02-21 DIAGNOSIS — E11.65 UNCONTROLLED TYPE 2 DIABETES MELLITUS WITH HYPERGLYCEMIA (HCC): ICD-10-CM

## 2022-02-21 DIAGNOSIS — C25.1 MALIGNANT NEOPLASM OF BODY OF PANCREAS (HCC): Primary | ICD-10-CM

## 2022-02-21 PROBLEM — G56.00 CARPAL TUNNEL SYNDROME: Status: ACTIVE | Noted: 2020-11-05

## 2022-02-21 PROBLEM — H25.13 AGE-RELATED NUCLEAR CATARACT OF BOTH EYES: Status: ACTIVE | Noted: 2018-06-04

## 2022-02-21 PROBLEM — K46.9 ABDOMINAL HERNIA: Status: ACTIVE | Noted: 2020-11-05

## 2022-02-21 PROBLEM — H40.003 PREGLAUCOMA OF BOTH EYES: Status: ACTIVE | Noted: 2018-06-04

## 2022-02-21 PROBLEM — D31.42 NEVUS OF IRIS OF LEFT EYE: Status: ACTIVE | Noted: 2018-06-04

## 2022-02-21 PROCEDURE — 99214 OFFICE O/P EST MOD 30 MIN: CPT | Performed by: INTERNAL MEDICINE

## 2022-02-22 ENCOUNTER — HOSPITAL ENCOUNTER (OUTPATIENT)
Dept: INFUSION THERAPY | Age: 67
Discharge: HOME | End: 2022-02-22
Payer: MEDICARE

## 2022-02-22 ENCOUNTER — TELEPHONE (OUTPATIENT)
Dept: ONCOLOGY | Age: 67
End: 2022-02-22

## 2022-02-22 VITALS
HEIGHT: 64 IN | RESPIRATION RATE: 18 BRPM | WEIGHT: 179.6 LBS | BODY MASS INDEX: 30.66 KG/M2 | TEMPERATURE: 98.9 F | DIASTOLIC BLOOD PRESSURE: 76 MMHG | OXYGEN SATURATION: 97 % | HEART RATE: 101 BPM | SYSTOLIC BLOOD PRESSURE: 174 MMHG

## 2022-02-22 DIAGNOSIS — F32.A DEPRESSION, UNSPECIFIED DEPRESSION TYPE: ICD-10-CM

## 2022-02-22 DIAGNOSIS — C25.9 ADENOCARCINOMA OF PANCREAS (HCC): Primary | ICD-10-CM

## 2022-02-22 LAB
ALBUMIN SERPL-MCNC: 2.9 G/DL (ref 3.5–5)
ALBUMIN/GLOB SERPL: 0.9 {RATIO} (ref 1.1–2.2)
ALP SERPL-CCNC: 122 U/L (ref 45–117)
ALT SERPL-CCNC: 22 U/L (ref 12–78)
ANION GAP SERPL CALC-SCNC: 11 MMOL/L (ref 5–15)
AST SERPL-CCNC: 15 U/L (ref 15–37)
BASOPHILS # BLD: 0.1 K/UL (ref 0–0.1)
BASOPHILS NFR BLD: 1 % (ref 0–1)
BILIRUB SERPL-MCNC: 0.3 MG/DL (ref 0.2–1)
BUN SERPL-MCNC: 10 MG/DL (ref 6–20)
BUN/CREAT SERPL: 12 (ref 12–20)
CALCIUM SERPL-MCNC: 8.8 MG/DL (ref 8.5–10.1)
CHLORIDE SERPL-SCNC: 99 MMOL/L (ref 97–108)
CO2 SERPL-SCNC: 27 MMOL/L (ref 21–32)
CREAT SERPL-MCNC: 0.84 MG/DL (ref 0.55–1.02)
DIFFERENTIAL METHOD BLD: ABNORMAL
EOSINOPHIL # BLD: 0.4 K/UL (ref 0–0.4)
EOSINOPHIL NFR BLD: 8 % (ref 0–7)
ERYTHROCYTE [DISTWIDTH] IN BLOOD BY AUTOMATED COUNT: 16 % (ref 11.5–14.5)
GLOBULIN SER CALC-MCNC: 3.3 G/DL (ref 2–4)
GLUCOSE SERPL-MCNC: 431 MG/DL (ref 65–100)
HCT VFR BLD AUTO: 30.1 % (ref 35–47)
HGB BLD-MCNC: 9.7 G/DL (ref 11.5–16)
IMM GRANULOCYTES # BLD AUTO: 0.1 K/UL (ref 0–0.04)
IMM GRANULOCYTES NFR BLD AUTO: 1 % (ref 0–0.5)
LYMPHOCYTES # BLD: 1.3 K/UL (ref 0.8–3.5)
LYMPHOCYTES NFR BLD: 23 % (ref 12–49)
MCH RBC QN AUTO: 29 PG (ref 26–34)
MCHC RBC AUTO-ENTMCNC: 32.2 G/DL (ref 30–36.5)
MCV RBC AUTO: 89.9 FL (ref 80–99)
MONOCYTES # BLD: 1.2 K/UL (ref 0–1)
MONOCYTES NFR BLD: 22 % (ref 5–13)
NEUTS SEG # BLD: 2.4 K/UL (ref 1.8–8)
NEUTS SEG NFR BLD: 45 % (ref 32–75)
NRBC # BLD: 0.08 K/UL (ref 0–0.01)
NRBC BLD-RTO: 1.4 PER 100 WBC
PLATELET # BLD AUTO: 222 K/UL (ref 150–400)
PLATELET COMMENTS,PCOM: ABNORMAL
PMV BLD AUTO: 11.1 FL (ref 8.9–12.9)
POTASSIUM SERPL-SCNC: 4.1 MMOL/L (ref 3.5–5.1)
PROT SERPL-MCNC: 6.2 G/DL (ref 6.4–8.2)
RBC # BLD AUTO: 3.35 M/UL (ref 3.8–5.2)
RBC MORPH BLD: ABNORMAL
SODIUM SERPL-SCNC: 137 MMOL/L (ref 136–145)
WBC # BLD AUTO: 5.5 K/UL (ref 3.6–11)

## 2022-02-22 PROCEDURE — 74011000250 HC RX REV CODE- 250: Performed by: INTERNAL MEDICINE

## 2022-02-22 PROCEDURE — 96375 TX/PRO/DX INJ NEW DRUG ADDON: CPT

## 2022-02-22 PROCEDURE — 74011250636 HC RX REV CODE- 250/636: Performed by: INTERNAL MEDICINE

## 2022-02-22 PROCEDURE — 74011000258 HC RX REV CODE- 258: Performed by: INTERNAL MEDICINE

## 2022-02-22 PROCEDURE — 36415 COLL VENOUS BLD VENIPUNCTURE: CPT

## 2022-02-22 PROCEDURE — 96417 CHEMO IV INFUS EACH ADDL SEQ: CPT

## 2022-02-22 PROCEDURE — 85025 COMPLETE CBC W/AUTO DIFF WBC: CPT

## 2022-02-22 PROCEDURE — 77030012965 HC NDL HUBR BBMI -A

## 2022-02-22 PROCEDURE — 80053 COMPREHEN METABOLIC PANEL: CPT

## 2022-02-22 PROCEDURE — 96413 CHEMO IV INFUSION 1 HR: CPT

## 2022-02-22 RX ORDER — HEPARIN 100 UNIT/ML
300-500 SYRINGE INTRAVENOUS AS NEEDED
Status: DISPENSED | OUTPATIENT
Start: 2022-02-22 | End: 2022-02-22

## 2022-02-22 RX ORDER — DIPHENHYDRAMINE HYDROCHLORIDE 50 MG/ML
25 INJECTION, SOLUTION INTRAMUSCULAR; INTRAVENOUS AS NEEDED
Status: ACTIVE | OUTPATIENT
Start: 2022-02-22 | End: 2022-02-22

## 2022-02-22 RX ORDER — DEXAMETHASONE SODIUM PHOSPHATE 4 MG/ML
10 INJECTION, SOLUTION INTRA-ARTICULAR; INTRALESIONAL; INTRAMUSCULAR; INTRAVENOUS; SOFT TISSUE ONCE
Status: COMPLETED | OUTPATIENT
Start: 2022-02-22 | End: 2022-02-22

## 2022-02-22 RX ORDER — ONDANSETRON 2 MG/ML
8 INJECTION INTRAMUSCULAR; INTRAVENOUS AS NEEDED
Status: ACTIVE | OUTPATIENT
Start: 2022-02-22 | End: 2022-02-22

## 2022-02-22 RX ORDER — SODIUM CHLORIDE 9 MG/ML
25 INJECTION, SOLUTION INTRAVENOUS CONTINUOUS
Status: DISPENSED | OUTPATIENT
Start: 2022-02-22 | End: 2022-02-22

## 2022-02-22 RX ORDER — SODIUM CHLORIDE 9 MG/ML
10 INJECTION INTRAMUSCULAR; INTRAVENOUS; SUBCUTANEOUS AS NEEDED
Status: ACTIVE | OUTPATIENT
Start: 2022-02-22 | End: 2022-02-22

## 2022-02-22 RX ORDER — ONDANSETRON 2 MG/ML
8 INJECTION INTRAMUSCULAR; INTRAVENOUS ONCE
Status: COMPLETED | OUTPATIENT
Start: 2022-02-22 | End: 2022-02-22

## 2022-02-22 RX ORDER — ACETAMINOPHEN 325 MG/1
650 TABLET ORAL AS NEEDED
Status: ACTIVE | OUTPATIENT
Start: 2022-02-22 | End: 2022-02-22

## 2022-02-22 RX ADMIN — SODIUM CHLORIDE, PRESERVATIVE FREE 10 ML: 5 INJECTION INTRAVENOUS at 11:53

## 2022-02-22 RX ADMIN — DEXAMETHASONE SODIUM PHOSPHATE 10 MG: 4 INJECTION, SOLUTION INTRAMUSCULAR; INTRAVENOUS at 09:49

## 2022-02-22 RX ADMIN — SODIUM CHLORIDE 25 ML/HR: 9 INJECTION, SOLUTION INTRAVENOUS at 09:45

## 2022-02-22 RX ADMIN — SODIUM CHLORIDE, PRESERVATIVE FREE 10 ML: 5 INJECTION INTRAVENOUS at 08:25

## 2022-02-22 RX ADMIN — ONDANSETRON 8 MG: 2 INJECTION INTRAMUSCULAR; INTRAVENOUS at 10:16

## 2022-02-22 RX ADMIN — PACLITAXEL 184 MG: 100 INJECTION, POWDER, LYOPHILIZED, FOR SUSPENSION INTRAVENOUS at 10:23

## 2022-02-22 RX ADMIN — Medication 500 UNITS: at 11:53

## 2022-02-22 RX ADMIN — GEMCITABINE 1656 MG: 38 INJECTION, SOLUTION INTRAVENOUS at 11:15

## 2022-02-22 NOTE — DISCHARGE INSTRUCTIONS
OUTPATIENT [facility]FUSION [facility]ENTER    DISCHARGE INSTRUCTIONS FOR:  CHEMOTHERAPY / BIOTHERAPY    Today you received: zofran 8 mg, dexamethsaone 10 mg, Abraxane 184 mg, Gemcitabine 1656 mg    Chemotherapy has the potential to cause many side effects. The following are general precautions that chemo patients should take:    1. Practice good hand washing:   * Use soap and water for at least 15 seconds, covering all areas of hands. * Always wash hands before eating. * Wash hands after contact with public surfaces such as door knobs and         handles, shopping carts, telephones and elevator buttons. 2. Get plenty of rest:    * You will likely experience fatigue three to five days following your treatment. It may last as long as seven days. 3. Drink plenty of fluids. Water is best.    4. Eat a well balanced diet:  * Small frequent meals may help if you are having trouble with nausea or your  appetite. Some people also do well with nutritional supplements. 5. Pace yourself with daily activities:   * Take frequent breaks and ask for help if you need it. 6. Exercise is very important:  * It will increase circulation and will help the fatigue. Do what you can each day. 7. If your regimen results in hair loss:  *  You will likely notice effects between two and three weeks following your first treatment. Some lose all hair while others only experience thinning. 8. Practice good oral hygiene:   *  Notify your M.D. immediately if any mouth sores or discomfort develop. 9. Protect yourself from the sun. Signs/Symptoms of an allergic reaction and/or some side effects may require immediate medical attention. Notify your physician if you develop one or more of the following:     Temperature of 100.5 degrees or greater;   Skin redness, itching, swelling, blistering, weeping, crusting, rash, or hives;    Wheezing, chest tightness, cough, or shortness of breath;   Swelling of the face, eyelids, lips, tongue, or throat;  Severe, persistent headache;  Stuffy nose, runny nose, sneezing;   Red (bloodshot), itchy, swollen, or watery eyes;   Stomach  pain, nausea, vomiting, diarrhea, or bloody stools;  Mouth sores        Your physician should also be aware of the following symptoms:    Persistent and unresolved nausea and/or vomiting;   Persistent and unresolved diarrhea or constipation;   Numbness/tingling/burning of the extremities, including the fingers and toes; Bleeding or unexplained bruising; Unexplained redness/swelling/pain in the arms or legs; Shortness of breath or fatigue that worsens;   Pain with urination or blood in the urine; Chills;  Cough, especially a productive cough;  Mouth sores or a white coating of the tongue; Redness, swelling, pain or drainage at the port-a-cath or IV site; Increased feeling of bloating or water retention; Excessive weight loss or gain;  Ringing in the ears; Difficulty swallowing;  Dizziness, vertigo, lightheadedness or fainting.           Freedom Ba Signature: ____________________________ 2/22/2022  Bartolome Kennedy RN

## 2022-02-22 NOTE — PROGRESS NOTES
[facility] Progress Note    Date: 2022    Name: Kimi Marcus    MRN: [id number]         : 1955    Ms. Ba Arrived ambulatory and in no distress for cycle 12 day 15 of Abraxane/Gemcitabine regimen. Assessment was completed, no acute issues at this time, no new complaints voiced. Continues to have balance issues, has had no further falls. Continues to have significant fatigue, naps several times during the day. Port accessed without difficulty, labs drawn and in process. Chemotherapy Flowsheet 2022   Cycle C12 D15   Date 2022   Drug / Regimen Abraxane/Gemcitabine   Dosage 184 mg/ 1656 mg   Time Up 1023   Time Down 1152   Pre Hydration -   Pre Meds zofran 8 mg IV, decadron 10 mg iv   Notes -       Ms. Ba's vitals were reviewed. Visit Vitals  BP (!) 174/76 (BP 1 Location: Left upper arm, BP Patient Position: Sitting)   Pulse (!) 101   Temp 98.9 °F (37.2 °C)   Resp 18   Ht 5' 4\" (1.626 m)   Wt 81.5 kg (179 lb 9.6 oz)   SpO2 97%   BMI 30.83 kg/m²       Lab results were obtained and reviewed. Recent Results (from the past 12 hour(s))   CBC WITH AUTOMATED DIFF    Collection Time: 22  8:25 AM   Result Value Ref Range    WBC 5.5 3.6 - 11.0 K/uL    RBC 3.35 (L) 3.80 - 5.20 M/uL    HGB 9.7 (L) 11.5 - 16.0 g/dL    HCT 30.1 (L) 35.0 - 47.0 %    MCV 89.9 80.0 - 99.0 FL    MCH 29.0 26.0 - 34.0 PG    MCHC 32.2 30.0 - 36.5 g/dL    RDW 16.0 (H) 11.5 - 14.5 %    PLATELET 662 864 - 977 K/uL    MPV 11.1 8.9 - 12.9 FL    NRBC 1.4 (H) 0  WBC    ABSOLUTE NRBC 0.08 (H) 0.00 - 0.01 K/uL    NEUTROPHILS 45 32 - 75 %    LYMPHOCYTES 23 12 - 49 %    MONOCYTES 22 (H) 5 - 13 %    EOSINOPHILS 8 (H) 0 - 7 %    BASOPHILS 1 0 - 1 %    IMMATURE GRANULOCYTES 1 (H) 0.0 - 0.5 %    ABS. NEUTROPHILS 2.4 1.8 - 8.0 K/UL    ABS. LYMPHOCYTES 1.3 0.8 - 3.5 K/UL    ABS. MONOCYTES 1.2 (H) 0.0 - 1.0 K/UL    ABS. EOSINOPHILS 0.4 0.0 - 0.4 K/UL    ABS. BASOPHILS 0.1 0.0 - 0.1 K/UL    ABS. IMM.  GRANS. 0.1 (H) 0.00 - 0.04 K/UL    DF SMEAR SCANNED      PLATELET COMMENTS ADEQUATE PLATELETS      RBC COMMENTS ANISOCYTOSIS  2+       METABOLIC PANEL, COMPREHENSIVE    Collection Time: 02/22/22  8:25 AM   Result Value Ref Range    Sodium 137 136 - 145 mmol/L    Potassium 4.1 3.5 - 5.1 mmol/L    Chloride 99 97 - 108 mmol/L    CO2 27 21 - 32 mmol/L    Anion gap 11 5 - 15 mmol/L    Glucose 431 (H) 65 - 100 mg/dL    BUN 10 6 - 20 MG/DL    Creatinine 0.84 0.55 - 1.02 MG/DL    BUN/Creatinine ratio 12 12 - 20      GFR est AA >60 >60 ml/min/1.73m2    GFR est non-AA >60 >60 ml/min/1.73m2    Calcium 8.8 8.5 - 10.1 MG/DL    Bilirubin, total 0.3 0.2 - 1.0 MG/DL    ALT (SGPT) 22 12 - 78 U/L    AST (SGOT) 15 15 - 37 U/L    Alk. phosphatase 122 (H) 45 - 117 U/L    Protein, total 6.2 (L) 6.4 - 8.2 g/dL    Albumin 2.9 (L) 3.5 - 5.0 g/dL    Globulin 3.3 2.0 - 4.0 g/dL    A-G Ratio 0.9 (L) 1.1 - 2.2       Blood glucose 431, patient aware and self administered 10 units Insulin from her own supply. She states that she is not managing her diet well- eating ice cream and other sweets. Encouraged her to try and manage her diabetes more carefully as that may contribute to how she is feeling. Pre-medications  were administered as ordered and chemotherapy was initiated.  ml IV main line established. Zofran 8 mg IV given  Decadron 10 mg IV given. 1023: Abraxane 184 mg IV started to infuse over 30 minutes. 1115: Gemcitabine 1656 mg IV started to infuse over 30 minutes. Port was flushed and de-accessed post administration and site intact. Ms. Charo Suarez tolerated treatment well. Had regular lunch while here and tolerated well. She was discharged from [place] [address] in stable condition at 1155. She is to return on March 8 at 8:30 for her next appointment.     Kenrick Landin RN  February 22, 2022

## 2022-02-22 NOTE — TELEPHONE ENCOUNTER
----- Message from Batsheva Murillo MD sent at 2/16/2022  9:06 AM EST -----  No signs of cancer in the bones--please inform

## 2022-02-22 NOTE — TELEPHONE ENCOUNTER
Patient in [facility] today. HIPAA verified. Notified patient of lab results per DR Kent's note. Patient verbalized understanding.

## 2022-02-23 RX ORDER — METHYLPHENIDATE HYDROCHLORIDE 5 MG/1
5 TABLET ORAL 3 TIMES DAILY
Qty: 90 TABLET | Refills: 0 | Status: SHIPPED | OUTPATIENT
Start: 2022-02-23 | End: 2022-03-10 | Stop reason: SDUPTHER

## 2022-02-25 RX ORDER — SODIUM CHLORIDE 9 MG/ML
10 INJECTION INTRAMUSCULAR; INTRAVENOUS; SUBCUTANEOUS AS NEEDED
Status: CANCELLED | OUTPATIENT
Start: 2022-03-22

## 2022-02-25 RX ORDER — DIPHENHYDRAMINE HYDROCHLORIDE 50 MG/ML
25 INJECTION, SOLUTION INTRAMUSCULAR; INTRAVENOUS AS NEEDED
Status: CANCELLED | OUTPATIENT
Start: 2022-03-29

## 2022-02-25 RX ORDER — DIPHENHYDRAMINE HYDROCHLORIDE 50 MG/ML
25 INJECTION, SOLUTION INTRAMUSCULAR; INTRAVENOUS
Status: CANCELLED
Start: 2022-03-08

## 2022-02-25 RX ORDER — EPINEPHRINE 1 MG/ML
0.3 INJECTION, SOLUTION, CONCENTRATE INTRAVENOUS AS NEEDED
Status: CANCELLED | OUTPATIENT
Start: 2022-03-29

## 2022-02-25 RX ORDER — ONDANSETRON 2 MG/ML
8 INJECTION INTRAMUSCULAR; INTRAVENOUS AS NEEDED
Status: CANCELLED | OUTPATIENT
Start: 2022-03-22

## 2022-02-25 RX ORDER — SODIUM CHLORIDE 0.9 % (FLUSH) 0.9 %
10 SYRINGE (ML) INJECTION AS NEEDED
Status: CANCELLED | OUTPATIENT
Start: 2022-03-22

## 2022-02-25 RX ORDER — EPINEPHRINE 1 MG/ML
0.3 INJECTION, SOLUTION, CONCENTRATE INTRAVENOUS AS NEEDED
Status: CANCELLED | OUTPATIENT
Start: 2022-03-22

## 2022-02-25 RX ORDER — ONDANSETRON 2 MG/ML
8 INJECTION INTRAMUSCULAR; INTRAVENOUS AS NEEDED
Status: CANCELLED | OUTPATIENT
Start: 2022-03-08

## 2022-02-25 RX ORDER — DIPHENHYDRAMINE HYDROCHLORIDE 50 MG/ML
50 INJECTION, SOLUTION INTRAMUSCULAR; INTRAVENOUS AS NEEDED
Status: CANCELLED
Start: 2022-03-08

## 2022-02-25 RX ORDER — HYDROCORTISONE SODIUM SUCCINATE 100 MG/2ML
100 INJECTION, POWDER, FOR SOLUTION INTRAMUSCULAR; INTRAVENOUS AS NEEDED
Status: CANCELLED | OUTPATIENT
Start: 2022-03-22

## 2022-02-25 RX ORDER — HYDROCORTISONE SODIUM SUCCINATE 100 MG/2ML
100 INJECTION, POWDER, FOR SOLUTION INTRAMUSCULAR; INTRAVENOUS AS NEEDED
Status: CANCELLED | OUTPATIENT
Start: 2022-03-29

## 2022-02-25 RX ORDER — LORAZEPAM 2 MG/ML
0.5 INJECTION INTRAMUSCULAR
Status: CANCELLED
Start: 2022-03-08

## 2022-02-25 RX ORDER — SODIUM CHLORIDE 9 MG/ML
10 INJECTION INTRAMUSCULAR; INTRAVENOUS; SUBCUTANEOUS AS NEEDED
Status: CANCELLED | OUTPATIENT
Start: 2022-03-08

## 2022-02-25 RX ORDER — DIPHENHYDRAMINE HYDROCHLORIDE 50 MG/ML
25 INJECTION, SOLUTION INTRAMUSCULAR; INTRAVENOUS AS NEEDED
Status: CANCELLED | OUTPATIENT
Start: 2022-03-22

## 2022-02-25 RX ORDER — DIPHENHYDRAMINE HYDROCHLORIDE 50 MG/ML
25 INJECTION, SOLUTION INTRAMUSCULAR; INTRAVENOUS
Status: CANCELLED
Start: 2022-03-22

## 2022-02-25 RX ORDER — SODIUM CHLORIDE 9 MG/ML
25 INJECTION, SOLUTION INTRAVENOUS CONTINUOUS
Status: CANCELLED | OUTPATIENT
Start: 2022-03-29

## 2022-02-25 RX ORDER — SODIUM CHLORIDE 9 MG/ML
10 INJECTION INTRAMUSCULAR; INTRAVENOUS; SUBCUTANEOUS AS NEEDED
Status: CANCELLED | OUTPATIENT
Start: 2022-03-29

## 2022-02-25 RX ORDER — SODIUM CHLORIDE 9 MG/ML
25 INJECTION, SOLUTION INTRAVENOUS CONTINUOUS
Status: CANCELLED | OUTPATIENT
Start: 2022-03-22

## 2022-02-25 RX ORDER — DEXAMETHASONE SODIUM PHOSPHATE 4 MG/ML
10 INJECTION, SOLUTION INTRA-ARTICULAR; INTRALESIONAL; INTRAMUSCULAR; INTRAVENOUS; SOFT TISSUE ONCE
Status: CANCELLED | OUTPATIENT
Start: 2022-03-08 | End: 2022-03-08

## 2022-02-25 RX ORDER — ALBUTEROL SULFATE 0.83 MG/ML
2.5 SOLUTION RESPIRATORY (INHALATION) AS NEEDED
Status: CANCELLED
Start: 2022-03-22

## 2022-02-25 RX ORDER — ONDANSETRON 2 MG/ML
8 INJECTION INTRAMUSCULAR; INTRAVENOUS ONCE
Status: CANCELLED | OUTPATIENT
Start: 2022-03-29 | End: 2022-03-22

## 2022-02-25 RX ORDER — LORAZEPAM 2 MG/ML
0.5 INJECTION INTRAMUSCULAR
Status: CANCELLED
Start: 2022-03-29

## 2022-02-25 RX ORDER — ONDANSETRON 2 MG/ML
8 INJECTION INTRAMUSCULAR; INTRAVENOUS ONCE
Status: CANCELLED | OUTPATIENT
Start: 2022-03-22 | End: 2022-03-15

## 2022-02-25 RX ORDER — SODIUM CHLORIDE 0.9 % (FLUSH) 0.9 %
10 SYRINGE (ML) INJECTION AS NEEDED
Status: CANCELLED | OUTPATIENT
Start: 2022-03-08

## 2022-02-25 RX ORDER — ONDANSETRON 2 MG/ML
8 INJECTION INTRAMUSCULAR; INTRAVENOUS AS NEEDED
Status: CANCELLED | OUTPATIENT
Start: 2022-03-29

## 2022-02-25 RX ORDER — ONDANSETRON 2 MG/ML
8 INJECTION INTRAMUSCULAR; INTRAVENOUS ONCE
Status: CANCELLED | OUTPATIENT
Start: 2022-03-08 | End: 2022-03-08

## 2022-02-25 RX ORDER — EPINEPHRINE 1 MG/ML
0.3 INJECTION, SOLUTION, CONCENTRATE INTRAVENOUS AS NEEDED
Status: CANCELLED | OUTPATIENT
Start: 2022-03-08

## 2022-02-25 RX ORDER — LORAZEPAM 2 MG/ML
0.5 INJECTION INTRAMUSCULAR
Status: CANCELLED
Start: 2022-03-22

## 2022-02-25 RX ORDER — SODIUM CHLORIDE 9 MG/ML
25 INJECTION, SOLUTION INTRAVENOUS CONTINUOUS
Status: CANCELLED | OUTPATIENT
Start: 2022-03-08

## 2022-02-25 RX ORDER — SODIUM CHLORIDE 0.9 % (FLUSH) 0.9 %
10 SYRINGE (ML) INJECTION AS NEEDED
Status: CANCELLED
Start: 2022-03-29

## 2022-02-25 RX ORDER — DIPHENHYDRAMINE HYDROCHLORIDE 50 MG/ML
25 INJECTION, SOLUTION INTRAMUSCULAR; INTRAVENOUS AS NEEDED
Status: CANCELLED | OUTPATIENT
Start: 2022-03-08

## 2022-02-25 RX ORDER — ACETAMINOPHEN 325 MG/1
650 TABLET ORAL AS NEEDED
Status: CANCELLED | OUTPATIENT
Start: 2022-03-08

## 2022-02-25 RX ORDER — ACETAMINOPHEN 325 MG/1
650 TABLET ORAL AS NEEDED
Status: CANCELLED | OUTPATIENT
Start: 2022-03-29

## 2022-02-25 RX ORDER — DIPHENHYDRAMINE HYDROCHLORIDE 50 MG/ML
25 INJECTION, SOLUTION INTRAMUSCULAR; INTRAVENOUS
Status: CANCELLED
Start: 2022-03-29

## 2022-02-25 RX ORDER — DIPHENHYDRAMINE HYDROCHLORIDE 50 MG/ML
50 INJECTION, SOLUTION INTRAMUSCULAR; INTRAVENOUS AS NEEDED
Status: CANCELLED
Start: 2022-03-29

## 2022-02-25 RX ORDER — HEPARIN 100 UNIT/ML
300-500 SYRINGE INTRAVENOUS AS NEEDED
Status: CANCELLED | OUTPATIENT
Start: 2022-03-22

## 2022-02-25 RX ORDER — ALBUTEROL SULFATE 0.83 MG/ML
2.5 SOLUTION RESPIRATORY (INHALATION) AS NEEDED
Status: CANCELLED
Start: 2022-03-08

## 2022-02-25 RX ORDER — HEPARIN 100 UNIT/ML
300-500 SYRINGE INTRAVENOUS AS NEEDED
Status: CANCELLED | OUTPATIENT
Start: 2022-03-29

## 2022-02-25 RX ORDER — DIPHENHYDRAMINE HYDROCHLORIDE 50 MG/ML
50 INJECTION, SOLUTION INTRAMUSCULAR; INTRAVENOUS AS NEEDED
Status: CANCELLED
Start: 2022-03-22

## 2022-02-25 RX ORDER — ALBUTEROL SULFATE 0.83 MG/ML
2.5 SOLUTION RESPIRATORY (INHALATION) AS NEEDED
Status: CANCELLED
Start: 2022-03-29

## 2022-02-25 RX ORDER — DEXAMETHASONE SODIUM PHOSPHATE 4 MG/ML
10 INJECTION, SOLUTION INTRA-ARTICULAR; INTRALESIONAL; INTRAMUSCULAR; INTRAVENOUS; SOFT TISSUE ONCE
Status: CANCELLED | OUTPATIENT
Start: 2022-03-22 | End: 2022-03-15

## 2022-02-25 RX ORDER — HEPARIN 100 UNIT/ML
300-500 SYRINGE INTRAVENOUS AS NEEDED
Status: CANCELLED | OUTPATIENT
Start: 2022-03-08

## 2022-02-25 RX ORDER — DEXAMETHASONE SODIUM PHOSPHATE 4 MG/ML
10 INJECTION, SOLUTION INTRA-ARTICULAR; INTRALESIONAL; INTRAMUSCULAR; INTRAVENOUS; SOFT TISSUE ONCE
Status: CANCELLED | OUTPATIENT
Start: 2022-03-29 | End: 2022-03-22

## 2022-02-25 RX ORDER — HYDROCORTISONE SODIUM SUCCINATE 100 MG/2ML
100 INJECTION, POWDER, FOR SOLUTION INTRAMUSCULAR; INTRAVENOUS AS NEEDED
Status: CANCELLED | OUTPATIENT
Start: 2022-03-08

## 2022-02-25 RX ORDER — ACETAMINOPHEN 325 MG/1
650 TABLET ORAL AS NEEDED
Status: CANCELLED | OUTPATIENT
Start: 2022-03-22

## 2022-03-01 RX ORDER — LEVOFLOXACIN 500 MG/1
500 TABLET, FILM COATED ORAL DAILY
Qty: 10 TABLET | Refills: 1 | Status: SHIPPED | OUTPATIENT
Start: 2022-03-01 | End: 2022-04-26

## 2022-03-03 ENCOUNTER — APPOINTMENT (OUTPATIENT)
Dept: GENERAL RADIOLOGY | Age: 67
End: 2022-03-03
Attending: EMERGENCY MEDICINE
Payer: MEDICARE

## 2022-03-03 ENCOUNTER — HOSPITAL ENCOUNTER (EMERGENCY)
Age: 67
Discharge: HOME | End: 2022-03-03
Attending: EMERGENCY MEDICINE
Payer: MEDICARE

## 2022-03-03 VITALS
WEIGHT: 179 LBS | TEMPERATURE: 98.3 F | HEIGHT: 64 IN | HEART RATE: 73 BPM | BODY MASS INDEX: 30.56 KG/M2 | DIASTOLIC BLOOD PRESSURE: 73 MMHG | OXYGEN SATURATION: 97 % | SYSTOLIC BLOOD PRESSURE: 128 MMHG | RESPIRATION RATE: 17 BRPM

## 2022-03-03 DIAGNOSIS — R50.9 FEVER, UNSPECIFIED FEVER CAUSE: Primary | ICD-10-CM

## 2022-03-03 LAB
ALBUMIN SERPL-MCNC: 2.9 G/DL (ref 3.5–5)
ALBUMIN/GLOB SERPL: 0.8 {RATIO} (ref 1.1–2.2)
ALP SERPL-CCNC: 99 U/L (ref 45–117)
ALT SERPL-CCNC: 32 U/L (ref 12–78)
ANION GAP SERPL CALC-SCNC: 5 MMOL/L (ref 5–15)
APPEARANCE UR: CLEAR
AST SERPL-CCNC: 27 U/L (ref 15–37)
BACTERIA URNS QL MICRO: NEGATIVE /HPF
BASOPHILS # BLD: 0 K/UL (ref 0–0.1)
BASOPHILS NFR BLD: 0 % (ref 0–1)
BILIRUB SERPL-MCNC: 0.3 MG/DL (ref 0.2–1)
BILIRUB UR QL: NEGATIVE
BUN SERPL-MCNC: 10 MG/DL (ref 6–20)
BUN/CREAT SERPL: 14 (ref 12–20)
CALCIUM SERPL-MCNC: 9.2 MG/DL (ref 8.5–10.1)
CHLORIDE SERPL-SCNC: 102 MMOL/L (ref 97–108)
CO2 SERPL-SCNC: 28 MMOL/L (ref 21–32)
COLOR UR: NORMAL
COMMENT, HOLDF: NORMAL
CREAT SERPL-MCNC: 0.73 MG/DL (ref 0.55–1.02)
DIFFERENTIAL METHOD BLD: ABNORMAL
EOSINOPHIL # BLD: 0.3 K/UL (ref 0–0.4)
EOSINOPHIL NFR BLD: 3 % (ref 0–7)
EPITH CASTS URNS QL MICRO: NORMAL /LPF
ERYTHROCYTE [DISTWIDTH] IN BLOOD BY AUTOMATED COUNT: 17 % (ref 11.5–14.5)
FLUAV RNA SPEC QL NAA+PROBE: NOT DETECTED
FLUBV RNA SPEC QL NAA+PROBE: NOT DETECTED
GLOBULIN SER CALC-MCNC: 3.8 G/DL (ref 2–4)
GLUCOSE SERPL-MCNC: 203 MG/DL (ref 65–100)
GLUCOSE UR STRIP.AUTO-MCNC: NEGATIVE MG/DL
HCT VFR BLD AUTO: 31.1 % (ref 35–47)
HGB BLD-MCNC: 9.9 G/DL (ref 11.5–16)
HGB UR QL STRIP: NEGATIVE
IMM GRANULOCYTES # BLD AUTO: 0 K/UL (ref 0–0.04)
IMM GRANULOCYTES NFR BLD AUTO: 0 % (ref 0–0.5)
KETONES UR QL STRIP.AUTO: NEGATIVE MG/DL
LACTATE SERPL-SCNC: 1 MMOL/L (ref 0.4–2)
LEUKOCYTE ESTERASE UR QL STRIP.AUTO: NEGATIVE
LYMPHOCYTES # BLD: 2.8 K/UL (ref 0.8–3.5)
LYMPHOCYTES NFR BLD: 32 % (ref 12–49)
MCH RBC QN AUTO: 28.9 PG (ref 26–34)
MCHC RBC AUTO-ENTMCNC: 31.8 G/DL (ref 30–36.5)
MCV RBC AUTO: 90.9 FL (ref 80–99)
MONOCYTES # BLD: 0.4 K/UL (ref 0–1)
MONOCYTES NFR BLD: 4 % (ref 5–13)
NEUTS SEG # BLD: 5.4 K/UL (ref 1.8–8)
NEUTS SEG NFR BLD: 61 % (ref 32–75)
NITRITE UR QL STRIP.AUTO: NEGATIVE
NRBC # BLD: 0 K/UL (ref 0–0.01)
NRBC BLD-RTO: 2 PER 100 WBC
PH UR STRIP: 6 [PH] (ref 5–8)
PLATELET # BLD AUTO: 423 K/UL (ref 150–400)
PLATELET COMMENTS,PCOM: ABNORMAL
PMV BLD AUTO: 10.3 FL (ref 8.9–12.9)
POTASSIUM SERPL-SCNC: 4.1 MMOL/L (ref 3.5–5.1)
PROT SERPL-MCNC: 6.7 G/DL (ref 6.4–8.2)
PROT UR STRIP-MCNC: NEGATIVE MG/DL
RBC # BLD AUTO: 3.42 M/UL (ref 3.8–5.2)
RBC #/AREA URNS HPF: NORMAL /HPF (ref 0–5)
RBC MORPH BLD: ABNORMAL
SAMPLES BEING HELD,HOLD: NORMAL
SARS-COV-2, COV2: NOT DETECTED
SODIUM SERPL-SCNC: 135 MMOL/L (ref 136–145)
SP GR UR REFRACTOMETRY: 1.01 (ref 1–1.03)
TROPONIN-HIGH SENSITIVITY: 9 NG/L (ref 0–51)
UA: UC IF INDICATED,UAUC: NORMAL
UROBILINOGEN UR QL STRIP.AUTO: 0.2 EU/DL (ref 0.2–1)
WBC # BLD AUTO: 8.9 K/UL (ref 3.6–11)
WBC URNS QL MICRO: NORMAL /HPF (ref 0–4)

## 2022-03-03 PROCEDURE — 85025 COMPLETE CBC W/AUTO DIFF WBC: CPT

## 2022-03-03 PROCEDURE — 84484 ASSAY OF TROPONIN QUANT: CPT

## 2022-03-03 PROCEDURE — 36415 COLL VENOUS BLD VENIPUNCTURE: CPT

## 2022-03-03 PROCEDURE — 71045 X-RAY EXAM CHEST 1 VIEW: CPT

## 2022-03-03 PROCEDURE — 74011250636 HC RX REV CODE- 250/636: Performed by: EMERGENCY MEDICINE

## 2022-03-03 PROCEDURE — 99285 EMERGENCY DEPT VISIT HI MDM: CPT

## 2022-03-03 PROCEDURE — 81001 URINALYSIS AUTO W/SCOPE: CPT

## 2022-03-03 PROCEDURE — 74011000250 HC RX REV CODE- 250: Performed by: EMERGENCY MEDICINE

## 2022-03-03 PROCEDURE — 74011250637 HC RX REV CODE- 250/637: Performed by: EMERGENCY MEDICINE

## 2022-03-03 PROCEDURE — 74011000258 HC RX REV CODE- 258: Performed by: EMERGENCY MEDICINE

## 2022-03-03 PROCEDURE — 93005 ELECTROCARDIOGRAM TRACING: CPT

## 2022-03-03 PROCEDURE — 80053 COMPREHEN METABOLIC PANEL: CPT

## 2022-03-03 PROCEDURE — 96366 THER/PROPH/DIAG IV INF ADDON: CPT

## 2022-03-03 PROCEDURE — 87040 BLOOD CULTURE FOR BACTERIA: CPT

## 2022-03-03 PROCEDURE — 83605 ASSAY OF LACTIC ACID: CPT

## 2022-03-03 PROCEDURE — 87636 SARSCOV2 & INF A&B AMP PRB: CPT

## 2022-03-03 PROCEDURE — 96365 THER/PROPH/DIAG IV INF INIT: CPT

## 2022-03-03 PROCEDURE — 96367 TX/PROPH/DG ADDL SEQ IV INF: CPT

## 2022-03-03 RX ORDER — IBUPROFEN 600 MG/1
600 TABLET ORAL
Status: COMPLETED | OUTPATIENT
Start: 2022-03-03 | End: 2022-03-03

## 2022-03-03 RX ORDER — SODIUM CHLORIDE 0.9 % (FLUSH) 0.9 %
5-10 SYRINGE (ML) INJECTION AS NEEDED
Status: DISCONTINUED | OUTPATIENT
Start: 2022-03-03 | End: 2022-03-03 | Stop reason: HOSPADM

## 2022-03-03 RX ORDER — ACETAMINOPHEN 500 MG
1000 TABLET ORAL
Status: DISCONTINUED | OUTPATIENT
Start: 2022-03-03 | End: 2022-03-03

## 2022-03-03 RX ADMIN — SODIUM CHLORIDE, PRESERVATIVE FREE 10 ML: 5 INJECTION INTRAVENOUS at 10:37

## 2022-03-03 RX ADMIN — VANCOMYCIN HYDROCHLORIDE 1500 MG: 750 INJECTION, POWDER, LYOPHILIZED, FOR SOLUTION INTRAVENOUS at 10:28

## 2022-03-03 RX ADMIN — IBUPROFEN 600 MG: 600 TABLET, FILM COATED ORAL at 09:15

## 2022-03-03 RX ADMIN — SODIUM CHLORIDE 1000 ML: 9 INJECTION, SOLUTION INTRAVENOUS at 10:42

## 2022-03-03 RX ADMIN — CEFEPIME HYDROCHLORIDE 2 G: 2 INJECTION, POWDER, FOR SOLUTION INTRAVENOUS at 09:40

## 2022-03-03 RX ADMIN — SODIUM CHLORIDE 1000 ML: 9 INJECTION, SOLUTION INTRAVENOUS at 09:15

## 2022-03-03 NOTE — ED PROVIDER NOTES
EMERGENCY DEPARTMENT HISTORY AND PHYSICAL EXAM          Date: 3/3/2022  Patient Name: Libbie Boas    History of Presenting Illness     Chief Complaint   Patient presents with    Fever       History Provided By: Patient    HPI: Libbie Boas is a 77 y.o. female, pmhx pretension, diabetes, pancreatitis with adenocarcinoma of the pancreas, who presents ambulatory to the ED c/o fever    Patient explains that her last chemo infusion was last Tuesday. Although she has not felt well for the past couple of weeks she did not start with a fever until this Tuesday. She was given an outpatient prescription of Levaquin which she took yesterday at noon when she picked it up and this morning. Today fever continues at 100.8 so she called the nurse manager in the infusion center who recommended she come to the ER for evaluation. Patient does have cough which is not changed from baseline but does not have any other URI symptoms. She does have some minor abdominal pain which does not seem to be changed from previous. She took Tylenol 1000 mg this a.m. at 7. PCP: Cuca Barba MD    Allergies: None known  Social Hx: -tobacco, -vaping, -EtOH, -Illicit Drugs; There are no other complaints, changes, or physical findings at this time. Current Facility-Administered Medications   Medication Dose Route Frequency Provider Last Rate Last Admin    sodium chloride (NS) flush 5-10 mL  5-10 mL IntraVENous PRN Romeliaeer, Sola Lance MD   10 mL at 03/03/22 1037     Current Outpatient Medications   Medication Sig Dispense Refill    levoFLOXacin (LEVAQUIN) 500 mg tablet Take 1 Tablet by mouth daily. 10 Tablet 1    methylphenidate HCl (RITALIN) 5 mg tablet Take 1 Tablet by mouth three (3) times daily. Max Daily Amount: 15 mg. 90 Tablet 0    LORazepam (ATIVAN) 1 mg tablet Take 1 Tablet by mouth every six (6) hours as needed for Anxiety. Max Daily Amount: 4 mg.  Indications: nausea and vomiting caused by cancer drugs 90 Tablet 2  DULoxetine (CYMBALTA) 30 mg capsule Take 3 Capsules by mouth daily. Indications: neuropathic pain 90 Capsule 5    Toujeo SoloStar U-300 Insulin 300 unit/mL (1.5 mL) inpn pen INJECT 55 UNITS ONCE DAILY AT BEDTIME - DOSE INCREASED (Patient taking differently: 55 Units by SubCUTAneous route. Patient uses differently during around chemo time.) 4.5 mL 10    gabapentin (NEURONTIN) 300 mg capsule TAKE 2 CAPSULES BY MOUTH THREE TIMES DAILY 180 Capsule 3    lipase-protease-amylase (Creon) 12,000-38,000 -60,000 unit capsule Take 2 Capsules by mouth three (3) times daily (with meals). 2 caps with meal and 1 with snacks  Indications: exocrine pancreatic insufficiency 240 Capsule 5    lidocaine-prilocaine (EMLA) topical cream Apply  to affected area as needed for Pain (pain ). Apply a thin layer over port site prior to accessing port 30 g 5    nystatin (MYCOSTATIN) powder Apply 1 g to affected area three (3) times daily. 60 g 1    HumaLOG KwikPen Insulin 100 unit/mL kwikpen patient on sliding scale 15 mL 6    famotidine (Pepcid) 20 mg tablet Take 20 mg by mouth two (2) times a day.  sennosides (Senna) 8.6 mg cap Take 1-2 Tablets by mouth two (2) times a day.  polyethylene glycol (Miralax) 17 gram/dose powder Take 17 g by mouth daily as needed.  promethazine (PHENERGAN) 25 mg tablet Take 1 Tab by mouth every six (6) hours as needed for Nausea. 50 Tab 4    ondansetron (ZOFRAN ODT) 4 mg disintegrating tablet Take 1 Tab by mouth every eight (8) hours as needed for Nausea or Vomiting. 30 Tab 5    diphenoxylate-atropine (LomotiL) 2.5-0.025 mg per tablet Take 2 Tabs by mouth four (4) times daily as needed for Diarrhea. Max Daily Amount: 8 Tabs. 200 Tab 0    acetaminophen (Acetaminophen Extra Strength) 500 mg tablet Take 1,000 mg by mouth daily as needed.          Past History     Past Medical History:  Past Medical History:   Diagnosis Date    [facility]2020    Dr Gertrude Torres biopsy via EUS    Diabetes ([facility]     GERD (gastroesophageal reflux disease)     Hernia of abdominal cavity     Subxyphoid hernia    Hypertension     Inflammatory polyarthropathy (HCC)     Joint pain     Joint swelling     Menopause     Ocular nevus of left eye     Pancreatitis     Tracheal stenosis        Past Surgical History:  Past Surgical History:   Procedure Laterality Date    HX CARPAL TUNNEL RELEASE Bilateral     HX COLONOSCOPY      HX HYSTERECTOMY      HX KNEE ARTHROSCOPY Right     HX KNEE REPLACEMENT Right     partial    HX LAP CHOLECYSTECTOMY      HX TONSILLECTOMY      HX VASCULAR ACCESS         Family History:  Family History   Problem Relation Age of Onset    Heart Disease Mother     Heart Attack Mother     Cancer Father         lung    Diabetes Sister        Social History:  Social History     Tobacco Use    Smoking status: Never Smoker    Smokeless tobacco: Never Used   Substance Use Topics    Alcohol use: No     Alcohol/week: 0.0 standard drinks    Drug use: No       Allergies:  No Known Allergies      Review of Systems   Review of Systems   Constitutional: Negative for activity change, appetite change, chills, fever and unexpected weight change. HENT: Negative for congestion. Eyes: Negative for pain and visual disturbance. Respiratory: Positive for cough. Negative for shortness of breath. Cardiovascular: Negative for chest pain. Gastrointestinal: Positive for abdominal pain. Negative for diarrhea, nausea and vomiting. Genitourinary: Negative for dysuria. Musculoskeletal: Negative for back pain. Skin: Negative for rash. Neurological: Negative for headaches. Physical Exam   Physical Exam  Vitals and nursing note reviewed. Constitutional:       Appearance: She is well-developed. She is not diaphoretic. Comments: Middle-aged female, tearful, slightly overweight in mild acute distress   HENT:      Head: Normocephalic and atraumatic.    Eyes:      General: Right eye: No discharge. Left eye: No discharge. Conjunctiva/sclera: Conjunctivae normal.      Pupils: Pupils are equal, round, and reactive to light. Cardiovascular:      Rate and Rhythm: Regular rhythm. Tachycardia present. Heart sounds: Normal heart sounds. No murmur heard. Comments: PowerPort left upper chest without overlying erythema, edema or tenderness  Pulmonary:      Effort: Pulmonary effort is normal. No respiratory distress. Breath sounds: Normal breath sounds. No stridor. No wheezing, rhonchi or rales. Abdominal:      General: Bowel sounds are normal. There is no distension. Palpations: Abdomen is soft. Tenderness: There is no abdominal tenderness. There is no guarding or rebound. Comments: Patient states she has pain in the mid and lower abdomen but without focal site of tenderness or peritonitis   Musculoskeletal:         General: Normal range of motion. Cervical back: Normal range of motion and neck supple. Right lower leg: No edema. Left lower leg: No edema. Skin:     General: Skin is warm and dry. Findings: No rash. Comments: Patient feels hot to touch   Neurological:      Mental Status: She is alert and oriented to person, place, and time. Cranial Nerves: No cranial nerve deficit. Motor: No abnormal muscle tone.        Diagnostic Study Results     Labs -     Recent Results (from the past 12 hour(s))   LACTIC ACID    Collection Time: 03/03/22  9:03 AM   Result Value Ref Range    Lactic acid 1.0 0.4 - 2.0 MMOL/L   METABOLIC PANEL, COMPREHENSIVE    Collection Time: 03/03/22  9:03 AM   Result Value Ref Range    Sodium 135 (L) 136 - 145 mmol/L    Potassium 4.1 3.5 - 5.1 mmol/L    Chloride 102 97 - 108 mmol/L    CO2 28 21 - 32 mmol/L    Anion gap 5 5 - 15 mmol/L    Glucose 203 (H) 65 - 100 mg/dL    BUN 10 6 - 20 MG/DL    Creatinine 0.73 0.55 - 1.02 MG/DL    BUN/Creatinine ratio 14 12 - 20      GFR est AA >60 >60 ml/min/1.73m2    GFR est non-AA >60 >60 ml/min/1.73m2    Calcium 9.2 8.5 - 10.1 MG/DL    Bilirubin, total 0.3 0.2 - 1.0 MG/DL    ALT (SGPT) 32 12 - 78 U/L    AST (SGOT) 27 15 - 37 U/L    Alk. phosphatase 99 45 - 117 U/L    Protein, total 6.7 6.4 - 8.2 g/dL    Albumin 2.9 (L) 3.5 - 5.0 g/dL    Globulin 3.8 2.0 - 4.0 g/dL    A-G Ratio 0.8 (L) 1.1 - 2.2     CBC WITH AUTOMATED DIFF    Collection Time: 03/03/22  9:03 AM   Result Value Ref Range    WBC 8.9 3.6 - 11.0 K/uL    RBC 3.42 (L) 3.80 - 5.20 M/uL    HGB 9.9 (L) 11.5 - 16.0 g/dL    HCT 31.1 (L) 35.0 - 47.0 %    MCV 90.9 80.0 - 99.0 FL    MCH 28.9 26.0 - 34.0 PG    MCHC 31.8 30.0 - 36.5 g/dL    RDW 17.0 (H) 11.5 - 14.5 %    PLATELET 823 (H) 081 - 400 K/uL    MPV 10.3 8.9 - 12.9 FL    NRBC 2.0 (H) 0.0  WBC    ABSOLUTE NRBC 0 0.00 - 0.01 K/uL    NEUTROPHILS 61 32 - 75 %    LYMPHOCYTES 32 12 - 49 %    MONOCYTES 4 (L) 5 - 13 %    EOSINOPHILS 3 0 - 7 %    BASOPHILS 0 0 - 1 %    IMMATURE GRANULOCYTES 0 0.0 - 0.5 %    ABS. NEUTROPHILS 5.4 1.8 - 8.0 K/UL    ABS. LYMPHOCYTES 2.8 0.8 - 3.5 K/UL    ABS. MONOCYTES 0.4 0.0 - 1.0 K/UL    ABS. EOSINOPHILS 0.3 0.0 - 0.4 K/UL    ABS. BASOPHILS 0.0 0.0 - 0.1 K/UL    ABS. IMM. GRANS. 0.0 0.00 - 0.04 K/UL    DF MANUAL      PLATELET COMMENTS Increased Platelets      RBC COMMENTS ANISOCYTOSIS  1+        RBC COMMENTS POIKILOCYTOSIS  1+        RBC COMMENTS SCHISTOCYTES  1+       TROPONIN-HIGH SENSITIVITY    Collection Time: 03/03/22  9:03 AM   Result Value Ref Range    Troponin-High Sensitivity 9 0 - 51 ng/L   SAMPLES BEING HELD    Collection Time: 03/03/22  9:03 AM   Result Value Ref Range    SAMPLES BEING HELD 1RED, 1BLUE,1SST     COMMENT        Add-on orders for these samples will be processed based on acceptable specimen integrity and analyte stability, which may vary by analyte.    EKG, 12 LEAD, INITIAL    Collection Time: 03/03/22  9:53 AM   Result Value Ref Range    Ventricular Rate 91 BPM    Atrial Rate 91 BPM    P-R Interval 170 ms    QRS Duration 102 ms    Q-T Interval 382 ms    QTC Calculation (Bezet) 469 ms    Calculated P Axis 56 degrees    Calculated R Axis 10 degrees    Calculated T Axis 28 degrees    Diagnosis       Normal sinus rhythm  Possible Left atrial enlargement  Incomplete right bundle branch block  Borderline ECG  When compared with ECG of 24-JUN-2021 16:08,  No significant change was found     COVID-19 WITH INFLUENZA A/B    Collection Time: 03/03/22 10:10 AM   Result Value Ref Range    SARS-CoV-2 Not detected NOTD      Influenza A by PCR Not detected NOTD      Influenza B by PCR Not detected NOTD     URINALYSIS W/ REFLEX CULTURE    Collection Time: 03/03/22 10:15 AM    Specimen: Urine   Result Value Ref Range    Color YELLOW/STRAW      Appearance CLEAR CLEAR      Specific gravity 1.010 1.003 - 1.030      pH (UA) 6.0 5.0 - 8.0      Protein Negative NEG mg/dL    Glucose Negative NEG mg/dL    Ketone Negative NEG mg/dL    Bilirubin Negative NEG      Blood Negative NEG      Urobilinogen 0.2 0.2 - 1.0 EU/dL    Nitrites Negative NEG      Leukocyte Esterase Negative NEG      WBC 0-4 0 - 4 /hpf    RBC 0-5 0 - 5 /hpf    Epithelial cells FEW FEW /lpf    Bacteria Negative NEG /hpf    UA:UC IF INDICATED CULTURE NOT INDICATED BY UA RESULT CNI         Radiologic Studies -   XR CHEST PORT   Final Result   No evidence of lobar consolidation. CT Results  (Last 48 hours)    None        CXR Results  (Last 48 hours)               03/03/22 0855  XR CHEST PORT Final result    Impression:  No evidence of lobar consolidation. Narrative:  Portable chest single view dated 3/3/2022. Comparison is portable chest dated 6/24/2021. The CT examination of the chest,   abdomen and pelvis of 2/2/2022 is available for correlation       History is evaluate for infiltrate       A single portable AP semierect view of the chest was obtained.  Metallic bra   hooks project over the lower portion of the chest. The cardiac silhouette is   normal in size. There is some tortuosity of the thoracic aorta. The Mediport   catheter on the left is again noted. No areas of infiltration are identified. The previously described tiny right basilar nodular densities are not noted   radiographically. Medical Decision Making   I am the first provider for this patient. I reviewed the vital signs, available nursing notes, past medical history, past surgical history, family history and social history. Vital Signs-Reviewed the patient's vital signs. Patient Vitals for the past 12 hrs:   Temp Pulse Resp BP SpO2   03/03/22 1331 98.3 °F (36.8 °C) 73 17 128/73 97 %   03/03/22 1215  78 18 105/83 96 %   03/03/22 1200  78 18 115/78 98 %   03/03/22 1130 98.9 °F (37.2 °C) 81 18 132/64 96 %   03/03/22 1115  81 18 (!) 140/56 96 %   03/03/22 1045  78 18 (!) 105/46 96 %   03/03/22 1033  81 18 (!) 98/39 94 %   03/03/22 0930 100.3 °F (37.9 °C) 90 18 (!) 110/49 96 %   03/03/22 0849  98 20 (!) 144/68 96 %       Pulse Oximetry Analysis - 96% on RA    Cardiac Monitor:   Rate: 101bpm  Rhythm: Sinus Tachycardia      Records Reviewed: Nursing Notes, Old Medical Records, Previous Radiology Studies and Previous Laboratory Studies    Provider Notes (Medical Decision Making):   MDM: Middle-aged female presenting 10 days post chemo infusion with greater than 48 hours of fever. She has had 2 doses of Levaquin 500 mg but continues with fever and feeling poorly. Patient does meet criteria for sepsis evaluation which has been initiated and will start broad-spectrum coverage with cefepime and vancomycin for possible intra-abdominal, intrathoracic or even high port infection. ED Course:   Initial assessment performed. The patients presenting problems have been discussed, and they are in agreement with the care plan formulated and outlined with them. I have encouraged them to ask questions as they arise throughout their visit.     EKG interpretation: (Preliminary)  Rhythm: Sinus rhythm at a rate of 91 bpm; normal CA; normal QRS normal QTC interval.  This EKG was interpreted by ED Provider Isaura Gotti MD    PROGRESS NOTE:  10:08 AM   Pt resting. Labs reviewed and notable for a white count of 8 with a normal differential.  Chemistries unremarkable and lactic acid at 1.0, all reassuring. X-ray without evidence of infection. Await Covid testing and urinalysis. ED Course as of 03/03/22 1348   Thu Mar 03, 2022   1031 Pt doing well. HR currently 81, SBP stable and IVF infusing. Urine sample obtained. [JT]   1038 Signs were updated and actually noted to be dropping with systolic of 98. Second liter bolus to be provided. [JT]   1045 Discussed patient's results with her oncologist, Dr. Chester Chakraborty. Given her normal lactic acid and white count if she continues to do well with improvement in vitals, he recommends sending her home with continued antibiotics while awaiting cultures. [JT]   1153 Patient resting comfortably. Repeat blood pressure improved and remaining in the 836L 094K systolic. Plan to DC after antibiotic infusion. [JT]   1348 Patient tolerated the infusion without event. Blood pressure mean stable and she appears appropriate for discharge. [JT]      ED Course User Index  [JT] Peggy Alba MD        Discharge note  Pt re-evaluated and noted to be feeling better, ready for discharge. Updated pt  on all final lab findings. Will follow up as instructed. All questions have been answered, pt voiced understanding and agreement with plan. Specific return precautions provided as well as instructions to return to the ED should sx worsen at any time. Vital signs stable for discharge. Critical Care Time:   0      Diagnosis     Clinical Impression:   1. Fever, unspecified fever cause        PLAN:  1. Current Discharge Medication List        2.    Follow-up Information     Follow up With Specialties Details Why Contact Info    [address] [facility] Emergency Medicine  If symptoms worsen [address] [id number]        Return to ED if worse     Disposition:  Home       Please note, this dictation was completed with [email or web address], the computer voice recognition software. Quite often unanticipated grammatical, syntax, homophones, and other interpretive errors are inadvertently transcribed by the computer software. Please disregard these errors. Please excuse any errors that have escaped final proof reading.

## 2022-03-03 NOTE — ED TRIAGE NOTES
Pt arrived by POV with complaint of not feeling well and fever. Pt reports she has not been feeling well for about 2 weeks and her last chemo was a week ago Tuesday, pt report she started with a fever 2 days ago and was put on antibiotics for which she has her first dose yesterday. Pt is tearful at this time. Pt also reports she has been having nose bleeds,no bleeding noted at this time. Pt is awake alert and oriented x 4.   Pt educated on ER flow

## 2022-03-03 NOTE — DISCHARGE INSTRUCTIONS
You were seen in the ER for increasing fever which is concerning given your recent chemotherapeutic infusion. Likely today, your blood level is 8.9 and your labs including a lactic acid level are all within normal limits.   Continue using Tylenol as needed for temperature control but return to the ER if you should have any shaking chills, decreased ability to get out of bed, decreased ability to stay hydrated or other concerning symptoms

## 2022-03-03 NOTE — ED NOTES
Attempted to access port for blood cultures. Port would flush but no blood return after position changes. De-accessed port.  MD aware

## 2022-03-04 ENCOUNTER — TELEPHONE (OUTPATIENT)
Dept: ONCOLOGY | Age: 67
End: 2022-03-04

## 2022-03-04 NOTE — TELEPHONE ENCOUNTER
HIPAA verified. Called patient to follow up on yesterdays ED visit. Per conversation patient had with [facility] manager this morning, she had temp of 99.1 last night before bed, this morning her temp was 100.5 before tylenol. She is experiencing her normal aches and pains, but over all okay. She does not feel 100% better but she is better. Per Dr Marietta Mc, if she feels like she needs fluids, we can bring her in the office this morning. Patient states she is drinking fine and denies needing fluids. She will continue on current antibiotics per Dr Kaiser Church. Blood culture are in, waiting on results. Patient will let us know if she starts to feel worse, otherwise she will rest and take it easy today.

## 2022-03-06 LAB
ATRIAL RATE: 91 BPM
CALCULATED P AXIS, ECG09: 56 DEGREES
CALCULATED R AXIS, ECG10: 10 DEGREES
CALCULATED T AXIS, ECG11: 28 DEGREES
DIAGNOSIS, 93000: NORMAL
P-R INTERVAL, ECG05: 170 MS
Q-T INTERVAL, ECG07: 382 MS
QRS DURATION, ECG06: 102 MS
QTC CALCULATION (BEZET), ECG08: 469 MS
VENTRICULAR RATE, ECG03: 91 BPM

## 2022-03-08 ENCOUNTER — HOSPITAL ENCOUNTER (OUTPATIENT)
Dept: INFUSION THERAPY | Age: 67
End: 2022-03-08

## 2022-03-10 ENCOUNTER — OFFICE VISIT (OUTPATIENT)
Dept: ONCOLOGY | Age: 67
End: 2022-03-10

## 2022-03-10 ENCOUNTER — VIRTUAL VISIT (OUTPATIENT)
Dept: PALLATIVE CARE | Age: 67
End: 2022-03-10
Payer: MEDICARE

## 2022-03-10 VITALS
DIASTOLIC BLOOD PRESSURE: 63 MMHG | OXYGEN SATURATION: 94 % | WEIGHT: 180.4 LBS | SYSTOLIC BLOOD PRESSURE: 120 MMHG | HEIGHT: 64 IN | TEMPERATURE: 98.8 F | RESPIRATION RATE: 16 BRPM | BODY MASS INDEX: 30.8 KG/M2 | HEART RATE: 99 BPM

## 2022-03-10 DIAGNOSIS — E11.9 TYPE 2 DIABETES MELLITUS WITHOUT COMPLICATION, WITH LONG-TERM CURRENT USE OF INSULIN (HCC): ICD-10-CM

## 2022-03-10 DIAGNOSIS — G89.3 NEOPLASM RELATED PAIN: Primary | ICD-10-CM

## 2022-03-10 DIAGNOSIS — Z79.4 TYPE 2 DIABETES MELLITUS WITHOUT COMPLICATION, WITH LONG-TERM CURRENT USE OF INSULIN (HCC): ICD-10-CM

## 2022-03-10 DIAGNOSIS — F32.A DEPRESSION, UNSPECIFIED DEPRESSION TYPE: ICD-10-CM

## 2022-03-10 DIAGNOSIS — C25.9 PANCREATIC CANCER METASTASIZED TO INTRA-ABDOMINAL LYMPH NODE (HCC): Primary | ICD-10-CM

## 2022-03-10 DIAGNOSIS — E11.65 TYPE 2 DIABETES MELLITUS WITH HYPERGLYCEMIA, WITH LONG-TERM CURRENT USE OF INSULIN (HCC): ICD-10-CM

## 2022-03-10 DIAGNOSIS — R53.83 PROFOUND FATIGUE: ICD-10-CM

## 2022-03-10 DIAGNOSIS — Z79.4 TYPE 2 DIABETES MELLITUS WITH HYPERGLYCEMIA, WITH LONG-TERM CURRENT USE OF INSULIN (HCC): ICD-10-CM

## 2022-03-10 DIAGNOSIS — C25.9 ADENOCARCINOMA OF PANCREAS (HCC): Primary | ICD-10-CM

## 2022-03-10 DIAGNOSIS — C77.2 PANCREATIC CANCER METASTASIZED TO INTRA-ABDOMINAL LYMPH NODE (HCC): Primary | ICD-10-CM

## 2022-03-10 DIAGNOSIS — G62.0 CHEMOTHERAPY-INDUCED NEUROPATHY (HCC): ICD-10-CM

## 2022-03-10 DIAGNOSIS — T45.1X5A CHEMOTHERAPY-INDUCED NEUROPATHY (HCC): ICD-10-CM

## 2022-03-10 PROCEDURE — G8510 SCR DEP NEG, NO PLAN REQD: HCPCS | Performed by: INTERNAL MEDICINE

## 2022-03-10 PROCEDURE — 1101F PT FALLS ASSESS-DOCD LE1/YR: CPT | Performed by: INTERNAL MEDICINE

## 2022-03-10 PROCEDURE — G8754 DIAS BP LESS 90: HCPCS | Performed by: INTERNAL MEDICINE

## 2022-03-10 PROCEDURE — G8427 DOCREV CUR MEDS BY ELIG CLIN: HCPCS | Performed by: INTERNAL MEDICINE

## 2022-03-10 PROCEDURE — 3017F COLORECTAL CA SCREEN DOC REV: CPT | Performed by: INTERNAL MEDICINE

## 2022-03-10 PROCEDURE — 2022F DILAT RTA XM EVC RTNOPTHY: CPT | Performed by: INTERNAL MEDICINE

## 2022-03-10 PROCEDURE — G8417 CALC BMI ABV UP PARAM F/U: HCPCS | Performed by: INTERNAL MEDICINE

## 2022-03-10 PROCEDURE — G8399 PT W/DXA RESULTS DOCUMENT: HCPCS | Performed by: INTERNAL MEDICINE

## 2022-03-10 PROCEDURE — 99215 OFFICE O/P EST HI 40 MIN: CPT | Performed by: INTERNAL MEDICINE

## 2022-03-10 PROCEDURE — 1090F PRES/ABSN URINE INCON ASSESS: CPT | Performed by: INTERNAL MEDICINE

## 2022-03-10 PROCEDURE — G8752 SYS BP LESS 140: HCPCS | Performed by: INTERNAL MEDICINE

## 2022-03-10 PROCEDURE — 3046F HEMOGLOBIN A1C LEVEL >9.0%: CPT | Performed by: INTERNAL MEDICINE

## 2022-03-10 PROCEDURE — G8536 NO DOC ELDER MAL SCRN: HCPCS | Performed by: INTERNAL MEDICINE

## 2022-03-10 PROCEDURE — 99214 OFFICE O/P EST MOD 30 MIN: CPT | Performed by: INTERNAL MEDICINE

## 2022-03-10 RX ORDER — ALBUTEROL SULFATE 0.83 MG/ML
2.5 SOLUTION RESPIRATORY (INHALATION) AS NEEDED
Status: CANCELLED
Start: 2022-04-19

## 2022-03-10 RX ORDER — ONDANSETRON 2 MG/ML
8 INJECTION INTRAMUSCULAR; INTRAVENOUS AS NEEDED
Status: CANCELLED | OUTPATIENT
Start: 2022-04-26

## 2022-03-10 RX ORDER — DIPHENHYDRAMINE HYDROCHLORIDE 50 MG/ML
25 INJECTION, SOLUTION INTRAMUSCULAR; INTRAVENOUS AS NEEDED
Status: CANCELLED | OUTPATIENT
Start: 2022-03-15

## 2022-03-10 RX ORDER — HYDROCORTISONE SODIUM SUCCINATE 100 MG/2ML
100 INJECTION, POWDER, FOR SOLUTION INTRAMUSCULAR; INTRAVENOUS AS NEEDED
Status: CANCELLED | OUTPATIENT
Start: 2022-03-15

## 2022-03-10 RX ORDER — ONDANSETRON 2 MG/ML
8 INJECTION INTRAMUSCULAR; INTRAVENOUS ONCE
Status: CANCELLED | OUTPATIENT
Start: 2022-03-15 | End: 2022-03-15

## 2022-03-10 RX ORDER — DIPHENHYDRAMINE HYDROCHLORIDE 50 MG/ML
50 INJECTION, SOLUTION INTRAMUSCULAR; INTRAVENOUS AS NEEDED
Status: CANCELLED
Start: 2022-04-12

## 2022-03-10 RX ORDER — ALBUTEROL SULFATE 0.83 MG/ML
2.5 SOLUTION RESPIRATORY (INHALATION) AS NEEDED
Status: CANCELLED
Start: 2022-03-15

## 2022-03-10 RX ORDER — SODIUM CHLORIDE 9 MG/ML
10 INJECTION INTRAMUSCULAR; INTRAVENOUS; SUBCUTANEOUS AS NEEDED
Status: CANCELLED | OUTPATIENT
Start: 2022-04-26

## 2022-03-10 RX ORDER — SODIUM CHLORIDE 0.9 % (FLUSH) 0.9 %
10 SYRINGE (ML) INJECTION AS NEEDED
Status: CANCELLED | OUTPATIENT
Start: 2022-04-12

## 2022-03-10 RX ORDER — SODIUM CHLORIDE 9 MG/ML
25 INJECTION, SOLUTION INTRAVENOUS CONTINUOUS
Status: CANCELLED | OUTPATIENT
Start: 2022-04-12

## 2022-03-10 RX ORDER — EPINEPHRINE 1 MG/ML
0.3 INJECTION, SOLUTION, CONCENTRATE INTRAVENOUS AS NEEDED
Status: CANCELLED | OUTPATIENT
Start: 2022-04-19

## 2022-03-10 RX ORDER — SODIUM CHLORIDE 9 MG/ML
10 INJECTION INTRAMUSCULAR; INTRAVENOUS; SUBCUTANEOUS AS NEEDED
Status: CANCELLED | OUTPATIENT
Start: 2022-03-15

## 2022-03-10 RX ORDER — EPINEPHRINE 1 MG/ML
0.3 INJECTION, SOLUTION, CONCENTRATE INTRAVENOUS AS NEEDED
Status: CANCELLED | OUTPATIENT
Start: 2022-04-26

## 2022-03-10 RX ORDER — DIPHENHYDRAMINE HYDROCHLORIDE 50 MG/ML
50 INJECTION, SOLUTION INTRAMUSCULAR; INTRAVENOUS AS NEEDED
Status: CANCELLED
Start: 2022-03-15

## 2022-03-10 RX ORDER — HEPARIN 100 UNIT/ML
300-500 SYRINGE INTRAVENOUS AS NEEDED
Status: CANCELLED | OUTPATIENT
Start: 2022-04-12

## 2022-03-10 RX ORDER — ONDANSETRON 2 MG/ML
8 INJECTION INTRAMUSCULAR; INTRAVENOUS ONCE
Status: CANCELLED | OUTPATIENT
Start: 2022-04-26 | End: 2022-04-26

## 2022-03-10 RX ORDER — HYDROCORTISONE SODIUM SUCCINATE 100 MG/2ML
100 INJECTION, POWDER, FOR SOLUTION INTRAMUSCULAR; INTRAVENOUS AS NEEDED
Status: CANCELLED | OUTPATIENT
Start: 2022-04-19

## 2022-03-10 RX ORDER — DIPHENHYDRAMINE HYDROCHLORIDE 50 MG/ML
25 INJECTION, SOLUTION INTRAMUSCULAR; INTRAVENOUS AS NEEDED
Status: CANCELLED | OUTPATIENT
Start: 2022-04-12

## 2022-03-10 RX ORDER — DIPHENHYDRAMINE HYDROCHLORIDE 50 MG/ML
25 INJECTION, SOLUTION INTRAMUSCULAR; INTRAVENOUS
Status: CANCELLED
Start: 2022-03-15

## 2022-03-10 RX ORDER — SODIUM CHLORIDE 0.9 % (FLUSH) 0.9 %
10 SYRINGE (ML) INJECTION AS NEEDED
Status: CANCELLED | OUTPATIENT
Start: 2022-04-19

## 2022-03-10 RX ORDER — EPINEPHRINE 1 MG/ML
0.3 INJECTION, SOLUTION, CONCENTRATE INTRAVENOUS AS NEEDED
Status: CANCELLED | OUTPATIENT
Start: 2022-03-15

## 2022-03-10 RX ORDER — DIPHENHYDRAMINE HYDROCHLORIDE 50 MG/ML
25 INJECTION, SOLUTION INTRAMUSCULAR; INTRAVENOUS
Status: CANCELLED
Start: 2022-04-12

## 2022-03-10 RX ORDER — LORAZEPAM 2 MG/ML
0.5 INJECTION INTRAMUSCULAR
Status: CANCELLED
Start: 2022-03-15

## 2022-03-10 RX ORDER — ACETAMINOPHEN 325 MG/1
650 TABLET ORAL AS NEEDED
Status: CANCELLED | OUTPATIENT
Start: 2022-04-26

## 2022-03-10 RX ORDER — SODIUM CHLORIDE 9 MG/ML
25 INJECTION, SOLUTION INTRAVENOUS CONTINUOUS
Status: CANCELLED | OUTPATIENT
Start: 2022-04-19

## 2022-03-10 RX ORDER — HEPARIN 100 UNIT/ML
300-500 SYRINGE INTRAVENOUS AS NEEDED
Status: CANCELLED | OUTPATIENT
Start: 2022-04-19

## 2022-03-10 RX ORDER — SODIUM CHLORIDE 9 MG/ML
10 INJECTION INTRAMUSCULAR; INTRAVENOUS; SUBCUTANEOUS AS NEEDED
Status: CANCELLED | OUTPATIENT
Start: 2022-04-19

## 2022-03-10 RX ORDER — SODIUM CHLORIDE 9 MG/ML
10 INJECTION INTRAMUSCULAR; INTRAVENOUS; SUBCUTANEOUS AS NEEDED
Status: CANCELLED | OUTPATIENT
Start: 2022-04-12

## 2022-03-10 RX ORDER — HYDROCORTISONE SODIUM SUCCINATE 100 MG/2ML
100 INJECTION, POWDER, FOR SOLUTION INTRAMUSCULAR; INTRAVENOUS AS NEEDED
Status: CANCELLED | OUTPATIENT
Start: 2022-04-26

## 2022-03-10 RX ORDER — ONDANSETRON 2 MG/ML
8 INJECTION INTRAMUSCULAR; INTRAVENOUS ONCE
Status: CANCELLED | OUTPATIENT
Start: 2022-04-12 | End: 2022-04-12

## 2022-03-10 RX ORDER — LORAZEPAM 2 MG/ML
0.5 INJECTION INTRAMUSCULAR
Status: CANCELLED
Start: 2022-04-26

## 2022-03-10 RX ORDER — SODIUM CHLORIDE 9 MG/ML
25 INJECTION, SOLUTION INTRAVENOUS CONTINUOUS
Status: CANCELLED | OUTPATIENT
Start: 2022-04-26

## 2022-03-10 RX ORDER — DEXAMETHASONE SODIUM PHOSPHATE 4 MG/ML
10 INJECTION, SOLUTION INTRA-ARTICULAR; INTRALESIONAL; INTRAMUSCULAR; INTRAVENOUS; SOFT TISSUE ONCE
Status: CANCELLED | OUTPATIENT
Start: 2022-04-26 | End: 2022-04-26

## 2022-03-10 RX ORDER — DIPHENHYDRAMINE HYDROCHLORIDE 50 MG/ML
50 INJECTION, SOLUTION INTRAMUSCULAR; INTRAVENOUS AS NEEDED
Status: CANCELLED
Start: 2022-04-26

## 2022-03-10 RX ORDER — DIPHENHYDRAMINE HYDROCHLORIDE 50 MG/ML
25 INJECTION, SOLUTION INTRAMUSCULAR; INTRAVENOUS AS NEEDED
Status: CANCELLED | OUTPATIENT
Start: 2022-04-19

## 2022-03-10 RX ORDER — DIPHENHYDRAMINE HYDROCHLORIDE 50 MG/ML
25 INJECTION, SOLUTION INTRAMUSCULAR; INTRAVENOUS
Status: CANCELLED
Start: 2022-04-26

## 2022-03-10 RX ORDER — METHYLPHENIDATE HYDROCHLORIDE 10 MG/1
10 TABLET ORAL 3 TIMES DAILY
Qty: 90 TABLET | Refills: 0 | Status: SHIPPED | OUTPATIENT
Start: 2022-03-10 | End: 2022-07-19 | Stop reason: SDUPTHER

## 2022-03-10 RX ORDER — ALBUTEROL SULFATE 0.83 MG/ML
2.5 SOLUTION RESPIRATORY (INHALATION) AS NEEDED
Status: CANCELLED
Start: 2022-04-12

## 2022-03-10 RX ORDER — SODIUM CHLORIDE 0.9 % (FLUSH) 0.9 %
10 SYRINGE (ML) INJECTION AS NEEDED
Status: CANCELLED
Start: 2022-04-26

## 2022-03-10 RX ORDER — DIPHENHYDRAMINE HYDROCHLORIDE 50 MG/ML
25 INJECTION, SOLUTION INTRAMUSCULAR; INTRAVENOUS
Status: CANCELLED
Start: 2022-04-19

## 2022-03-10 RX ORDER — SODIUM CHLORIDE 0.9 % (FLUSH) 0.9 %
10 SYRINGE (ML) INJECTION AS NEEDED
Status: CANCELLED | OUTPATIENT
Start: 2022-03-15

## 2022-03-10 RX ORDER — ALBUTEROL SULFATE 0.83 MG/ML
2.5 SOLUTION RESPIRATORY (INHALATION) AS NEEDED
Status: CANCELLED
Start: 2022-04-26

## 2022-03-10 RX ORDER — ONDANSETRON 2 MG/ML
8 INJECTION INTRAMUSCULAR; INTRAVENOUS ONCE
Status: CANCELLED | OUTPATIENT
Start: 2022-04-19 | End: 2022-04-19

## 2022-03-10 RX ORDER — LORAZEPAM 2 MG/ML
0.5 INJECTION INTRAMUSCULAR
Status: CANCELLED
Start: 2022-04-19

## 2022-03-10 RX ORDER — DEXAMETHASONE SODIUM PHOSPHATE 4 MG/ML
10 INJECTION, SOLUTION INTRA-ARTICULAR; INTRALESIONAL; INTRAMUSCULAR; INTRAVENOUS; SOFT TISSUE ONCE
Status: CANCELLED | OUTPATIENT
Start: 2022-04-12 | End: 2022-04-12

## 2022-03-10 RX ORDER — HYDROCORTISONE SODIUM SUCCINATE 100 MG/2ML
100 INJECTION, POWDER, FOR SOLUTION INTRAMUSCULAR; INTRAVENOUS AS NEEDED
Status: CANCELLED | OUTPATIENT
Start: 2022-04-12

## 2022-03-10 RX ORDER — EPINEPHRINE 1 MG/ML
0.3 INJECTION, SOLUTION, CONCENTRATE INTRAVENOUS AS NEEDED
Status: CANCELLED | OUTPATIENT
Start: 2022-04-12

## 2022-03-10 RX ORDER — DEXAMETHASONE SODIUM PHOSPHATE 4 MG/ML
10 INJECTION, SOLUTION INTRA-ARTICULAR; INTRALESIONAL; INTRAMUSCULAR; INTRAVENOUS; SOFT TISSUE ONCE
Status: CANCELLED | OUTPATIENT
Start: 2022-03-15 | End: 2022-03-15

## 2022-03-10 RX ORDER — DEXAMETHASONE SODIUM PHOSPHATE 4 MG/ML
10 INJECTION, SOLUTION INTRA-ARTICULAR; INTRALESIONAL; INTRAMUSCULAR; INTRAVENOUS; SOFT TISSUE ONCE
Status: CANCELLED | OUTPATIENT
Start: 2022-04-19 | End: 2022-04-19

## 2022-03-10 RX ORDER — LORAZEPAM 2 MG/ML
0.5 INJECTION INTRAMUSCULAR
Status: CANCELLED
Start: 2022-04-12

## 2022-03-10 RX ORDER — ACETAMINOPHEN 325 MG/1
650 TABLET ORAL AS NEEDED
Status: CANCELLED | OUTPATIENT
Start: 2022-04-12

## 2022-03-10 RX ORDER — HEPARIN 100 UNIT/ML
300-500 SYRINGE INTRAVENOUS AS NEEDED
Status: CANCELLED | OUTPATIENT
Start: 2022-03-15

## 2022-03-10 RX ORDER — ONDANSETRON 2 MG/ML
8 INJECTION INTRAMUSCULAR; INTRAVENOUS AS NEEDED
Status: CANCELLED | OUTPATIENT
Start: 2022-03-15

## 2022-03-10 RX ORDER — ONDANSETRON 2 MG/ML
8 INJECTION INTRAMUSCULAR; INTRAVENOUS AS NEEDED
Status: CANCELLED | OUTPATIENT
Start: 2022-04-19

## 2022-03-10 RX ORDER — ACETAMINOPHEN 325 MG/1
650 TABLET ORAL AS NEEDED
Status: CANCELLED | OUTPATIENT
Start: 2022-04-19

## 2022-03-10 RX ORDER — DIPHENHYDRAMINE HYDROCHLORIDE 50 MG/ML
50 INJECTION, SOLUTION INTRAMUSCULAR; INTRAVENOUS AS NEEDED
Status: CANCELLED
Start: 2022-04-19

## 2022-03-10 RX ORDER — ONDANSETRON 2 MG/ML
8 INJECTION INTRAMUSCULAR; INTRAVENOUS AS NEEDED
Status: CANCELLED | OUTPATIENT
Start: 2022-04-12

## 2022-03-10 RX ORDER — HEPARIN 100 UNIT/ML
300-500 SYRINGE INTRAVENOUS AS NEEDED
Status: CANCELLED | OUTPATIENT
Start: 2022-04-26

## 2022-03-10 RX ORDER — ACETAMINOPHEN 325 MG/1
650 TABLET ORAL AS NEEDED
Status: CANCELLED | OUTPATIENT
Start: 2022-03-15

## 2022-03-10 RX ORDER — DIPHENHYDRAMINE HYDROCHLORIDE 50 MG/ML
25 INJECTION, SOLUTION INTRAMUSCULAR; INTRAVENOUS AS NEEDED
Status: CANCELLED | OUTPATIENT
Start: 2022-04-26

## 2022-03-10 RX ORDER — SODIUM CHLORIDE 9 MG/ML
25 INJECTION, SOLUTION INTRAVENOUS CONTINUOUS
Status: CANCELLED | OUTPATIENT
Start: 2022-03-15

## 2022-03-10 RX ORDER — HYDROMORPHONE HYDROCHLORIDE 2 MG/1
4 TABLET ORAL
Qty: 90 TABLET | Refills: 0 | Status: SHIPPED | OUTPATIENT
Start: 2022-03-10 | End: 2022-05-19 | Stop reason: SDUPTHER

## 2022-03-10 NOTE — PROGRESS NOTES
Palliative Medicine Office Visit  Palliative Medicine Nurse Check In  [phone]    Patient Name: Kendy Ryan  YOB: 1955      Date of Office Visit: 3/10/2022    Patient states: \"  \"    From Check In Sheet (scanned in Media):  Has a medical provider talked with you about cardiopulmonary resuscitation (CPR)? [x] Yes   [] No   [] Unable to obtain    Nurse reminder to complete or update ACP FlowSheet:    Is ACP on the Problem List?    [x] Yes    [] No  IF ACP Document is ON FILE; Nurse to place ACP on Problem List     Is there an ACP Note in Chart Review/Note? [x] Yes    [] No   If NO: ALERT PROVIDER       Primary Decision MakerGuspina Hayden - Daughter - [phone]    Secondary Decision Maker: Carl Benavides - Daughter - [phone]  Advance Care Planning 3/10/2022   Patient's [place] is: Named in scanned ACP document   Confirm Advance Directive Yes, on file   Patient Would Like to Complete Advance Directive -   Does the patient have other document types Power of          Is there anything that we should know about you as a person in order to provide you the best care possible? Have you been to the ER, urgent care clinic since your last visit? [x] Yes   [] No   [] Unable to obtain    Have you been hospitalized since your last visit? [] Yes   [x] No   [] Unable to obtain    Have you seen or consulted any other health care providers outside of the [address] [place] [address] since your last visit?    [] Yes   [x] No   [] Unable to obtain    Functional status (describe):         Last BM: 3/10/2022     accessed (date): 3/10/2022    Bottle review (for opioid pain medication):  Medication 1:   Date filled:   Directions:   # filled:   # left:   # pills taking per day:  Last dose taken:    Medication 2:   Date filled:   Directions:   # filled:   # left:   # pills taking per day:  Last dose taken:    Medication 3:   Date filled:   Directions: # filled:   # left:   # pills taking per day:  Last dose taken:    Medication 4:   Date filled:   Directions:   # filled:   # left:   # pills taking per day:  Last dose taken:

## 2022-03-10 NOTE — PROGRESS NOTES
Palliative Medicine Outpatient Services  [place]as: [phone] ([phone])    Patient Name: Job Gandhi  YOB: 1955    Date of Current Visit: 03/10/22  Location of Current Visit:    [] [facility] Office  [] [facility] Office  [] [facility] Office  [] Home  [x]Synchronous A/V virtual visit    Date of Initial Visit: 6/15/2020  Referral from: Dr. Taisha Reyna  Primary Care Physician: Carson Naik MD      SUMMARY:   Job Gandhi is a 77y.o. year old with a  history of uncontrolled diabetes with an A1c of 9.2, newly diagnosed inoperable pancreatic cancer, who was referred to Palliative Medicine by Dr. Taisha Reyna for management of intractable symptoms and psychosocial support. 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 CT on June 19 shows pancreatitis with some ascites, pancreatic mass has disappeared. Completed 3 doses of 5-FU, started Gemzar Abraxane on 7/29/2020     CA 19 normal, patient s/p exploratory  laparoscopy at [facility] with Dr. Olvin Davison and Dr. Chris Beck as part of clinical trial on March 17, 2021     PALLIATIVE DIAGNOSES:       ICD-10-CM ICD-9-CM    1. Adenocarcinoma of pancreas (HCC)  C25.9 157.9    2. Chemotherapy-induced neuropathy (HCC)  G62.0 357.6     T45.1X5A E933.1    3. Profound fatigue  R53.83 780.79    4. Type 2 diabetes mellitus without complication, with long-term current use of insulin (HCC)  E11.9 250.00     Z79.4 V58.67    5.  Type 2 diabetes mellitus with hyperglycemia, with long-term current use of insulin (HCC)  E11.65 250.00     Z79.4 790.29      V58.67           PLAN:     Patient Instructions     Job Gandhi    It was a pleasure to see you for a virtual visit today. Below is the plan we discussed.      -Cancer:  -You restarted chemotherapy \. Currently you are taking it once weekly on Tuesday for three weeks followed by one week off. You are finishing your 3rd cycle.  -You have lost your hair since starting chemotherapy.  -The dexamethasone makes you feel a bit anxious/hyper. Sometimes you can feel sad. You also have low energy for a couple days after chemotherapy.  -Sometimes you get a rash on your arms--you use pepcid, benadryl, and hydrocortisone cream for this with improvement.     -Back and lower abdominal pain pain  -Your pain is pretty well controlled with your current regimen. You have this mild tolerable uncomfortable feeling.  -You are taking dilaudid 1 mg in the morning and 1 mg in the afternoon at 4 PM. This is working well for you.  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     -Insomnia  -You have been taking Ativan 0.5 mg at bedtime which is very helpful and so you want to continue the same.  -You also take benadryl at times at night  -You still can sometimes wake a few times at night. You don't think it is because of pain, though you note that your right arm pain can bother you at times.  -You said that tylenol does seem to help, so you can continue to do that. You can also try your as needed dilaudid if the tylenol is not helpful.    -Fatigue  -Of note, you also take methylphenidate 5 mg twice daily as prescribed by Dr. Phylicia Chang. We will continue to monitor to see if this may need to be adjusted (if the afternoon dose is impacting your sleep).    -Diarrhea   -You are on Creon. You do still sometimes have diarrhea.      -Uncontrolled diabetes  -Your Hemoglobin A1c was greater than 9 in June. You are working with your PCP on insulin doses. You are working to adjust your insulin doses when you take your chemotherapy and dexamethasone. Uncontrolled diabetes can also exacerbate neuropathy.     -Appetite  -You don't have too much of an appetite (worse on chemo weeks), but you try to make yourself eat a good dinner.     -Chemotherapy-induced neuropathy  -It is the worst on your feet and your right hand fingertips. You will drop things at times.  -Gabapentin 600 mg 3 times a day, total 1800 mg/day. This is also being prescribed by Dr. Dipak Pruett.  -Cymbalta was recently increased to 30 mg 2 times a day. This was a couple weeks ago after discussing with Dr. Dipak Pruett. You do think it seems to be helping some.  -You have some gait imbalance from severe neuropathy. We talked about physical therapy being helpful but you do not want to have home or outpatient physical therapy at this time. You will look at YouTube videos for gait and try them out first.    -Mood  -You think you are having more good days than bad and that overall you are doing very well. You can feel down at times during your chemo weeks. -You have an awesome support system: , daughter and her family. We will see you back in 3 months virtually.        GOALS OF CARE / TREATMENT PREFERENCES:   [====Goals of Care====]  GOALS OF CARE:  Patient / health care proxy stated goals: See Patient Instructions / Summary    TREATMENT PREFERENCES:   Code Status:  [x] Attempt Resuscitation       [] Do Not Attempt Resuscitation    Advance Care Planning:  [x] [facility] Interdisciplinary Team has updated the ACP Navigator with Decision Maker and Patient Capacity      Primary Decision MakerMnirajmegan Patel Daughter - [phone]    Secondary Decision Maker: Monica Bolanddaniel - Daughter - [phone]  Advance Care Planning 3/10/2022   Patient's [address] is: Named in scanned ACP document   Confirm Advance Directive Yes, on file   Patient Would Like to Complete Advance Directive -   Does the patient have other document types Power of        Other:  (If patient appropriate for POST, consider using PALLPOST smart phrase here)    The palliative care team has discussed with patient / health care proxy about goals of care / treatment preferences for patient.  [====Goals of Care====]     PRESCRIPTIONS GIVEN:     No orders of the defined types were placed in this encounter. FOLLOW UP:     Future Appointments   Date Time Provider Louis Campuzano   3/15/2022  8:30 AM [facility] INF MACI 1 [address]   3/22/2022  8:30 AM [facility] INF MACI 1 [address]   4/15/2022  9:00 AM Rickie Burkitt, MD ONCMR BS AMB   6/24/2022  1:00 PM Arianna Yang MD [facility] MAIN  AMB           PHYSICIANS INVOLVED IN CARE:   Patient Care Team:  Maxine Neff MD as PCP - General (Internal Medicine)  Maxine Neff MD as PCP - [facility] Empaneled Provider  Barby Downing MD (Inactive) (General Surgery)  Rickie Burkitt, MD as Physician (Hematology and Oncology)  Alban Mejia MD as Physician (Palliative Medicine)       HISTORY:   Reviewed patient-completed ESAS and advance care planning form. Reviewed patient record in prescription monitoring program.    CHIEF COMPLAINT:   No chief complaint on file. HPI/SUBJECTIVE:    The patient is: [x] Verbal / [] Nonverbal     Patient seen today virtually along with her daughter. Jena Coronel is in very good spirits, talked at length about her chickens. Neuropathy remains in both hands and feet but current doses of gabapentin Cymbalta are helping her. She has been taking Dilaudid 1 mg 2 times a day for fear of pain more than anything. There are days that she has more back pain especially when she is more active and we talked about her taking Tylenol instead of Dilaudid for that. We will also wean her off of Dilaudid. She is anxious about the next set of scans which may show cancer recurrence and that she may have to go back on chemo. She is excepting of this for now.  --------  Patient seen today along with her daughter Cori Taylor. Both are very tearful and stressed from patient's new diagnosis.   Jena Coronel tells me that she ignored her abdominal pain for several months and now she is paying for it. She is determined to do everything she can to live as long as possible but is very realistic about her outcome. She is struggling with abdominal pain and is not taking medications as prescribed. Her most significant complaint is profound fatigue. But from what she is sharing, she is eating little to nothing every day, drinking less than 10 ounces of water per day. Her daughter forces her to eat some applesauce with her meds which is about the only calories she gets per day. She sleeps most of the time. She is identified that the first 6 days of the chemotherapy is the worst but she seems to recover from it a little bit after and eats a little bit. She struggles with diarrhea after chemotherapy but constipation once the diarrhea dissipates. 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 Daughter is overwhelmed because of the illness of her mother.     Clinical Pain Assessment (nonverbal scale for nonverbal patients):   [++++ Clinical Pain Assessment++++]  [++++Pain Severity++++]: Pain: 5  [++++Pain Character++++]: cramping  [++++Pain Duration++++]: month  [++++Pain Effect++++]: functional and emotional  [++++Pain Factors++++]: none in particular  [++++Pain Frequency++++]: constant  [++++Pain Location++++]: upper abdomen  [++++ Clinical Pain Assessment++++]       FUNCTIONAL ASSESSMENT:     Palliative Performance Scale (PPS):  PPS: 70       PSYCHOSOCIAL/SPIRITUAL SCREENING:     Any spiritual / Catholic concerns:  [] Yes /  [x] No    Caregiver Burnout:  [] Yes /  [x] No /  [] No Caregiver Present      Anticipatory grief assessment:   [x] Normal  / [] Maladaptive       ESAS Anxiety: Anxiety: 2    ESAS Depression: Depression: 1       REVIEW OF SYSTEMS:     The following systems were [x] reviewed / [] unable to be reviewed  Systems: constitutional, ears/nose/mouth/throat, respiratory, gastrointestinal, genitourinary, musculoskeletal, integumentary, neurologic, psychiatric, endocrine. Positive findings noted below. Modified ESAS Completed by: provider   Fatigue: 5 Drowsiness: 4   Depression: 1 Pain: 5   Anxiety: 2 Nausea: 0   Anorexia: 1 Dyspnea: 0   Best Well-Being: 3 Constipation: No   Other Problem (Comment): 0          PHYSICAL EXAM:     Wt Readings from Last 3 Encounters:   03/10/22 180 lb 6.4 oz (81.8 kg)   03/03/22 179 lb (81.2 kg)   02/22/22 179 lb 9.6 oz (81.5 kg)     There were no vitals taken for this visit. Last bowel movement: See Nursing Note    Constitutional    [x] Appears well-developed and well-nourished in no apparent distress    [] Abnormal:  Mental status  [x] Alert and awake  [x] Oriented to person/place/time  [x] Able to follow commands  [] Abnormal:   Eyes  [x] EOM normal   [x] Sclera normal   [x] No visible ocular discharge  [] Abnormal:   HENT  [x] Normocephalic, atraumatic  [x] Mouth/Throat: Moist mucous membranes   [x] External Ears normal  [] Abnormal:  Oral thrush resolved  Neck  [x] No visualized mass  [] Abnormal:  Pulmonary/Chest   [x] Respiratory effort normal  [x] No visualized signs of difficulty breathing or respiratory distress  [] Abnormal:  Musculoskeletal  [x] Normal gait with no signs of ataxia  [x] Normal range of motion of neck  [] Abnormal:  Neurological:   [x] No facial asymmetry (Cranial nerve 7 motor function)  [x] No gaze palsy  [] Abnormal:   Skin  [x] No significant exanthematous lesions or discoloration noted on facial skin  [] Abnormal:                                  Psychiatric  [x] Normal affect  [x] No hallucinations  [] Abnormal:    Abdomen-laparotomy scar appears good, sutures healing well    Other pertinent observable physical exam findings:    Due to this being a TeleHealth evaluation, many elements of the physical examination are unable to be assessed.                HISTORY:     Past Medical History:   Diagnosis Date    Adenocarcinoma of pancreas ([facility] 05/13/2020    Dr Edgardo Sterling biopsy via EUS    [facility]     GERD (gastroesophageal reflux disease)     Hernia of abdominal cavity     Subxyphoid hernia    Hypertension     Inflammatory polyarthropathy (HCC)     Joint pain     Joint swelling     Menopause     Ocular nevus of left eye     Pancreatitis     Tracheal stenosis       Past Surgical History:   Procedure Laterality Date    HX CARPAL TUNNEL RELEASE Bilateral     HX COLONOSCOPY      HX HYSTERECTOMY      HX KNEE ARTHROSCOPY Right     HX KNEE REPLACEMENT Right     partial    HX LAP CHOLECYSTECTOMY      HX TONSILLECTOMY      HX VASCULAR ACCESS        Family History   Problem Relation Age of Onset    Heart Disease Mother     Heart Attack Mother     Cancer Father         lung    Diabetes Sister       History reviewed, no pertinent family history. Social History     Tobacco Use    Smoking status: Never Smoker    Smokeless tobacco: Never Used   Substance Use Topics    Alcohol use: No     Alcohol/week: 0.0 standard drinks     No Known Allergies   Current Outpatient Medications   Medication Sig    HYDROmorphone (DILAUDID) 2 mg tablet Take 2 Tablets by mouth every four (4) hours as needed for Pain for up to 8 days. Max Daily Amount: 24 mg.    methylphenidate HCl (RITALIN) 10 mg tablet Take 1 Tablet by mouth three (3) times daily. Max Daily Amount: 30 mg.  LORazepam (ATIVAN) 1 mg tablet Take 1 Tablet by mouth every six (6) hours as needed for Anxiety. Max Daily Amount: 4 mg. Indications: nausea and vomiting caused by cancer drugs    DULoxetine (CYMBALTA) 30 mg capsule Take 3 Capsules by mouth daily. Indications: neuropathic pain    Toujeo SoloStar U-300 Insulin 300 unit/mL (1.5 mL) inpn pen INJECT 55 UNITS ONCE DAILY AT BEDTIME - DOSE INCREASED (Patient taking differently: 55 Units by SubCUTAneous route.  Patient uses differently during around chemo time.)    gabapentin (NEURONTIN) 300 mg capsule TAKE 2 CAPSULES BY MOUTH THREE TIMES DAILY    lipase-protease-amylase (Creon) 12,000-38,000 -60,000 unit capsule Take 2 Capsules by mouth three (3) times daily (with meals). 2 caps with meal and 1 with snacks  Indications: exocrine pancreatic insufficiency    lidocaine-prilocaine (EMLA) topical cream Apply  to affected area as needed for Pain (pain ). Apply a thin layer over port site prior to accessing port    nystatin (MYCOSTATIN) powder Apply 1 g to affected area three (3) times daily.  HumaLOG KwikPen Insulin 100 unit/mL kwikpen patient on sliding scale    famotidine (Pepcid) 20 mg tablet Take 20 mg by mouth two (2) times a day.  sennosides (Senna) 8.6 mg cap Take 1-2 Tablets by mouth two (2) times a day.  polyethylene glycol (Miralax) 17 gram/dose powder Take 17 g by mouth daily as needed.  promethazine (PHENERGAN) 25 mg tablet Take 1 Tab by mouth every six (6) hours as needed for Nausea.  ondansetron (ZOFRAN ODT) 4 mg disintegrating tablet Take 1 Tab by mouth every eight (8) hours as needed for Nausea or Vomiting.  diphenoxylate-atropine (LomotiL) 2.5-0.025 mg per tablet Take 2 Tabs by mouth four (4) times daily as needed for Diarrhea. Max Daily Amount: 8 Tabs.  acetaminophen (Acetaminophen Extra Strength) 500 mg tablet Take 1,000 mg by mouth daily as needed.  levoFLOXacin (LEVAQUIN) 500 mg tablet Take 1 Tablet by mouth daily. (Patient not taking: Reported on 3/10/2022)     No current facility-administered medications for this visit.           LAB DATA REVIEWED:     Lab Results   Component Value Date/Time    WBC 8.9 03/03/2022 09:03 AM    HGB 9.9 (L) 03/03/2022 09:03 AM    PLATELET 437 (H) 15/22/9686 09:03 AM     Lab Results   Component Value Date/Time    Sodium 135 (L) 03/03/2022 09:03 AM    Potassium 4.1 03/03/2022 09:03 AM    Chloride 102 03/03/2022 09:03 AM    CO2 28 03/03/2022 09:03 AM    BUN 10 03/03/2022 09:03 AM    Creatinine 0.73 03/03/2022 09:03 AM    Calcium 9.2 03/03/2022 09:03 AM    Magnesium 1.8 04/07/2021 11:05 AM    Phosphorus 3.0 07/15/2020 05:35 AM      Lab Results   Component Value Date/Time    Alk. phosphatase 99 03/03/2022 09:03 AM    Protein, total 6.7 03/03/2022 09:03 AM    Albumin 2.9 (L) 03/03/2022 09:03 AM    Globulin 3.8 03/03/2022 09:03 AM     No results found for: INR, PTMR, PTP, PT1, PT2, APTT, INREXT, INREXT   Lab Results   Component Value Date/Time    Iron 228 (H) 08/04/2021 09:40 AM    TIBC 327 08/04/2021 09:40 AM    Iron % saturation 70 (H) 08/04/2021 09:40 AM    Ferritin 199 08/04/2021 09:40 AM           CONTROLLED SUBSTANCES SAFETY ASSESSMENT (IF ON CONTROLLED SUBSTANCES):     Reviewed opioid safety handout:  [x] Yes   [] No  24 hour opioid dose >150mg morphine equivalent/day:  [] Yes   [x] No  Benzodiazepines:  [] Yes   [x] No  Sleep apnea:  [] Yes   [x] No  Urine Toxicology Testing within last 6 months:  [] Yes   [x] No  History of or new aberrant medication taking behaviors:  [] Yes   [x] No  Has Narcan been prescribed [x] Yes   [] No          Total time: 60 minutes  Counseling / coordination time: 40 minutes  > 50% counseling / coordination?:  Yes    Consent:  He and/or health care decision maker is aware that that he may receive a bill for this telehealth service, depending on his insurance coverage, and has provided verbal consent to proceed: Yes    CPT Codes 20631-33380 for Established Patients may apply to this Telehealth Visit  Pursuant to the emergency declaration under the [facility], [facility] waiver authority and the [employer] and [employer] Act, this Virtual  Visit was conducted, with patient's consent, to reduce the patient's risk of exposure to COVID-19 and provide continuity of care for an established patient. Services were provided through a video synchronous discussion virtually to substitute for in-person clinic visit.

## 2022-03-10 NOTE — PROGRESS NOTES
Reason for Visit:   Mela boyce 82 L. o. female who is seen for pancreatic adenocarcinoma     Treatment History:   Dx: Pancreatic Adenocarcinoma--May 2020--[id number]--[id number] of splenic vein and superior mesenteric vein  Tx: mFOLFIRINOX--first cycle 5/26/2020, second cycle 6/10/2020, dose reduced 15% cycle 3 on 6/30/2020--delayed due to severe side effects--switched to Gemcitabine/Abraxane--Cycle #1 7/29/2002, Cycle #2 9/2/2020, Cycle #3 10/7/2020, Cycle #4 11/4/2020. Neoadjuvant Radiation with Dr Chelsey Odom ending 12/18/2021. Distal Pancreatectomy, Splenectomy, and Civa Sheet with Dr Sarajane Eisenmenger on 3/17/2021. Adjuvant radiation with Dr Chelsey Odom.   Restart Gemcitabine/abraxane Cycle #1 7/6/2021, Cycle #2 8/3/2021, Cycle #3 8/31/2021, Cycle #4 9/28/2021  Goal: Prolong life--Palliative  History of Present Illness:       Past Medical History:   Diagnosis Date    Adenocarcinoma of pancreas ([facility] [address] 05/13/2020    Dr Flores Filler biopsy via EUS    Diabetes ([facility] [address]     GERD (gastroesophageal reflux disease)     Hernia of abdominal cavity     Subxyphoid hernia    Hypertension     Inflammatory polyarthropathy (HCC)     Joint pain     Joint swelling     Menopause     Ocular nevus of left eye     Pancreatitis     Tracheal stenosis       Past Surgical History:   Procedure Laterality Date    HX CARPAL TUNNEL RELEASE Bilateral     HX COLONOSCOPY      HX HYSTERECTOMY      HX KNEE ARTHROSCOPY Right     HX KNEE REPLACEMENT Right     partial    HX LAP CHOLECYSTECTOMY      HX TONSILLECTOMY      HX VASCULAR ACCESS        Social History     Tobacco Use    Smoking status: Never Smoker    Smokeless tobacco: Never Used   Substance Use Topics    Alcohol use: No     Alcohol/week: 0.0 standard drinks      Family History   Problem Relation Age of Onset    Heart Disease Mother     Heart Attack Mother     Cancer Father         lung    Diabetes Sister      Current Outpatient Medications   Medication Sig    HYDROmorphone (DILAUDID) 2 mg tablet Take 2 Tablets by mouth every four (4) hours as needed for Pain for up to 8 days. Max Daily Amount: 24 mg.    methylphenidate HCl (RITALIN) 10 mg tablet Take 1 Tablet by mouth three (3) times daily. Max Daily Amount: 30 mg.  LORazepam (ATIVAN) 1 mg tablet Take 1 Tablet by mouth every six (6) hours as needed for Anxiety. Max Daily Amount: 4 mg. Indications: nausea and vomiting caused by cancer drugs    DULoxetine (CYMBALTA) 30 mg capsule Take 3 Capsules by mouth daily. Indications: neuropathic pain    Toujeo SoloStar U-300 Insulin 300 unit/mL (1.5 mL) inpn pen INJECT 55 UNITS ONCE DAILY AT BEDTIME - DOSE INCREASED (Patient taking differently: 55 Units by SubCUTAneous route. Patient uses differently during around chemo time.)    gabapentin (NEURONTIN) 300 mg capsule TAKE 2 CAPSULES BY MOUTH THREE TIMES DAILY    lipase-protease-amylase (Creon) 12,000-38,000 -60,000 unit capsule Take 2 Capsules by mouth three (3) times daily (with meals). 2 caps with meal and 1 with snacks  Indications: exocrine pancreatic insufficiency    nystatin (MYCOSTATIN) powder Apply 1 g to affected area three (3) times daily.  HumaLOG KwikPen Insulin 100 unit/mL kwikpen patient on sliding scale    famotidine (Pepcid) 20 mg tablet Take 20 mg by mouth two (2) times a day.  sennosides (Senna) 8.6 mg cap Take 1-2 Tablets by mouth two (2) times a day.  promethazine (PHENERGAN) 25 mg tablet Take 1 Tab by mouth every six (6) hours as needed for Nausea.  ondansetron (ZOFRAN ODT) 4 mg disintegrating tablet Take 1 Tab by mouth every eight (8) hours as needed for Nausea or Vomiting.  diphenoxylate-atropine (LomotiL) 2.5-0.025 mg per tablet Take 2 Tabs by mouth four (4) times daily as needed for Diarrhea. Max Daily Amount: 8 Tabs.  acetaminophen (Acetaminophen Extra Strength) 500 mg tablet Take 1,000 mg by mouth daily as needed.  levoFLOXacin (LEVAQUIN) 500 mg tablet Take 1 Tablet by mouth daily. (Patient not taking: Reported on 3/10/2022)    lidocaine-prilocaine (EMLA) topical cream Apply  to affected area as needed for Pain (pain ). Apply a thin layer over port site prior to accessing port    polyethylene glycol (Miralax) 17 gram/dose powder Take 17 g by mouth daily as needed. No current facility-administered medications for this visit. No Known Allergies     Review of Systems: A complete review of systems was obtained, negative except as described above. Physical Exam:     Visit Vitals  /63   Pulse 99   Temp 98.8 °F (37.1 °C) (Oral)   Resp 16   Ht 5' 4\" (1.626 m)   Wt 180 lb 6.4 oz (81.8 kg)   SpO2 94%   BMI 30.97 kg/m²     ECOG PS:   General: No distress  Eyes: PERRLA, anicteric sclerae  HENT: Atraumatic, OP clear  Neck: Supple  Lymphatic: No cervical, supraclavicular, or inguinal adenopathy  Respiratory: CTAB, normal respiratory effort  CV: Normal rate, regular rhythm, no murmurs, no peripheral edema  GI: Soft, nontender, nondistended, no masses, no hepatomegaly, no splenomegaly  MS: Normal gait and station. Digits without clubbing or cyanosis. Skin: No rashes, ecchymoses, or petechiae. Normal temperature, turgor, and texture. Psych: Alert, oriented, appropriate affect, normal judgment/insight    Results:     Lab Results   Component Value Date/Time    WBC 8.9 03/03/2022 09:03 AM    HGB 9.9 (L) 03/03/2022 09:03 AM    HCT 31.1 (L) 03/03/2022 09:03 AM    PLATELET 853 (H) 20/98/4715 09:03 AM    MCV 90.9 03/03/2022 09:03 AM    ABS.  NEUTROPHILS 5.4 03/03/2022 09:03 AM     Lab Results   Component Value Date/Time    Sodium 135 (L) 03/03/2022 09:03 AM    Potassium 4.1 03/03/2022 09:03 AM    Chloride 102 03/03/2022 09:03 AM    CO2 28 03/03/2022 09:03 AM    Glucose 203 (H) 03/03/2022 09:03 AM    BUN 10 03/03/2022 09:03 AM    Creatinine 0.73 03/03/2022 09:03 AM    GFR est AA >60 03/03/2022 09:03 AM    GFR est non-AA >60 03/03/2022 09:03 AM    Calcium 9.2 03/03/2022 09:03 AM    Glucose (POC) 192 (H) 07/15/2020 11:56 AM    Creatinine (POC) 0.7 02/06/2013 10:21 AM     Lab Results   Component Value Date/Time    Bilirubin, total 0.3 03/03/2022 09:03 AM    ALT (SGPT) 32 03/03/2022 09:03 AM    Alk. phosphatase 99 03/03/2022 09:03 AM    Protein, total 6.7 03/03/2022 09:03 AM    Albumin 2.9 (L) 03/03/2022 09:03 AM    Globulin 3.8 03/03/2022 09:03 AM       CT A/P 4/30/2020  IMPRESSION: Suspect neoplastic pancreatic mass versus atypical focal  pancreatitis with splenic and superior mesenteric vein occlusion. Consider  further evaluation with endoscopic ultrasound at which time biopsy could  potentially be performed.     CT Chest 5/21/2020  IMPRESSION:  1. No evidence of pulmonary metastatic disease. No evidence of mediastinal or  hilar lymphadenopathy. No pleural effusions identified. 2. No definite evidence of central pulmonary embolic disease. 3. Presence of a mass lesion involving the pancreatic head/body. 4. Evidence of fatty infiltration involving the liver. Presence of focal  low-density areas within the liver compatible with cysts. Surgical absence of  the gallbladder.     CT A/P 6/19/2020  IMPRESSION:  Pancreatitis with ascites favored. Discrete pancreatic mass is not identified. No biliary dilatation or definite arterial or venous thrombosis. No liver  metastases. Fat-containing ventral hernia  Nonobstructing left renal calculus  Retrolisthesis of L2 and L3.     CT C/A/P 10/12/2020  IMPRESSION:  1. Interval improvement in appearance of the pancreas (see discussion above). 2. Normal sized liver. Stable appearance of the previously described areas of  decreased attenuation within liver. 3. Surgical absence of the gallbladder. 4. Evidence of splenomegaly. 5. Normal sized kidneys. Presence of small, nonobstructing calculi within the  left kidney. No evidence of hydronephrotic change. 6. Suboptimal distention of the urinary bladder.   7. Presence of a small, fat-containing ventral hernia in the mid abdominal  Region. CT C/A/P 9/17/2021  IMPRESSION  New peritoneal implants identified in the anterior pelvis  Interval splenectomy and repair of the ventral hernia  Interval thickening of the GE junction and circumferential thickening of the  pylorus. This may represent gastritis but correlation with endoscopy or upper GI  recommended     Path: 5/8/2020  CYTOLOGIC INTERPRETATION:   Pancreas, EUS-guided fine needle aspiration and cell blocks: Adenocarcinoma     Path: 3/17/2021  No residual malignancy in pancrease, 0/29 lymph nodes. 2-3mm focus metastatic adenocarcinoma within hernia sac     Assessment:   1) Pancreatic Adenocarcinoma--Unresectable  2) Neoplasm Pain/Neuropathic Pain  3) Fatigue  4) Nausea  5) DM  6) Nutrition  7) Diarrhea  8) Hypotension  9) Abd Pain  10) Left Hydronephrosis  11) Anxiety/Insomnia     Plan:   1) Completed neoadjuvant chemotherapy. Radiation with Dr Renata Hernández. Definitive resection with Dr Lalitha Betts focus of adenocarcinoma in ventral hernia. Now peritoneal disease/mets. She is doing well with palliative chemotherapy with Gemcitaine/Abraxane. Markers are dropping--repeat.    2) S/p celiac block--pain in abd controlled. Having increased neuropathy from chemotherapy-- continue Cymbalta to 90mg. Will also increase the hydromorphone from 2mg prn to 3-4mg prn--can use at bedtime and 3-4 times daily if needed. Watch for OIC. Need her up and around so if opiate required to accomplish then ok.      3) Methylphenidate 5mg bid--has helped but not helping as much, will increase to 10mg bid-tid depending on energy levels.       4) Resolved.    5) Defer to Dr Eduardo Jaime for further management.       6) On pancreatic enzymes--weight has stabilized.    7) Chemo related--hopefully with not recurr.  Taking senna/miralax to prevent constipation. 8) Resolved currently     9) Treating for possible intraabdominal infection with cipro/metronidazole.   Scans show stable disease in abd.      10) Renal intact--had significant diuresis after passing stone.   Likely resovled      11) Taken lorazepam at bedtime--ok to increase but be cautious as we are increasing the opiate--combination of benzo/opiate can increase risk of respiratory depression    Signed By: Vidhi Luna MD

## 2022-03-10 NOTE — PATIENT INSTRUCTIONS
Tova Patiño    It was a pleasure to see you for a virtual visit today. Below is the plan we discussed.      -Cancer:  -You restarted chemotherapy \. Currently you are taking it once weekly on Tuesday for three weeks followed by one week off. You are finishing your 3rd cycle.  -You have lost your hair since starting chemotherapy.  -The dexamethasone makes you feel a bit anxious/hyper. Sometimes you can feel sad. You also have low energy for a couple days after chemotherapy.  -Sometimes you get a rash on your arms--you use pepcid, benadryl, and hydrocortisone cream for this with improvement.     -Back and lower abdominal pain pain  -Your pain is pretty well controlled with your current regimen. You have this mild tolerable uncomfortable feeling.  -You are taking dilaudid 1 mg in the morning and 1 mg in the afternoon at 4 PM. This is working well for you.  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     -Insomnia  -You have been taking Ativan 0.5 mg at bedtime which is very helpful and so you want to continue the same.  -You also take benadryl at times at night  -You still can sometimes wake a few times at night. You don't think it is because of pain, though you note that your right arm pain can bother you at times.  -You said that tylenol does seem to help, so you can continue to do that. You can also try your as needed dilaudid if the tylenol is not helpful.    -Fatigue  -Of note, you also take methylphenidate 5 mg twice daily as prescribed by Dr. Ernie Silva. We will continue to monitor to see if this may need to be adjusted (if the afternoon dose is impacting your sleep).    -Diarrhea   -You are on Creon. You do still sometimes have diarrhea.      -Uncontrolled diabetes  -Your Hemoglobin A1c was greater than 9 in June. You are working with your PCP on insulin doses. You are working to adjust your insulin doses when you take your chemotherapy and dexamethasone.  Uncontrolled diabetes can also exacerbate neuropathy. -Appetite  -You don't have too much of an appetite (worse on chemo weeks), but you try to make yourself eat a good dinner.     -Chemotherapy-induced neuropathy  -It is the worst on your feet and your right hand fingertips. You will drop things at times.  -Gabapentin 600 mg 3 times a day, total 1800 mg/day. This is also being prescribed by Dr. Phylicia Chang.  -Cymbalta was recently increased to 30 mg 2 times a day. This was a couple weeks ago after discussing with Dr. Phylicia Chang. You do think it seems to be helping some.  -You have some gait imbalance from severe neuropathy. We talked about physical therapy being helpful but you do not want to have home or outpatient physical therapy at this time. You will look at YouTube videos for gait and try them out first.    -Mood  -You think you are having more good days than bad and that overall you are doing very well. You can feel down at times during your chemo weeks. -You have an awesome support system: , daughter and her family. We will see you back in 3 months virtually.

## 2022-03-10 NOTE — PROGRESS NOTES
Jonel Patricia is a 77 y.o. female here for follow up of Pancreatic cancer. Patient c/o pain in L hip and Lower back, rates pain 4/10. Patient is taking Dilaudid 1.5mg Three times daily. Key Oncology Meds             ondansetron (ZOFRAN ODT) 4 mg disintegrating tablet Take 1 Tab by mouth every eight (8) hours as needed for Nausea or Vomiting. Key Pain Meds             acetaminophen (Acetaminophen Extra Strength) 500 mg tablet Take 1,000 mg by mouth daily as needed.         Chemotherapy Flowsheet 2/22/2022   Cycle C12 D15   Date 2/22/2022   Drug / Regimen Abraxane/Gemcitabine   Dosage 184 mg/ 1656 mg   Time Up 1023   Time Down 1152   Pre Hydration -   Pre Meds zofran 8 mg IV, decadron 10 mg iv   Notes -

## 2022-03-11 ENCOUNTER — HOSPITAL ENCOUNTER (OUTPATIENT)
Dept: INFUSION THERAPY | Age: 67
Discharge: HOME | End: 2022-03-11
Payer: MEDICARE

## 2022-03-11 LAB
ALBUMIN SERPL-MCNC: 3 G/DL (ref 3.5–5)
ALBUMIN/GLOB SERPL: 0.7 {RATIO} (ref 1.1–2.2)
ALP SERPL-CCNC: 114 U/L (ref 45–117)
ALT SERPL-CCNC: 28 U/L (ref 12–78)
ANION GAP SERPL CALC-SCNC: 11 MMOL/L (ref 5–15)
AST SERPL-CCNC: 22 U/L (ref 15–37)
BASOPHILS # BLD: 0.1 K/UL (ref 0–0.1)
BASOPHILS NFR BLD: 1 % (ref 0–1)
BILIRUB SERPL-MCNC: 0.2 MG/DL (ref 0.2–1)
BUN SERPL-MCNC: 8 MG/DL (ref 6–20)
BUN/CREAT SERPL: 11 (ref 12–20)
CALCIUM SERPL-MCNC: 9.6 MG/DL (ref 8.5–10.1)
CHLORIDE SERPL-SCNC: 99 MMOL/L (ref 97–108)
CO2 SERPL-SCNC: 28 MMOL/L (ref 21–32)
CREAT SERPL-MCNC: 0.76 MG/DL (ref 0.55–1.02)
DIFFERENTIAL METHOD BLD: ABNORMAL
EOSINOPHIL # BLD: 0.4 K/UL (ref 0–0.4)
EOSINOPHIL NFR BLD: 3 % (ref 0–7)
ERYTHROCYTE [DISTWIDTH] IN BLOOD BY AUTOMATED COUNT: 17.1 % (ref 11.5–14.5)
GLOBULIN SER CALC-MCNC: 4.3 G/DL (ref 2–4)
GLUCOSE SERPL-MCNC: 203 MG/DL (ref 65–100)
HCT VFR BLD AUTO: 32.9 % (ref 35–47)
HGB BLD-MCNC: 10.4 G/DL (ref 11.5–16)
IMM GRANULOCYTES # BLD AUTO: 0.1 K/UL (ref 0–0.04)
IMM GRANULOCYTES NFR BLD AUTO: 1 % (ref 0–0.5)
LYMPHOCYTES # BLD: 2.1 K/UL (ref 0.8–3.5)
LYMPHOCYTES NFR BLD: 17 % (ref 12–49)
MCH RBC QN AUTO: 28.2 PG (ref 26–34)
MCHC RBC AUTO-ENTMCNC: 31.6 G/DL (ref 30–36.5)
MCV RBC AUTO: 89.2 FL (ref 80–99)
MONOCYTES # BLD: 3.1 K/UL (ref 0–1)
MONOCYTES NFR BLD: 25 % (ref 5–13)
NEUTS SEG # BLD: 6.7 K/UL (ref 1.8–8)
NEUTS SEG NFR BLD: 53 % (ref 32–75)
NRBC # BLD: 0.02 K/UL (ref 0–0.01)
NRBC BLD-RTO: 0.2 PER 100 WBC
PLATELET # BLD AUTO: 1100 K/UL (ref 150–400)
PLATELET COMMENTS,PCOM: ABNORMAL
PMV BLD AUTO: 9.4 FL (ref 8.9–12.9)
POTASSIUM SERPL-SCNC: 4.1 MMOL/L (ref 3.5–5.1)
PROT SERPL-MCNC: 7.3 G/DL (ref 6.4–8.2)
RBC # BLD AUTO: 3.69 M/UL (ref 3.8–5.2)
RBC MORPH BLD: ABNORMAL
SODIUM SERPL-SCNC: 138 MMOL/L (ref 136–145)
WBC # BLD AUTO: 12.5 K/UL (ref 3.6–11)

## 2022-03-11 PROCEDURE — 85025 COMPLETE CBC W/AUTO DIFF WBC: CPT

## 2022-03-11 PROCEDURE — 36415 COLL VENOUS BLD VENIPUNCTURE: CPT

## 2022-03-11 PROCEDURE — 86301 IMMUNOASSAY TUMOR CA 19-9: CPT

## 2022-03-11 PROCEDURE — 80053 COMPREHEN METABOLIC PANEL: CPT

## 2022-03-12 LAB — CANCER AG19-9 SERPL-ACNC: 66 U/ML (ref 0–35)

## 2022-03-13 ENCOUNTER — TELEPHONE (OUTPATIENT)
Dept: FAMILY MEDICINE CLINIC | Age: 67
End: 2022-03-13

## 2022-03-14 ENCOUNTER — PATIENT MESSAGE (OUTPATIENT)
Dept: FAMILY MEDICINE CLINIC | Age: 67
End: 2022-03-14

## 2022-03-14 ENCOUNTER — HOSPITAL ENCOUNTER (OUTPATIENT)
Dept: CT IMAGING | Age: 67
Discharge: HOME | End: 2022-03-14
Attending: INTERNAL MEDICINE
Payer: MEDICARE

## 2022-03-14 DIAGNOSIS — C77.2 PANCREATIC CANCER METASTASIZED TO INTRA-ABDOMINAL LYMPH NODE (HCC): ICD-10-CM

## 2022-03-14 DIAGNOSIS — C25.9 PANCREATIC CANCER METASTASIZED TO INTRA-ABDOMINAL LYMPH NODE (HCC): ICD-10-CM

## 2022-03-14 PROCEDURE — 74011000250 HC RX REV CODE- 250: Performed by: INTERNAL MEDICINE

## 2022-03-14 PROCEDURE — 74177 CT ABD & PELVIS W/CONTRAST: CPT

## 2022-03-14 PROCEDURE — 74011000636 HC RX REV CODE- 636: Performed by: INTERNAL MEDICINE

## 2022-03-14 RX ORDER — BARIUM SULFATE 20 MG/ML
450 SUSPENSION ORAL
Status: COMPLETED | OUTPATIENT
Start: 2022-03-14 | End: 2022-03-14

## 2022-03-14 RX ORDER — PEN NEEDLE, DIABETIC 31 GX3/16"
1 NEEDLE, DISPOSABLE MISCELLANEOUS 3 TIMES DAILY
Qty: 100 PEN NEEDLE | Refills: 5 | Status: ON HOLD | OUTPATIENT
Start: 2022-03-14 | End: 2022-07-23

## 2022-03-14 RX ADMIN — IOPAMIDOL 100 ML: 612 INJECTION, SOLUTION INTRAVENOUS at 10:30

## 2022-03-14 RX ADMIN — BARIUM SULFATE 450 ML: 20 SUSPENSION ORAL at 09:00

## 2022-03-14 NOTE — TELEPHONE ENCOUNTER
Platelet count is 1.1 million. After reviewing her chart over the last few weeks of chemotherapy this looks like a rebound phenomenon.   I spoke with Dr. Ana Paula Garcia regarding this over the weekend and we will check the CBC this week

## 2022-03-15 ENCOUNTER — HOSPITAL ENCOUNTER (OUTPATIENT)
Dept: INFUSION THERAPY | Age: 67
Discharge: HOME | End: 2022-03-15
Payer: MEDICARE

## 2022-03-15 VITALS
WEIGHT: 179.01 LBS | SYSTOLIC BLOOD PRESSURE: 181 MMHG | BODY MASS INDEX: 30.56 KG/M2 | RESPIRATION RATE: 18 BRPM | HEART RATE: 99 BPM | HEIGHT: 64 IN | OXYGEN SATURATION: 94 % | TEMPERATURE: 98.5 F | DIASTOLIC BLOOD PRESSURE: 85 MMHG

## 2022-03-15 DIAGNOSIS — C25.9 ADENOCARCINOMA OF PANCREAS (HCC): Primary | ICD-10-CM

## 2022-03-15 LAB
ALBUMIN SERPL-MCNC: 2.8 G/DL (ref 3.5–5)
ALBUMIN/GLOB SERPL: 0.8 {RATIO} (ref 1.1–2.2)
ALP SERPL-CCNC: 100 U/L (ref 45–117)
ALT SERPL-CCNC: 22 U/L (ref 12–78)
ANION GAP SERPL CALC-SCNC: 8 MMOL/L (ref 5–15)
AST SERPL-CCNC: 24 U/L (ref 15–37)
BASOPHILS # BLD: 0.1 K/UL (ref 0–0.1)
BASOPHILS NFR BLD: 1 % (ref 0–1)
BILIRUB SERPL-MCNC: 0.3 MG/DL (ref 0.2–1)
BUN SERPL-MCNC: 14 MG/DL (ref 6–20)
BUN/CREAT SERPL: 18 (ref 12–20)
CALCIUM SERPL-MCNC: 9 MG/DL (ref 8.5–10.1)
CHLORIDE SERPL-SCNC: 101 MMOL/L (ref 97–108)
CO2 SERPL-SCNC: 30 MMOL/L (ref 21–32)
CREAT SERPL-MCNC: 0.77 MG/DL (ref 0.55–1.02)
DIFFERENTIAL METHOD BLD: ABNORMAL
EOSINOPHIL # BLD: 0.3 K/UL (ref 0–0.4)
EOSINOPHIL NFR BLD: 3 % (ref 0–7)
ERYTHROCYTE [DISTWIDTH] IN BLOOD BY AUTOMATED COUNT: 17.1 % (ref 11.5–14.5)
GLOBULIN SER CALC-MCNC: 3.7 G/DL (ref 2–4)
GLUCOSE SERPL-MCNC: 220 MG/DL (ref 65–100)
HCT VFR BLD AUTO: 33 % (ref 35–47)
HGB BLD-MCNC: 10.4 G/DL (ref 11.5–16)
IMM GRANULOCYTES # BLD AUTO: 0 K/UL (ref 0–0.04)
IMM GRANULOCYTES NFR BLD AUTO: 0 % (ref 0–0.5)
LYMPHOCYTES # BLD: 1.7 K/UL (ref 0.8–3.5)
LYMPHOCYTES NFR BLD: 16 % (ref 12–49)
MCH RBC QN AUTO: 28.4 PG (ref 26–34)
MCHC RBC AUTO-ENTMCNC: 31.5 G/DL (ref 30–36.5)
MCV RBC AUTO: 90.2 FL (ref 80–99)
MONOCYTES # BLD: 2.1 K/UL (ref 0–1)
MONOCYTES NFR BLD: 20 % (ref 5–13)
NEUTS SEG # BLD: 6.2 K/UL (ref 1.8–8)
NEUTS SEG NFR BLD: 60 % (ref 32–75)
NRBC # BLD: 0 K/UL (ref 0–0.01)
NRBC BLD-RTO: 0 PER 100 WBC
PLATELET # BLD AUTO: 895 K/UL (ref 150–400)
PLATELET COMMENTS,PCOM: ABNORMAL
PMV BLD AUTO: 9.4 FL (ref 8.9–12.9)
POTASSIUM SERPL-SCNC: 4.2 MMOL/L (ref 3.5–5.1)
PROT SERPL-MCNC: 6.5 G/DL (ref 6.4–8.2)
RBC # BLD AUTO: 3.66 M/UL (ref 3.8–5.2)
RBC MORPH BLD: ABNORMAL
SODIUM SERPL-SCNC: 139 MMOL/L (ref 136–145)
WBC # BLD AUTO: 10.4 K/UL (ref 3.6–11)

## 2022-03-15 PROCEDURE — 96375 TX/PRO/DX INJ NEW DRUG ADDON: CPT

## 2022-03-15 PROCEDURE — 36415 COLL VENOUS BLD VENIPUNCTURE: CPT

## 2022-03-15 PROCEDURE — 80053 COMPREHEN METABOLIC PANEL: CPT

## 2022-03-15 PROCEDURE — 74011000258 HC RX REV CODE- 258: Performed by: INTERNAL MEDICINE

## 2022-03-15 PROCEDURE — 85025 COMPLETE CBC W/AUTO DIFF WBC: CPT

## 2022-03-15 PROCEDURE — 96417 CHEMO IV INFUS EACH ADDL SEQ: CPT

## 2022-03-15 PROCEDURE — 96413 CHEMO IV INFUSION 1 HR: CPT

## 2022-03-15 PROCEDURE — 77030012965 HC NDL HUBR BBMI -A

## 2022-03-15 PROCEDURE — 74011250636 HC RX REV CODE- 250/636: Performed by: INTERNAL MEDICINE

## 2022-03-15 RX ORDER — SODIUM CHLORIDE 9 MG/ML
10 INJECTION INTRAMUSCULAR; INTRAVENOUS; SUBCUTANEOUS AS NEEDED
Status: ACTIVE | OUTPATIENT
Start: 2022-03-15 | End: 2022-03-15

## 2022-03-15 RX ORDER — SODIUM CHLORIDE 0.9 % (FLUSH) 0.9 %
10 SYRINGE (ML) INJECTION AS NEEDED
Status: ACTIVE | OUTPATIENT
Start: 2022-03-15 | End: 2022-03-15

## 2022-03-15 RX ORDER — ONDANSETRON 2 MG/ML
8 INJECTION INTRAMUSCULAR; INTRAVENOUS ONCE
Status: DISPENSED | OUTPATIENT
Start: 2022-03-15 | End: 2022-03-15

## 2022-03-15 RX ORDER — HEPARIN 100 UNIT/ML
300-500 SYRINGE INTRAVENOUS AS NEEDED
Status: DISPENSED | OUTPATIENT
Start: 2022-03-15 | End: 2022-03-15

## 2022-03-15 RX ORDER — DIPHENHYDRAMINE HYDROCHLORIDE 50 MG/ML
25 INJECTION, SOLUTION INTRAMUSCULAR; INTRAVENOUS AS NEEDED
Status: ACTIVE | OUTPATIENT
Start: 2022-03-15 | End: 2022-03-15

## 2022-03-15 RX ORDER — ACETAMINOPHEN 325 MG/1
650 TABLET ORAL AS NEEDED
Status: ACTIVE | OUTPATIENT
Start: 2022-03-15 | End: 2022-03-15

## 2022-03-15 RX ORDER — ONDANSETRON 2 MG/ML
8 INJECTION INTRAMUSCULAR; INTRAVENOUS AS NEEDED
Status: ACTIVE | OUTPATIENT
Start: 2022-03-15 | End: 2022-03-15

## 2022-03-15 RX ORDER — DEXAMETHASONE SODIUM PHOSPHATE 4 MG/ML
10 INJECTION, SOLUTION INTRA-ARTICULAR; INTRALESIONAL; INTRAMUSCULAR; INTRAVENOUS; SOFT TISSUE ONCE
Status: COMPLETED | OUTPATIENT
Start: 2022-03-15 | End: 2022-03-15

## 2022-03-15 RX ORDER — SODIUM CHLORIDE 9 MG/ML
25 INJECTION, SOLUTION INTRAVENOUS CONTINUOUS
Status: DISPENSED | OUTPATIENT
Start: 2022-03-15 | End: 2022-03-15

## 2022-03-15 RX ADMIN — Medication 500 UNITS: at 12:49

## 2022-03-15 RX ADMIN — PACLITAXEL 184 MG: 100 INJECTION, POWDER, LYOPHILIZED, FOR SUSPENSION INTRAVENOUS at 11:25

## 2022-03-15 RX ADMIN — SODIUM CHLORIDE 25 ML/HR: 9 INJECTION, SOLUTION INTRAVENOUS at 09:00

## 2022-03-15 RX ADMIN — DEXAMETHASONE SODIUM PHOSPHATE 10 MG: 4 INJECTION, SOLUTION INTRAMUSCULAR; INTRAVENOUS at 10:55

## 2022-03-15 RX ADMIN — ONDANSETRON 8 MG: 2 INJECTION INTRAMUSCULAR; INTRAVENOUS at 10:45

## 2022-03-15 RX ADMIN — GEMCITABINE HYDROCHLORIDE 1656 MG: 2 INJECTION, SOLUTION INTRAVENOUS at 12:00

## 2022-03-15 NOTE — PROGRESS NOTES
Problem: Chemotherapy Treatment  Goal: *Chemotherapy regimen followed  Outcome: Resolved/Met  Goal: *Hemodynamically stable  Outcome: Resolved/Met  Goal: *Tolerating diet  Outcome: Resolved/Met     Problem: Infection - Risk of, Central Venous Catheter-Associated Bloodstream Infection  Goal: *Absence of infection signs and symptoms  Outcome: Resolved/Met     Problem: Pain  Goal: *Control of Pain  Outcome: Resolved/Met     Problem: Anemia Care Plan (Adult and Pediatric)  Goal: *Tolerates increased activity  Outcome: Resolved/Met     Problem: Patient Education:  Go to Education Activity  Goal: Patient/Family Education  Outcome: Resolved/Met

## 2022-03-15 NOTE — DISCHARGE INSTRUCTIONS
[facility] Progress Note    Date: March 15, 2022    Name: Elizabeth Bell    MRN: [id number]         : 1955    Ms. Ba Arrived ambulatory and in no distress for cycle *** day *** of *** regimen. Assessment was completed, no acute issues at this time, no new complaints voiced. *** accessed without difficulty, labs drawn and in process. Chemotherapy Flowsheet 2022   Cycle C12 D15   Date 2022   Drug / Regimen Abraxane/Gemcitabine   Dosage 184 mg/ 1656 mg   Time Up 1023   Time Down 1152   Pre Hydration -   Pre Meds zofran 8 mg IV, decadron 10 mg iv   Notes -         ***:  Proceeded to appt with Dr. Janna Warner    Ms. Ba's vitals were reviewed. Visit Vitals  BP (!) 181/85 (BP 1 Location: Left upper arm, BP Patient Position: Sitting)   Pulse 99   Temp 98.5 °F (36.9 °C)   Resp 18   Ht 5' 4\" (1.626 m)   Wt 81.2 kg (179 lb 0.2 oz)   SpO2 94%   Breastfeeding No   BMI 30.73 kg/m²       Lab results were obtained and reviewed. Recent Results (from the past 12 hour(s))   CBC WITH AUTOMATED DIFF    Collection Time: 03/15/22  9:05 AM   Result Value Ref Range    WBC 10.4 3.6 - 11.0 K/uL    RBC 3.66 (L) 3.80 - 5.20 M/uL    HGB 10.4 (L) 11.5 - 16.0 g/dL    HCT 33.0 (L) 35.0 - 47.0 %    MCV 90.2 80.0 - 99.0 FL    MCH 28.4 26.0 - 34.0 PG    MCHC 31.5 30.0 - 36.5 g/dL    RDW 17.1 (H) 11.5 - 14.5 %    PLATELET 474 (H) 795 - 400 K/uL    MPV 9.4 8.9 - 12.9 FL    NRBC 0.0 0.0  WBC    ABSOLUTE NRBC 0.00 0.00 - 0.01 K/uL    NEUTROPHILS 60 32 - 75 %    LYMPHOCYTES 16 12 - 49 %    MONOCYTES 20 (H) 5 - 13 %    EOSINOPHILS 3 0 - 7 %    BASOPHILS 1 0 - 1 %    IMMATURE GRANULOCYTES 0 0.0 - 0.5 %    ABS. NEUTROPHILS 6.2 1.8 - 8.0 K/UL    ABS. LYMPHOCYTES 1.7 0.8 - 3.5 K/UL    ABS. MONOCYTES 2.1 (H) 0.0 - 1.0 K/UL    ABS. EOSINOPHILS 0.3 0.0 - 0.4 K/UL    ABS. BASOPHILS 0.1 0.0 - 0.1 K/UL    ABS. IMM.  GRANS. 0.0 0.00 - 0.04 K/UL    DF MANUAL      PLATELET COMMENTS Increased Platelets      RBC COMMENTS ANISOCYTOSIS  2+        RBC COMMENTS TARGET CELLS  1+        RBC COMMENTS POIKILOCYTOSIS  1+       METABOLIC PANEL, COMPREHENSIVE    Collection Time: 03/15/22  9:05 AM   Result Value Ref Range    Sodium 139 136 - 145 mmol/L    Potassium 4.2 3.5 - 5.1 mmol/L    Chloride 101 97 - 108 mmol/L    CO2 30 21 - 32 mmol/L    Anion gap 8 5 - 15 mmol/L    Glucose 220 (H) 65 - 100 mg/dL    BUN 14 6 - 20 MG/DL    Creatinine 0.77 0.55 - 1.02 MG/DL    BUN/Creatinine ratio 18 12 - 20      GFR est AA >60 >60 ml/min/1.73m2    GFR est non-AA >60 >60 ml/min/1.73m2    Calcium 9.0 8.5 - 10.1 MG/DL    Bilirubin, total 0.3 0.2 - 1.0 MG/DL    ALT (SGPT) 22 12 - 78 U/L    AST (SGOT) 24 15 - 37 U/L    Alk. phosphatase 100 45 - 117 U/L    Protein, total 6.5 6.4 - 8.2 g/dL    Albumin 2.8 (L) 3.5 - 5.0 g/dL    Globulin 3.7 2.0 - 4.0 g/dL    A-G Ratio 0.8 (L) 1.1 - 2.2         Pre-medications  were administered as ordered and chemotherapy was initiated. {med chemotherapy:505870}      Ms. Ba tolerated treatment well and was discharged from [place] [address] in stable condition at ***. She is to return on {MONTHS:19486} {Numbers; [id number]} at *** for her next appointment.     Kirk Vides, RN  March 15, 2022

## 2022-03-15 NOTE — PROGRESS NOTES
hospitals Progress Note    Date: March 15, 2022    Name: Luana Bernard    MRN: [id number]         : 1955    Ms. Ba Arrived ambulatory and in no distress for cycle 13  day 1 of chemo  regimen. Assessment was completed, no acute issues at this time, no new complaints voiced. VAD accessed without difficulty ; however unable to obtain blood return. Easily flushed . Dr Luna Jones notified . OK to use for treatment. Chemotherapy Flowsheet 2022   Cycle C12 D15   Date 2022   Drug / Regimen Abraxane/Gemcitabine   Dosage 184 mg/ 1656 mg   Time Up 1023   Time Down 1152   Pre Hydration -   Pre Meds zofran 8 mg IV, decadron 10 mg iv   Notes -       \    Ms. Ba's vitals were reviewed. Visit Vitals  BP (!) 181/85 (BP 1 Location: Left upper arm, BP Patient Position: Sitting)   Pulse 99   Temp 98.5 °F (36.9 °C)   Resp 18   Ht 5' 4\" (1.626 m)   Wt 81.2 kg (179 lb 0.2 oz)   SpO2 94%   Breastfeeding No   BMI 30.73 kg/m²       Lab results were obtained and reviewed. Recent Results (from the past 12 hour(s))   CBC WITH AUTOMATED DIFF    Collection Time: 03/15/22  9:05 AM   Result Value Ref Range    WBC 10.4 3.6 - 11.0 K/uL    RBC 3.66 (L) 3.80 - 5.20 M/uL    HGB 10.4 (L) 11.5 - 16.0 g/dL    HCT 33.0 (L) 35.0 - 47.0 %    MCV 90.2 80.0 - 99.0 FL    MCH 28.4 26.0 - 34.0 PG    MCHC 31.5 30.0 - 36.5 g/dL    RDW 17.1 (H) 11.5 - 14.5 %    PLATELET 271 (H) 592 - 400 K/uL    MPV 9.4 8.9 - 12.9 FL    NRBC 0.0 0.0  WBC    ABSOLUTE NRBC 0.00 0.00 - 0.01 K/uL    NEUTROPHILS 60 32 - 75 %    LYMPHOCYTES 16 12 - 49 %    MONOCYTES 20 (H) 5 - 13 %    EOSINOPHILS 3 0 - 7 %    BASOPHILS 1 0 - 1 %    IMMATURE GRANULOCYTES 0 0.0 - 0.5 %    ABS. NEUTROPHILS 6.2 1.8 - 8.0 K/UL    ABS. LYMPHOCYTES 1.7 0.8 - 3.5 K/UL    ABS. MONOCYTES 2.1 (H) 0.0 - 1.0 K/UL    ABS. EOSINOPHILS 0.3 0.0 - 0.4 K/UL    ABS. BASOPHILS 0.1 0.0 - 0.1 K/UL    ABS. IMM.  GRANS. 0.0 0.00 - 0.04 K/UL    DF MANUAL      PLATELET COMMENTS Increased Platelets      RBC COMMENTS ANISOCYTOSIS  2+        RBC COMMENTS TARGET CELLS  1+        RBC COMMENTS POIKILOCYTOSIS  1+       METABOLIC PANEL, COMPREHENSIVE    Collection Time: 03/15/22  9:05 AM   Result Value Ref Range    Sodium 139 136 - 145 mmol/L    Potassium 4.2 3.5 - 5.1 mmol/L    Chloride 101 97 - 108 mmol/L    CO2 30 21 - 32 mmol/L    Anion gap 8 5 - 15 mmol/L    Glucose 220 (H) 65 - 100 mg/dL    BUN 14 6 - 20 MG/DL    Creatinine 0.77 0.55 - 1.02 MG/DL    BUN/Creatinine ratio 18 12 - 20      GFR est AA >60 >60 ml/min/1.73m2    GFR est non-AA >60 >60 ml/min/1.73m2    Calcium 9.0 8.5 - 10.1 MG/DL    Bilirubin, total 0.3 0.2 - 1.0 MG/DL    ALT (SGPT) 22 12 - 78 U/L    AST (SGOT) 24 15 - 37 U/L    Alk. phosphatase 100 45 - 117 U/L    Protein, total 6.5 6.4 - 8.2 g/dL    Albumin 2.8 (L) 3.5 - 5.0 g/dL    Globulin 3.7 2.0 - 4.0 g/dL    A-G Ratio 0.8 (L) 1.1 - 2.2         Pre-medications of Zofran and decadron  were administered as ordered and chemotherapy was initiated. Abraxane was infused followed by Gemzar . At completion port flushed with NS and 5 ml Heparin. Needle removed from intact site. Ms. Adeline Osorio tolerated treatment well and was discharged from [place] [address] in stable condition at 1250 . She is to return on 3/22  for her next appointment.     Marlyn Thibodeaux RN  March 15, 2022

## 2022-03-19 PROBLEM — C25.9 ADENOCARCINOMA OF PANCREAS (HCC): Status: ACTIVE | Noted: 2020-05-13

## 2022-03-19 PROBLEM — T45.1X5A CHEMOTHERAPY INDUCED NAUSEA AND VOMITING: Status: ACTIVE | Noted: 2020-07-06

## 2022-03-19 PROBLEM — H25.13 AGE-RELATED NUCLEAR CATARACT OF BOTH EYES: Status: ACTIVE | Noted: 2018-06-04

## 2022-03-19 PROBLEM — D49.2 ATYPICAL SQUAMOPROLIFERATIVE SKIN LESION: Status: ACTIVE | Noted: 2020-06-01

## 2022-03-19 PROBLEM — R11.2 CHEMOTHERAPY INDUCED NAUSEA AND VOMITING: Status: ACTIVE | Noted: 2020-07-06

## 2022-03-19 PROBLEM — K46.9 ABDOMINAL HERNIA: Status: ACTIVE | Noted: 2020-11-05

## 2022-03-19 PROBLEM — G56.00 CARPAL TUNNEL SYNDROME: Status: ACTIVE | Noted: 2020-11-05

## 2022-03-19 PROBLEM — R11.2 INTRACTABLE NAUSEA AND VOMITING: Status: ACTIVE | Noted: 2020-07-06

## 2022-03-19 PROBLEM — E78.2 MIXED HYPERLIPIDEMIA: Status: ACTIVE | Noted: 2017-09-01

## 2022-03-19 PROBLEM — R19.7 DIARRHEA: Status: ACTIVE | Noted: 2020-07-06

## 2022-03-19 PROBLEM — Z09 POSTOPERATIVE EXAMINATION: Status: ACTIVE | Noted: 2020-06-01

## 2022-03-19 PROBLEM — D31.42 NEVUS OF IRIS OF LEFT EYE: Status: ACTIVE | Noted: 2018-06-04

## 2022-03-19 PROBLEM — E11.9 TYPE 2 DIABETES MELLITUS WITHOUT COMPLICATION, WITHOUT LONG-TERM CURRENT USE OF INSULIN (HCC): Status: ACTIVE | Noted: 2017-09-01

## 2022-03-19 PROBLEM — C25.0: Status: ACTIVE | Noted: 2020-07-06

## 2022-03-20 PROBLEM — E11.21 TYPE 2 DIABETES MELLITUS WITH NEPHROPATHY (HCC): Status: ACTIVE | Noted: 2017-12-22

## 2022-03-20 PROBLEM — H40.003 PREGLAUCOMA OF BOTH EYES: Status: ACTIVE | Noted: 2018-06-04

## 2022-03-20 PROBLEM — K86.81 EXOCRINE PANCREATIC INSUFFICIENCY: Status: ACTIVE | Noted: 2020-07-18

## 2022-03-22 ENCOUNTER — HOSPITAL ENCOUNTER (OUTPATIENT)
Dept: INFUSION THERAPY | Age: 67
Discharge: HOME | End: 2022-03-22
Payer: MEDICARE

## 2022-03-22 VITALS
BODY MASS INDEX: 30.26 KG/M2 | SYSTOLIC BLOOD PRESSURE: 165 MMHG | RESPIRATION RATE: 19 BRPM | HEIGHT: 64 IN | HEART RATE: 80 BPM | TEMPERATURE: 98.7 F | DIASTOLIC BLOOD PRESSURE: 81 MMHG | WEIGHT: 177.25 LBS

## 2022-03-22 DIAGNOSIS — C25.9 ADENOCARCINOMA OF PANCREAS (HCC): Primary | ICD-10-CM

## 2022-03-22 LAB
ALBUMIN SERPL-MCNC: 3 G/DL (ref 3.5–5)
ALBUMIN/GLOB SERPL: 0.9 {RATIO} (ref 1.1–2.2)
ALP SERPL-CCNC: 84 U/L (ref 45–117)
ALT SERPL-CCNC: 24 U/L (ref 12–78)
ANION GAP SERPL CALC-SCNC: 6 MMOL/L (ref 5–15)
AST SERPL-CCNC: 22 U/L (ref 15–37)
BASOPHILS # BLD: 0 K/UL (ref 0–0.1)
BASOPHILS NFR BLD: 1 % (ref 0–1)
BILIRUB SERPL-MCNC: 0.3 MG/DL (ref 0.2–1)
BUN SERPL-MCNC: 10 MG/DL (ref 6–20)
BUN/CREAT SERPL: 15 (ref 12–20)
CALCIUM SERPL-MCNC: 9 MG/DL (ref 8.5–10.1)
CHLORIDE SERPL-SCNC: 102 MMOL/L (ref 97–108)
CO2 SERPL-SCNC: 30 MMOL/L (ref 21–32)
CREAT SERPL-MCNC: 0.67 MG/DL (ref 0.55–1.02)
DIFFERENTIAL METHOD BLD: ABNORMAL
EOSINOPHIL # BLD: 0.2 K/UL (ref 0–0.4)
EOSINOPHIL NFR BLD: 5 % (ref 0–7)
ERYTHROCYTE [DISTWIDTH] IN BLOOD BY AUTOMATED COUNT: 17 % (ref 11.5–14.5)
GLOBULIN SER CALC-MCNC: 3.5 G/DL (ref 2–4)
GLUCOSE SERPL-MCNC: 198 MG/DL (ref 65–100)
HCT VFR BLD AUTO: 30.4 % (ref 35–47)
HGB BLD-MCNC: 9.4 G/DL (ref 11.5–16)
IMM GRANULOCYTES # BLD AUTO: 0 K/UL (ref 0–0.04)
IMM GRANULOCYTES NFR BLD AUTO: 0 % (ref 0–0.5)
LYMPHOCYTES # BLD: 1.8 K/UL (ref 0.8–3.5)
LYMPHOCYTES NFR BLD: 40 % (ref 12–49)
MCH RBC QN AUTO: 27.6 PG (ref 26–34)
MCHC RBC AUTO-ENTMCNC: 30.9 G/DL (ref 30–36.5)
MCV RBC AUTO: 89.4 FL (ref 80–99)
MONOCYTES # BLD: 0.9 K/UL (ref 0–1)
MONOCYTES NFR BLD: 22 % (ref 5–13)
NEUTS SEG # BLD: 1.4 K/UL (ref 1.8–8)
NEUTS SEG NFR BLD: 32 % (ref 32–75)
NRBC # BLD: 0.03 K/UL (ref 0–0.01)
NRBC BLD-RTO: 0.7 PER 100 WBC
PLATELET # BLD AUTO: 358 K/UL (ref 150–400)
PLATELET COMMENTS,PCOM: ABNORMAL
PMV BLD AUTO: 10.4 FL (ref 8.9–12.9)
POTASSIUM SERPL-SCNC: 3.9 MMOL/L (ref 3.5–5.1)
PROT SERPL-MCNC: 6.5 G/DL (ref 6.4–8.2)
RBC # BLD AUTO: 3.4 M/UL (ref 3.8–5.2)
RBC MORPH BLD: ABNORMAL
SODIUM SERPL-SCNC: 138 MMOL/L (ref 136–145)
WBC # BLD AUTO: 4.3 K/UL (ref 3.6–11)

## 2022-03-22 PROCEDURE — 85025 COMPLETE CBC W/AUTO DIFF WBC: CPT

## 2022-03-22 PROCEDURE — 80053 COMPREHEN METABOLIC PANEL: CPT

## 2022-03-22 PROCEDURE — 96375 TX/PRO/DX INJ NEW DRUG ADDON: CPT

## 2022-03-22 PROCEDURE — 96413 CHEMO IV INFUSION 1 HR: CPT

## 2022-03-22 PROCEDURE — 74011250636 HC RX REV CODE- 250/636: Performed by: INTERNAL MEDICINE

## 2022-03-22 PROCEDURE — 96367 TX/PROPH/DG ADDL SEQ IV INF: CPT

## 2022-03-22 PROCEDURE — 96417 CHEMO IV INFUS EACH ADDL SEQ: CPT

## 2022-03-22 PROCEDURE — 36415 COLL VENOUS BLD VENIPUNCTURE: CPT

## 2022-03-22 PROCEDURE — 74011000258 HC RX REV CODE- 258: Performed by: INTERNAL MEDICINE

## 2022-03-22 PROCEDURE — 74011000250 HC RX REV CODE- 250: Performed by: INTERNAL MEDICINE

## 2022-03-22 PROCEDURE — 77030012965 HC NDL HUBR BBMI -A

## 2022-03-22 RX ORDER — HEPARIN 100 UNIT/ML
300-500 SYRINGE INTRAVENOUS AS NEEDED
Status: DISPENSED | OUTPATIENT
Start: 2022-03-22 | End: 2022-03-22

## 2022-03-22 RX ORDER — SODIUM CHLORIDE 9 MG/ML
25 INJECTION, SOLUTION INTRAVENOUS CONTINUOUS
Status: DISPENSED | OUTPATIENT
Start: 2022-03-22 | End: 2022-03-22

## 2022-03-22 RX ORDER — ONDANSETRON 2 MG/ML
8 INJECTION INTRAMUSCULAR; INTRAVENOUS ONCE
Status: COMPLETED | OUTPATIENT
Start: 2022-03-22 | End: 2022-03-22

## 2022-03-22 RX ORDER — SODIUM CHLORIDE 9 MG/ML
10 INJECTION INTRAMUSCULAR; INTRAVENOUS; SUBCUTANEOUS AS NEEDED
Status: ACTIVE | OUTPATIENT
Start: 2022-03-22 | End: 2022-03-22

## 2022-03-22 RX ORDER — ONDANSETRON 2 MG/ML
8 INJECTION INTRAMUSCULAR; INTRAVENOUS AS NEEDED
Status: ACTIVE | OUTPATIENT
Start: 2022-03-22 | End: 2022-03-22

## 2022-03-22 RX ORDER — ACETAMINOPHEN 325 MG/1
650 TABLET ORAL AS NEEDED
Status: ACTIVE | OUTPATIENT
Start: 2022-03-22 | End: 2022-03-22

## 2022-03-22 RX ORDER — DIPHENHYDRAMINE HYDROCHLORIDE 50 MG/ML
25 INJECTION, SOLUTION INTRAMUSCULAR; INTRAVENOUS
Status: COMPLETED | OUTPATIENT
Start: 2022-03-22 | End: 2022-03-22

## 2022-03-22 RX ORDER — DIPHENHYDRAMINE HYDROCHLORIDE 50 MG/ML
25 INJECTION, SOLUTION INTRAMUSCULAR; INTRAVENOUS AS NEEDED
Status: ACTIVE | OUTPATIENT
Start: 2022-03-22 | End: 2022-03-22

## 2022-03-22 RX ORDER — SODIUM CHLORIDE 0.9 % (FLUSH) 0.9 %
10 SYRINGE (ML) INJECTION AS NEEDED
Status: ACTIVE | OUTPATIENT
Start: 2022-03-22 | End: 2022-03-22

## 2022-03-22 RX ADMIN — Medication 500 UNITS: at 12:27

## 2022-03-22 RX ADMIN — DIPHENHYDRAMINE HYDROCHLORIDE 25 MG: 50 INJECTION, SOLUTION INTRAMUSCULAR; INTRAVENOUS at 10:08

## 2022-03-22 RX ADMIN — PACLITAXEL 184 MG: 100 INJECTION, POWDER, LYOPHILIZED, FOR SUSPENSION INTRAVENOUS at 11:00

## 2022-03-22 RX ADMIN — SODIUM CHLORIDE 25 ML/HR: 9 INJECTION, SOLUTION INTRAVENOUS at 10:00

## 2022-03-22 RX ADMIN — SODIUM CHLORIDE, PRESERVATIVE FREE 10 ML: 5 INJECTION INTRAVENOUS at 12:27

## 2022-03-22 RX ADMIN — GEMCITABINE 1656 MG: 38 INJECTION, SOLUTION INTRAVENOUS at 11:53

## 2022-03-22 RX ADMIN — DEXAMETHASONE SODIUM PHOSPHATE 20 MG: 4 INJECTION INTRA-ARTICULAR; INTRALESIONAL; INTRAMUSCULAR; INTRAVENOUS; SOFT TISSUE at 10:15

## 2022-03-22 RX ADMIN — ONDANSETRON 8 MG: 2 INJECTION INTRAMUSCULAR; INTRAVENOUS at 10:05

## 2022-03-22 NOTE — PROGRESS NOTES
[facility] Progress Note    Date: 2022    Name: Ethel Hooper    MRN: [id number]         : 1955    Ms. Ba Arrived ambulatory and in no distress for cycle 13 day 8 of Abraxane/Gemcitabine regimen. Assessment was completed, pt reports breaking out in hives the evening after she had her last infusion. She reports first noticing itching and hives on her abdomen, then arms and chest and neck. Pt took PO Benadryl and used a topical cream, hives and itching resolved within a few hours. This RN spoke with Dr. Danny Huerta, Decadron increased to 20 mg, PRN Benadryl 25 mg. Port accessed without difficulty, labs drawn and in process. Chemotherapy Flowsheet 3/22/2022   Cycle C13 D8   Date 3/22/2022   Drug / Regimen Abraxane/Gemcitabine   Dosage 184 mg/ 1656 mg   Time Up 1100   Time Down 1227   Pre Hydration -   Pre Meds Zofran 8 mg, Decadron 20 mg, Benadryl 25 mg   Notes -     Ms. Ba's vitals were reviewed. Visit Vitals  BP (!) 165/81 (BP 1 Location: Left arm, BP Patient Position: Sitting)   Pulse 80   Temp 98.7 °F (37.1 °C)   Resp 19   Ht 5' 4\" (1.626 m)   Wt 80.4 kg (177 lb 4 oz)   BMI 30.42 kg/m²     Lab results were obtained and reviewed. Pre-medications  were administered as ordered and chemotherapy was initiated. 1000: NS initiated. 1005: Zofran 8 mg given IVP. 1008: Benadryl 25 mg given IVP. 1015: Decadron 20 mg given IVPB. 1100: Abraxane 184 mg initiated to infuse over 30 minutes. 1144: Abraxane infusion complete. NS flushing line. 1153: Gemzar 1,656 mg initiated to infuse over 30 minutes. 1227: Gemzar infusion complete. NS flushing line and stopped. Port flushed with  NS, brisk blood return and heparin. Needle removed and band-aid applied  to site without redness, swelling or pain. Ms. Lesia Rosas tolerated treatment well and was discharged from [place] [address] in stable condition at 1230. She is to return on  at 0830 for her next appointment.     Bruna RN  March 23, 2022

## 2022-03-22 NOTE — DISCHARGE INSTRUCTIONS
Patient Education   Gemcitabine (By injection)   Gemcitabine (hkb-EWP-[id number])  Used alone or in combination with other medicines to treat breast cancer, ovarian cancer, non-small cell lung cancer, and cancer of the pancreas. Brand Name(s): Gemcitabine Novaplus, Gemzar, PremierPro Rx Gemcitabine   There may be other brand names for this medicine. When This Medicine Should Not Be Used: You should not receive this medicine if you have had an allergic reaction to gemcitabine, or if you are pregnant. How to Use This Medicine:   Injectable  · Medicines used to treat cancer are very strong and can have many side effects. Before receiving this medicine, make sure you understand all the risks and benefits. It is important for you to work closely with your doctor during your treatment. · Your doctor will prescribe your dose and schedule. This medicine is given through a needle placed in a vein. · You will receive this medicine while you are in a hospital or cancer treatment center. A nurse or other trained health professional will give you this medicine. · If this medicine gets on your skin, wash the area with soap and water, and tell your caregiver. If you get the medicine in your eyes, nose, or mouth, rinse the area with large amounts of water, and tell your caregiver. If a dose is missed:   · Call your doctor or pharmacist for instructions. Drugs and Foods to Avoid:   Ask your doctor or pharmacist before using any other medicine, including over-the-counter medicines, vitamins, and herbal products. · This medicine may interfere with vaccines. Ask your doctor before you get a flu shot or any other vaccines. Warnings While Using This Medicine:   · It is not safe to take this medicine during pregnancy. It could harm an unborn baby. Tell your doctor right away if you become pregnant. · Make sure your doctor knows if you are breastfeeding, or if you have lung disease, kidney disease, or liver disease.   · Cancer medicines can cause nausea and vomiting in most people, sometimes even after receiving medicines to prevent it. Ask your doctor or nurse about other ways to control these side effects. · This medicine may make you bleed, bruise, or get infections more easily. Take precautions to prevent illness and injury. Wash your hands often. · Your doctor will do lab tests at regular visits to check on the effects of this medicine. Keep all appointments. Possible Side Effects While Using This Medicine:   Call your doctor right away if you notice any of these side effects:  · Allergic reaction: Itching or hives, swelling in your face or hands, swelling or tingling in your mouth or throat, chest tightness, trouble breathing  · Chest pain. · Dark urine, decrease in how much or how often you urinate. · Fast, slow, or irregular heartbeat. · Fever, chills, cough, sore throat, and body aches. · Lightheadedness or fainting. · Nausea, vomiting, severe itching, bleeding from your gums or nose. · Numbness or weakness in your arm or leg, or on one side of your body. · Pain, itching, burning, swelling, or a lump under your skin where the needle is placed. · Pinpoint red or purple spots on your skin. · Rapid weight gain. · Sudden or severe headache, problems with vision, hearing, speech, or walking. · Swelling in your hands, ankles, or feet. · Tingling, or burning pain in your hands, arms, legs, or feet. · Trouble breathing. · Unusual bleeding, bruising, or weakness. If you notice these less serious side effects, talk with your doctor:   · Diarrhea, constipation, loss of appetite. · Drowsiness. · Hair loss. · Mild headache. · Muscle, joint, or bone pain. · Skin rash or itching. · Sores or white patches on your lips, mouth, or throat. If you notice other side effects that you think are caused by this medicine, tell your doctor. Call your doctor for medical advice about side effects.  You may report side effects to FDA at 4-679-FDA-4761  © 2017 [facility] Information is for End User's use only and may not be sold, redistributed or otherwise used for commercial purposes. The above information is an  only. It is not intended as medical advice for individual conditions or treatments. Talk to your doctor, nurse or pharmacist before following any medical regimen to see if it is safe and effective for you. Paclitaxel Protein-bound (Abraxane) - (By injection)   Why this medicine is used:   Treats cancer. Contact a nurse or doctor right away if you have:  · Trouble breathing, dry cough  · Unusual bleeding, bruising, or weakness  · Numbness, tingling, or burning pain in your hands, arms, legs, or feet  · Fever, chills, cough, sore throat, or body aches  · Nausea, vomiting, diarrhea, extreme thirst     Common side effects:  · Hair loss  · Joint or muscle pain  © 2017 [facility] Information is for End User's use only and may not be sold, redistributed or otherwise used for commercial purposes. OUTPATIENT INFUSION CENTER    DISCHARGE INSTRUCTIONS FOR:  CHEMOTHERAPY / BIOTHERAPY  Pre-medications: Benadryl 25 mg, Decadron 20 mg, Zofran 8 mg  Chemotherapy: Abraxane 184mg, Gemzar 1,656 mg    Chemotherapy has the potential to cause many side effects. The following are general precautions that chemo patients should take:    1. Practice good hand washing:   * Use soap and water for at least 15 seconds, covering all areas of hands. * Always wash hands before eating. * Wash hands after contact with public surfaces such as door knobs and         handles, shopping carts, telephones and elevator buttons. 2. Get plenty of rest:    * You will likely experience fatigue three to five days following your treatment. It may last as long as seven days. 3. Drink plenty of fluids. Water is best. Drink 8-10 glasses per day.     4. Eat a well balanced diet:  * Small frequent meals may help if you are having trouble with nausea or your  appetite. Some people also do well with nutritional supplements. * Wash fruits and vegetables well. 5. Pace yourself with daily activities:   * Take frequent breaks and ask for help if you need it. 6. Exercise is very important:  * It will increase circulation and will help the fatigue. Do what you can each day. 7. If your regimen results in hair loss:  *  You will likely notice effects between two and three weeks following your first treatment. Some lose all hair while others only experience thinning. 8. Practice good oral hygiene:   *  Notify your M.D. immediately if any mouth sores or discomfort develop. 9. Protect yourself from the sun. Signs/Symptoms of an allergic reaction and/or some side effects may require immediate medical attention. Notify your physician if you develop one or more of the following:     Temperature of 100.5 degrees or greater;   Skin redness, itching, swelling, blistering, weeping, crusting, rash, or hives; Wheezing, chest tightness, cough, or shortness of breath;   Swelling of the face, eyelids, lips, tongue, or throat;  Severe, persistent headache;  Stuffy nose, runny nose, sneezing;   Red (bloodshot), itchy, swollen, or watery eyes;   Stomach  pain, nausea, vomiting, diarrhea, or bloody stools;  Mouth sores        Your physician should also be aware of the following symptoms:    Persistent and unresolved nausea and/or vomiting;   Persistent and unresolved diarrhea or constipation;   Numbness/tingling/burning of the extremities, including the fingers and toes; Bleeding or unexplained bruising; Unexplained redness/swelling/pain in the arms or legs; Shortness of breath or fatigue that worsens;   Pain with urination or blood in the urine; Chills;  Cough, especially a productive cough;  Mouth sores or a white coating of the tongue; Redness, swelling, pain or drainage at the port-a-cath or IV site;   Increased feeling of bloating or water retention; Excessive weight loss or gain;  Ringing in the ears; Difficulty swallowing;  Dizziness, vertigo, lightheadedness or fainting.           [facility] [place] Signature: ____________________________ 3/22/2022  Kenn Willard RN

## 2022-03-24 NOTE — PROGRESS NOTES
Reason for Visit:   Nga Manrique a 86 Q. o. female who is seen for pancreatic adenocarcinoma     Treatment History:   Dx: Pancreatic Adenocarcinoma--May 2020--[id number]--nbxygjdbxbm of splenic vein and superior mesenteric vein  Tx: mFOLFIRINOX--first cycle 5/26/2020, second cycle 6/10/2020, dose reduced 15% cycle 3 on 6/30/2020--delayed due to severe side effects--switched to Gemcitabine/Abraxane--Cycle #1 7/29/2002, Cycle #2 9/2/2020, Cycle #3 10/7/2020, Cycle #4 11/4/2020. Neoadjuvant Radiation with Dr Gilda Rowe ending 12/18/2021. Distal Pancreatectomy, Splenectomy, and Civa Sheet with Dr Gustabo Og on 3/17/2021. Adjuvant radiation with Dr Gilda Rowe. Restart Gemcitabine/abraxane Cycle #1 7/6/2021, Cycle #2 8/3/2021, Cycle #3 8/31/2021, Cycle #4 9/28/2021, Cycle #11 1/4/2022  Goal: Prolong life--Palliative    History of Present Illness:   Some fatigue, maintaining wt. Happy to have had a good holiday's. Spirits are good.      Past Medical History:   Diagnosis Date    Adenocarcinoma of pancreas ([facility] [address] 05/13/2020    Dr See Spine biopsy via EUS    Diabetes ([facility] [address]     GERD (gastroesophageal reflux disease)     Hernia of abdominal cavity     Subxyphoid hernia    Hypertension     Inflammatory polyarthropathy (HCC)     Joint pain     Joint swelling     Menopause     Ocular nevus of left eye     Pancreatitis     Tracheal stenosis       Past Surgical History:   Procedure Laterality Date    HX CARPAL TUNNEL RELEASE Bilateral     HX COLONOSCOPY      HX HYSTERECTOMY      HX KNEE ARTHROSCOPY Right     HX KNEE REPLACEMENT Right     partial    HX LAP CHOLECYSTECTOMY      HX TONSILLECTOMY      HX VASCULAR ACCESS        Social History     Tobacco Use    Smoking status: Never Smoker    Smokeless tobacco: Never Used   Substance Use Topics    Alcohol use: No     Alcohol/week: 0.0 standard drinks      Family History   Problem Relation Age of Onset    Heart Disease Mother     Heart Attack Mother     Cancer Father         lung    Diabetes Sister      Current Outpatient Medications   Medication Sig    LORazepam (ATIVAN) 1 mg tablet Take 1 Tablet by mouth every six (6) hours as needed for Anxiety. Max Daily Amount: 4 mg. Indications: nausea and vomiting caused by cancer drugs    DULoxetine (CYMBALTA) 30 mg capsule Take 3 Capsules by mouth daily. Indications: neuropathic pain    Toujeo SoloStar U-300 Insulin 300 unit/mL (1.5 mL) inpn pen INJECT 55 UNITS ONCE DAILY AT BEDTIME - DOSE INCREASED (Patient taking differently: 55 Units by SubCUTAneous route. Patient uses differently during around chemo time.)    gabapentin (NEURONTIN) 300 mg capsule TAKE 2 CAPSULES BY MOUTH THREE TIMES DAILY    lipase-protease-amylase (Creon) 12,000-38,000 -60,000 unit capsule Take 2 Capsules by mouth three (3) times daily (with meals). 2 caps with meal and 1 with snacks  Indications: exocrine pancreatic insufficiency    lidocaine-prilocaine (EMLA) topical cream Apply  to affected area as needed for Pain (pain ). Apply a thin layer over port site prior to accessing port    nystatin (MYCOSTATIN) powder Apply 1 g to affected area three (3) times daily.  HumaLOG KwikPen Insulin 100 unit/mL kwikpen patient on sliding scale    sennosides (Senna) 8.6 mg cap Take 1-2 Tablets by mouth two (2) times a day.  polyethylene glycol (Miralax) 17 gram/dose powder Take 17 g by mouth daily as needed.  ondansetron (ZOFRAN ODT) 4 mg disintegrating tablet Take 1 Tab by mouth every eight (8) hours as needed for Nausea or Vomiting.  diphenoxylate-atropine (LomotiL) 2.5-0.025 mg per tablet Take 2 Tabs by mouth four (4) times daily as needed for Diarrhea. Max Daily Amount: 8 Tabs.  acetaminophen (Acetaminophen Extra Strength) 500 mg tablet Take 1,000 mg by mouth daily as needed.  Insulin Needles, Disposable, (Nellie Pen Needle) 32 gauge x 5/32\" ndle 1 Each by Does Not Apply route three (3) times daily.     methylphenidate HCl (RITALIN) 10 mg tablet Take 1 Tablet by mouth three (3) times daily. Max Daily Amount: 30 mg.    levoFLOXacin (LEVAQUIN) 500 mg tablet Take 1 Tablet by mouth daily. (Patient not taking: Reported on 3/10/2022)    famotidine (Pepcid) 20 mg tablet Take 20 mg by mouth two (2) times a day.  promethazine (PHENERGAN) 25 mg tablet Take 1 Tab by mouth every six (6) hours as needed for Nausea. No current facility-administered medications for this visit. No Known Allergies     Review of Systems: A complete review of systems was obtained, negative except as described above. Physical Exam:     Visit Vitals  /81   Pulse 88   Temp 98.5 °F (36.9 °C) (Oral)   Resp 16   Ht 5' 4\" (1.626 m)   Wt 177 lb 6.4 oz (80.5 kg)   SpO2 97%   BMI 30.45 kg/m²     ECOG PS: 1   General: No distress  Eyes: PERRLA, anicteric sclerae  HENT: Atraumatic, OP clear  Neck: Supple  Lymphatic: No cervical, supraclavicular, or inguinal adenopathy  Respiratory: CTAB, normal respiratory effort  CV: Normal rate, regular rhythm, no murmurs, no peripheral edema  GI: Soft, nontender, nondistended, no masses, no hepatomegaly, no splenomegaly  MS: Normal gait and station. Digits without clubbing or cyanosis. Skin: No rashes, ecchymoses, or petechiae. Normal temperature, turgor, and texture. Psych: Alert, oriented, appropriate affect, normal judgment/insight    Results:     Lab Results   Component Value Date/Time    WBC 4.3 03/22/2022 08:50 AM    HGB 9.4 (L) 03/22/2022 08:50 AM    HCT 30.4 (L) 03/22/2022 08:50 AM    PLATELET 176 96/61/4978 08:50 AM    MCV 89.4 03/22/2022 08:50 AM    ABS.  NEUTROPHILS 1.4 (L) 03/22/2022 08:50 AM     Lab Results   Component Value Date/Time    Sodium 138 03/22/2022 08:50 AM    Potassium 3.9 03/22/2022 08:50 AM    Chloride 102 03/22/2022 08:50 AM    CO2 30 03/22/2022 08:50 AM    Glucose 198 (H) 03/22/2022 08:50 AM    BUN 10 03/22/2022 08:50 AM    Creatinine 0.67 03/22/2022 08:50 AM    GFR est AA >60 03/22/2022 08:50 AM    GFR est non-AA >60 03/22/2022 08:50 AM    Calcium 9.0 03/22/2022 08:50 AM    Glucose (POC) 192 (H) 07/15/2020 11:56 AM    Creatinine (POC) 0.7 02/06/2013 10:21 AM     Lab Results   Component Value Date/Time    Bilirubin, total 0.3 03/22/2022 08:50 AM    ALT (SGPT) 24 03/22/2022 08:50 AM    Alk. phosphatase 84 03/22/2022 08:50 AM    Protein, total 6.5 03/22/2022 08:50 AM    Albumin 3.0 (L) 03/22/2022 08:50 AM    Globulin 3.5 03/22/2022 08:50 AM       CT A/P 4/30/2020  IMPRESSION: Suspect neoplastic pancreatic mass versus atypical focal  pancreatitis with splenic and superior mesenteric vein occlusion. Consider  further evaluation with endoscopic ultrasound at which time biopsy could  potentially be performed.     CT Chest 5/21/2020  IMPRESSION:  1. No evidence of pulmonary metastatic disease. No evidence of mediastinal or  hilar lymphadenopathy. No pleural effusions identified. 2. No definite evidence of central pulmonary embolic disease. 3. Presence of a mass lesion involving the pancreatic head/body. 4. Evidence of fatty infiltration involving the liver. Presence of focal  low-density areas within the liver compatible with cysts. Surgical absence of  the gallbladder.     CT A/P 6/19/2020  IMPRESSION:  Pancreatitis with ascites favored. Discrete pancreatic mass is not identified. No biliary dilatation or definite arterial or venous thrombosis. No liver  metastases. Fat-containing ventral hernia  Nonobstructing left renal calculus  Retrolisthesis of L2 and L3.     CT C/A/P 10/12/2020  IMPRESSION:  1. Interval improvement in appearance of the pancreas (see discussion above). 2. Normal sized liver. Stable appearance of the previously described areas of  decreased attenuation within liver. 3. Surgical absence of the gallbladder. 4. Evidence of splenomegaly. 5. Normal sized kidneys. Presence of small, nonobstructing calculi within the  left kidney. No evidence of hydronephrotic change.   6. Suboptimal distention of the urinary bladder. 7. Presence of a small, fat-containing ventral hernia in the mid abdominal  Region. CT C/A/P 9/17/2021  IMPRESSION  New peritoneal implants identified in the anterior pelvis  Interval splenectomy and repair of the ventral hernia  Interval thickening of the GE junction and circumferential thickening of the  pylorus. This may represent gastritis but correlation with endoscopy or upper GI  recommended     Path: 5/8/2020  CYTOLOGIC INTERPRETATION:   Pancreas, EUS-guided fine needle aspiration and cell blocks: Adenocarcinoma     Path: 3/17/2021  No residual malignancy in pancrease, 0/29 lymph nodes. 2-3mm focus metastatic adenocarcinoma within hernia sac     Assessment:   1) Pancreatic Adenocarcinoma--Unresectable  2) Neoplasm Pain/Neuropathic Pain  3) Fatigue  4) Nausea  5) DM  6) Nutrition  7) Diarrhea  8) Hypotension  9) Abd Pain  10) Left Hydronephrosis  11) Anxiety/Insomnia     Plan:   1) Completed neoadjuvant chemotherapy. Radiation with Dr Renata Hernández. Definitive resection with Dr Lalitha Betts focus of adenocarcinoma in ventral hernia. Now peritoneal disease/mets. She is doing well with palliative chemotherapy with Gemcitaine/Abraxane. Markers are dropping.       2) S/p celiac block--pain in abd controlled. Having increased neuropathy from chemotherapy-- increase Cymbalta to 90mg. Will also increase the hydromorphone from 1mg prn to 2mg prn--can use at bedtime and 3-4 times daily if needed. Watch for OIC. Need her up and around so if opiate required to accomplish then ok.      3) Methylphenidate 5mg bid--has helped.       4) Resolved.    5) Defer to Dr Eduardo Jaime for further management.       6) On pancreatic enzymes--weight has stabilized.    7) Chemo related--hopefully with not recurr.  Taking senna/miralax to prevent constipation. 8) Resolved currently     9) Treating for possible intraabdominal infection with cipro/metronidazole.   Scans show stable disease in abd. 10) Renal intact--had significant diuresis after passing stone.   Likely resovled      11) Taken lorazepam at bedtime--ok to increase but be cautious as we are increasing the opiate--combination of benzo/opiate can increase risk of respiratory depression    Signed By: Bharat Hicks MD

## 2022-03-29 ENCOUNTER — HOSPITAL ENCOUNTER (OUTPATIENT)
Dept: INFUSION THERAPY | Age: 67
Discharge: HOME | End: 2022-03-29
Payer: MEDICARE

## 2022-03-29 VITALS
DIASTOLIC BLOOD PRESSURE: 85 MMHG | BODY MASS INDEX: 30.08 KG/M2 | WEIGHT: 176.2 LBS | RESPIRATION RATE: 19 BRPM | TEMPERATURE: 98.8 F | HEIGHT: 64 IN | OXYGEN SATURATION: 98 % | HEART RATE: 92 BPM | SYSTOLIC BLOOD PRESSURE: 179 MMHG

## 2022-03-29 DIAGNOSIS — C25.9 ADENOCARCINOMA OF PANCREAS (HCC): Primary | ICD-10-CM

## 2022-03-29 LAB
ALBUMIN SERPL-MCNC: 3.1 G/DL (ref 3.5–5)
ALBUMIN/GLOB SERPL: 0.9 {RATIO} (ref 1.1–2.2)
ALP SERPL-CCNC: 89 U/L (ref 45–117)
ALT SERPL-CCNC: 25 U/L (ref 12–78)
ANION GAP SERPL CALC-SCNC: 6 MMOL/L (ref 5–15)
AST SERPL-CCNC: 19 U/L (ref 15–37)
BASOPHILS # BLD: 0 K/UL (ref 0–0.1)
BASOPHILS NFR BLD: 1 % (ref 0–1)
BILIRUB SERPL-MCNC: 0.2 MG/DL (ref 0.2–1)
BUN SERPL-MCNC: 14 MG/DL (ref 6–20)
BUN/CREAT SERPL: 19 (ref 12–20)
CALCIUM SERPL-MCNC: 9 MG/DL (ref 8.5–10.1)
CHLORIDE SERPL-SCNC: 102 MMOL/L (ref 97–108)
CO2 SERPL-SCNC: 28 MMOL/L (ref 21–32)
CREAT SERPL-MCNC: 0.72 MG/DL (ref 0.55–1.02)
DIFFERENTIAL METHOD BLD: ABNORMAL
EOSINOPHIL # BLD: 0.1 K/UL (ref 0–0.4)
EOSINOPHIL NFR BLD: 5 % (ref 0–7)
ERYTHROCYTE [DISTWIDTH] IN BLOOD BY AUTOMATED COUNT: 17.5 % (ref 11.5–14.5)
GLOBULIN SER CALC-MCNC: 3.4 G/DL (ref 2–4)
GLUCOSE SERPL-MCNC: 340 MG/DL (ref 65–100)
HCT VFR BLD AUTO: 29.9 % (ref 35–47)
HGB BLD-MCNC: 9.6 G/DL (ref 11.5–16)
IMM GRANULOCYTES # BLD AUTO: 0 K/UL (ref 0–0.04)
IMM GRANULOCYTES NFR BLD AUTO: 0 % (ref 0–0.5)
LYMPHOCYTES # BLD: 1.2 K/UL (ref 0.8–3.5)
LYMPHOCYTES NFR BLD: 46 % (ref 12–49)
MCH RBC QN AUTO: 28.4 PG (ref 26–34)
MCHC RBC AUTO-ENTMCNC: 32.1 G/DL (ref 30–36.5)
MCV RBC AUTO: 88.5 FL (ref 80–99)
MONOCYTES # BLD: 0.4 K/UL (ref 0–1)
MONOCYTES NFR BLD: 16 % (ref 5–13)
NEUTS SEG # BLD: 0.8 K/UL (ref 1.8–8)
NEUTS SEG NFR BLD: 32 % (ref 32–75)
NRBC # BLD: 0.05 K/UL (ref 0–0.01)
NRBC BLD-RTO: 2 PER 100 WBC
PLATELET # BLD AUTO: 172 K/UL (ref 150–400)
PLATELET COMMENTS,PCOM: ABNORMAL
PMV BLD AUTO: 11 FL (ref 8.9–12.9)
POTASSIUM SERPL-SCNC: 4.3 MMOL/L (ref 3.5–5.1)
PROT SERPL-MCNC: 6.5 G/DL (ref 6.4–8.2)
RBC # BLD AUTO: 3.38 M/UL (ref 3.8–5.2)
RBC MORPH BLD: ABNORMAL
SODIUM SERPL-SCNC: 136 MMOL/L (ref 136–145)
WBC # BLD AUTO: 2.5 K/UL (ref 3.6–11)

## 2022-03-29 PROCEDURE — 74011250636 HC RX REV CODE- 250/636: Performed by: INTERNAL MEDICINE

## 2022-03-29 PROCEDURE — 36415 COLL VENOUS BLD VENIPUNCTURE: CPT

## 2022-03-29 PROCEDURE — 36591 DRAW BLOOD OFF VENOUS DEVICE: CPT

## 2022-03-29 PROCEDURE — 80053 COMPREHEN METABOLIC PANEL: CPT

## 2022-03-29 PROCEDURE — 74011000250 HC RX REV CODE- 250: Performed by: INTERNAL MEDICINE

## 2022-03-29 PROCEDURE — 77030012965 HC NDL HUBR BBMI -A

## 2022-03-29 PROCEDURE — 85025 COMPLETE CBC W/AUTO DIFF WBC: CPT

## 2022-03-29 RX ORDER — HEPARIN 100 UNIT/ML
300-500 SYRINGE INTRAVENOUS AS NEEDED
Status: DISPENSED | OUTPATIENT
Start: 2022-03-29 | End: 2022-03-29

## 2022-03-29 RX ORDER — SODIUM CHLORIDE 9 MG/ML
10 INJECTION INTRAMUSCULAR; INTRAVENOUS; SUBCUTANEOUS AS NEEDED
Status: ACTIVE | OUTPATIENT
Start: 2022-03-29 | End: 2022-03-29

## 2022-03-29 RX ORDER — HYDROMORPHONE HYDROCHLORIDE 2 MG/1
2 TABLET ORAL
COMMUNITY
End: 2022-06-21 | Stop reason: SDUPTHER

## 2022-03-29 RX ADMIN — SODIUM CHLORIDE, PRESERVATIVE FREE 10 ML: 5 INJECTION INTRAVENOUS at 09:50

## 2022-03-29 RX ADMIN — Medication 500 UNITS: at 09:53

## 2022-03-29 RX ADMIN — SODIUM CHLORIDE, PRESERVATIVE FREE 10 ML: 5 INJECTION INTRAVENOUS at 08:45

## 2022-03-29 NOTE — PROGRESS NOTES
Problem: Infection - Risk of, Central Venous Catheter-Associated Bloodstream Infection  Goal: *Absence of infection signs and symptoms  Outcome: Resolved/Met     Problem: Patient Education:  Go to Education Activity  Goal: Patient/Family Education  Outcome: Resolved/Met     Problem: Neutropenia  Goal: *Verbalizes and demonstrates neutropenic precautions  Outcome: Resolved/Met

## 2022-03-29 NOTE — DISCHARGE INSTRUCTIONS
Patient Education   Your chemotherapy was held today due to your neutrophil count of 0.8. Neutropenia: Care Instructions  Your Care Instructions  Neutropenia (say \"mwc-oooo-VAX-nee-uh\") means that your blood has too few neutrophils. These are white blood cells that help protect the body from infection. They do this by killing bacteria. Neutropenia can be caused by some types of infection. It also can be caused by immune system conditions such as HIV or lupus, a lack of vitamin H43 or folic acid, or an enlarged spleen. Some medicines can cause it too. It is most often caused by treatments for certain health problems, such as chemotherapy and radiation treatment for cancer. Mild neutropenia usually causes no symptoms. But when it's severe, it increases the risk of infection of your skin and organs. That's because your body can't fight off germs as well as it should. Follow-up care is a key part of your treatment and safety. Be sure to make and go to all appointments, and call your doctor if you are having problems. It's also a good idea to know your test results and keep a list of the medicines you take. How can you care for yourself at home? · Take your medicines exactly as prescribed. Call your doctor if you have any problems with your medicine. · Eat a healthy, balanced diet. Eat foods with a lot of fiber. This helps to prevent constipation. Prevent infections  · Take your temperature several times a day, as your doctor suggests. Keep a written record of your temperature readings. Fever is a common symptom of infection. And it may be the only symptom. · Use a soft toothbrush. Do not floss your teeth. Talk with your doctor about other steps to prevent infections in your mouth. · Wash your hands often with soap and water, especially before you eat and after you use the bathroom. · If you are a woman, use sanitary napkins (pads) instead of tampons. Do not douche.   · Do not use rectal thermometers or suppositories. · Avoid tasks that might expose you to germs, such as disposing of pet feces or urine. · Avoid crowds of people and anyone who might have an infection or an illness such as a cold or the flu. You may need to avoid people who have recently had certain kinds of vaccinations. · Even small injuries can get infected. Take steps to prevent cuts, burns, and sunburns. · If you have severe neutropenia, your doctor may advise you to avoid fresh fruits, vegetables, and flowers. When should you call for help? Call 911 anytime you think you may need emergency care. For example, call if:    · You have severe shortness of breath.     · You passed out (lost consciousness). Call your doctor now or seek immediate medical care if:    · You have signs of infection, such as:  ? Increased pain, swelling, warmth, or redness of your skin. ? Red streaks leading from a wound. ? Pus draining from a wound. ? A fever. Watch closely for changes in your health, and be sure to contact your doctor if:    · You do not get better as expected. Where can you learn more? Go to http://www.gray.com/  Enter L103 in the search box to learn more about \"Neutropenia: Care Instructions. \"  Current as of: June 17, 2021               Content Version: 13.2  © 0209-2497 [employer] Care instructions adapted under license by [employer] (which disclaims liability or warranty for this information). If you have questions about a medical condition or this instruction, always ask your healthcare professional. [place] [address] any warranty or liability for your use of this information.

## 2022-03-29 NOTE — PROGRESS NOTES
[facility] Progress Note    Date: 2022    Name: Raji Varghese    MRN: [id number]         : 1955    Ms. Ba Arrived ambulatory and in no distress for cycle 13 day 15 of Abraxane/Gemcitabine regimen. Assessment was completed, no acute issues at this time, no new complaints voiced. She reports that she did not have hives after last treatment on 3/22/22. Easily fatigued, states she notices her HR accelerates with activity but returns to normal with rest. Eating well. Port accessed without difficulty, labs drawn and in process. Chemotherapy Flowsheet 3/29/2022   Cycle C13 D15   Date 3/29/2022   Drug / Regimen HELD   Dosage -   Time Up -   Time Down -   Pre Hydration -   Pre Meds -   Notes -       Ms. Ba's vitals were reviewed. Visit Vitals  BP (!) 179/85 (BP 1 Location: Left upper arm, BP Patient Position: Sitting)   Pulse 92   Temp 98.8 °F (37.1 °C)   Resp 19   Ht 5' 4\" (1.626 m)   Wt 79.9 kg (176 lb 3.2 oz)   SpO2 98%   BMI 30.24 kg/m²       Lab results were obtained and reviewed. Recent Results (from the past 12 hour(s))   CBC WITH AUTOMATED DIFF    Collection Time: 22  8:45 AM   Result Value Ref Range    WBC 2.5 (L) 3.6 - 11.0 K/uL    RBC 3.38 (L) 3.80 - 5.20 M/uL    HGB 9.6 (L) 11.5 - 16.0 g/dL    HCT 29.9 (L) 35.0 - 47.0 %    MCV 88.5 80.0 - 99.0 FL    MCH 28.4 26.0 - 34.0 PG    MCHC 32.1 30.0 - 36.5 g/dL    RDW 17.5 (H) 11.5 - 14.5 %    PLATELET 529 851 - 563 K/uL    MPV 11.0 8.9 - 12.9 FL    NRBC 2.0 (H) 0  WBC    ABSOLUTE NRBC 0.05 (H) 0.00 - 0.01 K/uL    NEUTROPHILS 32 32 - 75 %    LYMPHOCYTES 46 12 - 49 %    MONOCYTES 16 (H) 5 - 13 %    EOSINOPHILS 5 0 - 7 %    BASOPHILS 1 0 - 1 %    IMMATURE GRANULOCYTES 0 0.0 - 0.5 %    ABS. NEUTROPHILS 0.8 (L) 1.8 - 8.0 K/UL    ABS. LYMPHOCYTES 1.2 0.8 - 3.5 K/UL    ABS. MONOCYTES 0.4 0.0 - 1.0 K/UL    ABS. EOSINOPHILS 0.1 0.0 - 0.4 K/UL    ABS. BASOPHILS 0.0 0.0 - 0.1 K/UL    ABS. IMM.  GRANS. 0.0 0.00 - 0.04 K/UL    DF MANUAL PLATELET COMMENTS ADEQUATE PLATELETS      RBC COMMENTS ANISOCYTOSIS  2+       METABOLIC PANEL, COMPREHENSIVE    Collection Time: 03/29/22  8:45 AM   Result Value Ref Range    Sodium 136 136 - 145 mmol/L    Potassium 4.3 3.5 - 5.1 mmol/L    Chloride 102 97 - 108 mmol/L    CO2 28 21 - 32 mmol/L    Anion gap 6 5 - 15 mmol/L    Glucose 340 (H) 65 - 100 mg/dL    BUN 14 6 - 20 MG/DL    Creatinine 0.72 0.55 - 1.02 MG/DL    BUN/Creatinine ratio 19 12 - 20      GFR est AA >60 >60 ml/min/1.73m2    GFR est non-AA >60 >60 ml/min/1.73m2    Calcium 9.0 8.5 - 10.1 MG/DL    Bilirubin, total 0.2 0.2 - 1.0 MG/DL    ALT (SGPT) 25 12 - 78 U/L    AST (SGOT) 19 15 - 37 U/L    Alk. phosphatase 89 45 - 117 U/L    Protein, total 6.5 6.4 - 8.2 g/dL    Albumin 3.1 (L) 3.5 - 5.0 g/dL    Globulin 3.4 2.0 - 4.0 g/dL    A-G Ratio 0.9 (L) 1.1 - 2.2       Chemotherapy held due to [address] of 0.8. Neutropenic instructions given to patient and discussed. Port was flushed and de-accessed and site intact. She was discharged from [place] [id number] in stable condition at 1000. She is to return on April 12 at 8:30 for her next appointment.     Alyssa Haley RN  March 29, 2022

## 2022-04-05 ENCOUNTER — HOSPITAL ENCOUNTER (EMERGENCY)
Age: 67
Discharge: HOME | End: 2022-04-05
Attending: EMERGENCY MEDICINE
Payer: MEDICARE

## 2022-04-05 ENCOUNTER — APPOINTMENT (OUTPATIENT)
Dept: GENERAL RADIOLOGY | Age: 67
End: 2022-04-05
Attending: EMERGENCY MEDICINE
Payer: MEDICARE

## 2022-04-05 VITALS
WEIGHT: 175 LBS | HEIGHT: 64 IN | HEART RATE: 78 BPM | DIASTOLIC BLOOD PRESSURE: 69 MMHG | TEMPERATURE: 98 F | SYSTOLIC BLOOD PRESSURE: 142 MMHG | BODY MASS INDEX: 29.88 KG/M2 | RESPIRATION RATE: 18 BRPM | OXYGEN SATURATION: 99 %

## 2022-04-05 DIAGNOSIS — S20.212A CONTUSION OF RIB ON LEFT SIDE, INITIAL ENCOUNTER: Primary | ICD-10-CM

## 2022-04-05 PROCEDURE — 96374 THER/PROPH/DIAG INJ IV PUSH: CPT

## 2022-04-05 PROCEDURE — 74011250636 HC RX REV CODE- 250/636: Performed by: EMERGENCY MEDICINE

## 2022-04-05 PROCEDURE — 77030027138 HC INCENT SPIROMETER -A

## 2022-04-05 PROCEDURE — 71101 X-RAY EXAM UNILAT RIBS/CHEST: CPT

## 2022-04-05 PROCEDURE — 99284 EMERGENCY DEPT VISIT MOD MDM: CPT

## 2022-04-05 RX ORDER — HYDROMORPHONE HYDROCHLORIDE 1 MG/ML
1 INJECTION, SOLUTION INTRAMUSCULAR; INTRAVENOUS; SUBCUTANEOUS ONCE
Status: COMPLETED | OUTPATIENT
Start: 2022-04-05 | End: 2022-04-05

## 2022-04-05 RX ADMIN — HYDROMORPHONE HYDROCHLORIDE 1 MG: 1 INJECTION, SOLUTION INTRAMUSCULAR; INTRAVENOUS; SUBCUTANEOUS at 18:09

## 2022-04-05 NOTE — ED TRIAGE NOTES
Pt arrived via EMS with c/o left rib pain s/p a fall around 3pm this evening. Did not hit her head or lose consciousness.

## 2022-04-05 NOTE — ED PROVIDER NOTES
EMERGENCY DEPARTMENT HISTORY AND PHYSICAL EXAM          Date: 4/5/2022  Patient Name: Flip Rodriguez    History of Presenting Illness     Chief Complaint   Patient presents with    Rib Pain       History Provided By: Patient    HPI: Flip Rodriguez is a 77 y.o. female, pmhx listed below, who presents to the ED c/o fall. Patient reports she became tangled in a cord and fell onto her left ribs. Reports she had a concrete floor. Reports she was unable to get up from the ground due to severity of pain. Now has pain with palpation of left anterior ribs. Denies head injury or LOC. Patient is currently being treated for pancreatic cancer, takes Dilaudid 2 mg 3 times daily as needed for pain. PCP: Belem Mitchell MD    There are no other complaints, changes, or physical findings at this time.          Past History       Past Medical History:  Past Medical History:   Diagnosis Date    Adenocarcinoma of pancreas ([facility] [address] 05/13/2020    Dr German Redo biopsy via EUS    Diabetes ([facility] [address]     GERD (gastroesophageal reflux disease)     Hernia of abdominal cavity     Subxyphoid hernia    Hypertension     Inflammatory polyarthropathy (HCC)     Joint pain     Joint swelling     Menopause     Ocular nevus of left eye     Pancreatitis     Tracheal stenosis        Past Surgical History:  Past Surgical History:   Procedure Laterality Date    HX CARPAL TUNNEL RELEASE Bilateral     HX COLONOSCOPY      HX HYSTERECTOMY      HX KNEE ARTHROSCOPY Right     HX KNEE REPLACEMENT Right     partial    HX LAP CHOLECYSTECTOMY      HX TONSILLECTOMY      HX VASCULAR ACCESS         Family History:  Family History   Problem Relation Age of Onset    Heart Disease Mother     Heart Attack Mother     Cancer Father         lung    Diabetes Sister        Social History:  Social History     Tobacco Use    Smoking status: Never Smoker    Smokeless tobacco: Never Used   Substance Use Topics    Alcohol use: No     Alcohol/week: 0.0 standard drinks    Drug use: No       Current Outpatient Medications   Medication Sig Dispense Refill    HYDROmorphone (Dilaudid) 2 mg tablet Take 2 mg by mouth every four (4) hours as needed for Pain.  Insulin Needles, Disposable, (Nellie Pen Needle) 32 gauge x 5/32\" ndle 1 Each by Does Not Apply route three (3) times daily. 100 Pen Needle 5    methylphenidate HCl (RITALIN) 10 mg tablet Take 1 Tablet by mouth three (3) times daily. Max Daily Amount: 30 mg. 90 Tablet 0    levoFLOXacin (LEVAQUIN) 500 mg tablet Take 1 Tablet by mouth daily. (Patient not taking: Reported on 3/10/2022) 10 Tablet 1    LORazepam (ATIVAN) 1 mg tablet Take 1 Tablet by mouth every six (6) hours as needed for Anxiety. Max Daily Amount: 4 mg. Indications: nausea and vomiting caused by cancer drugs 90 Tablet 2    DULoxetine (CYMBALTA) 30 mg capsule Take 3 Capsules by mouth daily. Indications: neuropathic pain 90 Capsule 5    Toujeo SoloStar U-300 Insulin 300 unit/mL (1.5 mL) inpn pen INJECT 55 UNITS ONCE DAILY AT BEDTIME - DOSE INCREASED (Patient taking differently: 55 Units by SubCUTAneous route. Patient uses differently during around chemo time.) 4.5 mL 10    gabapentin (NEURONTIN) 300 mg capsule TAKE 2 CAPSULES BY MOUTH THREE TIMES DAILY 180 Capsule 3    lipase-protease-amylase (Creon) 12,000-38,000 -60,000 unit capsule Take 2 Capsules by mouth three (3) times daily (with meals). 2 caps with meal and 1 with snacks  Indications: exocrine pancreatic insufficiency 240 Capsule 5    lidocaine-prilocaine (EMLA) topical cream Apply  to affected area as needed for Pain (pain ). Apply a thin layer over port site prior to accessing port 30 g 5    nystatin (MYCOSTATIN) powder Apply 1 g to affected area three (3) times daily. 60 g 1    HumaLOG KwikPen Insulin 100 unit/mL kwikpen patient on sliding scale 15 mL 6    famotidine (Pepcid) 20 mg tablet Take 20 mg by mouth two (2) times a day.       sennosides (Senna) 8.6 mg cap Take 1-2 Tablets by mouth two (2) times a day.  polyethylene glycol (Miralax) 17 gram/dose powder Take 17 g by mouth daily as needed.  promethazine (PHENERGAN) 25 mg tablet Take 1 Tab by mouth every six (6) hours as needed for Nausea. 50 Tab 4    ondansetron (ZOFRAN ODT) 4 mg disintegrating tablet Take 1 Tab by mouth every eight (8) hours as needed for Nausea or Vomiting. 30 Tab 5    diphenoxylate-atropine (LomotiL) 2.5-0.025 mg per tablet Take 2 Tabs by mouth four (4) times daily as needed for Diarrhea. Max Daily Amount: 8 Tabs. 200 Tab 0    acetaminophen (Acetaminophen Extra Strength) 500 mg tablet Take 1,000 mg by mouth daily as needed. Allergies:  No Known Allergies      Review of Systems   Review of Systems   Constitutional: Negative for chills and fever. HENT: Negative for congestion. Eyes: Negative for pain. Respiratory: Negative for shortness of breath. Cardiovascular: Positive for chest pain. Gastrointestinal: Negative for abdominal pain. Genitourinary: Negative for flank pain. Musculoskeletal: Negative for back pain. Neurological: Negative for headaches. Psychiatric/Behavioral: Negative for agitation. Physical Exam     Vital Signs-Reviewed the patient's vital signs. Patient Vitals for the past 12 hrs:   Temp Pulse Resp BP SpO2   04/05/22 1708 98 °F (36.7 °C) 87 20 (!) 157/77 99 %       Physical Exam  Constitutional:       Comments: Uncomfortable appearing, lying flat, hesitant to make any movements due to pain in left anterior ribs   HENT:      Head: Normocephalic and atraumatic. Mouth/Throat:      Mouth: Mucous membranes are moist.   Eyes:      Pupils: Pupils are equal, round, and reactive to light. Cardiovascular:      Rate and Rhythm: Normal rate and regular rhythm. Pulmonary:      Breath sounds: Normal breath sounds. Comments: Taking shallow breaths secondary to pain  Abdominal:      Tenderness: There is no abdominal tenderness.    Musculoskeletal: General: No swelling. Skin:     General: Skin is warm and dry. Neurological:      Mental Status: She is alert and oriented to person, place, and time. Psychiatric:         Mood and Affect: Mood normal.         Diagnostic Study Results     Labs -   No results found for this or any previous visit (from the past 12 hour(s)). Radiologic Studies -   XR RIBS LT W PA CXR MIN 3 V   Final Result      No displaced left rib fractures nor other acute finding in the chest.            CT Results  (Last 48 hours)    None        CXR Results  (Last 48 hours)    None            Medical Decision Making   I am the first provider for this patient. I reviewed the vital signs, available nursing notes, past medical history, past surgical history, family history and social history. Records Reviewed: Nursing Notes and Old Medical Records    Provider Notes (Medical Decision Making):   MDM: 60-year-old female with fall onto left ribs. Now appears acutely uncomfortable. Will give dose of IV Dilaudid and plan for rib series. Abdomen is benign. Lung sounds are clear. Initial assessment performed. The patients presenting problems have been discussed, and they are in agreement with the care plan formulated and outlined with them. I have encouraged them to ask questions as they arise throughout their visit. PROGRESS NOTE:    Chest and rib x-ray with no fracture. Will plan for discharge home, we discussed pain management using her home meds and additional Tylenol if needed. Discharge note:  Pt re-evaluated and noted to be feeling better, ready for discharge. Updated pt on all final results. Will follow up as instructed. All questions have been answered, pt voiced understanding and agreement with plan. Specific return precautions provided as well as instructions to return to the ED should sx worsen at any time. Vital signs stable for discharge. Diagnosis     Clinical Impression:   1.  Contusion of rib on left side, initial encounter            Disposition:  Discharged    Current Discharge Medication List            Please note, this dictation was completed with [employer], the computer voice recognition software. Quite often unanticipated grammatical, syntax, homophones, and other interpretive errors are inadvertently transcribed by the computer software. Please disregard these errors. Please excuse any errors that have escaped final proof reading.

## 2022-04-11 NOTE — PROGRESS NOTES
Carina Dinh is a 77 y.o. female here for follow up of Pancreatic cancer. Patient is scheduled for C14D1 today in [facility]. Patient fell twice last week, has pain in Ribs and neck. She reinjured the ribs on Sunday with crushing something between her arm and ribs. Patient is tearful, unsteady on her feet and rates pain 8/10 today in her ribs. She is taking dilaudid and tylenol for the pain. Key Oncology Meds             ondansetron (ZOFRAN ODT) 4 mg disintegrating tablet Take 1 Tab by mouth every eight (8) hours as needed for Nausea or Vomiting. Key Pain Meds             HYDROmorphone (Dilaudid) 2 mg tablet Take 2 mg by mouth every four (4) hours as needed for Pain. acetaminophen (Acetaminophen Extra Strength) 500 mg tablet Take 1,000 mg by mouth daily as needed.         Chemotherapy Flowsheet 3/29/2022   Cycle C13 D15   Date 3/29/2022   Drug / Regimen HELD   Dosage -   Time Up -   Time Down -   Pre Hydration -   Pre Meds -   Notes -

## 2022-04-11 NOTE — PROGRESS NOTES
Reason for Visit:   Joceline Vieyra a 69 F. o. female who is seen for pancreatic adenocarcinoma     Treatment History:   Dx: Pancreatic Adenocarcinoma--May 2020--[id number]--fofzoamzjyc of splenic vein and superior mesenteric vein  Tx: mFOLFIRINOX--first cycle 5/26/2020, second cycle 6/10/2020, dose reduced 15% cycle 3 on 6/30/2020--delayed due to severe side effects--switched to Gemcitabine/Abraxane--Cycle #1 7/29/2002, Cycle #2 9/2/2020, Cycle #3 10/7/2020, Cycle #4 11/4/2020. Neoadjuvant Radiation with Dr Maricarmen Grant ending 12/18/2021. Distal Pancreatectomy, Splenectomy, and Civa Sheet with Dr Matthias Sunshine on 3/17/2021. Adjuvant radiation with Dr Maricarmen Grant. Restart Gemcitabine/abraxane Cycle #1 7/6/2021, Cycle #2 8/3/2021, Cycle #3 8/31/2021, Cycle #4 9/28/2021  Goal: Prolong life--Palliative  History of Present Illness:     Seen today as a new patient fu on palliative chemo gemzar/ abraxane. Due tor chemo today. Wants to do chemo but having pain. Pt tripped and fell at home. Went to ER/ no breaks. Sent home. Thinks maybe pulled a muscle post fall at home. Has neck pain/ rib pain/TINSLEY. Does not feel well overall. Had temp 99.3 but better now. Takes dilaudid and tylenol which helps. Also takes ativan bid and ritalin and cymbalta. Here with daughter today. Last CTs 3/22 look stable. Labs pending. Worked at [employer] for many years.    No fevers/ chills/ chest pain/ / nausea/ vomiting/diarrhea/        Past Medical History:   Diagnosis Date    Adenocarcinoma of pancreas ([facility] [address] 05/13/2020    Dr Le Records biopsy via EUS    Diabetes ([facility] [address]     GERD (gastroesophageal reflux disease)     Hernia of abdominal cavity     Subxyphoid hernia    Hypertension     Inflammatory polyarthropathy (HCC)     Joint pain     Joint swelling     Menopause     Ocular nevus of left eye     Pancreatitis     Tracheal stenosis       Past Surgical History:   Procedure Laterality Date    HX CARPAL TUNNEL RELEASE Bilateral     HX COLONOSCOPY      HX HYSTERECTOMY      HX KNEE ARTHROSCOPY Right     HX KNEE REPLACEMENT Right     partial    HX LAP CHOLECYSTECTOMY      HX TONSILLECTOMY      HX VASCULAR ACCESS        Social History     Tobacco Use    Smoking status: Never Smoker    Smokeless tobacco: Never Used   Substance Use Topics    Alcohol use: No     Alcohol/week: 0.0 standard drinks      Family History   Problem Relation Age of Onset    Heart Disease Mother     Heart Attack Mother     Cancer Father         lung    Diabetes Sister      Current Outpatient Medications   Medication Sig    multivit-minerals/folic acid (MULTIVITAMIN GUMMIES PO) Take 1 Each by mouth daily.  HYDROmorphone (Dilaudid) 2 mg tablet Take 2 mg by mouth every four (4) hours as needed for Pain.  methylphenidate HCl (RITALIN) 10 mg tablet Take 1 Tablet by mouth three (3) times daily. Max Daily Amount: 30 mg.  LORazepam (ATIVAN) 1 mg tablet Take 1 Tablet by mouth every six (6) hours as needed for Anxiety. Max Daily Amount: 4 mg. Indications: nausea and vomiting caused by cancer drugs    DULoxetine (CYMBALTA) 30 mg capsule Take 3 Capsules by mouth daily. Indications: neuropathic pain    Toujeo SoloStar U-300 Insulin 300 unit/mL (1.5 mL) inpn pen INJECT 55 UNITS ONCE DAILY AT BEDTIME - DOSE INCREASED (Patient taking differently: 55 Units by SubCUTAneous route. Patient uses differently during around chemo time.)    gabapentin (NEURONTIN) 300 mg capsule TAKE 2 CAPSULES BY MOUTH THREE TIMES DAILY    lipase-protease-amylase (Creon) 12,000-38,000 -60,000 unit capsule Take 2 Capsules by mouth three (3) times daily (with meals). 2 caps with meal and 1 with snacks  Indications: exocrine pancreatic insufficiency    lidocaine-prilocaine (EMLA) topical cream Apply  to affected area as needed for Pain (pain ). Apply a thin layer over port site prior to accessing port    nystatin (MYCOSTATIN) powder Apply 1 g to affected area three (3) times daily.     HumaLOG KwikPen Insulin 100 unit/mL kwikpen patient on sliding scale    famotidine (Pepcid) 20 mg tablet Take 20 mg by mouth two (2) times a day.  sennosides (Senna) 8.6 mg cap Take 1-2 Tablets by mouth two (2) times a day.  polyethylene glycol (Miralax) 17 gram/dose powder Take 17 g by mouth daily as needed.  promethazine (PHENERGAN) 25 mg tablet Take 1 Tab by mouth every six (6) hours as needed for Nausea.  ondansetron (ZOFRAN ODT) 4 mg disintegrating tablet Take 1 Tab by mouth every eight (8) hours as needed for Nausea or Vomiting.  diphenoxylate-atropine (LomotiL) 2.5-0.025 mg per tablet Take 2 Tabs by mouth four (4) times daily as needed for Diarrhea. Max Daily Amount: 8 Tabs.  acetaminophen (Acetaminophen Extra Strength) 500 mg tablet Take 1,000 mg by mouth daily as needed.  Insulin Needles, Disposable, (Nellie Pen Needle) 32 gauge x 5/32\" ndle 1 Each by Does Not Apply route three (3) times daily. (Patient not taking: Reported on 4/12/2022)    levoFLOXacin (LEVAQUIN) 500 mg tablet Take 1 Tablet by mouth daily. (Patient not taking: Reported on 3/10/2022)     No current facility-administered medications for this visit.      Facility-Administered Medications Ordered in Other Visits   Medication Dose Route Frequency    0.9% sodium chloride infusion  25 mL/hr IntraVENous CONTINUOUS    saline peripheral flush soln 10 mL  10 mL InterCATHeter PRN    0.9% sodium chloride injection 10 mL  10 mL IntraVENous PRN    heparin (porcine) pf 300-500 Units  300-500 Units InterCATHeter PRN    sodium chloride 0.9 % bolus infusion 500 mL  500 mL IntraVENous PRN    diphenhydrAMINE (BENADRYL) injection 25 mg  25 mg IntraVENous PRN    famotidine (PF) (PEPCID) 20 mg in 0.9% sodium chloride 10 mL injection  20 mg IntraVENous PRN    acetaminophen (TYLENOL) tablet 650 mg  650 mg Oral PRN    meperidine (DEMEROL) injection 25 mg  25 mg IntraVENous PRN    ondansetron (ZOFRAN) injection 8 mg  8 mg IntraVENous PRN      No Known Allergies     Review of Systems: A complete review of systems was obtained, negative except as described above. Physical Exam:     Visit Vitals  BP (!) 141/85   Pulse (!) 105   Temp 98.5 °F (36.9 °C) (Oral)   Resp 18   Ht 5' 4\" (1.626 m)   Wt 181 lb 8 oz (82.3 kg)   SpO2 96%   BMI 31.15 kg/m²     ECOG PS: 1  General: No distress  Eyes: PERRLA, anicteric sclerae  HENT: Atraumatic, wearing mask  Neck: Supple  Respiratory: CTAB, normal respiratory effort  CV: Normal rate, regular rhythm, no murmurs, no peripheral edema  GI: Soft, nontender, nondistended, no masses  MS:  Walks with a cane, tender LEFT lower ribs  Skin: No rashes, ecchymoses, or petechiae. Normal temperature, turgor, and texture. Psych: Alert, oriented, appropriate affect, normal judgment/insight    Results:     Lab Results   Component Value Date/Time    WBC 12.6 (H) 04/12/2022 10:01 AM    HGB 10.9 (L) 04/12/2022 10:01 AM    HCT 34.3 (L) 04/12/2022 10:01 AM    PLATELET 431 (H) 05/83/7933 10:01 AM    MCV 89.3 04/12/2022 10:01 AM    ABS. NEUTROPHILS 7.2 04/12/2022 10:01 AM     Lab Results   Component Value Date/Time    Sodium 136 04/12/2022 10:01 AM    Potassium 3.8 04/12/2022 10:01 AM    Chloride 101 04/12/2022 10:01 AM    CO2 29 04/12/2022 10:01 AM    Glucose 195 (H) 04/12/2022 10:01 AM    BUN 15 04/12/2022 10:01 AM    Creatinine 0.70 04/12/2022 10:01 AM    GFR est AA >60 04/12/2022 10:01 AM    GFR est non-AA >60 04/12/2022 10:01 AM    Calcium 8.7 04/12/2022 10:01 AM    Glucose (POC) 192 (H) 07/15/2020 11:56 AM    Creatinine (POC) 0.7 02/06/2013 10:21 AM     Lab Results   Component Value Date/Time    Bilirubin, total 0.4 04/12/2022 10:01 AM    ALT (SGPT) 19 04/12/2022 10:01 AM    Alk.  phosphatase 98 04/12/2022 10:01 AM    Protein, total 6.8 04/12/2022 10:01 AM    Albumin 3.1 (L) 04/12/2022 10:01 AM    Globulin 3.7 04/12/2022 10:01 AM       CT A/P 4/30/2020  IMPRESSION: Suspect neoplastic pancreatic mass versus atypical focal  pancreatitis with splenic and superior mesenteric vein occlusion. Consider  further evaluation with endoscopic ultrasound at which time biopsy could  potentially be performed.     CT Chest 5/21/2020  IMPRESSION:  1. No evidence of pulmonary metastatic disease. No evidence of mediastinal or  hilar lymphadenopathy. No pleural effusions identified. 2. No definite evidence of central pulmonary embolic disease. 3. Presence of a mass lesion involving the pancreatic head/body. 4. Evidence of fatty infiltration involving the liver. Presence of focal  low-density areas within the liver compatible with cysts. Surgical absence of  the gallbladder.     CT A/P 6/19/2020  IMPRESSION:  Pancreatitis with ascites favored. Discrete pancreatic mass is not identified. No biliary dilatation or definite arterial or venous thrombosis. No liver  metastases. Fat-containing ventral hernia  Nonobstructing left renal calculus  Retrolisthesis of L2 and L3.     CT C/A/P 10/12/2020  IMPRESSION:  1. Interval improvement in appearance of the pancreas (see discussion above). 2. Normal sized liver. Stable appearance of the previously described areas of  decreased attenuation within liver. 3. Surgical absence of the gallbladder. 4. Evidence of splenomegaly. 5. Normal sized kidneys. Presence of small, nonobstructing calculi within the  left kidney. No evidence of hydronephrotic change. 6. Suboptimal distention of the urinary bladder. 7. Presence of a small, fat-containing ventral hernia in the mid abdominal  Region. CT C/A/P 9/17/2021  IMPRESSION  New peritoneal implants identified in the anterior pelvis  Interval splenectomy and repair of the ventral hernia  Interval thickening of the GE junction and circumferential thickening of the  pylorus.  This may represent gastritis but correlation with endoscopy or upper GI  recommended     Path: 5/8/2020  CYTOLOGIC INTERPRETATION:   Pancreas, EUS-guided fine needle aspiration and cell blocks: Adenocarcinoma     Path: 3/17/2021  No residual malignancy in pancrease, 0/29 lymph nodes. 2-3mm focus metastatic adenocarcinoma within hernia sac     Assessment/PLAN:     1) stage 4 Pancreatic Adenocarcinoma   Completed neoadjuvant chemotherapy. Radiation with Dr Betsy Haley. Definitive resection with Dr Nidhi Mendoza  Now peritoneal disease/mets. Goal of care palliative. Seen today for fu pre chemo. Reviewed records and history with pt and family today. CTs 3/22 stable. Pt has been tolerating chemo with manageable side effects. Having pain related to fall at home/went to ER and ok. Has pain meds at home. She is doing well with palliative chemotherapy with Gemcitaine/Abraxane. Markers are dropping. .    Will do xrays today due to fall at home. If xrays ok, can do chemo today. Labs pending. Pt / family agreeable to plan. 2) cancer Pain/Neuropathic Pain  S/p celiac block--pain in abd controlled. On dilaudid prn. Cymbalta. On ativan bid also. Discussed caution with multiple meds today. Pt /family monitoring well. 3) fall at home. More pain. Will send for more xrays today. Does not want to go to ER. If xrays ok, can do chemo today. Will increase ativan if needed for M/S pain. Continue dilaudid prn. 4) Nausea. zofran prn. 5) Anxiety/Insomnia. On ativan prn. Will increase to TID prn. Monitor. 6) thrombocytosis due to chemo/ reactive. Went back to baseline. Up again. Monitor. D/w IM. 7) psychosocial. Mood good. Coping well. Lives at [facility]. Friends with [employer]. Daughter here today.    Has chickens.        Fu here in a month  Call if questions      Signed By: Faye Stanley, DO

## 2022-04-12 ENCOUNTER — HOSPITAL ENCOUNTER (OUTPATIENT)
Dept: GENERAL RADIOLOGY | Age: 67
Discharge: HOME | End: 2022-04-12
Payer: MEDICARE

## 2022-04-12 ENCOUNTER — HOSPITAL ENCOUNTER (OUTPATIENT)
Dept: INFUSION THERAPY | Age: 67
Discharge: HOME | End: 2022-04-12
Payer: MEDICARE

## 2022-04-12 ENCOUNTER — OFFICE VISIT (OUTPATIENT)
Dept: ONCOLOGY | Age: 67
End: 2022-04-12
Payer: MEDICARE

## 2022-04-12 VITALS
BODY MASS INDEX: 30.99 KG/M2 | SYSTOLIC BLOOD PRESSURE: 141 MMHG | TEMPERATURE: 98.5 F | HEART RATE: 105 BPM | WEIGHT: 181.5 LBS | RESPIRATION RATE: 18 BRPM | OXYGEN SATURATION: 96 % | HEIGHT: 64 IN | DIASTOLIC BLOOD PRESSURE: 85 MMHG

## 2022-04-12 VITALS
HEIGHT: 64 IN | WEIGHT: 181.5 LBS | OXYGEN SATURATION: 96 % | RESPIRATION RATE: 18 BRPM | DIASTOLIC BLOOD PRESSURE: 85 MMHG | TEMPERATURE: 98.5 F | SYSTOLIC BLOOD PRESSURE: 141 MMHG | BODY MASS INDEX: 30.99 KG/M2 | HEART RATE: 105 BPM

## 2022-04-12 DIAGNOSIS — M54.2 NECK PAIN: ICD-10-CM

## 2022-04-12 DIAGNOSIS — G89.3 NEOPLASM RELATED PAIN: ICD-10-CM

## 2022-04-12 DIAGNOSIS — C25.9 ADENOCARCINOMA OF PANCREAS (HCC): Primary | ICD-10-CM

## 2022-04-12 DIAGNOSIS — F32.A DEPRESSION, UNSPECIFIED DEPRESSION TYPE: ICD-10-CM

## 2022-04-12 DIAGNOSIS — C25.9 MALIGNANT NEOPLASM OF PANCREAS, UNSPECIFIED LOCATION OF MALIGNANCY (HCC): Primary | ICD-10-CM

## 2022-04-12 DIAGNOSIS — C25.9 MALIGNANT NEOPLASM OF PANCREAS, UNSPECIFIED LOCATION OF MALIGNANCY (HCC): ICD-10-CM

## 2022-04-12 DIAGNOSIS — M79.2 NEUROPATHIC PAIN: ICD-10-CM
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
## 2022-04-12 DIAGNOSIS — R07.81 RIB PAIN: ICD-10-CM

## 2022-04-12 LAB
ALBUMIN SERPL-MCNC: 3.1 G/DL (ref 3.5–5)
ALBUMIN/GLOB SERPL: 0.8 {RATIO} (ref 1.1–2.2)
ALP SERPL-CCNC: 98 U/L (ref 45–117)
ALT SERPL-CCNC: 19 U/L (ref 12–78)
ANION GAP SERPL CALC-SCNC: 6 MMOL/L (ref 5–15)
AST SERPL-CCNC: 20 U/L (ref 15–37)
BASOPHILS # BLD: 0.1 K/UL (ref 0–0.1)
BASOPHILS NFR BLD: 1 % (ref 0–1)
BILIRUB SERPL-MCNC: 0.4 MG/DL (ref 0.2–1)
BUN SERPL-MCNC: 15 MG/DL (ref 6–20)
BUN/CREAT SERPL: 21 (ref 12–20)
CALCIUM SERPL-MCNC: 8.7 MG/DL (ref 8.5–10.1)
CHLORIDE SERPL-SCNC: 101 MMOL/L (ref 97–108)
CO2 SERPL-SCNC: 29 MMOL/L (ref 21–32)
CREAT SERPL-MCNC: 0.7 MG/DL (ref 0.55–1.02)
DIFFERENTIAL METHOD BLD: ABNORMAL
EOSINOPHIL # BLD: 0.1 K/UL (ref 0–0.4)
EOSINOPHIL NFR BLD: 1 % (ref 0–7)
ERYTHROCYTE [DISTWIDTH] IN BLOOD BY AUTOMATED COUNT: 18.2 % (ref 11.5–14.5)
GLOBULIN SER CALC-MCNC: 3.7 G/DL (ref 2–4)
GLUCOSE SERPL-MCNC: 195 MG/DL (ref 65–100)
HCT VFR BLD AUTO: 34.3 % (ref 35–47)
HGB BLD-MCNC: 10.9 G/DL (ref 11.5–16)
IMM GRANULOCYTES # BLD AUTO: 0 K/UL (ref 0–0.04)
IMM GRANULOCYTES NFR BLD AUTO: 0 % (ref 0–0.5)
LYMPHOCYTES # BLD: 2.4 K/UL (ref 0.8–3.5)
LYMPHOCYTES NFR BLD: 19 % (ref 12–49)
MCH RBC QN AUTO: 28.4 PG (ref 26–34)
MCHC RBC AUTO-ENTMCNC: 31.8 G/DL (ref 30–36.5)
MCV RBC AUTO: 89.3 FL (ref 80–99)
MONOCYTES # BLD: 2.8 K/UL (ref 0–1)
MONOCYTES NFR BLD: 22 % (ref 5–13)
NEUTS SEG # BLD: 7.2 K/UL (ref 1.8–8)
NEUTS SEG NFR BLD: 57 % (ref 32–75)
NRBC # BLD: 0 K/UL (ref 0–0.01)
NRBC BLD-RTO: 0 PER 100 WBC
PLATELET # BLD AUTO: 707 K/UL (ref 150–400)
PMV BLD AUTO: 9.6 FL (ref 8.9–12.9)
POTASSIUM SERPL-SCNC: 3.8 MMOL/L (ref 3.5–5.1)
PROT SERPL-MCNC: 6.8 G/DL (ref 6.4–8.2)
RBC # BLD AUTO: 3.84 M/UL (ref 3.8–5.2)
RBC MORPH BLD: ABNORMAL
SODIUM SERPL-SCNC: 136 MMOL/L (ref 136–145)
WBC # BLD AUTO: 12.6 K/UL (ref 3.6–11)

## 2022-04-12 PROCEDURE — 71046 X-RAY EXAM CHEST 2 VIEWS: CPT

## 2022-04-12 PROCEDURE — G8753 SYS BP > OR = 140: HCPCS | Performed by: INTERNAL MEDICINE

## 2022-04-12 PROCEDURE — 71110 X-RAY EXAM RIBS BIL 3 VIEWS: CPT

## 2022-04-12 PROCEDURE — G8399 PT W/DXA RESULTS DOCUMENT: HCPCS | Performed by: INTERNAL MEDICINE

## 2022-04-12 PROCEDURE — 96413 CHEMO IV INFUSION 1 HR: CPT

## 2022-04-12 PROCEDURE — 74011250636 HC RX REV CODE- 250/636: Performed by: INTERNAL MEDICINE

## 2022-04-12 PROCEDURE — 99215 OFFICE O/P EST HI 40 MIN: CPT | Performed by: INTERNAL MEDICINE

## 2022-04-12 PROCEDURE — G8536 NO DOC ELDER MAL SCRN: HCPCS | Performed by: INTERNAL MEDICINE

## 2022-04-12 PROCEDURE — 3017F COLORECTAL CA SCREEN DOC REV: CPT | Performed by: INTERNAL MEDICINE

## 2022-04-12 PROCEDURE — G8754 DIAS BP LESS 90: HCPCS | Performed by: INTERNAL MEDICINE

## 2022-04-12 PROCEDURE — 1101F PT FALLS ASSESS-DOCD LE1/YR: CPT | Performed by: INTERNAL MEDICINE

## 2022-04-12 PROCEDURE — 36415 COLL VENOUS BLD VENIPUNCTURE: CPT

## 2022-04-12 PROCEDURE — 96417 CHEMO IV INFUS EACH ADDL SEQ: CPT

## 2022-04-12 PROCEDURE — 1090F PRES/ABSN URINE INCON ASSESS: CPT | Performed by: INTERNAL MEDICINE

## 2022-04-12 PROCEDURE — G8417 CALC BMI ABV UP PARAM F/U: HCPCS | Performed by: INTERNAL MEDICINE

## 2022-04-12 PROCEDURE — 72050 X-RAY EXAM NECK SPINE 4/5VWS: CPT

## 2022-04-12 PROCEDURE — 77030012965 HC NDL HUBR BBMI -A

## 2022-04-12 PROCEDURE — 74011000258 HC RX REV CODE- 258: Performed by: INTERNAL MEDICINE

## 2022-04-12 PROCEDURE — 74011000250 HC RX REV CODE- 250: Performed by: INTERNAL MEDICINE

## 2022-04-12 PROCEDURE — 80053 COMPREHEN METABOLIC PANEL: CPT

## 2022-04-12 PROCEDURE — G8427 DOCREV CUR MEDS BY ELIG CLIN: HCPCS | Performed by: INTERNAL MEDICINE

## 2022-04-12 PROCEDURE — G8510 SCR DEP NEG, NO PLAN REQD: HCPCS | Performed by: INTERNAL MEDICINE

## 2022-04-12 PROCEDURE — 96375 TX/PRO/DX INJ NEW DRUG ADDON: CPT

## 2022-04-12 PROCEDURE — 85025 COMPLETE CBC W/AUTO DIFF WBC: CPT

## 2022-04-12 RX ORDER — SODIUM CHLORIDE 9 MG/ML
25 INJECTION, SOLUTION INTRAVENOUS CONTINUOUS
Status: DISPENSED | OUTPATIENT
Start: 2022-04-12 | End: 2022-04-12

## 2022-04-12 RX ORDER — ACETAMINOPHEN 325 MG/1
650 TABLET ORAL AS NEEDED
Status: ACTIVE | OUTPATIENT
Start: 2022-04-12 | End: 2022-04-12

## 2022-04-12 RX ORDER — HEPARIN 100 UNIT/ML
300-500 SYRINGE INTRAVENOUS AS NEEDED
Status: ACTIVE | OUTPATIENT
Start: 2022-04-12 | End: 2022-04-12

## 2022-04-12 RX ORDER — ONDANSETRON 2 MG/ML
8 INJECTION INTRAMUSCULAR; INTRAVENOUS AS NEEDED
Status: ACTIVE | OUTPATIENT
Start: 2022-04-12 | End: 2022-04-12

## 2022-04-12 RX ORDER — SODIUM CHLORIDE 9 MG/ML
10 INJECTION INTRAMUSCULAR; INTRAVENOUS; SUBCUTANEOUS AS NEEDED
Status: ACTIVE | OUTPATIENT
Start: 2022-04-12 | End: 2022-04-12

## 2022-04-12 RX ORDER — ONDANSETRON 2 MG/ML
8 INJECTION INTRAMUSCULAR; INTRAVENOUS ONCE
Status: COMPLETED | OUTPATIENT
Start: 2022-04-12 | End: 2022-04-12

## 2022-04-12 RX ORDER — DEXAMETHASONE SODIUM PHOSPHATE 4 MG/ML
10 INJECTION, SOLUTION INTRA-ARTICULAR; INTRALESIONAL; INTRAMUSCULAR; INTRAVENOUS; SOFT TISSUE ONCE
Status: COMPLETED | OUTPATIENT
Start: 2022-04-12 | End: 2022-04-12

## 2022-04-12 RX ORDER — DIPHENHYDRAMINE HYDROCHLORIDE 50 MG/ML
25 INJECTION, SOLUTION INTRAMUSCULAR; INTRAVENOUS AS NEEDED
Status: DISPENSED | OUTPATIENT
Start: 2022-04-12 | End: 2022-04-12

## 2022-04-12 RX ORDER — DIPHENHYDRAMINE HYDROCHLORIDE 50 MG/ML
25 INJECTION, SOLUTION INTRAMUSCULAR; INTRAVENOUS
Status: COMPLETED | OUTPATIENT
Start: 2022-04-12 | End: 2022-04-12

## 2022-04-12 RX ORDER — SODIUM CHLORIDE 0.9 % (FLUSH) 0.9 %
10 SYRINGE (ML) INJECTION AS NEEDED
Status: ACTIVE | OUTPATIENT
Start: 2022-04-12 | End: 2022-04-12

## 2022-04-12 RX ADMIN — SODIUM CHLORIDE 25 ML/HR: 9 INJECTION, SOLUTION INTRAVENOUS at 12:25

## 2022-04-12 RX ADMIN — DEXAMETHASONE SODIUM PHOSPHATE 10 MG: 4 INJECTION, SOLUTION INTRAMUSCULAR; INTRAVENOUS at 12:30

## 2022-04-12 RX ADMIN — ONDANSETRON 8 MG: 2 INJECTION INTRAMUSCULAR; INTRAVENOUS at 12:26

## 2022-04-12 RX ADMIN — SODIUM CHLORIDE, PRESERVATIVE FREE 10 ML: 5 INJECTION INTRAVENOUS at 14:30

## 2022-04-12 RX ADMIN — Medication 500 UNITS: at 14:30

## 2022-04-12 RX ADMIN — PACLITAXEL 184 MG: 100 INJECTION, POWDER, LYOPHILIZED, FOR SUSPENSION INTRAVENOUS at 13:05

## 2022-04-12 RX ADMIN — GEMCITABINE 1656 MG: 38 INJECTION, SOLUTION INTRAVENOUS at 13:56

## 2022-04-12 RX ADMIN — DIPHENHYDRAMINE HYDROCHLORIDE 25 MG: 50 INJECTION, SOLUTION INTRAMUSCULAR; INTRAVENOUS at 12:48

## 2022-04-12 NOTE — PROGRESS NOTES
\[facility]\"" Progress Note    Date: 2022    Name: Stephen Morgan    MRN: [id number]         : 1955     Ms. Ba Arrived ambulatory and in no distress for cycle 14 day 1 of Abraxane/Gemzar regimen. Assessment was completed, pt reports she was in the ER on  for a fall on concrete, still very sore. X-rays negative for fracture, pt neck very stiff today. Port accessed without difficulty, labs drawn and in process. Chemotherapy Flowsheet 2022   Cycle C14 D1   Date 2022   Drug / Regimen Abraxane/Gemzar   Dosage 184 mg/1656 mg   Time Up 1305   Time Down 1429   Pre Hydration -   Pre Meds Zofran 8 mg, Decadron 10 mg, Benadryl 25 mg   Notes -     0900:  Proceeded to appt with Dr. Otilio Jimenez    Ms. Ba's vitals were reviewed. Visit Vitals  BP (!) 141/85 (BP 1 Location: Left arm, BP Patient Position: Sitting)   Pulse (!) 105   Temp 98.5 °F (36.9 °C)   Resp 18   Ht 5' 4\" (1.626 m)   Wt 82.3 kg (181 lb 8 oz)   SpO2 96%   Breastfeeding No   BMI 31.15 kg/m²       Lab results were obtained and reviewed. Recent Results (from the past 12 hour(s))   CBC WITH AUTOMATED DIFF    Collection Time: 22 10:01 AM   Result Value Ref Range    WBC 12.6 (H) 3.6 - 11.0 K/uL    RBC 3.84 3.80 - 5.20 M/uL    HGB 10.9 (L) 11.5 - 16.0 g/dL    HCT 34.3 (L) 35.0 - 47.0 %    MCV 89.3 80.0 - 99.0 FL    MCH 28.4 26.0 - 34.0 PG    MCHC 31.8 30.0 - 36.5 g/dL    RDW 18.2 (H) 11.5 - 14.5 %    PLATELET 008 (H) 706 - 400 K/uL    MPV 9.6 8.9 - 12.9 FL    NRBC 0.0 0  WBC    ABSOLUTE NRBC 0.00 0.00 - 0.01 K/uL    NEUTROPHILS 57 32 - 75 %    LYMPHOCYTES 19 12 - 49 %    MONOCYTES 22 (H) 5 - 13 %    EOSINOPHILS 1 0 - 7 %    BASOPHILS 1 0 - 1 %    IMMATURE GRANULOCYTES 0 0.0 - 0.5 %    ABS. NEUTROPHILS 7.2 1.8 - 8.0 K/UL    ABS. LYMPHOCYTES 2.4 0.8 - 3.5 K/UL    ABS. MONOCYTES 2.8 (H) 0.0 - 1.0 K/UL    ABS. EOSINOPHILS 0.1 0.0 - 0.4 K/UL    ABS. BASOPHILS 0.1 0.0 - 0.1 K/UL    ABS. IMM.  GRANS. 0.0 0.00 - 0.04 K/UL    DF AUTOMATED      RBC COMMENTS ANISOCYTOSIS  2+       METABOLIC PANEL, COMPREHENSIVE    Collection Time: 04/12/22 10:01 AM   Result Value Ref Range    Sodium 136 136 - 145 mmol/L    Potassium 3.8 3.5 - 5.1 mmol/L    Chloride 101 97 - 108 mmol/L    CO2 29 21 - 32 mmol/L    Anion gap 6 5 - 15 mmol/L    Glucose 195 (H) 65 - 100 mg/dL    BUN 15 6 - 20 MG/DL    Creatinine 0.70 0.55 - 1.02 MG/DL    BUN/Creatinine ratio 21 (H) 12 - 20      GFR est AA >60 >60 ml/min/1.73m2    GFR est non-AA >60 >60 ml/min/1.73m2    Calcium 8.7 8.5 - 10.1 MG/DL    Bilirubin, total 0.4 0.2 - 1.0 MG/DL    ALT (SGPT) 19 12 - 78 U/L    AST (SGOT) 20 15 - 37 U/L    Alk. phosphatase 98 45 - 117 U/L    Protein, total 6.8 6.4 - 8.2 g/dL    Albumin 3.1 (L) 3.5 - 5.0 g/dL    Globulin 3.7 2.0 - 4.0 g/dL    A-G Ratio 0.8 (L) 1.1 - 2.2       Pre-medications  were administered as ordered and chemotherapy was initiated. 1225: NS initiated. 1226: Zofran 8 mg given IVP. 1230: Decadron 10 mg given IVP. 1248: Benadryl 25 mg given IVP. 1305: Abraxane 184 mg initiated to infuse. 1355: Abraxane infusion complete, line flushed with NS.  1356: Gemzar 1,656 mg initiated to infuse over 30 minutes. 1429: Gemzar infusion complete. NS flushing line and stopped. 1430: Port flushed with 10 mL NS, blood return absent, and heparin. Needle  removed from site without redness, swelling or pain. Band-aid applied. Ms. Yannick Hoffmann tolerated treatment well and was discharged from [place] [id number] in stable condition at 1440. She is to return on April 19 at 0830 for her next appointment.     Estevan Miranda RN  April 12, 2022

## 2022-04-12 NOTE — PATIENT INSTRUCTIONS
High Blood Pressure: Care Instructions  Overview     It's normal for blood pressure to go up and down throughout the day. But if it stays up, you have high blood pressure. Another name for high blood pressure is hypertension. Despite what a lot of people think, high blood pressure usually doesn't cause headaches or make you feel dizzy or lightheaded. It usually has no symptoms. But it does increase your risk of stroke, heart attack, and other problems. You and your doctor will talk about your risks of these problems based on your blood pressure. Your doctor will give you a goal for your blood pressure. Your goal will be based on your health and your age. Lifestyle changes, such as eating healthy and being active, are always important to help lower blood pressure. You might also take medicine to reach your blood pressure goal.  Follow-up care is a key part of your treatment and safety. Be sure to make and go to all appointments, and call your doctor if you are having problems. It's also a good idea to know your test results and keep a list of the medicines you take. How can you care for yourself at home? Medical treatment  · If you stop taking your medicine, your blood pressure will go back up. You may take one or more types of medicine to lower your blood pressure. Be safe with medicines. Take your medicine exactly as prescribed. Call your doctor if you think you are having a problem with your medicine. · Talk to your doctor before you start taking aspirin every day. Aspirin can help certain people lower their risk of a heart attack or stroke. But taking aspirin isn't right for everyone, because it can cause serious bleeding. · See your doctor regularly. You may need to see the doctor more often at first or until your blood pressure comes down. · If you are taking blood pressure medicine, talk to your doctor before you take decongestants or anti-inflammatory medicine, such as ibuprofen.  Some of these medicines can raise blood pressure. · Learn how to check your blood pressure at home. Lifestyle changes  · Stay at a healthy weight. This is especially important if you put on weight around the waist. Losing even 10 pounds can help you lower your blood pressure. · If your doctor recommends it, get more exercise. Walking is a good choice. Bit by bit, increase the amount you walk every day. Try for at least 30 minutes on most days of the week. You also may want to swim, bike, or do other activities. · Avoid or limit alcohol. Talk to your doctor about whether you can drink any alcohol. · Try to limit how much sodium you eat to less than 2,300 milligrams (mg) a day. Your doctor may ask you to try to eat less than 1,500 mg a day. · Eat plenty of fruits (such as bananas and oranges), vegetables, legumes, whole grains, and low-fat dairy products. · Lower the amount of saturated fat in your diet. Saturated fat is found in animal products such as milk, cheese, and meat. Limiting these foods may help you lose weight and also lower your risk for heart disease. · Do not smoke. Smoking increases your risk for heart attack and stroke. If you need help quitting, talk to your doctor about stop-smoking programs and medicines. These can increase your chances of quitting for good. When should you call for help? Call 911  anytime you think you may need emergency care. This may mean having symptoms that suggest that your blood pressure is causing a serious heart or blood vessel problem. Your blood pressure may be over 180/120. For example, call 911 if:    · You have symptoms of a heart attack. These may include:  ? Chest pain or pressure, or a strange feeling in the chest.  ? Sweating. ? Shortness of breath. ? Nausea or vomiting. ? Pain, pressure, or a strange feeling in the back, neck, jaw, or upper belly or in one or both shoulders or arms. ? Lightheadedness or sudden weakness.   ? A fast or irregular heartbeat.     · You have symptoms of a stroke. These may include:  ? Sudden numbness, tingling, weakness, or loss of movement in your face, arm, or leg, especially on only one side of your body. ? Sudden vision changes. ? Sudden trouble speaking. ? Sudden confusion or trouble understanding simple statements. ? Sudden problems with walking or balance. ? A sudden, severe headache that is different from past headaches.     · You have severe back or belly pain. Do not wait until your blood pressure comes down on its own. Get help right away. Call your doctor now or seek immediate care if:    · Your blood pressure is much higher than normal (such as 180/120 or higher), but you don't have symptoms.     · You think high blood pressure is causing symptoms, such as:  ? Severe headache.  ? Blurry vision. Watch closely for changes in your health, and be sure to contact your doctor if:    · Your blood pressure measures higher than your doctor recommends at least 2 times. That means the top number is higher or the bottom number is higher, or both.     · You think you may be having side effects from your blood pressure medicine. Where can you learn more? Go to http://www.gray.com/  Enter K485284 in the search box to learn more about \"High Blood Pressure: Care Instructions. \"  Current as of: January 10, 2022               Content Version: 13.2  © 2006-2022 [email or web address]. Care instructions adapted under license by [employer] (which disclaims liability or warranty for this information). If you have questions about a medical condition or this instruction, always ask your healthcare professional. [place] [address] any warranty or liability for your use of this information.

## 2022-04-13 DIAGNOSIS — C25.9 MALIGNANT NEOPLASM OF PANCREAS, UNSPECIFIED LOCATION OF MALIGNANCY (HCC): ICD-10-CM

## 2022-04-13 RX ORDER — LORAZEPAM 1 MG/1
1 TABLET ORAL
Qty: 90 TABLET | Refills: 2 | Status: SHIPPED | OUTPATIENT
Start: 2022-04-13 | End: 2022-07-19 | Stop reason: SDUPTHER

## 2022-04-19 ENCOUNTER — TELEPHONE (OUTPATIENT)
Dept: ONCOLOGY | Age: 67
End: 2022-04-19

## 2022-04-19 ENCOUNTER — HOSPITAL ENCOUNTER (OUTPATIENT)
Dept: INFUSION THERAPY | Age: 67
Discharge: HOME | End: 2022-04-19
Payer: MEDICARE

## 2022-04-19 VITALS
RESPIRATION RATE: 19 BRPM | HEIGHT: 64 IN | OXYGEN SATURATION: 97 % | WEIGHT: 176.8 LBS | DIASTOLIC BLOOD PRESSURE: 84 MMHG | TEMPERATURE: 98.5 F | SYSTOLIC BLOOD PRESSURE: 188 MMHG | BODY MASS INDEX: 30.19 KG/M2 | HEART RATE: 89 BPM

## 2022-04-19 DIAGNOSIS — C25.9 ADENOCARCINOMA OF PANCREAS (HCC): Primary | ICD-10-CM

## 2022-04-19 LAB
ALBUMIN SERPL-MCNC: 2.9 G/DL (ref 3.5–5)
ALBUMIN/GLOB SERPL: 0.9 {RATIO} (ref 1.1–2.2)
ALP SERPL-CCNC: 91 U/L (ref 45–117)
ALT SERPL-CCNC: 19 U/L (ref 12–78)
ANION GAP SERPL CALC-SCNC: 7 MMOL/L (ref 5–15)
AST SERPL-CCNC: 18 U/L (ref 15–37)
BASOPHILS # BLD: 0 K/UL (ref 0–0.1)
BASOPHILS NFR BLD: 1 % (ref 0–1)
BILIRUB SERPL-MCNC: 0.3 MG/DL (ref 0.2–1)
BUN SERPL-MCNC: 13 MG/DL (ref 6–20)
BUN/CREAT SERPL: 20 (ref 12–20)
CALCIUM SERPL-MCNC: 8.6 MG/DL (ref 8.5–10.1)
CHLORIDE SERPL-SCNC: 103 MMOL/L (ref 97–108)
CO2 SERPL-SCNC: 28 MMOL/L (ref 21–32)
CREAT SERPL-MCNC: 0.65 MG/DL (ref 0.55–1.02)
DIFFERENTIAL METHOD BLD: ABNORMAL
EOSINOPHIL # BLD: 0.1 K/UL (ref 0–0.4)
EOSINOPHIL NFR BLD: 2 % (ref 0–7)
ERYTHROCYTE [DISTWIDTH] IN BLOOD BY AUTOMATED COUNT: 17.8 % (ref 11.5–14.5)
GLOBULIN SER CALC-MCNC: 3.3 G/DL (ref 2–4)
GLUCOSE SERPL-MCNC: 278 MG/DL (ref 65–100)
HCT VFR BLD AUTO: 31.3 % (ref 35–47)
HGB BLD-MCNC: 9.9 G/DL (ref 11.5–16)
IMM GRANULOCYTES # BLD AUTO: 0 K/UL (ref 0–0.04)
IMM GRANULOCYTES NFR BLD AUTO: 0 % (ref 0–0.5)
LYMPHOCYTES # BLD: 1.7 K/UL (ref 0.8–3.5)
LYMPHOCYTES NFR BLD: 39 % (ref 12–49)
MCH RBC QN AUTO: 28 PG (ref 26–34)
MCHC RBC AUTO-ENTMCNC: 31.6 G/DL (ref 30–36.5)
MCV RBC AUTO: 88.7 FL (ref 80–99)
MONOCYTES # BLD: 0.9 K/UL (ref 0–1)
MONOCYTES NFR BLD: 23 % (ref 5–13)
NEUTS SEG # BLD: 1.4 K/UL (ref 1.8–8)
NEUTS SEG NFR BLD: 35 % (ref 32–75)
NRBC # BLD: 0 K/UL (ref 0–0.01)
NRBC BLD-RTO: 0 PER 100 WBC
PLATELET # BLD AUTO: 306 K/UL (ref 150–400)
PLATELET COMMENTS,PCOM: ABNORMAL
PMV BLD AUTO: 11.1 FL (ref 8.9–12.9)
POTASSIUM SERPL-SCNC: 4 MMOL/L (ref 3.5–5.1)
PROT SERPL-MCNC: 6.2 G/DL (ref 6.4–8.2)
RBC # BLD AUTO: 3.53 M/UL (ref 3.8–5.2)
RBC MORPH BLD: ABNORMAL
SODIUM SERPL-SCNC: 138 MMOL/L (ref 136–145)
WBC # BLD AUTO: 4.1 K/UL (ref 3.6–11)

## 2022-04-19 PROCEDURE — 74011000250 HC RX REV CODE- 250: Performed by: INTERNAL MEDICINE

## 2022-04-19 PROCEDURE — 80053 COMPREHEN METABOLIC PANEL: CPT

## 2022-04-19 PROCEDURE — 77030012965 HC NDL HUBR BBMI -A

## 2022-04-19 PROCEDURE — 36415 COLL VENOUS BLD VENIPUNCTURE: CPT

## 2022-04-19 PROCEDURE — 74011250636 HC RX REV CODE- 250/636: Performed by: INTERNAL MEDICINE

## 2022-04-19 PROCEDURE — 96417 CHEMO IV INFUS EACH ADDL SEQ: CPT

## 2022-04-19 PROCEDURE — 96375 TX/PRO/DX INJ NEW DRUG ADDON: CPT

## 2022-04-19 PROCEDURE — 74011000258 HC RX REV CODE- 258: Performed by: INTERNAL MEDICINE

## 2022-04-19 PROCEDURE — 96413 CHEMO IV INFUSION 1 HR: CPT

## 2022-04-19 PROCEDURE — 85025 COMPLETE CBC W/AUTO DIFF WBC: CPT

## 2022-04-19 RX ORDER — ACETAMINOPHEN 325 MG/1
650 TABLET ORAL AS NEEDED
Status: ACTIVE | OUTPATIENT
Start: 2022-04-19 | End: 2022-04-19

## 2022-04-19 RX ORDER — HEPARIN 100 UNIT/ML
300-500 SYRINGE INTRAVENOUS AS NEEDED
Status: DISPENSED | OUTPATIENT
Start: 2022-04-19 | End: 2022-04-19

## 2022-04-19 RX ORDER — DIPHENHYDRAMINE HYDROCHLORIDE 50 MG/ML
25 INJECTION, SOLUTION INTRAMUSCULAR; INTRAVENOUS
Status: COMPLETED | OUTPATIENT
Start: 2022-04-19 | End: 2022-04-19

## 2022-04-19 RX ORDER — SODIUM CHLORIDE 9 MG/ML
10 INJECTION INTRAMUSCULAR; INTRAVENOUS; SUBCUTANEOUS AS NEEDED
Status: ACTIVE | OUTPATIENT
Start: 2022-04-19 | End: 2022-04-19

## 2022-04-19 RX ORDER — DIPHENHYDRAMINE HYDROCHLORIDE 50 MG/ML
25 INJECTION, SOLUTION INTRAMUSCULAR; INTRAVENOUS AS NEEDED
Status: ACTIVE | OUTPATIENT
Start: 2022-04-19 | End: 2022-04-19

## 2022-04-19 RX ORDER — DEXAMETHASONE SODIUM PHOSPHATE 100 MG/10ML
10 INJECTION INTRAMUSCULAR; INTRAVENOUS ONCE
Status: COMPLETED | OUTPATIENT
Start: 2022-04-19 | End: 2022-04-19

## 2022-04-19 RX ORDER — ONDANSETRON 2 MG/ML
8 INJECTION INTRAMUSCULAR; INTRAVENOUS ONCE
Status: COMPLETED | OUTPATIENT
Start: 2022-04-19 | End: 2022-04-19

## 2022-04-19 RX ORDER — ONDANSETRON 2 MG/ML
8 INJECTION INTRAMUSCULAR; INTRAVENOUS AS NEEDED
Status: ACTIVE | OUTPATIENT
Start: 2022-04-19 | End: 2022-04-19

## 2022-04-19 RX ORDER — SODIUM CHLORIDE 9 MG/ML
25 INJECTION, SOLUTION INTRAVENOUS CONTINUOUS
Status: DISPENSED | OUTPATIENT
Start: 2022-04-19 | End: 2022-04-19

## 2022-04-19 RX ADMIN — DEXAMETHASONE SODIUM PHOSPHATE 10 MG: 10 INJECTION INTRAMUSCULAR; INTRAVENOUS at 10:17

## 2022-04-19 RX ADMIN — DIPHENHYDRAMINE HYDROCHLORIDE 25 MG: 50 INJECTION, SOLUTION INTRAMUSCULAR; INTRAVENOUS at 10:21

## 2022-04-19 RX ADMIN — Medication 500 UNITS: at 12:26

## 2022-04-19 RX ADMIN — GEMCITABINE 1656 MG: 38 INJECTION, SOLUTION INTRAVENOUS at 11:49

## 2022-04-19 RX ADMIN — SODIUM CHLORIDE, PRESERVATIVE FREE 10 ML: 5 INJECTION INTRAVENOUS at 12:26

## 2022-04-19 RX ADMIN — SODIUM CHLORIDE 25 ML/HR: 9 INJECTION, SOLUTION INTRAVENOUS at 10:11

## 2022-04-19 RX ADMIN — PACLITAXEL 184 MG: 100 INJECTION, POWDER, LYOPHILIZED, FOR SUSPENSION INTRAVENOUS at 10:52

## 2022-04-19 RX ADMIN — ONDANSETRON 8 MG: 2 INJECTION INTRAMUSCULAR; INTRAVENOUS at 10:14

## 2022-04-19 RX ADMIN — FAMOTIDINE 20 MG: 10 INJECTION INTRAVENOUS at 10:27

## 2022-04-19 RX ADMIN — SODIUM CHLORIDE, PRESERVATIVE FREE 10 ML: 5 INJECTION INTRAVENOUS at 08:55

## 2022-04-19 NOTE — TELEPHONE ENCOUNTER
HIPAA verified. Notified patient of Xray results per DR Lofton's note. Patient verbalized understanding and thanked for call.

## 2022-04-19 NOTE — PROGRESS NOTES
[facility] Progress Note    Date: 2022    Name: Marco Antonio Bravo    MRN: [id number]         : 1955    Ms. Ba Arrived ambulatory and in no distress for cycle 14 day 8 of Abraxane/Gemcitabine regimen. Assessment was completed, no acute issues at this time but did have another fall early this AM. Right shoulder is sore but ROM is intact. States generalized soreness and stiffness she was experiencing last week started to improve 4/15/22. Still has pain to rib cage. She reports that after chemo last week she had one area of rash/redness to knuckle of left second finger. It did not itch. Port accessed without difficulty, labs drawn and in process. Chemotherapy Flowsheet 2022   Cycle C14 D8   Date 2022   Drug / Regimen Abraxane/Gemcitabine   Dosage 184 mg/1656 mg   Time Up -   Time Down -   Pre Hydration -   Pre Meds Zofran 8 mg IV, Decadron 10 mg IV, Benadryl 25 mg IV, Pepcid 20 mg IV   Notes -       Ms. Ba's vitals were reviewed. Visit Vitals  BP (!) 188/84 (BP 1 Location: Left upper arm, BP Patient Position: Sitting)   Pulse 89   Temp 98.5 °F (36.9 °C)   Resp 19   Ht 5' 4\" (1.626 m)   Wt 80.2 kg (176 lb 12.8 oz)   SpO2 97%   BMI 30.35 kg/m²       Lab results were obtained and reviewed. Recent Results (from the past 12 hour(s))   CBC WITH AUTOMATED DIFF    Collection Time: 22  8:55 AM   Result Value Ref Range    WBC 4.1 3.6 - 11.0 K/uL    RBC 3.53 (L) 3.80 - 5.20 M/uL    HGB 9.9 (L) 11.5 - 16.0 g/dL    HCT 31.3 (L) 35.0 - 47.0 %    MCV 88.7 80.0 - 99.0 FL    MCH 28.0 26.0 - 34.0 PG    MCHC 31.6 30.0 - 36.5 g/dL    RDW 17.8 (H) 11.5 - 14.5 %    PLATELET 992 919 - 869 K/uL    MPV 11.1 8.9 - 12.9 FL    NRBC 0.0 0  WBC    ABSOLUTE NRBC 0.00 0.00 - 0.01 K/uL    NEUTROPHILS 35 32 - 75 %    LYMPHOCYTES 39 12 - 49 %    MONOCYTES 23 (H) 5 - 13 %    EOSINOPHILS 2 0 - 7 %    BASOPHILS 1 0 - 1 %    IMMATURE GRANULOCYTES 0 0.0 - 0.5 %    ABS. NEUTROPHILS 1.4 (L) 1.8 - 8.0 K/UL    ABS. LYMPHOCYTES 1.7 0.8 - 3.5 K/UL    ABS. MONOCYTES 0.9 0.0 - 1.0 K/UL    ABS. EOSINOPHILS 0.1 0.0 - 0.4 K/UL    ABS. BASOPHILS 0.0 0.0 - 0.1 K/UL    ABS. IMM. GRANS. 0.0 0.00 - 0.04 K/UL    DF SMEAR SCANNED      PLATELET COMMENTS Increased Platelets      RBC COMMENTS ANISOCYTOSIS  2+       METABOLIC PANEL, COMPREHENSIVE    Collection Time: 04/19/22  8:55 AM   Result Value Ref Range    Sodium 138 136 - 145 mmol/L    Potassium 4.0 3.5 - 5.1 mmol/L    Chloride 103 97 - 108 mmol/L    CO2 28 21 - 32 mmol/L    Anion gap 7 5 - 15 mmol/L    Glucose 278 (H) 65 - 100 mg/dL    BUN 13 6 - 20 MG/DL    Creatinine 0.65 0.55 - 1.02 MG/DL    BUN/Creatinine ratio 20 12 - 20      GFR est AA >60 >60 ml/min/1.73m2    GFR est non-AA >60 >60 ml/min/1.73m2    Calcium 8.6 8.5 - 10.1 MG/DL    Bilirubin, total 0.3 0.2 - 1.0 MG/DL    ALT (SGPT) 19 12 - 78 U/L    AST (SGOT) 18 15 - 37 U/L    Alk. phosphatase 91 45 - 117 U/L    Protein, total 6.2 (L) 6.4 - 8.2 g/dL    Albumin 2.9 (L) 3.5 - 5.0 g/dL    Globulin 3.3 2.0 - 4.0 g/dL    A-G Ratio 0.9 (L) 1.1 - 2.2         Pre-medications  were administered as ordered and chemotherapy was initiated.  ml IV main line established. Zofran 8 mg IV given. Decadron 10 mg IV given. Benadryl 25 mg IV given  Pepcid 20 mg IV given due to patient complaint of rash to knuckle last week. 1052: Abraxane 184 mg IV started to infuse over 30 minutes. 1149: Gemcitabine 1656 mg IV started to infuse over 30 minutes. Ms. Soni Lopes tolerated treatment well. Port was flushed and de-accessed and site intact. Patient questioned when she would have CA 19-9 drawn again. Reviewed with Dr. Kirk Tierney and we will draw it on 4/26/22. Talked with Mrs. Ba and her daughter about safety precautions in preventing falls. Her daughter plans to stay with her mother at this point. She was discharged from [place] [address] in stable condition at 1230.  She is to return on April 26 at 8:30 for her next appointment.     Ernie Meyers RN  April 19, 2022

## 2022-04-19 NOTE — TELEPHONE ENCOUNTER
----- Message from Nikko Galvez DO sent at 4/14/2022  8:16 AM EDT -----  Tell pt xrays DJD only/ no cancer or fracture good

## 2022-04-26 ENCOUNTER — HOSPITAL ENCOUNTER (OUTPATIENT)
Dept: INFUSION THERAPY | Age: 67
Discharge: HOME | End: 2022-04-26
Payer: MEDICARE

## 2022-04-26 VITALS
RESPIRATION RATE: 19 BRPM | HEIGHT: 64 IN | HEART RATE: 79 BPM | DIASTOLIC BLOOD PRESSURE: 77 MMHG | OXYGEN SATURATION: 98 % | WEIGHT: 174 LBS | TEMPERATURE: 98.7 F | SYSTOLIC BLOOD PRESSURE: 153 MMHG | BODY MASS INDEX: 29.71 KG/M2

## 2022-04-26 DIAGNOSIS — C25.9 ADENOCARCINOMA OF PANCREAS (HCC): Primary | ICD-10-CM

## 2022-04-26 LAB
ALBUMIN SERPL-MCNC: 3 G/DL (ref 3.5–5)
ALBUMIN/GLOB SERPL: 0.9 {RATIO} (ref 1.1–2.2)
ALP SERPL-CCNC: 111 U/L (ref 45–117)
ALT SERPL-CCNC: 23 U/L (ref 12–78)
ANION GAP SERPL CALC-SCNC: 5 MMOL/L (ref 5–15)
AST SERPL-CCNC: 19 U/L (ref 15–37)
BASOPHILS # BLD: 0 K/UL (ref 0–0.1)
BASOPHILS NFR BLD: 1 % (ref 0–1)
BILIRUB SERPL-MCNC: 0.2 MG/DL (ref 0.2–1)
BUN SERPL-MCNC: 11 MG/DL (ref 6–20)
BUN/CREAT SERPL: 14 (ref 12–20)
CALCIUM SERPL-MCNC: 8.6 MG/DL (ref 8.5–10.1)
CHLORIDE SERPL-SCNC: 104 MMOL/L (ref 97–108)
CO2 SERPL-SCNC: 30 MMOL/L (ref 21–32)
CREAT SERPL-MCNC: 0.77 MG/DL (ref 0.55–1.02)
DIFFERENTIAL METHOD BLD: ABNORMAL
EOSINOPHIL # BLD: 0 K/UL (ref 0–0.4)
EOSINOPHIL NFR BLD: 0 % (ref 0–7)
ERYTHROCYTE [DISTWIDTH] IN BLOOD BY AUTOMATED COUNT: 17.5 % (ref 11.5–14.5)
GLOBULIN SER CALC-MCNC: 3.4 G/DL (ref 2–4)
GLUCOSE SERPL-MCNC: 251 MG/DL (ref 65–100)
HCT VFR BLD AUTO: 31.7 % (ref 35–47)
HGB BLD-MCNC: 10 G/DL (ref 11.5–16)
IMM GRANULOCYTES # BLD AUTO: 0 K/UL (ref 0–0.04)
IMM GRANULOCYTES NFR BLD AUTO: 0 % (ref 0–0.5)
LYMPHOCYTES # BLD: 1.6 K/UL (ref 0.8–3.5)
LYMPHOCYTES NFR BLD: 62 % (ref 12–49)
MCH RBC QN AUTO: 27.8 PG (ref 26–34)
MCHC RBC AUTO-ENTMCNC: 31.5 G/DL (ref 30–36.5)
MCV RBC AUTO: 88.1 FL (ref 80–99)
METAMYELOCYTES NFR BLD MANUAL: 3 %
MONOCYTES # BLD: 0.1 K/UL (ref 0–1)
MONOCYTES NFR BLD: 4 % (ref 5–13)
NEUTS BAND NFR BLD MANUAL: 5 %
NEUTS SEG # BLD: 0.7 K/UL (ref 1.8–8)
NEUTS SEG NFR BLD: 23 % (ref 32–75)
NRBC # BLD: 0.05 K/UL (ref 0–0.01)
NRBC BLD-RTO: 2 PER 100 WBC
PLATELET # BLD AUTO: 166 K/UL (ref 150–400)
PLATELET COMMENTS,PCOM: ABNORMAL
PMV BLD AUTO: 10.5 FL (ref 8.9–12.9)
POTASSIUM SERPL-SCNC: 3.8 MMOL/L (ref 3.5–5.1)
PROMYELOCYTES NFR BLD MANUAL: 2 %
PROT SERPL-MCNC: 6.4 G/DL (ref 6.4–8.2)
RBC # BLD AUTO: 3.6 M/UL (ref 3.8–5.2)
RBC MORPH BLD: ABNORMAL
RBC MORPH BLD: ABNORMAL
SODIUM SERPL-SCNC: 139 MMOL/L (ref 136–145)
WBC # BLD AUTO: 2.5 K/UL (ref 3.6–11)

## 2022-04-26 PROCEDURE — 77030012965 HC NDL HUBR BBMI -A

## 2022-04-26 PROCEDURE — 36591 DRAW BLOOD OFF VENOUS DEVICE: CPT

## 2022-04-26 PROCEDURE — 85025 COMPLETE CBC W/AUTO DIFF WBC: CPT

## 2022-04-26 PROCEDURE — 80053 COMPREHEN METABOLIC PANEL: CPT

## 2022-04-26 PROCEDURE — 74011000250 HC RX REV CODE- 250: Performed by: INTERNAL MEDICINE

## 2022-04-26 PROCEDURE — 74011250636 HC RX REV CODE- 250/636: Performed by: INTERNAL MEDICINE

## 2022-04-26 PROCEDURE — 86301 IMMUNOASSAY TUMOR CA 19-9: CPT

## 2022-04-26 PROCEDURE — 36415 COLL VENOUS BLD VENIPUNCTURE: CPT

## 2022-04-26 RX ORDER — LIDOCAINE 50 MG/G
1 PATCH TOPICAL EVERY 24 HOURS
Status: ON HOLD | COMMUNITY
End: 2022-07-23

## 2022-04-26 RX ORDER — SODIUM CHLORIDE 9 MG/ML
10 INJECTION INTRAMUSCULAR; INTRAVENOUS; SUBCUTANEOUS AS NEEDED
Status: ACTIVE | OUTPATIENT
Start: 2022-04-26 | End: 2022-04-26

## 2022-04-26 RX ORDER — HEPARIN 100 UNIT/ML
300-500 SYRINGE INTRAVENOUS AS NEEDED
Status: ACTIVE | OUTPATIENT
Start: 2022-04-26 | End: 2022-04-26

## 2022-04-26 RX ADMIN — HEPARIN 500 UNITS: 100 SYRINGE at 10:00

## 2022-04-26 RX ADMIN — SODIUM CHLORIDE, PRESERVATIVE FREE 10 ML: 5 INJECTION INTRAVENOUS at 10:00

## 2022-04-26 RX ADMIN — SODIUM CHLORIDE, PRESERVATIVE FREE 10 ML: 5 INJECTION INTRAVENOUS at 08:50

## 2022-04-26 NOTE — PROGRESS NOTES
[facility] Progress Note    Date: 2022    Name: Haylee Alfred    MRN: [id number]         : 1955    Ms. Ba Arrived ambulatory and in no distress for cycle 14 day 15 of Abraxane/Gemcitabine regimen. Assessment was completed, no acute issues at this time, no new complaints voiced. Stiffness and pain from recent falls has continued to improve. She still has some pain right back below ribs and across lower abdomen. Port accessed without difficulty, labs drawn and in process. Chemotherapy Flowsheet 2022   Cycle C14 D15   Date 2022   Drug / Regimen HELD   Dosage -   Time Up -   Time Down -   Pre Hydration -   Pre Meds -   Notes -       Ms. Ba's vitals were reviewed. Visit Vitals  BP (!) 153/77 (BP 1 Location: Right upper arm)   Pulse 79   Temp 98.7 °F (37.1 °C)   Resp 19   Ht 5' 4\" (1.626 m)   Wt 78.9 kg (174 lb)   SpO2 98%   BMI 29.87 kg/m²       Lab results were obtained and reviewed. Recent Results (from the past 12 hour(s))   CBC WITH AUTOMATED DIFF    Collection Time: 22  8:50 AM   Result Value Ref Range    WBC 2.5 (L) 3.6 - 11.0 K/uL    RBC 3.60 (L) 3.80 - 5.20 M/uL    HGB 10.0 (L) 11.5 - 16.0 g/dL    HCT 31.7 (L) 35.0 - 47.0 %    MCV 88.1 80.0 - 99.0 FL    MCH 27.8 26.0 - 34.0 PG    MCHC 31.5 30.0 - 36.5 g/dL    RDW 17.5 (H) 11.5 - 14.5 %    PLATELET 180 044 - 992 K/uL    MPV 10.5 8.9 - 12.9 FL    NRBC 2.0 (H) 0  WBC    ABSOLUTE NRBC 0.05 (H) 0.00 - 0.01 K/uL    NEUTROPHILS 23 (L) 32 - 75 %    BAND NEUTROPHILS 5 %    LYMPHOCYTES 62 (H) 12 - 49 %    MONOCYTES 4 (L) 5 - 13 %    EOSINOPHILS 0 0 - 7 %    BASOPHILS 1 0 - 1 %    METAMYELOCYTES 3 %    PROMYELOCYTES 2 %    IMMATURE GRANULOCYTES 0 0.0 - 0.5 %    ABS. NEUTROPHILS 0.7 (L) 1.8 - 8.0 K/UL    ABS. LYMPHOCYTES 1.6 0.8 - 3.5 K/UL    ABS. MONOCYTES 0.1 0.0 - 1.0 K/UL    ABS. EOSINOPHILS 0.0 0.0 - 0.4 K/UL    ABS. BASOPHILS 0.0 0.0 - 0.1 K/UL    ABS. IMM.  GRANS. 0.0 0.00 - 0.04 K/UL    DF MANUAL      PLATELET COMMENTS SMUDGE CELLS SEEN      RBC COMMENTS ANISOCYTOSIS  1+        RBC COMMENTS POIKILOCYTOSIS  1+       METABOLIC PANEL, COMPREHENSIVE    Collection Time: 04/26/22  8:50 AM   Result Value Ref Range    Sodium 139 136 - 145 mmol/L    Potassium 3.8 3.5 - 5.1 mmol/L    Chloride 104 97 - 108 mmol/L    CO2 30 21 - 32 mmol/L    Anion gap 5 5 - 15 mmol/L    Glucose 251 (H) 65 - 100 mg/dL    BUN 11 6 - 20 MG/DL    Creatinine 0.77 0.55 - 1.02 MG/DL    BUN/Creatinine ratio 14 12 - 20      GFR est AA >60 >60 ml/min/1.73m2    GFR est non-AA >60 >60 ml/min/1.73m2    Calcium 8.6 8.5 - 10.1 MG/DL    Bilirubin, total 0.2 0.2 - 1.0 MG/DL    ALT (SGPT) 23 12 - 78 U/L    AST (SGOT) 19 15 - 37 U/L    Alk. phosphatase 111 45 - 117 U/L    Protein, total 6.4 6.4 - 8.2 g/dL    Albumin 3.0 (L) 3.5 - 5.0 g/dL    Globulin 3.4 2.0 - 4.0 g/dL    A-G Ratio 0.9 (L) 1.1 - 2.2       Chemo held due to [facility] of 0.7. Port was flushed and de-accessed. Site intact. Discussed neutropenic precautions. Ms. Anastasia Garcia  was discharged from [place] [address] in stable condition at 1010. She is to return on May 10 at 9:30 for her next appointment.     Celeste Deleon RN  April 26, 2022

## 2022-04-28 LAB — CANCER AG19-9 SERPL-ACNC: 70 U/ML (ref 0–35)

## 2022-04-28 RX ORDER — EPINEPHRINE 1 MG/ML
0.3 INJECTION, SOLUTION, CONCENTRATE INTRAVENOUS AS NEEDED
Status: CANCELLED | OUTPATIENT
Start: 2022-06-07

## 2022-04-28 RX ORDER — SODIUM CHLORIDE 9 MG/ML
10 INJECTION INTRAMUSCULAR; INTRAVENOUS; SUBCUTANEOUS AS NEEDED
Status: CANCELLED | OUTPATIENT
Start: 2022-06-07

## 2022-04-28 RX ORDER — SODIUM CHLORIDE 0.9 % (FLUSH) 0.9 %
10 SYRINGE (ML) INJECTION AS NEEDED
Status: CANCELLED | OUTPATIENT
Start: 2022-05-17

## 2022-04-28 RX ORDER — DIPHENHYDRAMINE HYDROCHLORIDE 50 MG/ML
25 INJECTION, SOLUTION INTRAMUSCULAR; INTRAVENOUS AS NEEDED
Status: CANCELLED | OUTPATIENT
Start: 2022-06-07

## 2022-04-28 RX ORDER — EPINEPHRINE 1 MG/ML
0.3 INJECTION, SOLUTION, CONCENTRATE INTRAVENOUS AS NEEDED
Status: CANCELLED | OUTPATIENT
Start: 2022-05-17

## 2022-04-28 RX ORDER — ALBUTEROL SULFATE 0.83 MG/ML
2.5 SOLUTION RESPIRATORY (INHALATION) AS NEEDED
Status: CANCELLED
Start: 2022-06-07

## 2022-04-28 RX ORDER — HYDROCORTISONE SODIUM SUCCINATE 100 MG/2ML
100 INJECTION, POWDER, FOR SOLUTION INTRAMUSCULAR; INTRAVENOUS AS NEEDED
Status: CANCELLED | OUTPATIENT
Start: 2022-06-07

## 2022-04-28 RX ORDER — ONDANSETRON 2 MG/ML
8 INJECTION INTRAMUSCULAR; INTRAVENOUS AS NEEDED
Status: CANCELLED | OUTPATIENT
Start: 2022-06-07

## 2022-04-28 RX ORDER — ONDANSETRON 2 MG/ML
8 INJECTION INTRAMUSCULAR; INTRAVENOUS AS NEEDED
Status: CANCELLED | OUTPATIENT
Start: 2022-05-10

## 2022-04-28 RX ORDER — SODIUM CHLORIDE 0.9 % (FLUSH) 0.9 %
10 SYRINGE (ML) INJECTION AS NEEDED
Status: CANCELLED
Start: 2022-06-07

## 2022-04-28 RX ORDER — ACETAMINOPHEN 325 MG/1
650 TABLET ORAL AS NEEDED
Status: CANCELLED | OUTPATIENT
Start: 2022-05-10

## 2022-04-28 RX ORDER — DIPHENHYDRAMINE HYDROCHLORIDE 50 MG/ML
25 INJECTION, SOLUTION INTRAMUSCULAR; INTRAVENOUS
Status: CANCELLED
Start: 2022-05-17

## 2022-04-28 RX ORDER — ONDANSETRON 2 MG/ML
8 INJECTION INTRAMUSCULAR; INTRAVENOUS ONCE
Status: CANCELLED | OUTPATIENT
Start: 2022-05-10 | End: 2022-05-10

## 2022-04-28 RX ORDER — ALBUTEROL SULFATE 0.83 MG/ML
2.5 SOLUTION RESPIRATORY (INHALATION) AS NEEDED
Status: CANCELLED
Start: 2022-05-10

## 2022-04-28 RX ORDER — HEPARIN 100 UNIT/ML
300-500 SYRINGE INTRAVENOUS AS NEEDED
Status: CANCELLED | OUTPATIENT
Start: 2022-05-17

## 2022-04-28 RX ORDER — DIPHENHYDRAMINE HYDROCHLORIDE 50 MG/ML
25 INJECTION, SOLUTION INTRAMUSCULAR; INTRAVENOUS AS NEEDED
Status: CANCELLED | OUTPATIENT
Start: 2022-05-17

## 2022-04-28 RX ORDER — SODIUM CHLORIDE 9 MG/ML
25 INJECTION, SOLUTION INTRAVENOUS CONTINUOUS
Status: CANCELLED | OUTPATIENT
Start: 2022-05-10

## 2022-04-28 RX ORDER — DIPHENHYDRAMINE HYDROCHLORIDE 50 MG/ML
50 INJECTION, SOLUTION INTRAMUSCULAR; INTRAVENOUS AS NEEDED
Status: CANCELLED
Start: 2022-05-17

## 2022-04-28 RX ORDER — ACETAMINOPHEN 325 MG/1
650 TABLET ORAL AS NEEDED
Status: CANCELLED | OUTPATIENT
Start: 2022-05-17

## 2022-04-28 RX ORDER — ONDANSETRON 2 MG/ML
8 INJECTION INTRAMUSCULAR; INTRAVENOUS AS NEEDED
Status: CANCELLED | OUTPATIENT
Start: 2022-05-17

## 2022-04-28 RX ORDER — ACETAMINOPHEN 325 MG/1
650 TABLET ORAL AS NEEDED
Status: CANCELLED | OUTPATIENT
Start: 2022-06-07

## 2022-04-28 RX ORDER — ONDANSETRON 2 MG/ML
8 INJECTION INTRAMUSCULAR; INTRAVENOUS ONCE
Status: CANCELLED | OUTPATIENT
Start: 2022-06-07 | End: 2022-05-24

## 2022-04-28 RX ORDER — DEXAMETHASONE SODIUM PHOSPHATE 4 MG/ML
10 INJECTION, SOLUTION INTRA-ARTICULAR; INTRALESIONAL; INTRAMUSCULAR; INTRAVENOUS; SOFT TISSUE ONCE
Status: CANCELLED | OUTPATIENT
Start: 2022-05-10 | End: 2022-05-10

## 2022-04-28 RX ORDER — DIPHENHYDRAMINE HYDROCHLORIDE 50 MG/ML
25 INJECTION, SOLUTION INTRAMUSCULAR; INTRAVENOUS
Status: CANCELLED
Start: 2022-06-07

## 2022-04-28 RX ORDER — SODIUM CHLORIDE 9 MG/ML
10 INJECTION INTRAMUSCULAR; INTRAVENOUS; SUBCUTANEOUS AS NEEDED
Status: CANCELLED | OUTPATIENT
Start: 2022-05-10

## 2022-04-28 RX ORDER — ALBUTEROL SULFATE 0.83 MG/ML
2.5 SOLUTION RESPIRATORY (INHALATION) AS NEEDED
Status: CANCELLED
Start: 2022-05-17

## 2022-04-28 RX ORDER — DIPHENHYDRAMINE HYDROCHLORIDE 50 MG/ML
25 INJECTION, SOLUTION INTRAMUSCULAR; INTRAVENOUS
Status: CANCELLED
Start: 2022-05-10

## 2022-04-28 RX ORDER — ONDANSETRON 2 MG/ML
8 INJECTION INTRAMUSCULAR; INTRAVENOUS ONCE
Status: CANCELLED | OUTPATIENT
Start: 2022-05-17 | End: 2022-05-17

## 2022-04-28 RX ORDER — LORAZEPAM 2 MG/ML
0.5 INJECTION INTRAMUSCULAR
Status: CANCELLED
Start: 2022-06-07

## 2022-04-28 RX ORDER — HYDROCORTISONE SODIUM SUCCINATE 100 MG/2ML
100 INJECTION, POWDER, FOR SOLUTION INTRAMUSCULAR; INTRAVENOUS AS NEEDED
Status: CANCELLED | OUTPATIENT
Start: 2022-05-17

## 2022-04-28 RX ORDER — SODIUM CHLORIDE 9 MG/ML
25 INJECTION, SOLUTION INTRAVENOUS CONTINUOUS
Status: CANCELLED | OUTPATIENT
Start: 2022-05-17

## 2022-04-28 RX ORDER — SODIUM CHLORIDE 9 MG/ML
10 INJECTION INTRAMUSCULAR; INTRAVENOUS; SUBCUTANEOUS AS NEEDED
Status: CANCELLED | OUTPATIENT
Start: 2022-05-17

## 2022-04-28 RX ORDER — DIPHENHYDRAMINE HYDROCHLORIDE 50 MG/ML
25 INJECTION, SOLUTION INTRAMUSCULAR; INTRAVENOUS AS NEEDED
Status: CANCELLED | OUTPATIENT
Start: 2022-05-10

## 2022-04-28 RX ORDER — DEXAMETHASONE SODIUM PHOSPHATE 4 MG/ML
10 INJECTION, SOLUTION INTRA-ARTICULAR; INTRALESIONAL; INTRAMUSCULAR; INTRAVENOUS; SOFT TISSUE ONCE
Status: CANCELLED | OUTPATIENT
Start: 2022-06-07 | End: 2022-05-24

## 2022-04-28 RX ORDER — HEPARIN 100 UNIT/ML
300-500 SYRINGE INTRAVENOUS AS NEEDED
Status: CANCELLED | OUTPATIENT
Start: 2022-06-07

## 2022-04-28 RX ORDER — DIPHENHYDRAMINE HYDROCHLORIDE 50 MG/ML
50 INJECTION, SOLUTION INTRAMUSCULAR; INTRAVENOUS AS NEEDED
Status: CANCELLED
Start: 2022-05-10

## 2022-04-28 RX ORDER — DIPHENHYDRAMINE HYDROCHLORIDE 50 MG/ML
50 INJECTION, SOLUTION INTRAMUSCULAR; INTRAVENOUS AS NEEDED
Status: CANCELLED
Start: 2022-06-07

## 2022-04-28 RX ORDER — HEPARIN 100 UNIT/ML
300-500 SYRINGE INTRAVENOUS AS NEEDED
Status: CANCELLED | OUTPATIENT
Start: 2022-05-10

## 2022-04-28 RX ORDER — EPINEPHRINE 1 MG/ML
0.3 INJECTION, SOLUTION, CONCENTRATE INTRAVENOUS AS NEEDED
Status: CANCELLED | OUTPATIENT
Start: 2022-05-10

## 2022-04-28 RX ORDER — DEXAMETHASONE SODIUM PHOSPHATE 4 MG/ML
10 INJECTION, SOLUTION INTRA-ARTICULAR; INTRALESIONAL; INTRAMUSCULAR; INTRAVENOUS; SOFT TISSUE ONCE
Status: CANCELLED | OUTPATIENT
Start: 2022-05-17 | End: 2022-05-17

## 2022-04-28 RX ORDER — HYDROCORTISONE SODIUM SUCCINATE 100 MG/2ML
100 INJECTION, POWDER, FOR SOLUTION INTRAMUSCULAR; INTRAVENOUS AS NEEDED
Status: CANCELLED | OUTPATIENT
Start: 2022-05-10

## 2022-04-28 RX ORDER — SODIUM CHLORIDE 9 MG/ML
25 INJECTION, SOLUTION INTRAVENOUS CONTINUOUS
Status: CANCELLED | OUTPATIENT
Start: 2022-06-07

## 2022-04-28 RX ORDER — LORAZEPAM 2 MG/ML
0.5 INJECTION INTRAMUSCULAR
Status: CANCELLED
Start: 2022-05-10

## 2022-04-28 RX ORDER — SODIUM CHLORIDE 0.9 % (FLUSH) 0.9 %
10 SYRINGE (ML) INJECTION AS NEEDED
Status: CANCELLED | OUTPATIENT
Start: 2022-05-10

## 2022-04-28 RX ORDER — LORAZEPAM 2 MG/ML
0.5 INJECTION INTRAMUSCULAR
Status: CANCELLED
Start: 2022-05-17

## 2022-05-04 ENCOUNTER — TELEPHONE (OUTPATIENT)
Dept: ONCOLOGY | Age: 67
End: 2022-05-04

## 2022-05-04 ENCOUNTER — OFFICE VISIT (OUTPATIENT)
Dept: FAMILY MEDICINE CLINIC | Age: 67
End: 2022-05-04
Payer: MEDICARE

## 2022-05-04 VITALS
OXYGEN SATURATION: 97 % | DIASTOLIC BLOOD PRESSURE: 78 MMHG | HEART RATE: 92 BPM | TEMPERATURE: 97.9 F | HEIGHT: 64 IN | RESPIRATION RATE: 16 BRPM | BODY MASS INDEX: 30.73 KG/M2 | SYSTOLIC BLOOD PRESSURE: 158 MMHG | WEIGHT: 180 LBS

## 2022-05-04 DIAGNOSIS — R35.0 URINARY FREQUENCY: ICD-10-CM

## 2022-05-04 DIAGNOSIS — R30.9 URINARY PAIN: Primary | ICD-10-CM

## 2022-05-04 DIAGNOSIS — R35.0 URINARY FREQUENCY: Primary | ICD-10-CM

## 2022-05-04 LAB
BILIRUB UR QL STRIP: NEGATIVE
GLUCOSE UR-MCNC: NORMAL MG/DL
KETONES P FAST UR STRIP-MCNC: NEGATIVE MG/DL
PH UR STRIP: 7 [PH] (ref 4.6–8)
PROT UR QL STRIP: NORMAL
SP GR UR STRIP: 1.02 (ref 1–1.03)
UA UROBILINOGEN AMB POC: NORMAL (ref 0.2–1)
URINALYSIS CLARITY POC: NORMAL
URINALYSIS COLOR POC: NORMAL
URINE BLOOD POC: NEGATIVE
URINE LEUKOCYTES POC: NORMAL
URINE NITRITES POC: POSITIVE

## 2022-05-04 PROCEDURE — 99213 OFFICE O/P EST LOW 20 MIN: CPT | Performed by: PHYSICIAN ASSISTANT

## 2022-05-04 PROCEDURE — 81003 URINALYSIS AUTO W/O SCOPE: CPT | Performed by: PHYSICIAN ASSISTANT

## 2022-05-04 NOTE — TELEPHONE ENCOUNTER
Received a message from [address] patient not feeling well. Returned call to patient, for about the last week patient has been having difficulty urinating at times. She feels like she needs to urinate/pressure but only dribbles or does a small amount. She bears down and tried to push the urine out but cannot, also experiencing spasms when urinating. Denies fever, nausea/Vomiting. Patient does have pain in lower abdomen, lower back and R flank. She has had kidney stones in the past.  Patient has taken AZO but this did not help, increased fluid intake, she is taking tylenol. Patient fell Saturday, she feels \"wobbly\" and is overall not feeling well.

## 2022-05-04 NOTE — PROGRESS NOTES
Chantal Kay is a 77 y.o. female who presents to the office today with the following:  Chief Complaint   Patient presents with    Urinary Pain     feels like she has a UTI or may be passing a kidney stone       HPI   Here with daughter. Pt suspects she has a UTI. Has had symptoms for about a week now. H/o frequent UTIs and kidney stones. Feeling a lot pressure and discomfort in lower abd, flank pain, urinary urgency, burning, and frequency. Wondered if maybe a kidney stone at first, bc she has had that before and was \"dripping with pulsating feeling and then wait a second and then could void\" but then things progressed. Is a [place] patient, tried to go through her Oncologist, but they told her to go to PCP Dr. Tye Hunter (our office was closer than [place])  H/o neutropenia due to tx. This wk was week off chemo. Has cipro 250 mg at home already, has 14 pills. Wants to know if has a UTI and can take that. Has had UTIs that were sensitive and also resistant to this. Has tried AZO and cranberry for symptoms, but stopped before today. BP also high. Has h/o HTN. Had issues of BP \"bottoming out\" on medication. Falls often, no dizziness, had MRI. They plan to keep her off for now. Review of Systems   Constitutional: Negative for chills and fever. Gastrointestinal: Positive for abdominal pain. Negative for constipation, diarrhea, nausea and vomiting. Genitourinary: Positive for dysuria, flank pain, frequency and urgency. Negative for hematuria. See HPI.     Past Medical History:   Diagnosis Date    Adenocarcinoma of pancreas ([facility] [address] 05/13/2020    Dr Cris Porter biopsy via EUS    Diabetes [facility])     GERD (gastroesophageal reflux disease)     Hernia of abdominal cavity     Subxyphoid hernia    Hypertension     Inflammatory polyarthropathy (HCC)     Joint pain     Joint swelling     Menopause     Ocular nevus of left eye     Pancreatitis     Tracheal stenosis        Past Surgical History:   Procedure Laterality Date    HX CARPAL TUNNEL RELEASE Bilateral     HX COLONOSCOPY      HX HYSTERECTOMY      HX KNEE ARTHROSCOPY Right     HX KNEE REPLACEMENT Right     partial    HX LAP CHOLECYSTECTOMY      HX TONSILLECTOMY      HX VASCULAR ACCESS         No Known Allergies    Current Outpatient Medications   Medication Sig    lidocaine (LIDODERM) 5 % 1 Patch by TransDERmal route every twenty-four (24) hours. Apply patch to the affected area for 12 hours a day and remove for 12 hours a day.  LORazepam (ATIVAN) 1 mg tablet Take 1 Tablet by mouth every six (6) hours as needed for Anxiety. Max Daily Amount: 4 mg. Indications: nausea and vomiting caused by cancer drugs    multivit-minerals/folic acid (MULTIVITAMIN GUMMIES PO) Take 1 Each by mouth daily.  HYDROmorphone (Dilaudid) 2 mg tablet Take 2 mg by mouth every four (4) hours as needed for Pain.  Insulin Needles, Disposable, (Nellie Pen Needle) 32 gauge x 5/32\" ndle 1 Each by Does Not Apply route three (3) times daily.  methylphenidate HCl (RITALIN) 10 mg tablet Take 1 Tablet by mouth three (3) times daily. Max Daily Amount: 30 mg.    DULoxetine (CYMBALTA) 30 mg capsule Take 3 Capsules by mouth daily. Indications: neuropathic pain    Toujeo SoloStar U-300 Insulin 300 unit/mL (1.5 mL) inpn pen INJECT 55 UNITS ONCE DAILY AT BEDTIME - DOSE INCREASED (Patient taking differently: 55 Units by SubCUTAneous route. Patient uses differently during around chemo time.)    gabapentin (NEURONTIN) 300 mg capsule TAKE 2 CAPSULES BY MOUTH THREE TIMES DAILY    lipase-protease-amylase (Creon) 12,000-38,000 -60,000 unit capsule Take 2 Capsules by mouth three (3) times daily (with meals). 2 caps with meal and 1 with snacks  Indications: exocrine pancreatic insufficiency    lidocaine-prilocaine (EMLA) topical cream Apply  to affected area as needed for Pain (pain ).  Apply a thin layer over port site prior to accessing port    nystatin (MYCOSTATIN) powder Apply 1 g to affected area three (3) times daily.  HumaLOG KwikPen Insulin 100 unit/mL kwikpen patient on sliding scale    famotidine (Pepcid) 20 mg tablet Take 20 mg by mouth two (2) times a day.  sennosides (Senna) 8.6 mg cap Take 1-2 Tablets by mouth two (2) times a day.  acetaminophen (Acetaminophen Extra Strength) 500 mg tablet Take 1,000 mg by mouth daily as needed.  polyethylene glycol (Miralax) 17 gram/dose powder Take 17 g by mouth daily as needed. (Patient not taking: Reported on 5/4/2022)    promethazine (PHENERGAN) 25 mg tablet Take 1 Tab by mouth every six (6) hours as needed for Nausea. (Patient not taking: Reported on 5/4/2022)    ondansetron (ZOFRAN ODT) 4 mg disintegrating tablet Take 1 Tab by mouth every eight (8) hours as needed for Nausea or Vomiting. (Patient not taking: Reported on 5/4/2022)    diphenoxylate-atropine (LomotiL) 2.5-0.025 mg per tablet Take 2 Tabs by mouth four (4) times daily as needed for Diarrhea. Max Daily Amount: 8 Tabs. (Patient not taking: Reported on 5/4/2022)     No current facility-administered medications for this visit.        Social History     Socioeconomic History    Marital status:      Spouse name: Lashawn Osborne Number of children: 3    Highest education level: Associate degree: academic program   Occupational History    Occupation: RN  [employer]   Tobacco Use    Smoking status: Never Smoker    Smokeless tobacco: Never Used   Substance and Sexual Activity    Alcohol use: No     Alcohol/week: 0.0 standard drinks    Drug use: No    Sexual activity: Not Currently       Family History   Problem Relation Age of Onset    Heart Disease Mother     Heart Attack Mother     Cancer Father         lung    Diabetes Sister          Physical Exam:  Visit Vitals  BP (!) 158/78 (BP 1 Location: Right arm, BP Patient Position: Sitting, BP Cuff Size: Adult) Comment: Daniel   Pulse 92   Temp 97.9 °F (36.6 °C) (Temporal)   Resp 16   Ht 5' 4\" (1.626 m)   Wt 180 lb (81.6 kg) SpO2 97%   BMI 30.90 kg/m²     Physical Exam  Vitals and nursing note reviewed. Constitutional:       Appearance: Normal appearance. HENT:      Head: Normocephalic and atraumatic. Cardiovascular:      Rate and Rhythm: Normal rate and regular rhythm. Heart sounds: Normal heart sounds. Pulmonary:      Effort: Pulmonary effort is normal.      Breath sounds: Normal breath sounds. Abdominal:      General: There is no distension. Palpations: Abdomen is soft. Abdomen is not rigid. There is no mass. Tenderness: There is no abdominal tenderness. There is no right CVA tenderness, left CVA tenderness, guarding or rebound. Negative signs include Govea's sign and McBurney's sign. Skin:     General: Skin is warm and dry. Neurological:      Mental Status: She is alert and oriented to person, place, and time. Gait: Gait is intact. Psychiatric:         Mood and Affect: Mood and affect normal.         Assessment/Plan:    ICD-10-CM ICD-9-CM    1. Urinary pain  R30.9 788.1 AMB POC URINALYSIS DIP STICK AUTO W/O MICRO      CULTURE, URINE      CULTURE, URINE   2. Urinary frequency  R35.0 788.41 CULTURE, URINE      CULTURE, URINE     Results for orders placed or performed in visit on 05/04/22   AMB POC URINALYSIS DIP STICK AUTO W/O MICRO   Result Value Ref Range    Color (UA POC) Dark Yellow     Clarity (UA POC) Slightly Cloudy     Glucose (UA POC) 2+ Negative    Bilirubin (UA POC) Negative Negative    Ketones (UA POC) Negative Negative    Specific gravity (UA POC) 1.020 1.001 - 1.035    Blood (UA POC) Negative Negative    pH (UA POC) 7.0 4.6 - 8.0    Protein (UA POC) 1+ Negative    Urobilinogen (UA POC) 0.2 mg/dL 0.2 - 1    Nitrites (UA POC) Positive Negative    Leukocyte esterase (UA POC) 1+ Negative     Pt will take her Cipro 250 mg BID x 7 d, or until culture is back. Notify in interim if getting any worse. Instructed to push fluids (ie water/cranberry juice).  Avoid caffeine and carbonated bvgs.  Counseled on otc medications for sxs relief. Will notify of culture results. Educational handout provided with home care instructions. Instructed to RTO/seek medical attn if sxs persist/worsen. Pt verbalizes understanding and agrees with the plan.     Elsi Beaver PA-C

## 2022-05-04 NOTE — TELEPHONE ENCOUNTER
HIPAA verified. Notified patient per DR Kris Jewell, she should see her PCP and have a UA done. Patient verbalized understanding.

## 2022-05-08 LAB
BACTERIA SPEC CULT: ABNORMAL
BACTERIA SPEC CULT: ABNORMAL
CC UR VC: ABNORMAL
SERVICE CMNT-IMP: ABNORMAL

## 2022-05-09 NOTE — PROGRESS NOTES
Reason for Visit:   Scout Woo a 20 C. o. female who is seen for pancreatic adenocarcinoma     Treatment History:     Dx: Pancreatic Adenocarcinoma--May 2020--[id number]--opfhvomsikn of splenic vein and superior mesenteric vein    Tx: mFOLFIRINOX--first cycle 5/26/2020, second cycle 6/10/2020, dose reduced 15% cycle 3 on 6/30/2020--delayed due to severe side effects--switched to Gemcitabine/Abraxane--Cycle #1 7/29/2002, Cycle #2 9/2/2020, Cycle #3 10/7/2020, Cycle #4 11/4/2020. Neoadjuvant Radiation with Dr Fernanda Hilton ending 12/18/2021. Distal Pancreatectomy, Splenectomy, and Civa Sheet with Dr Florentino Coats on 3/17/2021. Adjuvant radiation with Dr Fernanda Hilton. Restart Gemcitabine/abraxane Cycle #1 7/6/2021, Cycle #2 8/3/2021, Cycle #3 8/31/2021, Cycle #4 9/28/2021    Goal: Prolong life--Palliative    History of Present Illness:     Seen today as a new patient fu on palliative chemo gemzar/ abraxane. Feeling tired. and falling some at home. For chemo today. Has pain in shoulders/ left side/ joints/sciatica. Here with Dr Twan Melvin wife and daughter. CBC good today  No fevers/ chills/ chest pain/ SOB/ nausea/ vomiting/diarrhea/         Last visit:   Due tor chemo today. Wants to do chemo but having pain. Pt tripped and fell at home. Went to ER/ no breaks. Sent home. Thinks maybe pulled a muscle post fall at home. Has neck pain/ rib pain/TINSLEY. Does not feel well overall. Had temp 99.3 but better now. Takes dilaudid and tylenol which helps. Also takes ativan bid and ritalin and cymbalta. Here with daughter today. Last CTs 3/22 look stable. Labs pending. Worked at [employer] for many years.    No fevers/ chills/ chest pain/ / nausea/ vomiting/diarrhea/        Past Medical History:   Diagnosis Date    Ad[facility] ([facility] 05/13/2020    Dr Luan Saldivar biopsy via EUS    Diabetes ([address]     GERD (gastroesophageal reflux disease)     Hernia of abdominal cavity     Subxyphoid hernia    Hypertension     Inflammatory polyarthropathy (HCC)     Joint pain     Joint swelling     Menopause     Ocular nevus of left eye     Pancreatitis     Tracheal stenosis       Past Surgical History:   Procedure Laterality Date    HX CARPAL TUNNEL RELEASE Bilateral     HX COLONOSCOPY      HX HYSTERECTOMY      HX KNEE ARTHROSCOPY Right     HX KNEE REPLACEMENT Right     partial    HX LAP CHOLECYSTECTOMY      HX TONSILLECTOMY      HX VASCULAR ACCESS        Social History     Tobacco Use    Smoking status: Never Smoker    Smokeless tobacco: Never Used   Substance Use Topics    Alcohol use: No     Alcohol/week: 0.0 standard drinks      Family History   Problem Relation Age of Onset    Heart Disease Mother     Heart Attack Mother     Cancer Father         lung    Diabetes Sister      Current Outpatient Medications   Medication Sig    ciprofloxacin HCl (Cipro) 250 mg tablet Take 250 mg by mouth every twelve (12) hours.  lidocaine (LIDODERM) 5 % 1 Patch by TransDERmal route every twenty-four (24) hours. Apply patch to the affected area for 12 hours a day and remove for 12 hours a day.  LORazepam (ATIVAN) 1 mg tablet Take 1 Tablet by mouth every six (6) hours as needed for Anxiety. Max Daily Amount: 4 mg. Indications: nausea and vomiting caused by cancer drugs    multivit-minerals/folic acid (MULTIVITAMIN GUMMIES PO) Take 1 Each by mouth daily.  HYDROmorphone (Dilaudid) 2 mg tablet Take 2 mg by mouth every four (4) hours as needed for Pain.  methylphenidate HCl (RITALIN) 10 mg tablet Take 1 Tablet by mouth three (3) times daily. Max Daily Amount: 30 mg.    DULoxetine (CYMBALTA) 30 mg capsule Take 3 Capsules by mouth daily. Indications: neuropathic pain    Toujeo SoloStar U-300 Insulin 300 unit/mL (1.5 mL) inpn pen INJECT 55 UNITS ONCE DAILY AT BEDTIME - DOSE INCREASED (Patient taking differently: 55 Units by SubCUTAneous route.  Patient uses differently during around chemo time.)    gabapentin (NEURONTIN) 300 mg capsule TAKE 2 CAPSULES BY MOUTH THREE TIMES DAILY    lipase-protease-amylase (Creon) 12,000-38,000 -60,000 unit capsule Take 2 Capsules by mouth three (3) times daily (with meals). 2 caps with meal and 1 with snacks  Indications: exocrine pancreatic insufficiency    lidocaine-prilocaine (EMLA) topical cream Apply  to affected area as needed for Pain (pain ). Apply a thin layer over port site prior to accessing port    nystatin (MYCOSTATIN) powder Apply 1 g to affected area three (3) times daily.  HumaLOG KwikPen Insulin 100 unit/mL kwikpen patient on sliding scale    famotidine (Pepcid) 20 mg tablet Take 20 mg by mouth two (2) times a day.  sennosides (Senna) 8.6 mg cap Take 1-2 Tablets by mouth two (2) times a day.  promethazine (PHENERGAN) 25 mg tablet Take 1 Tab by mouth every six (6) hours as needed for Nausea.  ondansetron (ZOFRAN ODT) 4 mg disintegrating tablet Take 1 Tab by mouth every eight (8) hours as needed for Nausea or Vomiting.  acetaminophen (Acetaminophen Extra Strength) 500 mg tablet Take 1,000 mg by mouth daily as needed.  Insulin Needles, Disposable, (Nellie Pen Needle) 32 gauge x 5/32\" ndle 1 Each by Does Not Apply route three (3) times daily. (Patient not taking: Reported on 5/10/2022)    polyethylene glycol (Miralax) 17 gram/dose powder Take 17 g by mouth daily as needed. (Patient not taking: Reported on 5/10/2022)    diphenoxylate-atropine (LomotiL) 2.5-0.025 mg per tablet Take 2 Tabs by mouth four (4) times daily as needed for Diarrhea. Max Daily Amount: 8 Tabs. (Patient not taking: Reported on 5/4/2022)     No current facility-administered medications for this visit. No Known Allergies     Review of Systems: A complete review of systems was obtained, negative except as described above.     Physical Exam:     Visit Vitals  /72   Pulse 99   Temp 98.4 °F (36.9 °C) (Oral)   Resp 16   Ht 5' 4\" (1.626 m)   Wt 185 lb (83.9 kg)   SpO2 99%   BMI 31.76 kg/m²     ECOG PS: 1  General: No distress  Eyes: anicteric sclerae  HENT: Atraumatic, wearing mask  Neck: Supple  Respiratory: CTAB, normal respiratory effort  CV: Normal rate, regular rhythm, no murmurs, no peripheral edema  GI: Soft, nontender, nondistended, no masses  MS:  Moving in bed  Skin: No rashes, ecchymoses, or petechiae. Normal temperature, turgor, and texture. Psych: Alert, oriented, appropriate affect, normal judgment/insight    Results:     Lab Results   Component Value Date/Time    WBC 8.9 05/10/2022 09:20 AM    HGB 10.7 (L) 05/10/2022 09:20 AM    HCT 34.1 (L) 05/10/2022 09:20 AM    PLATELET 365 (H) 38/28/3473 09:20 AM    MCV 90.5 05/10/2022 09:20 AM    ABS. NEUTROPHILS PENDING 05/10/2022 09:20 AM     Lab Results   Component Value Date/Time    Sodium 139 04/26/2022 08:50 AM    Potassium 3.8 04/26/2022 08:50 AM    Chloride 104 04/26/2022 08:50 AM    CO2 30 04/26/2022 08:50 AM    Glucose 251 (H) 04/26/2022 08:50 AM    BUN 11 04/26/2022 08:50 AM    Creatinine 0.77 04/26/2022 08:50 AM    GFR est AA >60 04/26/2022 08:50 AM    GFR est non-AA >60 04/26/2022 08:50 AM    Calcium 8.6 04/26/2022 08:50 AM    Glucose (POC) 192 (H) 07/15/2020 11:56 AM    Creatinine (POC) 0.7 02/06/2013 10:21 AM     Lab Results   Component Value Date/Time    Bilirubin, total 0.2 04/26/2022 08:50 AM    ALT (SGPT) 23 04/26/2022 08:50 AM    Alk. phosphatase 111 04/26/2022 08:50 AM    Protein, total 6.4 04/26/2022 08:50 AM    Albumin 3.0 (L) 04/26/2022 08:50 AM    Globulin 3.4 04/26/2022 08:50 AM       CT A/P 4/30/2020  IMPRESSION: Suspect neoplastic pancreatic mass versus atypical focal  pancreatitis with splenic and superior mesenteric vein occlusion. Consider  further evaluation with endoscopic ultrasound at which time biopsy could  potentially be performed.     CT Chest 5/21/2020  IMPRESSION:  1. No evidence of pulmonary metastatic disease. No evidence of mediastinal or  hilar lymphadenopathy.  No pleural effusions identified. 2. No definite evidence of central pulmonary embolic disease. 3. Presence of a mass lesion involving the pancreatic head/body. 4. Evidence of fatty infiltration involving the liver. Presence of focal  low-density areas within the liver compatible with cysts. Surgical absence of  the gallbladder.     CT A/P 6/19/2020  IMPRESSION:  Pancreatitis with ascites favored. Discrete pancreatic mass is not identified. No biliary dilatation or definite arterial or venous thrombosis. No liver  metastases. Fat-containing ventral hernia  Nonobstructing left renal calculus  Retrolisthesis of L2 and L3.     CT C/A/P 10/12/2020  IMPRESSION:  1. Interval improvement in appearance of the pancreas (see discussion above). 2. Normal sized liver. Stable appearance of the previously described areas of  decreased attenuation within liver. 3. Surgical absence of the gallbladder. 4. Evidence of splenomegaly. 5. Normal sized kidneys. Presence of small, nonobstructing calculi within the  left kidney. No evidence of hydronephrotic change. 6. Suboptimal distention of the urinary bladder. 7. Presence of a small, fat-containing ventral hernia in the mid abdominal  Region. CT C/A/P 9/17/2021  IMPRESSION  New peritoneal implants identified in the anterior pelvis  Interval splenectomy and repair of the ventral hernia  Interval thickening of the GE junction and circumferential thickening of the  pylorus. This may represent gastritis but correlation with endoscopy or upper GI  recommended     Path: 5/8/2020  CYTOLOGIC INTERPRETATION:   Pancreas, EUS-guided fine needle aspiration and cell blocks: Adenocarcinoma     Path: 3/17/2021  No residual malignancy in pancrease, 0/29 lymph nodes. 2-3mm focus metastatic adenocarcinoma within hernia sac     Assessment/PLAN:     1) stage 4 Pancreatic Adenocarcinoma dx 5/20  Completed neoadjuvant chemotherapy. Radiation with Dr Parish Prather.     Definitive resection with  Funmilayo.    BRCA negative.    Now peritoneal disease/mets. Goal of care palliative. Seen today for fu chemo. CTs 3/22 stable. Pt has been tolerating chemo with manageable side effects. She is doing well with palliative chemotherapy with Gemcitaine/Abraxane. Reviewed chemo overall today. Will do CTs again in 6/22. Ordered. CBC good today. Comp met pending. Pt wants to do chemo today. Pt / family agreeable to plan. 2) cancer Pain/Neuropathic Pain  S/p celiac block--pain in abd controlled. On dilaudid prn. Cymbalta. On ativan bid also. Discussed caution with multiple meds today. Pt /family monitoring well. 3) weakness / fatigue due to cancer. Monitor. 4) Nausea. zofran prn. 5) Anxiety/Insomnia. On ativan prn. Monitor. 6) thrombocytosis due to chemo/ reactive. Went back to baseline. Up again. Monitor. D/w IM. 7) psychosocial. Mood good. Coping well. Lives at [facility]. Friends with [employer]. Daughter here today.    Has chickens.        Fu here in a month  Call if questions      Signed By: Farida Tomlin, DO

## 2022-05-09 NOTE — PROGRESS NOTES
I have left a message for this patient to call me back to let me know she has gotten the message about her Urine C/S.

## 2022-05-09 NOTE — PROGRESS NOTES
Latesha Jacob is a 77 y.o. female her for follow up for pancreatic cancer. Patient c/o pain in R shoulder chronic, in L leg and hip. Patient is scheduled for C15D1 chemo today in [facility]. Key Oncology Meds             ondansetron (ZOFRAN ODT) 4 mg disintegrating tablet Take 1 Tab by mouth every eight (8) hours as needed for Nausea or Vomiting. Key Pain Meds             HYDROmorphone (Dilaudid) 2 mg tablet Take 2 mg by mouth every four (4) hours as needed for Pain. acetaminophen (Acetaminophen Extra Strength) 500 mg tablet Take 1,000 mg by mouth daily as needed.         Chemotherapy Flowsheet 4/26/2022   Cycle C14 D15   Date 4/26/2022   Drug / Regimen HELD   Dosage -   Time Up -   Time Down -   Pre Hydration -   Pre Meds -   Notes -

## 2022-05-09 NOTE — PROGRESS NOTES
Notify pt her urine culture is positive but luckily shows susceptibility to the cipro so it should work! She should f/up with her PCP to make sure everything clears up okay.

## 2022-05-10 ENCOUNTER — HOSPITAL ENCOUNTER (OUTPATIENT)
Dept: INFUSION THERAPY | Age: 67
Discharge: HOME | End: 2022-05-10
Payer: MEDICARE

## 2022-05-10 ENCOUNTER — OFFICE VISIT (OUTPATIENT)
Dept: ONCOLOGY | Age: 67
End: 2022-05-10
Payer: MEDICARE

## 2022-05-10 VITALS
HEIGHT: 64 IN | WEIGHT: 185 LBS | TEMPERATURE: 98.4 F | SYSTOLIC BLOOD PRESSURE: 156 MMHG | HEART RATE: 99 BPM | OXYGEN SATURATION: 97 % | DIASTOLIC BLOOD PRESSURE: 80 MMHG | BODY MASS INDEX: 31.58 KG/M2 | RESPIRATION RATE: 18 BRPM

## 2022-05-10 VITALS
BODY MASS INDEX: 31.58 KG/M2 | HEIGHT: 64 IN | DIASTOLIC BLOOD PRESSURE: 72 MMHG | TEMPERATURE: 98.4 F | SYSTOLIC BLOOD PRESSURE: 134 MMHG | WEIGHT: 185 LBS | RESPIRATION RATE: 16 BRPM | OXYGEN SATURATION: 99 % | HEART RATE: 99 BPM

## 2022-05-10 DIAGNOSIS — G89.3 NEOPLASM RELATED PAIN: ICD-10-CM

## 2022-05-10 DIAGNOSIS — M54.2 NECK PAIN: ICD-10-CM

## 2022-05-10 DIAGNOSIS — R07.81 RIB PAIN: ICD-10-CM

## 2022-05-10 DIAGNOSIS — C25.9 ADENOCARCINOMA OF PANCREAS (HCC): Primary | ICD-10-CM

## 2022-05-10 DIAGNOSIS — T45.1X5A CHEMOTHERAPY INDUCED NAUSEA AND VOMITING: ICD-10-CM

## 2022-05-10 DIAGNOSIS — M79.2 NEUROPATHIC PAIN: ICD-10-CM

## 2022-05-10 DIAGNOSIS — C25.9 MALIGNANT NEOPLASM OF PANCREAS, UNSPECIFIED LOCATION OF MALIGNANCY (HCC): Primary | ICD-10-CM

## 2022-05-10 DIAGNOSIS — R11.2 CHEMOTHERAPY INDUCED NAUSEA AND VOMITING: ICD-10-CM

## 2022-05-10 LAB
ALBUMIN SERPL-MCNC: 3.1 G/DL (ref 3.5–5)
ALBUMIN/GLOB SERPL: 1 {RATIO} (ref 1.1–2.2)
ALP SERPL-CCNC: 116 U/L (ref 45–117)
ALT SERPL-CCNC: 21 U/L (ref 12–78)
ANION GAP SERPL CALC-SCNC: 7 MMOL/L (ref 5–15)
AST SERPL-CCNC: 20 U/L (ref 15–37)
BASOPHILS # BLD: 0.1 K/UL (ref 0–0.1)
BASOPHILS NFR BLD: 1 % (ref 0–1)
BILIRUB SERPL-MCNC: 0.3 MG/DL (ref 0.2–1)
BUN SERPL-MCNC: 12 MG/DL (ref 6–20)
BUN/CREAT SERPL: 16 (ref 12–20)
CALCIUM SERPL-MCNC: 8.6 MG/DL (ref 8.5–10.1)
CHLORIDE SERPL-SCNC: 104 MMOL/L (ref 97–108)
CO2 SERPL-SCNC: 28 MMOL/L (ref 21–32)
CREAT SERPL-MCNC: 0.74 MG/DL (ref 0.55–1.02)
DIFFERENTIAL METHOD BLD: ABNORMAL
EOSINOPHIL # BLD: 0.3 K/UL (ref 0–0.4)
EOSINOPHIL NFR BLD: 3 % (ref 0–7)
ERYTHROCYTE [DISTWIDTH] IN BLOOD BY AUTOMATED COUNT: 18.5 % (ref 11.5–14.5)
GLOBULIN SER CALC-MCNC: 3.1 G/DL (ref 2–4)
GLUCOSE SERPL-MCNC: 270 MG/DL (ref 65–100)
HCT VFR BLD AUTO: 34.1 % (ref 35–47)
HGB BLD-MCNC: 10.7 G/DL (ref 11.5–16)
IMM GRANULOCYTES # BLD AUTO: 0.1 K/UL (ref 0–0.04)
IMM GRANULOCYTES NFR BLD AUTO: 1 % (ref 0–0.5)
LYMPHOCYTES # BLD: 2.2 K/UL (ref 0.8–3.5)
LYMPHOCYTES NFR BLD: 25 % (ref 12–49)
MCH RBC QN AUTO: 28.4 PG (ref 26–34)
MCHC RBC AUTO-ENTMCNC: 31.4 G/DL (ref 30–36.5)
MCV RBC AUTO: 90.5 FL (ref 80–99)
MONOCYTES # BLD: 2.3 K/UL (ref 0–1)
MONOCYTES NFR BLD: 26 % (ref 5–13)
NEUTS SEG # BLD: 3.9 K/UL (ref 1.8–8)
NEUTS SEG NFR BLD: 44 % (ref 32–75)
NRBC # BLD: 0 K/UL (ref 0–0.01)
NRBC BLD-RTO: 0 PER 100 WBC
PLATELET # BLD AUTO: 695 K/UL (ref 150–400)
PMV BLD AUTO: 9.8 FL (ref 8.9–12.9)
POTASSIUM SERPL-SCNC: 4.4 MMOL/L (ref 3.5–5.1)
PROT SERPL-MCNC: 6.2 G/DL (ref 6.4–8.2)
RBC # BLD AUTO: 3.77 M/UL (ref 3.8–5.2)
RBC MORPH BLD: ABNORMAL
SODIUM SERPL-SCNC: 139 MMOL/L (ref 136–145)
WBC # BLD AUTO: 8.9 K/UL (ref 3.6–11)

## 2022-05-10 PROCEDURE — 85025 COMPLETE CBC W/AUTO DIFF WBC: CPT

## 2022-05-10 PROCEDURE — 96413 CHEMO IV INFUSION 1 HR: CPT

## 2022-05-10 PROCEDURE — G8752 SYS BP LESS 140: HCPCS | Performed by: INTERNAL MEDICINE

## 2022-05-10 PROCEDURE — G8536 NO DOC ELDER MAL SCRN: HCPCS | Performed by: INTERNAL MEDICINE

## 2022-05-10 PROCEDURE — G8427 DOCREV CUR MEDS BY ELIG CLIN: HCPCS | Performed by: INTERNAL MEDICINE

## 2022-05-10 PROCEDURE — 77030012965 HC NDL HUBR BBMI -A

## 2022-05-10 PROCEDURE — 74011250636 HC RX REV CODE- 250/636: Performed by: INTERNAL MEDICINE

## 2022-05-10 PROCEDURE — G8754 DIAS BP LESS 90: HCPCS | Performed by: INTERNAL MEDICINE

## 2022-05-10 PROCEDURE — 3017F COLORECTAL CA SCREEN DOC REV: CPT | Performed by: INTERNAL MEDICINE

## 2022-05-10 PROCEDURE — 36415 COLL VENOUS BLD VENIPUNCTURE: CPT

## 2022-05-10 PROCEDURE — G8417 CALC BMI ABV UP PARAM F/U: HCPCS | Performed by: INTERNAL MEDICINE

## 2022-05-10 PROCEDURE — 96417 CHEMO IV INFUS EACH ADDL SEQ: CPT

## 2022-05-10 PROCEDURE — 99215 OFFICE O/P EST HI 40 MIN: CPT | Performed by: INTERNAL MEDICINE

## 2022-05-10 PROCEDURE — 1090F PRES/ABSN URINE INCON ASSESS: CPT | Performed by: INTERNAL MEDICINE

## 2022-05-10 PROCEDURE — 1101F PT FALLS ASSESS-DOCD LE1/YR: CPT | Performed by: INTERNAL MEDICINE

## 2022-05-10 PROCEDURE — 74011000250 HC RX REV CODE- 250: Performed by: INTERNAL MEDICINE

## 2022-05-10 PROCEDURE — 80053 COMPREHEN METABOLIC PANEL: CPT

## 2022-05-10 PROCEDURE — 96375 TX/PRO/DX INJ NEW DRUG ADDON: CPT

## 2022-05-10 PROCEDURE — G8399 PT W/DXA RESULTS DOCUMENT: HCPCS | Performed by: INTERNAL MEDICINE

## 2022-05-10 PROCEDURE — G8510 SCR DEP NEG, NO PLAN REQD: HCPCS | Performed by: INTERNAL MEDICINE

## 2022-05-10 RX ORDER — ONDANSETRON 2 MG/ML
8 INJECTION INTRAMUSCULAR; INTRAVENOUS AS NEEDED
Status: ACTIVE | OUTPATIENT
Start: 2022-05-10 | End: 2022-05-10

## 2022-05-10 RX ORDER — ACETAMINOPHEN 325 MG/1
650 TABLET ORAL AS NEEDED
Status: ACTIVE | OUTPATIENT
Start: 2022-05-10 | End: 2022-05-10

## 2022-05-10 RX ORDER — CIPROFLOXACIN 250 MG/1
250 TABLET, FILM COATED ORAL EVERY 12 HOURS
COMMUNITY
End: 2022-06-07

## 2022-05-10 RX ORDER — DIPHENHYDRAMINE HYDROCHLORIDE 50 MG/ML
25 INJECTION, SOLUTION INTRAMUSCULAR; INTRAVENOUS AS NEEDED
Status: ACTIVE | OUTPATIENT
Start: 2022-05-10 | End: 2022-05-10

## 2022-05-10 RX ORDER — ONDANSETRON 2 MG/ML
8 INJECTION INTRAMUSCULAR; INTRAVENOUS ONCE
Status: COMPLETED | OUTPATIENT
Start: 2022-05-10 | End: 2022-05-10

## 2022-05-10 RX ORDER — SODIUM CHLORIDE 9 MG/ML
25 INJECTION, SOLUTION INTRAVENOUS CONTINUOUS
Status: DISPENSED | OUTPATIENT
Start: 2022-05-10 | End: 2022-05-10

## 2022-05-10 RX ORDER — DEXAMETHASONE SODIUM PHOSPHATE 4 MG/ML
10 INJECTION, SOLUTION INTRA-ARTICULAR; INTRALESIONAL; INTRAMUSCULAR; INTRAVENOUS; SOFT TISSUE ONCE
Status: COMPLETED | OUTPATIENT
Start: 2022-05-10 | End: 2022-05-10

## 2022-05-10 RX ORDER — HEPARIN 100 UNIT/ML
300-500 SYRINGE INTRAVENOUS AS NEEDED
Status: DISPENSED | OUTPATIENT
Start: 2022-05-10 | End: 2022-05-10

## 2022-05-10 RX ORDER — SODIUM CHLORIDE 9 MG/ML
10 INJECTION INTRAMUSCULAR; INTRAVENOUS; SUBCUTANEOUS AS NEEDED
Status: ACTIVE | OUTPATIENT
Start: 2022-05-10 | End: 2022-05-10

## 2022-05-10 RX ORDER — SODIUM CHLORIDE 0.9 % (FLUSH) 0.9 %
10 SYRINGE (ML) INJECTION AS NEEDED
Status: ACTIVE | OUTPATIENT
Start: 2022-05-10 | End: 2022-05-10

## 2022-05-10 RX ADMIN — SODIUM CHLORIDE 25 ML/HR: 9 INJECTION, SOLUTION INTRAVENOUS at 10:37

## 2022-05-10 RX ADMIN — GEMCITABINE 1656 MG: 38 INJECTION, SOLUTION INTRAVENOUS at 12:16

## 2022-05-10 RX ADMIN — PACLITAXEL 184 MG: 100 INJECTION, POWDER, LYOPHILIZED, FOR SUSPENSION INTRAVENOUS at 11:32

## 2022-05-10 RX ADMIN — DEXAMETHASONE SODIUM PHOSPHATE 10 MG: 4 INJECTION, SOLUTION INTRAMUSCULAR; INTRAVENOUS at 10:43

## 2022-05-10 RX ADMIN — ONDANSETRON 8 MG: 2 INJECTION INTRAMUSCULAR; INTRAVENOUS at 10:40

## 2022-05-10 RX ADMIN — Medication 500 UNITS: at 12:52

## 2022-05-10 NOTE — DISCHARGE INSTRUCTIONS

## 2022-05-12 ENCOUNTER — TELEPHONE (OUTPATIENT)
Dept: FAMILY MEDICINE CLINIC | Age: 67
End: 2022-05-12

## 2022-05-17 ENCOUNTER — HOSPITAL ENCOUNTER (OUTPATIENT)
Dept: CT IMAGING | Age: 67
Discharge: HOME | End: 2022-05-17
Attending: INTERNAL MEDICINE
Payer: MEDICARE

## 2022-05-17 ENCOUNTER — HOSPITAL ENCOUNTER (OUTPATIENT)
Dept: INFUSION THERAPY | Age: 67
Discharge: HOME | End: 2022-05-17
Payer: MEDICARE

## 2022-05-17 VITALS
HEART RATE: 97 BPM | DIASTOLIC BLOOD PRESSURE: 81 MMHG | OXYGEN SATURATION: 97 % | RESPIRATION RATE: 18 BRPM | BODY MASS INDEX: 30.7 KG/M2 | WEIGHT: 179.8 LBS | SYSTOLIC BLOOD PRESSURE: 173 MMHG | HEIGHT: 64 IN | TEMPERATURE: 98.8 F

## 2022-05-17 DIAGNOSIS — R07.81 RIB PAIN: ICD-10-CM

## 2022-05-17 DIAGNOSIS — M79.2 NEUROPATHIC PAIN: ICD-10-CM

## 2022-05-17 DIAGNOSIS — T45.1X5A CHEMOTHERAPY INDUCED NAUSEA AND VOMITING: ICD-10-CM

## 2022-05-17 DIAGNOSIS — C25.9 MALIGNANT NEOPLASM OF PANCREAS, UNSPECIFIED LOCATION OF MALIGNANCY (HCC): ICD-10-CM

## 2022-05-17 DIAGNOSIS — C25.9 ADENOCARCINOMA OF PANCREAS (HCC): Primary | ICD-10-CM

## 2022-05-17 DIAGNOSIS — R11.2 CHEMOTHERAPY INDUCED NAUSEA AND VOMITING: ICD-10-CM

## 2022-05-17 DIAGNOSIS — M54.2 NECK PAIN: ICD-10-CM

## 2022-05-17 DIAGNOSIS — G89.3 NEOPLASM RELATED PAIN: ICD-10-CM

## 2022-05-17 LAB
ALBUMIN SERPL-MCNC: 2.9 G/DL (ref 3.5–5)
ALBUMIN/GLOB SERPL: 0.9 {RATIO} (ref 1.1–2.2)
ALP SERPL-CCNC: 104 U/L (ref 45–117)
ALT SERPL-CCNC: 21 U/L (ref 12–78)
ANION GAP SERPL CALC-SCNC: 8 MMOL/L (ref 5–15)
AST SERPL-CCNC: 15 U/L (ref 15–37)
BASOPHILS # BLD: 0 K/UL (ref 0–0.1)
BASOPHILS NFR BLD: 0 % (ref 0–1)
BILIRUB SERPL-MCNC: 0.2 MG/DL (ref 0.2–1)
BUN SERPL-MCNC: 11 MG/DL (ref 6–20)
BUN/CREAT SERPL: 17 (ref 12–20)
CALCIUM SERPL-MCNC: 8.7 MG/DL (ref 8.5–10.1)
CHLORIDE SERPL-SCNC: 102 MMOL/L (ref 97–108)
CO2 SERPL-SCNC: 29 MMOL/L (ref 21–32)
CREAT SERPL-MCNC: 0.64 MG/DL (ref 0.55–1.02)
DIFFERENTIAL METHOD BLD: ABNORMAL
EOSINOPHIL # BLD: 0 K/UL (ref 0–0.4)
EOSINOPHIL NFR BLD: 0 % (ref 0–7)
ERYTHROCYTE [DISTWIDTH] IN BLOOD BY AUTOMATED COUNT: 17.3 % (ref 11.5–14.5)
GLOBULIN SER CALC-MCNC: 3.2 G/DL (ref 2–4)
GLUCOSE SERPL-MCNC: 243 MG/DL (ref 65–100)
HCT VFR BLD AUTO: 32.5 % (ref 35–47)
HGB BLD-MCNC: 10.3 G/DL (ref 11.5–16)
IMM GRANULOCYTES # BLD AUTO: 0 K/UL (ref 0–0.04)
IMM GRANULOCYTES NFR BLD AUTO: 0 % (ref 0–0.5)
LYMPHOCYTES # BLD: 1.5 K/UL (ref 0.8–3.5)
LYMPHOCYTES NFR BLD: 37 % (ref 12–49)
MCH RBC QN AUTO: 28.4 PG (ref 26–34)
MCHC RBC AUTO-ENTMCNC: 31.7 G/DL (ref 30–36.5)
MCV RBC AUTO: 89.5 FL (ref 80–99)
MONOCYTES # BLD: 1 K/UL (ref 0–1)
MONOCYTES NFR BLD: 25 % (ref 5–13)
MYELOCYTES NFR BLD MANUAL: 2 %
NEUTS BAND NFR BLD MANUAL: 2 %
NEUTS SEG # BLD: 1.4 K/UL (ref 1.8–8)
NEUTS SEG NFR BLD: 34 % (ref 32–75)
NRBC # BLD: 0.02 K/UL (ref 0–0.01)
NRBC BLD-RTO: 0.5 PER 100 WBC
PLATELET # BLD AUTO: 281 K/UL (ref 150–400)
PMV BLD AUTO: 11.4 FL (ref 8.9–12.9)
POTASSIUM SERPL-SCNC: 4.1 MMOL/L (ref 3.5–5.1)
PROT SERPL-MCNC: 6.1 G/DL (ref 6.4–8.2)
RBC # BLD AUTO: 3.63 M/UL (ref 3.8–5.2)
RBC MORPH BLD: ABNORMAL
SODIUM SERPL-SCNC: 139 MMOL/L (ref 136–145)
WBC # BLD AUTO: 4 K/UL (ref 3.6–11)

## 2022-05-17 PROCEDURE — 96375 TX/PRO/DX INJ NEW DRUG ADDON: CPT

## 2022-05-17 PROCEDURE — 77030012965 HC NDL HUBR BBMI -A

## 2022-05-17 PROCEDURE — 96417 CHEMO IV INFUS EACH ADDL SEQ: CPT

## 2022-05-17 PROCEDURE — 74177 CT ABD & PELVIS W/CONTRAST: CPT

## 2022-05-17 PROCEDURE — 74011250636 HC RX REV CODE- 250/636: Performed by: INTERNAL MEDICINE

## 2022-05-17 PROCEDURE — 96413 CHEMO IV INFUSION 1 HR: CPT

## 2022-05-17 PROCEDURE — 74011000250 HC RX REV CODE- 250: Performed by: INTERNAL MEDICINE

## 2022-05-17 PROCEDURE — 74011000636 HC RX REV CODE- 636: Performed by: INTERNAL MEDICINE

## 2022-05-17 PROCEDURE — 85025 COMPLETE CBC W/AUTO DIFF WBC: CPT

## 2022-05-17 PROCEDURE — 74011000258 HC RX REV CODE- 258: Performed by: INTERNAL MEDICINE

## 2022-05-17 PROCEDURE — 80053 COMPREHEN METABOLIC PANEL: CPT

## 2022-05-17 PROCEDURE — 36415 COLL VENOUS BLD VENIPUNCTURE: CPT

## 2022-05-17 RX ORDER — SODIUM CHLORIDE 0.9 % (FLUSH) 0.9 %
10 SYRINGE (ML) INJECTION AS NEEDED
Status: ACTIVE | OUTPATIENT
Start: 2022-05-17 | End: 2022-05-17

## 2022-05-17 RX ORDER — HEPARIN 100 UNIT/ML
300-500 SYRINGE INTRAVENOUS AS NEEDED
Status: DISPENSED | OUTPATIENT
Start: 2022-05-17 | End: 2022-05-17

## 2022-05-17 RX ORDER — SODIUM CHLORIDE 9 MG/ML
10 INJECTION INTRAMUSCULAR; INTRAVENOUS; SUBCUTANEOUS AS NEEDED
Status: ACTIVE | OUTPATIENT
Start: 2022-05-17 | End: 2022-05-17

## 2022-05-17 RX ORDER — DEXAMETHASONE SODIUM PHOSPHATE 4 MG/ML
10 INJECTION, SOLUTION INTRA-ARTICULAR; INTRALESIONAL; INTRAMUSCULAR; INTRAVENOUS; SOFT TISSUE ONCE
Status: COMPLETED | OUTPATIENT
Start: 2022-05-17 | End: 2022-05-17

## 2022-05-17 RX ORDER — ACETAMINOPHEN 325 MG/1
650 TABLET ORAL AS NEEDED
Status: ACTIVE | OUTPATIENT
Start: 2022-05-17 | End: 2022-05-17

## 2022-05-17 RX ORDER — ONDANSETRON 2 MG/ML
8 INJECTION INTRAMUSCULAR; INTRAVENOUS ONCE
Status: COMPLETED | OUTPATIENT
Start: 2022-05-17 | End: 2022-05-17

## 2022-05-17 RX ORDER — HEPARIN 100 UNIT/ML
300 SYRINGE INTRAVENOUS
Status: COMPLETED | OUTPATIENT
Start: 2022-05-17 | End: 2022-05-17

## 2022-05-17 RX ORDER — SODIUM CHLORIDE 9 MG/ML
25 INJECTION, SOLUTION INTRAVENOUS CONTINUOUS
Status: DISPENSED | OUTPATIENT
Start: 2022-05-17 | End: 2022-05-17

## 2022-05-17 RX ORDER — DIPHENHYDRAMINE HYDROCHLORIDE 50 MG/ML
25 INJECTION, SOLUTION INTRAMUSCULAR; INTRAVENOUS AS NEEDED
Status: ACTIVE | OUTPATIENT
Start: 2022-05-17 | End: 2022-05-17

## 2022-05-17 RX ORDER — ONDANSETRON 2 MG/ML
8 INJECTION INTRAMUSCULAR; INTRAVENOUS AS NEEDED
Status: ACTIVE | OUTPATIENT
Start: 2022-05-17 | End: 2022-05-17

## 2022-05-17 RX ADMIN — ONDANSETRON 8 MG: 2 INJECTION INTRAMUSCULAR; INTRAVENOUS at 09:53

## 2022-05-17 RX ADMIN — DEXAMETHASONE SODIUM PHOSPHATE 10 MG: 4 INJECTION, SOLUTION INTRAMUSCULAR; INTRAVENOUS at 10:00

## 2022-05-17 RX ADMIN — DIATRIZOATE MEGLUMINE AND DIATRIZOATE SODIUM 30 ML: 600; 100 SOLUTION ORAL; RECTAL at 09:44

## 2022-05-17 RX ADMIN — SODIUM CHLORIDE 25 ML/HR: 9 INJECTION, SOLUTION INTRAVENOUS at 09:50

## 2022-05-17 RX ADMIN — IOPAMIDOL 100 ML: 612 INJECTION, SOLUTION INTRAVENOUS at 12:25

## 2022-05-17 RX ADMIN — PACLITAXEL 184 MG: 100 INJECTION, POWDER, LYOPHILIZED, FOR SUSPENSION INTRAVENOUS at 10:30

## 2022-05-17 RX ADMIN — SODIUM CHLORIDE, PRESERVATIVE FREE 10 ML: 5 INJECTION INTRAVENOUS at 11:54

## 2022-05-17 RX ADMIN — GEMCITABINE 1656 MG: 38 INJECTION, SOLUTION INTRAVENOUS at 11:20

## 2022-05-17 RX ADMIN — Medication 300 UNITS: at 12:15

## 2022-05-17 NOTE — DISCHARGE INSTRUCTIONS
Patient Education   Pancrelipase (By mouth)   Amylase (AM-i-lase), Lipase (LYE-pase), Protease ([id number])  Helps patients who have cystic fibrosis, pancreatitis, or digestive disorders digest food. Brand Name(s): Creon, Pancreaze, Pertzye, Viokace, Zenpep   There may be other brand names for this medicine. When This Medicine Should Not Be Used: This medicine is not right for everyone. Do not use it if you had an allergic reaction to pancrelipase, pancreatin, or pork products. How to Use This Medicine:   Capsule, Delayed Release Capsule, Long Acting Capsule, Powder, Tablet, Coated Tablet  · Take your medicine as directed. Your dose may need to be changed several times to find what works best for you. · Drink a full glass of water with your medicine and plenty of fluids during the day. · Take this medicine with food as directed by your doctor. · Viokace tablets are used in combination with medicines for stomach ulcers called proton pump inhibitors (including esomeprazole, lansoprazole, omeprazole). · If the capsule opens, avoid breathing in the powder. It can be irritating to your lungs. · Swallow the capsule or tablet whole. Do not let it dissolve in your mouth. Do not crush, break, or chew it. · Swallow the delayed-released capsule whole. Do not chew or crush it. If the capsule is too big to swallow, open it and put the contents in soft acidic food (including applesauce or yogurt). Swallow the mixture right away. Do not chew it. Do not mix the contents with alkaline foods or liquids, including milk. Drink plenty of water or juice to make sure all of the medicine has been swallowed. · When the delayed-released capsule is given to infants, it may be put directly into the mouth. It may also be opened and the contents mixed with a small amount of soft food (including applesauce). Do not mix the contents with alkaline foods or liquids, including breast milk or formula.  Give 4 ounces of formula or breast milk immediately after the medicine. · Pertzye® capsules may also be given through a gastrostomy tube with a diameter of 14 Western Mini or larger. ¨ Mix the contents of the capsule with soft foods (including applesauce). ¨ Remove the plunger and carefully spoon the mixture into the syringe. Replace the plunger partially back. ¨ Connect the syringe into the tube feeding port and inject the medicine for 10 to 12 seconds. ¨ Flush the tube with 10 mL of water to rinse all of the medicine into the stomach. · Throw away any unused portions of the mixture. Do not save for later use. · Take the powder with meals. Avoid breathing in the powder. It can be irritating to your lungs. · Carefully follow your doctor's instructions about any special diet. · This medicine should come with a Medication Guide. Ask your pharmacist for a copy if you do not have one. · Missed dose: If you miss a dose of this medicine, skip the missed dose and take your next dose at your usual time. Do not take 2 doses at the same time. · Store the medicine in a closed container at room temperature, away from heat, moisture, and direct light. Store the delayed-release capsules in a tightly closed container to protect them from moisture. Drugs and Foods to Avoid:   Ask your doctor or pharmacist before using any other medicine, including over-the-counter medicines, vitamins, and herbal products. · Some foods and medicines can affect how pancrelipase works. Tell your doctor if you use iron supplements or vitamins that contain iron. Warnings While Using This Medicine:   · Tell your doctor if you are pregnant or breastfeeding, or if you have kidney problems, gout, high uric acid in the blood or urine (hyperuricemia or hyperuricosuria), severe pancreas disease, or bowel problems. Tell your doctor if you have trouble swallowing capsules or a history of problems with blood sugar levels.   · This medicine may cause the following problems:  ¨ Fibrosing colonopathy (serious bowel disorder)  ¨ High uric acid levels  · Tell your doctor if you are lactose intolerant. Viokace tablets contain lactose, which can make this condition worse. · This medicine is made from the pancreas of pigs. The risk of getting a virus from medicines made of pig organs has been greatly reduced in recent years. This is the result of required testing for certain viruses and testing during the making of these medicines. Although the risk is low, talk with your doctor if you have concerns. · Keep all medicine out of the reach of children. Never share your medicine with anyone. Possible Side Effects While Using This Medicine:   Call your doctor right away if you notice any of these side effects:  · Allergic reaction: Itching or hives, swelling in your face or hands, swelling or tingling in your mouth or throat, chest tightness, trouble breathing  · Joint pain  · Severe stomach pain, bloating, constipation, diarrhea, nausea, or vomiting  · Swelling in your feet or lower legs  If you notice these less serious side effects, talk with your doctor:   · Cough  · Headache  · Irritation of the inside of the mouth  · Mild diarrhea or stomach upset  · Nosebleeds  If you notice other side effects that you think are caused by this medicine, tell your doctor. Call your doctor for medical advice about side effects. You may report side effects to FDA at 6-060-FDA-9657  © 2017 [facility] [place][note 2] Information is for End User's use only and may not be sold, redistributed or otherwise used for commercial purposes. The above information is an  only. It is not intended as medical advice for individual conditions or treatments. Talk to your doctor, nurse or pharmacist before following any medical regimen to see if it is safe and effective for you.

## 2022-05-17 NOTE — PROGRESS NOTES
[facility] Progress Note    Date: May 17, 2022    Name: Jennifer Deleon    MRN: [id number]         : 1955    Ms. Ba Arrived ambulatory and in no distress for cycle 15 day 8 of Abraxane/Gemzar regimen. Assessment was completed, no acute issues at this time, no new complaints voiced. Pt will have CT scan today following chemotherapy. Port accessed without difficulty, labs drawn and in process. Chemotherapy Flowsheet 2022   Cycle C15 D8    Date 2022   Drug / Regimen Abraxane/Gemzar   Dosage 184 mg/1656 mg   Time Up 1030   Time Down 1154   Pre Hydration -   Pre Meds Zofran/Decadron   Notes (No Data)     Ms. Ba's vitals were reviewed. Visit Vitals  BP (!) 173/81 (BP 1 Location: Left arm, BP Patient Position: Sitting)   Pulse 97   Temp 98.8 °F (37.1 °C)   Resp 18   Ht 5' 4\" (1.626 m)   Wt 81.6 kg (179 lb 12.8 oz)   SpO2 97%   BMI 30.86 kg/m²       Lab results were obtained and reviewed. Recent Results (from the past 12 hour(s))   CBC WITH AUTOMATED DIFF    Collection Time: 22  8:50 AM   Result Value Ref Range    WBC 4.0 3.6 - 11.0 K/uL    RBC 3.63 (L) 3.80 - 5.20 M/uL    HGB 10.3 (L) 11.5 - 16.0 g/dL    HCT 32.5 (L) 35.0 - 47.0 %    MCV 89.5 80.0 - 99.0 FL    MCH 28.4 26.0 - 34.0 PG    MCHC 31.7 30.0 - 36.5 g/dL    RDW 17.3 (H) 11.5 - 14.5 %    PLATELET 440 837 - 435 K/uL    MPV 11.4 8.9 - 12.9 FL    NRBC 0.5 (H) 0.0  WBC    ABSOLUTE NRBC 0.02 (H) 0.00 - 0.01 K/uL    NEUTROPHILS 34 32 - 75 %    BAND NEUTROPHILS 2 %    LYMPHOCYTES 37 12 - 49 %    MONOCYTES 25 (H) 5 - 13 %    EOSINOPHILS 0 0 - 7 %    BASOPHILS 0 0 - 1 %    MYELOCYTES 2 %    IMMATURE GRANULOCYTES 0 0.0 - 0.5 %    ABS. NEUTROPHILS 1.4 (L) 1.8 - 8.0 K/UL    ABS. LYMPHOCYTES 1.5 0.8 - 3.5 K/UL    ABS. MONOCYTES 1.0 0.0 - 1.0 K/UL    ABS. EOSINOPHILS 0.0 0.0 - 0.4 K/UL    ABS. BASOPHILS 0.0 0.0 - 0.1 K/UL    ABS. IMM.  GRANS. 0.0 0.00 - 0.04 K/UL    DF MANUAL      RBC COMMENTS ANISOCYTOSIS  1+        RBC COMMENTS POIKILOCYTOSIS  1+        RBC COMMENTS SCHISTOCYTES  1+        RBC COMMENTS OVALOCYTES  1+       METABOLIC PANEL, COMPREHENSIVE    Collection Time: 05/17/22  8:50 AM   Result Value Ref Range    Sodium 139 136 - 145 mmol/L    Potassium 4.1 3.5 - 5.1 mmol/L    Chloride 102 97 - 108 mmol/L    CO2 29 21 - 32 mmol/L    Anion gap 8 5 - 15 mmol/L    Glucose 243 (H) 65 - 100 mg/dL    BUN 11 6 - 20 MG/DL    Creatinine 0.64 0.55 - 1.02 MG/DL    BUN/Creatinine ratio 17 12 - 20      GFR est AA >60 >60 ml/min/1.73m2    GFR est non-AA >60 >60 ml/min/1.73m2    Calcium 8.7 8.5 - 10.1 MG/DL    Bilirubin, total 0.2 0.2 - 1.0 MG/DL    ALT (SGPT) 21 12 - 78 U/L    AST (SGOT) 15 15 - 37 U/L    Alk. phosphatase 104 45 - 117 U/L    Protein, total 6.1 (L) 6.4 - 8.2 g/dL    Albumin 2.9 (L) 3.5 - 5.0 g/dL    Globulin 3.2 2.0 - 4.0 g/dL    A-G Ratio 0.9 (L) 1.1 - 2.2       Pre-medications  were administered as ordered and chemotherapy was initiated. NS initiated. 5628: Zofran 8 mg given IVP. 1000: Decadron 10 mg given IVP. 1030: Abraxane 184 mg initiated to infuse. 1118: Abraxane infusion complete. NS flushing line. 1120: Gemzar 1,656 mg initiated to infuse. 1154: Gemzar infusion complete. Port flushed with NS, blood return present. Pt  left accessed for CT. Ms. Socorro Arreaga tolerated treatment well and was discharged from [place] [id number] in stable condition at 1200. She is to return on May 26 at 0830 for her next appointment.     Keisha Marsh RN  May 17, 2022

## 2022-05-17 NOTE — PROGRESS NOTES
Tell pt CTs probably stable with ?  Infectious/ inflammatory lung nodule  Can take a break from chemo if pt wants

## 2022-05-18 ENCOUNTER — TELEPHONE (OUTPATIENT)
Dept: ONCOLOGY | Age: 67
End: 2022-05-18

## 2022-05-18 NOTE — TELEPHONE ENCOUNTER
----- Message from Pako Pitts DO sent at 5/17/2022  5:38 PM EDT -----  Tell pt CTs probably stable with ?  Infectious/ inflammatory lung nodule  Can take a break from chemo if pt wants

## 2022-05-18 NOTE — TELEPHONE ENCOUNTER
HIPAA verified. Relayed message from Dr Kris Jewell regarding CT Results to patient. Patient would like to continue with treatments until she speaks with DR Kris Jewell at her visit on 6/7/22. She has questions concerning taking a break.

## 2022-05-19 DIAGNOSIS — G89.3 NEOPLASM RELATED PAIN: ICD-10-CM

## 2022-05-19 RX ORDER — HYDROMORPHONE HYDROCHLORIDE 2 MG/1
4 TABLET ORAL
Qty: 90 TABLET | Refills: 0 | Status: SHIPPED | OUTPATIENT
Start: 2022-05-19 | End: 2022-05-27

## 2022-05-23 ENCOUNTER — TELEPHONE (OUTPATIENT)
Dept: ONCOLOGY | Age: 67
End: 2022-05-23

## 2022-05-23 NOTE — TELEPHONE ENCOUNTER
Returned call to patient, she has broken half of a tooth off, at the gum line. It is somewhat painful. Patient had a nose bleed this morning at 0500, the bleed was a drip, she stuffed tissue in her nose which helped to resolve the bleed. Patient is concerned why she was told it is okay to hold chemo. She would like to know if it is because she is doing so well or it has done all it is going to do? Patient is scheduled in [facility] for chemo tomorrow morning, she would like to know with the tooth and nose bleed if she should get the chemo?

## 2022-05-23 NOTE — TELEPHONE ENCOUNTER
Patient has broken a tooth, and has a bloody nose. Supposed to get treatment tomorrow. [facility] staff need to know if patient needs to go get teeth fixed before getting treatment. [facility] nurse currently out of office.  Sending to Dr Silvia Mckeon nurse at [facility]

## 2022-05-23 NOTE — TELEPHONE ENCOUNTER
HIPAA verified. Spoke with patient, relayed DR ANAY Herring. Patient okay with holding chemo tomorrow, she will call dentist for appointment. Will need to find out how long after tooth extraction to restart chemo.

## 2022-05-24 ENCOUNTER — TELEPHONE (OUTPATIENT)
Dept: ONCOLOGY | Age: 67
End: 2022-05-24

## 2022-05-24 NOTE — TELEPHONE ENCOUNTER
Pt is undergoing chemo and needs to have a have a tooth extracted at [facility]. Please contact Beka Garcia at dentist office to advise.     CB# is [phone]

## 2022-05-25 ENCOUNTER — TELEPHONE (OUTPATIENT)
Dept: ONCOLOGY | Age: 67
End: 2022-05-25

## 2022-05-25 DIAGNOSIS — T45.1X5A CHEMOTHERAPY INDUCED NEUTROPENIA (HCC): ICD-10-CM

## 2022-05-25 DIAGNOSIS — D70.1 CHEMOTHERAPY INDUCED NEUTROPENIA (HCC): ICD-10-CM

## 2022-05-25 DIAGNOSIS — C25.9 MALIGNANT NEOPLASM OF PANCREAS, UNSPECIFIED LOCATION OF MALIGNANCY (HCC): Primary | ICD-10-CM

## 2022-05-25 NOTE — TELEPHONE ENCOUNTER
Called patient she will come into [facility] on 5/26/22 for CBC w/ Diff. Labs need to be okay by DR Temo Oglesby or Bobby. Call Dentist. Radha Galeano at     [phone] to advise if okay or not okay to extract tooth.

## 2022-05-25 NOTE — TELEPHONE ENCOUNTER
Spoke with Seun Easton at [facility] . Patient is scheduled for tooth extraction tomorrow. They would like to know if there are any barriers/issues with patient having extraction.  Patient did not have chemo this week, her next cycle is due 6/7/22

## 2022-05-25 NOTE — TELEPHONE ENCOUNTER
She had chemo on 5/17/22 and ANC was 1400. Recommend re-checking CBC with diff prior to extraction to make sure counts are stable/good. At increased risk of infection due to currently being on chemo.

## 2022-05-26 ENCOUNTER — TELEPHONE (OUTPATIENT)
Dept: ONCOLOGY | Age: 67
End: 2022-05-26

## 2022-05-26 ENCOUNTER — HOSPITAL ENCOUNTER (OUTPATIENT)
Dept: INFUSION THERAPY | Age: 67
Discharge: HOME | End: 2022-05-26
Payer: MEDICARE

## 2022-05-26 DIAGNOSIS — T45.1X5A CHEMOTHERAPY INDUCED NEUTROPENIA (HCC): ICD-10-CM

## 2022-05-26 DIAGNOSIS — C25.9 MALIGNANT NEOPLASM OF PANCREAS, UNSPECIFIED LOCATION OF MALIGNANCY (HCC): ICD-10-CM

## 2022-05-26 DIAGNOSIS — D70.1 CHEMOTHERAPY INDUCED NEUTROPENIA (HCC): ICD-10-CM

## 2022-05-26 DIAGNOSIS — T45.1X5A CHEMOTHERAPY INDUCED NEUTROPENIA (HCC): Primary | ICD-10-CM

## 2022-05-26 DIAGNOSIS — D70.1 CHEMOTHERAPY INDUCED NEUTROPENIA (HCC): Primary | ICD-10-CM

## 2022-05-26 LAB
BASOPHILS # BLD: 0 K/UL (ref 0–0.1)
BASOPHILS NFR BLD: 1 % (ref 0–1)
DIFFERENTIAL METHOD BLD: ABNORMAL
EOSINOPHIL # BLD: 0 K/UL (ref 0–0.4)
EOSINOPHIL NFR BLD: 0 % (ref 0–7)
ERYTHROCYTE [DISTWIDTH] IN BLOOD BY AUTOMATED COUNT: 17.5 % (ref 11.5–14.5)
HCT VFR BLD AUTO: 34.2 % (ref 35–47)
HGB BLD-MCNC: 10.8 G/DL (ref 11.5–16)
IMM GRANULOCYTES # BLD AUTO: 0 K/UL (ref 0–0.04)
IMM GRANULOCYTES NFR BLD AUTO: 0 % (ref 0–0.5)
LYMPHOCYTES # BLD: 1.9 K/UL (ref 0.8–3.5)
LYMPHOCYTES NFR BLD: 47 % (ref 12–49)
MCH RBC QN AUTO: 28.3 PG (ref 26–34)
MCHC RBC AUTO-ENTMCNC: 31.6 G/DL (ref 30–36.5)
MCV RBC AUTO: 89.8 FL (ref 80–99)
METAMYELOCYTES NFR BLD MANUAL: 3 %
MONOCYTES # BLD: 0.4 K/UL (ref 0–1)
MONOCYTES NFR BLD: 10 % (ref 5–13)
NEUTS SEG # BLD: 1.5 K/UL (ref 1.8–8)
NEUTS SEG NFR BLD: 38 % (ref 32–75)
NRBC # BLD: 0.09 K/UL (ref 0–0.01)
NRBC BLD-RTO: 2.3 PER 100 WBC
PLATELET # BLD AUTO: 252 K/UL (ref 150–400)
PMV BLD AUTO: 9.8 FL (ref 8.9–12.9)
PROMYELOCYTES NFR BLD MANUAL: 1 %
RBC # BLD AUTO: 3.81 M/UL (ref 3.8–5.2)
RBC MORPH BLD: ABNORMAL
WBC # BLD AUTO: 4 K/UL (ref 3.6–11)

## 2022-05-26 PROCEDURE — 36415 COLL VENOUS BLD VENIPUNCTURE: CPT

## 2022-05-26 PROCEDURE — 85025 COMPLETE CBC W/AUTO DIFF WBC: CPT

## 2022-05-26 NOTE — TELEPHONE ENCOUNTER
Patient into [facility] today for CBC, ANC low cannot get tooth extraction today. Patient and Dentist office made aware. Will recheck CBC on 6/2 at 0830 for possible extraction on 6/3. Patient will call dentist to schedule time for extraction.

## 2022-05-26 NOTE — PROGRESS NOTES
[facility] Lab Visit:    Erik Ramirez  1955  [id number]    0825:  Pt arrived ambulatory. Labs drawn peripherally and sent for processing. Departed [facility] ambulatory and in no distress. There were no vitals taken for this visit.

## 2022-06-02 ENCOUNTER — VIRTUAL VISIT (OUTPATIENT)
Dept: PALLATIVE CARE | Age: 67
End: 2022-06-02
Payer: MEDICARE

## 2022-06-02 ENCOUNTER — HOSPITAL ENCOUNTER (OUTPATIENT)
Dept: INFUSION THERAPY | Age: 67
Discharge: HOME | End: 2022-06-02
Payer: MEDICARE

## 2022-06-02 DIAGNOSIS — M79.2 NEUROPATHIC PAIN: ICD-10-CM

## 2022-06-02 DIAGNOSIS — T45.1X5A CHEMOTHERAPY INDUCED NEUTROPENIA (HCC): ICD-10-CM

## 2022-06-02 DIAGNOSIS — D70.1 CHEMOTHERAPY INDUCED NEUTROPENIA (HCC): ICD-10-CM

## 2022-06-02 LAB
BASOPHILS # BLD: 0 K/UL (ref 0–0.1)
BASOPHILS NFR BLD: 0 % (ref 0–1)
DIFFERENTIAL METHOD BLD: ABNORMAL
EOSINOPHIL # BLD: 0 K/UL (ref 0–0.4)
EOSINOPHIL NFR BLD: 0 % (ref 0–7)
ERYTHROCYTE [DISTWIDTH] IN BLOOD BY AUTOMATED COUNT: 18.3 % (ref 11.5–14.5)
HCT VFR BLD AUTO: 36 % (ref 35–47)
HGB BLD-MCNC: 11.3 G/DL (ref 11.5–16)
IMM GRANULOCYTES # BLD AUTO: 0 K/UL (ref 0–0.04)
IMM GRANULOCYTES NFR BLD AUTO: 0 % (ref 0–0.5)
LYMPHOCYTES # BLD: 2.3 K/UL (ref 0.8–3.5)
LYMPHOCYTES NFR BLD: 43 % (ref 12–49)
MCH RBC QN AUTO: 28.4 PG (ref 26–34)
MCHC RBC AUTO-ENTMCNC: 31.4 G/DL (ref 30–36.5)
MCV RBC AUTO: 90.5 FL (ref 80–99)
MONOCYTES # BLD: 1.4 K/UL (ref 0–1)
MONOCYTES NFR BLD: 26 % (ref 5–13)
NEUTS SEG # BLD: 1.7 K/UL (ref 1.8–8)
NEUTS SEG NFR BLD: 31 % (ref 32–75)
NRBC # BLD: 0 K/UL (ref 0–0.01)
NRBC BLD-RTO: 0 PER 100 WBC
PLATELET # BLD AUTO: 854 K/UL (ref 150–400)
PLATELET COMMENTS,PCOM: ABNORMAL
PMV BLD AUTO: 10 FL (ref 8.9–12.9)
RBC # BLD AUTO: 3.98 M/UL (ref 3.8–5.2)
RBC MORPH BLD: ABNORMAL
WBC # BLD AUTO: 5.4 K/UL (ref 3.6–11)

## 2022-06-02 PROCEDURE — 99215 OFFICE O/P EST HI 40 MIN: CPT | Performed by: INTERNAL MEDICINE

## 2022-06-02 PROCEDURE — 36415 COLL VENOUS BLD VENIPUNCTURE: CPT

## 2022-06-02 PROCEDURE — G8427 DOCREV CUR MEDS BY ELIG CLIN: HCPCS | Performed by: INTERNAL MEDICINE

## 2022-06-02 PROCEDURE — 1101F PT FALLS ASSESS-DOCD LE1/YR: CPT | Performed by: INTERNAL MEDICINE

## 2022-06-02 PROCEDURE — 3017F COLORECTAL CA SCREEN DOC REV: CPT | Performed by: INTERNAL MEDICINE

## 2022-06-02 PROCEDURE — 85025 COMPLETE CBC W/AUTO DIFF WBC: CPT

## 2022-06-02 PROCEDURE — G8432 DEP SCR NOT DOC, RNG: HCPCS | Performed by: INTERNAL MEDICINE

## 2022-06-02 PROCEDURE — G8536 NO DOC ELDER MAL SCRN: HCPCS | Performed by: INTERNAL MEDICINE

## 2022-06-02 PROCEDURE — G8417 CALC BMI ABV UP PARAM F/U: HCPCS | Performed by: INTERNAL MEDICINE

## 2022-06-02 PROCEDURE — G8399 PT W/DXA RESULTS DOCUMENT: HCPCS | Performed by: INTERNAL MEDICINE

## 2022-06-02 PROCEDURE — 1090F PRES/ABSN URINE INCON ASSESS: CPT | Performed by: INTERNAL MEDICINE

## 2022-06-02 PROCEDURE — G8756 NO BP MEASURE DOC: HCPCS | Performed by: INTERNAL MEDICINE

## 2022-06-02 RX ORDER — DULOXETIN HYDROCHLORIDE 60 MG/1
60 CAPSULE, DELAYED RELEASE ORAL DAILY
Qty: 30 CAPSULE | Refills: 1 | Status: SHIPPED | OUTPATIENT
Start: 2022-06-02 | End: 2022-07-18 | Stop reason: SDUPTHER

## 2022-06-02 NOTE — PROGRESS NOTES
[facility] Lab Visit:    Moni Larson  1955  [id number]    0815:  Pt arrived ambulatory. Labs drawn peripherally and sent for processing. Departed [facility] ambulatory and in no distress. There were no vitals taken for this visit.

## 2022-06-02 NOTE — PROGRESS NOTES
Palliative Medicine Office Visit  Palliative Medicine Nurse Check In  [phone]    Patient Name: Erik Ramirez  YOB: 1955      Date of Office Visit: 6/2/2022  Patient states: \"  \"    From Check In Sheet (scanned in Media):  Has a medical provider talked with you about cardiopulmonary resuscitation (CPR)? [x] Yes   [] No   [] Unable to obtain    Nurse reminder to complete or update ACP FlowSheet:    Is ACP on the Problem List?    [] Yes    [] No  IF ACP Document is ON FILE; Nurse to place ACP on Problem List     Is there an ACP Note in Chart Review/Note? [] Yes    [] No   If NO: ALERT PROVIDER       Primary Decision MakerSsandraihsan Galdamez - Daughter - [phone]    Secondary Decision Maker: Laura Idania - Daughter - [phone]  Advance Care Planning 6/2/2022   Patient's [place] is: Named in scanned ACP document   Confirm Advance Directive Yes, on file   Patient Would Like to Complete Advance Directive -   Does the patient have other document types Power of          Is there anything that we should know about you as a person in order to provide you the best care possible? Have you been to the ER, urgent care clinic since your last visit? [] Yes   [x] No   [] Unable to obtain    Have you been hospitalized since your last visit? [] Yes   [x] No   [] Unable to obtain    Have you seen or consulted any other health care providers outside of the [address] [place] [address] since your last visit?    [] Yes   [x] No   [] Unable to obtain    Functional status (describe):         Last BM: 6/1/2022     accessed (date): 6/2/2022  Bottle review (for opioid pain medication):  Medication 1:   Date filled:   Directions:   # filled:   # left:   # pills taking per day:  Last dose taken:    Medication 2:   Date filled:   Directions:   # filled:   # left:   # pills taking per day:  Last dose taken:    Medication 3:   Date filled:   Directions:   # filled:   # left:   # pills taking per day:  Last dose taken:    Medication 4:   Date filled:   Directions:   # filled:   # left:   # pills taking per day:  Last dose taken:

## 2022-06-02 NOTE — PATIENT INSTRUCTIONS
Nicholas Perez    It was a pleasure to see you for a virtual visit today. Below is the plan we discussed.      -Cancer:  -You restarted chemotherapy \. Currently you are taking it once weekly on Tuesday for three weeks followed by one week off. You are finishing your 3rd cycle.  -You have lost your hair since starting chemotherapy.  -The dexamethasone makes you feel a bit anxious/hyper. Sometimes you can feel sad. You also have low energy for a couple days after chemotherapy.  -Sometimes you get a rash on your arms--you use pepcid, benadryl, and hydrocortisone cream for this with improvement.     -Back and lower abdominal pain pain  -Your pain is pretty well controlled with your current regimen. You have this mild tolerable uncomfortable feeling.  -You are taking dilaudid 1 mg in the morning and 1 mg in the afternoon at 4 PM. This is working well for you.  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 -Your PCP is providing you with Dilaudid refill.     -Insomnia  -You have been taking Ativan 0.5 mg at bedtime which is very helpful and so you want to continue the same.  -You also take benadryl at times at night  -You still can sometimes wake a few times at night. You don't think it is because of pain, though you note that your right arm pain can bother you at times.  -You said that tylenol does seem to help, so you can continue to do that. You can also try your as needed dilaudid if the tylenol is not helpful.    -Fatigue  -Of note, you also take methylphenidate 5 mg twice daily as prescribed by Dr. Ren Munoz. We will continue to monitor to see if this may need to be adjusted (if the afternoon dose is impacting your sleep).    -Diarrhea   -You are on Creon. You do still sometimes have diarrhea.      -Uncontrolled diabetes  -Your Hemoglobin A1c was greater than 9 in June. You are working with your PCP on insulin doses.  You are working to adjust your insulin doses when you take your chemotherapy and dexamethasone. Uncontrolled diabetes can also exacerbate neuropathy. -Appetite  -You don't have too much of an appetite (worse on chemo weeks), but you try to make yourself eat a good dinner.     -Chemotherapy-induced neuropathy  -It is the worst on your feet and your right hand fingertips. You will drop things at times.  -Gabapentin 600 mg 3 times a day, total 1800 mg/day. This is also being prescribed by Dr. Meliton Gill.  -Cymbalta was recently increased to 30 mg 2 times a day. This was a couple weeks ago after discussing with Dr. Meliton Gill. You do think it seems to be helping some.  -You have some gait imbalance from severe neuropathy. We talked about physical therapy being helpful but you do not want to have home or outpatient physical therapy at this time. You will look at YouTube videos for gait and try them out first.    -Mood  -You think you are having more good days than bad and that overall you are doing very well. You can feel down at times during your chemo weeks. -You have an awesome support system: , daughter and her family. We will see you back in 3 months virtually.

## 2022-06-05 NOTE — PROGRESS NOTES
Reason for Visit:   Brain Zarate a 63 S. o. 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 Neoadjuvant Radiation with Dr Tova Mercado ending 12/18/2021. Distal Pancreatectomy, Splenectomy, and Civa Sheet with Dr Hazle Kehr on 3/17/2021. Adjuvant radiation with Dr Tova Mercado. Restart Gemcitabine/abraxane Cycle #1 7/6/2021, Cycle #2 8/3/2021, Cycle #3 8/31/2021, Cycle #4 9/28/2021    Goal: Prolong life--Palliative    History of Present Illness:     Seen today as a new patient fu on palliative chemo gemzar/ abraxane. Feeling tired. For chemo today. Has pain in shoulders/ left side/ joints/sciatica. CBC good today  No fevers/ chills/ chest pain/ SOB/ nausea/ vomiting/diarrhea/           Past Medical History:   Diagnosis Date    Adenocarcinoma of pancreas ([facility] [address] 05/13/2020    Dr Floyd Branch biopsy via EUS    Diabetes ([facility] [address]     GERD (gastroesophageal reflux disease)     Hernia of abdominal cavity     Subxyphoid hernia    Hypertension     Inflammatory polyarthropathy (HCC)     Joint pain     Joint swelling     Menopause     Ocular nevus of left eye     Pancreatitis     Tracheal stenosis       Past Surgical History:   Procedure Laterality Date    HX CARPAL TUNNEL RELEASE Bilateral     HX COLONOSCOPY      HX HYSTERECTOMY      HX KNEE ARTHROSCOPY Right     HX KNEE REPLACEMENT Right     partial    HX LAP CHOLECYSTECTOMY      HX TONSILLECTOMY      HX VASCULAR ACCESS        Social History     Tobacco Use    Smoking status: Never Smoker    Smokeless tobacco: Never Used   Substance Use Topics    Alcohol use:  No Alcohol/week: 0.0 standard drinks      Family History   Problem Relation Age of Onset    Heart Disease Mother     Heart Attack Mother     Cancer Father         lung    Diabetes Sister      Current Outpatient Medications   Medication Sig    DULoxetine (CYMBALTA) 60 mg capsule Take 1 Capsule by mouth daily. Indications: neuropathic pain    lidocaine (LIDODERM) 5 % 1 Patch by TransDERmal route every twenty-four (24) hours. Apply patch to the affected area for 12 hours a day and remove for 12 hours a day.  LORazepam (ATIVAN) 1 mg tablet Take 1 Tablet by mouth every six (6) hours as needed for Anxiety. Max Daily Amount: 4 mg. Indications: nausea and vomiting caused by cancer drugs    multivit-minerals/folic acid (MULTIVITAMIN GUMMIES PO) Take 1 Each by mouth daily.  HYDROmorphone (Dilaudid) 2 mg tablet Take 2 mg by mouth every four (4) hours as needed for Pain.  methylphenidate HCl (RITALIN) 10 mg tablet Take 1 Tablet by mouth three (3) times daily. Max Daily Amount: 30 mg.    Toujeo SoloStar U-300 Insulin 300 unit/mL (1.5 mL) inpn pen INJECT 55 UNITS ONCE DAILY AT BEDTIME - DOSE INCREASED (Patient taking differently: 55 Units by SubCUTAneous route. Patient uses differently during around chemo time.)    gabapentin (NEURONTIN) 300 mg capsule TAKE 2 CAPSULES BY MOUTH THREE TIMES DAILY    lipase-protease-amylase (Creon) 12,000-38,000 -60,000 unit capsule Take 2 Capsules by mouth three (3) times daily (with meals). 2 caps with meal and 1 with snacks  Indications: exocrine pancreatic insufficiency (Patient taking differently: Take 2 Capsules by mouth three (3) times daily (with meals). 2 caps with meal  Indications: exocrine pancreatic insufficiency)    lidocaine-prilocaine (EMLA) topical cream Apply  to affected area as needed for Pain (pain ). Apply a thin layer over port site prior to accessing port    nystatin (MYCOSTATIN) powder Apply 1 g to affected area three (3) times daily.     HumaLOG KwikPen Insulin 100 unit/mL kwikpen patient on sliding scale    famotidine (Pepcid) 20 mg tablet Take 20 mg by mouth two (2) times a day.  sennosides (Senna) 8.6 mg cap Take 1-2 Tablets by mouth two (2) times a day.  polyethylene glycol (Miralax) 17 gram/dose powder Take 17 g by mouth daily as needed.  ondansetron (ZOFRAN ODT) 4 mg disintegrating tablet Take 1 Tab by mouth every eight (8) hours as needed for Nausea or Vomiting.  acetaminophen (Acetaminophen Extra Strength) 500 mg tablet Take 1,000 mg by mouth daily as needed.  CRANIAL PROSTHESIS misc Alopecia due to chemo for cancer (Patient not taking: Reported on 6/7/2022)    Insulin Needles, Disposable, (Nellie Pen Needle) 32 gauge x 5/32\" ndle 1 Each by Does Not Apply route three (3) times daily. (Patient not taking: Reported on 5/10/2022)    promethazine (PHENERGAN) 25 mg tablet Take 1 Tab by mouth every six (6) hours as needed for Nausea. (Patient not taking: Reported on 6/7/2022)    diphenoxylate-atropine (LomotiL) 2.5-0.025 mg per tablet Take 2 Tabs by mouth four (4) times daily as needed for Diarrhea. Max Daily Amount: 8 Tabs. (Patient not taking: Reported on 5/4/2022)     No current facility-administered medications for this visit. Facility-Administered Medications Ordered in Other Visits   Medication Dose Route Frequency    0.9% sodium chloride injection 10 mL  10 mL IntraVENous PRN    heparin (porcine) pf 300-500 Units  300-500 Units InterCATHeter PRN      No Known Allergies     Review of Systems: A complete review of systems was obtained, negative except as described above.     Physical Exam:     Visit Vitals  BP (!) 120/57   Pulse 92   Temp 98.5 °F (36.9 °C) (Oral)   Resp 18   Ht 5' 4\" (1.626 m)   Wt 183 lb 6.8 oz (83.2 kg)   SpO2 99%   BMI 31.48 kg/m²     ECOG PS: 1  General: No distress  Eyes: anicteric sclerae  HENT: Atraumatic, wearing mask  Neck: Supple  Respiratory: CTAB, normal respiratory effort  CV: Normal rate, regular rhythm, no murmurs, no peripheral edema  GI: Soft, nontender, nondistended, no masses  MS:  Moving in bed  Skin: No rashes, ecchymoses, or petechiae. Normal temperature, turgor, and texture. Psych: Alert, oriented, appropriate affect, normal judgment/insight    Results:     Lab Results   Component Value Date/Time    WBC 8.7 06/07/2022 08:30 AM    HGB 10.9 (L) 06/07/2022 08:30 AM    HCT 34.2 (L) 06/07/2022 08:30 AM    PLATELET 882 (H) 73/31/7141 08:30 AM    MCV 89.5 06/07/2022 08:30 AM    ABS. NEUTROPHILS 4.2 06/07/2022 08:30 AM     Lab Results   Component Value Date/Time    Sodium 138 06/07/2022 08:30 AM    Potassium 4.1 06/07/2022 08:30 AM    Chloride 102 06/07/2022 08:30 AM    CO2 29 06/07/2022 08:30 AM    Glucose 288 (H) 06/07/2022 08:30 AM    BUN 14 06/07/2022 08:30 AM    Creatinine 0.71 06/07/2022 08:30 AM    GFR est AA >60 06/07/2022 08:30 AM    GFR est non-AA >60 06/07/2022 08:30 AM    Calcium 8.7 06/07/2022 08:30 AM    Glucose (POC) 192 (H) 07/15/2020 11:56 AM    Creatinine (POC) 0.7 02/06/2013 10:21 AM     Lab Results   Component Value Date/Time    Bilirubin, total 0.2 06/07/2022 08:30 AM    ALT (SGPT) 20 06/07/2022 08:30 AM    Alk. phosphatase 123 (H) 06/07/2022 08:30 AM    Protein, total 6.6 06/07/2022 08:30 AM    Albumin 3.2 (L) 06/07/2022 08:30 AM    Globulin 3.4 06/07/2022 08:30 AM       CT A/P 4/30/2020  IMPRESSION: Suspect neoplastic pancreatic mass versus atypical focal  pancreatitis with splenic and superior mesenteric vein occlusion. Consider  further evaluation with endoscopic ultrasound at which time biopsy could  potentially be performed.     CT Chest 5/21/2020  IMPRESSION:  1. No evidence of pulmonary metastatic disease. No evidence of mediastinal or  hilar lymphadenopathy. No pleural effusions identified. 2. No definite evidence of central pulmonary embolic disease. 3. Presence of a mass lesion involving the pancreatic head/body. 4. Evidence of fatty infiltration involving the liver.  Presence of focal  low-density areas within the liver compatible with cysts. Surgical absence of  the gallbladder.     CT A/P 6/19/2020  IMPRESSION:  Pancreatitis with ascites favored. Discrete pancreatic mass is not identified. No biliary dilatation or definite arterial or venous thrombosis. No liver  metastases. Fat-containing ventral hernia  Nonobstructing left renal calculus  Retrolisthesis of L2 and L3.     CT C/A/P 10/12/2020  IMPRESSION:  1. Interval improvement in appearance of the pancreas (see discussion above). 2. Normal sized liver. Stable appearance of the previously described areas of  decreased attenuation within liver. 3. Surgical absence of the gallbladder. 4. Evidence of splenomegaly. 5. Normal sized kidneys. Presence of small, nonobstructing calculi within the  left kidney. No evidence of hydronephrotic change. 6. Suboptimal distention of the urinary bladder. 7. Presence of a small, fat-containing ventral hernia in the mid abdominal  Region. CT C/A/P 9/17/2021  IMPRESSION  New peritoneal implants identified in the anterior pelvis  Interval splenectomy and repair of the ventral hernia  Interval thickening of the GE junction and circumferential thickening of the  pylorus. This may represent gastritis but correlation with endoscopy or upper GI  recommended     Path: 5/8/2020  CYTOLOGIC INTERPRETATION:   Pancreas, EUS-guided fine needle aspiration and cell blocks: Adenocarcinoma     Path: 3/17/2021  No residual malignancy in pancrease, 0/29 lymph nodes. 2-3mm focus metastatic adenocarcinoma within hernia sac     Assessment/PLAN:     1) stage 4 Pancreatic Adenocarcinoma dx 5/20  Completed neoadjuvant chemotherapy. Radiation with Dr Rey Martins. Definitive resection with Dr Edwina Enciso  Now peritoneal disease/mets. Goal of care palliative. Seen today for fu chemo. CTs 5/22 stable. Reviewed with pt today. Unclear pulmonary nodule infectious vs inflammatory.    Omental disease likely stable. Reviewed list of questions today. Pt has been tolerating chemo with manageable side effects. She is doing well with palliative chemotherapy with Gemcitaine/Abraxane. Reviewed chemo overall today. Discussed plan with pt and will continue same plan for 2-3 months. Then CTs again. Labs reviewed today/ good. Pt wants to do chemo today. Pt / family agreeable to plan. 2) cancer abdominal Pain/Neuropathic Pain/ RUE pain. Seeing palliative care. Hx of celiac block--pain in abd controlled. On dilaudid prn. Cymbalta up to 60mg. On ativan bid also. caution with multiple meds today. 3) weakness / fatigue due to cancer. Monitor. 4) Nausea. zofran prn. 5) Anxiety/Insomnia. Not sleeping / wakes up. On ativan prn. And cymbalta increased to 60mg. Monitor. 6) thrombocytosis due to chemo/ reactive. goes back to baseline. Up again. Monitor. 7) psychosocial. Mood good. Coping well. Lives at \[facility]". Friends with [employer].   Has chickens.        Fu \[facility]\"" clinic in a month/ virtual at chemo if needed  Call if questions      Signed By: Faye Stanley, DO

## 2022-06-06 NOTE — PROGRESS NOTES
Palliative Medicine Outpatient Services  Mobile: [phone] ([phone]    Patient Name: Eloise Espinosa  YOB: 1955    Date of Current Visit: 06/06/22  Location of Current Visit:    [] [facility] Office  [] [facility] Office  [] [facility] Office  [] Home  [x]Synchronous A/V virtual visit    Date of Initial Visit: 6/15/2020  Referral from: Dr. Ankit Montes  Primary Care Physician: Lesli Butler MD      SUMMARY:   Eloise Espinosa is a 77y.o. year old with a  history of uncontrolled diabetes with an A1c of 9.2, newly diagnosed inoperable pancreatic cancer, who was referred to Palliative Medicine by Dr. Ankit Montes for management of intractable symptoms and psychosocial support. 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 CT on June 19 shows pancreatitis with some ascites, pancreatic mass has disappeared. Completed 3 doses of 5-FU, started Gemzar Abraxane on 7/29/2020     CA 19 normal, patient s/p exploratory  laparoscopy at [facility] with Dr. Neri Avalos and Dr. Mary Acuna as part of clinical trial on March 17, 2021     PALLIATIVE DIAGNOSES:       ICD-10-CM ICD-9-CM    1. Neuropathic pain  M79.2 729.2 DULoxetine (CYMBALTA) 60 mg capsule          PLAN:     Patient Instructions     Eloise Espinosa    It was a pleasure to see you for a virtual visit today. Below is the plan we discussed.      -Cancer:  -You restarted chemotherapy \. Currently you are taking it once weekly on Tuesday for three weeks followed by one week off. You are finishing your 3rd cycle.  -You have lost your hair since starting chemotherapy.  -The dexamethasone makes you feel a bit anxious/hyper.  Sometimes you can feel sad. You also have low energy for a couple days after chemotherapy.  -Sometimes you get a rash on your arms--you use pepcid, benadryl, and hydrocortisone cream for this with improvement.     -Back and lower abdominal pain pain  -Your pain is pretty well controlled with your current regimen. You have this mild tolerable uncomfortable feeling.  -You are taking dilaudid 1 mg in the morning and 1 mg in the afternoon at 4 PM. This is working well for you.  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 -Your PCP is providing you with Dilaudid refill.     -Insomnia  -You have been taking Ativan 0.5 mg at bedtime which is very helpful and so you want to continue the same.  -You also take benadryl at times at night  -You still can sometimes wake a few times at night. You don't think it is because of pain, though you note that your right arm pain can bother you at times.  -You said that tylenol does seem to help, so you can continue to do that. You can also try your as needed dilaudid if the tylenol is not helpful.    -Fatigue  -Of note, you also take methylphenidate 5 mg twice daily as prescribed by Dr. Red Vo. We will continue to monitor to see if this may need to be adjusted (if the afternoon dose is impacting your sleep).    -Diarrhea   -You are on Creon. You do still sometimes have diarrhea.      -Uncontrolled diabetes  -Your Hemoglobin A1c was greater than 9 in June. You are working with your PCP on insulin doses. You are working to adjust your insulin doses when you take your chemotherapy and dexamethasone. Uncontrolled diabetes can also exacerbate neuropathy. -Appetite  -You don't have too much of an appetite (worse on chemo weeks), but you try to make yourself eat a good dinner.     -Chemotherapy-induced neuropathy  -It is the worst on your feet and your right hand fingertips. You will drop things at times.  -Gabapentin 600 mg 3 times a day, total 1800 mg/day.  This is also being prescribed by Dr. Ren Munoz.  -Cymbalta was recently increased to 30 mg 2 times a day. This was a couple weeks ago after discussing with Dr. Ren Munoz. You do think it seems to be helping some.  -You have some gait imbalance from severe neuropathy. We talked about physical therapy being helpful but you do not want to have home or outpatient physical therapy at this time. You will look at YouTube videos for gait and try them out first.    -Mood  -You think you are having more good days than bad and that overall you are doing very well. You can feel down at times during your chemo weeks. -You have an awesome support system: , daughter and her family. We will see you back in 3 months virtually. GOALS OF CARE / TREATMENT PREFERENCES:   [====Goals of Care====]  GOALS OF CARE:  Patient / health care proxy stated goals: See Patient Instructions / Summary    TREATMENT PREFERENCES:   Code Status:  [x] Attempt Resuscitation       [] Do Not Attempt Resuscitation    Advance Care Planning:  [x] The [facility] Interdisciplinary Team has updated the ACP Navigator with Decision Maker and Patient Capacity      Primary Decision MakerTaffsalome Valenzuelajonathan - Daughter - [phone]    Secondary Decision Maker: Winnie Romero - Daughter - [phone]  Advance Care Planning 6/2/2022   Patient's [address] is: Named in scanned ACP document   Confirm Advance Directive Yes, on file   Patient Would Like to Complete Advance Directive -   Does the patient have other document types Power of        Other:  (If patient appropriate for POST, consider using PALLPOST smart phrase here)    The palliative care team has discussed with patient / health care proxy about goals of care / treatment preferences for patient.  [====Goals of Care====]     PRESCRIPTIONS GIVEN:     Medications Ordered Today   Medications    DULoxetine (CYMBALTA) 60 mg capsule     Sig: Take 1 Capsule by mouth daily.  Indications: neuropathic pain Dispense:  30 Capsule     Refill:  1           FOLLOW UP:     Future Appointments   Date Time Provider Louis Campuzano   6/7/2022  8:00 AM [facility] INF [facility] [address]   6/7/2022  9:00 AM Modesto Patterson DO [facility] BS AMB   6/24/2022  1:00 PM Dilia Yang MD [facility] MAIN BS AMB           PHYSICIANS INVOLVED IN CARE:   Patient Care Team:  Yoly Martinez MD as PCP - General (Internal Medicine Physician)  Yoly Martinez MD as PCP - [facility] Empaneled Provider  Johnson Davalos MD (Inactive) (General Surgery)  Vida Sprague MD as Physician (Hematology and Oncology)  Adelina Yeh MD as Physician (Palliative Medicine)       HISTORY:   Reviewed patient-completed ESAS and advance care planning form. Reviewed patient record in prescription monitoring program.    CHIEF COMPLAINT:   No chief complaint on file. HPI/SUBJECTIVE:    The patient is: [x] Verbal / [] Nonverbal     Patient seen today virtually along with her daughter. Jorge Alberto Mendez is in very good spirits, talked at length about her chickens. Neuropathy remains in both hands and feet but current doses of gabapentin Cymbalta are helping her. She has been taking Dilaudid 1 mg 2 times a day for fear of pain more than anything. There are days that she has more back pain especially when she is more active and we talked about her taking Tylenol instead of Dilaudid for that. We will also wean her off of Dilaudid. She is anxious about the next set of scans which may show cancer recurrence and that she may have to go back on chemo. She is excepting of this for now.  --------  Patient seen today along with her daughter Dane Stovall. Both are very tearful and stressed from patient's new diagnosis. Jorge Alberto Mendez tells me that she ignored her abdominal pain for several months and now she is paying for it. She is determined to do everything she can to live as long as possible but is very realistic about her outcome.   She is struggling with abdominal pain and is not taking medications as prescribed. Her most significant complaint is profound fatigue. But from what she is sharing, she is eating little to nothing every day, drinking less than 10 ounces of water per day. Her daughter forces her to eat some applesauce with her meds which is about the only calories she gets per day. She sleeps most of the time. She is identified that the first 6 days of the chemotherapy is the worst but she seems to recover from it a little bit after and eats a little bit. She struggles with diarrhea after chemotherapy but constipation once the diarrhea dissipates. 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 Daughter is overwhelmed because of the illness of her mother. Clinical Pain Assessment (nonverbal scale for nonverbal patients):   [++++ Clinical Pain Assessment++++]  [++++Pain Severity++++]: Pain: 5  [++++Pain Character++++]: cramping  [++++Pain Duration++++]: month  [++++Pain Effect++++]: functional and emotional  [++++Pain Factors++++]: none in particular  [++++Pain Frequency++++]: constant  [++++Pain Location++++]: upper abdomen  [++++ Clinical Pain Assessment++++]       FUNCTIONAL ASSESSMENT:     Palliative Performance Scale (PPS):  PPS: 70       PSYCHOSOCIAL/SPIRITUAL SCREENING:     Any spiritual / Bahai concerns:  [] Yes /  [x] No    Caregiver Burnout:  [] Yes /  [x] No /  [] No Caregiver Present      Anticipatory grief assessment:   [x] Normal  / [] Maladaptive       ESAS Anxiety: Anxiety: 2    ESAS Depression: Depression: 2       REVIEW OF SYSTEMS:     The following systems were [x] reviewed / [] unable to be reviewed  Systems: constitutional, ears/nose/mouth/throat, respiratory, gastrointestinal, genitourinary, musculoskeletal, integumentary, neurologic, psychiatric, endocrine. Positive findings noted below.   Modified ESAS Completed by: provider   Fatigue: 7 Drowsiness: 7 Depression: 2 Pain: 5   Anxiety: 2 Nausea: 0   Anorexia: 0 Dyspnea: 1   Best Well-Being: 3 Constipation: No   Other Problem (Comment): 0          PHYSICAL EXAM:     Wt Readings from Last 3 Encounters:   05/17/22 179 lb 12.8 oz (81.6 kg)   05/10/22 185 lb (83.9 kg)   05/10/22 185 lb (83.9 kg)     There were no vitals taken for this visit. Last bowel movement: See Nursing Note    Constitutional    [x] Appears well-developed and well-nourished in no apparent distress    [] Abnormal:  Mental status  [x] Alert and awake  [x] Oriented to person/place/time  [x] Able to follow commands  [] Abnormal:   Eyes  [x] EOM normal   [x] Sclera normal   [x] No visible ocular discharge  [] Abnormal:   HENT  [x] Normocephalic, atraumatic  [x] Mouth/Throat: Moist mucous membranes   [x] External Ears normal  [] Abnormal:  Oral thrush resolved  Neck  [x] No visualized mass  [] Abnormal:  Pulmonary/Chest   [x] Respiratory effort normal  [x] No visualized signs of difficulty breathing or respiratory distress  [] Abnormal:  Musculoskeletal  [x] Normal gait with no signs of ataxia  [x] Normal range of motion of neck  [] Abnormal:  Neurological:   [x] No facial asymmetry (Cranial nerve 7 motor function)  [x] No gaze palsy  [] Abnormal:   Skin  [x] No significant exanthematous lesions or discoloration noted on facial skin  [] Abnormal:                                  Psychiatric  [x] Normal affect  [x] No hallucinations  [] Abnormal:    Abdomen-laparotomy scar appears good, sutures healing well    Other pertinent observable physical exam findings:    Due to this being a TeleHealth evaluation, many elements of the physical examination are unable to be assessed.                HISTORY:     Past Medical History:   Diagnosis Date    Adenocarcinoma of pancreas ([facility] [address] 05/13/2020    Dr Luan Saldivar biopsy via EUS    Diabetes [facility])     GERD (gastroesophageal reflux disease)     Hernia of abdominal cavity     Subxyphoid hernia    Hypertension     Inflammatory polyarthropathy (HCC)     Joint pain     Joint swelling     Menopause     Ocular nevus of left eye     Pancreatitis     Tracheal stenosis       Past Surgical History:   Procedure Laterality Date    HX CARPAL TUNNEL RELEASE Bilateral     HX COLONOSCOPY      HX HYSTERECTOMY      HX KNEE ARTHROSCOPY Right     HX KNEE REPLACEMENT Right     partial    HX LAP CHOLECYSTECTOMY      HX TONSILLECTOMY      HX VASCULAR ACCESS        Family History   Problem Relation Age of Onset    Heart Disease Mother     Heart Attack Mother     Cancer Father         lung    Diabetes Sister       History reviewed, no pertinent family history. Social History     Tobacco Use    Smoking status: Never Smoker    Smokeless tobacco: Never Used   Substance Use Topics    Alcohol use: No     Alcohol/week: 0.0 standard drinks     No Known Allergies   Current Outpatient Medications   Medication Sig    DULoxetine (CYMBALTA) 60 mg capsule Take 1 Capsule by mouth daily. Indications: neuropathic pain    ciprofloxacin HCl (Cipro) 250 mg tablet Take 250 mg by mouth every twelve (12) hours.  CRANIAL PROSTHESIS misc Alopecia due to chemo for cancer    lidocaine (LIDODERM) 5 % 1 Patch by TransDERmal route every twenty-four (24) hours. Apply patch to the affected area for 12 hours a day and remove for 12 hours a day.  LORazepam (ATIVAN) 1 mg tablet Take 1 Tablet by mouth every six (6) hours as needed for Anxiety. Max Daily Amount: 4 mg. Indications: nausea and vomiting caused by cancer drugs    multivit-minerals/folic acid (MULTIVITAMIN GUMMIES PO) Take 1 Each by mouth daily.  HYDROmorphone (Dilaudid) 2 mg tablet Take 2 mg by mouth every four (4) hours as needed for Pain.  Insulin Needles, Disposable, (Nellie Pen Needle) 32 gauge x 5/32\" ndle 1 Each by Does Not Apply route three (3) times daily.  (Patient not taking: Reported on 5/10/2022)    methylphenidate HCl (RITALIN) 10 mg tablet Take 1 Tablet by mouth three (3) times daily. Max Daily Amount: 30 mg.    Toujeo SoloStar U-300 Insulin 300 unit/mL (1.5 mL) inpn pen INJECT 55 UNITS ONCE DAILY AT BEDTIME - DOSE INCREASED (Patient taking differently: 55 Units by SubCUTAneous route. Patient uses differently during around chemo time.)    gabapentin (NEURONTIN) 300 mg capsule TAKE 2 CAPSULES BY MOUTH THREE TIMES DAILY    lipase-protease-amylase (Creon) 12,000-38,000 -60,000 unit capsule Take 2 Capsules by mouth three (3) times daily (with meals). 2 caps with meal and 1 with snacks  Indications: exocrine pancreatic insufficiency    lidocaine-prilocaine (EMLA) topical cream Apply  to affected area as needed for Pain (pain ). Apply a thin layer over port site prior to accessing port    nystatin (MYCOSTATIN) powder Apply 1 g to affected area three (3) times daily.  HumaLOG KwikPen Insulin 100 unit/mL kwikpen patient on sliding scale    famotidine (Pepcid) 20 mg tablet Take 20 mg by mouth two (2) times a day.  sennosides (Senna) 8.6 mg cap Take 1-2 Tablets by mouth two (2) times a day.  polyethylene glycol (Miralax) 17 gram/dose powder Take 17 g by mouth daily as needed. (Patient not taking: Reported on 5/10/2022)    promethazine (PHENERGAN) 25 mg tablet Take 1 Tab by mouth every six (6) hours as needed for Nausea.  ondansetron (ZOFRAN ODT) 4 mg disintegrating tablet Take 1 Tab by mouth every eight (8) hours as needed for Nausea or Vomiting.  diphenoxylate-atropine (LomotiL) 2.5-0.025 mg per tablet Take 2 Tabs by mouth four (4) times daily as needed for Diarrhea. Max Daily Amount: 8 Tabs. (Patient not taking: Reported on 5/4/2022)    acetaminophen (Acetaminophen Extra Strength) 500 mg tablet Take 1,000 mg by mouth daily as needed. No current facility-administered medications for this visit.           LAB DATA REVIEWED:     Lab Results   Component Value Date/Time    WBC 5.4 06/02/2022 08:25 AM    HGB 11.3 (L) 06/02/2022 08:25 AM    PLATELET 774 (H) 21/39/1801 08:25 AM     Lab Results   Component Value Date/Time    Sodium 139 05/17/2022 08:50 AM    Potassium 4.1 05/17/2022 08:50 AM    Chloride 102 05/17/2022 08:50 AM    CO2 29 05/17/2022 08:50 AM    BUN 11 05/17/2022 08:50 AM    Creatinine 0.64 05/17/2022 08:50 AM    Calcium 8.7 05/17/2022 08:50 AM    Magnesium 1.8 04/07/2021 11:05 AM    Phosphorus 3.0 07/15/2020 05:35 AM      Lab Results   Component Value Date/Time    Alk.  phosphatase 104 05/17/2022 08:50 AM    Protein, total 6.1 (L) 05/17/2022 08:50 AM    Albumin 2.9 (L) 05/17/2022 08:50 AM    Globulin 3.2 05/17/2022 08:50 AM     No results found for: INR, PTMR, PTP, PT1, PT2, APTT, INREXT, INREXT   Lab Results   Component Value Date/Time    Iron 228 (H) 08/04/2021 09:40 AM    TIBC 327 08/04/2021 09:40 AM    Iron % saturation 70 (H) 08/04/2021 09:40 AM    Ferritin 199 08/04/2021 09:40 AM           CONTROLLED SUBSTANCES SAFETY ASSESSMENT (IF ON CONTROLLED SUBSTANCES):     Reviewed opioid safety handout:  [x] Yes   [] No  24 hour opioid dose >150mg morphine equivalent/day:  [] Yes   [x] No  Benzodiazepines:  [] Yes   [x] No  Sleep apnea:  [] Yes   [x] No  Urine Toxicology Testing within last 6 months:  [] Yes   [x] No  History of or new aberrant medication taking behaviors:  [] Yes   [x] No  Has Narcan been prescribed [x] Yes   [] No          Total time: 60 minutes  Counseling / coordination time: 40 minutes  > 50% counseling / coordination?:  Yes    Consent:  He and/or health care decision maker is aware that that he may receive a bill for this telehealth service, depending on his insurance coverage, and has provided verbal consent to proceed: Yes    CPT Codes 24731-05800 for Established Patients may apply to this Telehealth Visit  Pursuant to the emergency declaration under the [address] and the [place] [address] waiver authority and the [employer] Act, this Virtual  Visit was conducted, with patient's consent, to reduce the patient's risk of exposure to COVID-19 and provide continuity of care for an established patient. Services were provided through a video synchronous discussion virtually to substitute for in-person clinic visit.

## 2022-06-06 NOTE — PROGRESS NOTES
Bela Peters is a 77 y.o. female here for follow up for pancreatic cancer. Patient scheduled for C15C15 today in [facility]. Patient is not sleeping well, she c/o pain in back, LLE and abdomen. She feels her use of tyelnol has increased. Patient feels like she is alone at times. Key Oncology Meds             ondansetron (ZOFRAN ODT) 4 mg disintegrating tablet Take 1 Tab by mouth every eight (8) hours as needed for Nausea or Vomiting. Key Pain Meds             HYDROmorphone (Dilaudid) 2 mg tablet Take 2 mg by mouth every four (4) hours as needed for Pain. acetaminophen (Acetaminophen Extra Strength) 500 mg tablet Take 1,000 mg by mouth daily as needed.         Chemotherapy Flowsheet 5/17/2022   Cycle C15 D8    Date 5/17/2022   Drug / Regimen Abraxane/Gemzar   Dosage 184 mg/1656 mg   Time Up 1030   Time Down 1154   Pre Hydration -   Pre Meds Zofran/Decadron   Notes (No Data)

## 2022-06-07 ENCOUNTER — OFFICE VISIT (OUTPATIENT)
Dept: ONCOLOGY | Age: 67
End: 2022-06-07
Payer: MEDICARE

## 2022-06-07 ENCOUNTER — DOCUMENTATION ONLY (OUTPATIENT)
Dept: ONCOLOGY | Age: 67
End: 2022-06-07

## 2022-06-07 ENCOUNTER — HOSPITAL ENCOUNTER (OUTPATIENT)
Dept: INFUSION THERAPY | Age: 67
Discharge: HOME | End: 2022-06-07
Payer: MEDICARE

## 2022-06-07 VITALS
OXYGEN SATURATION: 99 % | SYSTOLIC BLOOD PRESSURE: 120 MMHG | TEMPERATURE: 98.5 F | BODY MASS INDEX: 31.31 KG/M2 | HEIGHT: 64 IN | DIASTOLIC BLOOD PRESSURE: 57 MMHG | HEART RATE: 92 BPM | WEIGHT: 183.42 LBS | RESPIRATION RATE: 18 BRPM

## 2022-06-07 VITALS
OXYGEN SATURATION: 99 % | BODY MASS INDEX: 31.31 KG/M2 | SYSTOLIC BLOOD PRESSURE: 171 MMHG | HEIGHT: 64 IN | RESPIRATION RATE: 18 BRPM | HEART RATE: 92 BPM | TEMPERATURE: 98.5 F | DIASTOLIC BLOOD PRESSURE: 80 MMHG | WEIGHT: 183.42 LBS

## 2022-06-07 DIAGNOSIS — R10.84 GENERALIZED ABDOMINAL PAIN: ICD-10-CM

## 2022-06-07 DIAGNOSIS — R11.2 CHEMOTHERAPY INDUCED NAUSEA AND VOMITING: ICD-10-CM

## 2022-06-07 DIAGNOSIS — F41.8 ANXIETY ABOUT HEALTH: ICD-10-CM

## 2022-06-07 DIAGNOSIS — G89.3 NEOPLASM RELATED PAIN: ICD-10-CM

## 2022-06-07 DIAGNOSIS — C25.9 ADENOCARCINOMA OF PANCREAS (HCC): Primary | ICD-10-CM

## 2022-06-07 DIAGNOSIS — G47.00 INSOMNIA, UNSPECIFIED TYPE: ICD-10-CM

## 2022-06-07 DIAGNOSIS — M54.2 NECK PAIN: ICD-10-CM

## 2022-06-07 DIAGNOSIS — M79.2 NEUROPATHIC PAIN: ICD-10-CM

## 2022-06-07 DIAGNOSIS — C25.9 MALIGNANT NEOPLASM OF PANCREAS, UNSPECIFIED LOCATION OF MALIGNANCY (HCC): Primary | ICD-10-CM

## 2022-06-07 DIAGNOSIS — M79.601 RIGHT ARM PAIN: ICD-10-CM

## 2022-06-07 DIAGNOSIS — D70.1 CHEMOTHERAPY INDUCED NEUTROPENIA (HCC): ICD-10-CM

## 2022-06-07 DIAGNOSIS — T45.1X5A CHEMOTHERAPY INDUCED NAUSEA AND VOMITING: ICD-10-CM

## 2022-06-07 DIAGNOSIS — T45.1X5A CHEMOTHERAPY INDUCED NEUTROPENIA (HCC): ICD-10-CM

## 2022-06-07 LAB
ALBUMIN SERPL-MCNC: 3.2 G/DL (ref 3.5–5)
ALBUMIN/GLOB SERPL: 0.9 {RATIO} (ref 1.1–2.2)
ALP SERPL-CCNC: 123 U/L (ref 45–117)
ALT SERPL-CCNC: 20 U/L (ref 12–78)
ANION GAP SERPL CALC-SCNC: 7 MMOL/L (ref 5–15)
AST SERPL-CCNC: 17 U/L (ref 15–37)
BASOPHILS # BLD: 0.1 K/UL (ref 0–0.1)
BASOPHILS NFR BLD: 1 % (ref 0–1)
BILIRUB SERPL-MCNC: 0.2 MG/DL (ref 0.2–1)
BUN SERPL-MCNC: 14 MG/DL (ref 6–20)
BUN/CREAT SERPL: 20 (ref 12–20)
CALCIUM SERPL-MCNC: 8.7 MG/DL (ref 8.5–10.1)
CHLORIDE SERPL-SCNC: 102 MMOL/L (ref 97–108)
CO2 SERPL-SCNC: 29 MMOL/L (ref 21–32)
CREAT SERPL-MCNC: 0.71 MG/DL (ref 0.55–1.02)
DIFFERENTIAL METHOD BLD: ABNORMAL
EOSINOPHIL # BLD: 0.3 K/UL (ref 0–0.4)
EOSINOPHIL NFR BLD: 3 % (ref 0–7)
ERYTHROCYTE [DISTWIDTH] IN BLOOD BY AUTOMATED COUNT: 16.9 % (ref 11.5–14.5)
GLOBULIN SER CALC-MCNC: 3.4 G/DL (ref 2–4)
GLUCOSE SERPL-MCNC: 288 MG/DL (ref 65–100)
HCT VFR BLD AUTO: 34.2 % (ref 35–47)
HGB BLD-MCNC: 10.9 G/DL (ref 11.5–16)
IMM GRANULOCYTES # BLD AUTO: 0 K/UL (ref 0–0.04)
IMM GRANULOCYTES NFR BLD AUTO: 0 % (ref 0–0.5)
LYMPHOCYTES # BLD: 2.1 K/UL (ref 0.8–3.5)
LYMPHOCYTES NFR BLD: 24 % (ref 12–49)
MCH RBC QN AUTO: 28.5 PG (ref 26–34)
MCHC RBC AUTO-ENTMCNC: 31.9 G/DL (ref 30–36.5)
MCV RBC AUTO: 89.5 FL (ref 80–99)
MONOCYTES # BLD: 2 K/UL (ref 0–1)
MONOCYTES NFR BLD: 23 % (ref 5–13)
NEUTS SEG # BLD: 4.2 K/UL (ref 1.8–8)
NEUTS SEG NFR BLD: 49 % (ref 32–75)
NRBC # BLD: 0 K/UL (ref 0–0.01)
NRBC BLD-RTO: 0 PER 100 WBC
PLATELET # BLD AUTO: 660 K/UL (ref 150–400)
PLATELET COMMENTS,PCOM: ABNORMAL
PMV BLD AUTO: 9.5 FL (ref 8.9–12.9)
POTASSIUM SERPL-SCNC: 4.1 MMOL/L (ref 3.5–5.1)
PROT SERPL-MCNC: 6.6 G/DL (ref 6.4–8.2)
RBC # BLD AUTO: 3.82 M/UL (ref 3.8–5.2)
RBC MORPH BLD: ABNORMAL
SODIUM SERPL-SCNC: 138 MMOL/L (ref 136–145)
WBC # BLD AUTO: 8.7 K/UL (ref 3.6–11)

## 2022-06-07 PROCEDURE — 74011000250 HC RX REV CODE- 250: Performed by: INTERNAL MEDICINE

## 2022-06-07 PROCEDURE — G8754 DIAS BP LESS 90: HCPCS | Performed by: INTERNAL MEDICINE

## 2022-06-07 PROCEDURE — 74011250636 HC RX REV CODE- 250/636: Performed by: INTERNAL MEDICINE

## 2022-06-07 PROCEDURE — 77030012965 HC NDL HUBR BBMI -A

## 2022-06-07 PROCEDURE — G8427 DOCREV CUR MEDS BY ELIG CLIN: HCPCS | Performed by: INTERNAL MEDICINE

## 2022-06-07 PROCEDURE — G8752 SYS BP LESS 140: HCPCS | Performed by: INTERNAL MEDICINE

## 2022-06-07 PROCEDURE — 96413 CHEMO IV INFUSION 1 HR: CPT

## 2022-06-07 PROCEDURE — 74011000258 HC RX REV CODE- 258: Performed by: INTERNAL MEDICINE

## 2022-06-07 PROCEDURE — G8510 SCR DEP NEG, NO PLAN REQD: HCPCS | Performed by: INTERNAL MEDICINE

## 2022-06-07 PROCEDURE — 36415 COLL VENOUS BLD VENIPUNCTURE: CPT

## 2022-06-07 PROCEDURE — G8417 CALC BMI ABV UP PARAM F/U: HCPCS | Performed by: INTERNAL MEDICINE

## 2022-06-07 PROCEDURE — G8399 PT W/DXA RESULTS DOCUMENT: HCPCS | Performed by: INTERNAL MEDICINE

## 2022-06-07 PROCEDURE — 96417 CHEMO IV INFUS EACH ADDL SEQ: CPT

## 2022-06-07 PROCEDURE — 1090F PRES/ABSN URINE INCON ASSESS: CPT | Performed by: INTERNAL MEDICINE

## 2022-06-07 PROCEDURE — 3017F COLORECTAL CA SCREEN DOC REV: CPT | Performed by: INTERNAL MEDICINE

## 2022-06-07 PROCEDURE — 85025 COMPLETE CBC W/AUTO DIFF WBC: CPT

## 2022-06-07 PROCEDURE — G8536 NO DOC ELDER MAL SCRN: HCPCS | Performed by: INTERNAL MEDICINE

## 2022-06-07 PROCEDURE — 1101F PT FALLS ASSESS-DOCD LE1/YR: CPT | Performed by: INTERNAL MEDICINE

## 2022-06-07 PROCEDURE — 1123F ACP DISCUSS/DSCN MKR DOCD: CPT | Performed by: INTERNAL MEDICINE

## 2022-06-07 PROCEDURE — 96375 TX/PRO/DX INJ NEW DRUG ADDON: CPT

## 2022-06-07 PROCEDURE — 99215 OFFICE O/P EST HI 40 MIN: CPT | Performed by: INTERNAL MEDICINE

## 2022-06-07 PROCEDURE — 80053 COMPREHEN METABOLIC PANEL: CPT

## 2022-06-07 RX ORDER — DIPHENHYDRAMINE HYDROCHLORIDE 50 MG/ML
25 INJECTION, SOLUTION INTRAMUSCULAR; INTRAVENOUS
Status: CANCELLED
Start: 2022-06-07

## 2022-06-07 RX ORDER — ONDANSETRON 2 MG/ML
8 INJECTION INTRAMUSCULAR; INTRAVENOUS ONCE
Status: COMPLETED | OUTPATIENT
Start: 2022-06-07 | End: 2022-06-07

## 2022-06-07 RX ORDER — LORAZEPAM 2 MG/ML
0.5 INJECTION INTRAMUSCULAR
Status: CANCELLED
Start: 2022-06-07

## 2022-06-07 RX ORDER — DEXAMETHASONE SODIUM PHOSPHATE 4 MG/ML
10 INJECTION, SOLUTION INTRA-ARTICULAR; INTRALESIONAL; INTRAMUSCULAR; INTRAVENOUS; SOFT TISSUE ONCE
Status: CANCELLED | OUTPATIENT
Start: 2022-06-07 | End: 2022-06-07

## 2022-06-07 RX ORDER — DIPHENHYDRAMINE HYDROCHLORIDE 50 MG/ML
50 INJECTION, SOLUTION INTRAMUSCULAR; INTRAVENOUS AS NEEDED
Status: CANCELLED
Start: 2022-06-07

## 2022-06-07 RX ORDER — ACETAMINOPHEN 325 MG/1
650 TABLET ORAL AS NEEDED
Status: CANCELLED | OUTPATIENT
Start: 2022-06-07

## 2022-06-07 RX ORDER — SODIUM CHLORIDE 9 MG/ML
25 INJECTION, SOLUTION INTRAVENOUS CONTINUOUS
Status: CANCELLED | OUTPATIENT
Start: 2022-06-07

## 2022-06-07 RX ORDER — SODIUM CHLORIDE 9 MG/ML
10 INJECTION INTRAMUSCULAR; INTRAVENOUS; SUBCUTANEOUS AS NEEDED
Status: CANCELLED | OUTPATIENT
Start: 2022-06-07

## 2022-06-07 RX ORDER — SODIUM CHLORIDE 9 MG/ML
25 INJECTION, SOLUTION INTRAVENOUS CONTINUOUS
Status: DISPENSED | OUTPATIENT
Start: 2022-06-07 | End: 2022-06-07

## 2022-06-07 RX ORDER — ONDANSETRON 2 MG/ML
8 INJECTION INTRAMUSCULAR; INTRAVENOUS AS NEEDED
Status: ACTIVE | OUTPATIENT
Start: 2022-06-07 | End: 2022-06-07

## 2022-06-07 RX ORDER — HEPARIN 100 UNIT/ML
300-500 SYRINGE INTRAVENOUS AS NEEDED
Status: DISPENSED | OUTPATIENT
Start: 2022-06-07 | End: 2022-06-07

## 2022-06-07 RX ORDER — HEPARIN 100 UNIT/ML
300-500 SYRINGE INTRAVENOUS AS NEEDED
Status: ACTIVE | OUTPATIENT
Start: 2022-06-07 | End: 2022-06-07

## 2022-06-07 RX ORDER — ACETAMINOPHEN 325 MG/1
650 TABLET ORAL AS NEEDED
Status: ACTIVE | OUTPATIENT
Start: 2022-06-07 | End: 2022-06-07

## 2022-06-07 RX ORDER — DIPHENHYDRAMINE HYDROCHLORIDE 50 MG/ML
25 INJECTION, SOLUTION INTRAMUSCULAR; INTRAVENOUS AS NEEDED
Status: ACTIVE | OUTPATIENT
Start: 2022-06-07 | End: 2022-06-07

## 2022-06-07 RX ORDER — ALBUTEROL SULFATE 0.83 MG/ML
2.5 SOLUTION RESPIRATORY (INHALATION) AS NEEDED
Status: CANCELLED
Start: 2022-06-07

## 2022-06-07 RX ORDER — EPINEPHRINE 1 MG/ML
0.3 INJECTION, SOLUTION, CONCENTRATE INTRAVENOUS AS NEEDED
Status: CANCELLED | OUTPATIENT
Start: 2022-06-07

## 2022-06-07 RX ORDER — HYDROCORTISONE SODIUM SUCCINATE 100 MG/2ML
100 INJECTION, POWDER, FOR SOLUTION INTRAMUSCULAR; INTRAVENOUS AS NEEDED
Status: CANCELLED | OUTPATIENT
Start: 2022-06-07

## 2022-06-07 RX ORDER — DIPHENHYDRAMINE HYDROCHLORIDE 50 MG/ML
25 INJECTION, SOLUTION INTRAMUSCULAR; INTRAVENOUS AS NEEDED
Status: CANCELLED | OUTPATIENT
Start: 2022-06-07

## 2022-06-07 RX ORDER — ONDANSETRON 2 MG/ML
8 INJECTION INTRAMUSCULAR; INTRAVENOUS AS NEEDED
Status: CANCELLED | OUTPATIENT
Start: 2022-06-07

## 2022-06-07 RX ORDER — ONDANSETRON 2 MG/ML
8 INJECTION INTRAMUSCULAR; INTRAVENOUS ONCE
Status: CANCELLED | OUTPATIENT
Start: 2022-06-07 | End: 2022-06-07

## 2022-06-07 RX ORDER — DEXAMETHASONE SODIUM PHOSPHATE 4 MG/ML
10 INJECTION, SOLUTION INTRA-ARTICULAR; INTRALESIONAL; INTRAMUSCULAR; INTRAVENOUS; SOFT TISSUE ONCE
Status: COMPLETED | OUTPATIENT
Start: 2022-06-07 | End: 2022-06-07

## 2022-06-07 RX ORDER — HEPARIN 100 UNIT/ML
300-500 SYRINGE INTRAVENOUS AS NEEDED
Status: CANCELLED | OUTPATIENT
Start: 2022-06-07

## 2022-06-07 RX ORDER — SODIUM CHLORIDE 9 MG/ML
10 INJECTION INTRAMUSCULAR; INTRAVENOUS; SUBCUTANEOUS AS NEEDED
Status: ACTIVE | OUTPATIENT
Start: 2022-06-07 | End: 2022-06-07

## 2022-06-07 RX ORDER — SODIUM CHLORIDE 0.9 % (FLUSH) 0.9 %
10 SYRINGE (ML) INJECTION AS NEEDED
Status: CANCELLED | OUTPATIENT
Start: 2022-06-07

## 2022-06-07 RX ADMIN — DEXAMETHASONE SODIUM PHOSPHATE 10 MG: 4 INJECTION, SOLUTION INTRAMUSCULAR; INTRAVENOUS at 11:05

## 2022-06-07 RX ADMIN — PACLITAXEL 184 MG: 100 INJECTION, POWDER, LYOPHILIZED, FOR SUSPENSION INTRAVENOUS at 11:17

## 2022-06-07 RX ADMIN — SODIUM CHLORIDE, PRESERVATIVE FREE 10 ML: 5 INJECTION INTRAVENOUS at 08:30

## 2022-06-07 RX ADMIN — GEMCITABINE 1656 MG: 38 INJECTION, SOLUTION INTRAVENOUS at 12:15

## 2022-06-07 RX ADMIN — ONDANSETRON 8 MG: 2 INJECTION INTRAMUSCULAR; INTRAVENOUS at 10:59

## 2022-06-07 RX ADMIN — SODIUM CHLORIDE 25 ML/HR: 9 INJECTION, SOLUTION INTRAVENOUS at 10:58

## 2022-06-07 RX ADMIN — SODIUM CHLORIDE, PRESERVATIVE FREE 10 ML: 5 INJECTION INTRAVENOUS at 13:03

## 2022-06-07 RX ADMIN — HEPARIN 500 UNITS: 100 SYRINGE at 13:04

## 2022-06-07 NOTE — PROGRESS NOTES
[facility] Progress Note    Date: 2022    Name: Rahel Waterman    MRN: [id number]         : 1955    Ms. Ba Arrived ambulatory and in no distress for cycle 16 day 1 of Abraxane/Gemcitabine regimen. Assessment was completed, no acute issues at this time. She has pain right shoulder and right arm. States she is having muscle weakness in her right arm and atrophy. This is stemming from an old shoulder injury. States her palliative care doctor has ordered PT and she will have it at home. Reports that she has stopped driving. Has lower abdominal pain and left hip and leg pain that is intermittent. She states pain medication is effective. Port accessed without difficulty, labs drawn and in process. Chemotherapy Flowsheet 2022   Cycle C 16 D1   Date 2022   Drug / Regimen Abraxane/Gemcitabine   Dosage 184 mg/1656 mg   Time Up see MAR   Time Down -   Pre Hydration -   Pre Meds zofran 8 mg IV, Decadron 10 mg IV   Notes -     0900:  Proceeded to appt with Dr. Adriana Mcduffie. Ms. Ba's vitals were reviewed. Visit Vitals  BP (!) 171/80 (BP 1 Location: Left upper arm, BP Patient Position: Sitting)   Pulse 92   Temp 98.5 °F (36.9 °C)   Resp 18   Ht 5' 4\" (1.626 m)   Wt 83.2 kg (183 lb 6.8 oz)   SpO2 99%   Breastfeeding No   BMI 31.48 kg/m²       Lab results were obtained and reviewed.   Recent Results (from the past 12 hour(s))   CBC WITH AUTOMATED DIFF    Collection Time: 22  8:30 AM   Result Value Ref Range    WBC 8.7 3.6 - 11.0 K/uL    RBC 3.82 3.80 - 5.20 M/uL    HGB 10.9 (L) 11.5 - 16.0 g/dL    HCT 34.2 (L) 35.0 - 47.0 %    MCV 89.5 80.0 - 99.0 FL    MCH 28.5 26.0 - 34.0 PG    MCHC 31.9 30.0 - 36.5 g/dL    RDW 16.9 (H) 11.5 - 14.5 %    PLATELET 251 (H) 782 - 400 K/uL    MPV 9.5 8.9 - 12.9 FL    NRBC 0.0 0  WBC    ABSOLUTE NRBC 0.00 0.00 - 0.01 K/uL    NEUTROPHILS 49 32 - 75 %    LYMPHOCYTES 24 12 - 49 %    MONOCYTES 23 (H) 5 - 13 %    EOSINOPHILS 3 0 - 7 %    BASOPHILS 1 0 - 1 %    IMMATURE GRANULOCYTES 0 0.0 - 0.5 %    ABS. NEUTROPHILS 4.2 1.8 - 8.0 K/UL    ABS. LYMPHOCYTES 2.1 0.8 - 3.5 K/UL    ABS. MONOCYTES 2.0 (H) 0.0 - 1.0 K/UL    ABS. EOSINOPHILS 0.3 0.0 - 0.4 K/UL    ABS. BASOPHILS 0.1 0.0 - 0.1 K/UL    ABS. IMM. GRANS. 0.0 0.00 - 0.04 K/UL    DF AUTOMATED      PLATELET COMMENTS Increased Platelets      RBC COMMENTS ANISOCYTOSIS  1+       METABOLIC PANEL, COMPREHENSIVE    Collection Time: 06/07/22  8:30 AM   Result Value Ref Range    Sodium 138 136 - 145 mmol/L    Potassium 4.1 3.5 - 5.1 mmol/L    Chloride 102 97 - 108 mmol/L    CO2 29 21 - 32 mmol/L    Anion gap 7 5 - 15 mmol/L    Glucose 288 (H) 65 - 100 mg/dL    BUN 14 6 - 20 MG/DL    Creatinine 0.71 0.55 - 1.02 MG/DL    BUN/Creatinine ratio 20 12 - 20      GFR est AA >60 >60 ml/min/1.73m2    GFR est non-AA >60 >60 ml/min/1.73m2    Calcium 8.7 8.5 - 10.1 MG/DL    Bilirubin, total 0.2 0.2 - 1.0 MG/DL    ALT (SGPT) 20 12 - 78 U/L    AST (SGOT) 17 15 - 37 U/L    Alk. phosphatase 123 (H) 45 - 117 U/L    Protein, total 6.6 6.4 - 8.2 g/dL    Albumin 3.2 (L) 3.5 - 5.0 g/dL    Globulin 3.4 2.0 - 4.0 g/dL    A-G Ratio 0.9 (L) 1.1 - 2.2       Per Dr. Keith Sung, today will be cycle 16 day 1, the treatment plan will change to Day 1 and 15 of each cycle. She will no longer get day 8.  ml IV main line established. Zofran 8 mg IVP given. Decadron 10 mg IVP given. 1117: Abraxane 184 mg IV given. 1215: Gemcitabine 1656 mg IV given. NS stopped and port flushed and de-accessed post administration. Site intact. Ms. Anastasia Garcia tolerated treatment well and was discharged from [place] [address] in stable condition at . She is to return on June 21 at 8:00 for her next appointment.     Celeste Deleon RN  June 7, 2022

## 2022-06-10 DIAGNOSIS — C25.9 MALIGNANT NEOPLASM OF PANCREAS, UNSPECIFIED LOCATION OF MALIGNANCY (HCC): ICD-10-CM

## 2022-06-10 RX ORDER — GABAPENTIN 300 MG/1
CAPSULE ORAL
Qty: 180 CAPSULE | Refills: 3 | Status: SHIPPED | OUTPATIENT
Start: 2022-06-10 | End: 2022-10-11

## 2022-06-20 RX ORDER — DIPHENHYDRAMINE HYDROCHLORIDE 50 MG/ML
25 INJECTION, SOLUTION INTRAMUSCULAR; INTRAVENOUS
Status: CANCELLED
Start: 2022-06-21

## 2022-06-20 RX ORDER — DIPHENHYDRAMINE HYDROCHLORIDE 50 MG/ML
50 INJECTION, SOLUTION INTRAMUSCULAR; INTRAVENOUS AS NEEDED
Status: CANCELLED
Start: 2022-06-21

## 2022-06-20 RX ORDER — EPINEPHRINE 1 MG/ML
0.3 INJECTION, SOLUTION, CONCENTRATE INTRAVENOUS AS NEEDED
Status: CANCELLED | OUTPATIENT
Start: 2022-06-21

## 2022-06-20 RX ORDER — HYDROCORTISONE SODIUM SUCCINATE 100 MG/2ML
100 INJECTION, POWDER, FOR SOLUTION INTRAMUSCULAR; INTRAVENOUS AS NEEDED
Status: CANCELLED | OUTPATIENT
Start: 2022-06-21

## 2022-06-20 RX ORDER — ALBUTEROL SULFATE 0.83 MG/ML
2.5 SOLUTION RESPIRATORY (INHALATION) AS NEEDED
Status: CANCELLED
Start: 2022-06-21

## 2022-06-20 RX ORDER — LORAZEPAM 2 MG/ML
0.5 INJECTION INTRAMUSCULAR
Status: CANCELLED
Start: 2022-06-21

## 2022-06-21 ENCOUNTER — HOSPITAL ENCOUNTER (OUTPATIENT)
Dept: INFUSION THERAPY | Age: 67
Discharge: HOME | End: 2022-06-21
Payer: MEDICARE

## 2022-06-21 ENCOUNTER — OFFICE VISIT (OUTPATIENT)
Dept: ONCOLOGY | Age: 67
End: 2022-06-21
Payer: MEDICARE

## 2022-06-21 VITALS
HEIGHT: 64 IN | WEIGHT: 183 LBS | DIASTOLIC BLOOD PRESSURE: 75 MMHG | TEMPERATURE: 98 F | SYSTOLIC BLOOD PRESSURE: 173 MMHG | HEART RATE: 88 BPM | BODY MASS INDEX: 31.24 KG/M2 | OXYGEN SATURATION: 98 % | RESPIRATION RATE: 17 BRPM

## 2022-06-21 DIAGNOSIS — G89.3 NEOPLASM RELATED PAIN: ICD-10-CM

## 2022-06-21 DIAGNOSIS — R29.898 WEAKNESS OF RIGHT UPPER EXTREMITY: ICD-10-CM

## 2022-06-21 DIAGNOSIS — C25.9 MALIGNANT NEOPLASM OF PANCREAS, UNSPECIFIED LOCATION OF MALIGNANCY (HCC): Primary | ICD-10-CM

## 2022-06-21 DIAGNOSIS — T45.1X5A CHEMOTHERAPY INDUCED NEUTROPENIA (HCC): ICD-10-CM

## 2022-06-21 DIAGNOSIS — D70.1 CHEMOTHERAPY INDUCED NEUTROPENIA (HCC): ICD-10-CM

## 2022-06-21 DIAGNOSIS — M79.601 PAIN IN RIGHT ARM: ICD-10-CM

## 2022-06-21 DIAGNOSIS — M79.2 NEUROPATHIC PAIN: ICD-10-CM

## 2022-06-21 DIAGNOSIS — R10.84 GENERALIZED ABDOMINAL PAIN: ICD-10-CM

## 2022-06-21 DIAGNOSIS — R11.2 CHEMOTHERAPY INDUCED NAUSEA AND VOMITING: ICD-10-CM

## 2022-06-21 DIAGNOSIS — C25.9 ADENOCARCINOMA OF PANCREAS (HCC): Primary | ICD-10-CM

## 2022-06-21 DIAGNOSIS — M19.90 OSTEOARTHRITIS, UNSPECIFIED OSTEOARTHRITIS TYPE, UNSPECIFIED SITE: ICD-10-CM

## 2022-06-21 DIAGNOSIS — T45.1X5A CHEMOTHERAPY INDUCED NAUSEA AND VOMITING: ICD-10-CM

## 2022-06-21 LAB
ALBUMIN SERPL-MCNC: 3.2 G/DL (ref 3.5–5)
ALBUMIN/GLOB SERPL: 0.8 {RATIO} (ref 1.1–2.2)
ALP SERPL-CCNC: 136 U/L (ref 45–117)
ALT SERPL-CCNC: 17 U/L (ref 12–78)
ANION GAP SERPL CALC-SCNC: 8 MMOL/L (ref 5–15)
AST SERPL-CCNC: 10 U/L (ref 15–37)
BASOPHILS # BLD: 0.1 K/UL (ref 0–0.1)
BASOPHILS NFR BLD: 1 % (ref 0–1)
BILIRUB SERPL-MCNC: 0.2 MG/DL (ref 0.2–1)
BUN SERPL-MCNC: 14 MG/DL (ref 6–20)
BUN/CREAT SERPL: 17 (ref 12–20)
CALCIUM SERPL-MCNC: 8.8 MG/DL (ref 8.5–10.1)
CHLORIDE SERPL-SCNC: 98 MMOL/L (ref 97–108)
CO2 SERPL-SCNC: 30 MMOL/L (ref 21–32)
CREAT SERPL-MCNC: 0.81 MG/DL (ref 0.55–1.02)
DIFFERENTIAL METHOD BLD: ABNORMAL
EOSINOPHIL # BLD: 0.3 K/UL (ref 0–0.4)
EOSINOPHIL NFR BLD: 6 % (ref 0–7)
ERYTHROCYTE [DISTWIDTH] IN BLOOD BY AUTOMATED COUNT: 16.7 % (ref 11.5–14.5)
GLOBULIN SER CALC-MCNC: 3.9 G/DL (ref 2–4)
GLUCOSE SERPL-MCNC: 469 MG/DL (ref 65–100)
HCT VFR BLD AUTO: 35.7 % (ref 35–47)
HGB BLD-MCNC: 11.2 G/DL (ref 11.5–16)
IMM GRANULOCYTES # BLD AUTO: 0 K/UL (ref 0–0.04)
IMM GRANULOCYTES NFR BLD AUTO: 0 % (ref 0–0.5)
LYMPHOCYTES # BLD: 2 K/UL (ref 0.8–3.5)
LYMPHOCYTES NFR BLD: 34 % (ref 12–49)
MCH RBC QN AUTO: 28.2 PG (ref 26–34)
MCHC RBC AUTO-ENTMCNC: 31.4 G/DL (ref 30–36.5)
MCV RBC AUTO: 89.9 FL (ref 80–99)
MONOCYTES # BLD: 1.5 K/UL (ref 0–1)
MONOCYTES NFR BLD: 26 % (ref 5–13)
NEUTS SEG # BLD: 1.9 K/UL (ref 1.8–8)
NEUTS SEG NFR BLD: 33 % (ref 32–75)
NRBC # BLD: 0 K/UL (ref 0–0.01)
NRBC BLD-RTO: 0 PER 100 WBC
PLATELET # BLD AUTO: 405 K/UL (ref 150–400)
PMV BLD AUTO: 10.2 FL (ref 8.9–12.9)
POTASSIUM SERPL-SCNC: 4.1 MMOL/L (ref 3.5–5.1)
PROT SERPL-MCNC: 7.1 G/DL (ref 6.4–8.2)
RBC # BLD AUTO: 3.97 M/UL (ref 3.8–5.2)
RBC MORPH BLD: ABNORMAL
RBC MORPH BLD: ABNORMAL
SODIUM SERPL-SCNC: 136 MMOL/L (ref 136–145)
WBC # BLD AUTO: 5.8 K/UL (ref 3.6–11)

## 2022-06-21 PROCEDURE — 99215 OFFICE O/P EST HI 40 MIN: CPT | Performed by: INTERNAL MEDICINE

## 2022-06-21 PROCEDURE — 74011250636 HC RX REV CODE- 250/636: Performed by: INTERNAL MEDICINE

## 2022-06-21 PROCEDURE — 1101F PT FALLS ASSESS-DOCD LE1/YR: CPT | Performed by: INTERNAL MEDICINE

## 2022-06-21 PROCEDURE — 1090F PRES/ABSN URINE INCON ASSESS: CPT | Performed by: INTERNAL MEDICINE

## 2022-06-21 PROCEDURE — G8427 DOCREV CUR MEDS BY ELIG CLIN: HCPCS | Performed by: INTERNAL MEDICINE

## 2022-06-21 PROCEDURE — G8754 DIAS BP LESS 90: HCPCS | Performed by: INTERNAL MEDICINE

## 2022-06-21 PROCEDURE — G8399 PT W/DXA RESULTS DOCUMENT: HCPCS | Performed by: INTERNAL MEDICINE

## 2022-06-21 PROCEDURE — G8417 CALC BMI ABV UP PARAM F/U: HCPCS | Performed by: INTERNAL MEDICINE

## 2022-06-21 PROCEDURE — 77030012965 HC NDL HUBR BBMI -A

## 2022-06-21 PROCEDURE — 36415 COLL VENOUS BLD VENIPUNCTURE: CPT

## 2022-06-21 PROCEDURE — 96413 CHEMO IV INFUSION 1 HR: CPT

## 2022-06-21 PROCEDURE — G8753 SYS BP > OR = 140: HCPCS | Performed by: INTERNAL MEDICINE

## 2022-06-21 PROCEDURE — 74011000258 HC RX REV CODE- 258: Performed by: INTERNAL MEDICINE

## 2022-06-21 PROCEDURE — 1123F ACP DISCUSS/DSCN MKR DOCD: CPT | Performed by: INTERNAL MEDICINE

## 2022-06-21 PROCEDURE — 96417 CHEMO IV INFUS EACH ADDL SEQ: CPT

## 2022-06-21 PROCEDURE — G8536 NO DOC ELDER MAL SCRN: HCPCS | Performed by: INTERNAL MEDICINE

## 2022-06-21 PROCEDURE — 85025 COMPLETE CBC W/AUTO DIFF WBC: CPT

## 2022-06-21 PROCEDURE — 3017F COLORECTAL CA SCREEN DOC REV: CPT | Performed by: INTERNAL MEDICINE

## 2022-06-21 PROCEDURE — 96375 TX/PRO/DX INJ NEW DRUG ADDON: CPT

## 2022-06-21 PROCEDURE — 80053 COMPREHEN METABOLIC PANEL: CPT

## 2022-06-21 PROCEDURE — G8432 DEP SCR NOT DOC, RNG: HCPCS | Performed by: INTERNAL MEDICINE

## 2022-06-21 RX ORDER — DEXAMETHASONE SODIUM PHOSPHATE 4 MG/ML
10 INJECTION, SOLUTION INTRA-ARTICULAR; INTRALESIONAL; INTRAMUSCULAR; INTRAVENOUS; SOFT TISSUE ONCE
Status: COMPLETED | OUTPATIENT
Start: 2022-06-21 | End: 2022-06-21

## 2022-06-21 RX ORDER — DIPHENHYDRAMINE HYDROCHLORIDE 50 MG/ML
25 INJECTION, SOLUTION INTRAMUSCULAR; INTRAVENOUS AS NEEDED
Status: ACTIVE | OUTPATIENT
Start: 2022-06-21 | End: 2022-06-21

## 2022-06-21 RX ORDER — HYDROMORPHONE HYDROCHLORIDE 2 MG/1
2 TABLET ORAL
Qty: 90 TABLET | Refills: 0 | Status: SHIPPED | OUTPATIENT
Start: 2022-06-21 | End: 2022-07-23

## 2022-06-21 RX ORDER — HEPARIN 100 UNIT/ML
300-500 SYRINGE INTRAVENOUS AS NEEDED
Status: DISPENSED | OUTPATIENT
Start: 2022-06-21 | End: 2022-06-21

## 2022-06-21 RX ORDER — SODIUM CHLORIDE 9 MG/ML
25 INJECTION, SOLUTION INTRAVENOUS CONTINUOUS
Status: DISPENSED | OUTPATIENT
Start: 2022-06-21 | End: 2022-06-21

## 2022-06-21 RX ORDER — SODIUM CHLORIDE 9 MG/ML
10 INJECTION INTRAMUSCULAR; INTRAVENOUS; SUBCUTANEOUS AS NEEDED
Status: ACTIVE | OUTPATIENT
Start: 2022-06-21 | End: 2022-06-21

## 2022-06-21 RX ORDER — SODIUM CHLORIDE 0.9 % (FLUSH) 0.9 %
10 SYRINGE (ML) INJECTION AS NEEDED
Status: ACTIVE | OUTPATIENT
Start: 2022-06-21 | End: 2022-06-21

## 2022-06-21 RX ORDER — ONDANSETRON 2 MG/ML
8 INJECTION INTRAMUSCULAR; INTRAVENOUS ONCE
Status: COMPLETED | OUTPATIENT
Start: 2022-06-21 | End: 2022-06-21

## 2022-06-21 RX ORDER — ONDANSETRON 2 MG/ML
8 INJECTION INTRAMUSCULAR; INTRAVENOUS AS NEEDED
Status: ACTIVE | OUTPATIENT
Start: 2022-06-21 | End: 2022-06-21

## 2022-06-21 RX ORDER — ACETAMINOPHEN 325 MG/1
650 TABLET ORAL AS NEEDED
Status: ACTIVE | OUTPATIENT
Start: 2022-06-21 | End: 2022-06-21

## 2022-06-21 RX ADMIN — SODIUM CHLORIDE 25 ML/HR: 9 INJECTION, SOLUTION INTRAVENOUS at 10:33

## 2022-06-21 RX ADMIN — Medication 500 UNITS: at 12:35

## 2022-06-21 RX ADMIN — GEMCITABINE 1656 MG: 38 INJECTION, SOLUTION INTRAVENOUS at 11:44

## 2022-06-21 RX ADMIN — ONDANSETRON 8 MG: 2 INJECTION INTRAMUSCULAR; INTRAVENOUS at 10:45

## 2022-06-21 RX ADMIN — DEXAMETHASONE SODIUM PHOSPHATE 10 MG: 4 INJECTION, SOLUTION INTRAMUSCULAR; INTRAVENOUS at 10:37

## 2022-06-21 RX ADMIN — PACLITAXEL 184 MG: 100 INJECTION, POWDER, LYOPHILIZED, FOR SUSPENSION INTRAVENOUS at 10:50

## 2022-06-21 NOTE — PROGRESS NOTES
Reason for Visit:   Pernell Richter a 74 T. o. female who is seen for pancreatic adenocarcinoma     Treatment History:     Dx: Pancreatic Adenocarcinoma--May 2020--[id number]--svojfovysjl of splenic vein and superior mesenteric vein    Tx: mFOLFIRINOX--first cycle 5/26/2020, second cycle 6/10/2020, dose reduced 15% cycle 3 on 6/30/2020--delayed due to severe side effects--switched to Gemcitabine/Abraxane--Cycle #1 7/29/2002, Cycle #2 9/2/2020, Cycle #3 10/7/2020, Cycle #4 11/4/2020. Neoadjuvant Radiation with Dr Melvin Bacon ending 12/18/2021. Distal Pancreatectomy, Splenectomy, and Civa Sheet with Dr Becky Pederson on 3/17/2021. Adjuvant radiation with Dr Melvin Bacon. Restart Gemcitabine/abraxane Cycle #1 7/6/2021, Cycle #2 8/3/2021, Cycle #3 8/31/2021, Cycle #4 9/28/2021    Goal: Prolong life--Palliative    History of Present Illness:     Seen today for fu on palliative chemo gemzar/ abraxane. Feeling tired. getting chemo today. C/o weakness in RUE which is chronic but worse. Also pain in arm. RIGHT handed. Off balance/ has neuropathy. Has pain in shoulders/ left side/ joints/sciatica. Here with cousin today.    No fevers/ chills/ chest pain/ SOB/ nausea/ vomiting/diarrhea/           Past Medical History:   Diagnosis Date    Adenocarcinoma of pancreas ([facility] [address] 05/13/2020    Dr Irlanda Schmidt biopsy via EUS    Diabetes ([facility] [address]     GERD (gastroesophageal reflux disease)     Hernia of abdominal cavity     Subxyphoid hernia    Hypertension     Inflammatory polyarthropathy (HCC)     Joint pain     Joint swelling     Menopause     Ocular nevus of left eye     Pancreatitis     Tracheal stenosis       Past Surgical History:   Procedure Laterality Date    HX CARPAL TUNNEL RELEASE Bilateral     HX COLONOSCOPY      HX HYSTERECTOMY      HX KNEE ARTHROSCOPY Right     HX KNEE REPLACEMENT Right     partial    HX LAP CHOLECYSTECTOMY      HX TONSILLECTOMY      HX VASCULAR ACCESS        Social History     Tobacco Use    Smoking status: Never Smoker    Smokeless tobacco: Never Used   Substance Use Topics    Alcohol use: No     Alcohol/week: 0.0 standard drinks      Family History   Problem Relation Age of Onset    Heart Disease Mother     Heart Attack Mother     Cancer Father         lung    Diabetes Sister      Current Outpatient Medications   Medication Sig    HYDROmorphone (Dilaudid) 2 mg tablet Take 1 Tablet by mouth every four (4) hours as needed for Pain for up to 30 days. Max Daily Amount: 12 mg. Indications: excessive pain    gabapentin (NEURONTIN) 300 mg capsule TAKE 2 CAPSULES BY MOUTH THREE TIMES DAILY    DULoxetine (CYMBALTA) 60 mg capsule Take 1 Capsule by mouth daily. Indications: neuropathic pain    CRANIAL PROSTHESIS misc Alopecia due to chemo for cancer (Patient not taking: Reported on 6/7/2022)    lidocaine (LIDODERM) 5 % 1 Patch by TransDERmal route every twenty-four (24) hours. Apply patch to the affected area for 12 hours a day and remove for 12 hours a day.  LORazepam (ATIVAN) 1 mg tablet Take 1 Tablet by mouth every six (6) hours as needed for Anxiety. Max Daily Amount: 4 mg. Indications: nausea and vomiting caused by cancer drugs    multivit-minerals/folic acid (MULTIVITAMIN GUMMIES PO) Take 1 Each by mouth daily.  Insulin Needles, Disposable, (Nellie Pen Needle) 32 gauge x 5/32\" ndle 1 Each by Does Not Apply route three (3) times daily. (Patient not taking: Reported on 5/10/2022)    methylphenidate HCl (RITALIN) 10 mg tablet Take 1 Tablet by mouth three (3) times daily. Max Daily Amount: 30 mg.    Toujeo SoloStar U-300 Insulin 300 unit/mL (1.5 mL) inpn pen INJECT 55 UNITS ONCE DAILY AT BEDTIME - DOSE INCREASED (Patient taking differently: 55 Units by SubCUTAneous route. Patient uses differently during around chemo time.)    lipase-protease-amylase (Creon) 12,000-38,000 -60,000 unit capsule Take 2 Capsules by mouth three (3) times daily (with meals).  2 caps with meal and 1 with snacks Indications: exocrine pancreatic insufficiency (Patient taking differently: Take 2 Capsules by mouth three (3) times daily (with meals). 2 caps with meal  Indications: exocrine pancreatic insufficiency)    lidocaine-prilocaine (EMLA) topical cream Apply  to affected area as needed for Pain (pain ). Apply a thin layer over port site prior to accessing port    nystatin (MYCOSTATIN) powder Apply 1 g to affected area three (3) times daily.  HumaLOG KwikPen Insulin 100 unit/mL kwikpen patient on sliding scale    famotidine (Pepcid) 20 mg tablet Take 20 mg by mouth two (2) times a day.  sennosides (Senna) 8.6 mg cap Take 1-2 Tablets by mouth two (2) times a day.  polyethylene glycol (Miralax) 17 gram/dose powder Take 17 g by mouth daily as needed.  promethazine (PHENERGAN) 25 mg tablet Take 1 Tab by mouth every six (6) hours as needed for Nausea. (Patient not taking: Reported on 6/7/2022)    ondansetron (ZOFRAN ODT) 4 mg disintegrating tablet Take 1 Tab by mouth every eight (8) hours as needed for Nausea or Vomiting.  diphenoxylate-atropine (LomotiL) 2.5-0.025 mg per tablet Take 2 Tabs by mouth four (4) times daily as needed for Diarrhea. Max Daily Amount: 8 Tabs. (Patient not taking: Reported on 5/4/2022)    acetaminophen (Acetaminophen Extra Strength) 500 mg tablet Take 1,000 mg by mouth daily as needed. No current facility-administered medications for this visit.      Facility-Administered Medications Ordered in Other Visits   Medication Dose Route Frequency    0.9% sodium chloride infusion  25 mL/hr IntraVENous CONTINUOUS    gemcitabine (GEMZAR) 1,656 mg in 0.9% sodium chloride 250 mL, overfill volume 25 mL chemo infusion  900 mg/m2 (Order-Specific) IntraVENous ONCE    saline peripheral flush soln 10 mL  10 mL InterCATHeter PRN    0.9% sodium chloride injection 10 mL  10 mL IntraVENous PRN    heparin (porcine) pf 300-500 Units  300-500 Units InterCATHeter PRN    sodium chloride 0.9 % bolus infusion 500 mL  500 mL IntraVENous PRN    diphenhydrAMINE (BENADRYL) injection 25 mg  25 mg IntraVENous PRN    famotidine (PF) (PEPCID) 20 mg in 0.9% sodium chloride 10 mL injection  20 mg IntraVENous PRN    acetaminophen (TYLENOL) tablet 650 mg  650 mg Oral PRN    meperidine (DEMEROL) injection 25 mg  25 mg IntraVENous PRN    ondansetron (ZOFRAN) injection 8 mg  8 mg IntraVENous PRN      No Known Allergies     Review of Systems: A complete review of systems was obtained, negative except as described above. Physical Exam:     There were no vitals taken for this visit. Per [facility]   ECOG PS: 1  General: No distress  Eyes: anicteric sclerae  HENT: Atraumatic, wearing mask  Neck: Supple  Respiratory: CTAB, normal respiratory effort  CV: Normal rate, regular rhythm  GI: Soft, nontender, nondistended, no masses  MS:  Moving in bed, minimal RUE weakness on exam  Skin: No rashes, ecchymoses, or petechiae. Normal temperature, turgor, and texture. Psych: Alert, oriented, appropriate affect, normal judgment/insight    Results:     Lab Results   Component Value Date/Time    WBC 5.8 06/21/2022 08:25 AM    HGB 11.2 (L) 06/21/2022 08:25 AM    HCT 35.7 06/21/2022 08:25 AM    PLATELET 587 (H) 44/69/3550 08:25 AM    MCV 89.9 06/21/2022 08:25 AM    ABS. NEUTROPHILS 1.9 06/21/2022 08:25 AM     Lab Results   Component Value Date/Time    Sodium 136 06/21/2022 08:25 AM    Potassium 4.1 06/21/2022 08:25 AM    Chloride 98 06/21/2022 08:25 AM    CO2 30 06/21/2022 08:25 AM    Glucose 469 (H) 06/21/2022 08:25 AM    BUN 14 06/21/2022 08:25 AM    Creatinine 0.81 06/21/2022 08:25 AM    GFR est AA >60 06/21/2022 08:25 AM    GFR est non-AA >60 06/21/2022 08:25 AM    Calcium 8.8 06/21/2022 08:25 AM    Glucose (POC) 192 (H) 07/15/2020 11:56 AM    Creatinine (POC) 0.7 02/06/2013 10:21 AM     Lab Results   Component Value Date/Time    Bilirubin, total 0.2 06/21/2022 08:25 AM    ALT (SGPT) 17 06/21/2022 08:25 AM    Alk.  phosphatase 136 (H) 06/21/2022 08:25 AM    Protein, total 7.1 06/21/2022 08:25 AM    Albumin 3.2 (L) 06/21/2022 08:25 AM    Globulin 3.9 06/21/2022 08:25 AM       CT A/P 4/30/2020  IMPRESSION: Suspect neoplastic pancreatic mass versus atypical focal  pancreatitis with splenic and superior mesenteric vein occlusion. Consider  further evaluation with endoscopic ultrasound at which time biopsy could  potentially be performed.     CT Chest 5/21/2020  IMPRESSION:  1. No evidence of pulmonary metastatic disease. No evidence of mediastinal or  hilar lymphadenopathy. No pleural effusions identified. 2. No definite evidence of central pulmonary embolic disease. 3. Presence of a mass lesion involving the pancreatic head/body. 4. Evidence of fatty infiltration involving the liver. Presence of focal  low-density areas within the liver compatible with cysts. Surgical absence of  the gallbladder.     CT A/P 6/19/2020  IMPRESSION:  Pancreatitis with ascites favored. Discrete pancreatic mass is not identified. No biliary dilatation or definite arterial or venous thrombosis. No liver  metastases. Fat-containing ventral hernia  Nonobstructing left renal calculus  Retrolisthesis of L2 and L3.     CT C/A/P 10/12/2020  IMPRESSION:  1. Interval improvement in appearance of the pancreas (see discussion above). 2. Normal sized liver. Stable appearance of the previously described areas of  decreased attenuation within liver. 3. Surgical absence of the gallbladder. 4. Evidence of splenomegaly. 5. Normal sized kidneys. Presence of small, nonobstructing calculi within the  left kidney. No evidence of hydronephrotic change. 6. Suboptimal distention of the urinary bladder. 7. Presence of a small, fat-containing ventral hernia in the mid abdominal  Region.     CT C/A/P 9/17/2021  IMPRESSION  New peritoneal implants identified in the anterior pelvis  Interval splenectomy and repair of the ventral hernia  Interval thickening of the GE junction and circumferential thickening of the  pylorus. This may represent gastritis but correlation with endoscopy or upper GI  recommended     Path: 5/8/2020  CYTOLOGIC INTERPRETATION:   Pancreas, EUS-guided fine needle aspiration and cell blocks: Adenocarcinoma     Path: 3/17/2021  No residual malignancy in pancrease, 0/29 lymph nodes. 2-3mm focus metastatic adenocarcinoma within hernia sac     Assessment/PLAN:     1) stage 4 Pancreatic Adenocarcinoma dx 5/20  Completed neoadjuvant chemotherapy. Radiation with Dr Otoniel Sam. Definitive resection with Dr Juan R Ferris  Now peritoneal disease/mets. Goal of care palliative. Seen today for fu on chemo. Seen today due to worsening of symptoms. CTs 5/22 stable. Reviewed with pt today. Unclear pulmonary nodule infectious vs inflammatory. Omental disease likely stable. Pt has been tolerating chemo with manageable side effects. She is doing well with palliative chemotherapy with Gemcitaine/Abraxane. Reviewed chemo overall today. Discussed plan with pt and will continue same plan for 2-3 months. Then CTs again. Pt having worse RUE weakness/ pain/ subjective swelling. Will do brain MRI to r/o mets and doppler RUE to r/o clot. Pt is good with doing these tests. Continuing with chemo. Pt / family agreeable to plan. Fu at [facility] needs to be monthly with ONC if possible     2) cancer abdominal Pain/Neuropathic Pain/ RUE pain. Seeing palliative care. Hx of celiac block--pain in abd controlled. On dilaudid prn. Requested refilled today and sent in #90. Can get from palliative care next visit. Cymbalta up to 60mg. On ativan bid also. 3) weakness / fatigue due to cancer. Weakness specifically RUE today. Chronic worse. Monitor. 4) Nausea. zofran prn. 5) Anxiety/Insomnia. Not sleeping / wakes up. On ativan prn. And cymbalta increased to 60mg. Monitor.      6) thrombocytosis due to chemo/ reactive. goes back to baseline. Up again. Monitor. 7) psychosocial. Mood good. Coping well. [address] Friends with [employer]. Has chickens.  Requesting SW eval and ordered.        Fu [address] clinic in a month/ virtual at [facility] if needed  Call if questions      Signed By: Faye Stanley, DO

## 2022-06-21 NOTE — PROGRESS NOTES
[facility] Progress Note    Date: 2022    Name: Narciso Tineo    MRN: [id number]         : 1955    Ms. Ba arrived ambulatory and in no distress for cycle 16 day 15 of Abraxane / Gemcitabine regimen. Assessment was completed. Port accessed without difficulty, labs drawn and in process. Patient recently had a tooth removed- c/o pain and swelling on R side of neck. I was not able to palpate any selling or nodes. ( she will talk to Dr Yoav Pantoja about this)  Patient also complains of pain in her R arm, numbness and diminished function in that R hand. She thinks this R arm is smaller than the L and is perhaps atrophiing. She is quite tearful , but states that she uses us to unload her stress. Busy and active at home. Able to control the pain with medication. Chemotherapy Flowsheet 2022   Cycle C16D15   Date 2022   Drug / Regimen Abraxane / Gemcitabine   Dosage -   Time Up -   Time Down -   Pre Hydration -   Pre Meds Zofran / Decadron   Notes -       Ms. Ba's vitals were reviewed. Visit Vitals  BP (!) 173/75   Pulse 88   Temp 98 °F (36.7 °C)   Resp 17   Ht 5' 4\" (1.626 m)   Wt 83 kg (183 lb)   SpO2 98%   BMI 31.41 kg/m²       Lab results were obtained and reviewed. Recent Results (from the past 12 hour(s))   CBC WITH AUTOMATED DIFF    Collection Time: 22  8:25 AM   Result Value Ref Range    WBC 5.8 3.6 - 11.0 K/uL    RBC 3.97 3.80 - 5.20 M/uL    HGB 11.2 (L) 11.5 - 16.0 g/dL    HCT 35.7 35.0 - 47.0 %    MCV 89.9 80.0 - 99.0 FL    MCH 28.2 26.0 - 34.0 PG    MCHC 31.4 30.0 - 36.5 g/dL    RDW 16.7 (H) 11.5 - 14.5 %    PLATELET 469 (H) 879 - 400 K/uL    MPV 10.2 8.9 - 12.9 FL    NRBC 0.0 0  WBC    ABSOLUTE NRBC 0.00 0.00 - 0.01 K/uL    NEUTROPHILS 33 32 - 75 %    LYMPHOCYTES 34 12 - 49 %    MONOCYTES 26 (H) 5 - 13 %    EOSINOPHILS 6 0 - 7 %    BASOPHILS 1 0 - 1 %    IMMATURE GRANULOCYTES 0 0.0 - 0.5 %    ABS. NEUTROPHILS 1.9 1.8 - 8.0 K/UL    ABS.  LYMPHOCYTES 2.0 0.8 - 3.5 K/UL ABS. MONOCYTES 1.5 (H) 0.0 - 1.0 K/UL    ABS. EOSINOPHILS 0.3 0.0 - 0.4 K/UL    ABS. BASOPHILS 0.1 0.0 - 0.1 K/UL    ABS. IMM. GRANS. 0.0 0.00 - 0.04 K/UL    DF AUTOMATED      RBC COMMENTS ANISOCYTOSIS  1+        RBC COMMENTS POIKILOCYTOSIS  1+       METABOLIC PANEL, COMPREHENSIVE    Collection Time: 22  8:25 AM   Result Value Ref Range    Sodium 136 136 - 145 mmol/L    Potassium 4.1 3.5 - 5.1 mmol/L    Chloride 98 97 - 108 mmol/L    CO2 30 21 - 32 mmol/L    Anion gap 8 5 - 15 mmol/L    Glucose 469 (H) 65 - 100 mg/dL    BUN 14 6 - 20 MG/DL    Creatinine 0.81 0.55 - 1.02 MG/DL    BUN/Creatinine ratio 17 12 - 20      GFR est AA >60 >60 ml/min/1.73m2    GFR est non-AA >60 >60 ml/min/1.73m2    Calcium 8.8 8.5 - 10.1 MG/DL    Bilirubin, total 0.2 0.2 - 1.0 MG/DL    ALT (SGPT) 17 12 - 78 U/L    AST (SGOT) 10 (L) 15 - 37 U/L    Alk. phosphatase 136 (H) 45 - 117 U/L    Protein, total 7.1 6.4 - 8.2 g/dL    Albumin 3.2 (L) 3.5 - 5.0 g/dL    Globulin 3.9 2.0 - 4.0 g/dL    A-G Ratio 0.8 (L) 1.1 - 2.2       Decadron, Zofran IVP    Pre-medications  were administered as ordered and chemotherapy was initiated. Two nurses verified prior to administering. Drug name:Abraxane  Dose:184mg   infusion volume:36.4ml  Rate and route of administration:73mls/hr IVPB  Expiration date of dru22 @1433  No particulates noted. Drug name:Gemcitabine Dose: 1656mg  infusion volume: 318.55 mls  Rate and route of administration: IVPB @ 637mls/hr  Expiration date of dru22 @1052  No particulates noted. Discharge instructions reviewed with patient. MD requested    Ms. Ba tolerated treatment well and was discharged from [place] [address] in stable condition at 1240. She is to return on  at 0800 for her next appointment.     Zane Vieira RN  2022

## 2022-06-21 NOTE — PROGRESS NOTES
Identified pt with two pt identifiers(name and ). Reviewed record in preparation for visit and have obtained necessary documentation. Chief Complaint   Patient presents with    Arm Pain     pt came in for chemo and c/o arm pain to [facility] nurse; added to Dr. Meera Felipe schedule to address; pain becoming increasingly worse    Medication Refill     dilaudid      There were no vitals filed for this visit. Health Maintenance Review: Patient reminded of \"due or due soon\" health maintenance. I have asked the patient to contact his/her primary care provider (PCP) for follow-up on his/her health maintenance. Coordination of Care Questionnaire:  :   1) Have you been to an emergency room, urgent care, or hospitalized since your last visit? If yes, where when, and reason for visit? no       2. Have seen or consulted any other health care provider since your last visit? If yes, where when, and reason for visit? NO      Patient is accompanied by self I have received verbal consent from Stephen Morgan to discuss any/all medical information while they are present in the room.

## 2022-06-23 RX ORDER — LORAZEPAM 2 MG/ML
0.5 INJECTION INTRAMUSCULAR
Status: CANCELLED
Start: 2022-07-05

## 2022-06-23 RX ORDER — DIPHENHYDRAMINE HYDROCHLORIDE 50 MG/ML
50 INJECTION, SOLUTION INTRAMUSCULAR; INTRAVENOUS AS NEEDED
Status: CANCELLED
Start: 2022-07-05

## 2022-06-23 RX ORDER — ACETAMINOPHEN 325 MG/1
650 TABLET ORAL AS NEEDED
Status: CANCELLED | OUTPATIENT
Start: 2022-07-05

## 2022-06-23 RX ORDER — HEPARIN 100 UNIT/ML
300-500 SYRINGE INTRAVENOUS AS NEEDED
Status: CANCELLED | OUTPATIENT
Start: 2022-07-05

## 2022-06-23 RX ORDER — DIPHENHYDRAMINE HYDROCHLORIDE 50 MG/ML
25 INJECTION, SOLUTION INTRAMUSCULAR; INTRAVENOUS AS NEEDED
Status: CANCELLED | OUTPATIENT
Start: 2022-07-05

## 2022-06-23 RX ORDER — SODIUM CHLORIDE 0.9 % (FLUSH) 0.9 %
10 SYRINGE (ML) INJECTION AS NEEDED
Status: CANCELLED | OUTPATIENT
Start: 2022-07-05

## 2022-06-23 RX ORDER — ONDANSETRON 2 MG/ML
8 INJECTION INTRAMUSCULAR; INTRAVENOUS ONCE
Status: CANCELLED | OUTPATIENT
Start: 2022-07-05 | End: 2022-07-05

## 2022-06-23 RX ORDER — HYDROCORTISONE SODIUM SUCCINATE 100 MG/2ML
100 INJECTION, POWDER, FOR SOLUTION INTRAMUSCULAR; INTRAVENOUS AS NEEDED
Status: CANCELLED | OUTPATIENT
Start: 2022-07-05

## 2022-06-23 RX ORDER — DIPHENHYDRAMINE HYDROCHLORIDE 50 MG/ML
25 INJECTION, SOLUTION INTRAMUSCULAR; INTRAVENOUS
Status: CANCELLED
Start: 2022-07-05

## 2022-06-23 RX ORDER — EPINEPHRINE 1 MG/ML
0.3 INJECTION, SOLUTION, CONCENTRATE INTRAVENOUS AS NEEDED
Status: CANCELLED | OUTPATIENT
Start: 2022-07-05

## 2022-06-23 RX ORDER — ALBUTEROL SULFATE 0.83 MG/ML
2.5 SOLUTION RESPIRATORY (INHALATION) AS NEEDED
Status: CANCELLED
Start: 2022-07-05

## 2022-06-23 RX ORDER — ONDANSETRON 2 MG/ML
8 INJECTION INTRAMUSCULAR; INTRAVENOUS AS NEEDED
Status: CANCELLED | OUTPATIENT
Start: 2022-07-05

## 2022-06-23 RX ORDER — SODIUM CHLORIDE 9 MG/ML
25 INJECTION, SOLUTION INTRAVENOUS CONTINUOUS
Status: CANCELLED | OUTPATIENT
Start: 2022-07-05

## 2022-06-23 RX ORDER — DEXAMETHASONE SODIUM PHOSPHATE 4 MG/ML
10 INJECTION, SOLUTION INTRA-ARTICULAR; INTRALESIONAL; INTRAMUSCULAR; INTRAVENOUS; SOFT TISSUE ONCE
Status: CANCELLED | OUTPATIENT
Start: 2022-07-05 | End: 2022-07-05

## 2022-06-23 RX ORDER — SODIUM CHLORIDE 9 MG/ML
10 INJECTION INTRAMUSCULAR; INTRAVENOUS; SUBCUTANEOUS AS NEEDED
Status: CANCELLED | OUTPATIENT
Start: 2022-07-05

## 2022-06-24 NOTE — PROGRESS NOTES
Attempted to reach patient off mobile phone number listed. No answer, left a voicemail to give the office a call back!   Dony Miranda

## 2022-06-27 ENCOUNTER — HOSPITAL ENCOUNTER (OUTPATIENT)
Dept: MRI IMAGING | Age: 67
Discharge: HOME | End: 2022-06-27
Attending: INTERNAL MEDICINE
Payer: MEDICARE

## 2022-06-27 ENCOUNTER — HOSPITAL ENCOUNTER (OUTPATIENT)
Dept: ULTRASOUND IMAGING | Age: 67
Discharge: HOME | End: 2022-06-27
Attending: INTERNAL MEDICINE
Payer: MEDICARE

## 2022-06-27 DIAGNOSIS — G89.3 NEOPLASM RELATED PAIN: ICD-10-CM

## 2022-06-27 DIAGNOSIS — C25.9 MALIGNANT NEOPLASM OF PANCREAS, UNSPECIFIED LOCATION OF MALIGNANCY (HCC): ICD-10-CM

## 2022-06-27 DIAGNOSIS — T45.1X5A CHEMOTHERAPY INDUCED NEUTROPENIA (HCC): ICD-10-CM

## 2022-06-27 DIAGNOSIS — M79.2 NEUROPATHIC PAIN: ICD-10-CM

## 2022-06-27 DIAGNOSIS — R29.898 WEAKNESS OF RIGHT UPPER EXTREMITY: ICD-10-CM

## 2022-06-27 DIAGNOSIS — T45.1X5A CHEMOTHERAPY INDUCED NAUSEA AND VOMITING: ICD-10-CM

## 2022-06-27 DIAGNOSIS — D70.1 CHEMOTHERAPY INDUCED NEUTROPENIA (HCC): ICD-10-CM

## 2022-06-27 DIAGNOSIS — R11.2 CHEMOTHERAPY INDUCED NAUSEA AND VOMITING: ICD-10-CM

## 2022-06-27 DIAGNOSIS — R10.84 GENERALIZED ABDOMINAL PAIN: ICD-10-CM

## 2022-06-27 DIAGNOSIS — M19.90 OSTEOARTHRITIS, UNSPECIFIED OSTEOARTHRITIS TYPE, UNSPECIFIED SITE: ICD-10-CM

## 2022-06-27 DIAGNOSIS — M79.601 PAIN IN RIGHT ARM: ICD-10-CM

## 2022-06-27 PROCEDURE — 74011250636 HC RX REV CODE- 250/636: Performed by: INTERNAL MEDICINE

## 2022-06-27 PROCEDURE — A9576 INJ PROHANCE MULTIPACK: HCPCS | Performed by: INTERNAL MEDICINE

## 2022-06-27 PROCEDURE — 70553 MRI BRAIN STEM W/O & W/DYE: CPT

## 2022-06-27 PROCEDURE — 93971 EXTREMITY STUDY: CPT

## 2022-06-27 RX ADMIN — GADOTERIDOL 17 ML: 279.3 INJECTION, SOLUTION INTRAVENOUS at 08:38

## 2022-06-27 NOTE — PROGRESS NOTES
YADY Verified. Called pt on mobile phone number listed. Patient was made aware of most recent Ultrasound being good and her Brain MRI looking negative/good per Provider. Patient stated she had seen her results on MyChart. She was very appreciative over update!   Antonietta Joseph

## 2022-06-27 NOTE — PROGRESS NOTES
Attempted to reach patient off mobile phone number listed 2x, no answer. Left a voicemail to give the office a call back!   Jaswant Ruiz

## 2022-07-05 ENCOUNTER — HOSPITAL ENCOUNTER (OUTPATIENT)
Dept: INFUSION THERAPY | Age: 67
Discharge: HOME | End: 2022-07-05
Payer: MEDICARE

## 2022-07-05 VITALS
WEIGHT: 186.2 LBS | DIASTOLIC BLOOD PRESSURE: 81 MMHG | SYSTOLIC BLOOD PRESSURE: 185 MMHG | TEMPERATURE: 98.6 F | OXYGEN SATURATION: 97 % | HEIGHT: 64 IN | BODY MASS INDEX: 31.79 KG/M2 | HEART RATE: 84 BPM | RESPIRATION RATE: 18 BRPM

## 2022-07-05 DIAGNOSIS — C25.9 ADENOCARCINOMA OF PANCREAS (HCC): Primary | ICD-10-CM

## 2022-07-05 LAB
ALBUMIN SERPL-MCNC: 3 G/DL (ref 3.5–5)
ALBUMIN/GLOB SERPL: 0.9 {RATIO} (ref 1.1–2.2)
ALP SERPL-CCNC: 107 U/L (ref 45–117)
ALT SERPL-CCNC: 23 U/L (ref 12–78)
ANION GAP SERPL CALC-SCNC: 7 MMOL/L (ref 5–15)
AST SERPL-CCNC: 17 U/L (ref 15–37)
BASOPHILS # BLD: 0.1 K/UL (ref 0–0.1)
BASOPHILS NFR BLD: 1 % (ref 0–1)
BILIRUB SERPL-MCNC: 0.2 MG/DL (ref 0.2–1)
BUN SERPL-MCNC: 17 MG/DL (ref 6–20)
BUN/CREAT SERPL: 24 (ref 12–20)
CALCIUM SERPL-MCNC: 8.6 MG/DL (ref 8.5–10.1)
CHLORIDE SERPL-SCNC: 105 MMOL/L (ref 97–108)
CO2 SERPL-SCNC: 29 MMOL/L (ref 21–32)
CREAT SERPL-MCNC: 0.71 MG/DL (ref 0.55–1.02)
DIFFERENTIAL METHOD BLD: ABNORMAL
EOSINOPHIL # BLD: 0.3 K/UL (ref 0–0.4)
EOSINOPHIL NFR BLD: 5 % (ref 0–7)
ERYTHROCYTE [DISTWIDTH] IN BLOOD BY AUTOMATED COUNT: 16.4 % (ref 11.5–14.5)
GLOBULIN SER CALC-MCNC: 3.4 G/DL (ref 2–4)
GLUCOSE SERPL-MCNC: 187 MG/DL (ref 65–100)
HCT VFR BLD AUTO: 34.5 % (ref 35–47)
HGB BLD-MCNC: 10.9 G/DL (ref 11.5–16)
IMM GRANULOCYTES # BLD AUTO: 0 K/UL (ref 0–0.04)
IMM GRANULOCYTES NFR BLD AUTO: 0 % (ref 0–0.5)
LYMPHOCYTES # BLD: 1.8 K/UL (ref 0.8–3.5)
LYMPHOCYTES NFR BLD: 37 % (ref 12–49)
MCH RBC QN AUTO: 28.2 PG (ref 26–34)
MCHC RBC AUTO-ENTMCNC: 31.6 G/DL (ref 30–36.5)
MCV RBC AUTO: 89.1 FL (ref 80–99)
MONOCYTES # BLD: 1.4 K/UL (ref 0–1)
MONOCYTES NFR BLD: 28 % (ref 5–13)
NEUTS SEG # BLD: 1.5 K/UL (ref 1.8–8)
NEUTS SEG NFR BLD: 29 % (ref 32–75)
NRBC # BLD: 0 K/UL (ref 0–0.01)
NRBC BLD-RTO: 0 PER 100 WBC
PLATELET # BLD AUTO: 341 K/UL (ref 150–400)
PMV BLD AUTO: 10.4 FL (ref 8.9–12.9)
POTASSIUM SERPL-SCNC: 4 MMOL/L (ref 3.5–5.1)
PROT SERPL-MCNC: 6.4 G/DL (ref 6.4–8.2)
RBC # BLD AUTO: 3.87 M/UL (ref 3.8–5.2)
RBC MORPH BLD: ABNORMAL
SODIUM SERPL-SCNC: 141 MMOL/L (ref 136–145)
WBC # BLD AUTO: 5.1 K/UL (ref 3.6–11)

## 2022-07-05 PROCEDURE — 74011000250 HC RX REV CODE- 250: Performed by: INTERNAL MEDICINE

## 2022-07-05 PROCEDURE — 96417 CHEMO IV INFUS EACH ADDL SEQ: CPT

## 2022-07-05 PROCEDURE — 80053 COMPREHEN METABOLIC PANEL: CPT

## 2022-07-05 PROCEDURE — 77030012965 HC NDL HUBR BBMI -A

## 2022-07-05 PROCEDURE — 96375 TX/PRO/DX INJ NEW DRUG ADDON: CPT

## 2022-07-05 PROCEDURE — 36415 COLL VENOUS BLD VENIPUNCTURE: CPT

## 2022-07-05 PROCEDURE — 96413 CHEMO IV INFUSION 1 HR: CPT

## 2022-07-05 PROCEDURE — 85025 COMPLETE CBC W/AUTO DIFF WBC: CPT

## 2022-07-05 PROCEDURE — 74011250636 HC RX REV CODE- 250/636: Performed by: INTERNAL MEDICINE

## 2022-07-05 RX ORDER — SODIUM CHLORIDE 9 MG/ML
10 INJECTION INTRAMUSCULAR; INTRAVENOUS; SUBCUTANEOUS AS NEEDED
Status: ACTIVE | OUTPATIENT
Start: 2022-07-05 | End: 2022-07-05

## 2022-07-05 RX ORDER — SODIUM CHLORIDE 9 MG/ML
25 INJECTION, SOLUTION INTRAVENOUS CONTINUOUS
Status: DISPENSED | OUTPATIENT
Start: 2022-07-05 | End: 2022-07-05

## 2022-07-05 RX ORDER — HEPARIN 100 UNIT/ML
300-500 SYRINGE INTRAVENOUS AS NEEDED
Status: DISPENSED | OUTPATIENT
Start: 2022-07-05 | End: 2022-07-05

## 2022-07-05 RX ORDER — DIPHENHYDRAMINE HYDROCHLORIDE 50 MG/ML
25 INJECTION, SOLUTION INTRAMUSCULAR; INTRAVENOUS AS NEEDED
Status: ACTIVE | OUTPATIENT
Start: 2022-07-05 | End: 2022-07-05

## 2022-07-05 RX ORDER — ONDANSETRON 2 MG/ML
8 INJECTION INTRAMUSCULAR; INTRAVENOUS AS NEEDED
Status: ACTIVE | OUTPATIENT
Start: 2022-07-05 | End: 2022-07-05

## 2022-07-05 RX ORDER — DEXAMETHASONE SODIUM PHOSPHATE 4 MG/ML
10 INJECTION, SOLUTION INTRA-ARTICULAR; INTRALESIONAL; INTRAMUSCULAR; INTRAVENOUS; SOFT TISSUE ONCE
Status: COMPLETED | OUTPATIENT
Start: 2022-07-05 | End: 2022-07-05

## 2022-07-05 RX ORDER — ACETAMINOPHEN 325 MG/1
650 TABLET ORAL AS NEEDED
Status: ACTIVE | OUTPATIENT
Start: 2022-07-05 | End: 2022-07-05

## 2022-07-05 RX ORDER — ONDANSETRON 2 MG/ML
8 INJECTION INTRAMUSCULAR; INTRAVENOUS ONCE
Status: COMPLETED | OUTPATIENT
Start: 2022-07-05 | End: 2022-07-05

## 2022-07-05 RX ADMIN — PACLITAXEL 184 MG: 100 INJECTION, POWDER, LYOPHILIZED, FOR SUSPENSION INTRAVENOUS at 10:42

## 2022-07-05 RX ADMIN — Medication 10 ML: at 08:45

## 2022-07-05 RX ADMIN — DEXAMETHASONE SODIUM PHOSPHATE 10 MG: 4 INJECTION, SOLUTION INTRAMUSCULAR; INTRAVENOUS at 10:09

## 2022-07-05 RX ADMIN — GEMCITABINE 1656 MG: 38 INJECTION, SOLUTION INTRAVENOUS at 11:40

## 2022-07-05 RX ADMIN — Medication 10 ML: at 12:17

## 2022-07-05 RX ADMIN — SODIUM CHLORIDE 25 ML/HR: 9 INJECTION, SOLUTION INTRAVENOUS at 09:57

## 2022-07-05 RX ADMIN — ONDANSETRON 8 MG: 2 INJECTION INTRAMUSCULAR; INTRAVENOUS at 10:00

## 2022-07-05 RX ADMIN — Medication 500 UNITS: at 12:18

## 2022-07-05 NOTE — PROGRESS NOTES
[facility] Progress Note    Date: 2022    Name: Mayra Gill    MRN: [id number]         : 1955    Ms. Ba Arrived ambulatory and in no distress for cycle 17 day 1 of Abraxane/Gemcitabine regimen. Assessment was completed, no acute issues at this time, no new complaints voiced. She continues to have lower abdominal pain. Right should is sore and painful at times, has started exercises and strength has improved to right arm. States she is eating well. Had several episodes of nausea after last chemo, resolved with zofran. States she has felt good over the last week and is happy with that. Is liking the Day1 and Day15 schedule for chemo. Port accessed without difficulty, labs drawn and in process. Chemotherapy Flowsheet 2022   Cycle C17 D1   Date 2022   Drug / Regimen Abraxane/Gemcitabine   Dosage 184 mg/1656 mg   Time Up see MAR   Time Down -   Pre Hydration -   Pre Meds Zofran 8 mg IV, Decadron 10 mg   Notes -     Ms. Ba's vitals were reviewed. Visit Vitals  BP (!) 185/81 (BP 1 Location: Left upper arm, BP Patient Position: Sitting)   Pulse 84   Temp 98.6 °F (37 °C)   Resp 18   Ht 5' 4\" (1.626 m)   Wt 84.5 kg (186 lb 3.2 oz)   SpO2 97%   BMI 31.96 kg/m²       Lab results were obtained and reviewed. Recent Results (from the past 12 hour(s))   CBC WITH AUTOMATED DIFF    Collection Time: 22  8:48 AM   Result Value Ref Range    WBC 5.1 3.6 - 11.0 K/uL    RBC 3.87 3.80 - 5.20 M/uL    HGB 10.9 (L) 11.5 - 16.0 g/dL    HCT 34.5 (L) 35.0 - 47.0 %    MCV 89.1 80.0 - 99.0 FL    MCH 28.2 26.0 - 34.0 PG    MCHC 31.6 30.0 - 36.5 g/dL    RDW 16.4 (H) 11.5 - 14.5 %    PLATELET 183 555 - 983 K/uL    MPV 10.4 8.9 - 12.9 FL    NRBC 0.0 0  WBC    ABSOLUTE NRBC 0.00 0.00 - 0.01 K/uL    NEUTROPHILS 29 (L) 32 - 75 %    LYMPHOCYTES 37 12 - 49 %    MONOCYTES 28 (H) 5 - 13 %    EOSINOPHILS 5 0 - 7 %    BASOPHILS 1 0 - 1 %    IMMATURE GRANULOCYTES 0 0.0 - 0.5 %    ABS.  NEUTROPHILS 1.5 (L) 1.8 - 8.0 K/UL    ABS. LYMPHOCYTES 1.8 0.8 - 3.5 K/UL    ABS. MONOCYTES 1.4 (H) 0.0 - 1.0 K/UL    ABS. EOSINOPHILS 0.3 0.0 - 0.4 K/UL    ABS. BASOPHILS 0.1 0.0 - 0.1 K/UL    ABS. IMM. GRANS. 0.0 0.00 - 0.04 K/UL    DF AUTOMATED      RBC COMMENTS ANISOCYTOSIS  1+       METABOLIC PANEL, COMPREHENSIVE    Collection Time: 07/05/22  8:48 AM   Result Value Ref Range    Sodium 141 136 - 145 mmol/L    Potassium 4.0 3.5 - 5.1 mmol/L    Chloride 105 97 - 108 mmol/L    CO2 29 21 - 32 mmol/L    Anion gap 7 5 - 15 mmol/L    Glucose 187 (H) 65 - 100 mg/dL    BUN 17 6 - 20 MG/DL    Creatinine 0.71 0.55 - 1.02 MG/DL    BUN/Creatinine ratio 24 (H) 12 - 20      GFR est AA >60 >60 ml/min/1.73m2    GFR est non-AA >60 >60 ml/min/1.73m2    Calcium 8.6 8.5 - 10.1 MG/DL    Bilirubin, total 0.2 0.2 - 1.0 MG/DL    ALT (SGPT) 23 12 - 78 U/L    AST (SGOT) 17 15 - 37 U/L    Alk. phosphatase 107 45 - 117 U/L    Protein, total 6.4 6.4 - 8.2 g/dL    Albumin 3.0 (L) 3.5 - 5.0 g/dL    Globulin 3.4 2.0 - 4.0 g/dL    A-G Ratio 0.9 (L) 1.1 - 2.2         Pre-medications  were administered as ordered and chemotherapy was initiated. Two nurses verified chemotherapy prior to administering.  ml IV main line established. 1000: Zofran 8 mg IVP given. 1009: Decadron 10 mg IVP given. 1042: Drug name: Abraxne  Dose:184 mg  infusion volume: 36.8 ml  Rate and route of administration: 73.6 ml/hour IV per port  Expiration date of drug: BUD 7/5/22 at 1420  Abraxane was clear, milky white and with no particulates. 1133: Abraxane infused. 1140: Drug name: Gemcitabine  Dose: 1656 mg  infusion volume: 318.55 ml  Rate and route of administration: 637 ml/hour IV per port  Expiration date of drug: BUD 7/6/22 at 1040  Gemcitbine was clear with no particulates. 1212: Gemcitabine infused. Port was flushed post administration and de-accessed. Site intact. Ms. Nicholas Herbert tolerated treatment well. She had regular lunch while here and tolerated well.  [facility] at 1220. She is to return on July 19 at 8 AM for her next appointment.     Lotus Ramsay RN  July 5, 2022

## 2022-07-06 PROCEDURE — 77030012965 HC NDL HUBR BBMI -A

## 2022-07-15 RX ORDER — SODIUM CHLORIDE 0.9 % (FLUSH) 0.9 %
10 SYRINGE (ML) INJECTION AS NEEDED
Status: CANCELLED
Start: 2022-07-19

## 2022-07-15 RX ORDER — DEXAMETHASONE SODIUM PHOSPHATE 4 MG/ML
10 INJECTION, SOLUTION INTRA-ARTICULAR; INTRALESIONAL; INTRAMUSCULAR; INTRAVENOUS; SOFT TISSUE ONCE
Status: CANCELLED | OUTPATIENT
Start: 2022-07-19 | End: 2022-07-19

## 2022-07-15 RX ORDER — HYDROCORTISONE SODIUM SUCCINATE 100 MG/2ML
100 INJECTION, POWDER, FOR SOLUTION INTRAMUSCULAR; INTRAVENOUS AS NEEDED
Status: CANCELLED | OUTPATIENT
Start: 2022-07-19

## 2022-07-15 RX ORDER — EPINEPHRINE 1 MG/ML
0.3 INJECTION, SOLUTION, CONCENTRATE INTRAVENOUS AS NEEDED
Status: CANCELLED | OUTPATIENT
Start: 2022-07-19

## 2022-07-15 RX ORDER — SODIUM CHLORIDE 9 MG/ML
10 INJECTION INTRAMUSCULAR; INTRAVENOUS; SUBCUTANEOUS AS NEEDED
Status: CANCELLED | OUTPATIENT
Start: 2022-07-19

## 2022-07-15 RX ORDER — DIPHENHYDRAMINE HYDROCHLORIDE 50 MG/ML
25 INJECTION, SOLUTION INTRAMUSCULAR; INTRAVENOUS
Status: CANCELLED
Start: 2022-07-19

## 2022-07-15 RX ORDER — LORAZEPAM 2 MG/ML
0.5 INJECTION INTRAMUSCULAR
Status: CANCELLED
Start: 2022-07-19

## 2022-07-15 RX ORDER — HEPARIN 100 UNIT/ML
300-500 SYRINGE INTRAVENOUS AS NEEDED
Status: CANCELLED | OUTPATIENT
Start: 2022-07-19

## 2022-07-15 RX ORDER — SODIUM CHLORIDE 9 MG/ML
25 INJECTION, SOLUTION INTRAVENOUS CONTINUOUS
Status: CANCELLED | OUTPATIENT
Start: 2022-07-19

## 2022-07-15 RX ORDER — ALBUTEROL SULFATE 0.83 MG/ML
2.5 SOLUTION RESPIRATORY (INHALATION) AS NEEDED
Status: CANCELLED
Start: 2022-07-19

## 2022-07-15 RX ORDER — ONDANSETRON 2 MG/ML
8 INJECTION INTRAMUSCULAR; INTRAVENOUS ONCE
Status: CANCELLED | OUTPATIENT
Start: 2022-07-19 | End: 2022-07-19

## 2022-07-15 RX ORDER — DIPHENHYDRAMINE HYDROCHLORIDE 50 MG/ML
50 INJECTION, SOLUTION INTRAMUSCULAR; INTRAVENOUS AS NEEDED
Status: CANCELLED
Start: 2022-07-19

## 2022-07-15 RX ORDER — ACETAMINOPHEN 325 MG/1
650 TABLET ORAL AS NEEDED
Status: CANCELLED | OUTPATIENT
Start: 2022-07-19

## 2022-07-15 RX ORDER — ONDANSETRON 2 MG/ML
8 INJECTION INTRAMUSCULAR; INTRAVENOUS AS NEEDED
Status: CANCELLED | OUTPATIENT
Start: 2022-07-19

## 2022-07-15 RX ORDER — DIPHENHYDRAMINE HYDROCHLORIDE 50 MG/ML
25 INJECTION, SOLUTION INTRAMUSCULAR; INTRAVENOUS AS NEEDED
Status: CANCELLED | OUTPATIENT
Start: 2022-07-19

## 2022-07-16 NOTE — PROGRESS NOTES
Ms. Marjan Jones is a 79 y.o. female who is here for evaluation of   Chief Complaint   Patient presents with    Fall     Reports x1 fall monthly. States has really bad balance. Will lean and then fall.  Annual Wellness Visit    Immunization/Injection     No updated vaccines. .       ASSESSMENT AND PLAN:    1. Uncontrolled type 2 diabetes mellitus with hyperglycemia ([facility]  Will have A1c checked tomorrow and oncology    2. Essential hypertension  Borderline. Given her current medical comorbidities her blood pressure is well controlled    3. Adenocarcinoma of pancreas ([facility]  Refill Dilaudid for pain  - HYDROmorphone (DILAUDID) 2 mg tablet; Take 1 Tablet by mouth three (3) times daily for 3 days. Max Daily Amount: 6 mg. Dispense: 90 Tablet; Refill: 0    4. Neuropathic pain  Duloxetine 60 mg in the morning and 30 mg at bedtime. Doing well  - DULoxetine (CYMBALTA) 60 mg capsule; Take 1 Capsule by mouth daily. Indications: neuropathic pain  Dispense: 90 Capsule; Refill: 2    5. Malignant neoplasm of pancreas, unspecified location of malignancy ([facility]  Continues with chemotherapy. 6. Neoplasm related pain  - HYDROmorphone (DILAUDID) 2 mg tablet; Take 1 Tablet by mouth three (3) times daily for 3 days. Max Daily Amount: 6 mg. Dispense: 90 Tablet; Refill: 0    7. Chemotherapy induced neutropenia (HCC)  8. Generalized abdominal pain  9. Chemotherapy induced nausea and vomiting  10. Weakness of right upper extremity  11. Pain in right arm  12. Osteoarthritis, unspecified osteoarthritis type, unspecified site  13. Medicare annual wellness visit, subsequent        Orders Placed This Encounter    DULoxetine (CYMBALTA) 30 mg capsule     Sig: Take 1 Capsule by mouth every evening. Dispense:  90 Capsule     Refill:  3    DULoxetine (CYMBALTA) 60 mg capsule     Sig: Take 1 Capsule by mouth daily.  Indications: neuropathic pain     Dispense:  90 Capsule     Refill:  2    HYDROmorphone (DILAUDID) 2 mg tablet     Sig: Take 1 Tablet by mouth three (3) times daily for 3 days. Max Daily Amount: 6 mg. 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  Over 2 years ago she was diagnosed with pancreatic cancer and is undergone multiple treatments initially aiming at cure and now aiming for palliation. She is followed by Dr. Mya Howard. Chemotherapy-induced complications have included hyperglycemia, alopecia, and neuropathy. Pain management, anxiety and depression remain ongoing active issues which are presently under reasonable control.     Diabetes-she is due for urine microalbumin and a foot exam but currently denies any recent difficulties. She remains on insulin and experiences difficulties with hyperglycemia when receiving chemotherapy associated steroid infusions. These elevated glucose readings are generally short-lived.     She is also due for subsequent annual wellness visit. ROS:  Denies  fever, chills, cough, chest pain, SOB,  nausea, vomiting, or diarrhea. Denies wt loss, wt gain, hemoptysis, hematochezia or melena. Physical Examination:    Visit Vitals  BP (!) 142/76 (BP 1 Location: Left upper arm, BP Patient Position: Sitting, BP Cuff Size: Adult long)   Pulse 85   Temp 97.8 °F (36.6 °C) (Temporal)   Resp 16   Ht 5' 4\" (1.626 m)   Wt 184 lb 12.8 oz (83.8 kg)   SpO2 96%   BMI 31.72 kg/m²      General:  Alert, cooperative, no distress. Head:  Normocephalic, without obvious abnormality, atraumatic. Eyes:  Conjunctivae/corneas clear. Pupils equal, round, reactive to light. Extraocular movements intact. Lungs:   Clear to auscultation bilaterally. Chest wall:  No tenderness or deformity. Cardiac:  RRR   Abdomen:   Soft, non-tender. Bowel sounds normal. No masses. No organomegaly. Extremities: Extremities normal, atraumatic, no cyanosis or edema. Pulses: 2+ and symmetric all extremities.    Skin: Skin color, texture, turgor normal. No rashes or lesions. Lymph nodes: Cervical, supraclavicular, and axillary nodes normal.   Neurologic: CNII-XII intact. Normal strength, sensation, and reflexes throughout. On this date 07/18/2022 I have spent 30 minutes reviewing previous notes, test results and face to face with the patient discussing the diagnosis and importance of compliance with the treatment plan as well as documenting on the day of the visit.     Kaiden Vargas MD FACP    (signed electronically) on 7/18/2022 at 3:17 PM

## 2022-07-18 ENCOUNTER — OFFICE VISIT (OUTPATIENT)
Dept: FAMILY MEDICINE CLINIC | Age: 67
End: 2022-07-18
Payer: MEDICARE

## 2022-07-18 VITALS
WEIGHT: 184.8 LBS | HEART RATE: 85 BPM | DIASTOLIC BLOOD PRESSURE: 76 MMHG | BODY MASS INDEX: 31.55 KG/M2 | RESPIRATION RATE: 16 BRPM | HEIGHT: 64 IN | TEMPERATURE: 97.8 F | OXYGEN SATURATION: 96 % | SYSTOLIC BLOOD PRESSURE: 142 MMHG

## 2022-07-18 DIAGNOSIS — M19.90 OSTEOARTHRITIS, UNSPECIFIED OSTEOARTHRITIS TYPE, UNSPECIFIED SITE: ICD-10-CM

## 2022-07-18 DIAGNOSIS — G89.3 NEOPLASM RELATED PAIN: ICD-10-CM

## 2022-07-18 DIAGNOSIS — M79.601 PAIN IN RIGHT ARM: ICD-10-CM

## 2022-07-18 DIAGNOSIS — T45.1X5A CHEMOTHERAPY INDUCED NEUTROPENIA (HCC): ICD-10-CM

## 2022-07-18 DIAGNOSIS — R10.84 GENERALIZED ABDOMINAL PAIN: ICD-10-CM

## 2022-07-18 DIAGNOSIS — R29.898 WEAKNESS OF RIGHT UPPER EXTREMITY: ICD-10-CM

## 2022-07-18 DIAGNOSIS — T45.1X5A CHEMOTHERAPY INDUCED NAUSEA AND VOMITING: ICD-10-CM

## 2022-07-18 DIAGNOSIS — R11.2 CHEMOTHERAPY INDUCED NAUSEA AND VOMITING: ICD-10-CM

## 2022-07-18 DIAGNOSIS — E11.65 UNCONTROLLED TYPE 2 DIABETES MELLITUS WITH HYPERGLYCEMIA (HCC): Primary | ICD-10-CM

## 2022-07-18 DIAGNOSIS — C25.9 MALIGNANT NEOPLASM OF PANCREAS, UNSPECIFIED LOCATION OF MALIGNANCY (HCC): ICD-10-CM

## 2022-07-18 DIAGNOSIS — C25.9 ADENOCARCINOMA OF PANCREAS (HCC): ICD-10-CM

## 2022-07-18 DIAGNOSIS — Z00.00 MEDICARE ANNUAL WELLNESS VISIT, SUBSEQUENT: ICD-10-CM

## 2022-07-18 DIAGNOSIS — M79.2 NEUROPATHIC PAIN: ICD-10-CM

## 2022-07-18 DIAGNOSIS — D70.1 CHEMOTHERAPY INDUCED NEUTROPENIA (HCC): ICD-10-CM

## 2022-07-18 DIAGNOSIS — I10 ESSENTIAL HYPERTENSION: ICD-10-CM

## 2022-07-18 PROCEDURE — G0439 PPPS, SUBSEQ VISIT: HCPCS | Performed by: INTERNAL MEDICINE

## 2022-07-18 PROCEDURE — 99214 OFFICE O/P EST MOD 30 MIN: CPT | Performed by: INTERNAL MEDICINE

## 2022-07-18 RX ORDER — DULOXETIN HYDROCHLORIDE 30 MG/1
30 CAPSULE, DELAYED RELEASE ORAL EVERY EVENING
Qty: 90 CAPSULE | Refills: 3 | Status: SHIPPED | OUTPATIENT
Start: 2022-07-18 | End: 2022-08-23 | Stop reason: SDUPTHER

## 2022-07-18 RX ORDER — HYDROMORPHONE HYDROCHLORIDE 2 MG/1
2 TABLET ORAL
Qty: 90 TABLET | Refills: 0 | Status: CANCELLED | OUTPATIENT
Start: 2022-07-18 | End: 2022-08-17

## 2022-07-18 RX ORDER — HYDROMORPHONE HYDROCHLORIDE 2 MG/1
2 TABLET ORAL 3 TIMES DAILY
Qty: 90 TABLET | Refills: 0 | Status: SHIPPED | OUTPATIENT
Start: 2022-07-18 | End: 2022-07-24

## 2022-07-18 RX ORDER — DULOXETIN HYDROCHLORIDE 60 MG/1
60 CAPSULE, DELAYED RELEASE ORAL DAILY
Qty: 90 CAPSULE | Refills: 2 | Status: SHIPPED | OUTPATIENT
Start: 2022-07-18

## 2022-07-18 NOTE — PROGRESS NOTES
Violet Dominique is a 79 y.o. female presenting for/with:    Chief Complaint   Patient presents with    Fall     Reports x1 fall monthly. States has really bad balance. Will lean and then fall.  Annual Wellness Visit    Immunization/Injection     No updated vaccines. Visit Vitals  BP (!) 142/76 (BP 1 Location: Left upper arm, BP Patient Position: Sitting, BP Cuff Size: Adult long)   Pulse 85   Temp 97.8 °F (36.6 °C) (Temporal)   Resp 16   Ht 5' 4\" (1.626 m)   Wt 184 lb 12.8 oz (83.8 kg)   SpO2 96%   BMI 31.72 kg/m²     Pain Scale: 3/10  Pain Location: Generalized    1. \"Have you been to the ER, urgent care clinic since your last visit? Hospitalized since your last visit? \" No    2. \"Have you seen or consulted any other health care providers outside of the [address] [place] [address] since your last visit? \" No     3. For patients aged 39-70: Has the patient had a colonoscopy / FIT/ Cologuard? Yes - Care Gap present. Most recent result on file      If the patient is female:    4. For patients aged 41-77: Has the patient had a mammogram within the past 2 years? Yes - Care Gap present. Most recent result on file      5. For patients aged 21-65: Has the patient had a pap smear?  No      Symptom review:  NO  Fever   NO  Shaking chills  NO  Cough  NO  Body aches  NO  Coughing up blood  NO  Chest congestion  NO  Chest pain  NO  Shortness of breath  NO  Profound Loss of smell/taste  NO  Nausea/Vomiting   NO  Loose stool/Diarrhea  NO  any skin issues    Patient Risk Factors Reviewed as follows:  NO  have you been in Close contact with confirmed COVID19 patient   NO  History of recent travel to affected geographical areas within the past 14 days  NO  COPD  YES  Active Cancer/Leukemia/Lymphoma/Chemotherapy  NO  Oral steroid use  NO  Pregnant  YES  Diabetes Mellitus  YES  Heart disease  NO  Asthma  NO Health care worker at home  [address] care worker  NO Is there a Pregnant Woman in the home  NO Dialysis pt in the home NO a large number of people living in the home    Learning Assessment 6/11/2021   PRIMARY LEARNER Patient   PRIMARY LANGUAGE ENGLISH   LEARNER PREFERENCE PRIMARY DEMONSTRATION   ANSWERED BY patient   RELATIONSHIP SELF     Fall Risk Assessment, last 12 mths 7/18/2022   Able to walk? Yes   Fall in past 12 months? 1   Do you feel unsteady? 1   Are you worried about falling 1   Is TUG test greater than 12 seconds? -   Is the gait abnormal? 0   Number of falls in past 12 months 2   Fall with injury? 1       3 most recent PHQ Screens 7/18/2022   Little interest or pleasure in doing things Several days   Feeling down, depressed, irritable, or hopeless More than half the days   Total Score PHQ 2 3   Trouble falling or staying asleep, or sleeping too much Nearly every day   Feeling tired or having little energy Not at all   Poor appetite, weight loss, or overeating Not at all   Feeling bad about yourself - or that you are a failure or have let yourself or your family down Nearly every day   Trouble concentrating on things such as school, work, reading, or watching TV Nearly every day   Moving or speaking so slowly that other people could have noticed; or the opposite being so fidgety that others notice -   Thoughts of being better off dead, or hurting yourself in some way -   PHQ 9 Score -     Abuse Screening Questionnaire 7/18/2022   Do you ever feel afraid of your partner? N   Are you in a relationship with someone who physically or mentally threatens you? N   Is it safe for you to go home?  Y       ADL Assessment 2/21/2022   Feeding yourself No Help Needed   Getting from bed to chair No Help Needed   Getting dressed No Help Needed   Bathing or showering No Help Needed   Walk across the room (includes cane/walker) No Help Needed   Using the telphone No Help Needed   Taking your medications Help Needed   Preparing meals No Help Needed   Managing money (expenses/bills) No Help Needed   Moderately strenuous housework (laundry) No Help Needed   Shopping for personal items (toiletries/medicines) No Help Needed   Shopping for groceries No Help Needed   Driving Help Needed   Climbing a flight of stairs No Help Needed   Getting to places beyond walking distances No Help Needed      Advance Care Planning 7/18/2022   Patient's Healthcare Decision Maker is: Named in scanned ACP document   Confirm Advance Directive Yes, on file   Patient Would Like to Complete Advance Directive -   Does the patient have other document types Power of         This is the Subsequent Medicare Annual Wellness Exam, performed 12 months or more after the Initial AWV or the last Subsequent AWV    I have reviewed the patient's medical history in detail and updated the computerized patient record. Assessment/Plan   Education and counseling provided:  Are appropriate based on today's review and evaluation    1. Uncontrolled type 2 diabetes mellitus with hyperglycemia ([facility]  2. Essential hypertension  3. Adenocarcinoma of pancreas ([facility]  4. Neuropathic pain  5. Malignant neoplasm of pancreas, unspecified location of malignancy ([facility]  6. Neoplasm related pain  7. Chemotherapy induced neutropenia (HCC)  8. Generalized abdominal pain  9. Chemotherapy induced nausea and vomiting  10. Weakness of right upper extremity  11. Pain in right arm  12. Osteoarthritis, unspecified osteoarthritis type, unspecified site  13.  Medicare annual wellness visit, subsequent       Depression Risk Factor Screening     3 most recent PHQ Screens 7/18/2022   Little interest or pleasure in doing things Several days   Feeling down, depressed, irritable, or hopeless More than half the days   Total Score PHQ 2 3   Trouble falling or staying asleep, or sleeping too much Nearly every day   Feeling tired or having little energy Not at all   Poor appetite, weight loss, or overeating Not at all   Feeling bad about yourself - or that you are a failure or have let yourself or your family down Nearly every day   Trouble concentrating on things such as school, work, reading, or watching TV Nearly every day   Moving or speaking so slowly that other people could have noticed; or the opposite being so fidgety that others notice -   Thoughts of being better off dead, or hurting yourself in some way -   PHQ 9 Score -       Alcohol & Drug Abuse Risk Screen    Do you average more than 1 drink per night or more than 7 drinks a week:  No    On any one occasion in the past three months have you have had more than 3 drinks containing alcohol:  No          Functional Ability and Level of Safety    Hearing: Hearing is good. Activities of Daily Living: The home contains: no safety equipment. Patient does total self care      Ambulation: with no difficulty     Fall Risk:  Fall Risk Assessment, last 12 mths 7/18/2022   Able to walk? Yes   Fall in past 12 months? 1   Do you feel unsteady? 1   Are you worried about falling 1   Is TUG test greater than 12 seconds? -   Is the gait abnormal? 0   Number of falls in past 12 months 2   Fall with injury?  1      Abuse Screen:  Patient is not abused       Cognitive Screening    Has your family/caregiver stated any concerns about your memory: no       Health Maintenance Due     Health Maintenance Due   Topic Date Due    MICROALBUMIN Q1  06/09/2018    Colorectal Cancer Screening Combo  12/16/2018    Foot Exam Q1  04/01/2020    Eye Exam Retinal or Dilated  05/31/2020    Lipid Screen  07/01/2020    Breast Cancer Screen Mammogram  07/12/2021    A1C test (Diabetic or Prediabetic)  02/09/2022    COVID-19 Vaccine (4 - Booster for [employer] [place] series) 03/01/2022       Patient Care Team   Patient Care Team:  Bernabe Edwards MD as PCP - General (Internal Medicine Physician)  Bernabe Edwards MD as PCP - [facility]  China Leslie MD (Inactive) (General Surgery)  Farheen Kent MD as Physician (Palliative Medicine)    History     Patient Active Problem List   Diagnosis Code    Arthritis, degenerative M19.90    Pancreatitis K85.90    Tracheal stenosis J39.8    GERD (gastroesophageal reflux disease) K21.9    Hypertension I10    Ocular nevus of left eye D31.92    Type 2 diabetes mellitus without complication, without long-term current use of insulin (HCC) E11.9    Mixed hyperlipidemia E78.2    Type 2 diabetes mellitus with nephropathy (HCC) E11.21    Adenocarcinoma of pancreas (HCC) C25.9    Atypical squamoproliferative skin lesion D49.2    Postoperative examination Z09    Chemotherapy induced nausea and vomiting R11.2, T45.1X5A    Diarrhea R19.7    Pancreatic malignancy syndrome (HCC) C25.0    Intractable nausea and vomiting R11.2    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    Abdominal hernia K46.9    Age-related nuclear cataract of both eyes H25.13    Carpal tunnel syndrome G56.00    Nevus of iris of left eye D31.42    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     Past Medical History:   Diagnosis Date    Adenocarcinoma of pancreas ([facility] 05/13/2020    Dr Booker Brood biopsy via EUS    Diabetes ([facility]     GERD (gastroesophageal reflux disease)     Hernia of abdominal cavity     Subxyphoid hernia    Hypertension     Inflammatory polyarthropathy (HCC)     Joint pain     Joint swelling     Menopause     Ocular nevus of left eye     Pancreatitis     Tracheal stenosis       Past Surgical History:   Procedure Laterality Date    HX CARPAL TUNNEL RELEASE Bilateral     HX COLONOSCOPY      HX HYSTERECTOMY      HX KNEE ARTHROSCOPY Right     HX KNEE REPLACEMENT Right     partial    HX LAP CHOLECYSTECTOMY      HX TONSILLECTOMY      HX VASCULAR ACCESS       Current Outpatient Medications   Medication Sig Dispense Refill    gabapentin (NEURONTIN) 300 mg capsule TAKE 2 CAPSULES BY MOUTH THREE TIMES DAILY 180 Capsule 3    DULoxetine (CYMBALTA) 60 mg capsule Take 1 Capsule by mouth daily.  Indications: neuropathic pain 30 Capsule 1    LORazepam (ATIVAN) 1 mg tablet Take 1 Tablet by mouth every six (6) hours as needed for Anxiety. Max Daily Amount: 4 mg. Indications: nausea and vomiting caused by cancer drugs 90 Tablet 2    multivit-minerals/folic acid (MULTIVITAMIN GUMMIES PO) Take 1 Each by mouth daily.  Insulin Needles, Disposable, (Nellie Pen Needle) 32 gauge x 5/32\" ndle 1 Each by Does Not Apply route three (3) times daily. 100 Pen Needle 5    methylphenidate HCl (RITALIN) 10 mg tablet Take 1 Tablet by mouth three (3) times daily. Max Daily Amount: 30 mg. 90 Tablet 0    Toujeo SoloStar U-300 Insulin 300 unit/mL (1.5 mL) inpn pen INJECT 55 UNITS ONCE DAILY AT BEDTIME - DOSE INCREASED (Patient taking differently: 55 Units by SubCUTAneous route. Patient uses differently during around chemo time.) 4.5 mL 10    lidocaine-prilocaine (EMLA) topical cream Apply  to affected area as needed for Pain (pain ). Apply a thin layer over port site prior to accessing port 30 g 5    HumaLOG KwikPen Insulin 100 unit/mL kwikpen patient on sliding scale 15 mL 6    famotidine (Pepcid) 20 mg tablet Take 20 mg by mouth two (2) times a day.  sennosides (Senna) 8.6 mg cap Take 1-2 Tablets by mouth two (2) times a day.  ondansetron (ZOFRAN ODT) 4 mg disintegrating tablet Take 1 Tab by mouth every eight (8) hours as needed for Nausea or Vomiting. 30 Tab 5    acetaminophen (Acetaminophen Extra Strength) 500 mg tablet Take 1,000 mg by mouth daily as needed.  HYDROmorphone (Dilaudid) 2 mg tablet Take 1 Tablet by mouth every four (4) hours as needed for Pain for up to 30 days. Max Daily Amount: 12 mg. Indications: excessive pain 90 Tablet 0    lidocaine (LIDODERM) 5 % 1 Patch by TransDERmal route every twenty-four (24) hours. Apply patch to the affected area for 12 hours a day and remove for 12 hours a day.  lipase-protease-amylase (Creon) 12,000-38,000 -60,000 unit capsule Take 2 Capsules by mouth three (3) times daily (with meals).  2 caps with meal and 1 with snacks  Indications: exocrine pancreatic insufficiency (Patient not taking: Reported on 7/18/2022) 240 Capsule 5    nystatin (MYCOSTATIN) powder Apply 1 g to affected area three (3) times daily. (Patient not taking: Reported on 7/18/2022) 60 g 1    polyethylene glycol (Miralax) 17 gram/dose powder Take 17 g by mouth daily as needed. (Patient not taking: Reported on 7/18/2022)      promethazine (PHENERGAN) 25 mg tablet Take 1 Tab by mouth every six (6) hours as needed for Nausea. (Patient not taking: Reported on 6/7/2022) 50 Tab 4    diphenoxylate-atropine (LomotiL) 2.5-0.025 mg per tablet Take 2 Tabs by mouth four (4) times daily as needed for Diarrhea. Max Daily Amount: 8 Tabs.  (Patient not taking: Reported on 5/4/2022) 200 Tab 0     No Known Allergies    Family History   Problem Relation Age of Onset    Heart Disease Mother     Heart Attack Mother     Cancer Father         lung    Diabetes Sister      Social History     Tobacco Use    Smoking status: Never Smoker    Smokeless tobacco: Never Used   Substance Use Topics    Alcohol use: No     Alcohol/week: 0.0 standard drinks         Belén Wan

## 2022-07-18 NOTE — PATIENT INSTRUCTIONS
Medicare Wellness Visit, Female     The best way to live healthy is to have a lifestyle where you eat a well-balanced diet, exercise regularly, limit alcohol use, and quit all forms of tobacco/nicotine, if applicable. Regular preventive services are another way to keep healthy. Preventive services (vaccines, screening tests, monitoring & exams) can help personalize your care plan, which helps you manage your own care. Screening tests can find health problems at the earliest stages, when they are easiest to treat. [facility] follows the current, evidence-based guidelines published by the [facility] ([facility]) when recommending preventive services for our patients. Because we follow these guidelines, sometimes recommendations change over time as research supports it. (For example, mammograms used to be recommended annually. Even though Medicare will still pay for an annual mammogram, the newer guidelines recommend a mammogram every two years for women of average risk). Of course, you and your doctor may decide to screen more often for some diseases, based on your risk and your co-morbidities (chronic disease you are already diagnosed with). Preventive services for you include:  - Medicare offers their members a free annual wellness visit, which is time for you and your primary care provider to discuss and plan for your preventive service needs. Take advantage of this benefit every year!  -All adults over the age of 72 should receive the recommended pneumonia vaccines. Current USPSTF guidelines recommend a series of two vaccines for the best pneumonia protection.   -All adults should have a flu vaccine yearly and a tetanus vaccine every 10 years.   -All adults age 48 and older should receive the shingles vaccines (series of two vaccines).       -All adults age 38-68 who are overweight should have a diabetes screening test once every three years.   -All adults born between [birth year] and 1965 should be screened once for Hepatitis C.  -Other screening tests and preventive services for persons with diabetes include: an eye exam to screen for diabetic retinopathy, a kidney function test, a foot exam, and stricter control over your cholesterol.   -Cardiovascular screening for adults with routine risk involves an electrocardiogram (ECG) at intervals determined by your doctor.   -Colorectal cancer screenings should be done for adults age 54-65 with no increased risk factors for colorectal cancer. There are a number of acceptable methods of screening for this type of cancer. Each test has its own benefits and drawbacks. Discuss with your doctor what is most appropriate for you during your annual wellness visit. The different tests include: colonoscopy (considered the best screening method), a fecal occult blood test, a fecal DNA test, and sigmoidoscopy.    -A bone mass density test is recommended when a woman turns 65 to screen for osteoporosis. This test is only recommended one time, as a screening. Some providers will use this same test as a disease monitoring tool if you already have osteoporosis. -Breast cancer screenings are recommended every other year for women of normal risk, age 54-69.  -Cervical cancer screenings for women over age 72 are only recommended with certain risk factors.      Here is a list of your current Health Maintenance items (your personalized list of preventive services) with a due date:  Health Maintenance Due   Topic Date Due    Albumin Urine Test  06/09/2018    Colorectal Screening  12/16/2018    Diabetic Foot Care  04/01/2020    Eye Exam  05/31/2020    Cholesterol Test   07/01/2020    Mammogram  07/12/2021    Hemoglobin A1C    02/09/2022    COVID-19 Vaccine (4 - Booster for [employer] series) 03/01/2022

## 2022-07-19 ENCOUNTER — VIRTUAL VISIT (OUTPATIENT)
Dept: ONCOLOGY | Age: 67
End: 2022-07-19
Payer: MEDICARE

## 2022-07-19 ENCOUNTER — HOSPITAL ENCOUNTER (OUTPATIENT)
Dept: INFUSION THERAPY | Age: 67
Discharge: HOME | End: 2022-07-19
Payer: MEDICARE

## 2022-07-19 VITALS
HEART RATE: 94 BPM | RESPIRATION RATE: 18 BRPM | TEMPERATURE: 97.3 F | OXYGEN SATURATION: 98 % | WEIGHT: 184 LBS | BODY MASS INDEX: 31.41 KG/M2 | DIASTOLIC BLOOD PRESSURE: 78 MMHG | SYSTOLIC BLOOD PRESSURE: 154 MMHG | HEIGHT: 64 IN

## 2022-07-19 DIAGNOSIS — R10.84 GENERALIZED ABDOMINAL PAIN: ICD-10-CM

## 2022-07-19 DIAGNOSIS — M79.2 NEUROPATHIC PAIN: ICD-10-CM

## 2022-07-19 DIAGNOSIS — F32.A DEPRESSION, UNSPECIFIED DEPRESSION TYPE: ICD-10-CM

## 2022-07-19 DIAGNOSIS — T45.1X5A CHEMOTHERAPY INDUCED NAUSEA AND VOMITING: ICD-10-CM

## 2022-07-19 DIAGNOSIS — T45.1X5A CHEMOTHERAPY INDUCED NEUTROPENIA (HCC): ICD-10-CM

## 2022-07-19 DIAGNOSIS — R11.2 CHEMOTHERAPY INDUCED NAUSEA AND VOMITING: ICD-10-CM

## 2022-07-19 DIAGNOSIS — C25.9 ADENOCARCINOMA OF PANCREAS (HCC): Primary | ICD-10-CM

## 2022-07-19 DIAGNOSIS — M79.601 PAIN IN RIGHT ARM: ICD-10-CM

## 2022-07-19 DIAGNOSIS — C25.9 MALIGNANT NEOPLASM OF PANCREAS, UNSPECIFIED LOCATION OF MALIGNANCY (HCC): Primary | ICD-10-CM

## 2022-07-19 DIAGNOSIS — F41.8 ANXIETY ABOUT HEALTH: ICD-10-CM

## 2022-07-19 DIAGNOSIS — D70.1 CHEMOTHERAPY INDUCED NEUTROPENIA (HCC): ICD-10-CM

## 2022-07-19 DIAGNOSIS — G89.3 NEOPLASM RELATED PAIN: ICD-10-CM

## 2022-07-19 LAB
ALBUMIN SERPL-MCNC: 3 G/DL (ref 3.5–5)
ALBUMIN/GLOB SERPL: 0.9 {RATIO} (ref 1.1–2.2)
ALP SERPL-CCNC: 95 U/L (ref 45–117)
ALT SERPL-CCNC: 23 U/L (ref 12–78)
ANION GAP SERPL CALC-SCNC: 8 MMOL/L (ref 5–15)
AST SERPL-CCNC: 18 U/L (ref 15–37)
BASOPHILS # BLD: 0.1 K/UL (ref 0–0.1)
BASOPHILS NFR BLD: 1 % (ref 0–1)
BILIRUB SERPL-MCNC: 0.3 MG/DL (ref 0.2–1)
BUN SERPL-MCNC: 11 MG/DL (ref 6–20)
BUN/CREAT SERPL: 16 (ref 12–20)
CALCIUM SERPL-MCNC: 8.9 MG/DL (ref 8.5–10.1)
CHLORIDE SERPL-SCNC: 106 MMOL/L (ref 97–108)
CO2 SERPL-SCNC: 29 MMOL/L (ref 21–32)
CREAT SERPL-MCNC: 0.69 MG/DL (ref 0.55–1.02)
DIFFERENTIAL METHOD BLD: ABNORMAL
EOSINOPHIL # BLD: 0.3 K/UL (ref 0–0.4)
EOSINOPHIL NFR BLD: 4 % (ref 0–7)
ERYTHROCYTE [DISTWIDTH] IN BLOOD BY AUTOMATED COUNT: 16.4 % (ref 11.5–14.5)
GLOBULIN SER CALC-MCNC: 3.2 G/DL (ref 2–4)
GLUCOSE SERPL-MCNC: 169 MG/DL (ref 65–100)
HCT VFR BLD AUTO: 34 % (ref 35–47)
HGB BLD-MCNC: 11 G/DL (ref 11.5–16)
IMM GRANULOCYTES # BLD AUTO: 0 K/UL (ref 0–0.04)
IMM GRANULOCYTES NFR BLD AUTO: 0 % (ref 0–0.5)
LYMPHOCYTES # BLD: 1.9 K/UL (ref 0.8–3.5)
LYMPHOCYTES NFR BLD: 30 % (ref 12–49)
MCH RBC QN AUTO: 29 PG (ref 26–34)
MCHC RBC AUTO-ENTMCNC: 32.4 G/DL (ref 30–36.5)
MCV RBC AUTO: 89.7 FL (ref 80–99)
MONOCYTES # BLD: 1.7 K/UL (ref 0–1)
MONOCYTES NFR BLD: 27 % (ref 5–13)
NEUTS SEG # BLD: 2.3 K/UL (ref 1.8–8)
NEUTS SEG NFR BLD: 38 % (ref 32–75)
NRBC # BLD: 0 K/UL (ref 0–0.01)
NRBC BLD-RTO: 0 PER 100 WBC
PLATELET # BLD AUTO: 465 K/UL (ref 150–400)
PMV BLD AUTO: 9.8 FL (ref 8.9–12.9)
POTASSIUM SERPL-SCNC: 3.6 MMOL/L (ref 3.5–5.1)
PROT SERPL-MCNC: 6.2 G/DL (ref 6.4–8.2)
RBC # BLD AUTO: 3.79 M/UL (ref 3.8–5.2)
RBC MORPH BLD: ABNORMAL
SODIUM SERPL-SCNC: 143 MMOL/L (ref 136–145)
WBC # BLD AUTO: 6.3 K/UL (ref 3.6–11)

## 2022-07-19 PROCEDURE — G8417 CALC BMI ABV UP PARAM F/U: HCPCS | Performed by: INTERNAL MEDICINE

## 2022-07-19 PROCEDURE — 74011250636 HC RX REV CODE- 250/636: Performed by: INTERNAL MEDICINE

## 2022-07-19 PROCEDURE — 3017F COLORECTAL CA SCREEN DOC REV: CPT | Performed by: INTERNAL MEDICINE

## 2022-07-19 PROCEDURE — 96413 CHEMO IV INFUSION 1 HR: CPT

## 2022-07-19 PROCEDURE — G8399 PT W/DXA RESULTS DOCUMENT: HCPCS | Performed by: INTERNAL MEDICINE

## 2022-07-19 PROCEDURE — 74011000258 HC RX REV CODE- 258: Performed by: INTERNAL MEDICINE

## 2022-07-19 PROCEDURE — 1090F PRES/ABSN URINE INCON ASSESS: CPT | Performed by: INTERNAL MEDICINE

## 2022-07-19 PROCEDURE — G0463 HOSPITAL OUTPT CLINIC VISIT: HCPCS | Performed by: INTERNAL MEDICINE

## 2022-07-19 PROCEDURE — 85025 COMPLETE CBC W/AUTO DIFF WBC: CPT

## 2022-07-19 PROCEDURE — 1101F PT FALLS ASSESS-DOCD LE1/YR: CPT | Performed by: INTERNAL MEDICINE

## 2022-07-19 PROCEDURE — G8536 NO DOC ELDER MAL SCRN: HCPCS | Performed by: INTERNAL MEDICINE

## 2022-07-19 PROCEDURE — 80053 COMPREHEN METABOLIC PANEL: CPT

## 2022-07-19 PROCEDURE — G8754 DIAS BP LESS 90: HCPCS | Performed by: INTERNAL MEDICINE

## 2022-07-19 PROCEDURE — 99215 OFFICE O/P EST HI 40 MIN: CPT | Performed by: INTERNAL MEDICINE

## 2022-07-19 PROCEDURE — 36415 COLL VENOUS BLD VENIPUNCTURE: CPT

## 2022-07-19 PROCEDURE — 1123F ACP DISCUSS/DSCN MKR DOCD: CPT | Performed by: INTERNAL MEDICINE

## 2022-07-19 PROCEDURE — G8753 SYS BP > OR = 140: HCPCS | Performed by: INTERNAL MEDICINE

## 2022-07-19 PROCEDURE — 86301 IMMUNOASSAY TUMOR CA 19-9: CPT

## 2022-07-19 PROCEDURE — 74011000250 HC RX REV CODE- 250: Performed by: INTERNAL MEDICINE

## 2022-07-19 PROCEDURE — G8510 SCR DEP NEG, NO PLAN REQD: HCPCS | Performed by: INTERNAL MEDICINE

## 2022-07-19 PROCEDURE — 96375 TX/PRO/DX INJ NEW DRUG ADDON: CPT

## 2022-07-19 PROCEDURE — 96417 CHEMO IV INFUS EACH ADDL SEQ: CPT

## 2022-07-19 PROCEDURE — G8427 DOCREV CUR MEDS BY ELIG CLIN: HCPCS | Performed by: INTERNAL MEDICINE

## 2022-07-19 PROCEDURE — 77030012965 HC NDL HUBR BBMI -A

## 2022-07-19 RX ORDER — SODIUM CHLORIDE 9 MG/ML
25 INJECTION, SOLUTION INTRAVENOUS CONTINUOUS
Status: DISPENSED | OUTPATIENT
Start: 2022-07-19 | End: 2022-07-19

## 2022-07-19 RX ORDER — METHYLPHENIDATE HYDROCHLORIDE 10 MG/1
10 TABLET ORAL 2 TIMES DAILY
Qty: 60 TABLET | Refills: 0 | Status: SHIPPED | OUTPATIENT
Start: 2022-07-19 | End: 2022-09-19 | Stop reason: SDUPTHER

## 2022-07-19 RX ORDER — DEXAMETHASONE SODIUM PHOSPHATE 4 MG/ML
10 INJECTION, SOLUTION INTRA-ARTICULAR; INTRALESIONAL; INTRAMUSCULAR; INTRAVENOUS; SOFT TISSUE ONCE
Status: COMPLETED | OUTPATIENT
Start: 2022-07-19 | End: 2022-07-19

## 2022-07-19 RX ORDER — LORAZEPAM 1 MG/1
TABLET ORAL
Qty: 90 TABLET | Refills: 0 | Status: SHIPPED | OUTPATIENT
Start: 2022-07-19 | End: 2022-08-18 | Stop reason: SDUPTHER

## 2022-07-19 RX ORDER — DIPHENHYDRAMINE HYDROCHLORIDE 50 MG/ML
25 INJECTION, SOLUTION INTRAMUSCULAR; INTRAVENOUS AS NEEDED
Status: ACTIVE | OUTPATIENT
Start: 2022-07-19 | End: 2022-07-19

## 2022-07-19 RX ORDER — SODIUM CHLORIDE 9 MG/ML
10 INJECTION INTRAMUSCULAR; INTRAVENOUS; SUBCUTANEOUS AS NEEDED
Status: ACTIVE | OUTPATIENT
Start: 2022-07-19 | End: 2022-07-19

## 2022-07-19 RX ORDER — ACETAMINOPHEN 325 MG/1
650 TABLET ORAL AS NEEDED
Status: ACTIVE | OUTPATIENT
Start: 2022-07-19 | End: 2022-07-19

## 2022-07-19 RX ORDER — HEPARIN 100 UNIT/ML
300-500 SYRINGE INTRAVENOUS AS NEEDED
Status: DISPENSED | OUTPATIENT
Start: 2022-07-19 | End: 2022-07-19

## 2022-07-19 RX ORDER — ONDANSETRON 2 MG/ML
8 INJECTION INTRAMUSCULAR; INTRAVENOUS AS NEEDED
Status: ACTIVE | OUTPATIENT
Start: 2022-07-19 | End: 2022-07-19

## 2022-07-19 RX ORDER — ONDANSETRON 2 MG/ML
8 INJECTION INTRAMUSCULAR; INTRAVENOUS ONCE
Status: COMPLETED | OUTPATIENT
Start: 2022-07-19 | End: 2022-07-19

## 2022-07-19 RX ORDER — SODIUM CHLORIDE 0.9 % (FLUSH) 0.9 %
10 SYRINGE (ML) INJECTION AS NEEDED
Status: ACTIVE | OUTPATIENT
Start: 2022-07-19 | End: 2022-07-19

## 2022-07-19 RX ADMIN — PACLITAXEL 184 MG: 100 INJECTION, POWDER, LYOPHILIZED, FOR SUSPENSION INTRAVENOUS at 10:27

## 2022-07-19 RX ADMIN — GEMCITABINE 1656 MG: 38 INJECTION, SOLUTION INTRAVENOUS at 11:10

## 2022-07-19 RX ADMIN — SODIUM CHLORIDE, PRESERVATIVE FREE 10 ML: 5 INJECTION INTRAVENOUS at 11:43

## 2022-07-19 RX ADMIN — ONDANSETRON 8 MG: 2 INJECTION INTRAMUSCULAR; INTRAVENOUS at 09:44

## 2022-07-19 RX ADMIN — HEPARIN 500 UNITS: 100 SYRINGE at 11:43

## 2022-07-19 RX ADMIN — DEXAMETHASONE SODIUM PHOSPHATE 10 MG: 4 INJECTION, SOLUTION INTRAMUSCULAR; INTRAVENOUS at 09:48

## 2022-07-19 RX ADMIN — SODIUM CHLORIDE 25 ML/HR: 9 INJECTION, SOLUTION INTRAVENOUS at 09:42

## 2022-07-19 NOTE — PROGRESS NOTES
Violet Dominique is a 79 y.o. female here for follow up for pancreatic cancer. Patient is scheduled for C17 D15 today in [facility]. Key Oncology Meds             ondansetron (ZOFRAN ODT) 4 mg disintegrating tablet Take 1 Tab by mouth every eight (8) hours as needed for Nausea or Vomiting. Key Pain Meds             HYDROmorphone (DILAUDID) 2 mg tablet Take 1 Tablet by mouth three (3) times daily for 3 days. Max Daily Amount: 6 mg. HYDROmorphone (Dilaudid) 2 mg tablet Take 1 Tablet by mouth every four (4) hours as needed for Pain for up to 30 days. Max Daily Amount: 12 mg. Indications: excessive pain    acetaminophen (Acetaminophen Extra Strength) 500 mg tablet Take 1,000 mg by mouth daily as needed.         Chemotherapy Flowsheet 7/5/2022   Cycle C17 D1   Date 7/5/2022   Drug / Regimen Abraxane/Gemcitabine   Dosage 184 mg/1656 mg   Time Up see MAR   Time Down -   Pre Hydration -   Pre Meds Zofran 8 mg IV, Decadron 10 mg   Notes -

## 2022-07-19 NOTE — DISCHARGE INSTRUCTIONS

## 2022-07-19 NOTE — PROGRESS NOTES
[facility] Progress Note    Date: 2022    Name: Harriet Foy    MRN: [id number]         : 1955    Ms. Ba Arrived ambulatory and in no distress for cycle 17 day 15 of Abraxane/Gemzar regimen. Assessment was completed, no acute issues at this time, no new complaints voiced. Port accessed without difficulty, labs drawn and in process. Chemotherapy Flowsheet 2022   Cycle C17 D15   Date 2022   Drug / Regimen Abraxane/Gemzar   Dosage 184 mg/1656 mg   Time Up see MAR   Time Down 1027   Pre Hydration 1142   Pre Meds Zofran 8 mg IV, Decadron 10 mg   Notes Chemo verified by two RN's     930:  Proceeded to appt with Dr. Raman. Ms. Ba's vitals were reviewed. Visit Vitals  BP (!) 154/78 (BP 1 Location: Left arm, BP Patient Position: Sitting)   Pulse 94   Temp 97.3 °F (36.3 °C)   Resp 18   Ht 5' 4\" (1.626 m)   Wt 83.5 kg (184 lb)   SpO2 98%   BMI 31.58 kg/m²       Lab results were obtained and reviewed. Recent Results (from the past 12 hour(s))   CBC WITH AUTOMATED DIFF    Collection Time: 22  8:30 AM   Result Value Ref Range    WBC 6.3 3.6 - 11.0 K/uL    RBC 3.79 (L) 3.80 - 5.20 M/uL    HGB 11.0 (L) 11.5 - 16.0 g/dL    HCT 34.0 (L) 35.0 - 47.0 %    MCV 89.7 80.0 - 99.0 FL    MCH 29.0 26.0 - 34.0 PG    MCHC 32.4 30.0 - 36.5 g/dL    RDW 16.4 (H) 11.5 - 14.5 %    PLATELET 313 (H) 081 - 400 K/uL    MPV 9.8 8.9 - 12.9 FL    NRBC 0.0 0  WBC    ABSOLUTE NRBC 0.00 0.00 - 0.01 K/uL    NEUTROPHILS 38 32 - 75 %    LYMPHOCYTES 30 12 - 49 %    MONOCYTES 27 (H) 5 - 13 %    EOSINOPHILS 4 0 - 7 %    BASOPHILS 1 0 - 1 %    IMMATURE GRANULOCYTES 0 0.0 - 0.5 %    ABS. NEUTROPHILS 2.3 1.8 - 8.0 K/UL    ABS. LYMPHOCYTES 1.9 0.8 - 3.5 K/UL    ABS. MONOCYTES 1.7 (H) 0.0 - 1.0 K/UL    ABS. EOSINOPHILS 0.3 0.0 - 0.4 K/UL    ABS. BASOPHILS 0.1 0.0 - 0.1 K/UL    ABS. IMM.  GRANS. 0.0 0.00 - 0.04 K/UL    DF AUTOMATED      RBC COMMENTS ANISOCYTOSIS  1+       METABOLIC PANEL, COMPREHENSIVE    Collection Time: 07/19/22  8:30 AM   Result Value Ref Range    Sodium 143 136 - 145 mmol/L    Potassium 3.6 3.5 - 5.1 mmol/L    Chloride 106 97 - 108 mmol/L    CO2 29 21 - 32 mmol/L    Anion gap 8 5 - 15 mmol/L    Glucose 169 (H) 65 - 100 mg/dL    BUN 11 6 - 20 MG/DL    Creatinine 0.69 0.55 - 1.02 MG/DL    BUN/Creatinine ratio 16 12 - 20      GFR est AA >60 >60 ml/min/1.73m2    GFR est non-AA >60 >60 ml/min/1.73m2    Calcium 8.9 8.5 - 10.1 MG/DL    Bilirubin, total 0.3 0.2 - 1.0 MG/DL    ALT (SGPT) 23 12 - 78 U/L    AST (SGOT) 18 15 - 37 U/L    Alk. phosphatase 95 45 - 117 U/L    Protein, total 6.2 (L) 6.4 - 8.2 g/dL    Albumin 3.0 (L) 3.5 - 5.0 g/dL    Globulin 3.2 2.0 - 4.0 g/dL    A-G Ratio 0.9 (L) 1.1 - 2.2       NS initiated. Pre-medications of Decadron 10 mg IVP and Zofran 8 mg IVP  were administered as ordered and chemotherapy was initiated. Two nurses verified prior to administering. 1027: Drug name: Abraxane was milky, clear with no particulates. Dose: 184 mg  infusion volume: 36.8 mL  Rate and route of administration: 73.6 mL/hr  Expiration date of drug: BUD 7/19 at 1345  1107: infusion complete. 1110: Drug name:Gemzar was clear with no particulates. Dose: 1,656 mg  infusion volume: 318.55 mL  Rate and route of administration: 637.1 mL/hr IV  Expiration date of drug: BUD 7/20/22 at 1015  1142: Infusion complete. Port flushed with NS, brisk blood return and Heparin. Needle removed and band-aid applied to site without redness, swelling or pain. Ms. Shelley Bravo tolerated treatment well and was discharged from [place] [address] in stable condition at 1153. She is to return on August 2 at 0800 for her next appointment.     Precious Kelley RN  July 19, 2022

## 2022-07-20 LAB — CANCER AG19-9 SERPL-ACNC: 104 U/ML (ref 0–35)

## 2022-07-22 ENCOUNTER — APPOINTMENT (OUTPATIENT)
Dept: GENERAL RADIOLOGY | Age: 67
End: 2022-07-22
Attending: FAMILY MEDICINE
Payer: MEDICARE

## 2022-07-22 ENCOUNTER — HOSPITAL ENCOUNTER (OUTPATIENT)
Age: 67
Setting detail: OBSERVATION
Discharge: HOME | End: 2022-07-24
Attending: FAMILY MEDICINE | Admitting: HOSPITALIST
Payer: MEDICARE

## 2022-07-22 DIAGNOSIS — U07.1 COVID-19: Primary | ICD-10-CM

## 2022-07-22 DIAGNOSIS — C25.9 ADENOCARCINOMA OF PANCREAS (HCC): ICD-10-CM

## 2022-07-22 DIAGNOSIS — G89.3 NEOPLASM RELATED PAIN: ICD-10-CM

## 2022-07-22 LAB
ALBUMIN SERPL-MCNC: 3.4 G/DL (ref 3.5–5)
ALBUMIN/GLOB SERPL: 1.2 {RATIO} (ref 1.1–2.2)
ALP SERPL-CCNC: 95 U/L (ref 45–117)
ALT SERPL-CCNC: 32 U/L (ref 12–78)
ANION GAP SERPL CALC-SCNC: 10 MMOL/L (ref 5–15)
APPEARANCE UR: CLEAR
AST SERPL-CCNC: 37 U/L (ref 15–37)
BACTERIA URNS QL MICRO: ABNORMAL /HPF
BASOPHILS # BLD: 0 K/UL (ref 0–0.1)
BASOPHILS NFR BLD: 1 % (ref 0–1)
BILIRUB SERPL-MCNC: 0.4 MG/DL (ref 0.2–1)
BILIRUB UR QL: NEGATIVE
BUN SERPL-MCNC: 14 MG/DL (ref 6–20)
BUN/CREAT SERPL: 18 (ref 12–20)
CALCIUM SERPL-MCNC: 9 MG/DL (ref 8.5–10.1)
CHLORIDE SERPL-SCNC: 102 MMOL/L (ref 97–108)
CO2 SERPL-SCNC: 28 MMOL/L (ref 21–32)
COLOR UR: ABNORMAL
CREAT SERPL-MCNC: 0.76 MG/DL (ref 0.55–1.02)
DIFFERENTIAL METHOD BLD: ABNORMAL
EOSINOPHIL # BLD: 0 K/UL (ref 0–0.4)
EOSINOPHIL NFR BLD: 1 % (ref 0–7)
EPITH CASTS URNS QL MICRO: ABNORMAL /LPF
ERYTHROCYTE [DISTWIDTH] IN BLOOD BY AUTOMATED COUNT: 16.4 % (ref 11.5–14.5)
GLOBULIN SER CALC-MCNC: 2.9 G/DL (ref 2–4)
GLUCOSE SERPL-MCNC: 229 MG/DL (ref 65–100)
GLUCOSE UR STRIP.AUTO-MCNC: NEGATIVE MG/DL
HCT VFR BLD AUTO: 36.3 % (ref 35–47)
HGB BLD-MCNC: 11.8 G/DL (ref 11.5–16)
HGB UR QL STRIP: ABNORMAL
IMM GRANULOCYTES # BLD AUTO: 0 K/UL (ref 0–0.04)
IMM GRANULOCYTES NFR BLD AUTO: 0 % (ref 0–0.5)
KETONES UR QL STRIP.AUTO: NEGATIVE MG/DL
LACTATE SERPL-SCNC: 1.2 MMOL/L (ref 0.4–2)
LEUKOCYTE ESTERASE UR QL STRIP.AUTO: ABNORMAL
LYMPHOCYTES # BLD: 1 K/UL (ref 0.8–3.5)
LYMPHOCYTES NFR BLD: 34 % (ref 12–49)
MCH RBC QN AUTO: 28.8 PG (ref 26–34)
MCHC RBC AUTO-ENTMCNC: 32.5 G/DL (ref 30–36.5)
MCV RBC AUTO: 88.5 FL (ref 80–99)
MONOCYTES # BLD: 0.2 K/UL (ref 0–1)
MONOCYTES NFR BLD: 6 % (ref 5–13)
NEUTS SEG # BLD: 1.7 K/UL (ref 1.8–8)
NEUTS SEG NFR BLD: 58 % (ref 32–75)
NITRITE UR QL STRIP.AUTO: POSITIVE
NRBC # BLD: 0 K/UL (ref 0–0.01)
NRBC BLD-RTO: 0 PER 100 WBC
PH UR STRIP: 5.5 [PH] (ref 5–8)
PLATELET # BLD AUTO: 465 K/UL (ref 150–400)
PMV BLD AUTO: 11.1 FL (ref 8.9–12.9)
POTASSIUM SERPL-SCNC: 4.5 MMOL/L (ref 3.5–5.1)
PROT SERPL-MCNC: 6.3 G/DL (ref 6.4–8.2)
PROT UR STRIP-MCNC: 100 MG/DL
RBC # BLD AUTO: 4.1 M/UL (ref 3.8–5.2)
RBC #/AREA URNS HPF: ABNORMAL /HPF (ref 0–5)
SODIUM SERPL-SCNC: 140 MMOL/L (ref 136–145)
SP GR UR REFRACTOMETRY: 1.02 (ref 1–1.03)
TROPONIN-HIGH SENSITIVITY: 12 NG/L (ref 0–51)
UA: UC IF INDICATED,UAUC: ABNORMAL
UROBILINOGEN UR QL STRIP.AUTO: 0.2 EU/DL (ref 0.2–1)
WBC # BLD AUTO: 2.9 K/UL (ref 3.6–11)
WBC URNS QL MICRO: >100 /HPF (ref 0–4)

## 2022-07-22 PROCEDURE — 71045 X-RAY EXAM CHEST 1 VIEW: CPT

## 2022-07-22 PROCEDURE — 99285 EMERGENCY DEPT VISIT HI MDM: CPT

## 2022-07-22 PROCEDURE — 80053 COMPREHEN METABOLIC PANEL: CPT

## 2022-07-22 PROCEDURE — 87086 URINE CULTURE/COLONY COUNT: CPT

## 2022-07-22 PROCEDURE — 74011000250 HC RX REV CODE- 250: Performed by: FAMILY MEDICINE

## 2022-07-22 PROCEDURE — 96374 THER/PROPH/DIAG INJ IV PUSH: CPT

## 2022-07-22 PROCEDURE — 85025 COMPLETE CBC W/AUTO DIFF WBC: CPT

## 2022-07-22 PROCEDURE — 87040 BLOOD CULTURE FOR BACTERIA: CPT

## 2022-07-22 PROCEDURE — 84484 ASSAY OF TROPONIN QUANT: CPT

## 2022-07-22 PROCEDURE — 74011250636 HC RX REV CODE- 250/636: Performed by: FAMILY MEDICINE

## 2022-07-22 PROCEDURE — 81001 URINALYSIS AUTO W/SCOPE: CPT

## 2022-07-22 PROCEDURE — 94640 AIRWAY INHALATION TREATMENT: CPT

## 2022-07-22 PROCEDURE — 36415 COLL VENOUS BLD VENIPUNCTURE: CPT

## 2022-07-22 PROCEDURE — 83605 ASSAY OF LACTIC ACID: CPT

## 2022-07-22 PROCEDURE — 93005 ELECTROCARDIOGRAM TRACING: CPT

## 2022-07-22 PROCEDURE — 96361 HYDRATE IV INFUSION ADD-ON: CPT

## 2022-07-22 RX ORDER — NIRMATRELVIR AND RITONAVIR 300-100 MG
KIT ORAL
Qty: 1 BOX | Refills: 0 | Status: ON HOLD | OUTPATIENT
Start: 2022-07-22 | End: 2022-07-23

## 2022-07-22 RX ORDER — DEXAMETHASONE SODIUM PHOSPHATE 4 MG/ML
10 INJECTION, SOLUTION INTRA-ARTICULAR; INTRALESIONAL; INTRAMUSCULAR; INTRAVENOUS; SOFT TISSUE
Status: COMPLETED | OUTPATIENT
Start: 2022-07-22 | End: 2022-07-22

## 2022-07-22 RX ORDER — SODIUM CHLORIDE 0.9 % (FLUSH) 0.9 %
5-10 SYRINGE (ML) INJECTION AS NEEDED
Status: DISCONTINUED | OUTPATIENT
Start: 2022-07-22 | End: 2022-07-24 | Stop reason: HOSPADM

## 2022-07-22 RX ADMIN — SODIUM CHLORIDE 2448 ML: 9 INJECTION, SOLUTION INTRAVENOUS at 20:49

## 2022-07-22 RX ADMIN — DEXAMETHASONE SODIUM PHOSPHATE 10 MG: 4 INJECTION, SOLUTION INTRAMUSCULAR; INTRAVENOUS at 20:49

## 2022-07-22 RX ADMIN — RACEPINEPHRINE HYDROCHLORIDE 0.5 ML: 11.25 SOLUTION RESPIRATORY (INHALATION) at 20:58

## 2022-07-23 PROBLEM — U07.1 COVID: Status: ACTIVE | Noted: 2022-01-01

## 2022-07-23 PROBLEM — R06.1 STRIDOR: Status: ACTIVE | Noted: 2022-01-01

## 2022-07-23 PROBLEM — U07.1 COVID: Status: ACTIVE | Noted: 2022-07-23

## 2022-07-23 LAB
ATRIAL RATE: 95 BPM
BACTERIA SPEC CULT: NORMAL
CALCULATED P AXIS, ECG09: 17 DEGREES
CALCULATED R AXIS, ECG10: -26 DEGREES
CALCULATED T AXIS, ECG11: 38 DEGREES
CC UR VC: NORMAL
DIAGNOSIS, 93000: NORMAL
FLUAV RNA SPEC QL NAA+PROBE: NOT DETECTED
FLUBV RNA SPEC QL NAA+PROBE: NOT DETECTED
GLUCOSE BLD STRIP.AUTO-MCNC: 290 MG/DL (ref 65–117)
GLUCOSE BLD STRIP.AUTO-MCNC: 307 MG/DL (ref 65–117)
GLUCOSE BLD STRIP.AUTO-MCNC: 337 MG/DL (ref 65–117)
GLUCOSE BLD STRIP.AUTO-MCNC: 368 MG/DL (ref 65–117)
P-R INTERVAL, ECG05: 142 MS
Q-T INTERVAL, ECG07: 356 MS
QRS DURATION, ECG06: 98 MS
QTC CALCULATION (BEZET), ECG08: 447 MS
SARS-COV-2, COV2: DETECTED
SERVICE CMNT-IMP: ABNORMAL
SERVICE CMNT-IMP: NORMAL
VENTRICULAR RATE, ECG03: 95 BPM

## 2022-07-23 PROCEDURE — 74011250636 HC RX REV CODE- 250/636: Performed by: INTERNAL MEDICINE

## 2022-07-23 PROCEDURE — 87636 SARSCOV2 & INF A&B AMP PRB: CPT

## 2022-07-23 PROCEDURE — 74011000250 HC RX REV CODE- 250: Performed by: INTERNAL MEDICINE

## 2022-07-23 PROCEDURE — G0378 HOSPITAL OBSERVATION PER HR: HCPCS

## 2022-07-23 PROCEDURE — 74011636637 HC RX REV CODE- 636/637: Performed by: INTERNAL MEDICINE

## 2022-07-23 PROCEDURE — 74011250637 HC RX REV CODE- 250/637: Performed by: FAMILY MEDICINE

## 2022-07-23 PROCEDURE — 96372 THER/PROPH/DIAG INJ SC/IM: CPT

## 2022-07-23 PROCEDURE — 74011250637 HC RX REV CODE- 250/637: Performed by: INTERNAL MEDICINE

## 2022-07-23 PROCEDURE — 82962 GLUCOSE BLOOD TEST: CPT

## 2022-07-23 RX ORDER — POLYETHYLENE GLYCOL 3350 17 G/17G
17 POWDER, FOR SOLUTION ORAL DAILY PRN
Status: DISCONTINUED | OUTPATIENT
Start: 2022-07-23 | End: 2022-07-24 | Stop reason: HOSPADM

## 2022-07-23 RX ORDER — HYDROMORPHONE HYDROCHLORIDE 2 MG/1
2 TABLET ORAL
Status: DISCONTINUED | OUTPATIENT
Start: 2022-07-23 | End: 2022-07-23

## 2022-07-23 RX ORDER — HYDROMORPHONE HYDROCHLORIDE 2 MG/1
2 TABLET ORAL 3 TIMES DAILY
Status: DISCONTINUED | OUTPATIENT
Start: 2022-07-23 | End: 2022-07-24 | Stop reason: HOSPADM

## 2022-07-23 RX ORDER — FAMOTIDINE 20 MG/1
20 TABLET, FILM COATED ORAL 2 TIMES DAILY
Status: DISCONTINUED | OUTPATIENT
Start: 2022-07-23 | End: 2022-07-24 | Stop reason: HOSPADM

## 2022-07-23 RX ORDER — INSULIN GLARGINE 100 [IU]/ML
20 INJECTION, SOLUTION SUBCUTANEOUS
Status: DISCONTINUED | OUTPATIENT
Start: 2022-07-23 | End: 2022-07-23

## 2022-07-23 RX ORDER — LORAZEPAM 1 MG/1
1 TABLET ORAL ONCE
Status: COMPLETED | OUTPATIENT
Start: 2022-07-23 | End: 2022-07-23

## 2022-07-23 RX ORDER — GABAPENTIN 300 MG/1
600 CAPSULE ORAL 3 TIMES DAILY
Status: DISCONTINUED | OUTPATIENT
Start: 2022-07-23 | End: 2022-07-24 | Stop reason: HOSPADM

## 2022-07-23 RX ORDER — LORAZEPAM 1 MG/1
2 TABLET ORAL
Status: DISCONTINUED | OUTPATIENT
Start: 2022-07-23 | End: 2022-07-24 | Stop reason: HOSPADM

## 2022-07-23 RX ORDER — METHYLPHENIDATE HYDROCHLORIDE 5 MG/1
10 TABLET ORAL DAILY
Status: DISCONTINUED | OUTPATIENT
Start: 2022-07-23 | End: 2022-07-24 | Stop reason: HOSPADM

## 2022-07-23 RX ORDER — INSULIN LISPRO 100 [IU]/ML
10 INJECTION, SOLUTION INTRAVENOUS; SUBCUTANEOUS ONCE
Status: COMPLETED | OUTPATIENT
Start: 2022-07-23 | End: 2022-07-23

## 2022-07-23 RX ORDER — AMOXICILLIN 250 MG
1 CAPSULE ORAL
COMMUNITY

## 2022-07-23 RX ORDER — DEXTROSE MONOHYDRATE 100 MG/ML
0-250 INJECTION, SOLUTION INTRAVENOUS AS NEEDED
Status: DISCONTINUED | OUTPATIENT
Start: 2022-07-23 | End: 2022-07-24 | Stop reason: HOSPADM

## 2022-07-23 RX ORDER — LORAZEPAM 1 MG/1
1 TABLET ORAL
Status: COMPLETED | OUTPATIENT
Start: 2022-07-23 | End: 2022-07-23

## 2022-07-23 RX ORDER — ACETAMINOPHEN 325 MG/1
650 TABLET ORAL
Status: DISCONTINUED | OUTPATIENT
Start: 2022-07-23 | End: 2022-07-24 | Stop reason: HOSPADM

## 2022-07-23 RX ORDER — SODIUM CHLORIDE 0.9 % (FLUSH) 0.9 %
5-40 SYRINGE (ML) INJECTION AS NEEDED
Status: DISCONTINUED | OUTPATIENT
Start: 2022-07-23 | End: 2022-07-24 | Stop reason: HOSPADM

## 2022-07-23 RX ORDER — MAGNESIUM SULFATE 100 %
4 CRYSTALS MISCELLANEOUS AS NEEDED
Status: DISCONTINUED | OUTPATIENT
Start: 2022-07-23 | End: 2022-07-24 | Stop reason: HOSPADM

## 2022-07-23 RX ORDER — DULOXETIN HYDROCHLORIDE 30 MG/1
30 CAPSULE, DELAYED RELEASE ORAL EVERY EVENING
Status: DISCONTINUED | OUTPATIENT
Start: 2022-07-23 | End: 2022-07-24 | Stop reason: HOSPADM

## 2022-07-23 RX ORDER — ACETAMINOPHEN 650 MG/1
650 SUPPOSITORY RECTAL
Status: DISCONTINUED | OUTPATIENT
Start: 2022-07-23 | End: 2022-07-24 | Stop reason: HOSPADM

## 2022-07-23 RX ORDER — INSULIN GLARGINE 100 [IU]/ML
20 INJECTION, SOLUTION SUBCUTANEOUS
Status: DISCONTINUED | OUTPATIENT
Start: 2022-07-23 | End: 2022-07-24

## 2022-07-23 RX ORDER — INSULIN GLARGINE 100 [IU]/ML
20 INJECTION, SOLUTION SUBCUTANEOUS ONCE
Status: DISCONTINUED | OUTPATIENT
Start: 2022-07-24 | End: 2022-07-24 | Stop reason: HOSPADM

## 2022-07-23 RX ORDER — GABAPENTIN 300 MG/1
300 CAPSULE ORAL 3 TIMES DAILY
Status: DISCONTINUED | OUTPATIENT
Start: 2022-07-23 | End: 2022-07-23

## 2022-07-23 RX ORDER — ACETAMINOPHEN 325 MG/1
650 TABLET ORAL
Status: DISCONTINUED | OUTPATIENT
Start: 2022-07-23 | End: 2022-07-23 | Stop reason: SDUPTHER

## 2022-07-23 RX ORDER — LORAZEPAM 1 MG/1
1 TABLET ORAL DAILY
Status: DISCONTINUED | OUTPATIENT
Start: 2022-07-24 | End: 2022-07-24 | Stop reason: HOSPADM

## 2022-07-23 RX ORDER — ONDANSETRON 2 MG/ML
4 INJECTION INTRAMUSCULAR; INTRAVENOUS
Status: DISCONTINUED | OUTPATIENT
Start: 2022-07-23 | End: 2022-07-24 | Stop reason: HOSPADM

## 2022-07-23 RX ORDER — ENOXAPARIN SODIUM 100 MG/ML
40 INJECTION SUBCUTANEOUS DAILY
Status: DISCONTINUED | OUTPATIENT
Start: 2022-07-23 | End: 2022-07-24 | Stop reason: HOSPADM

## 2022-07-23 RX ORDER — DIPHENOXYLATE HYDROCHLORIDE AND ATROPINE SULFATE 2.5; .025 MG/1; MG/1
2 TABLET ORAL
Status: DISCONTINUED | OUTPATIENT
Start: 2022-07-23 | End: 2022-07-24 | Stop reason: HOSPADM

## 2022-07-23 RX ORDER — DULOXETIN HYDROCHLORIDE 30 MG/1
60 CAPSULE, DELAYED RELEASE ORAL DAILY
Status: DISCONTINUED | OUTPATIENT
Start: 2022-07-23 | End: 2022-07-24 | Stop reason: HOSPADM

## 2022-07-23 RX ORDER — SODIUM CHLORIDE 0.9 % (FLUSH) 0.9 %
5-40 SYRINGE (ML) INJECTION EVERY 8 HOURS
Status: DISCONTINUED | OUTPATIENT
Start: 2022-07-23 | End: 2022-07-24 | Stop reason: HOSPADM

## 2022-07-23 RX ORDER — PROMETHAZINE HYDROCHLORIDE 25 MG/1
12.5 TABLET ORAL
Status: DISCONTINUED | OUTPATIENT
Start: 2022-07-23 | End: 2022-07-24 | Stop reason: HOSPADM

## 2022-07-23 RX ORDER — INSULIN LISPRO 100 [IU]/ML
INJECTION, SOLUTION INTRAVENOUS; SUBCUTANEOUS
Status: DISCONTINUED | OUTPATIENT
Start: 2022-07-23 | End: 2022-07-24 | Stop reason: HOSPADM

## 2022-07-23 RX ORDER — SENNOSIDES 8.6 MG/1
1 TABLET ORAL
Status: DISCONTINUED | OUTPATIENT
Start: 2022-07-23 | End: 2022-07-24 | Stop reason: HOSPADM

## 2022-07-23 RX ORDER — GABAPENTIN 300 MG/1
300 CAPSULE ORAL ONCE
Status: COMPLETED | OUTPATIENT
Start: 2022-07-23 | End: 2022-07-23

## 2022-07-23 RX ADMIN — SODIUM CHLORIDE, PRESERVATIVE FREE 10 ML: 5 INJECTION INTRAVENOUS at 11:35

## 2022-07-23 RX ADMIN — GABAPENTIN 600 MG: 300 CAPSULE ORAL at 14:36

## 2022-07-23 RX ADMIN — DULOXETINE HYDROCHLORIDE 30 MG: 30 CAPSULE, DELAYED RELEASE ORAL at 21:01

## 2022-07-23 RX ADMIN — HYDROMORPHONE HYDROCHLORIDE 2 MG: 2 TABLET ORAL at 02:00

## 2022-07-23 RX ADMIN — DULOXETINE HYDROCHLORIDE 60 MG: 30 CAPSULE, DELAYED RELEASE ORAL at 10:45

## 2022-07-23 RX ADMIN — Medication 10 UNITS: at 22:00

## 2022-07-23 RX ADMIN — Medication 10 UNITS: at 12:03

## 2022-07-23 RX ADMIN — FAMOTIDINE 20 MG: 20 TABLET, FILM COATED ORAL at 17:12

## 2022-07-23 RX ADMIN — GABAPENTIN 600 MG: 300 CAPSULE ORAL at 21:01

## 2022-07-23 RX ADMIN — LORAZEPAM 1 MG: 1 TABLET ORAL at 11:32

## 2022-07-23 RX ADMIN — GABAPENTIN 300 MG: 300 CAPSULE ORAL at 11:31

## 2022-07-23 RX ADMIN — SENNOSIDES 8.6 MG: 8.6 TABLET, FILM COATED ORAL at 22:42

## 2022-07-23 RX ADMIN — Medication 7 UNITS: at 17:06

## 2022-07-23 RX ADMIN — LORAZEPAM 1 MG: 1 TABLET ORAL at 02:00

## 2022-07-23 RX ADMIN — SODIUM CHLORIDE, PRESERVATIVE FREE 10 ML: 5 INJECTION INTRAVENOUS at 22:00

## 2022-07-23 RX ADMIN — INSULIN GLARGINE 20 UNITS: 100 INJECTION, SOLUTION SUBCUTANEOUS at 23:23

## 2022-07-23 RX ADMIN — ENOXAPARIN SODIUM 40 MG: 100 INJECTION SUBCUTANEOUS at 10:52

## 2022-07-23 RX ADMIN — HYDROMORPHONE HYDROCHLORIDE 2 MG: 2 TABLET ORAL at 14:36

## 2022-07-23 RX ADMIN — FAMOTIDINE 20 MG: 20 TABLET, FILM COATED ORAL at 10:47

## 2022-07-23 RX ADMIN — HYDROMORPHONE HYDROCHLORIDE 2 MG: 2 TABLET ORAL at 21:01

## 2022-07-23 RX ADMIN — METHYLPHENIDATE HYDROCHLORIDE 10 MG: 5 TABLET ORAL at 10:47

## 2022-07-23 RX ADMIN — SODIUM CHLORIDE, PRESERVATIVE FREE 10 ML: 5 INJECTION INTRAVENOUS at 16:21

## 2022-07-23 RX ADMIN — GABAPENTIN 300 MG: 300 CAPSULE ORAL at 02:00

## 2022-07-23 RX ADMIN — LORAZEPAM 2 MG: 1 TABLET ORAL at 21:01

## 2022-07-23 NOTE — ED NOTES
TRANSFER - OUT REPORT:    Verbal report given to [address] [facility] on [facility]  being transferred to [facility] for routine progression of care       Report consisted of patients Situation, Background, Assessment and   Recommendations(SBAR). Information from the following report(s) SBAR, ED Summary, [facility] and Recent Results was reviewed with the receiving nurse. Lines:   Venous Access Device port-a-cath 07/16/20 Upper chest (subclavicular area), left (Active)       Peripheral IV 07/22/22 Left Antecubital (Active)        Opportunity for questions and clarification was provided.       Patient transported with:   [employer]

## 2022-07-23 NOTE — PROGRESS NOTES
Problem: Pain  Goal: *Control of Pain  Outcome: Progressing Towards Goal  Goal: *PALLIATIVE CARE:  Alleviation of Pain  Outcome: Progressing Towards Goal     Problem: Falls - Risk of  Goal: *Absence of Falls  Description: Document Marquis Fall Risk and appropriate interventions in the flowsheet.   Outcome: Progressing Towards Goal  Note: Fall Risk Interventions:  Mobility Interventions: Patient to call before getting OOB, Utilize walker, cane, or other assistive device         Medication Interventions: Patient to call before getting OOB, Teach patient to arise slowly         History of Falls Interventions: Bed/chair exit alarm, Door open when patient unattended, Investigate reason for fall

## 2022-07-23 NOTE — H&P
Hospitalist Admission Note    NAME: Manjula Wyman   :  1955   MRN:  [id number]     Date/Time:  2022 1:53 PM    Patient PCP: Barry Torres MD  ______________________________________________________________________  Given the patient's current clinical presentation, I have a high level of concern for decompensation if discharged from the emergency department. Complex decision making was performed, which includes reviewing the patient's available past medical records, laboratory results, and x-ray films. My assessment of this patient's clinical condition and my plan of care is as follows. Assessment / Plan:    COVID-19 disease  Dyspnea with no acute hypoxic respiratory failure  - Presented with dyspnea  - No hypoxia reported  - Symptoms removed  - Was diagnosed yesterday with COVID-19  - Asymptomatic currently  - Does not meet criteria for Decadron given she is not hypoxic    Stage IV pancreatic adenocarcinoma  - Was diagnosed in May 2020  - On chemotherapy currently with last session on Tuesday  -Follow outpatient with oncology    Cancer related pain  - On duloxetine, continuing    Neutropenia  - Leukocytosis 2.9 thousand from 2000  - Probably secondary to viral infection    diabetes mellitus type 2  - On Toujeo 25 units  - Add correction scale of hypoglycemia protocol          DVT Prophylaxis: Lovenox  GI Prophylaxis: not indicated          Subjective:   CHIEF COMPLAINT: Shortness of breath    HISTORY OF PRESENT ILLNESS:       The patient 79years old woman with past medical history significant for stage IV pancreatic cancer on chemotherapy, cancer related pain, diabetes mellitus type 2 presented to the emergency department on  due to shortness of breath. Patient was diagnosed yesterday with COVID-19 pneumonia and she started having some episodes of shortness of breath for which she was told to come to the emergency department by her primary care physician.   She denies any fever, chills, abdominal pain, cough, nausea, vomiting, diarrhea or any other symptoms. The emergency department, patient was given racemic epinephrine. There was no hypoxia reported. Her x-ray with no acute abnormality reported. She was admitted for observation. I had long conversation with patient today and she preferred to be observed overnight today due to her previous history of pancreatic cancer that with her and immunocompromise status. We were asked to admit for work up and evaluation of the above problems. Past Medical History:   Diagnosis Date    Adenocarcinoma of pancreas ([facility] [address] 05/13/2020    Dr Sherine Reyna biopsy via EUS    Diabetes [facility])     GERD (gastroesophageal reflux disease)     Hernia of abdominal cavity     Subxyphoid hernia    Hypertension     Inflammatory polyarthropathy (HCC)     Joint pain     Joint swelling     Menopause     Ocular nevus of left eye     Pancreatitis     Tracheal stenosis         Past Surgical History:   Procedure Laterality Date    HX CARPAL TUNNEL RELEASE Bilateral     HX COLONOSCOPY      HX HYSTERECTOMY      HX KNEE ARTHROSCOPY Right     HX KNEE REPLACEMENT Right     partial    HX LAP CHOLECYSTECTOMY      HX TONSILLECTOMY      HX VASCULAR ACCESS         Social History     Tobacco Use    Smoking status: Never    Smokeless tobacco: Never   Substance Use Topics    Alcohol use: No     Alcohol/week: 0.0 standard drinks        Family History   Problem Relation Age of Onset    Heart Disease Mother     Heart Attack Mother     Cancer Father         lung    Diabetes Sister      No Known Allergies     Prior to Admission medications    Medication Sig Start Date End Date Taking? Authorizing Provider   senna-docusate (Senokot-S) 8.6-50 mg per tablet Take 1 Tablet by mouth nightly.    Yes Provider, Historical   LORazepam (ATIVAN) 1 mg tablet 1 pill in am and 2 pills at night  Indications: anxious, nausea and vomiting caused by cancer drugs, difficulty sleeping, prevent nausea and vomiting from cancer chemotherapy  Patient taking differently: 1 mg. 1 pill in am at 0900 and 2 pills at night at 2100  Indications: anxious, nausea and vomiting caused by cancer drugs, difficulty sleeping, prevent nausea and vomiting from cancer chemotherapy 7/19/22  Yes Modesto Forbes DO   methylphenidate HCl (RITALIN) 10 mg tablet Take 1 Tablet by mouth two (2) times a day. Max Daily Amount: 20 mg. Patient taking differently: Take 10 mg by mouth in the morning. At 0900 7/19/22  Yes Modesto Forbes DO   DULoxetine (CYMBALTA) 30 mg capsule Take 1 Capsule by mouth every evening. Patient taking differently: Take 30 mg by mouth every evening. At bedtime @ 2100 7/18/22  Yes Violeta Yang MD   DULoxetine (CYMBALTA) 60 mg capsule Take 1 Capsule by mouth daily. Indications: neuropathic pain  Patient taking differently: Take 60 mg by mouth in the morning. At 0900  Indications: neuropathic pain 7/18/22  Yes Nellie Contreras MD   HYDROmorphone (DILAUDID) 2 mg tablet Take 1 Tablet by mouth three (3) times daily for 3 days. Max Daily Amount: 6 mg. Patient taking differently: Take 2 mg by mouth three (3) times daily. Takes at 0900, 1500, 2100 7/18/22 7/23/22 Yes Violeta Yang MD   gabapentin (NEURONTIN) 300 mg capsule TAKE 2 CAPSULES BY MOUTH THREE TIMES DAILY  Patient taking differently: 600 mg three (3) times daily. Takes at 0900, 1500, 2100 6/10/22  Yes Violeta Yang MD Toujeo SoloStar U-300 Insulin 300 unit/mL (1.5 mL) inpn pen INJECT 55 UNITS ONCE DAILY AT BEDTIME - DOSE INCREASED  Patient taking differently: 25 Units by SubCUTAneous route nightly. Patient uses differently during around chemo time. 11/22/21  Yes Sue Baires, ELIZABETH   HumaLOG KwikPen Insulin 100 unit/mL kwikpen patient on sliding scale 6/16/21  Yes Violeta Yang MD       REVIEW OF SYSTEMS:     I am not able to complete the review of systems because:    The patient is intubated and sedated    The patient has altered mental status due to his acute medical problems    The patient has baseline aphasia from prior stroke(s)    The patient has baseline dementia and is not reliable historian    The patient is in acute medical distress and unable to provide information           Total of 12 systems reviewed as follows:       POSITIVE= underlined text  Negative = text not underlined  General:  fever, chills, sweats, generalized weakness, weight loss/gain,      loss of appetite   Eyes:    blurred vision, eye pain, loss of vision, double vision  ENT:    rhinorrhea, pharyngitis   Respiratory:   cough, sputum production, SOB, TINSLEY, wheezing, pleuritic pain   Cardiology:   chest pain, palpitations, orthopnea, PND, edema, syncope   Gastrointestinal:  abdominal pain , N/V, diarrhea, dysphagia, constipation, bleeding   Genitourinary:  frequency, urgency, dysuria, hematuria, incontinence   Muskuloskeletal :  arthralgia, myalgia, back pain  Hematology:  easy bruising, nose or gum bleeding, lymphadenopathy   Dermatological: rash, ulceration, pruritis, color change / jaundice  Endocrine:   hot flashes or polydipsia   Neurological:  headache, dizziness, confusion, focal weakness, paresthesia,     Speech difficulties, memory loss, gait difficulty  Psychological: Feelings of anxiety, depression, agitation    Objective:   VITALS:    Visit Vitals  /73 (BP 1 Location: Right upper arm, BP Patient Position: At rest)   Pulse 81   Temp 99 °F (37.2 °C)   Resp 18   Wt 81.3 kg (179 lb 3.7 oz)   SpO2 95%   Breastfeeding Unknown   BMI 30.77 kg/m²       PHYSICAL EXAM:    General:    Alert, cooperative, no distress, appears stated age. HEENT: Atraumatic, anicteric sclerae, pink conjunctivae     No oral ulcers, mucosa moist, throat clear, dentition fair  Neck:  Supple, symmetrical,  thyroid: non tender  Lungs:   Clear to auscultation bilaterally. No Wheezing or Rhonchi. No rales.   Chest wall:  No tenderness  No Accessory muscle use.  Heart:   Regular  rhythm,  No  murmur   No edema  Abdomen:   Soft, non-tender. Not distended. Bowel sounds normal  Extremities: No cyanosis. No clubbing,      Skin turgor normal, Capillary refill normal, Radial dial pulse 2+  Skin:     Not pale. Not Jaundiced  No rashes   Psych:  Good insight. Not depressed. Not anxious or agitated. Neurologic: EOMs intact. No facial asymmetry. No aphasia or slurred speech. Symmetrical strength, Sensation grossly intact. Alert and oriented X 4.     _______________________________________________________________________  Care Plan discussed with:    Comments   Patient x    Family      RN x    Care Manager                    Consultant:      _______________________________________________________________________  Expected  Disposition:   Home with Family x   HH/PT/OT/RN    SNF/LTC    ANNA    ________________________________________________________________________  TOTAL TIME:  79 Minutes    Critical Care Provided     Minutes non procedure based      Comments    x Reviewed previous records   >50% of visit spent in counseling and coordination of care x Discussion with patient and/or family and questions answered       ________________________________________________________________________  Signed: Amber Daniels MD    Procedures: see electronic medical records for all procedures/Xrays and details which were not copied into this note but were reviewed prior to creation of Plan.     LAB DATA REVIEWED:    Recent Results (from the past 24 hour(s))   CULTURE, BLOOD    Collection Time: 07/22/22  8:39 PM    Specimen: Blood   Result Value Ref Range    Special Requests: NO SPECIAL REQUESTS      Culture result: NO GROWTH AFTER 10 HOURS     LACTIC ACID    Collection Time: 07/22/22  8:42 PM   Result Value Ref Range    Lactic acid 1.2 0.4 - 2.0 MMOL/L   CULTURE, BLOOD    Collection Time: 07/22/22  8:42 PM    Specimen: Blood   Result Value Ref Range    Special Requests: NO SPECIAL REQUESTS      Culture result: NO GROWTH AFTER 10 HOURS     METABOLIC PANEL, COMPREHENSIVE    Collection Time: 07/22/22  8:42 PM   Result Value Ref Range    Sodium 140 136 - 145 mmol/L    Potassium 4.5 3.5 - 5.1 mmol/L    Chloride 102 97 - 108 mmol/L    CO2 28 21 - 32 mmol/L    Anion gap 10 5 - 15 mmol/L    Glucose 229 (H) 65 - 100 mg/dL    BUN 14 6 - 20 MG/DL    Creatinine 0.76 0.55 - 1.02 MG/DL    BUN/Creatinine ratio 18 12 - 20      GFR est AA >60 >60 ml/min/1.73m2    GFR est non-AA >60 >60 ml/min/1.73m2    Calcium 9.0 8.5 - 10.1 MG/DL    Bilirubin, total 0.4 0.2 - 1.0 MG/DL    ALT (SGPT) 32 12 - 78 U/L    AST (SGOT) 37 15 - 37 U/L    Alk. phosphatase 95 45 - 117 U/L    Protein, total 6.3 (L) 6.4 - 8.2 g/dL    Albumin 3.4 (L) 3.5 - 5.0 g/dL    Globulin 2.9 2.0 - 4.0 g/dL    A-G Ratio 1.2 1.1 - 2.2     CBC WITH AUTOMATED DIFF    Collection Time: 07/22/22  8:42 PM   Result Value Ref Range    WBC 2.9 (L) 3.6 - 11.0 K/uL    RBC 4.10 3.80 - 5.20 M/uL    HGB 11.8 11.5 - 16.0 g/dL    HCT 36.3 35.0 - 47.0 %    MCV 88.5 80.0 - 99.0 FL    MCH 28.8 26.0 - 34.0 PG    MCHC 32.5 30.0 - 36.5 g/dL    RDW 16.4 (H) 11.5 - 14.5 %    PLATELET 375 (H) 624 - 400 K/uL    MPV 11.1 8.9 - 12.9 FL    NRBC 0.0 0  WBC    ABSOLUTE NRBC 0.00 0.00 - 0.01 K/uL    NEUTROPHILS 58 32 - 75 %    LYMPHOCYTES 34 12 - 49 %    MONOCYTES 6 5 - 13 %    EOSINOPHILS 1 0 - 7 %    BASOPHILS 1 0 - 1 %    IMMATURE GRANULOCYTES 0 0.0 - 0.5 %    ABS. NEUTROPHILS 1.7 (L) 1.8 - 8.0 K/UL    ABS. LYMPHOCYTES 1.0 0.8 - 3.5 K/UL    ABS. MONOCYTES 0.2 0.0 - 1.0 K/UL    ABS. EOSINOPHILS 0.0 0.0 - 0.4 K/UL    ABS. BASOPHILS 0.0 0.0 - 0.1 K/UL    ABS. IMM.  GRANS. 0.0 0.00 - 0.04 K/UL    DF AUTOMATED     TROPONIN-HIGH SENSITIVITY    Collection Time: 07/22/22  8:42 PM   Result Value Ref Range    Troponin-High Sensitivity 12 0 - 51 ng/L   URINALYSIS W/ REFLEX CULTURE    Collection Time: 07/22/22  9:00 PM    Specimen: Urine   Result Value Ref Range    Color YELLOW/STRAW Appearance CLEAR CLEAR      Specific gravity 1.020 1.003 - 1.030      pH (UA) 5.5 5.0 - 8.0      Protein 100 (A) NEG mg/dL    Glucose Negative NEG mg/dL    Ketone Negative NEG mg/dL    Bilirubin Negative NEG      Blood MODERATE (A) NEG      Urobilinogen 0.2 0.2 - 1.0 EU/dL    Nitrites Positive (A) NEG      Leukocyte Esterase MODERATE (A) NEG      WBC >100 (H) 0 - 4 /hpf    RBC 0-5 0 - 5 /hpf    Epithelial cells FEW FEW /lpf    Bacteria 2+ (A) NEG /hpf    UA:UC IF INDICATED URINE CULTURE ORDERED (A) CNI     GLUCOSE, POC    Collection Time: 07/23/22  8:41 AM   Result Value Ref Range    Glucose (POC) 307 (H) 65 - 117 mg/dL    Performed by Lillie Sosa (VERITO)    GLUCOSE, POC    Collection Time: 07/23/22 11:41 AM   Result Value Ref Range    Glucose (POC) 368 (H) 65 - 117 mg/dL    Performed by Lillie Sosa (VERITO)

## 2022-07-23 NOTE — PROGRESS NOTES
Pharmacy Medication Reconciliation     The patient was interviewed regarding current PTA medication list, use and drug allergies. The patient was questioned regarding use of any:  inhalers, topical products, over the counter medications, herbal medications, vitamin products or ophthalmic/nasal/otic medication use. Allergies were verified. Allergy Update: none    Recommendations/Findings: The following amendments were made to the patient's active medication list on file at [facility]:   1) Additions: none    2) Deletions: emla, miralax, lidocaine, paxolid, creon, lomotil, tylenol, pepcid, MVI, promethazine, nystatin, zofran    3) Changes: ritalin, toujeo        Drug Interactions and duplicate therapies include: n/a    PTA medication list was corrected to the following:     Prior to Admission Medications   Prescriptions Last Dose Informant Patient Reported? Taking? DULoxetine (CYMBALTA) 30 mg capsule 2022 at 9pm  No Yes   Sig: Take 1 Capsule by mouth every evening. Patient taking differently: Take 30 mg by mouth every evening. At bedtime @ 2100   DULoxetine (CYMBALTA) 60 mg capsule 2022 at 9am  No Yes   Sig: Take 1 Capsule by mouth daily. Indications: neuropathic pain   Patient taking differently: Take 60 mg by mouth in the morning. At 0900  Indications: neuropathic pain   HYDROmorphone (DILAUDID) 2 mg tablet 2022 at 12pm  No Yes   Sig: Take 1 Tablet by mouth three (3) times daily for 3 days. Max Daily Amount: 6 mg. Patient taking differently: Take 2 mg by mouth three (3) times daily.  Takes at 0900, 1500, 2100   HumaLOG KwikPen Insulin 100 unit/mL kwikpen 2022  No Yes   Sig: patient on sliding scale   LORazepam (ATIVAN) 1 mg tablet 2022 at 9:00am  No Yes   Si pill in am and 2 pills at night  Indications: anxious, nausea and vomiting caused by cancer drugs, difficulty sleeping, prevent nausea and vomiting from cancer chemotherapy   Patient taking differently: 1 mg. 1 pill in am at 0900 and 2 pills at night at 2100  Indications: anxious, nausea and vomiting caused by cancer drugs, difficulty sleeping, prevent nausea and vomiting from cancer chemotherapy   Toujeo SoloStar U-300 Insulin 300 unit/mL (1.5 mL) inpn pen 7/22/2022  No Yes   Sig: INJECT 55 UNITS ONCE DAILY AT BEDTIME - DOSE INCREASED   Patient taking differently: 25 Units by SubCUTAneous route nightly. Patient uses differently during around chemo time. gabapentin (NEURONTIN) 300 mg capsule 7/22/2022 at 3:00pm  No Yes   Sig: TAKE 2 CAPSULES BY MOUTH THREE TIMES DAILY   Patient taking differently: 600 mg three (3) times daily. Takes at 0900, 1500, 2100   methylphenidate HCl (RITALIN) 10 mg tablet 7/22/2022 at 9am  No Yes   Sig: Take 1 Tablet by mouth two (2) times a day. Max Daily Amount: 20 mg. Patient taking differently: Take 10 mg by mouth in the morning. At 0900   senna-docusate (Senokot-S) 8.6-50 mg per tablet 7/22/2022  Yes Yes   Sig: Take 1 Tablet by mouth nightly.       Facility-Administered Medications: None        Thank you,  Jo-Ann Fong, PHARMD

## 2022-07-24 VITALS
OXYGEN SATURATION: 96 % | HEART RATE: 76 BPM | SYSTOLIC BLOOD PRESSURE: 145 MMHG | TEMPERATURE: 98.3 F | WEIGHT: 179.9 LBS | BODY MASS INDEX: 30.88 KG/M2 | RESPIRATION RATE: 18 BRPM | DIASTOLIC BLOOD PRESSURE: 73 MMHG

## 2022-07-24 LAB
ALBUMIN SERPL-MCNC: 2.8 G/DL (ref 3.5–5)
ALBUMIN/GLOB SERPL: 0.8 {RATIO} (ref 1.1–2.2)
ALP SERPL-CCNC: 91 U/L (ref 45–117)
ALT SERPL-CCNC: 27 U/L (ref 12–78)
ANION GAP SERPL CALC-SCNC: 8 MMOL/L (ref 5–15)
AST SERPL-CCNC: 24 U/L (ref 15–37)
BASOPHILS # BLD: 0 K/UL (ref 0–0.1)
BASOPHILS NFR BLD: 1 % (ref 0–1)
BILIRUB SERPL-MCNC: 0.3 MG/DL (ref 0.2–1)
BUN SERPL-MCNC: 14 MG/DL (ref 6–20)
BUN/CREAT SERPL: 18 (ref 12–20)
CALCIUM SERPL-MCNC: 8.8 MG/DL (ref 8.5–10.1)
CHLORIDE SERPL-SCNC: 104 MMOL/L (ref 97–108)
CO2 SERPL-SCNC: 27 MMOL/L (ref 21–32)
CREAT SERPL-MCNC: 0.77 MG/DL (ref 0.55–1.02)
DIFFERENTIAL METHOD BLD: ABNORMAL
EOSINOPHIL # BLD: 0 K/UL (ref 0–0.4)
EOSINOPHIL NFR BLD: 1 % (ref 0–7)
ERYTHROCYTE [DISTWIDTH] IN BLOOD BY AUTOMATED COUNT: 16.5 % (ref 11.5–14.5)
GLOBULIN SER CALC-MCNC: 3.3 G/DL (ref 2–4)
GLUCOSE BLD STRIP.AUTO-MCNC: 282 MG/DL (ref 65–117)
GLUCOSE SERPL-MCNC: 306 MG/DL (ref 65–100)
HCT VFR BLD AUTO: 33.5 % (ref 35–47)
HGB BLD-MCNC: 10.8 G/DL (ref 11.5–16)
IMM GRANULOCYTES # BLD AUTO: 0 K/UL (ref 0–0.04)
IMM GRANULOCYTES NFR BLD AUTO: 1 % (ref 0–0.5)
LYMPHOCYTES # BLD: 1.6 K/UL (ref 0.8–3.5)
LYMPHOCYTES NFR BLD: 42 % (ref 12–49)
MCH RBC QN AUTO: 28.4 PG (ref 26–34)
MCHC RBC AUTO-ENTMCNC: 32.2 G/DL (ref 30–36.5)
MCV RBC AUTO: 88.2 FL (ref 80–99)
MONOCYTES # BLD: 0.2 K/UL (ref 0–1)
MONOCYTES NFR BLD: 4 % (ref 5–13)
NEUTS SEG # BLD: 1.9 K/UL (ref 1.8–8)
NEUTS SEG NFR BLD: 51 % (ref 32–75)
NRBC # BLD: 0 K/UL (ref 0–0.01)
NRBC BLD-RTO: 0 PER 100 WBC
PLATELET # BLD AUTO: 407 K/UL (ref 150–400)
PMV BLD AUTO: 10.8 FL (ref 8.9–12.9)
POTASSIUM SERPL-SCNC: 4.2 MMOL/L (ref 3.5–5.1)
PROT SERPL-MCNC: 6.1 G/DL (ref 6.4–8.2)
RBC # BLD AUTO: 3.8 M/UL (ref 3.8–5.2)
SERVICE CMNT-IMP: ABNORMAL
SODIUM SERPL-SCNC: 139 MMOL/L (ref 136–145)
WBC # BLD AUTO: 3.7 K/UL (ref 3.6–11)

## 2022-07-24 PROCEDURE — 85025 COMPLETE CBC W/AUTO DIFF WBC: CPT

## 2022-07-24 PROCEDURE — 74011250636 HC RX REV CODE- 250/636: Performed by: INTERNAL MEDICINE

## 2022-07-24 PROCEDURE — 74011636637 HC RX REV CODE- 636/637: Performed by: INTERNAL MEDICINE

## 2022-07-24 PROCEDURE — 36415 COLL VENOUS BLD VENIPUNCTURE: CPT

## 2022-07-24 PROCEDURE — G0378 HOSPITAL OBSERVATION PER HR: HCPCS

## 2022-07-24 PROCEDURE — 74011250637 HC RX REV CODE- 250/637: Performed by: INTERNAL MEDICINE

## 2022-07-24 PROCEDURE — 80053 COMPREHEN METABOLIC PANEL: CPT

## 2022-07-24 PROCEDURE — 82962 GLUCOSE BLOOD TEST: CPT

## 2022-07-24 PROCEDURE — 96372 THER/PROPH/DIAG INJ SC/IM: CPT

## 2022-07-24 RX ORDER — ACETAMINOPHEN 500 MG
TABLET ORAL
Qty: 100 TABLET | Refills: 0 | Status: SHIPPED | OUTPATIENT
Start: 2022-07-24 | End: 2022-08-12

## 2022-07-24 RX ORDER — NIRMATRELVIR AND RITONAVIR 300-100 MG
3 KIT ORAL EVERY 12 HOURS
Qty: 1 BOX | Refills: 0 | Status: SHIPPED | OUTPATIENT
Start: 2022-07-24 | End: 2022-07-29

## 2022-07-24 RX ORDER — INSULIN GLARGINE 100 [IU]/ML
30 INJECTION, SOLUTION SUBCUTANEOUS
Status: DISCONTINUED | OUTPATIENT
Start: 2022-07-24 | End: 2022-07-24 | Stop reason: HOSPADM

## 2022-07-24 RX ADMIN — FAMOTIDINE 20 MG: 20 TABLET, FILM COATED ORAL at 09:25

## 2022-07-24 RX ADMIN — Medication 5 UNITS: at 09:24

## 2022-07-24 RX ADMIN — METHYLPHENIDATE HYDROCHLORIDE 10 MG: 5 TABLET ORAL at 09:27

## 2022-07-24 RX ADMIN — DULOXETINE HYDROCHLORIDE 60 MG: 30 CAPSULE, DELAYED RELEASE ORAL at 09:26

## 2022-07-24 RX ADMIN — LORAZEPAM 1 MG: 1 TABLET ORAL at 09:27

## 2022-07-24 RX ADMIN — GABAPENTIN 600 MG: 300 CAPSULE ORAL at 09:27

## 2022-07-24 RX ADMIN — ENOXAPARIN SODIUM 40 MG: 100 INJECTION SUBCUTANEOUS at 09:28

## 2022-07-24 RX ADMIN — HYDROMORPHONE HYDROCHLORIDE 2 MG: 2 TABLET ORAL at 09:28

## 2022-07-24 NOTE — PROGRESS NOTES
Bedside shift change report given to KARRI Barrera RN (oncoming nurse) by [employer]. SHIRLEY Parham (offgoing nurse). Report included the following information SBAR, Kardex, Intake/Output, and MAR.

## 2022-07-24 NOTE — PROGRESS NOTES
Problem: Falls - Risk of  Goal: *Absence of Falls  Description: Document Isidro Sol Fall Risk and appropriate interventions in the flowsheet.   Outcome: Progressing Towards Goal  Note: Fall Risk Interventions:  Mobility Interventions: Bed/chair exit alarm, Patient to call before getting OOB, Utilize walker, cane, or other assistive device         Medication Interventions: Bed/chair exit alarm, Patient to call before getting OOB, Teach patient to arise slowly         History of Falls Interventions: Bed/chair exit alarm, Door open when patient unattended, Room close to nurse's station

## 2022-07-24 NOTE — PROGRESS NOTES
Discharge instructions reviewed with patient  Personal belongings returned: n/a  Home meds returned: n/a  To front entrance via w/c  Discharged home with  daughter @ 200    Patient teaches back that she will call Dr. Nathan Cotto (PCP) tomorrow and make a follow-up appointment. She also verbalizes understanding to discuss her chronic meds-and -discharge instructions by new hospitalist today.

## 2022-07-24 NOTE — ED PROVIDER NOTES
EMERGENCY DEPARTMENT HISTORY AND PHYSICAL EXAM          Date: 7/22/2022  Patient Name: Belle Castillo    History of Presenting Illness     Chief Complaint   Patient presents with    Positive For Covid-19    Shortness of Breath       History Provided By: Patient    HPI: Belle Castillo is a 79 y.o. female, pmhx pancreatic CA on chemo, tracheal stenosis, and DMII, who presents to the ED c/o stridor that developed this afternoon. This morning she began to notice a cough, fever, and fatigue. She tested positive for COVID with a home test. When the cough and stridor worsened she called her pcp, who advised that she go to the ED for eval and treatment. She had chemo 3 days ago and had decadron then. She has not had any more steroids, and today she has used her inhalers minimally, because they did not really seem to be helping. It seemed like the problem was more trachea and bronchi. PCP: Patricia Mars MD    Allergies: see list  Social Hx: -tobacco, -vaping, -EtOH, -Illicit Drugs; Lives in [facility]. There are no other complaints, changes, or physical findings at this time.      Current Facility-Administered Medications   Medication Dose Route Frequency Provider Last Rate Last Admin    racEPINEPHrine (VAPONEFRIN) 2.25% nebulizer solution  0.5 mL Nebulization Q3H PRN Alia Corbin MD        diphenoxylate-atropine (LOMOTIL) tablet 2 Tablet  2 Tablet Oral QID PRN Alia Corbin MD        DULoxetine (CYMBALTA) capsule 30 mg  30 mg Oral QPM Alia Corbin MD   30 mg at 07/23/22 2101    famotidine (PEPCID) tablet 20 mg  20 mg Oral BID Alia Corbin MD   20 mg at 07/23/22 1712    sodium chloride (NS) flush 5-40 mL  5-40 mL IntraVENous Q8H Alia Corbin MD   10 mL at 07/23/22 2200    sodium chloride (NS) flush 5-40 mL  5-40 mL IntraVENous PRN Alia Corbin MD        acetaminophen (TYLENOL) tablet 650 mg  650 mg Oral Q6H PRN Alia Corbin MD        Or    acetaminophen (TYLENOL) suppository 650 mg 650 mg Rectal Q6H PRN Eugenie Cough, MD        polyethylene glycol (MIRALAX) packet 17 g  17 g Oral DAILY PRN Eugenie Brown, MD        promethazine (PHENERGAN) tablet 12.5 mg  12.5 mg Oral Q6H PRN Eugenie Brown, MD        Or    ondansetron (ZOFRAN) injection 4 mg  4 mg IntraVENous Q6H PRN Eugenie Cough, MD        enoxaparin (LOVENOX) injection 40 mg  40 mg SubCUTAneous DAILY Eugenie Brown, MD   40 mg at 07/23/22 1052    HYDROmorphone (DILAUDID) tablet 2 mg  2 mg Oral TID Eugenie Brown, MD   2 mg at 07/23/22 2101    gabapentin (NEURONTIN) capsule 600 mg  600 mg Oral TID Eugenie Brown, MD   600 mg at 07/23/22 2101    DULoxetine (CYMBALTA) capsule 60 mg  60 mg Oral DAILY Eugenie Cough, MD   60 mg at 07/23/22 1045    senna (SENOKOT) tablet 8.6 mg  1 Tablet Oral QHS Eugenie Brown, MD   8.6 mg at 07/23/22 2242    methylphenidate HCl (RITALIN) tablet 10 mg  10 mg Oral DAILY Eugenie Brown, MD   10 mg at 07/23/22 1047    LORazepam (ATIVAN) tablet 1 mg  1 mg Oral DAILY Eugenie Brown, MD        LORazepam (ATIVAN) tablet 2 mg  2 mg Oral QHS Eugenie Brown, MD   2 mg at 07/23/22 2101    insulin lispro (HUMALOG) injection   SubCUTAneous AC&HS Eugenie Brown, MD   10 Units at 07/23/22 2200    glucose chewable tablet 16 g  4 Tablet Oral PRN Eugenie Cough, MD        glucagon (GLUCAGEN) injection 1 mg  1 mg IntraMUSCular PRN Eugenie Brown, MD        dextrose 10% infusion 0-250 mL  0-250 mL IntraVENous PRN Eugenie Brown, MD        insulin glargine (LANTUS) injection 20 Units  20 Units SubCUTAneous QHS Eugenie Brown MD   20 Units at 07/23/22 2323    insulin glargine (LANTUS) injection 20 Units  20 Units SubCUTAneous ONCE Eugenie Brown, MD        sodium chloride (NS) flush 5-10 mL  5-10 mL IntraVENous PRN Eugenie Brown MD           Past History     Past Medical History:  Past Medical History:   Diagnosis Date    Adenocarcinoma of pancreas ([facility] [address] 05/13/2020    Dr Mega Glaser biopsy via EUS    Diabetes [facility])     GERD (gastroesophageal reflux disease)     Hernia of abdominal cavity     Subxyphoid hernia    Hypertension     Inflammatory polyarthropathy (HCC)     Joint pain     Joint swelling     Menopause     Ocular nevus of left eye     Pancreatitis     Tracheal stenosis        Past Surgical History:  Past Surgical History:   Procedure Laterality Date    HX CARPAL TUNNEL RELEASE Bilateral     HX COLONOSCOPY      HX HYSTERECTOMY      HX KNEE ARTHROSCOPY Right     HX KNEE REPLACEMENT Right     partial    HX LAP CHOLECYSTECTOMY      HX TONSILLECTOMY      HX VASCULAR ACCESS         Family History:  Family History   Problem Relation Age of Onset    Heart Disease Mother     Heart Attack Mother     Cancer Father         lung    Diabetes Sister        Social History:  Social History     Tobacco Use    Smoking status: Never    Smokeless tobacco: Never   Substance Use Topics    Alcohol use: No     Alcohol/week: 0.0 standard drinks    Drug use: No       Allergies:  No Known Allergies      Review of Systems   Review of Systems   Constitutional:  Positive for appetite change, fatigue and fever. HENT:  Positive for congestion. Negative for rhinorrhea and sore throat. Respiratory:  Positive for cough, shortness of breath and stridor. Gastrointestinal:  Negative for abdominal pain, diarrhea and vomiting. Genitourinary:  Negative for dysuria and hematuria. Musculoskeletal:  Positive for myalgias. Negative for back pain and neck pain. Skin:  Negative for rash. Neurological:  Positive for weakness. Negative for dizziness and headaches. Hematological:  Does not bruise/bleed easily. Psychiatric/Behavioral:  The patient is not nervous/anxious. Physical Exam   Physical Exam  Vitals reviewed. Constitutional:       General: She is not in acute distress. Appearance: She is well-developed. She is not diaphoretic. HENT:      Head: Normocephalic and atraumatic.       Right Ear: External ear normal.      Left Ear: External ear normal.      Nose: Nose normal.      Mouth/Throat:      Mouth: Mucous membranes are moist.   Eyes:      General: No scleral icterus. Right eye: No discharge. Left eye: No discharge. Conjunctiva/sclera: Conjunctivae normal.      Pupils: Pupils are equal, round, and reactive to light. Neck:      Thyroid: No thyromegaly. Trachea: No tracheal deviation. Cardiovascular:      Rate and Rhythm: Normal rate and regular rhythm. Heart sounds: Normal heart sounds. No murmur heard. No friction rub. No gallop. Pulmonary:      Effort: Pulmonary effort is normal. No respiratory distress. Breath sounds: Stridor present. Examination of the right-upper field reveals wheezing. Examination of the left-upper field reveals wheezing. Examination of the right-middle field reveals wheezing. Examination of the left-middle field reveals wheezing. Wheezing present. No rales. Chest:      Chest wall: No tenderness. Abdominal:      General: Bowel sounds are normal. There is no distension. Palpations: Abdomen is soft. Tenderness: There is no abdominal tenderness. There is no guarding or rebound. Musculoskeletal:         General: No tenderness or deformity. Normal range of motion. Cervical back: Normal range of motion and neck supple. Skin:     General: Skin is warm and dry. Neurological:      Mental Status: She is alert and oriented to person, place, and time. Cranial Nerves: No cranial nerve deficit. Coordination: Coordination normal.      Deep Tendon Reflexes: Reflexes are normal and symmetric.  Reflexes normal.       Diagnostic Study Results     Labs -     Recent Results (from the past 12 hour(s))   GLUCOSE, POC    Collection Time: 07/23/22  9:00 PM   Result Value Ref Range    Glucose (POC) 290 (H) 65 - 117 mg/dL    Performed by Prasad Donnelly Reason    COVID-19 WITH INFLUENZA A/B    Collection Time: 07/23/22  9:09 PM   Result Value Ref Range    SARS-CoV-2 by PCR Detected (A) NOTD      Influenza A by PCR Not detected NOTD      Influenza B by PCR Not detected NOTD           Radiologic Studies -   XR CHEST PORT   Final Result   No acute cardiopulmonary findings. CT Results  (Last 48 hours)      None          CXR Results  (Last 48 hours)                 07/22/22 2018  XR CHEST PORT Final result    Impression:  No acute cardiopulmonary findings. Narrative:  EXAM: XR CHEST PORT       INDICATION: Eval for Infiltrate       COMPARISON: Chest April 12, 2022       FINDINGS: A portable AP radiograph of the chest was obtained at 2012 hours. There is a left-sided Port-A-Cath in place with the catheter tip in stable   position near the brachiocephalic confluence. The heart size is normal. Lungs   are adequately expanded. There is no focal lung infiltrate. No definite effusion   or pneumothorax is seen. Medical Decision Making   I am the first provider for this patient. I reviewed the vital signs, available nursing notes, past medical history, past surgical history, family history and social history. Vital Signs-Reviewed the patient's vital signs. Patient Vitals for the past 12 hrs:   Temp Pulse Resp BP SpO2   07/24/22 0025 99.1 °F (37.3 °C) 87 18 105/66 93 %       Pulse Oximetry Analysis - 93% on RA    Cardiac Monitor:   Rate:  85 bpm  Rhythm: Normal Sinus Rhythm      Records Reviewed: Nursing Notes, Old Medical Records, Previous Radiology Studies, and Previous Laboratory Studies    Provider Notes (Medical Decision Making):   MDM: Immunosuppressed woman with COVID, now a deep cough/tracheal inflammation. Given a racemic epi neb with good relief. Will admit with hospitalist's permission. ED Course:   Initial assessment performed. The patients presenting problems have been discussed, and they are in agreement with the care plan formulated and outlined with them.   I have encouraged them to ask questions as they arise throughout their visit.      PROGRESS NOTE:  Pt given a racemic epi neb and 10 mg dexamethasone IV. Following these interventions she was much more comfortable. She was admitted to the floore because of the combination of medical problems        Critical Care Time:   0      Diagnosis     Clinical Impression: COVID-19                                      Tracheal stenosis                                       Pancreatitic CA      PLAN:    Admit med/surg floor.     Epi nebs q 6 prn

## 2022-07-24 NOTE — PROGRESS NOTES
Pt admitted with symptoms from [facility]. She tested positive on a home test. Her entire household is positive. Order placed per protocol for a covid test because we can not accept home test results to determine how long she should be on isolation. Nursing supervisor notified.

## 2022-07-24 NOTE — DISCHARGE SUMMARY
Hospitalist Discharge Summary     Patient ID:  Aman Alcazar  [id number]  82 y.o.  1955 7/22/2022    PCP on record: Aimee Regalado MD    Admit date: 7/22/2022  Discharge date and time: 7/24/2022    DISCHARGE DIAGNOSIS:    COVID  Stage 4 Pancreatic Ca. Chronic pain. IDDM      CONSULTATIONS:  None    Excerpted HPI from H&P of Caroline Robins MD:  The patient 79years old woman with past medical history significant for stage IV pancreatic cancer on chemotherapy, cancer related pain, diabetes mellitus type 2 presented to the emergency department on July 22 due to shortness of breath. Patient was diagnosed yesterday with COVID-19 pneumonia and she started having some episodes of shortness of breath for which she was told to come to the emergency department by her primary care physician. She denies any fever, chills, abdominal pain, cough, nausea, vomiting, diarrhea or any other symptoms. The emergency department, patient was given racemic epinephrine. There was no hypoxia reported. Her x-ray with no acute abnormality reported. She was admitted for observation. I had long conversation with patient today and she preferred to be observed overnight today due to her previous history of pancreatic cancer that with her and immunocompromise status. ______________________________________________________________________  DISCHARGE SUMMARY/HOSPITAL COURSE:  for full details see H&P, daily progress notes, labs, consult notes. COVID-19 disease  Dyspnea with no acute hypoxic respiratory failure  - Presented with dyspnea  - No hypoxia reported  - Symptoms removed  - Was diagnosed yesterday with COVID-19  - Asymptomatic currently  - Does not meet criteria for Decadron given she is not hypoxic. -- Dischage on Paxlovid for 5 days.       Stage IV pancreatic adenocarcinoma  - Was diagnosed in May 2020  - On chemotherapy currently with last session on Tuesday  -Follow outpatient with oncology     Cancer related pain  - On duloxetine, continuing     Neutropenia  - Leukocytosis 2.9 thousand from 6/20/2000  - Probably secondary to viral infection vs chemo therapy. diabetes mellitus type 2  - resume home dose medication. _______________________________________________________________________  Patient seen and examined by me on discharge day. Pertinent Findings:  Gen:    Not in distress  Chest: Clear lungs  CVS:   Regular rhythm. No edema  Abd:  Soft, not distended, not tender  Neuro:  Alert,   _______________________________________________________________________  DISCHARGE MEDICATIONS:   Current Discharge Medication List        START taking these medications    Details   !! acetaminophen (TYLENOL) 500 mg tablet Take 2 Tablets by mouth every eight (8) hours for 7 days, THEN 2 Tablets every twelve (12) hours for 7 days, THEN 2 Tablets every eight (8) hours for 5 days. Qty: 100 Tablet, Refills: 0  Start date: 7/24/2022, End date: 8/12/2022    Comments: Pharmacist, Please add the following text to the patient instructions, \"Take as needed for pain in addition to any other medications ordered for pain relief. \"      !! acetaminophen (TYLENOL) 500 mg tablet Take 2 Tablets by mouth every eight (8) hours for 7 days, THEN 2 Tablets every twelve (12) hours for 7 days, THEN 2 Tablets every eight (8) hours for 5 days. Qty: 100 Tablet, Refills: 0  Start date: 7/24/2022, End date: 8/12/2022    Comments: Pharmacist, Please add the following text to the patient instructions, \"Take as needed for pain in addition to any other medications ordered for pain relief. \"      nirmatrelvir-ritonavir (Paxlovid, EUA,) 150 mg x 2- 100 mg tablet Take 3 Tablets by mouth every twelve (12) hours for 5 days. Qty: 1 Box, Refills: 0  Start date: 7/24/2022, End date: 7/29/2022       !! - Potential duplicate medications found. Please discuss with provider.         CONTINUE these medications which have NOT CHANGED Details   senna-docusate (Senokot-S) 8.6-50 mg per tablet Take 1 Tablet by mouth nightly. LORazepam (ATIVAN) 1 mg tablet 1 pill in am and 2 pills at night  Indications: anxious, nausea and vomiting caused by cancer drugs, difficulty sleeping, prevent nausea and vomiting from cancer chemotherapy  Qty: 90 Tablet, Refills: 0    Associated Diagnoses: Malignant neoplasm of pancreas, unspecified location of malignancy (HCC)      methylphenidate HCl (RITALIN) 10 mg tablet Take 1 Tablet by mouth two (2) times a day. Max Daily Amount: 20 mg.  Qty: 60 Tablet, Refills: 0    Associated Diagnoses: Depression, unspecified depression type      !! DULoxetine (CYMBALTA) 30 mg capsule Take 1 Capsule by mouth every evening. Qty: 90 Capsule, Refills: 3      !! DULoxetine (CYMBALTA) 60 mg capsule Take 1 Capsule by mouth daily. Indications: neuropathic pain  Qty: 90 Capsule, Refills: 2    Associated Diagnoses: Neuropathic pain      gabapentin (NEURONTIN) 300 mg capsule TAKE 2 CAPSULES BY MOUTH THREE TIMES DAILY  Qty: 180 Capsule, Refills: 3    Associated Diagnoses: Malignant neoplasm of pancreas, unspecified location of malignancy (HCC)      Touwalt Taar U-300 Insulin 300 unit/mL (1.5 mL) inpn pen INJECT 55 UNITS ONCE DAILY AT BEDTIME - DOSE INCREASED  Qty: 4.5 mL, Refills: 10    Associated Diagnoses: Type 2 diabetes mellitus without complication, without long-term current use of insulin (HCC)      HumaLOG KwikPen Insulin 100 unit/mL kwikpen patient on sliding scale  Qty: 15 mL, Refills: 6       !! - Potential duplicate medications found. Please discuss with provider. STOP taking these medications       HYDROmorphone (DILAUDID) 2 mg tablet Comments:   Reason for Stopping:         famotidine (PEPCID) 20 mg tablet Comments:   Reason for Stopping:         diphenoxylate-atropine (LomotiL) 2.5-0.025 mg per tablet Comments:   Reason for Stopping:                 Patient Follow Up Instructions:    Activity: Activity as tolerated  Diet: Diabetic Diet  Wound Care: None needed    Follow-up with PCP in 1 week.   Follow-up tests/labs None    Follow-up Information       Follow up With Specialties Details Why Contact Lorrie Charles MD Internal Medicine Physician   [address]  [phone]            ________________________________________________________________    Risk of deterioration: Moderate    Condition at Discharge:  Stable  __________________________________________________________________    Disposition  Home with family, no needs    ____________________________________________________________________    Code Status: Full Code  ___________________________________________________________________      Total time in minutes spent coordinating this discharge (includes going over instructions, follow-up, prescriptions, and preparing report for sign off to her PCP) :  >30 minutes    Signed:  Lani Bass MD

## 2022-07-25 ENCOUNTER — VIRTUAL VISIT (OUTPATIENT)
Dept: FAMILY MEDICINE CLINIC | Age: 67
End: 2022-07-25

## 2022-07-25 DIAGNOSIS — U07.1 COVID-19: Primary | ICD-10-CM

## 2022-07-25 NOTE — PROGRESS NOTES
FOLLOW UP PHONE CALL: 7/5/22 11AM    Patient was admitted and discharged over the weekend. Contacted the patient by phone. She states that she was doing ok. Declined the need for home health. Got a virtual follow up appointment with her PCP, Dr. Ab Carroll for today after lunch.

## 2022-07-25 NOTE — PROGRESS NOTES
Gertrudis Arjunjama was scheduled for a virtual visit today but she was tired and sent me a text telling me that she was feeling better that she was going to take a nap.   She has not answered her phone at this point and we will simply reschedule her visit for another day

## 2022-07-28 RX ORDER — ONDANSETRON 2 MG/ML
8 INJECTION INTRAMUSCULAR; INTRAVENOUS AS NEEDED
Status: CANCELLED | OUTPATIENT
Start: 2022-08-02

## 2022-07-28 RX ORDER — DIPHENHYDRAMINE HYDROCHLORIDE 50 MG/ML
50 INJECTION, SOLUTION INTRAMUSCULAR; INTRAVENOUS AS NEEDED
Status: CANCELLED
Start: 2022-08-23

## 2022-07-28 RX ORDER — SODIUM CHLORIDE 9 MG/ML
10 INJECTION INTRAMUSCULAR; INTRAVENOUS; SUBCUTANEOUS AS NEEDED
Status: CANCELLED | OUTPATIENT
Start: 2022-08-02

## 2022-07-28 RX ORDER — ONDANSETRON 2 MG/ML
8 INJECTION INTRAMUSCULAR; INTRAVENOUS ONCE
Status: CANCELLED | OUTPATIENT
Start: 2022-08-02 | End: 2022-08-02

## 2022-07-28 RX ORDER — ONDANSETRON 2 MG/ML
8 INJECTION INTRAMUSCULAR; INTRAVENOUS ONCE
Status: CANCELLED | OUTPATIENT
Start: 2022-08-23 | End: 2022-08-16

## 2022-07-28 RX ORDER — DIPHENHYDRAMINE HYDROCHLORIDE 50 MG/ML
25 INJECTION, SOLUTION INTRAMUSCULAR; INTRAVENOUS
Status: CANCELLED
Start: 2022-08-02

## 2022-07-28 RX ORDER — ALBUTEROL SULFATE 0.83 MG/ML
2.5 SOLUTION RESPIRATORY (INHALATION) AS NEEDED
Status: CANCELLED
Start: 2022-08-23

## 2022-07-28 RX ORDER — DIPHENHYDRAMINE HYDROCHLORIDE 50 MG/ML
25 INJECTION, SOLUTION INTRAMUSCULAR; INTRAVENOUS
Status: CANCELLED
Start: 2022-08-23

## 2022-07-28 RX ORDER — ONDANSETRON 2 MG/ML
8 INJECTION INTRAMUSCULAR; INTRAVENOUS AS NEEDED
Status: CANCELLED | OUTPATIENT
Start: 2022-08-23

## 2022-07-28 RX ORDER — ACETAMINOPHEN 325 MG/1
650 TABLET ORAL AS NEEDED
Status: CANCELLED | OUTPATIENT
Start: 2022-08-23

## 2022-07-28 RX ORDER — HEPARIN 100 UNIT/ML
300-500 SYRINGE INTRAVENOUS AS NEEDED
Status: CANCELLED | OUTPATIENT
Start: 2022-08-02

## 2022-07-28 RX ORDER — ALBUTEROL SULFATE 0.83 MG/ML
2.5 SOLUTION RESPIRATORY (INHALATION) AS NEEDED
Status: CANCELLED
Start: 2022-08-02

## 2022-07-28 RX ORDER — DEXAMETHASONE SODIUM PHOSPHATE 4 MG/ML
10 INJECTION, SOLUTION INTRA-ARTICULAR; INTRALESIONAL; INTRAMUSCULAR; INTRAVENOUS; SOFT TISSUE ONCE
Status: CANCELLED | OUTPATIENT
Start: 2022-08-02 | End: 2022-08-02

## 2022-07-28 RX ORDER — SODIUM CHLORIDE 0.9 % (FLUSH) 0.9 %
10 SYRINGE (ML) INJECTION AS NEEDED
Status: CANCELLED | OUTPATIENT
Start: 2022-08-02

## 2022-07-28 RX ORDER — HEPARIN 100 UNIT/ML
300-500 SYRINGE INTRAVENOUS AS NEEDED
Status: CANCELLED | OUTPATIENT
Start: 2022-08-23

## 2022-07-28 RX ORDER — SODIUM CHLORIDE 9 MG/ML
25 INJECTION, SOLUTION INTRAVENOUS CONTINUOUS
Status: CANCELLED | OUTPATIENT
Start: 2022-08-02

## 2022-07-28 RX ORDER — LORAZEPAM 2 MG/ML
0.5 INJECTION INTRAMUSCULAR
Status: CANCELLED
Start: 2022-08-23

## 2022-07-28 RX ORDER — SODIUM CHLORIDE 0.9 % (FLUSH) 0.9 %
10 SYRINGE (ML) INJECTION AS NEEDED
Status: CANCELLED
Start: 2022-08-23

## 2022-07-28 RX ORDER — EPINEPHRINE 1 MG/ML
0.3 INJECTION, SOLUTION, CONCENTRATE INTRAVENOUS AS NEEDED
Status: CANCELLED | OUTPATIENT
Start: 2022-08-02

## 2022-07-28 RX ORDER — DIPHENHYDRAMINE HYDROCHLORIDE 50 MG/ML
25 INJECTION, SOLUTION INTRAMUSCULAR; INTRAVENOUS AS NEEDED
Status: CANCELLED | OUTPATIENT
Start: 2022-08-02

## 2022-07-28 RX ORDER — EPINEPHRINE 1 MG/ML
0.3 INJECTION, SOLUTION, CONCENTRATE INTRAVENOUS AS NEEDED
Status: CANCELLED | OUTPATIENT
Start: 2022-08-23

## 2022-07-28 RX ORDER — DIPHENHYDRAMINE HYDROCHLORIDE 50 MG/ML
50 INJECTION, SOLUTION INTRAMUSCULAR; INTRAVENOUS AS NEEDED
Status: CANCELLED
Start: 2022-08-02

## 2022-07-28 RX ORDER — SODIUM CHLORIDE 9 MG/ML
10 INJECTION INTRAMUSCULAR; INTRAVENOUS; SUBCUTANEOUS AS NEEDED
Status: CANCELLED | OUTPATIENT
Start: 2022-08-23

## 2022-07-28 RX ORDER — LORAZEPAM 2 MG/ML
0.5 INJECTION INTRAMUSCULAR
Status: CANCELLED
Start: 2022-08-02

## 2022-07-28 RX ORDER — DIPHENHYDRAMINE HYDROCHLORIDE 50 MG/ML
25 INJECTION, SOLUTION INTRAMUSCULAR; INTRAVENOUS AS NEEDED
Status: CANCELLED | OUTPATIENT
Start: 2022-08-23

## 2022-07-28 RX ORDER — HYDROCORTISONE SODIUM SUCCINATE 100 MG/2ML
100 INJECTION, POWDER, FOR SOLUTION INTRAMUSCULAR; INTRAVENOUS AS NEEDED
Status: CANCELLED | OUTPATIENT
Start: 2022-08-02

## 2022-07-28 RX ORDER — SODIUM CHLORIDE 9 MG/ML
25 INJECTION, SOLUTION INTRAVENOUS CONTINUOUS
Status: CANCELLED | OUTPATIENT
Start: 2022-08-23

## 2022-07-28 RX ORDER — ACETAMINOPHEN 325 MG/1
650 TABLET ORAL AS NEEDED
Status: CANCELLED | OUTPATIENT
Start: 2022-08-02

## 2022-07-28 RX ORDER — DEXAMETHASONE SODIUM PHOSPHATE 4 MG/ML
10 INJECTION, SOLUTION INTRA-ARTICULAR; INTRALESIONAL; INTRAMUSCULAR; INTRAVENOUS; SOFT TISSUE ONCE
Status: CANCELLED | OUTPATIENT
Start: 2022-08-23 | End: 2022-08-16

## 2022-07-28 RX ORDER — HYDROCORTISONE SODIUM SUCCINATE 100 MG/2ML
100 INJECTION, POWDER, FOR SOLUTION INTRAMUSCULAR; INTRAVENOUS AS NEEDED
Status: CANCELLED | OUTPATIENT
Start: 2022-08-23

## 2022-08-02 ENCOUNTER — HOSPITAL ENCOUNTER (OUTPATIENT)
Dept: INFUSION THERAPY | Age: 67
Discharge: HOME | End: 2022-08-02
Payer: MEDICARE

## 2022-08-02 VITALS
DIASTOLIC BLOOD PRESSURE: 65 MMHG | OXYGEN SATURATION: 97 % | TEMPERATURE: 99.1 F | HEIGHT: 64 IN | BODY MASS INDEX: 30.63 KG/M2 | HEART RATE: 95 BPM | WEIGHT: 179.4 LBS | RESPIRATION RATE: 18 BRPM | SYSTOLIC BLOOD PRESSURE: 138 MMHG

## 2022-08-02 DIAGNOSIS — C25.9 ADENOCARCINOMA OF PANCREAS (HCC): Primary | ICD-10-CM

## 2022-08-02 LAB
ALBUMIN SERPL-MCNC: 3 G/DL (ref 3.5–5)
ALBUMIN/GLOB SERPL: 0.9 {RATIO} (ref 1.1–2.2)
ALP SERPL-CCNC: 115 U/L (ref 45–117)
ALT SERPL-CCNC: 20 U/L (ref 12–78)
ANION GAP SERPL CALC-SCNC: 7 MMOL/L (ref 5–15)
AST SERPL-CCNC: 14 U/L (ref 15–37)
BASOPHILS # BLD: 0 K/UL (ref 0–0.1)
BASOPHILS NFR BLD: 0 % (ref 0–1)
BILIRUB SERPL-MCNC: 0.3 MG/DL (ref 0.2–1)
BUN SERPL-MCNC: 10 MG/DL (ref 6–20)
BUN/CREAT SERPL: 14 (ref 12–20)
CALCIUM SERPL-MCNC: 8.7 MG/DL (ref 8.5–10.1)
CHLORIDE SERPL-SCNC: 103 MMOL/L (ref 97–108)
CO2 SERPL-SCNC: 29 MMOL/L (ref 21–32)
CREAT SERPL-MCNC: 0.73 MG/DL (ref 0.55–1.02)
DIFFERENTIAL METHOD BLD: ABNORMAL
EOSINOPHIL # BLD: 0.1 K/UL (ref 0–0.4)
EOSINOPHIL NFR BLD: 1 % (ref 0–7)
ERYTHROCYTE [DISTWIDTH] IN BLOOD BY AUTOMATED COUNT: 15.9 % (ref 11.5–14.5)
GLOBULIN SER CALC-MCNC: 3.5 G/DL (ref 2–4)
GLUCOSE SERPL-MCNC: 197 MG/DL (ref 65–100)
HCT VFR BLD AUTO: 34.7 % (ref 35–47)
HGB BLD-MCNC: 11.1 G/DL (ref 11.5–16)
IMM GRANULOCYTES # BLD AUTO: 0 K/UL (ref 0–0.04)
IMM GRANULOCYTES NFR BLD AUTO: 0 % (ref 0–0.5)
LYMPHOCYTES # BLD: 2.1 K/UL (ref 0.8–3.5)
LYMPHOCYTES NFR BLD: 26 % (ref 12–49)
MCH RBC QN AUTO: 28.8 PG (ref 26–34)
MCHC RBC AUTO-ENTMCNC: 32 G/DL (ref 30–36.5)
MCV RBC AUTO: 90.1 FL (ref 80–99)
MONOCYTES # BLD: 1.8 K/UL (ref 0–1)
MONOCYTES NFR BLD: 22 % (ref 5–13)
NEUTS SEG # BLD: 4 K/UL (ref 1.8–8)
NEUTS SEG NFR BLD: 51 % (ref 32–75)
NRBC # BLD: 0 K/UL (ref 0–0.01)
NRBC BLD-RTO: 0 PER 100 WBC
PLATELET # BLD AUTO: 436 K/UL (ref 150–400)
PLATELET COMMENTS,PCOM: ABNORMAL
PMV BLD AUTO: 10 FL (ref 8.9–12.9)
POTASSIUM SERPL-SCNC: 3.6 MMOL/L (ref 3.5–5.1)
PROT SERPL-MCNC: 6.5 G/DL (ref 6.4–8.2)
RBC # BLD AUTO: 3.85 M/UL (ref 3.8–5.2)
RBC MORPH BLD: ABNORMAL
RBC MORPH BLD: ABNORMAL
SODIUM SERPL-SCNC: 139 MMOL/L (ref 136–145)
WBC # BLD AUTO: 8 K/UL (ref 3.6–11)

## 2022-08-02 PROCEDURE — 80053 COMPREHEN METABOLIC PANEL: CPT

## 2022-08-02 PROCEDURE — 74011250636 HC RX REV CODE- 250/636: Performed by: INTERNAL MEDICINE

## 2022-08-02 PROCEDURE — 96375 TX/PRO/DX INJ NEW DRUG ADDON: CPT

## 2022-08-02 PROCEDURE — 96413 CHEMO IV INFUSION 1 HR: CPT

## 2022-08-02 PROCEDURE — 74011000250 HC RX REV CODE- 250: Performed by: INTERNAL MEDICINE

## 2022-08-02 PROCEDURE — 85025 COMPLETE CBC W/AUTO DIFF WBC: CPT

## 2022-08-02 PROCEDURE — 96417 CHEMO IV INFUS EACH ADDL SEQ: CPT

## 2022-08-02 PROCEDURE — 74011000258 HC RX REV CODE- 258: Performed by: INTERNAL MEDICINE

## 2022-08-02 PROCEDURE — 36415 COLL VENOUS BLD VENIPUNCTURE: CPT

## 2022-08-02 PROCEDURE — 77030012965 HC NDL HUBR BBMI -A

## 2022-08-02 RX ORDER — DEXAMETHASONE SODIUM PHOSPHATE 10 MG/ML
10 INJECTION INTRAMUSCULAR; INTRAVENOUS ONCE
Status: COMPLETED | OUTPATIENT
Start: 2022-08-02 | End: 2022-08-02

## 2022-08-02 RX ORDER — ONDANSETRON 2 MG/ML
8 INJECTION INTRAMUSCULAR; INTRAVENOUS ONCE
Status: COMPLETED | OUTPATIENT
Start: 2022-08-02 | End: 2022-08-02

## 2022-08-02 RX ORDER — DIPHENHYDRAMINE HYDROCHLORIDE 50 MG/ML
25 INJECTION, SOLUTION INTRAMUSCULAR; INTRAVENOUS AS NEEDED
Status: ACTIVE | OUTPATIENT
Start: 2022-08-02 | End: 2022-08-02

## 2022-08-02 RX ORDER — ACETAMINOPHEN 325 MG/1
650 TABLET ORAL AS NEEDED
Status: ACTIVE | OUTPATIENT
Start: 2022-08-02 | End: 2022-08-02

## 2022-08-02 RX ORDER — ONDANSETRON 2 MG/ML
8 INJECTION INTRAMUSCULAR; INTRAVENOUS AS NEEDED
Status: ACTIVE | OUTPATIENT
Start: 2022-08-02 | End: 2022-08-02

## 2022-08-02 RX ORDER — SODIUM CHLORIDE 9 MG/ML
10 INJECTION INTRAMUSCULAR; INTRAVENOUS; SUBCUTANEOUS AS NEEDED
Status: ACTIVE | OUTPATIENT
Start: 2022-08-02 | End: 2022-08-02

## 2022-08-02 RX ORDER — SODIUM CHLORIDE 9 MG/ML
25 INJECTION, SOLUTION INTRAVENOUS CONTINUOUS
Status: DISPENSED | OUTPATIENT
Start: 2022-08-02 | End: 2022-08-02

## 2022-08-02 RX ORDER — HEPARIN 100 UNIT/ML
300-500 SYRINGE INTRAVENOUS AS NEEDED
Status: ACTIVE | OUTPATIENT
Start: 2022-08-02 | End: 2022-08-02

## 2022-08-02 RX ADMIN — ONDANSETRON 8 MG: 2 INJECTION INTRAMUSCULAR; INTRAVENOUS at 09:40

## 2022-08-02 RX ADMIN — SODIUM CHLORIDE, PRESERVATIVE FREE 10 ML: 5 INJECTION INTRAVENOUS at 12:20

## 2022-08-02 RX ADMIN — SODIUM CHLORIDE, PRESERVATIVE FREE 10 ML: 5 INJECTION INTRAVENOUS at 08:40

## 2022-08-02 RX ADMIN — SODIUM CHLORIDE 25 ML/HR: 9 INJECTION, SOLUTION INTRAVENOUS at 09:35

## 2022-08-02 RX ADMIN — DEXAMETHASONE SODIUM PHOSPHATE 10 MG: 10 INJECTION, SOLUTION INTRAMUSCULAR; INTRAVENOUS at 09:43

## 2022-08-02 RX ADMIN — PACLITAXEL 184 MG: 100 INJECTION, POWDER, LYOPHILIZED, FOR SUSPENSION INTRAVENOUS at 10:43

## 2022-08-02 RX ADMIN — HEPARIN 500 UNITS: 100 SYRINGE at 12:20

## 2022-08-02 RX ADMIN — GEMCITABINE HYDROCHLORIDE 1656 MG: 2 INJECTION, SOLUTION INTRAVENOUS at 11:36

## 2022-08-02 NOTE — PROGRESS NOTES
Problem: Airway Clearance - Ineffective  Goal: Achieve or maintain patent airway  Outcome: Resolved/Met     Problem: Gas Exchange - Impaired  Goal: Absence of hypoxia  Outcome: Resolved/Met  Goal: Promote optimal lung function  Outcome: Resolved/Met     Problem: Breathing Pattern - Ineffective  Goal: Ability to achieve and maintain a regular respiratory rate  Outcome: Resolved/Met     Problem:  Body Temperature -  Risk of, Imbalanced  Goal: Ability to maintain a body temperature within defined limits  Outcome: Resolved/Met  Goal: Will regain or maintain usual level of consciousness  Outcome: Resolved/Met  Goal: Complications related to the disease process, condition or treatment will be avoided or minimized  Outcome: Resolved/Met     Problem: Isolation Precautions - Risk of Spread of Infection  Goal: Prevent transmission of infectious organism to others  Outcome: Resolved/Met     Problem: Nutrition Deficits  Goal: Optimize nutrtional status  Outcome: Resolved/Met     Problem: Risk for Fluid Volume Deficit  Goal: Maintain normal heart rhythm  Outcome: Resolved/Met  Goal: Maintain absence of muscle cramping  Outcome: Resolved/Met  Goal: Maintain normal serum potassium, sodium, calcium, phosphorus, and pH  Outcome: Resolved/Met     Problem: Loneliness or Risk for Loneliness  Goal: Demonstrate positive use of time alone when socialization is not possible  Outcome: Resolved/Met     Problem: Fatigue  Goal: Verbalize increase energy and improved vitality  Outcome: Resolved/Met     Problem: Patient Education: Go to Patient Education Activity  Goal: Patient/Family Education  Outcome: Resolved/Met     Problem: Chemotherapy Treatment  Goal: *Chemotherapy regimen followed  Outcome: Resolved/Met  Goal: *Hemodynamically stable  Outcome: Resolved/Met  Goal: *Tolerating diet  Outcome: Resolved/Met     Problem: Infection - Risk of, Central Venous Catheter-Associated Bloodstream Infection  Goal: *Absence of infection signs and symptoms  Outcome: Resolved/Met     Problem: Patient Education:  Go to Education Activity  Goal: Patient/Family Education  Outcome: Resolved/Met

## 2022-08-02 NOTE — PROGRESS NOTES
[facility] Progress Note    Date: 2022    Name: Arnulfo Corona    MRN: [id number]         : 1955    Ms. Ba Arrived ambulatory and in no distress for cycle 18 day 1 of Abraxane/Gemcitabine regimen. Assessment was completed, no acute issues at this time, no new complaints voiced. States she has recovered from covid 2 weeks ago. Easily fatigues, no cough but is easily short of breath with exertion. Port accessed without difficulty, labs drawn and in process. Chemotherapy Flowsheet 2022   Cycle C18 D1   Date 2022   Drug / Regimen Abraxane/Gemzar   Dosage 184 mg, 1656 mg   Time Up 1043   Time Down -   Pre Hydration -   Pre Meds zofran 8 mg IV, Decadron 10 mg IV   Notes -       Ms. Ba's vitals were reviewed. Visit Vitals  /65   Pulse 95   Temp 99.1 °F (37.3 °C)   Resp 18   Ht 5' 4\" (1.626 m)   Wt 81.4 kg (179 lb 6.4 oz)   SpO2 97%   BMI 30.79 kg/m²       Lab results were obtained and reviewed. Recent Results (from the past 12 hour(s))   CBC WITH AUTOMATED DIFF    Collection Time: 22  8:40 AM   Result Value Ref Range    WBC 8.0 3.6 - 11.0 K/uL    RBC 3.85 3.80 - 5.20 M/uL    HGB 11.1 (L) 11.5 - 16.0 g/dL    HCT 34.7 (L) 35.0 - 47.0 %    MCV 90.1 80.0 - 99.0 FL    MCH 28.8 26.0 - 34.0 PG    MCHC 32.0 30.0 - 36.5 g/dL    RDW 15.9 (H) 11.5 - 14.5 %    PLATELET 464 (H) 617 - 400 K/uL    MPV 10.0 8.9 - 12.9 FL    NRBC 0.0 0  WBC    ABSOLUTE NRBC 0.00 0.00 - 0.01 K/uL    NEUTROPHILS 51 32 - 75 %    LYMPHOCYTES 26 12 - 49 %    MONOCYTES 22 (H) 5 - 13 %    EOSINOPHILS 1 0 - 7 %    BASOPHILS 0 0 - 1 %    IMMATURE GRANULOCYTES 0 0.0 - 0.5 %    ABS. NEUTROPHILS 4.0 1.8 - 8.0 K/UL    ABS. LYMPHOCYTES 2.1 0.8 - 3.5 K/UL    ABS. MONOCYTES 1.8 (H) 0.0 - 1.0 K/UL    ABS. EOSINOPHILS 0.1 0.0 - 0.4 K/UL    ABS. BASOPHILS 0.0 0.0 - 0.1 K/UL    ABS. IMM.  GRANS. 0.0 0.00 - 0.04 K/UL    DF MANUAL      PLATELET COMMENTS Increased Platelets      RBC COMMENTS ANISOCYTOSIS  1+        RBC COMMENTS POIKILOCYTOSIS  1+       METABOLIC PANEL, COMPREHENSIVE    Collection Time: 08/02/22  8:40 AM   Result Value Ref Range    Sodium 139 136 - 145 mmol/L    Potassium 3.6 3.5 - 5.1 mmol/L    Chloride 103 97 - 108 mmol/L    CO2 29 21 - 32 mmol/L    Anion gap 7 5 - 15 mmol/L    Glucose 197 (H) 65 - 100 mg/dL    BUN 10 6 - 20 MG/DL    Creatinine 0.73 0.55 - 1.02 MG/DL    BUN/Creatinine ratio 14 12 - 20      GFR est AA >60 >60 ml/min/1.73m2    GFR est non-AA >60 >60 ml/min/1.73m2    Calcium 8.7 8.5 - 10.1 MG/DL    Bilirubin, total 0.3 0.2 - 1.0 MG/DL    ALT (SGPT) 20 12 - 78 U/L    AST (SGOT) 14 (L) 15 - 37 U/L    Alk. phosphatase 115 45 - 117 U/L    Protein, total 6.5 6.4 - 8.2 g/dL    Albumin 3.0 (L) 3.5 - 5.0 g/dL    Globulin 3.5 2.0 - 4.0 g/dL    A-G Ratio 0.9 (L) 1.1 - 2.2         Pre-medications  were administered as ordered.  ml IV main line established. Zofran 8 mg IV given. Decadron 10 mg IV given. Chemotherapy was initiated. Two nurses verified prior to administering. Drug name: Abraxane  Dose: 184 mg  infusion volume: 36.8 ml  Rate and route of administration: IV at 73.6 ml/hr started at 1043  Expiration date of drug: BUD 8/2/22 at 1420  Abraxane was milky in color and without particulates. Drug name: Gemcitabine  Dose: 1656 mg  infusion volume: 318.55 ml  Rate and route of administration: IV at 637.1 ml/hr started at 1136  Expiration date of drug: BUD 8/3/22 at 1020  Gemcitabine was clear with no particulates. Ms. Jennifer Matson tolerated treatment well. Port was flushed and de-accessed and site intact. She was discharged from [place] [address] in stable condition at 1220. She is to return on August 16 at 8:00 for her next appointment.     Amelie Cisneros RN  August 2, 2022

## 2022-08-09 ENCOUNTER — HOSPITAL ENCOUNTER (OUTPATIENT)
Dept: CT IMAGING | Age: 67
Discharge: HOME | End: 2022-08-09
Attending: INTERNAL MEDICINE
Payer: MEDICARE

## 2022-08-09 DIAGNOSIS — D70.1 CHEMOTHERAPY INDUCED NEUTROPENIA (HCC): ICD-10-CM

## 2022-08-09 DIAGNOSIS — C25.9 MALIGNANT NEOPLASM OF PANCREAS, UNSPECIFIED LOCATION OF MALIGNANCY (HCC): ICD-10-CM

## 2022-08-09 DIAGNOSIS — G89.3 NEOPLASM RELATED PAIN: ICD-10-CM

## 2022-08-09 DIAGNOSIS — T45.1X5A CHEMOTHERAPY INDUCED NEUTROPENIA (HCC): ICD-10-CM

## 2022-08-09 DIAGNOSIS — F41.8 ANXIETY ABOUT HEALTH: ICD-10-CM

## 2022-08-09 DIAGNOSIS — M79.2 NEUROPATHIC PAIN: ICD-10-CM

## 2022-08-09 DIAGNOSIS — M79.601 PAIN IN RIGHT ARM: ICD-10-CM

## 2022-08-09 DIAGNOSIS — R10.84 GENERALIZED ABDOMINAL PAIN: ICD-10-CM

## 2022-08-09 DIAGNOSIS — T45.1X5A CHEMOTHERAPY INDUCED NAUSEA AND VOMITING: ICD-10-CM

## 2022-08-09 DIAGNOSIS — F32.A DEPRESSION, UNSPECIFIED DEPRESSION TYPE: ICD-10-CM

## 2022-08-09 DIAGNOSIS — R11.2 CHEMOTHERAPY INDUCED NAUSEA AND VOMITING: ICD-10-CM

## 2022-08-09 PROCEDURE — 74177 CT ABD & PELVIS W/CONTRAST: CPT

## 2022-08-09 PROCEDURE — 74011000250 HC RX REV CODE- 250: Performed by: INTERNAL MEDICINE

## 2022-08-09 PROCEDURE — 74011000636 HC RX REV CODE- 636: Performed by: INTERNAL MEDICINE

## 2022-08-09 RX ORDER — BARIUM SULFATE 20 MG/ML
450 SUSPENSION ORAL
Status: COMPLETED | OUTPATIENT
Start: 2022-08-09 | End: 2022-08-09

## 2022-08-09 RX ADMIN — BARIUM SULFATE 450 ML: 20 SUSPENSION ORAL at 07:52

## 2022-08-09 RX ADMIN — IOPAMIDOL 100 ML: 612 INJECTION, SOLUTION INTRAVENOUS at 09:22

## 2022-08-10 ENCOUNTER — CLINICAL SUPPORT (OUTPATIENT)
Dept: FAMILY MEDICINE CLINIC | Age: 67
End: 2022-08-10
Payer: MEDICARE

## 2022-08-10 ENCOUNTER — TELEPHONE (OUTPATIENT)
Dept: FAMILY MEDICINE CLINIC | Age: 67
End: 2022-08-10

## 2022-08-10 DIAGNOSIS — Z01.89 VISIT FOR URINE TEST: Primary | ICD-10-CM

## 2022-08-10 LAB
BILIRUB UR QL STRIP: NEGATIVE
GLUCOSE UR-MCNC: NORMAL MG/DL
KETONES P FAST UR STRIP-MCNC: NORMAL MG/DL
PH UR STRIP: 5.5 [PH] (ref 4.6–8)
PROT UR QL STRIP: NORMAL
SP GR UR STRIP: 1.02 (ref 1–1.03)
UA UROBILINOGEN AMB POC: NORMAL (ref 0.2–1)
URINALYSIS CLARITY POC: CLEAR
URINALYSIS COLOR POC: YELLOW
URINE BLOOD POC: NEGATIVE
URINE LEUKOCYTES POC: NEGATIVE
URINE NITRITES POC: NEGATIVE

## 2022-08-10 PROCEDURE — 81002 URINALYSIS NONAUTO W/O SCOPE: CPT | Performed by: INTERNAL MEDICINE

## 2022-08-10 RX ORDER — LEVOFLOXACIN 500 MG/1
500 TABLET, FILM COATED ORAL DAILY
Qty: 10 TABLET | Refills: 1 | Status: SHIPPED | OUTPATIENT
Start: 2022-08-10 | End: 2022-08-20

## 2022-08-10 NOTE — PROGRESS NOTES
Patient presents for lab draw ordered by:    Ordering Provider:  Dr Adelaida Lay Department/Practice:  [facility]  Phone:  [phone]  Date Ordered:  8-10-22    The following labs were done by Rashida Parmar RN:    MYA POC UA

## 2022-08-10 NOTE — TELEPHONE ENCOUNTER
CT scan and urinalysis results reviewed and discussed with patient today. Given her recent COVID illness and hospitalization in addition to her recent chocolate brown sputum and low-grade fever postchemotherapy, we will begin antibiotic therapy with Levaquin.

## 2022-08-10 NOTE — PROGRESS NOTES
Can tell pt CTs look stable but ? Infection/ PNA in lungs  Does pt have symptoms of PNA?   Can have abx if yes  Copying PCP on this also as [facility] pt and not seen at [facility] office

## 2022-08-11 NOTE — PROGRESS NOTES
Per MA looking into patients chart, June Davis, has nicely contacted patient in regards to this message. Patient has been given results/advice updates.   Jaswant Ruiz

## 2022-08-16 ENCOUNTER — HOSPITAL ENCOUNTER (OUTPATIENT)
Dept: INFUSION THERAPY | Age: 67
End: 2022-08-16

## 2022-08-18 DIAGNOSIS — C25.9 MALIGNANT NEOPLASM OF PANCREAS, UNSPECIFIED LOCATION OF MALIGNANCY (HCC): ICD-10-CM

## 2022-08-18 RX ORDER — LORAZEPAM 1 MG/1
TABLET ORAL
Qty: 90 TABLET | Refills: 0 | Status: SHIPPED | OUTPATIENT
Start: 2022-08-18 | End: 2022-09-22

## 2022-08-19 NOTE — PROGRESS NOTES
Юлия Pereira is a 79 y.o. female here for follow up for Pancreatic cancer. Patient was recently treated for PNA. Patient no longer coughing up cement colored mucus, she completed her antibiotic for her PNA. She c/o pain in her back, and lower abdomen. She does have occasionial generalized pain. Patient having difficulty sleeping, she is up 3-4 times per night, this is due to not sleeping. Bowels moving without difficulty, urine clear, denies burning. Patient is scheduled for C18D15 today in [place]. Key Oncology Meds       Patient is on no Oncologic meds. Key Pain Meds       The patient is on no pain meds.           Chemotherapy Flowsheet 8/2/2022   Cycle C18 D1   Date 8/2/2022   Drug / Regimen Abraxane/Gemzar   Dosage 184 mg, 1656 mg   Time Up 1043   Time Down 1211   Pre Hydration -   Pre Meds zofran 8 mg IV, Decadron 10 mg IV   Notes -

## 2022-08-23 ENCOUNTER — HOSPITAL ENCOUNTER (OUTPATIENT)
Dept: INFUSION THERAPY | Age: 67
Discharge: HOME | End: 2022-08-23
Payer: MEDICARE

## 2022-08-23 ENCOUNTER — VIRTUAL VISIT (OUTPATIENT)
Dept: ONCOLOGY | Age: 67
End: 2022-08-23
Payer: MEDICARE

## 2022-08-23 ENCOUNTER — HOSPITAL ENCOUNTER (OUTPATIENT)
Dept: INFUSION THERAPY | Age: 67
End: 2022-08-23
Payer: MEDICARE

## 2022-08-23 VITALS
TEMPERATURE: 96.5 F | RESPIRATION RATE: 16 BRPM | HEART RATE: 93 BPM | OXYGEN SATURATION: 93 % | DIASTOLIC BLOOD PRESSURE: 85 MMHG | BODY MASS INDEX: 31.18 KG/M2 | SYSTOLIC BLOOD PRESSURE: 150 MMHG | HEIGHT: 64 IN | WEIGHT: 182.6 LBS

## 2022-08-23 VITALS
HEIGHT: 64 IN | HEART RATE: 93 BPM | BODY MASS INDEX: 31.18 KG/M2 | DIASTOLIC BLOOD PRESSURE: 79 MMHG | TEMPERATURE: 96.5 F | RESPIRATION RATE: 16 BRPM | OXYGEN SATURATION: 93 % | WEIGHT: 182.6 LBS | SYSTOLIC BLOOD PRESSURE: 173 MMHG

## 2022-08-23 DIAGNOSIS — C25.9 MALIGNANT NEOPLASM OF PANCREAS, UNSPECIFIED LOCATION OF MALIGNANCY (HCC): Primary | ICD-10-CM

## 2022-08-23 DIAGNOSIS — M79.2 NEUROPATHIC PAIN: ICD-10-CM

## 2022-08-23 DIAGNOSIS — C25.9 ADENOCARCINOMA OF PANCREAS (HCC): Primary | ICD-10-CM

## 2022-08-23 DIAGNOSIS — M19.90 OSTEOARTHRITIS, UNSPECIFIED OSTEOARTHRITIS TYPE, UNSPECIFIED SITE: ICD-10-CM

## 2022-08-23 DIAGNOSIS — R10.84 GENERALIZED ABDOMINAL PAIN: ICD-10-CM

## 2022-08-23 DIAGNOSIS — M79.601 PAIN IN RIGHT ARM: ICD-10-CM

## 2022-08-23 DIAGNOSIS — G89.3 NEOPLASM RELATED PAIN: ICD-10-CM

## 2022-08-23 DIAGNOSIS — F32.A DEPRESSION, UNSPECIFIED DEPRESSION TYPE: ICD-10-CM

## 2022-08-23 DIAGNOSIS — Z86.16 HISTORY OF COVID-19: ICD-10-CM

## 2022-08-23 LAB
ALBUMIN SERPL-MCNC: 3 G/DL (ref 3.5–5)
ALBUMIN/GLOB SERPL: 0.8 {RATIO} (ref 1.1–2.2)
ALP SERPL-CCNC: 129 U/L (ref 45–117)
ALT SERPL-CCNC: 22 U/L (ref 12–78)
ANION GAP SERPL CALC-SCNC: 7 MMOL/L (ref 5–15)
AST SERPL-CCNC: 24 U/L (ref 15–37)
BASOPHILS # BLD: 0.1 K/UL (ref 0–0.1)
BASOPHILS NFR BLD: 1 % (ref 0–1)
BILIRUB SERPL-MCNC: 0.2 MG/DL (ref 0.2–1)
BUN SERPL-MCNC: 13 MG/DL (ref 6–20)
BUN/CREAT SERPL: 16 (ref 12–20)
CALCIUM SERPL-MCNC: 8.6 MG/DL (ref 8.5–10.1)
CHLORIDE SERPL-SCNC: 100 MMOL/L (ref 97–108)
CO2 SERPL-SCNC: 30 MMOL/L (ref 21–32)
COMMENT, HOLDF: NORMAL
CREAT SERPL-MCNC: 0.81 MG/DL (ref 0.55–1.02)
DIFFERENTIAL METHOD BLD: ABNORMAL
EOSINOPHIL # BLD: 0.2 K/UL (ref 0–0.4)
EOSINOPHIL NFR BLD: 3 % (ref 0–7)
ERYTHROCYTE [DISTWIDTH] IN BLOOD BY AUTOMATED COUNT: 16.8 % (ref 11.5–14.5)
GLOBULIN SER CALC-MCNC: 3.7 G/DL (ref 2–4)
GLUCOSE SERPL-MCNC: 421 MG/DL (ref 65–100)
HCT VFR BLD AUTO: 34.2 % (ref 35–47)
HGB BLD-MCNC: 11 G/DL (ref 11.5–16)
IMM GRANULOCYTES # BLD AUTO: 0.1 K/UL (ref 0–0.04)
IMM GRANULOCYTES NFR BLD AUTO: 1 % (ref 0–0.5)
LYMPHOCYTES # BLD: 1.9 K/UL (ref 0.8–3.5)
LYMPHOCYTES NFR BLD: 25 % (ref 12–49)
MCH RBC QN AUTO: 29.1 PG (ref 26–34)
MCHC RBC AUTO-ENTMCNC: 32.2 G/DL (ref 30–36.5)
MCV RBC AUTO: 90.5 FL (ref 80–99)
MONOCYTES # BLD: 1.8 K/UL (ref 0–1)
MONOCYTES NFR BLD: 24 % (ref 5–13)
NEUTS SEG # BLD: 3.6 K/UL (ref 1.8–8)
NEUTS SEG NFR BLD: 46 % (ref 32–75)
NRBC # BLD: 0 K/UL (ref 0–0.01)
NRBC BLD-RTO: 0 PER 100 WBC
PLATELET # BLD AUTO: 717 K/UL (ref 150–400)
PLATELET COMMENTS,PCOM: ABNORMAL
PMV BLD AUTO: 9.6 FL (ref 8.9–12.9)
POTASSIUM SERPL-SCNC: 3.9 MMOL/L (ref 3.5–5.1)
PROT SERPL-MCNC: 6.7 G/DL (ref 6.4–8.2)
RBC # BLD AUTO: 3.78 M/UL (ref 3.8–5.2)
RBC MORPH BLD: ABNORMAL
SAMPLES BEING HELD,HOLD: NORMAL
SODIUM SERPL-SCNC: 137 MMOL/L (ref 136–145)
WBC # BLD AUTO: 7.7 K/UL (ref 3.6–11)

## 2022-08-23 PROCEDURE — 80053 COMPREHEN METABOLIC PANEL: CPT

## 2022-08-23 PROCEDURE — G8417 CALC BMI ABV UP PARAM F/U: HCPCS | Performed by: INTERNAL MEDICINE

## 2022-08-23 PROCEDURE — 1090F PRES/ABSN URINE INCON ASSESS: CPT | Performed by: INTERNAL MEDICINE

## 2022-08-23 PROCEDURE — G0463 HOSPITAL OUTPT CLINIC VISIT: HCPCS | Performed by: INTERNAL MEDICINE

## 2022-08-23 PROCEDURE — G8432 DEP SCR NOT DOC, RNG: HCPCS | Performed by: INTERNAL MEDICINE

## 2022-08-23 PROCEDURE — 36415 COLL VENOUS BLD VENIPUNCTURE: CPT

## 2022-08-23 PROCEDURE — 3017F COLORECTAL CA SCREEN DOC REV: CPT | Performed by: INTERNAL MEDICINE

## 2022-08-23 PROCEDURE — 74011250636 HC RX REV CODE- 250/636: Performed by: INTERNAL MEDICINE

## 2022-08-23 PROCEDURE — 77030012965 HC NDL HUBR BBMI -A

## 2022-08-23 PROCEDURE — 85025 COMPLETE CBC W/AUTO DIFF WBC: CPT

## 2022-08-23 PROCEDURE — G8754 DIAS BP LESS 90: HCPCS | Performed by: INTERNAL MEDICINE

## 2022-08-23 PROCEDURE — G8427 DOCREV CUR MEDS BY ELIG CLIN: HCPCS | Performed by: INTERNAL MEDICINE

## 2022-08-23 PROCEDURE — G8536 NO DOC ELDER MAL SCRN: HCPCS | Performed by: INTERNAL MEDICINE

## 2022-08-23 PROCEDURE — 99215 OFFICE O/P EST HI 40 MIN: CPT | Performed by: INTERNAL MEDICINE

## 2022-08-23 PROCEDURE — 1123F ACP DISCUSS/DSCN MKR DOCD: CPT | Performed by: INTERNAL MEDICINE

## 2022-08-23 PROCEDURE — 1101F PT FALLS ASSESS-DOCD LE1/YR: CPT | Performed by: INTERNAL MEDICINE

## 2022-08-23 PROCEDURE — G8399 PT W/DXA RESULTS DOCUMENT: HCPCS | Performed by: INTERNAL MEDICINE

## 2022-08-23 PROCEDURE — 96413 CHEMO IV INFUSION 1 HR: CPT

## 2022-08-23 PROCEDURE — G8753 SYS BP > OR = 140: HCPCS | Performed by: INTERNAL MEDICINE

## 2022-08-23 PROCEDURE — 74011000258 HC RX REV CODE- 258: Performed by: INTERNAL MEDICINE

## 2022-08-23 PROCEDURE — 96417 CHEMO IV INFUS EACH ADDL SEQ: CPT

## 2022-08-23 PROCEDURE — 74011000250 HC RX REV CODE- 250: Performed by: INTERNAL MEDICINE

## 2022-08-23 PROCEDURE — 96375 TX/PRO/DX INJ NEW DRUG ADDON: CPT

## 2022-08-23 RX ORDER — ACETAMINOPHEN 325 MG/1
650 TABLET ORAL AS NEEDED
Status: ACTIVE | OUTPATIENT
Start: 2022-08-23 | End: 2022-08-23

## 2022-08-23 RX ORDER — DULOXETIN HYDROCHLORIDE 30 MG/1
30 CAPSULE, DELAYED RELEASE ORAL EVERY EVENING
Qty: 90 CAPSULE | Refills: 3 | Status: SHIPPED | OUTPATIENT
Start: 2022-08-23

## 2022-08-23 RX ORDER — DIPHENHYDRAMINE HYDROCHLORIDE 50 MG/ML
25 INJECTION, SOLUTION INTRAMUSCULAR; INTRAVENOUS AS NEEDED
Status: ACTIVE | OUTPATIENT
Start: 2022-08-23 | End: 2022-08-23

## 2022-08-23 RX ORDER — IBUPROFEN 200 MG
600 TABLET ORAL 2 TIMES DAILY
COMMUNITY

## 2022-08-23 RX ORDER — ONDANSETRON 2 MG/ML
8 INJECTION INTRAMUSCULAR; INTRAVENOUS ONCE
Status: COMPLETED | OUTPATIENT
Start: 2022-08-23 | End: 2022-08-23

## 2022-08-23 RX ORDER — SODIUM CHLORIDE 9 MG/ML
25 INJECTION, SOLUTION INTRAVENOUS CONTINUOUS
Status: DISPENSED | OUTPATIENT
Start: 2022-08-23 | End: 2022-08-23

## 2022-08-23 RX ORDER — SODIUM CHLORIDE 9 MG/ML
10 INJECTION INTRAMUSCULAR; INTRAVENOUS; SUBCUTANEOUS AS NEEDED
Status: ACTIVE | OUTPATIENT
Start: 2022-08-23 | End: 2022-08-23

## 2022-08-23 RX ORDER — HYDROMORPHONE HYDROCHLORIDE 2 MG/1
2 TABLET ORAL
Qty: 90 TABLET | Refills: 0 | Status: SHIPPED | OUTPATIENT
Start: 2022-08-23 | End: 2022-09-22

## 2022-08-23 RX ORDER — DEXAMETHASONE SODIUM PHOSPHATE 10 MG/ML
10 INJECTION INTRAMUSCULAR; INTRAVENOUS ONCE
Status: COMPLETED | OUTPATIENT
Start: 2022-08-23 | End: 2022-08-23

## 2022-08-23 RX ORDER — ONDANSETRON 2 MG/ML
8 INJECTION INTRAMUSCULAR; INTRAVENOUS AS NEEDED
Status: ACTIVE | OUTPATIENT
Start: 2022-08-23 | End: 2022-08-23

## 2022-08-23 RX ORDER — HEPARIN 100 UNIT/ML
300-500 SYRINGE INTRAVENOUS AS NEEDED
Status: DISPENSED | OUTPATIENT
Start: 2022-08-23 | End: 2022-08-23

## 2022-08-23 RX ORDER — ACETAMINOPHEN 500 MG
1000 TABLET ORAL 2 TIMES DAILY
COMMUNITY

## 2022-08-23 RX ADMIN — PACLITAXEL 184 MG: 100 INJECTION, POWDER, LYOPHILIZED, FOR SUSPENSION INTRAVENOUS at 11:06

## 2022-08-23 RX ADMIN — SODIUM CHLORIDE 25 ML/HR: 9 INJECTION, SOLUTION INTRAVENOUS at 10:22

## 2022-08-23 RX ADMIN — Medication 500 UNITS: at 12:41

## 2022-08-23 RX ADMIN — ONDANSETRON 8 MG: 2 INJECTION INTRAMUSCULAR; INTRAVENOUS at 10:20

## 2022-08-23 RX ADMIN — WATER 2 MG: 1 INJECTION INTRAMUSCULAR; INTRAVENOUS; SUBCUTANEOUS at 09:41

## 2022-08-23 RX ADMIN — SODIUM CHLORIDE, PRESERVATIVE FREE 10 ML: 5 INJECTION INTRAVENOUS at 12:41

## 2022-08-23 RX ADMIN — DEXAMETHASONE SODIUM PHOSPHATE 10 MG: 10 INJECTION, SOLUTION INTRAMUSCULAR; INTRAVENOUS at 10:27

## 2022-08-23 RX ADMIN — GEMCITABINE 1656 MG: 38 INJECTION, SOLUTION INTRAVENOUS at 12:00

## 2022-08-23 NOTE — PROGRESS NOTES
[facility] Progress Note    Date: 2022    Name: Violet Dominique    MRN: [id number]         : 1955    Ms. Ba Arrived ambulatory and in no distress for cycle 18 day 1 of Abraxane/Gemcitabine regimen. Assessment was completed, no acute issues at this time. She has recovered from pneumonia although still has some cough, has completed antibiotics and has no fevers. Continues to have pain in lower abdomen and back. She also has some pain right shoulder from an old injury and muscle weakness in that arm. Port accessed without difficulty, unable to obtain blood return with multiple flushes and repositioning. Blood drawn peripherally left antecub. Chemotherapy Flowsheet 2022   Cycle C 18 D 15   Date 2022   Drug / Regimen Abraxane/Gemcitabine   Dosage 184 mg/1656 mg   Time Up 1106   Time Down -   Pre Hydration -   Pre Meds zofran 8 mg, decadron 10 mh   Notes -        Proceeded to virtual appt with Dr. Narcisa Bueno. Ms. Ba's vitals were reviewed. Visit Vitals  BP (!) 173/79 (BP 1 Location: Left upper arm, BP Patient Position: Sitting)   Pulse 93   Temp (!) 96.5 °F (35.8 °C)   Resp 16   Ht 5' 4\" (1.626 m)   Wt 82.8 kg (182 lb 9.6 oz)   SpO2 93%   Breastfeeding No   BMI 31.34 kg/m²       Lab results were obtained and reviewed. Recent Results (from the past 12 hour(s))   CBC WITH AUTOMATED DIFF    Collection Time: 22  8:40 AM   Result Value Ref Range    WBC 7.7 3.6 - 11.0 K/uL    RBC 3.78 (L) 3.80 - 5.20 M/uL    HGB 11.0 (L) 11.5 - 16.0 g/dL    HCT 34.2 (L) 35.0 - 47.0 %    MCV 90.5 80.0 - 99.0 FL    MCH 29.1 26.0 - 34.0 PG    MCHC 32.2 30.0 - 36.5 g/dL    RDW 16.8 (H) 11.5 - 14.5 %    PLATELET 785 (H) 137 - 400 K/uL    MPV 9.6 8.9 - 12.9 FL    NRBC 0.0 0.0  WBC    ABSOLUTE NRBC 0.00 0.00 - 0.01 K/uL    NEUTROPHILS 46 32 - 75 %    LYMPHOCYTES 25 12 - 49 %    MONOCYTES 24 (H) 5 - 13 %    EOSINOPHILS 3 0 - 7 %    BASOPHILS 1 0 - 1 %    IMMATURE GRANULOCYTES 1 (H) 0 - 0.5 %    ABS. NEUTROPHILS 3.6 1.8 - 8.0 K/UL    ABS. LYMPHOCYTES 1.9 0.8 - 3.5 K/UL    ABS. MONOCYTES 1.8 (H) 0.0 - 1.0 K/UL    ABS. EOSINOPHILS 0.2 0.0 - 0.4 K/UL    ABS. BASOPHILS 0.1 0.0 - 0.1 K/UL    ABS. IMM. GRANS. 0.1 (H) 0.00 - 0.04 K/UL    DF SMEAR SCANNED      PLATELET COMMENTS Increased Platelets      RBC COMMENTS ANISOCYTOSIS  1+       METABOLIC PANEL, COMPREHENSIVE    Collection Time: 08/23/22  8:40 AM   Result Value Ref Range    Sodium 137 136 - 145 mmol/L    Potassium 3.9 3.5 - 5.1 mmol/L    Chloride 100 97 - 108 mmol/L    CO2 30 21 - 32 mmol/L    Anion gap 7 5 - 15 mmol/L    Glucose 421 (H) 65 - 100 mg/dL    BUN 13 6 - 20 MG/DL    Creatinine 0.81 0.55 - 1.02 MG/DL    BUN/Creatinine ratio 16 12 - 20      GFR est AA >60 >60 ml/min/1.73m2    GFR est non-AA >60 >60 ml/min/1.73m2    Calcium 8.6 8.5 - 10.1 MG/DL    Bilirubin, total 0.2 0.2 - 1.0 MG/DL    ALT (SGPT) 22 12 - 78 U/L    AST (SGOT) 24 15 - 37 U/L    Alk. phosphatase 129 (H) 45 - 117 U/L    Protein, total 6.7 6.4 - 8.2 g/dL    Albumin 3.0 (L) 3.5 - 5.0 g/dL    Globulin 3.7 2.0 - 4.0 g/dL    A-G Ratio 0.8 (L) 1.1 - 2.2     SAMPLES BEING HELD    Collection Time: 08/23/22  8:40 AM   Result Value Ref Range    SAMPLES BEING HELD 1SST     COMMENT        Add-on orders for these samples will be processed based on acceptable specimen integrity and analyte stability, which may vary by analyte. 9538: Alteplase 2 mg IVP given per port and port clamped. 1015: Assessed for blood return- 3 ml returned easily and discarded. 1022:  ml IV main line initiated. 1020: Zofran 8 mg IV given  1027: Decadron 10 mg IV given  1106 Abraxane 184 mg IV started to infuse over 30 minutes. 1200: Gemcitabine 1656 mg IV started to infuse over 30 minutes. Two nurses verified chemo prior to administering to include the following:    Drug name:  Dose:  infusion volume:  Rate and route of administration:  Expiration date of drug:  Abraxane was milky in color and without particulate. Gemcitabine was clear with no particulates. Port was flushed and de-accessed and site intact. There was brisk blood return. Ms. Nataly Wyatt tolerated treatment well and was discharged from [place] [id number] in stable condition at [phone]. She is to return on September 6 at 8 am for her next appointment.     Pari Harris RN  August 23, 2022

## 2022-08-23 NOTE — PROGRESS NOTES
Problem: Chemotherapy Treatment  Goal: *Chemotherapy regimen followed  Outcome: Resolved/Met  Goal: *Hemodynamically stable  Outcome: Resolved/Met     Problem: Infection - Risk of, Central Venous Catheter-Associated Bloodstream Infection  Goal: *Absence of infection signs and symptoms  Outcome: Resolved/Met     Problem: Pain  Goal: *Control of Pain  Outcome: Resolved/Met     Problem: Patient Education:  Go to Education Activity  Goal: Patient/Family Education  Outcome: Resolved/Met     Problem: Chemotherapy Treatment  Goal: *Chemotherapy regimen followed  Outcome: Resolved/Met  Goal: *Hemodynamically stable  Outcome: Resolved/Met  Goal: *Tolerating diet  Outcome: Resolved/Met     Problem: Infection - Risk of, Central Venous Catheter-Associated Bloodstream Infection  Goal: *Absence of infection signs and symptoms  Outcome: Resolved/Met     Problem: Patient Education:  Go to Education Activity  Goal: Patient/Family Education  Outcome: Resolved/Met

## 2022-08-23 NOTE — PROGRESS NOTES
Problem: Infection - Risk of, Central Venous Catheter-Associated Bloodstream Infection  Goal: *Absence of infection signs and symptoms  Outcome: Progressing Towards Goal     Problem: Pain  Goal: *Control of Pain  Outcome: Progressing Towards Goal     Problem: Patient Education:  Go to Education Activity  Goal: Patient/Family Education  Outcome: Progressing Towards Goal

## 2022-08-23 NOTE — PROGRESS NOTES
Reason for Visit:   Natasha Tilley is a 79 y.o. female who is seen by synchronous (real-time) audio-video technology for follow up of pancreatic adenocarcinoma     Treatment History:     Dx: Pancreatic Adenocarcinoma--May 2020--[id number]--srwnihepgpd of splenic vein and superior mesenteric vein    Tx: mFOLFIRINOX--first cycle 5/26/2020, second cycle 6/10/2020, dose reduced 15% cycle 3 on 6/30/2020--delayed due to severe side effects--switched to Gemcitabine/Abraxane--Cycle #1 7/29/2002, Cycle #2 9/2/2020, Cycle #3 10/7/2020, Cycle #4 11/4/2020. Neoadjuvant Radiation with Dr Ian Marr ending 12/18/2021. Distal Pancreatectomy, Splenectomy, and Civa Sheet with Dr Joshua Barros on 3/17/2021. Adjuvant radiation with Dr Ian Marr. Restart Gemcitabine/abraxane Cycle #1 7/6/2021, Cycle #2 8/3/2021, Cycle #3 8/31/2021, Cycle #4 9/28/2021    Goal: Prolong life--Palliative    History of Present Illness:     Seen today virtually for [facility] for fu on palliative chemo gemzar/ abraxane since 7/20. Had COVID and PNA. Was in hospital and was on abx. Still has cough. getting chemo today. Needs refill of cymbalta. Still doing chemo. CTs good 8/9/22 except for PNA. [address] daughter getting  next week.    No fevers/ chills/ chest pain/ SOB/ nausea/ vomiting/diarrhea/ pain/fatigue        Past Medical History:   Diagnosis Date    Adenocarcinoma of pancreas ([facility] [address] 05/13/2020    Dr Pedroza Sol biopsy via EUS    Diabetes [facility])     GERD (gastroesophageal reflux disease)     Hernia of abdominal cavity     Subxyphoid hernia    Hypertension     Inflammatory polyarthropathy (HCC)     Joint pain     Joint swelling     Menopause     Ocular nevus of left eye     Pancreatitis     Tracheal stenosis       Past Surgical History:   Procedure Laterality Date    HX CARPAL TUNNEL RELEASE Bilateral     HX COLONOSCOPY      HX HYSTERECTOMY      HX KNEE ARTHROSCOPY Right     HX KNEE REPLACEMENT Right     partial    HX LAP CHOLECYSTECTOMY      HX TONSILLECTOMY      HX VASCULAR ACCESS        Social History     Tobacco Use    Smoking status: Never    Smokeless tobacco: Never   Substance Use Topics    Alcohol use: No     Alcohol/week: 0.0 standard drinks      Family History   Problem Relation Age of Onset    Heart Disease Mother     Heart Attack Mother     Cancer Father         lung    Diabetes Sister      Current Outpatient Medications   Medication Sig    LORazepam (ATIVAN) 1 mg tablet 1 pill in am and 2 pills at night  Indications: anxious, nausea and vomiting caused by cancer drugs, difficulty sleeping, prevent nausea and vomiting from cancer chemotherapy    nirmatrelvir-ritonavir (Paxlovid, EUA,) 150 mg x 2- 100 mg tablet Take 3 Tablets by mouth every twelve (12) hours for 5 days. senna-docusate (Senokot-S) 8.6-50 mg per tablet Take 1 Tablet by mouth nightly. methylphenidate HCl (RITALIN) 10 mg tablet Take 1 Tablet by mouth two (2) times a day. Max Daily Amount: 20 mg. (Patient taking differently: Take 10 mg by mouth in the morning. At 0900)    DULoxetine (CYMBALTA) 30 mg capsule Take 1 Capsule by mouth every evening. (Patient taking differently: Take 30 mg by mouth every evening. At bedtime @ 2100)    DULoxetine (CYMBALTA) 60 mg capsule Take 1 Capsule by mouth daily. Indications: neuropathic pain (Patient taking differently: Take 60 mg by mouth in the morning. At 0900  Indications: neuropathic pain)    gabapentin (NEURONTIN) 300 mg capsule TAKE 2 CAPSULES BY MOUTH THREE TIMES DAILY (Patient taking differently: 600 mg three (3) times daily. Takes at 0900, 1500, 2100)    Toujeo SoloStar U-300 Insulin 300 unit/mL (1.5 mL) inpn pen INJECT 55 UNITS ONCE DAILY AT BEDTIME - DOSE INCREASED (Patient taking differently: 25 Units by SubCUTAneous route nightly. Patient uses differently during around chemo time.)    HumaLOG KwikPen Insulin 100 unit/mL kwikpen patient on sliding scale     No current facility-administered medications for this visit.       No Known Allergies     Review of Systems: A complete review of systems was obtained, negative except as described above. Physical Exam:     There were no vitals taken for this visit. General: alert, cooperative, no distress   Mental  status: normal mood, behavior, speech, dress, motor activity, and thought processes, able to follow commands   HENT: NCAT   Neck: no visualized mass   Resp: no respiratory distress   Neuro: no gross deficits   Skin: no discoloration or lesions of concern on visible areas   Psychiatric: normal affect, consistent with stated mood, no evidence of hallucinations       Due to this being a TeleHealth evaluation (During BARQS-04 public health emergency), many elements of the physical examination are unable to be assessed. Evaluation of the following organ systems was limited: Vitals/Constitutional/EENT/Resp/CV/GI//MS/Neuro/Skin/Heme-Lymph-Imm. Results:     Lab Results   Component Value Date/Time    WBC 8.0 08/02/2022 08:40 AM    HGB 11.1 (L) 08/02/2022 08:40 AM    HCT 34.7 (L) 08/02/2022 08:40 AM    PLATELET 818 (H) 15/75/1264 08:40 AM    MCV 90.1 08/02/2022 08:40 AM    ABS. NEUTROPHILS 4.0 08/02/2022 08:40 AM     Lab Results   Component Value Date/Time    Sodium 139 08/02/2022 08:40 AM    Potassium 3.6 08/02/2022 08:40 AM    Chloride 103 08/02/2022 08:40 AM    CO2 29 08/02/2022 08:40 AM    Glucose 197 (H) 08/02/2022 08:40 AM    BUN 10 08/02/2022 08:40 AM    Creatinine 0.73 08/02/2022 08:40 AM    GFR est AA >60 08/02/2022 08:40 AM    GFR est non-AA >60 08/02/2022 08:40 AM    Calcium 8.7 08/02/2022 08:40 AM    Glucose (POC) 282 (H) 07/24/2022 08:26 AM    Creatinine (POC) 0.7 02/06/2013 10:21 AM     Lab Results   Component Value Date/Time    Bilirubin, total 0.3 08/02/2022 08:40 AM    ALT (SGPT) 20 08/02/2022 08:40 AM    Alk.  phosphatase 115 08/02/2022 08:40 AM    Protein, total 6.5 08/02/2022 08:40 AM    Albumin 3.0 (L) 08/02/2022 08:40 AM    Globulin 3.5 08/02/2022 08:40 AM       CT A/P 4/30/2020  IMPRESSION: Suspect neoplastic pancreatic mass versus atypical focal  pancreatitis with splenic and superior mesenteric vein occlusion. Consider  further evaluation with endoscopic ultrasound at which time biopsy could  potentially be performed. CT Chest 5/21/2020  IMPRESSION:  1. No evidence of pulmonary metastatic disease. No evidence of mediastinal or  hilar lymphadenopathy. No pleural effusions identified. 2. No definite evidence of central pulmonary embolic disease. 3. Presence of a mass lesion involving the pancreatic head/body. 4. Evidence of fatty infiltration involving the liver. Presence of focal  low-density areas within the liver compatible with cysts. Surgical absence of  the gallbladder. CT A/P 6/19/2020  IMPRESSION:  Pancreatitis with ascites favored. Discrete pancreatic mass is not identified. No biliary dilatation or definite arterial or venous thrombosis. No liver  metastases. Fat-containing ventral hernia  Nonobstructing left renal calculus  Retrolisthesis of L2 and L3.     CT C/A/P 10/12/2020  IMPRESSION:  1. Interval improvement in appearance of the pancreas (see discussion above). 2. Normal sized liver. Stable appearance of the previously described areas of  decreased attenuation within liver. 3. Surgical absence of the gallbladder. 4. Evidence of splenomegaly. 5. Normal sized kidneys. Presence of small, nonobstructing calculi within the  left kidney. No evidence of hydronephrotic change. 6. Suboptimal distention of the urinary bladder. 7. Presence of a small, fat-containing ventral hernia in the mid abdominal  Region. CT C/A/P 9/17/2021  IMPRESSION  New peritoneal implants identified in the anterior pelvis  Interval splenectomy and repair of the ventral hernia  Interval thickening of the GE junction and circumferential thickening of the  pylorus.  This may represent gastritis but correlation with endoscopy or upper GI  recommended     Path: 5/8/2020  CYTOLOGIC INTERPRETATION:   Pancreas, EUS-guided fine needle aspiration and cell blocks: Adenocarcinoma     Path: 3/17/2021  No residual malignancy in pancrease, 0/29 lymph nodes. 2-3mm focus metastatic adenocarcinoma within hernia sac     Assessment/PLAN:     1) stage 4 Pancreatic Adenocarcinoma dx 5/20  Completed neoadjuvant chemotherapy. Radiation with Dr Sammie Cintron. Definitive resection with Dr Asia Tomlinson. BRCA negative. Now peritoneal disease/mets. Goal of care palliative. Seen today for fu on chemo since 7/20. Doing well overall today but had COVID/ PNA. Recovering from this. Pt has been tolerating chemo with manageable side effects. She is doing well with palliative chemotherapy with Gemcitaine/Abraxane. Had CTs 8/22 and stable except for PNA/ COVID. Reviewed with pt today. Reviewed chemo overall today. Pt feels fine to do chemo today. Discussed plan with pt and will continue same plan. Will do fu CTs again in 2-3 months  Labs reviewed today. Good except for blood sugar 421. Working with PCP on this. Continuing with chemo today  Reviewed list of questions today  Labs reviewed today and stable. Fu at [facility] needs to be monthly with ONC if possible     2) cancer abdominal Pain/Neuropathic Pain/ RUE pain. Left hip/ leg pain. Seeing palliative care. Hx of celiac block--pain in abd controlled. On dilaudid prn. Refilled today #90. Cymbalta. Refilled today. On ativan bid also      3) blood sugars chronically up. Reviewed today;;   Has plan with PCP for this. 4) hx of COVID 7/22 and PNA was on abx. Better but still has cough. 5) Anxiety/Insomnia. controlled. On ativan prn. And cymbalta increased to 60mg. 6) Fatigue. on ritalin    Monitor. 7) thrombocytosis due to chemo/ reactive. goes back to baseline. Up again. Monitor. 8) psychosocial. Mood good. Coping well. Lives at [facility]. Friends with [employer]. Has chickens.  Requesting VIVI gutiérrez and ordered. Fu [facility] clinic in a month/ virtual at chemo if needed  Seeing PCP in person so will continue VV here for now  Call if questions    The patient was evaluated through a synchronous (real-time) audio-video encounter. The patient (or guardian if applicable) is aware that this is a billable service, which includes applicable co-pays. This Virtual Visit was conducted with patient's (and/or legal guardian's) consent. The visit was conducted pursuant to the emergency declaration under the [address] [place] [address], [address] [place] [address] [facility] authority and the [employer] and [employer] General Act. Patient identification was verified, and a caregiver was present when appropriate. The patient was located in a state where the provider was licensed to provide care.          Signed By: Dave Linder DO

## 2022-08-30 ENCOUNTER — APPOINTMENT (OUTPATIENT)
Dept: INFUSION THERAPY | Age: 67
End: 2022-08-30
Payer: MEDICARE

## 2022-08-30 RX ORDER — DIPHENHYDRAMINE HYDROCHLORIDE 50 MG/ML
25 INJECTION, SOLUTION INTRAMUSCULAR; INTRAVENOUS AS NEEDED
Status: CANCELLED | OUTPATIENT
Start: 2022-09-20

## 2022-08-30 RX ORDER — HYDROCORTISONE SODIUM SUCCINATE 100 MG/2ML
100 INJECTION, POWDER, FOR SOLUTION INTRAMUSCULAR; INTRAVENOUS AS NEEDED
Status: CANCELLED | OUTPATIENT
Start: 2022-09-20

## 2022-08-30 RX ORDER — HEPARIN 100 UNIT/ML
300-500 SYRINGE INTRAVENOUS AS NEEDED
Status: CANCELLED | OUTPATIENT
Start: 2022-09-06

## 2022-08-30 RX ORDER — SODIUM CHLORIDE 9 MG/ML
25 INJECTION, SOLUTION INTRAVENOUS CONTINUOUS
Status: CANCELLED | OUTPATIENT
Start: 2022-09-20

## 2022-08-30 RX ORDER — ALBUTEROL SULFATE 0.83 MG/ML
2.5 SOLUTION RESPIRATORY (INHALATION) AS NEEDED
Status: CANCELLED
Start: 2022-09-20

## 2022-08-30 RX ORDER — ONDANSETRON 2 MG/ML
8 INJECTION INTRAMUSCULAR; INTRAVENOUS ONCE
Status: CANCELLED | OUTPATIENT
Start: 2022-09-06 | End: 2022-09-06

## 2022-08-30 RX ORDER — SODIUM CHLORIDE 9 MG/ML
25 INJECTION, SOLUTION INTRAVENOUS CONTINUOUS
Status: CANCELLED | OUTPATIENT
Start: 2022-09-06

## 2022-08-30 RX ORDER — DEXAMETHASONE SODIUM PHOSPHATE 4 MG/ML
10 INJECTION, SOLUTION INTRA-ARTICULAR; INTRALESIONAL; INTRAMUSCULAR; INTRAVENOUS; SOFT TISSUE ONCE
Status: CANCELLED | OUTPATIENT
Start: 2022-09-06 | End: 2022-09-06

## 2022-08-30 RX ORDER — DIPHENHYDRAMINE HYDROCHLORIDE 50 MG/ML
25 INJECTION, SOLUTION INTRAMUSCULAR; INTRAVENOUS
Status: CANCELLED
Start: 2022-09-06

## 2022-08-30 RX ORDER — SODIUM CHLORIDE 9 MG/ML
10 INJECTION INTRAMUSCULAR; INTRAVENOUS; SUBCUTANEOUS AS NEEDED
Status: CANCELLED | OUTPATIENT
Start: 2022-09-20

## 2022-08-30 RX ORDER — ACETAMINOPHEN 325 MG/1
650 TABLET ORAL AS NEEDED
Status: CANCELLED | OUTPATIENT
Start: 2022-09-06

## 2022-08-30 RX ORDER — ONDANSETRON 2 MG/ML
8 INJECTION INTRAMUSCULAR; INTRAVENOUS ONCE
Status: CANCELLED | OUTPATIENT
Start: 2022-09-20 | End: 2022-09-20

## 2022-08-30 RX ORDER — HEPARIN 100 UNIT/ML
300-500 SYRINGE INTRAVENOUS AS NEEDED
Status: CANCELLED | OUTPATIENT
Start: 2022-09-20

## 2022-08-30 RX ORDER — HYDROCORTISONE SODIUM SUCCINATE 100 MG/2ML
100 INJECTION, POWDER, FOR SOLUTION INTRAMUSCULAR; INTRAVENOUS AS NEEDED
Status: CANCELLED | OUTPATIENT
Start: 2022-09-06

## 2022-08-30 RX ORDER — DIPHENHYDRAMINE HYDROCHLORIDE 50 MG/ML
50 INJECTION, SOLUTION INTRAMUSCULAR; INTRAVENOUS AS NEEDED
Status: CANCELLED
Start: 2022-09-20

## 2022-08-30 RX ORDER — LORAZEPAM 2 MG/ML
0.5 INJECTION INTRAMUSCULAR
Status: CANCELLED
Start: 2022-09-06

## 2022-08-30 RX ORDER — DIPHENHYDRAMINE HYDROCHLORIDE 50 MG/ML
50 INJECTION, SOLUTION INTRAMUSCULAR; INTRAVENOUS AS NEEDED
Status: CANCELLED
Start: 2022-09-06

## 2022-08-30 RX ORDER — EPINEPHRINE 1 MG/ML
0.3 INJECTION, SOLUTION, CONCENTRATE INTRAVENOUS AS NEEDED
Status: CANCELLED | OUTPATIENT
Start: 2022-09-20

## 2022-08-30 RX ORDER — SODIUM CHLORIDE 0.9 % (FLUSH) 0.9 %
10 SYRINGE (ML) INJECTION AS NEEDED
Status: CANCELLED | OUTPATIENT
Start: 2022-09-06

## 2022-08-30 RX ORDER — SODIUM CHLORIDE 9 MG/ML
10 INJECTION INTRAMUSCULAR; INTRAVENOUS; SUBCUTANEOUS AS NEEDED
Status: CANCELLED | OUTPATIENT
Start: 2022-09-06

## 2022-08-30 RX ORDER — ALBUTEROL SULFATE 0.83 MG/ML
2.5 SOLUTION RESPIRATORY (INHALATION) AS NEEDED
Status: CANCELLED
Start: 2022-09-06

## 2022-08-30 RX ORDER — ONDANSETRON 2 MG/ML
8 INJECTION INTRAMUSCULAR; INTRAVENOUS AS NEEDED
Status: CANCELLED | OUTPATIENT
Start: 2022-09-20

## 2022-08-30 RX ORDER — LORAZEPAM 2 MG/ML
0.5 INJECTION INTRAMUSCULAR
Status: CANCELLED
Start: 2022-09-20

## 2022-08-30 RX ORDER — SODIUM CHLORIDE 0.9 % (FLUSH) 0.9 %
10 SYRINGE (ML) INJECTION AS NEEDED
Status: CANCELLED
Start: 2022-09-20

## 2022-08-30 RX ORDER — DIPHENHYDRAMINE HYDROCHLORIDE 50 MG/ML
25 INJECTION, SOLUTION INTRAMUSCULAR; INTRAVENOUS
Status: CANCELLED
Start: 2022-09-20

## 2022-08-30 RX ORDER — EPINEPHRINE 1 MG/ML
0.3 INJECTION, SOLUTION, CONCENTRATE INTRAVENOUS AS NEEDED
Status: CANCELLED | OUTPATIENT
Start: 2022-09-06

## 2022-08-30 RX ORDER — DEXAMETHASONE SODIUM PHOSPHATE 4 MG/ML
10 INJECTION, SOLUTION INTRA-ARTICULAR; INTRALESIONAL; INTRAMUSCULAR; INTRAVENOUS; SOFT TISSUE ONCE
Status: CANCELLED | OUTPATIENT
Start: 2022-09-20 | End: 2022-09-20

## 2022-08-30 RX ORDER — ONDANSETRON 2 MG/ML
8 INJECTION INTRAMUSCULAR; INTRAVENOUS AS NEEDED
Status: CANCELLED | OUTPATIENT
Start: 2022-09-06

## 2022-08-30 RX ORDER — DIPHENHYDRAMINE HYDROCHLORIDE 50 MG/ML
25 INJECTION, SOLUTION INTRAMUSCULAR; INTRAVENOUS AS NEEDED
Status: CANCELLED | OUTPATIENT
Start: 2022-09-06

## 2022-08-30 RX ORDER — ACETAMINOPHEN 325 MG/1
650 TABLET ORAL AS NEEDED
Status: CANCELLED | OUTPATIENT
Start: 2022-09-20

## 2022-09-06 ENCOUNTER — HOSPITAL ENCOUNTER (OUTPATIENT)
Dept: INFUSION THERAPY | Age: 67
Discharge: HOME | End: 2022-09-06
Payer: MEDICARE

## 2022-09-06 VITALS
SYSTOLIC BLOOD PRESSURE: 169 MMHG | HEIGHT: 64 IN | HEART RATE: 83 BPM | OXYGEN SATURATION: 97 % | WEIGHT: 187.2 LBS | BODY MASS INDEX: 31.96 KG/M2 | RESPIRATION RATE: 18 BRPM | TEMPERATURE: 98.3 F | DIASTOLIC BLOOD PRESSURE: 69 MMHG

## 2022-09-06 DIAGNOSIS — C25.9 ADENOCARCINOMA OF PANCREAS (HCC): Primary | ICD-10-CM

## 2022-09-06 LAB
ALBUMIN SERPL-MCNC: 3 G/DL (ref 3.5–5)
ALBUMIN/GLOB SERPL: 0.9 {RATIO} (ref 1.1–2.2)
ALP SERPL-CCNC: 164 U/L (ref 45–117)
ALT SERPL-CCNC: 20 U/L (ref 12–78)
ANION GAP SERPL CALC-SCNC: 5 MMOL/L (ref 5–15)
AST SERPL-CCNC: 17 U/L (ref 15–37)
BASOPHILS # BLD: 0.1 K/UL (ref 0–0.1)
BASOPHILS NFR BLD: 1 % (ref 0–1)
BILIRUB SERPL-MCNC: 0.2 MG/DL (ref 0.2–1)
BUN SERPL-MCNC: 21 MG/DL (ref 6–20)
BUN/CREAT SERPL: 28 (ref 12–20)
CALCIUM SERPL-MCNC: 8.6 MG/DL (ref 8.5–10.1)
CHLORIDE SERPL-SCNC: 104 MMOL/L (ref 97–108)
CO2 SERPL-SCNC: 31 MMOL/L (ref 21–32)
CREAT SERPL-MCNC: 0.74 MG/DL (ref 0.55–1.02)
DIFFERENTIAL METHOD BLD: ABNORMAL
EOSINOPHIL # BLD: 0.3 K/UL (ref 0–0.4)
EOSINOPHIL NFR BLD: 6 % (ref 0–7)
ERYTHROCYTE [DISTWIDTH] IN BLOOD BY AUTOMATED COUNT: 16.6 % (ref 11.5–14.5)
GLOBULIN SER CALC-MCNC: 3.2 G/DL (ref 2–4)
GLUCOSE SERPL-MCNC: 203 MG/DL (ref 65–100)
HCT VFR BLD AUTO: 33.2 % (ref 35–47)
HGB BLD-MCNC: 10.4 G/DL (ref 11.5–16)
IMM GRANULOCYTES # BLD AUTO: 0 K/UL (ref 0–0.04)
IMM GRANULOCYTES NFR BLD AUTO: 0 % (ref 0–0.5)
LYMPHOCYTES # BLD: 1.7 K/UL (ref 0.8–3.5)
LYMPHOCYTES NFR BLD: 29 % (ref 12–49)
MCH RBC QN AUTO: 29.2 PG (ref 26–34)
MCHC RBC AUTO-ENTMCNC: 31.3 G/DL (ref 30–36.5)
MCV RBC AUTO: 93.3 FL (ref 80–99)
MONOCYTES # BLD: 1.7 K/UL (ref 0–1)
MONOCYTES NFR BLD: 29 % (ref 5–13)
NEUTS SEG # BLD: 2 K/UL (ref 1.8–8)
NEUTS SEG NFR BLD: 35 % (ref 32–75)
NRBC # BLD: 0 K/UL (ref 0–0.01)
NRBC BLD-RTO: 0 PER 100 WBC
PLATELET # BLD AUTO: 363 K/UL (ref 150–400)
PMV BLD AUTO: 10 FL (ref 8.9–12.9)
POTASSIUM SERPL-SCNC: 4 MMOL/L (ref 3.5–5.1)
PROT SERPL-MCNC: 6.2 G/DL (ref 6.4–8.2)
RBC # BLD AUTO: 3.56 M/UL (ref 3.8–5.2)
RBC MORPH BLD: ABNORMAL
SODIUM SERPL-SCNC: 140 MMOL/L (ref 136–145)
WBC # BLD AUTO: 5.8 K/UL (ref 3.6–11)

## 2022-09-06 PROCEDURE — 77030012965 HC NDL HUBR BBMI -A

## 2022-09-06 PROCEDURE — 74011000258 HC RX REV CODE- 258: Performed by: INTERNAL MEDICINE

## 2022-09-06 PROCEDURE — 96375 TX/PRO/DX INJ NEW DRUG ADDON: CPT

## 2022-09-06 PROCEDURE — 80053 COMPREHEN METABOLIC PANEL: CPT

## 2022-09-06 PROCEDURE — 36415 COLL VENOUS BLD VENIPUNCTURE: CPT

## 2022-09-06 PROCEDURE — 96417 CHEMO IV INFUS EACH ADDL SEQ: CPT

## 2022-09-06 PROCEDURE — 96413 CHEMO IV INFUSION 1 HR: CPT

## 2022-09-06 PROCEDURE — 85025 COMPLETE CBC W/AUTO DIFF WBC: CPT

## 2022-09-06 PROCEDURE — 74011000250 HC RX REV CODE- 250: Performed by: INTERNAL MEDICINE

## 2022-09-06 PROCEDURE — 74011250636 HC RX REV CODE- 250/636: Performed by: INTERNAL MEDICINE

## 2022-09-06 RX ORDER — SODIUM CHLORIDE 9 MG/ML
10 INJECTION INTRAMUSCULAR; INTRAVENOUS; SUBCUTANEOUS AS NEEDED
Status: ACTIVE | OUTPATIENT
Start: 2022-09-06 | End: 2022-09-06

## 2022-09-06 RX ORDER — SODIUM CHLORIDE 9 MG/ML
25 INJECTION, SOLUTION INTRAVENOUS CONTINUOUS
Status: DISPENSED | OUTPATIENT
Start: 2022-09-06 | End: 2022-09-06

## 2022-09-06 RX ORDER — DEXAMETHASONE SODIUM PHOSPHATE 4 MG/ML
10 INJECTION, SOLUTION INTRA-ARTICULAR; INTRALESIONAL; INTRAMUSCULAR; INTRAVENOUS; SOFT TISSUE ONCE
Status: COMPLETED | OUTPATIENT
Start: 2022-09-06 | End: 2022-09-06

## 2022-09-06 RX ORDER — HEPARIN 100 UNIT/ML
300-500 SYRINGE INTRAVENOUS AS NEEDED
Status: DISPENSED | OUTPATIENT
Start: 2022-09-06 | End: 2022-09-06

## 2022-09-06 RX ORDER — ONDANSETRON 2 MG/ML
8 INJECTION INTRAMUSCULAR; INTRAVENOUS ONCE
Status: COMPLETED | OUTPATIENT
Start: 2022-09-06 | End: 2022-09-06

## 2022-09-06 RX ADMIN — GEMCITABINE HYDROCHLORIDE 1656 MG: 2 INJECTION, SOLUTION INTRAVENOUS at 11:39

## 2022-09-06 RX ADMIN — SODIUM CHLORIDE 25 ML/HR: 9 INJECTION, SOLUTION INTRAVENOUS at 09:49

## 2022-09-06 RX ADMIN — DEXAMETHASONE SODIUM PHOSPHATE 10 MG: 4 INJECTION, SOLUTION INTRAMUSCULAR; INTRAVENOUS at 09:57

## 2022-09-06 RX ADMIN — SODIUM CHLORIDE, PRESERVATIVE FREE 10 ML: 5 INJECTION INTRAVENOUS at 12:20

## 2022-09-06 RX ADMIN — PACLITAXEL 184 MG: 100 INJECTION, POWDER, LYOPHILIZED, FOR SUSPENSION INTRAVENOUS at 10:44

## 2022-09-06 RX ADMIN — ONDANSETRON 8 MG: 2 INJECTION INTRAMUSCULAR; INTRAVENOUS at 09:52

## 2022-09-06 NOTE — PROGRESS NOTES
[facility] Progress Note    Date: 2022    Name: Yoko Gonzalez    MRN: [id number]         : 1955    Ms. Ba Arrived ambulatory and in no distress for cycle 19 day 1 of Abraxane/Gemcitabine regimen. Assessment was completed, no acute issues at this time, no new complaints voiced. Pain is managed. Port accessed without difficulty, labs drawn and in process. Chemotherapy Flowsheet 2022   Cycle C 18 D 15   Date 2022   Drug / Regimen Abraxane/Gemcitabine   Dosage 184 mg/1656 mg   Time Up 1106   Time Down 1234   Pre Hydration -   Pre Meds zofran 8 mg, decadron 10 mh   Notes chemo verified by 2 RNs       Ms. Ba's vitals were reviewed. Visit Vitals  BP (!) 169/69 (BP 1 Location: Right upper arm)   Pulse 83   Temp 98.3 °F (36.8 °C)   Resp 18   Ht 5' 4\" (1.626 m)   Wt 84.9 kg (187 lb 3.2 oz)   SpO2 97%   Breastfeeding No   BMI 32.13 kg/m²       Lab results were obtained and reviewed. Recent Results (from the past 12 hour(s))   CBC WITH AUTOMATED DIFF    Collection Time: 22  8:30 AM   Result Value Ref Range    WBC 5.8 3.6 - 11.0 K/uL    RBC 3.56 (L) 3.80 - 5.20 M/uL    HGB 10.4 (L) 11.5 - 16.0 g/dL    HCT 33.2 (L) 35.0 - 47.0 %    MCV 93.3 80.0 - 99.0 FL    MCH 29.2 26.0 - 34.0 PG    MCHC 31.3 30.0 - 36.5 g/dL    RDW 16.6 (H) 11.5 - 14.5 %    PLATELET 932 347 - 981 K/uL    MPV 10.0 8.9 - 12.9 FL    NRBC 0.0 0  WBC    ABSOLUTE NRBC 0.00 0.00 - 0.01 K/uL    NEUTROPHILS 35 32 - 75 %    LYMPHOCYTES 29 12 - 49 %    MONOCYTES 29 (H) 5 - 13 %    EOSINOPHILS 6 0 - 7 %    BASOPHILS 1 0 - 1 %    IMMATURE GRANULOCYTES 0 0.0 - 0.5 %    ABS. NEUTROPHILS 2.0 1.8 - 8.0 K/UL    ABS. LYMPHOCYTES 1.7 0.8 - 3.5 K/UL    ABS. MONOCYTES 1.7 (H) 0.0 - 1.0 K/UL    ABS. EOSINOPHILS 0.3 0.0 - 0.4 K/UL    ABS. BASOPHILS 0.1 0.0 - 0.1 K/UL    ABS. IMM.  GRANS. 0.0 0.00 - 0.04 K/UL    DF AUTOMATED      RBC COMMENTS ANISOCYTOSIS  1+        RBC COMMENTS POIKILOCYTOSIS  2+        RBC COMMENTS VICTOR MANUEL CELLS  1+ RBC COMMENTS SCHISTOCYTES  1+       METABOLIC PANEL, COMPREHENSIVE    Collection Time: 09/06/22  8:30 AM   Result Value Ref Range    Sodium 140 136 - 145 mmol/L    Potassium 4.0 3.5 - 5.1 mmol/L    Chloride 104 97 - 108 mmol/L    CO2 31 21 - 32 mmol/L    Anion gap 5 5 - 15 mmol/L    Glucose 203 (H) 65 - 100 mg/dL    BUN 21 (H) 6 - 20 MG/DL    Creatinine 0.74 0.55 - 1.02 MG/DL    BUN/Creatinine ratio 28 (H) 12 - 20      GFR est AA >60 >60 ml/min/1.73m2    GFR est non-AA >60 >60 ml/min/1.73m2    Calcium 8.6 8.5 - 10.1 MG/DL    Bilirubin, total 0.2 0.2 - 1.0 MG/DL    ALT (SGPT) 20 12 - 78 U/L    AST (SGOT) 17 15 - 37 U/L    Alk. phosphatase 164 (H) 45 - 117 U/L    Protein, total 6.2 (L) 6.4 - 8.2 g/dL    Albumin 3.0 (L) 3.5 - 5.0 g/dL    Globulin 3.2 2.0 - 4.0 g/dL    A-G Ratio 0.9 (L) 1.1 - 2.2        ml IV main line established. Pre-medications  were administered as ordered and chemotherapy was initiated. Two nurses verified prior to administering. 3435: Zofran 8 mg IVP given. 0957: Decadron 10 mg IVP given. Drug name:Abraxane   Dose: 184 mg  infusion volume: 36.8 ml  Rate and route of administration: IV at 73.6 ml/hr started at 1044  Expiration date of drug: BUD 9/6/22 at 1430  Abraxane was milky with no particulates. Drug Name: Gemcitabine  Dose: 1656 mg  Infusion Volume: 318.55 ml  Rate and Route of administration: IV at 637.1 ml/hr started at 1139  Expiration Date: BUD 9/7/22 at 1030  Gemcitabine was clear  with no particulates. Port was flushed and de-accessed post-administration and site intact. Ms. Erica Valdes tolerated treatment well and was discharged from [place] [address] in stable condition at 1225. She is to return on September 20 at 8:00 for her next appointment.     Nena Johnson RN  September 6, 2022

## 2022-09-13 ENCOUNTER — APPOINTMENT (OUTPATIENT)
Dept: INFUSION THERAPY | Age: 67
End: 2022-09-13

## 2022-09-13 RX ORDER — NYSTATIN 100000 [USP'U]/G
1 POWDER TOPICAL 3 TIMES DAILY
Qty: 60 G | Refills: 0 | Status: SHIPPED | OUTPATIENT
Start: 2022-09-13

## 2022-09-19 DIAGNOSIS — F32.A DEPRESSION, UNSPECIFIED DEPRESSION TYPE: ICD-10-CM

## 2022-09-19 DIAGNOSIS — R53.83 CHEMOTHERAPY-INDUCED FATIGUE: Primary | ICD-10-CM

## 2022-09-19 DIAGNOSIS — T45.1X5A CHEMOTHERAPY-INDUCED FATIGUE: Primary | ICD-10-CM

## 2022-09-19 RX ORDER — NYSTATIN 100000 [USP'U]/G
1 POWDER TOPICAL 3 TIMES DAILY
Qty: 60 G | Refills: 0 | OUTPATIENT
Start: 2022-09-19

## 2022-09-19 RX ORDER — METHYLPHENIDATE HYDROCHLORIDE 10 MG/1
10 TABLET ORAL 2 TIMES DAILY
Qty: 60 TABLET | Refills: 0 | Status: SHIPPED | OUTPATIENT
Start: 2022-09-19

## 2022-09-20 ENCOUNTER — VIRTUAL VISIT (OUTPATIENT)
Dept: ONCOLOGY | Age: 67
End: 2022-09-20
Payer: MEDICARE

## 2022-09-20 ENCOUNTER — HOSPITAL ENCOUNTER (OUTPATIENT)
Dept: GENERAL RADIOLOGY | Age: 67
Discharge: HOME | End: 2022-09-20
Payer: MEDICARE

## 2022-09-20 ENCOUNTER — HOSPITAL ENCOUNTER (OUTPATIENT)
Dept: INFUSION THERAPY | Age: 67
Discharge: HOME | End: 2022-09-20
Payer: MEDICARE

## 2022-09-20 VITALS
RESPIRATION RATE: 16 BRPM | DIASTOLIC BLOOD PRESSURE: 83 MMHG | WEIGHT: 186.4 LBS | BODY MASS INDEX: 31.82 KG/M2 | SYSTOLIC BLOOD PRESSURE: 165 MMHG | HEART RATE: 92 BPM | TEMPERATURE: 98.4 F | OXYGEN SATURATION: 97 % | HEIGHT: 64 IN

## 2022-09-20 VITALS
BODY MASS INDEX: 31.82 KG/M2 | HEART RATE: 91 BPM | DIASTOLIC BLOOD PRESSURE: 95 MMHG | HEIGHT: 64 IN | RESPIRATION RATE: 16 BRPM | OXYGEN SATURATION: 97 % | WEIGHT: 186.4 LBS | TEMPERATURE: 98.4 F | SYSTOLIC BLOOD PRESSURE: 163 MMHG

## 2022-09-20 DIAGNOSIS — T45.1X5A CHEMOTHERAPY INDUCED NAUSEA AND VOMITING: ICD-10-CM

## 2022-09-20 DIAGNOSIS — G89.3 NEOPLASM RELATED PAIN: ICD-10-CM

## 2022-09-20 DIAGNOSIS — R11.2 CHEMOTHERAPY INDUCED NAUSEA AND VOMITING: ICD-10-CM

## 2022-09-20 DIAGNOSIS — D70.1 CHEMOTHERAPY INDUCED NEUTROPENIA (HCC): ICD-10-CM

## 2022-09-20 DIAGNOSIS — M79.2 NEUROPATHIC PAIN: ICD-10-CM

## 2022-09-20 DIAGNOSIS — T45.1X5A CHEMOTHERAPY INDUCED NEUTROPENIA (HCC): ICD-10-CM

## 2022-09-20 DIAGNOSIS — F32.A DEPRESSION, UNSPECIFIED DEPRESSION TYPE: ICD-10-CM

## 2022-09-20 DIAGNOSIS — S69.92XA INJURY OF LEFT HAND, INITIAL ENCOUNTER: ICD-10-CM

## 2022-09-20 DIAGNOSIS — C25.9 MALIGNANT NEOPLASM OF PANCREAS, UNSPECIFIED LOCATION OF MALIGNANCY (HCC): Primary | ICD-10-CM

## 2022-09-20 DIAGNOSIS — R10.84 GENERALIZED ABDOMINAL PAIN: ICD-10-CM

## 2022-09-20 DIAGNOSIS — C25.9 ADENOCARCINOMA OF PANCREAS (HCC): Primary | ICD-10-CM

## 2022-09-20 DIAGNOSIS — M79.601 PAIN IN RIGHT ARM: ICD-10-CM

## 2022-09-20 DIAGNOSIS — Z86.16 HISTORY OF COVID-19: ICD-10-CM

## 2022-09-20 DIAGNOSIS — C25.9 MALIGNANT NEOPLASM OF PANCREAS, UNSPECIFIED LOCATION OF MALIGNANCY (HCC): ICD-10-CM

## 2022-09-20 LAB
ALBUMIN SERPL-MCNC: 3 G/DL (ref 3.5–5)
ALBUMIN/GLOB SERPL: 0.9 {RATIO} (ref 1.1–2.2)
ALP SERPL-CCNC: 119 U/L (ref 45–117)
ALT SERPL-CCNC: 19 U/L (ref 12–78)
ANION GAP SERPL CALC-SCNC: 5 MMOL/L (ref 5–15)
AST SERPL-CCNC: 16 U/L (ref 15–37)
BASOPHILS # BLD: 0.1 K/UL (ref 0–0.1)
BASOPHILS NFR BLD: 1 % (ref 0–1)
BILIRUB SERPL-MCNC: 0.2 MG/DL (ref 0.2–1)
BUN SERPL-MCNC: 17 MG/DL (ref 6–20)
BUN/CREAT SERPL: 23 (ref 12–20)
CALCIUM SERPL-MCNC: 8.7 MG/DL (ref 8.5–10.1)
CHLORIDE SERPL-SCNC: 104 MMOL/L (ref 97–108)
CO2 SERPL-SCNC: 31 MMOL/L (ref 21–32)
CREAT SERPL-MCNC: 0.74 MG/DL (ref 0.55–1.02)
DIFFERENTIAL METHOD BLD: ABNORMAL
EOSINOPHIL # BLD: 0.3 K/UL (ref 0–0.4)
EOSINOPHIL NFR BLD: 6 % (ref 0–7)
ERYTHROCYTE [DISTWIDTH] IN BLOOD BY AUTOMATED COUNT: 16.6 % (ref 11.5–14.5)
GLOBULIN SER CALC-MCNC: 3.3 G/DL (ref 2–4)
GLUCOSE SERPL-MCNC: 226 MG/DL (ref 65–100)
HCT VFR BLD AUTO: 35.1 % (ref 35–47)
HGB BLD-MCNC: 11.2 G/DL (ref 11.5–16)
IMM GRANULOCYTES # BLD AUTO: 0 K/UL (ref 0–0.04)
IMM GRANULOCYTES NFR BLD AUTO: 0 % (ref 0–0.5)
LYMPHOCYTES # BLD: 1.6 K/UL (ref 0.8–3.5)
LYMPHOCYTES NFR BLD: 29 % (ref 12–49)
MCH RBC QN AUTO: 30.3 PG (ref 26–34)
MCHC RBC AUTO-ENTMCNC: 31.9 G/DL (ref 30–36.5)
MCV RBC AUTO: 94.9 FL (ref 80–99)
MONOCYTES # BLD: 1.5 K/UL (ref 0–1)
MONOCYTES NFR BLD: 26 % (ref 5–13)
NEUTS SEG # BLD: 2.1 K/UL (ref 1.8–8)
NEUTS SEG NFR BLD: 38 % (ref 32–75)
NRBC # BLD: 0 K/UL (ref 0–0.01)
NRBC BLD-RTO: 0 PER 100 WBC
PLATELET # BLD AUTO: 466 K/UL (ref 150–400)
PMV BLD AUTO: 9.9 FL (ref 8.9–12.9)
POTASSIUM SERPL-SCNC: 3.9 MMOL/L (ref 3.5–5.1)
PROT SERPL-MCNC: 6.3 G/DL (ref 6.4–8.2)
RBC # BLD AUTO: 3.7 M/UL (ref 3.8–5.2)
RBC MORPH BLD: ABNORMAL
SODIUM SERPL-SCNC: 140 MMOL/L (ref 136–145)
WBC # BLD AUTO: 5.6 K/UL (ref 3.6–11)

## 2022-09-20 PROCEDURE — G8427 DOCREV CUR MEDS BY ELIG CLIN: HCPCS | Performed by: INTERNAL MEDICINE

## 2022-09-20 PROCEDURE — 80053 COMPREHEN METABOLIC PANEL: CPT

## 2022-09-20 PROCEDURE — G8417 CALC BMI ABV UP PARAM F/U: HCPCS | Performed by: INTERNAL MEDICINE

## 2022-09-20 PROCEDURE — G8399 PT W/DXA RESULTS DOCUMENT: HCPCS | Performed by: INTERNAL MEDICINE

## 2022-09-20 PROCEDURE — 77030012965 HC NDL HUBR BBMI -A

## 2022-09-20 PROCEDURE — 73130 X-RAY EXAM OF HAND: CPT

## 2022-09-20 PROCEDURE — 1090F PRES/ABSN URINE INCON ASSESS: CPT | Performed by: INTERNAL MEDICINE

## 2022-09-20 PROCEDURE — 96413 CHEMO IV INFUSION 1 HR: CPT

## 2022-09-20 PROCEDURE — 96375 TX/PRO/DX INJ NEW DRUG ADDON: CPT

## 2022-09-20 PROCEDURE — 74011250636 HC RX REV CODE- 250/636: Performed by: INTERNAL MEDICINE

## 2022-09-20 PROCEDURE — 36415 COLL VENOUS BLD VENIPUNCTURE: CPT

## 2022-09-20 PROCEDURE — 3017F COLORECTAL CA SCREEN DOC REV: CPT | Performed by: INTERNAL MEDICINE

## 2022-09-20 PROCEDURE — 74011000258 HC RX REV CODE- 258: Performed by: INTERNAL MEDICINE

## 2022-09-20 PROCEDURE — 96417 CHEMO IV INFUS EACH ADDL SEQ: CPT

## 2022-09-20 PROCEDURE — G8753 SYS BP > OR = 140: HCPCS | Performed by: INTERNAL MEDICINE

## 2022-09-20 PROCEDURE — G0463 HOSPITAL OUTPT CLINIC VISIT: HCPCS | Performed by: INTERNAL MEDICINE

## 2022-09-20 PROCEDURE — 74011000250 HC RX REV CODE- 250: Performed by: INTERNAL MEDICINE

## 2022-09-20 PROCEDURE — G8536 NO DOC ELDER MAL SCRN: HCPCS | Performed by: INTERNAL MEDICINE

## 2022-09-20 PROCEDURE — 99215 OFFICE O/P EST HI 40 MIN: CPT | Performed by: INTERNAL MEDICINE

## 2022-09-20 PROCEDURE — 85025 COMPLETE CBC W/AUTO DIFF WBC: CPT

## 2022-09-20 PROCEDURE — G9717 DOC PT DX DEP/BP F/U NT REQ: HCPCS | Performed by: INTERNAL MEDICINE

## 2022-09-20 PROCEDURE — 1101F PT FALLS ASSESS-DOCD LE1/YR: CPT | Performed by: INTERNAL MEDICINE

## 2022-09-20 PROCEDURE — G8754 DIAS BP LESS 90: HCPCS | Performed by: INTERNAL MEDICINE

## 2022-09-20 PROCEDURE — 1123F ACP DISCUSS/DSCN MKR DOCD: CPT | Performed by: INTERNAL MEDICINE

## 2022-09-20 RX ORDER — HEPARIN 100 UNIT/ML
300-500 SYRINGE INTRAVENOUS AS NEEDED
Status: ACTIVE | OUTPATIENT
Start: 2022-09-20 | End: 2022-09-20

## 2022-09-20 RX ORDER — FAMOTIDINE 20 MG/1
20 TABLET, FILM COATED ORAL 2 TIMES DAILY
COMMUNITY

## 2022-09-20 RX ORDER — ACETAMINOPHEN 325 MG/1
650 TABLET ORAL AS NEEDED
Status: ACTIVE | OUTPATIENT
Start: 2022-09-20 | End: 2022-09-20

## 2022-09-20 RX ORDER — DIPHENHYDRAMINE HYDROCHLORIDE 50 MG/ML
25 INJECTION, SOLUTION INTRAMUSCULAR; INTRAVENOUS AS NEEDED
Status: ACTIVE | OUTPATIENT
Start: 2022-09-20 | End: 2022-09-20

## 2022-09-20 RX ORDER — SODIUM CHLORIDE 0.9 % (FLUSH) 0.9 %
10 SYRINGE (ML) INJECTION AS NEEDED
Status: ACTIVE | OUTPATIENT
Start: 2022-09-20 | End: 2022-09-20

## 2022-09-20 RX ORDER — SODIUM CHLORIDE 9 MG/ML
10 INJECTION INTRAMUSCULAR; INTRAVENOUS; SUBCUTANEOUS AS NEEDED
Status: ACTIVE | OUTPATIENT
Start: 2022-09-20 | End: 2022-09-20

## 2022-09-20 RX ORDER — SODIUM CHLORIDE 9 MG/ML
25 INJECTION, SOLUTION INTRAVENOUS CONTINUOUS
Status: DISPENSED | OUTPATIENT
Start: 2022-09-20 | End: 2022-09-20

## 2022-09-20 RX ORDER — ONDANSETRON 2 MG/ML
8 INJECTION INTRAMUSCULAR; INTRAVENOUS ONCE
Status: COMPLETED | OUTPATIENT
Start: 2022-09-20 | End: 2022-09-20

## 2022-09-20 RX ORDER — ONDANSETRON 2 MG/ML
8 INJECTION INTRAMUSCULAR; INTRAVENOUS AS NEEDED
Status: ACTIVE | OUTPATIENT
Start: 2022-09-20 | End: 2022-09-20

## 2022-09-20 RX ORDER — DEXAMETHASONE SODIUM PHOSPHATE 10 MG/ML
10 INJECTION INTRAMUSCULAR; INTRAVENOUS ONCE
Status: COMPLETED | OUTPATIENT
Start: 2022-09-20 | End: 2022-09-20

## 2022-09-20 RX ADMIN — ONDANSETRON 8 MG: 2 INJECTION INTRAMUSCULAR; INTRAVENOUS at 10:07

## 2022-09-20 RX ADMIN — DEXAMETHASONE SODIUM PHOSPHATE 10 MG: 10 INJECTION, SOLUTION INTRAMUSCULAR; INTRAVENOUS at 10:08

## 2022-09-20 RX ADMIN — PACLITAXEL 184 MG: 100 INJECTION, POWDER, LYOPHILIZED, FOR SUSPENSION INTRAVENOUS at 10:40

## 2022-09-20 RX ADMIN — GEMCITABINE HYDROCHLORIDE 1656 MG: 2 INJECTION, SOLUTION INTRAVENOUS at 11:38

## 2022-09-20 RX ADMIN — SODIUM CHLORIDE 25 ML/HR: 9 INJECTION, SOLUTION INTRAVENOUS at 10:05

## 2022-09-20 RX ADMIN — SODIUM CHLORIDE, PRESERVATIVE FREE 10 ML: 5 INJECTION INTRAVENOUS at 12:14

## 2022-09-20 RX ADMIN — HEPARIN 500 UNITS: 100 SYRINGE at 12:14

## 2022-09-20 NOTE — PROGRESS NOTES
Reason for Visit:   Dayana Michaud is a 79 y.o. female who is seen by synchronous (real-time) audio-video technology for follow up of pancreatic adenocarcinoma     Treatment History:     Dx: Pancreatic Adenocarcinoma--May 2020--[id number]--sglvakbfobv of splenic vein and superior mesenteric vein    Tx: mFOLFIRINOX--first cycle 5/26/2020, second cycle 6/10/2020, dose reduced 15% cycle 3 on 6/30/2020--delayed due to severe side effects--switched to Gemcitabine/Abraxane--Cycle #1 7/29/2002, Cycle #2 9/2/2020, Cycle #3 10/7/2020, Cycle #4 11/4/2020. Neoadjuvant Radiation with Dr Kaiser Zhu ending 12/18/2021. Distal Pancreatectomy, Splenectomy, and Civa Sheet with Dr Ben Prater on 3/17/2021. Adjuvant radiation with Dr Kaiser Zhu. Restart Gemcitabine/abraxane Cycle #1 7/6/2021, Cycle #2 8/3/2021, Cycle #3 8/31/2021, Cycle #4 9/28/2021    Goal: Prolong life--Palliative    History of Present Illness:     Seen today virtually for [facility] for fu on palliative chemo gemzar/ abraxane since 7/20. Doing ok overall. Bp up a little today but got better. Got to go to wedding and was good. Pt fell at home and hurt LEFT hand and has swelling in hand. CTs good 8/9/22 except for PNA. Labs good today.    To have fu with PCP  No fevers/ chills/ chest pain/ SOB/ nausea/ vomiting/diarrhea/ pain/fatigue        Past Medical History:   Diagnosis Date    Adenocarcinoma of pancreas ([facility] [address] 05/13/2020    Dr Juancho Rosenberg biopsy via EUS    Diabetes [facility])     GERD (gastroesophageal reflux disease)     Hernia of abdominal cavity     Subxyphoid hernia    Hypertension     Inflammatory polyarthropathy (HCC)     Joint pain     Joint swelling     Menopause     Ocular nevus of left eye     Pancreatitis     Tracheal stenosis       Past Surgical History:   Procedure Laterality Date    HX CARPAL TUNNEL RELEASE Bilateral     HX COLONOSCOPY      HX HYSTERECTOMY      HX KNEE ARTHROSCOPY Right     HX KNEE REPLACEMENT Right     partial    HX LAP CHOLECYSTECTOMY HX TONSILLECTOMY      HX VASCULAR ACCESS        Social History     Tobacco Use    Smoking status: Never    Smokeless tobacco: Never   Substance Use Topics    Alcohol use: No     Alcohol/week: 0.0 standard drinks      Family History   Problem Relation Age of Onset    Heart Disease Mother     Heart Attack Mother     Cancer Father         lung    Diabetes Sister      Current Outpatient Medications   Medication Sig    methylphenidate HCl (RITALIN) 10 mg tablet Take 1 Tablet by mouth two (2) times a day. Max Daily Amount: 20 mg.    nystatin (MYCOSTATIN) powder Apply 1 g to affected area three (3) times daily. DULoxetine (CYMBALTA) 30 mg capsule Take 1 Capsule by mouth every evening. acetaminophen (Tylenol Extra Strength) 500 mg tablet Take 1,000 mg by mouth two (2) times a day. Indications: pain    ibuprofen (Motrin IB) 200 mg tablet Take 600 mg by mouth two (2) times a day. Indications: pain    HYDROmorphone (DILAUDID) 2 mg tablet Take 1 Tablet by mouth every eight (8) hours as needed for Pain for up to 30 days. Max Daily Amount: 6 mg. Indications: pain    LORazepam (ATIVAN) 1 mg tablet 1 pill in am and 2 pills at night  Indications: anxious, nausea and vomiting caused by cancer drugs, difficulty sleeping, prevent nausea and vomiting from cancer chemotherapy    nirmatrelvir-ritonavir (Paxlovid, EUA,) 150 mg x 2- 100 mg tablet Take 3 Tablets by mouth every twelve (12) hours for 5 days. senna-docusate (PERICOLACE) 8.6-50 mg per tablet Take 1 Tablet by mouth nightly. DULoxetine (CYMBALTA) 60 mg capsule Take 1 Capsule by mouth daily. Indications: neuropathic pain (Patient taking differently: Take 60 mg by mouth daily. At 0900  Indications: neuropathic pain)    gabapentin (NEURONTIN) 300 mg capsule TAKE 2 CAPSULES BY MOUTH THREE TIMES DAILY (Patient taking differently: 600 mg three (3) times daily.  Takes at 0900, 1500, 2100)    Toujeo SoloStar U-300 Insulin 300 unit/mL (1.5 mL) inpn pen INJECT 55 UNITS ONCE DAILY AT BEDTIME - DOSE INCREASED (Patient taking differently: 25 Units by SubCUTAneous route nightly. Patient uses differently during around chemo time.)    HumaLOG KwikPen Insulin 100 unit/mL kwikpen patient on sliding scale     No current facility-administered medications for this visit. No Known Allergies     Review of Systems: A complete review of systems was obtained, negative except as described above. Physical Exam:     There were no vitals taken for this visit. General: alert, cooperative, no distress   Mental  status: normal mood, behavior, speech, dress, motor activity, and thought processes, able to follow commands   HENT: NCAT   Neck: no visualized mass   Resp: no respiratory distress   Neuro: no gross deficits   Skin: no discoloration or lesions of concern on visible areas   Psychiatric: normal affect, consistent with stated mood, no evidence of hallucinations       Due to this being a TeleHealth evaluation (During [facility] public health emergency), many elements of the physical examination are unable to be assessed. Evaluation of the following organ systems was limited: Vitals/Constitutional/EENT/Resp/CV/GI//MS/Neuro/Skin/Heme-Lymph-Imm. Results:     Lab Results   Component Value Date/Time    WBC 5.8 09/06/2022 08:30 AM    HGB 10.4 (L) 09/06/2022 08:30 AM    HCT 33.2 (L) 09/06/2022 08:30 AM    PLATELET 323 54/74/2053 08:30 AM    MCV 93.3 09/06/2022 08:30 AM    ABS.  NEUTROPHILS 2.0 09/06/2022 08:30 AM     Lab Results   Component Value Date/Time    Sodium 140 09/06/2022 08:30 AM    Potassium 4.0 09/06/2022 08:30 AM    Chloride 104 09/06/2022 08:30 AM    CO2 31 09/06/2022 08:30 AM    Glucose 203 (H) 09/06/2022 08:30 AM    BUN 21 (H) 09/06/2022 08:30 AM    Creatinine 0.74 09/06/2022 08:30 AM    GFR est AA >60 09/06/2022 08:30 AM    GFR est non-AA >60 09/06/2022 08:30 AM    Calcium 8.6 09/06/2022 08:30 AM    Glucose (POC) 282 (H) 07/24/2022 08:26 AM    Creatinine (POC) 0.7 02/06/2013 10:21 AM     Lab Results   Component Value Date/Time    Bilirubin, total 0.2 09/06/2022 08:30 AM    ALT (SGPT) 20 09/06/2022 08:30 AM    Alk. phosphatase 164 (H) 09/06/2022 08:30 AM    Protein, total 6.2 (L) 09/06/2022 08:30 AM    Albumin 3.0 (L) 09/06/2022 08:30 AM    Globulin 3.2 09/06/2022 08:30 AM       CT A/P 4/30/2020  IMPRESSION: Suspect neoplastic pancreatic mass versus atypical focal  pancreatitis with splenic and superior mesenteric vein occlusion. Consider  further evaluation with endoscopic ultrasound at which time biopsy could  potentially be performed. CT Chest 5/21/2020  IMPRESSION:  1. No evidence of pulmonary metastatic disease. No evidence of mediastinal or  hilar lymphadenopathy. No pleural effusions identified. 2. No definite evidence of central pulmonary embolic disease. 3. Presence of a mass lesion involving the pancreatic head/body. 4. Evidence of fatty infiltration involving the liver. Presence of focal  low-density areas within the liver compatible with cysts. Surgical absence of  the gallbladder. CT A/P 6/19/2020  IMPRESSION:  Pancreatitis with ascites favored. Discrete pancreatic mass is not identified. No biliary dilatation or definite arterial or venous thrombosis. No liver  metastases. Fat-containing ventral hernia  Nonobstructing left renal calculus  Retrolisthesis of L2 and L3.     CT C/A/P 10/12/2020  IMPRESSION:  1. Interval improvement in appearance of the pancreas (see discussion above). 2. Normal sized liver. Stable appearance of the previously described areas of  decreased attenuation within liver. 3. Surgical absence of the gallbladder. 4. Evidence of splenomegaly. 5. Normal sized kidneys. Presence of small, nonobstructing calculi within the  left kidney. No evidence of hydronephrotic change. 6. Suboptimal distention of the urinary bladder. 7. Presence of a small, fat-containing ventral hernia in the mid abdominal  Region.     CT C/A/P 9/17/2021  IMPRESSION  New peritoneal implants identified in the anterior pelvis  Interval splenectomy and repair of the ventral hernia  Interval thickening of the GE junction and circumferential thickening of the  pylorus. This may represent gastritis but correlation with endoscopy or upper GI  recommended     Path: 5/8/2020  CYTOLOGIC INTERPRETATION:   Pancreas, EUS-guided fine needle aspiration and cell blocks: Adenocarcinoma     Path: 3/17/2021  No residual malignancy in pancrease, 0/29 lymph nodes. 2-3mm focus metastatic adenocarcinoma within hernia sac     Assessment/PLAN:     1) stage 4 Pancreatic Adenocarcinoma dx 5/20  Completed neoadjuvant chemotherapy. Radiation with Dr Cole Chaudhari. Definitive resection with Dr Georgene Habermann. BRCA negative. Now peritoneal disease/mets. Goal of care palliative. On chemo since 7/20    Seen today for fu on chemo     Doing well overall today      Pt has been tolerating chemo with manageable side effects. She is doing well with palliative chemotherapy with Gemcitaine/Abraxane. Had CTs 8/22 and stable except for PNA/ COVID. Reviewed chemo overall today. Pt feels fine to do chemo today. Discussed plan with pt and will continue same plan. Will do fu CTs again 10/22 at [facility] and ordered today. Labs reviewed today. Continuing with chemo today  Reviewed list of questions today  Labs reviewed today and stable. Fu at [facility] needs to be monthly with ONC if possible     2) cancer abdominal Pain/Neuropathic Pain/ RUE pain. Left hip/ leg pain. Seeing palliative care. Hx of celiac block--pain in abd controlled. On dilaudid prn. Cymbalta. On ativan bid also      3) blood sugars chronically up. Reviewed today;;   Has plan with PCP for this. 4) hx of COVID 7/22 and PNA was on abx. Better but still has cough. 5) chronic anxiety/Insomnia. controlled. On ativan prn. And cymbalta increased to 60mg. States insomnia not well controlled.    Discussed palliative care fu. Pt does not want to see palliative care again. 6) Fatigue. on ritalin once a day as needed. Monitor. 7) thrombocytosis due to chemo/ reactive. goes back to baseline. Up again. Monitor. 8) fall at home and hurt LEFT hand with swelling and lack of mobility. Ordered xray of hand. For [facility] [address]    9) psychosocial. Mood good. Coping well. Lives at [facility]. Friends with [employer]. Has chickens. SW support       [facility] in [facility] month/ virtual at chemo if needed  Seeing PCP in person so will continue VV here for now  Call if questions    The patient was evaluated through a synchronous (real-time) audio-video encounter. The patient (or guardian if applicable) is aware that this is a billable service, which includes applicable co-pays. This Virtual Visit was conducted with patient's (and/or legal guardian's) consent. The visit was conducted pursuant to the emergency declaration under the [address] [place] [address], [address] [place] [address] waiver authority and the [employer] General Act. Patient identification was verified, and a caregiver was present when appropriate. The patient was located in a state where the provider was licensed to provide care.          Signed By: Vandana Rodriguez DO

## 2022-09-20 NOTE — DISCHARGE INSTRUCTIONS
OUTPATIENT INFUSION CENTER    DISCHARGE INSTRUCTIONS FOR:  CHEMOTHERAPY / BIOTHERAPY    Chemotherapy has the potential to cause many side effects. The following are general precautions that chemo patients should take:    1. Practice good hand washing:   * Use soap and water for at least 15 seconds, covering all areas of hands. * Always wash hands before eating. * Wash hands after contact with public surfaces such as door knobs and         handles, shopping carts, telephones and elevator buttons. 2. Get plenty of rest:    * You will likely experience fatigue three to five days following your treatment. It may last as long as seven days. 3. Drink plenty of fluids. Water is best.    4. Eat a well balanced diet:  * Small frequent meals may help if you are having trouble with nausea or your  appetite. Some people also do well with nutritional supplements. 5. Pace yourself with daily activities:   * Take frequent breaks and ask for help if you need it. 6. Exercise is very important:  * It will increase circulation and will help the fatigue. Do what you can each day. 7. If your regimen results in hair loss:  *  You will likely notice effects between two and three weeks following your first treatment. Some lose all hair while others only experience thinning. 8. Practice good oral hygiene:   *  Notify your M.D. immediately if any mouth sores or discomfort develop. 9. Protect yourself from the sun. Signs/Symptoms of an allergic reaction and/or some side effects may require immediate medical attention. Notify your physician if you develop one or more of the following:     Temperature of 100.5 degrees or greater;   Skin redness, itching, swelling, blistering, weeping, crusting, rash, or hives;    Wheezing, chest tightness, cough, or shortness of breath;   Swelling of the face, eyelids, lips, tongue, or throat;  Severe, persistent headache;  Stuffy nose, runny nose, sneezing;   Red (bloodshot), itchy, swollen, or watery eyes;   Stomach  pain, nausea, vomiting, diarrhea, or bloody stools;  Mouth sores        Your physician should also be aware of the following symptoms:    Persistent and unresolved nausea and/or vomiting;   Persistent and unresolved diarrhea or constipation;   Numbness/tingling/burning of the extremities, including the fingers and toes; Bleeding or unexplained bruising; Unexplained redness/swelling/pain in the arms or legs; Shortness of breath or fatigue that worsens;   Pain with urination or blood in the urine; Chills;  Cough, especially a productive cough;  Mouth sores or a white coating of the tongue; Redness, swelling, pain or drainage at the port-a-cath or IV site; Increased feeling of bloating or water retention; Excessive weight loss or gain;  Ringing in the ears; Difficulty swallowing;  Dizziness, vertigo, lightheadedness or fainting. Angelia Ba Signature: ____________________________ 9/20/2022  Rico Martinez RN     Gemcitabine (By injection)   Gemcitabine (okf-HNN-qi-been)  Used alone or in combination with other medicines to treat breast cancer, ovarian cancer, non-small cell lung cancer, and cancer of the pancreas. Brand Name(s): Gemcitabine Novaplus, Gemzar, PremierPro Rx Gemcitabine   There may be other brand names for this medicine. When This Medicine Should Not Be Used: You should not receive this medicine if you have had an allergic reaction to gemcitabine, or if you are pregnant. How to Use This Medicine:   Injectable  Medicines used to treat cancer are very strong and can have many side effects. Before receiving this medicine, make sure you understand all the risks and benefits. It is important for you to work closely with your doctor during your treatment. Your doctor will prescribe your dose and schedule. This medicine is given through a needle placed in a vein.   You will receive this medicine while you are in a hospital or cancer treatment center. A nurse or other trained health professional will give you this medicine. If this medicine gets on your skin, wash the area with soap and water, and tell your caregiver. If you get the medicine in your eyes, nose, or mouth, rinse the area with large amounts of water, and tell your caregiver. If a dose is missed:   Call your doctor or pharmacist for instructions. Drugs and Foods to Avoid:   Ask your doctor or pharmacist before using any other medicine, including over-the-counter medicines, vitamins, and herbal products. This medicine may interfere with vaccines. Ask your doctor before you get a flu shot or any other vaccines. Warnings While Using This Medicine: It is not safe to take this medicine during pregnancy. It could harm an unborn baby. Tell your doctor right away if you become pregnant. Make sure your doctor knows if you are breastfeeding, or if you have lung disease, kidney disease, or liver disease. Cancer medicines can cause nausea and vomiting in most people, sometimes even after receiving medicines to prevent it. Ask your doctor or nurse about other ways to control these side effects. This medicine may make you bleed, bruise, or get infections more easily. Take precautions to prevent illness and injury. Wash your hands often. Your doctor will do lab tests at regular visits to check on the effects of this medicine. Keep all appointments. Possible Side Effects While Using This Medicine:   Call your doctor right away if you notice any of these side effects: Allergic reaction: Itching or hives, swelling in your face or hands, swelling or tingling in your mouth or throat, chest tightness, trouble breathing  Chest pain. Dark urine, decrease in how much or how often you urinate. Fast, slow, or irregular heartbeat. Fever, chills, cough, sore throat, and body aches. Lightheadedness or fainting. Nausea, vomiting, severe itching, bleeding from your gums or nose.   Numbness or weakness in your arm or leg, or on one side of your body. Pain, itching, burning, swelling, or a lump under your skin where the needle is placed. Pinpoint red or purple spots on your skin. Rapid weight gain. Sudden or severe headache, problems with vision, hearing, speech, or walking. Swelling in your hands, ankles, or feet. Tingling, or burning pain in your hands, arms, legs, or feet. Trouble breathing. Unusual bleeding, bruising, or weakness. If you notice these less serious side effects, talk with your doctor:   Diarrhea, constipation, loss of appetite. Drowsiness. Hair loss. Mild headache. Muscle, joint, or bone pain. Skin rash or itching. Sores or white patches on your lips, mouth, or throat. If you notice other side effects that you think are caused by this medicine, tell your doctor. Call your doctor for medical advice about side effects. You may report side effects to FDA at 9-642-FLU-9885  © 2017 [employer] [facility] Information is for End User's use only and may not be sold, redistributed or otherwise used for commercial purposes. The above information is an  only. It is not intended as medical advice for individual conditions or treatments. Talk to your doctor, nurse or pharmacist before following any medical regimen to see if it is safe and effective for you. Paclitaxel Protein-bound (Abraxane) - (By injection)   Why this medicine is used:   Treats cancer. Contact a nurse or doctor right away if you have:  Trouble breathing, dry cough  Unusual bleeding, bruising, or weakness  Numbness, tingling, or burning pain in your hands, arms, legs, or feet  Fever, chills, cough, sore throat, or body aches  Nausea, vomiting, diarrhea, extreme thirst     Common side effects:  Hair loss  Joint or muscle pain  © 2017 [facility] Information is for End User's use only and may not be sold, redistributed or otherwise used for commercial purposes.

## 2022-09-20 NOTE — PROGRESS NOTES
[facility] Progress Note    Date: 2022    Name: Cedrick Hogue    MRN: [id number]         : 1955    Ms. Ba Arrived ambulatory and in no distress for cycle 19 day 15 of Abraxane/Gemzar regimen. Assessment was completed, pt reports she fell approximately one month ago and is still experiencing pain and swelling in her left hand. Reported to MD, Xray ordered. No new concerns reported. Port accessed without difficulty, labs drawn and in process. Chemotherapy Flowsheet 2022   Cycle C19 D15   Date 2022   Drug / Regimen Abraxane/Gemcitabine   Dosage 184 mg/1656 mg   Time Up 1040   Time Down 1211   Pre Hydration -   Pre Meds Zofran 8 mg IV, Decadron 10 mg IV   Notes -     0900:  Proceeded to appt with Dr. Josep Hollins. Ms. Ba's vitals were reviewed. Visit Vitals  BP (!) 165/83 (BP 1 Location: Left arm, BP Patient Position: Sitting)   Pulse 92   Temp 98.4 °F (36.9 °C)   Resp 16   Ht 5' 4\" (1.626 m)   Wt 84.6 kg (186 lb 6.4 oz)   SpO2 97%   BMI 32.00 kg/m²       Lab results were obtained and reviewed. Recent Results (from the past 12 hour(s))   CBC WITH AUTOMATED DIFF    Collection Time: 22  8:27 AM   Result Value Ref Range    WBC 5.6 3.6 - 11.0 K/uL    RBC 3.70 (L) 3.80 - 5.20 M/uL    HGB 11.2 (L) 11.5 - 16.0 g/dL    HCT 35.1 35.0 - 47.0 %    MCV 94.9 80.0 - 99.0 FL    MCH 30.3 26.0 - 34.0 PG    MCHC 31.9 30.0 - 36.5 g/dL    RDW 16.6 (H) 11.5 - 14.5 %    PLATELET 346 (H) 549 - 400 K/uL    MPV 9.9 8.9 - 12.9 FL    NRBC 0.0 0.0  WBC    ABSOLUTE NRBC 0.00 0.00 - 0.01 K/uL    NEUTROPHILS 38 32 - 75 %    LYMPHOCYTES 29 12 - 49 %    MONOCYTES 26 (H) 5 - 13 %    EOSINOPHILS 6 0 - 7 %    BASOPHILS 1 0 - 1 %    IMMATURE GRANULOCYTES 0 0 - 0.5 %    ABS. NEUTROPHILS 2.1 1.8 - 8.0 K/UL    ABS. LYMPHOCYTES 1.6 0.8 - 3.5 K/UL    ABS. MONOCYTES 1.5 (H) 0.0 - 1.0 K/UL    ABS. EOSINOPHILS 0.3 0.0 - 0.4 K/UL    ABS. BASOPHILS 0.1 0.0 - 0.1 K/UL    ABS. IMM.  GRANS. 0.0 0.00 - 0.04 K/UL    DF AUTOMATED      RBC COMMENTS ANISOCYTOSIS  1+       METABOLIC PANEL, COMPREHENSIVE    Collection Time: 09/20/22  8:27 AM   Result Value Ref Range    Sodium 140 136 - 145 mmol/L    Potassium 3.9 3.5 - 5.1 mmol/L    Chloride 104 97 - 108 mmol/L    CO2 31 21 - 32 mmol/L    Anion gap 5 5 - 15 mmol/L    Glucose 226 (H) 65 - 100 mg/dL    BUN 17 6 - 20 MG/DL    Creatinine 0.74 0.55 - 1.02 MG/DL    BUN/Creatinine ratio 23 (H) 12 - 20      GFR est AA >60 >60 ml/min/1.73m2    GFR est non-AA >60 >60 ml/min/1.73m2    Calcium 8.7 8.5 - 10.1 MG/DL    Bilirubin, total 0.2 0.2 - 1.0 MG/DL    ALT (SGPT) 19 12 - 78 U/L    AST (SGOT) 16 15 - 37 U/L    Alk. phosphatase 119 (H) 45 - 117 U/L    Protein, total 6.3 (L) 6.4 - 8.2 g/dL    Albumin 3.0 (L) 3.5 - 5.0 g/dL    Globulin 3.3 2.0 - 4.0 g/dL    A-G Ratio 0.9 (L) 1.1 - 2.2         NS initiated. Pre-medications of Decadron 10 mg IVP and Zofran 8 mg IVP were administered as ordered and chemotherapy was initiated. Two nurses verified prior to administering: drug name, drug dose, infusion volume or drug volume  when prepared in a syringe, rate of administration, route of administration , expiration dates and/or times, appearance and physical integrity of the drugs, rate set on infusion pump, when used, sequencing of drug administration. 1029: NS stopped for patient to be transported to [facility]. 1038: NS restarted. 1040: Abraxane 184 mg initiated to infuse at 73 mL/hr.  1135: Abraxane infusion complete. NS flushing line. 1138: Gemzar 1,656 mg initiated to infuse at 637 mL/hr.  1211: Gemzar infusion complete, NS flushing line and stopped. 1214: Port flushed with NS, brisk blood return and heparin. Needle removed and band-aid applied to site without redness, swelling or pain. Ms. [facility] tolerated treatment well and was discharged from [place] [address] in stable condition at 1220. Xray results not available prior to discharge.  She is to return on October 4 at 0800 for her next appointment.     Tatianna Mcintyre RN  September 20, 2022

## 2022-09-20 NOTE — PROGRESS NOTES
Levar Faith is a 79 y.o. female here for follow up for Pancreatic cancer. Patient is scheduled for C19D15 chemotherapy in [place] today. Patient feel about a month ago, she hurt her L hand/knuckles. Knuckles above her middle and ring finger are swollen and are painful. She placed a boxer splint on her hand after she fell. She feels the pain is worse at this time. Patient finds she has increase in depression the week after chemo, she feels this is due to her not feeling well and not feeling well. She is more sedentary during that time. Patient states she cried on the sofa for the first week after chemo last cycle. Key Oncology Meds       Patient is on no Oncologic meds. Key Pain Meds               acetaminophen (Tylenol Extra Strength) 500 mg tablet Take 1,000 mg by mouth two (2) times a day. Indications: pain    ibuprofen (Motrin IB) 200 mg tablet Take 600 mg by mouth two (2) times a day. Indications: pain    HYDROmorphone (DILAUDID) 2 mg tablet Take 1 Tablet by mouth every eight (8) hours as needed for Pain for up to 30 days. Max Daily Amount: 6 mg. Indications: pain          Chemotherapy Flowsheet 9/6/2022   Cycle C19 D1   Date 9/6/2022   Drug / Regimen Abraxane/Gemcitabine   Dosage 184 mg/1656 mg   Time Up 1044   Time Down 1215   Pre Hydration -   Pre Meds zofran 8 mg IV, decadron 10 mg IV   Notes -     Patient hypertensive,  She will monitor  BP at home, and follow up with PCP if it remains 140/80 or greater. DR Vitor martins.

## 2022-09-21 ENCOUNTER — DOCUMENTATION ONLY (OUTPATIENT)
Dept: ONCOLOGY | Age: 67
End: 2022-09-21

## 2022-09-21 ENCOUNTER — TELEPHONE (OUTPATIENT)
Dept: ONCOLOGY | Age: 67
End: 2022-09-21

## 2022-09-21 DIAGNOSIS — C25.9 MALIGNANT NEOPLASM OF PANCREAS, UNSPECIFIED LOCATION OF MALIGNANCY (HCC): ICD-10-CM

## 2022-09-21 NOTE — PROGRESS NOTES
UPDATE:    Good Morning Nellie, I wanted to inform you there is a Social Work Referral that was put in the chart for this patient on 9/20/22. Thank you!   Nadia Medley

## 2022-09-21 NOTE — TELEPHONE ENCOUNTER
[address]  Oncology Social Work  Encounter     Patient Name:  Sneha Neal     Medical History:     Advance Directives:    Narrative: PT talked about having wonderful support, pt & spouse who has had dementia for almost 5 years-is turning 72. PT has a son who lives in [facility] who comes once a week to help her with things. Pt and spouse sold their home and so did dtr and soto and they bought a bigger house together and each put funds into a joint acct to pay mortgage, power, trash, TV and any repairs. PT has chickens for eggs and they just split a cow with SOTO dad and had a pig that was butchered. 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 SW inquired if pt would like SW to help her find a counselor and pt said no, \"I think it's just the day after chemo\", I will save your number in my phone and know I can call you.       Barriers to Care:     Plan:    Referral/Handouts:

## 2022-09-22 RX ORDER — LORAZEPAM 1 MG/1
TABLET ORAL
Qty: 90 TABLET | Refills: 0 | Status: SHIPPED | OUTPATIENT
Start: 2022-09-22 | End: 2022-10-19

## 2022-09-26 DIAGNOSIS — C25.9 MALIGNANT NEOPLASM OF PANCREAS, UNSPECIFIED LOCATION OF MALIGNANCY (HCC): Primary | ICD-10-CM

## 2022-09-26 DIAGNOSIS — G89.3 CANCER RELATED PAIN: ICD-10-CM

## 2022-09-26 RX ORDER — DIPHENHYDRAMINE HYDROCHLORIDE 50 MG/ML
25 INJECTION, SOLUTION INTRAMUSCULAR; INTRAVENOUS AS NEEDED
Status: CANCELLED | OUTPATIENT
Start: 2022-10-17

## 2022-09-26 RX ORDER — DIPHENHYDRAMINE HYDROCHLORIDE 50 MG/ML
25 INJECTION, SOLUTION INTRAMUSCULAR; INTRAVENOUS
Status: CANCELLED
Start: 2022-10-04

## 2022-09-26 RX ORDER — EPINEPHRINE 1 MG/ML
0.3 INJECTION, SOLUTION, CONCENTRATE INTRAVENOUS AS NEEDED
Status: CANCELLED | OUTPATIENT
Start: 2022-10-04

## 2022-09-26 RX ORDER — DIPHENHYDRAMINE HYDROCHLORIDE 50 MG/ML
25 INJECTION, SOLUTION INTRAMUSCULAR; INTRAVENOUS AS NEEDED
Status: CANCELLED | OUTPATIENT
Start: 2022-10-04

## 2022-09-26 RX ORDER — SODIUM CHLORIDE 9 MG/ML
10 INJECTION INTRAMUSCULAR; INTRAVENOUS; SUBCUTANEOUS AS NEEDED
Status: CANCELLED | OUTPATIENT
Start: 2022-10-04

## 2022-09-26 RX ORDER — EPINEPHRINE 1 MG/ML
0.3 INJECTION, SOLUTION, CONCENTRATE INTRAVENOUS AS NEEDED
Status: CANCELLED | OUTPATIENT
Start: 2022-10-17

## 2022-09-26 RX ORDER — DEXAMETHASONE SODIUM PHOSPHATE 4 MG/ML
10 INJECTION, SOLUTION INTRA-ARTICULAR; INTRALESIONAL; INTRAMUSCULAR; INTRAVENOUS; SOFT TISSUE ONCE
Status: CANCELLED | OUTPATIENT
Start: 2022-10-17 | End: 2022-10-18

## 2022-09-26 RX ORDER — SODIUM CHLORIDE 9 MG/ML
25 INJECTION, SOLUTION INTRAVENOUS CONTINUOUS
Status: CANCELLED | OUTPATIENT
Start: 2022-10-04

## 2022-09-26 RX ORDER — LORAZEPAM 2 MG/ML
0.5 INJECTION INTRAMUSCULAR
Status: CANCELLED
Start: 2022-10-04

## 2022-09-26 RX ORDER — ALBUTEROL SULFATE 0.83 MG/ML
2.5 SOLUTION RESPIRATORY (INHALATION) AS NEEDED
Status: CANCELLED
Start: 2022-10-17

## 2022-09-26 RX ORDER — SODIUM CHLORIDE 9 MG/ML
25 INJECTION, SOLUTION INTRAVENOUS CONTINUOUS
Status: CANCELLED | OUTPATIENT
Start: 2022-10-17

## 2022-09-26 RX ORDER — ACETAMINOPHEN 325 MG/1
650 TABLET ORAL AS NEEDED
Status: CANCELLED | OUTPATIENT
Start: 2022-10-17

## 2022-09-26 RX ORDER — DIPHENHYDRAMINE HYDROCHLORIDE 50 MG/ML
50 INJECTION, SOLUTION INTRAMUSCULAR; INTRAVENOUS AS NEEDED
Status: CANCELLED
Start: 2022-10-04

## 2022-09-26 RX ORDER — DIPHENHYDRAMINE HYDROCHLORIDE 50 MG/ML
50 INJECTION, SOLUTION INTRAMUSCULAR; INTRAVENOUS AS NEEDED
Status: CANCELLED
Start: 2022-10-17

## 2022-09-26 RX ORDER — ACETAMINOPHEN 325 MG/1
650 TABLET ORAL AS NEEDED
Status: CANCELLED | OUTPATIENT
Start: 2022-10-04

## 2022-09-26 RX ORDER — DEXAMETHASONE SODIUM PHOSPHATE 4 MG/ML
10 INJECTION, SOLUTION INTRA-ARTICULAR; INTRALESIONAL; INTRAMUSCULAR; INTRAVENOUS; SOFT TISSUE ONCE
Status: CANCELLED | OUTPATIENT
Start: 2022-10-04 | End: 2022-10-04

## 2022-09-26 RX ORDER — SODIUM CHLORIDE 0.9 % (FLUSH) 0.9 %
10 SYRINGE (ML) INJECTION AS NEEDED
Status: CANCELLED | OUTPATIENT
Start: 2022-10-04

## 2022-09-26 RX ORDER — ONDANSETRON 2 MG/ML
8 INJECTION INTRAMUSCULAR; INTRAVENOUS ONCE
Status: CANCELLED | OUTPATIENT
Start: 2022-10-17 | End: 2022-10-18

## 2022-09-26 RX ORDER — ONDANSETRON 2 MG/ML
8 INJECTION INTRAMUSCULAR; INTRAVENOUS AS NEEDED
Status: CANCELLED | OUTPATIENT
Start: 2022-10-17

## 2022-09-26 RX ORDER — SODIUM CHLORIDE 0.9 % (FLUSH) 0.9 %
10 SYRINGE (ML) INJECTION AS NEEDED
Status: CANCELLED
Start: 2022-10-17

## 2022-09-26 RX ORDER — DIPHENHYDRAMINE HYDROCHLORIDE 50 MG/ML
25 INJECTION, SOLUTION INTRAMUSCULAR; INTRAVENOUS
Status: CANCELLED
Start: 2022-10-17

## 2022-09-26 RX ORDER — HEPARIN 100 UNIT/ML
300-500 SYRINGE INTRAVENOUS AS NEEDED
Status: CANCELLED | OUTPATIENT
Start: 2022-10-04

## 2022-09-26 RX ORDER — LORAZEPAM 2 MG/ML
0.5 INJECTION INTRAMUSCULAR
Status: CANCELLED
Start: 2022-10-17

## 2022-09-26 RX ORDER — HYDROCORTISONE SODIUM SUCCINATE 100 MG/2ML
100 INJECTION, POWDER, FOR SOLUTION INTRAMUSCULAR; INTRAVENOUS AS NEEDED
Status: CANCELLED | OUTPATIENT
Start: 2022-10-17

## 2022-09-26 RX ORDER — ALBUTEROL SULFATE 0.83 MG/ML
2.5 SOLUTION RESPIRATORY (INHALATION) AS NEEDED
Status: CANCELLED
Start: 2022-10-04

## 2022-09-26 RX ORDER — HEPARIN 100 UNIT/ML
300-500 SYRINGE INTRAVENOUS AS NEEDED
Status: CANCELLED | OUTPATIENT
Start: 2022-10-17

## 2022-09-26 RX ORDER — HYDROCORTISONE SODIUM SUCCINATE 100 MG/2ML
100 INJECTION, POWDER, FOR SOLUTION INTRAMUSCULAR; INTRAVENOUS AS NEEDED
Status: CANCELLED | OUTPATIENT
Start: 2022-10-04

## 2022-09-26 RX ORDER — ONDANSETRON 2 MG/ML
8 INJECTION INTRAMUSCULAR; INTRAVENOUS AS NEEDED
Status: CANCELLED | OUTPATIENT
Start: 2022-10-04

## 2022-09-26 RX ORDER — ONDANSETRON 2 MG/ML
8 INJECTION INTRAMUSCULAR; INTRAVENOUS ONCE
Status: CANCELLED | OUTPATIENT
Start: 2022-10-04 | End: 2022-10-04

## 2022-09-26 RX ORDER — HYDROMORPHONE HYDROCHLORIDE 2 MG/1
2 TABLET ORAL
Qty: 90 TABLET | Refills: 0 | Status: SHIPPED | OUTPATIENT
Start: 2022-09-26 | End: 2022-11-03 | Stop reason: SDUPTHER

## 2022-09-26 RX ORDER — SODIUM CHLORIDE 9 MG/ML
10 INJECTION INTRAMUSCULAR; INTRAVENOUS; SUBCUTANEOUS AS NEEDED
Status: CANCELLED | OUTPATIENT
Start: 2022-10-17

## 2022-09-26 NOTE — TELEPHONE ENCOUNTER
Aron Dallas at  [facility] in [facility].   Provided Dx codes per Audrey Quispe NP.

## 2022-09-26 NOTE — TELEPHONE ENCOUNTER
Patient called, HIPAA verified. Patient states her last refill of Dilaudid 2mg, 1 tab Q8H PRN tabs was on 8/23822. She would like [facility] [place].

## 2022-09-27 ENCOUNTER — APPOINTMENT (OUTPATIENT)
Dept: INFUSION THERAPY | Age: 67
End: 2022-09-27

## 2022-09-28 ENCOUNTER — CLINICAL SUPPORT (OUTPATIENT)
Dept: FAMILY MEDICINE CLINIC | Age: 67
End: 2022-09-28
Payer: MEDICARE

## 2022-09-28 ENCOUNTER — TELEPHONE (OUTPATIENT)
Dept: FAMILY MEDICINE CLINIC | Age: 67
End: 2022-09-28

## 2022-09-28 DIAGNOSIS — R33.9 URINE RETENTION: Primary | ICD-10-CM

## 2022-09-28 DIAGNOSIS — R30.0 DYSURIA: ICD-10-CM

## 2022-09-28 LAB
BILIRUB UR QL STRIP: NEGATIVE
GLUCOSE UR-MCNC: NEGATIVE MG/DL
KETONES P FAST UR STRIP-MCNC: NORMAL MG/DL
PH UR STRIP: 5.5 [PH] (ref 4.6–8)
PROT UR QL STRIP: NORMAL
SP GR UR STRIP: 1.02 (ref 1–1.03)
UA UROBILINOGEN AMB POC: NORMAL (ref 0.2–1)
URINALYSIS CLARITY POC: NORMAL
URINALYSIS COLOR POC: YELLOW
URINE BLOOD POC: NEGATIVE
URINE LEUKOCYTES POC: NORMAL
URINE NITRITES POC: NEGATIVE

## 2022-09-28 PROCEDURE — 81002 URINALYSIS NONAUTO W/O SCOPE: CPT | Performed by: INTERNAL MEDICINE

## 2022-09-28 RX ORDER — LEVOFLOXACIN 500 MG/1
500 TABLET, FILM COATED ORAL DAILY
Qty: 5 TABLET | Refills: 1 | Status: SHIPPED | OUTPATIENT
Start: 2022-09-28 | End: 2022-10-03

## 2022-09-28 NOTE — PROGRESS NOTES
Patient presents for lab draw ordered by:    Ordering Provider:  Dr Benita Nova Department/Practice:  [facility]  Phone:  [phone]  Date Ordered:  Standing Orders    The following labs were Done by Constantino Neff RN:    MYA GUILLEN UA

## 2022-09-29 NOTE — TELEPHONE ENCOUNTER
Patient scheduled for chemotherapy next week. Contacted me yesterday with complaints of feeling ill in a nonspecific manner. No focal symptoms other than inability or difficulty urinating. She was able to get a urine specimen checked at [facility] which was positive only for leukocyte Estrace.   We prescribed Levaquin and she will follow-up with us later this week

## 2022-09-30 NOTE — PROGRESS NOTES
YADY Verified. Called pt on mobile phone number listed. Patient was made aware of most recent XR of her hand showing no fracture/good. Patient verbalized understanding and wanted MA to confirm with our Provider if okay for her to continue taking Motrin for the swelling/pain she is still experiencing. MA confirmed with NP, Ascencion Chris, that this is fine. Patient was appreciative for updates!   Timmy Weeks

## 2022-09-30 NOTE — PROGRESS NOTES
Attempted to reach patient off mobile phone number listed. No answer, left a voicemail to give the office a call back!   Colby Galo

## 2022-10-01 LAB
BACTERIA SPEC CULT: ABNORMAL
CC UR VC: ABNORMAL
SERVICE CMNT-IMP: ABNORMAL

## 2022-10-04 ENCOUNTER — HOSPITAL ENCOUNTER (OUTPATIENT)
Dept: INFUSION THERAPY | Age: 67
Discharge: HOME | End: 2022-10-04
Payer: MEDICARE

## 2022-10-04 ENCOUNTER — HOSPITAL ENCOUNTER (OUTPATIENT)
Dept: CT IMAGING | Age: 67
Discharge: HOME | End: 2022-10-04
Attending: INTERNAL MEDICINE
Payer: MEDICARE

## 2022-10-04 DIAGNOSIS — C25.9 ADENOCARCINOMA OF PANCREAS (HCC): Primary | ICD-10-CM

## 2022-10-04 DIAGNOSIS — G89.3 NEOPLASM RELATED PAIN: ICD-10-CM

## 2022-10-04 DIAGNOSIS — F32.A DEPRESSION, UNSPECIFIED DEPRESSION TYPE: ICD-10-CM

## 2022-10-04 DIAGNOSIS — T45.1X5A CHEMOTHERAPY INDUCED NAUSEA AND VOMITING: ICD-10-CM

## 2022-10-04 DIAGNOSIS — R11.2 CHEMOTHERAPY INDUCED NAUSEA AND VOMITING: ICD-10-CM

## 2022-10-04 DIAGNOSIS — C25.9 MALIGNANT NEOPLASM OF PANCREAS, UNSPECIFIED LOCATION OF MALIGNANCY (HCC): ICD-10-CM

## 2022-10-04 DIAGNOSIS — D70.1 CHEMOTHERAPY INDUCED NEUTROPENIA (HCC): ICD-10-CM

## 2022-10-04 DIAGNOSIS — M79.601 PAIN IN RIGHT ARM: ICD-10-CM

## 2022-10-04 DIAGNOSIS — M79.2 NEUROPATHIC PAIN: ICD-10-CM

## 2022-10-04 DIAGNOSIS — T45.1X5A CHEMOTHERAPY INDUCED NEUTROPENIA (HCC): ICD-10-CM

## 2022-10-04 DIAGNOSIS — R10.84 GENERALIZED ABDOMINAL PAIN: ICD-10-CM

## 2022-10-04 DIAGNOSIS — Z86.16 HISTORY OF COVID-19: ICD-10-CM

## 2022-10-04 DIAGNOSIS — S69.92XA INJURY OF LEFT HAND, INITIAL ENCOUNTER: ICD-10-CM

## 2022-10-04 LAB
ALBUMIN SERPL-MCNC: 3.2 G/DL (ref 3.5–5)
ALBUMIN/GLOB SERPL: 1 {RATIO} (ref 1.1–2.2)
ALP SERPL-CCNC: 144 U/L (ref 45–117)
ALT SERPL-CCNC: 42 U/L (ref 12–78)
ANION GAP SERPL CALC-SCNC: 8 MMOL/L (ref 5–15)
AST SERPL-CCNC: 39 U/L (ref 15–37)
BASOPHILS # BLD: 0 K/UL (ref 0–0.1)
BASOPHILS NFR BLD: 0 % (ref 0–1)
BILIRUB SERPL-MCNC: 0.3 MG/DL (ref 0.2–1)
BUN SERPL-MCNC: 23 MG/DL (ref 6–20)
BUN/CREAT SERPL: 28 (ref 12–20)
CALCIUM SERPL-MCNC: 8.9 MG/DL (ref 8.5–10.1)
CHLORIDE SERPL-SCNC: 103 MMOL/L (ref 97–108)
CO2 SERPL-SCNC: 28 MMOL/L (ref 21–32)
CREAT SERPL-MCNC: 0.81 MG/DL (ref 0.55–1.02)
DIFFERENTIAL METHOD BLD: ABNORMAL
EOSINOPHIL # BLD: 0.2 K/UL (ref 0–0.4)
EOSINOPHIL NFR BLD: 3 % (ref 0–7)
ERYTHROCYTE [DISTWIDTH] IN BLOOD BY AUTOMATED COUNT: 16.7 % (ref 11.5–14.5)
GLOBULIN SER CALC-MCNC: 3.2 G/DL (ref 2–4)
GLUCOSE SERPL-MCNC: 177 MG/DL (ref 65–100)
HCT VFR BLD AUTO: 35.1 % (ref 35–47)
HGB BLD-MCNC: 11.2 G/DL (ref 11.5–16)
IMM GRANULOCYTES # BLD AUTO: 0 K/UL (ref 0–0.04)
IMM GRANULOCYTES NFR BLD AUTO: 0 % (ref 0–0.5)
LYMPHOCYTES # BLD: 1.6 K/UL (ref 0.8–3.5)
LYMPHOCYTES NFR BLD: 26 % (ref 12–49)
MCH RBC QN AUTO: 29.6 PG (ref 26–34)
MCHC RBC AUTO-ENTMCNC: 31.9 G/DL (ref 30–36.5)
MCV RBC AUTO: 92.9 FL (ref 80–99)
MONOCYTES # BLD: 2 K/UL (ref 0–1)
MONOCYTES NFR BLD: 31 % (ref 5–13)
NEUTS SEG # BLD: 2.5 K/UL (ref 1.8–8)
NEUTS SEG NFR BLD: 40 % (ref 32–75)
NRBC # BLD: 0 K/UL (ref 0–0.01)
NRBC BLD-RTO: 0 PER 100 WBC
PLATELET # BLD AUTO: 440 K/UL (ref 150–400)
PLATELET COMMENTS,PCOM: ABNORMAL
PMV BLD AUTO: 10.2 FL (ref 8.9–12.9)
POTASSIUM SERPL-SCNC: 3.8 MMOL/L (ref 3.5–5.1)
PROT SERPL-MCNC: 6.4 G/DL (ref 6.4–8.2)
RBC # BLD AUTO: 3.78 M/UL (ref 3.8–5.2)
RBC MORPH BLD: ABNORMAL
RBC MORPH BLD: ABNORMAL
SODIUM SERPL-SCNC: 139 MMOL/L (ref 136–145)
WBC # BLD AUTO: 6.3 K/UL (ref 3.6–11)

## 2022-10-04 PROCEDURE — 36415 COLL VENOUS BLD VENIPUNCTURE: CPT

## 2022-10-04 PROCEDURE — 85025 COMPLETE CBC W/AUTO DIFF WBC: CPT

## 2022-10-04 PROCEDURE — 86301 IMMUNOASSAY TUMOR CA 19-9: CPT

## 2022-10-04 PROCEDURE — 96413 CHEMO IV INFUSION 1 HR: CPT

## 2022-10-04 PROCEDURE — 80053 COMPREHEN METABOLIC PANEL: CPT

## 2022-10-04 PROCEDURE — 96375 TX/PRO/DX INJ NEW DRUG ADDON: CPT

## 2022-10-04 PROCEDURE — 74011000250 HC RX REV CODE- 250: Performed by: INTERNAL MEDICINE

## 2022-10-04 PROCEDURE — 74011000636 HC RX REV CODE- 636: Performed by: INTERNAL MEDICINE

## 2022-10-04 PROCEDURE — 77030012965 HC NDL HUBR BBMI -A

## 2022-10-04 PROCEDURE — 96417 CHEMO IV INFUS EACH ADDL SEQ: CPT

## 2022-10-04 PROCEDURE — 74011250636 HC RX REV CODE- 250/636: Performed by: INTERNAL MEDICINE

## 2022-10-04 PROCEDURE — 74011000258 HC RX REV CODE- 258: Performed by: INTERNAL MEDICINE

## 2022-10-04 PROCEDURE — 74177 CT ABD & PELVIS W/CONTRAST: CPT

## 2022-10-04 RX ORDER — DIPHENHYDRAMINE HYDROCHLORIDE 50 MG/ML
25 INJECTION, SOLUTION INTRAMUSCULAR; INTRAVENOUS AS NEEDED
Status: ACTIVE | OUTPATIENT
Start: 2022-10-04 | End: 2022-10-04

## 2022-10-04 RX ORDER — ONDANSETRON 2 MG/ML
8 INJECTION INTRAMUSCULAR; INTRAVENOUS AS NEEDED
Status: ACTIVE | OUTPATIENT
Start: 2022-10-04 | End: 2022-10-04

## 2022-10-04 RX ORDER — DEXAMETHASONE SODIUM PHOSPHATE 10 MG/ML
10 INJECTION INTRAMUSCULAR; INTRAVENOUS ONCE
Status: COMPLETED | OUTPATIENT
Start: 2022-10-04 | End: 2022-10-04

## 2022-10-04 RX ORDER — ONDANSETRON 2 MG/ML
8 INJECTION INTRAMUSCULAR; INTRAVENOUS ONCE
Status: COMPLETED | OUTPATIENT
Start: 2022-10-04 | End: 2022-10-04

## 2022-10-04 RX ORDER — SODIUM CHLORIDE 0.9 % (FLUSH) 0.9 %
10 SYRINGE (ML) INJECTION AS NEEDED
Status: ACTIVE | OUTPATIENT
Start: 2022-10-04 | End: 2022-10-04

## 2022-10-04 RX ORDER — ACETAMINOPHEN 325 MG/1
650 TABLET ORAL AS NEEDED
Status: ACTIVE | OUTPATIENT
Start: 2022-10-04 | End: 2022-10-04

## 2022-10-04 RX ORDER — SODIUM CHLORIDE 9 MG/ML
10 INJECTION INTRAMUSCULAR; INTRAVENOUS; SUBCUTANEOUS AS NEEDED
Status: ACTIVE | OUTPATIENT
Start: 2022-10-04 | End: 2022-10-04

## 2022-10-04 RX ORDER — HEPARIN 100 UNIT/ML
300-500 SYRINGE INTRAVENOUS AS NEEDED
Status: DISPENSED | OUTPATIENT
Start: 2022-10-04 | End: 2022-10-04

## 2022-10-04 RX ORDER — BARIUM SULFATE 20 MG/ML
450 SUSPENSION ORAL
Status: COMPLETED | OUTPATIENT
Start: 2022-10-04 | End: 2022-10-04

## 2022-10-04 RX ORDER — SODIUM CHLORIDE 9 MG/ML
25 INJECTION, SOLUTION INTRAVENOUS CONTINUOUS
Status: DISPENSED | OUTPATIENT
Start: 2022-10-04 | End: 2022-10-04

## 2022-10-04 RX ADMIN — PACLITAXEL 184 MG: 100 INJECTION, POWDER, LYOPHILIZED, FOR SUSPENSION INTRAVENOUS at 10:40

## 2022-10-04 RX ADMIN — Medication 500 UNITS: at 12:21

## 2022-10-04 RX ADMIN — BARIUM SULFATE 450 ML: 20 SUSPENSION ORAL at 08:45

## 2022-10-04 RX ADMIN — IOPAMIDOL 100 ML: 612 INJECTION, SOLUTION INTRAVENOUS at 09:55

## 2022-10-04 RX ADMIN — DEXAMETHASONE SODIUM PHOSPHATE 10 MG: 10 INJECTION, SOLUTION INTRAMUSCULAR; INTRAVENOUS at 10:15

## 2022-10-04 RX ADMIN — ONDANSETRON 8 MG: 2 INJECTION INTRAMUSCULAR; INTRAVENOUS at 10:12

## 2022-10-04 RX ADMIN — SODIUM CHLORIDE 25 ML/HR: 9 INJECTION, SOLUTION INTRAVENOUS at 10:10

## 2022-10-04 RX ADMIN — GEMCITABINE HYDROCHLORIDE 1656 MG: 2 INJECTION, SOLUTION INTRAVENOUS at 11:38

## 2022-10-04 RX ADMIN — SODIUM CHLORIDE, PRESERVATIVE FREE 10 ML: 5 INJECTION INTRAVENOUS at 12:21

## 2022-10-04 NOTE — PROGRESS NOTES
[facility] Progress Note    Date: 2022    Name: Asael Valadez    MRN: [id number]         : 1955    Ms. Ba Arrived ambulatory and in no distress for cycle 20 day 1 of Abraxane/Gemzar regimen. Assessment was completed, no acute issues at this time, no new complaints voiced. Pt reports she has not suffered any additional falls, but the neuropathy in her feet is increasing. Port accessed without difficulty, labs drawn and in process. Chemotherapy Flowsheet 10/4/2022   Cycle C20 D1   Date 10/4/2022   Drug / Regimen Abraxane/Gemzar   Dosage 184 mg/1656 mg   Time Up -   Time Down -   Pre Hydration -   Pre Meds Zofran 8 mg IV, Decadron 10 mg IVP   Notes -     Ms. Ba's vitals were reviewed. Visit Vitals  BP (!) 177/80 (BP 1 Location: Left arm, BP Patient Position: Sitting)   Pulse 85   Temp 98.5 °F (36.9 °C)   Resp 18   Ht 5' 4\" (1.626 m)   Wt 83.2 kg (183 lb 6.4 oz)   SpO2 97%   BMI 31.48 kg/m²       Lab results were obtained and reviewed. Recent Results (from the past 12 hour(s))   CBC WITH AUTOMATED DIFF    Collection Time: 10/04/22  8:29 AM   Result Value Ref Range    WBC 6.3 3.6 - 11.0 K/uL    RBC 3.78 (L) 3.80 - 5.20 M/uL    HGB 11.2 (L) 11.5 - 16.0 g/dL    HCT 35.1 35.0 - 47.0 %    MCV 92.9 80.0 - 99.0 FL    MCH 29.6 26.0 - 34.0 PG    MCHC 31.9 30.0 - 36.5 g/dL    RDW 16.7 (H) 11.5 - 14.5 %    PLATELET 630 (H) 024 - 400 K/uL    MPV 10.2 8.9 - 12.9 FL    NRBC 0.0 0  WBC    ABSOLUTE NRBC 0.00 0.00 - 0.01 K/uL    NEUTROPHILS 40 32 - 75 %    LYMPHOCYTES 26 12 - 49 %    MONOCYTES 31 (H) 5 - 13 %    EOSINOPHILS 3 0 - 7 %    BASOPHILS 0 0 - 1 %    IMMATURE GRANULOCYTES 0 0.0 - 0.5 %    ABS. NEUTROPHILS 2.5 1.8 - 8.0 K/UL    ABS. LYMPHOCYTES 1.6 0.8 - 3.5 K/UL    ABS. MONOCYTES 2.0 (H) 0.0 - 1.0 K/UL    ABS. EOSINOPHILS 0.2 0.0 - 0.4 K/UL    ABS. BASOPHILS 0.0 0.0 - 0.1 K/UL    ABS. IMM.  GRANS. 0.0 0.00 - 0.04 K/UL    DF MANUAL      PLATELET COMMENTS ADEQUATE PLATELETS      RBC COMMENTS ANISOCYTOSIS  1+        RBC COMMENTS POIKILOCYTOSIS  1+       METABOLIC PANEL, COMPREHENSIVE    Collection Time: 10/04/22  8:29 AM   Result Value Ref Range    Sodium 139 136 - 145 mmol/L    Potassium 3.8 3.5 - 5.1 mmol/L    Chloride 103 97 - 108 mmol/L    CO2 28 21 - 32 mmol/L    Anion gap 8 5 - 15 mmol/L    Glucose 177 (H) 65 - 100 mg/dL    BUN 23 (H) 6 - 20 MG/DL    Creatinine 0.81 0.55 - 1.02 MG/DL    BUN/Creatinine ratio 28 (H) 12 - 20      eGFR >60 >60 ml/min/1.73m2    Calcium 8.9 8.5 - 10.1 MG/DL    Bilirubin, total 0.3 0.2 - 1.0 MG/DL    ALT (SGPT) 42 12 - 78 U/L    AST (SGOT) 39 (H) 15 - 37 U/L    Alk. phosphatase 144 (H) 45 - 117 U/L    Protein, total 6.4 6.4 - 8.2 g/dL    Albumin 3.2 (L) 3.5 - 5.0 g/dL    Globulin 3.2 2.0 - 4.0 g/dL    A-G Ratio 1.0 (L) 1.1 - 2.2       Pt left [facility] for CT scan. Pt returned to [facility]. Pre-medications  were administered as ordered and chemotherapy was initiated. Two nurses verified prior to administering: drug name, drug dose, infusion volume or drug volume  when prepared in a syringe, rate of administration, route of administration , expiration dates and/or times, appearance and physical integrity of the drugs, rate set on infusion pump, when used, sequencing of drug administration. 1010: NS initiated. 1012: Zofran 8 mg given IVP. 1015: Decadron 10 mg given IVP. 1040: Abraxane 184 mg initiated to infuse over 30 minutes. 1126: Abraxane infusion complete, NS flushing line. 1138:  Gemzar 1,656 mg initiated to infuse over 30 minutes. 1220: Gemzar infusion complete. NS flushing line and stopped. Port flushed with 10 mL NS, brisk blood return and Heparin. Needle removed and paper tape and gauze applied to site without redness, swelling or pain. Ms. Kiara Molina tolerated treatment well and was discharged from [place] [address] in stable condition at 1228. She is to return on October 18 at 0800 for her next appointment.     Maggie Cho RN  October 4, 2022

## 2022-10-05 VITALS
TEMPERATURE: 98.5 F | HEIGHT: 64 IN | SYSTOLIC BLOOD PRESSURE: 177 MMHG | WEIGHT: 183.4 LBS | OXYGEN SATURATION: 97 % | DIASTOLIC BLOOD PRESSURE: 80 MMHG | BODY MASS INDEX: 31.31 KG/M2 | HEART RATE: 85 BPM | RESPIRATION RATE: 18 BRPM

## 2022-10-05 LAB — CANCER AG19-9 SERPL-ACNC: 257 U/ML (ref 0–35)

## 2022-10-05 NOTE — PROGRESS NOTES
Sandrita Orta pt  Slight progression on CTs on gem/ abraxane  Hx of FOLFIRINOX stopped due to side effects  Next option would be FOLFOX or dose reduced FOLFIRINOX  Pt needs a local ONC ? [facility] vs [facility]?

## 2022-10-06 NOTE — PROGRESS NOTES
Ms. Baldomero Vitale is a 79 y.o. female who is here for evaluation of   Chief Complaint   Patient presents with    Medication Adjustment     In process of decreasing diluadid. Wants to know process    Pancreatic Cancer     Has F/U with [facility]    Immunization/Injection     Flu vaccine provided    Diabetes   . ASSESSMENT AND PLAN:    1. Uncontrolled type 2 diabetes mellitus with hyperglycemia ([facility] [address]  Difficulty with good control due to IV steroids from chemotherapy but doing the best she can. 2. Essential hypertension  Elevated today. We will repeat again in January. 3. Adenocarcinoma of pancreas ([facility] [address]  Doing extremely well. Has an appoint with Dr. Ugo Montoya later today. Has been followed at [facility] and continues to do very well with chemotherapy    4. 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 Recent CT scan:  slight increases in  pulmonary nodule and mesenteric nodule. Recent UTI:  pansensitive. Steroids (chemo) have made DM difficult to control. ROS:  Denies  fever, chills, cough, chest pain, SOB,  nausea, vomiting, or diarrhea. Denies wt loss, wt gain, hemoptysis, hematochezia or melena. Physical Examination:    Visit Vitals  BP (!) 166/92 (BP 1 Location: Left upper arm, BP Patient Position: Sitting, BP Cuff Size: Adult long)   Pulse 90   Temp 97.4 °F (36.3 °C) (Temporal)   Resp 18   Ht 5' 4\" (1.626 m)   Wt 188 lb 3.2 oz (85.4 kg)   SpO2 95%   BMI 32.30 kg/m²      General:  Alert, cooperative, no distress. Head:  Normocephalic, without obvious abnormality, atraumatic. Eyes:  Conjunctivae/corneas clear. Pupils equal, round, reactive to light. Extraocular movements intact.    Lungs: Clear to auscultation bilaterally. Chest wall:  No tenderness or deformity. Cardiac:  RRR   Abdomen:   Soft, non-tender. Bowel sounds normal. No masses. No organomegaly. Extremities: Extremities normal, atraumatic, no cyanosis or edema. Pulses: 2+ and symmetric all extremities. Skin: Skin color, texture, turgor normal. No rashes or lesions. Lymph nodes: Cervical, supraclavicular, and axillary nodes normal.   Neurologic: CNII-XII intact. Normal strength, sensation, and reflexes throughout. On this date 10/21/2022 I have spent 30 minutes reviewing previous notes, test results and face to face with the patient discussing the diagnosis and importance of compliance with the treatment plan as well as documenting on the day of the visit.     Theresa Bauman MD FACP    (signed electronically) on 10/21/2022 at 10:54 AM

## 2022-10-07 NOTE — PROGRESS NOTES
Patient aware of results, she saw them on My chart.   Patient will consider who to see closer to home for her Oncologist.   She will let us know

## 2022-10-11 ENCOUNTER — TELEPHONE (OUTPATIENT)
Dept: ONCOLOGY | Age: 67
End: 2022-10-11

## 2022-10-11 NOTE — TELEPHONE ENCOUNTER
Patient would like to switch to DR Joel. She has been seeing DR Padmini Siddiqi, who has referred patient to a closer provider.

## 2022-10-12 ENCOUNTER — TELEPHONE (OUTPATIENT)
Dept: ONCOLOGY | Age: 67
End: 2022-10-12

## 2022-10-12 NOTE — TELEPHONE ENCOUNTER
This is in regard to moving chemofor  Mrs Mana Devries to Mondays, V/s Tuesday or Wednesday. Mayte Self  You; Amos Varela, PHARMD; Katelyn MENESES 50 minutes ago (10:42 AM)     JWENDIE  Thank you, done. Alejandra Phillips, BONIFACIOD  You; Adair Ramírez; Mayte Self 1 hour ago (10:25 AM)     RD  Yes - you can move her to Mondays. A day early will be just fine.

## 2022-10-17 ENCOUNTER — HOSPITAL ENCOUNTER (OUTPATIENT)
Dept: INFUSION THERAPY | Age: 67
Discharge: HOME | End: 2022-10-17
Payer: MEDICARE

## 2022-10-17 ENCOUNTER — VIRTUAL VISIT (OUTPATIENT)
Dept: ONCOLOGY | Age: 67
End: 2022-10-17
Payer: MEDICARE

## 2022-10-17 VITALS
WEIGHT: 189.8 LBS | TEMPERATURE: 98.7 F | RESPIRATION RATE: 18 BRPM | HEIGHT: 64 IN | SYSTOLIC BLOOD PRESSURE: 182 MMHG | DIASTOLIC BLOOD PRESSURE: 89 MMHG | OXYGEN SATURATION: 95 % | HEART RATE: 87 BPM | BODY MASS INDEX: 32.4 KG/M2

## 2022-10-17 VITALS
OXYGEN SATURATION: 95 % | DIASTOLIC BLOOD PRESSURE: 78 MMHG | HEART RATE: 87 BPM | RESPIRATION RATE: 18 BRPM | TEMPERATURE: 98.7 F | SYSTOLIC BLOOD PRESSURE: 166 MMHG

## 2022-10-17 DIAGNOSIS — C25.9 PRIMARY PANCREATIC CANCER WITH METASTASIS TO OTHER SITE (HCC): Primary | ICD-10-CM

## 2022-10-17 DIAGNOSIS — C25.9 ADENOCARCINOMA OF PANCREAS (HCC): Primary | ICD-10-CM

## 2022-10-17 LAB
ALBUMIN SERPL-MCNC: 3.1 G/DL (ref 3.5–5)
ALBUMIN/GLOB SERPL: 0.9 {RATIO} (ref 1.1–2.2)
ALP SERPL-CCNC: 188 U/L (ref 45–117)
ALT SERPL-CCNC: 31 U/L (ref 12–78)
ANION GAP SERPL CALC-SCNC: 7 MMOL/L (ref 5–15)
AST SERPL-CCNC: 26 U/L (ref 15–37)
BASOPHILS # BLD: 0.1 K/UL (ref 0–0.1)
BASOPHILS NFR BLD: 1 % (ref 0–1)
BILIRUB SERPL-MCNC: 0.3 MG/DL (ref 0.2–1)
BUN SERPL-MCNC: 13 MG/DL (ref 6–20)
BUN/CREAT SERPL: 17 (ref 12–20)
CALCIUM SERPL-MCNC: 8.9 MG/DL (ref 8.5–10.1)
CHLORIDE SERPL-SCNC: 103 MMOL/L (ref 97–108)
CO2 SERPL-SCNC: 30 MMOL/L (ref 21–32)
CREAT SERPL-MCNC: 0.75 MG/DL (ref 0.55–1.02)
DIFFERENTIAL METHOD BLD: ABNORMAL
EOSINOPHIL # BLD: 0.4 K/UL (ref 0–0.4)
EOSINOPHIL NFR BLD: 7 % (ref 0–7)
ERYTHROCYTE [DISTWIDTH] IN BLOOD BY AUTOMATED COUNT: 16.4 % (ref 11.5–14.5)
GLOBULIN SER CALC-MCNC: 3.3 G/DL (ref 2–4)
GLUCOSE SERPL-MCNC: 233 MG/DL (ref 65–100)
HCT VFR BLD AUTO: 34.4 % (ref 35–47)
HGB BLD-MCNC: 11.1 G/DL (ref 11.5–16)
IMM GRANULOCYTES # BLD AUTO: 0 K/UL (ref 0–0.04)
IMM GRANULOCYTES NFR BLD AUTO: 0 % (ref 0–0.5)
LYMPHOCYTES # BLD: 1.6 K/UL (ref 0.8–3.5)
LYMPHOCYTES NFR BLD: 31 % (ref 12–49)
MCH RBC QN AUTO: 30.6 PG (ref 26–34)
MCHC RBC AUTO-ENTMCNC: 32.3 G/DL (ref 30–36.5)
MCV RBC AUTO: 94.8 FL (ref 80–99)
MONOCYTES # BLD: 1.5 K/UL (ref 0–1)
MONOCYTES NFR BLD: 28 % (ref 5–13)
NEUTS SEG # BLD: 1.6 K/UL (ref 1.8–8)
NEUTS SEG NFR BLD: 33 % (ref 32–75)
NRBC # BLD: 0 K/UL (ref 0–0.01)
NRBC BLD-RTO: 0 PER 100 WBC
PLATELET # BLD AUTO: 429 K/UL (ref 150–400)
PLATELET COMMENTS,PCOM: ABNORMAL
PMV BLD AUTO: 10.1 FL (ref 8.9–12.9)
POTASSIUM SERPL-SCNC: 3.7 MMOL/L (ref 3.5–5.1)
PROT SERPL-MCNC: 6.4 G/DL (ref 6.4–8.2)
RBC # BLD AUTO: 3.63 M/UL (ref 3.8–5.2)
RBC MORPH BLD: ABNORMAL
SODIUM SERPL-SCNC: 140 MMOL/L (ref 136–145)
WBC # BLD AUTO: 5.2 K/UL (ref 3.6–11)

## 2022-10-17 PROCEDURE — G8536 NO DOC ELDER MAL SCRN: HCPCS | Performed by: INTERNAL MEDICINE

## 2022-10-17 PROCEDURE — G8417 CALC BMI ABV UP PARAM F/U: HCPCS | Performed by: INTERNAL MEDICINE

## 2022-10-17 PROCEDURE — 1123F ACP DISCUSS/DSCN MKR DOCD: CPT | Performed by: INTERNAL MEDICINE

## 2022-10-17 PROCEDURE — G0463 HOSPITAL OUTPT CLINIC VISIT: HCPCS | Performed by: INTERNAL MEDICINE

## 2022-10-17 PROCEDURE — 1101F PT FALLS ASSESS-DOCD LE1/YR: CPT | Performed by: INTERNAL MEDICINE

## 2022-10-17 PROCEDURE — 74011250636 HC RX REV CODE- 250/636: Performed by: INTERNAL MEDICINE

## 2022-10-17 PROCEDURE — 80053 COMPREHEN METABOLIC PANEL: CPT

## 2022-10-17 PROCEDURE — G8754 DIAS BP LESS 90: HCPCS | Performed by: INTERNAL MEDICINE

## 2022-10-17 PROCEDURE — 36591 DRAW BLOOD OFF VENOUS DEVICE: CPT

## 2022-10-17 PROCEDURE — 85025 COMPLETE CBC W/AUTO DIFF WBC: CPT

## 2022-10-17 PROCEDURE — 99215 OFFICE O/P EST HI 40 MIN: CPT | Performed by: INTERNAL MEDICINE

## 2022-10-17 PROCEDURE — 77030012965 HC NDL HUBR BBMI -A

## 2022-10-17 PROCEDURE — 74011000250 HC RX REV CODE- 250: Performed by: INTERNAL MEDICINE

## 2022-10-17 PROCEDURE — G8427 DOCREV CUR MEDS BY ELIG CLIN: HCPCS | Performed by: INTERNAL MEDICINE

## 2022-10-17 PROCEDURE — 1090F PRES/ABSN URINE INCON ASSESS: CPT | Performed by: INTERNAL MEDICINE

## 2022-10-17 PROCEDURE — G8510 SCR DEP NEG, NO PLAN REQD: HCPCS | Performed by: INTERNAL MEDICINE

## 2022-10-17 PROCEDURE — G8753 SYS BP > OR = 140: HCPCS | Performed by: INTERNAL MEDICINE

## 2022-10-17 PROCEDURE — 36415 COLL VENOUS BLD VENIPUNCTURE: CPT

## 2022-10-17 PROCEDURE — 3017F COLORECTAL CA SCREEN DOC REV: CPT | Performed by: INTERNAL MEDICINE

## 2022-10-17 PROCEDURE — G8399 PT W/DXA RESULTS DOCUMENT: HCPCS | Performed by: INTERNAL MEDICINE

## 2022-10-17 RX ORDER — SODIUM CHLORIDE 9 MG/ML
10 INJECTION INTRAMUSCULAR; INTRAVENOUS; SUBCUTANEOUS AS NEEDED
Status: ACTIVE | OUTPATIENT
Start: 2022-10-17 | End: 2022-10-17

## 2022-10-17 RX ORDER — HEPARIN 100 UNIT/ML
300-500 SYRINGE INTRAVENOUS AS NEEDED
Status: DISPENSED | OUTPATIENT
Start: 2022-10-17 | End: 2022-10-17

## 2022-10-17 RX ADMIN — SODIUM CHLORIDE, PRESERVATIVE FREE 10 ML: 5 INJECTION INTRAVENOUS at 10:23

## 2022-10-17 RX ADMIN — SODIUM CHLORIDE, PRESERVATIVE FREE 10 ML: 5 INJECTION INTRAVENOUS at 08:30

## 2022-10-17 RX ADMIN — HEPARIN 500 UNITS: 100 SYRINGE at 10:24

## 2022-10-17 NOTE — PROGRESS NOTES
[address] [facility] [address], [address] [facility], [address] [facility], [address] [place] [address]  [phone]          Reason for Visit:   Mo Case is a 79 y.o. female who is seen by synchronous (real-time) audio-video technology for follow up of pancreatic adenocarcinoma     Treatment History:     Dx: Pancreatic Adenocarcinoma--May 2020--[id number]--xiregdgaxwq of splenic vein and superior mesenteric vein    Tx: mFOLFIRINOX--first cycle 5/26/2020, second cycle 6/10/2020, dose reduced 15% cycle 3 on 6/30/2020--delayed due to severe side effects--switched to Gemcitabine/Abraxane--Cycle #1 7/29/2002, Cycle #2 9/2/2020, Cycle #3 10/7/2020, Cycle #4 11/4/2020. Neoadjuvant Radiation with Dr Winifred Suarez ending 12/18/2021. Neoadjuvant Christian/Abraxane, 06/2020 - 11/2020    Distal Pancreatectomy, Splenectomy, and Civa Sheet with Dr Toni Villar on 3/17/2021. Adjuvant radiation with Dr Winifred Suarez. Palliative chemotherapy    Restart Gemcitabine/abraxane Cycle #1 07/06/2021    Goal: Prolong life--Palliative    History of Present Illness:     Ms. Stoney Shay is a women with metastatic pancreatic adenocarcinoma. She had locally advanced pan Can Dx'ed in 2020. She received 4 cycles of FOLFIRINOX. She had severe toxicities from it with neutropenic fever. She then transitioned to Christian/Abraxane. She then underwent distal pancreatectomy in 2020 with Dr. Toni Villar. Disease recurrent in the peritoneum in 07/2021. It was never biopsied. She then started receiving Christian/abraxane. She is doing fair. Some numbness in hands and feet.          Past Medical History:   Diagnosis Date    Adenocarcinoma of pancreas ([address] 05/13/2020    Dr Terrill Runner biopsy via EUS    Diabetes [facility])     GERD (gastroesophageal reflux disease)     Hernia of abdominal cavity     Subxyphoid hernia    Hypertension     Inflammatory polyarthropathy (HCC)     Joint pain     Joint swelling     Menopause     Ocular nevus of left eye Pancreatitis     Tracheal stenosis       Past Surgical History:   Procedure Laterality Date    HX CARPAL TUNNEL RELEASE Bilateral     HX COLONOSCOPY      HX HYSTERECTOMY      HX KNEE ARTHROSCOPY Right     HX KNEE REPLACEMENT Right     partial    HX LAP CHOLECYSTECTOMY      HX TONSILLECTOMY      HX VASCULAR ACCESS        Social History     Tobacco Use    Smoking status: Never    Smokeless tobacco: Never   Substance Use Topics    Alcohol use: No     Alcohol/week: 0.0 standard drinks      Family History   Problem Relation Age of Onset    Heart Disease Mother     Heart Attack Mother     Cancer Father         lung    Diabetes Sister      Current Outpatient Medications   Medication Sig    gabapentin (NEURONTIN) 300 mg capsule TAKE 2 CAPSULES BY MOUTH THREE TIMES DAILY    HYDROmorphone (DILAUDID) 2 mg tablet Take 1 Tablet by mouth every eight (8) hours as needed for Pain for up to 30 days. Max Daily Amount: 6 mg. LORazepam (ATIVAN) 1 mg tablet TAKE 1 TABLET BY MOUTH EVERY MORNING AND TAKE 2 TABLETS AT BEDTIME (Indications: anxious, nausea AND vomiting caused by cancer drugs, difficulty sleeping, prevent nausea AND vomiting from cancer chemotherapy)    famotidine (PEPCID) 20 mg tablet Take 20 mg by mouth two (2) times a day. multivit-minerals/folic acid (MULTIVITAMIN GUMMIES PO) Take 2 Each by mouth Every morning. methylphenidate HCl (RITALIN) 10 mg tablet Take 1 Tablet by mouth two (2) times a day. Max Daily Amount: 20 mg.    nystatin (MYCOSTATIN) powder Apply 1 g to affected area three (3) times daily. DULoxetine (CYMBALTA) 30 mg capsule Take 1 Capsule by mouth every evening. acetaminophen (TYLENOL) 500 mg tablet Take 1,000 mg by mouth two (2) times a day. Indications: pain    ibuprofen (MOTRIN) 200 mg tablet Take 600 mg by mouth two (2) times a day. Indications: pain    senna-docusate (PERICOLACE) 8.6-50 mg per tablet Take 1 Tablet by mouth nightly.     DULoxetine (CYMBALTA) 60 mg capsule Take 1 Capsule by mouth daily. Indications: neuropathic pain (Patient taking differently: Take 60 mg by mouth Every morning. At 0900  Indications: neuropathic pain)    Toujeo SoloStar U-300 Insulin 300 unit/mL (1.5 mL) inpn pen INJECT 55 UNITS ONCE DAILY AT BEDTIME - DOSE INCREASED (Patient taking differently: 28 Units by SubCUTAneous route nightly. Patient uses differently during around chemo time.)    HumaLOG KwikPen Insulin 100 unit/mL kwikpen patient on sliding scale    nirmatrelvir-ritonavir (Paxlovid, EUA,) 150 mg x 2- 100 mg tablet Take 3 Tablets by mouth every twelve (12) hours for 5 days. No current facility-administered medications for this visit. No Known Allergies     Review of Systems: A complete review of systems was obtained, negative except as described above. Physical Exam:     General: alert, cooperative, no distress   Mental  status: normal mood, behavior, speech, dress, motor activity, and thought processes, able to follow commands   HENT: NCAT   Neck: no visualized mass   Resp: no respiratory distress   Neuro: no gross deficits   Skin: no discoloration or lesions of concern on visible areas   Psychiatric: normal affect, consistent with stated mood, no evidence of hallucinations       Due to this being a TeleHealth evaluation (During [facility] public health emergency), many elements of the physical examination are unable to be assessed. Evaluation of the following organ systems was limited: Vitals/Constitutional/EENT/Resp/CV/GI//MS/Neuro/Skin/Heme-Lymph-Imm. Results:     Lab Results   Component Value Date/Time    WBC 5.2 10/17/2022 08:30 AM    HGB 11.1 (L) 10/17/2022 08:30 AM    HCT 34.4 (L) 10/17/2022 08:30 AM    PLATELET 083 (H) 87/26/3738 08:30 AM    MCV 94.8 10/17/2022 08:30 AM    ABS.  NEUTROPHILS 1.6 (L) 10/17/2022 08:30 AM     Lab Results   Component Value Date/Time    Sodium 140 10/17/2022 08:30 AM    Potassium 3.7 10/17/2022 08:30 AM    Chloride 103 10/17/2022 08:30 AM    CO2 30 10/17/2022 08:30 AM    Glucose 233 (H) 10/17/2022 08:30 AM    BUN 13 10/17/2022 08:30 AM    Creatinine 0.75 10/17/2022 08:30 AM    GFR est AA >60 09/20/2022 08:27 AM    GFR est non-AA >60 09/20/2022 08:27 AM    Calcium 8.9 10/17/2022 08:30 AM    Glucose (POC) 282 (H) 07/24/2022 08:26 AM    Creatinine (POC) 0.7 02/06/2013 10:21 AM     Lab Results   Component Value Date/Time    Bilirubin, total 0.3 10/17/2022 08:30 AM    ALT (SGPT) 31 10/17/2022 08:30 AM    Alk. phosphatase 188 (H) 10/17/2022 08:30 AM    Protein, total 6.4 10/17/2022 08:30 AM    Albumin 3.1 (L) 10/17/2022 08:30 AM    Globulin 3.3 10/17/2022 08:30 AM         CT Results (most recent):  Results from [place] encounter on 10/04/22    CT CHEST ABD PELV W CONT    Narrative  EXAM: CT CHEST ABD PELV W CONT    INDICATION: Pancreatic carcinoma, pulmonary nodules, and omental nodule. Distal  pancreatectomy and splenectomy. Evaluate response to therapy. COMPARISON: CT chest, abdomen, and pelvis on 8/9/2022 and 5/17/2022. IV CONTRAST: 100 mL of Isovue-300. ORAL CONTRAST: Oral contrast was administered to better evaluate the bowel. TECHNIQUE: CT chest, abdomen, pelvis. Following the uneventful intravenous administration of contrast, thin axial  images were obtained through the chest, abdomen and pelvis. Coronal and sagittal  reformats were generated. CT dose reduction was achieved through use of a  standardized protocol tailored for this examination and automatic exposure  control for dose modulation. FINDINGS:    CHEST WALL: Left port and catheter are unchanged and in good position. THYROID: No nodule. MEDIASTINUM: No mass or lymphadenopathy. RADHA: No mass or lymphadenopathy. THORACIC AORTA: Aortic atherosclerosis without aneurysm. Pulsation artifact. MAIN PULMONARY ARTERY: No central embolism. TRACHEA/BRONCHI: Patent. ESOPHAGUS: No wall thickening or dilatation. HEART: Normal size, no effusion.   PLEURA: No effusion or pneumothorax. LUNGS: Right lower lobe nodular pneumonia has resolved. The pulmonary nodules in  both lungs are slightly increased since the comparison studies. A posterior  right lower lobe nodule previously measured 6 mm and now measures 8 mm (series  3, image 32). Largest nodule is lateral right lower lobe and measures 9 mm  (previously 6 mm). Largest left lung nodule is anterior, lateral left lower lobe  and measures 6 mm. No new nodule. LIVER: Hepatic cysts are unchanged. No solid hepatic mass. Biliary dilatation in  the left hepatic lobe is more conspicuous. BILIARY TREE: Cholecystectomy. CBD is not dilated. SPLEEN: Splenectomy. PANCREAS: Resection of the body and tail. Unchanged head and neck. No measurable  mass. ADRENALS: Unremarkable. KIDNEYS: No mass, calculus, or hydronephrosis. STOMACH: Incomplete distention, limited evaluation. SMALL BOWEL: No dilatation or wall thickening. COLON: Colonic constipation pattern is unchanged. APPENDIX: Normal. Located in the right upper quadrant. PERITONEUM: 1 cm nodule in the left omentum of the peritoneum has increased in  size. Right central abdominal omental nodularity is slightly more conspicuous. RETROPERITONEUM: No lymphadenopathy or aortic aneurysm. REPRODUCTIVE ORGANS: Hysterectomy. URINARY BLADDER: No mass or calculus. BONES: No osseous metastatic disease. Multilevel degenerative spine disease. ABDOMINAL WALL: Postsurgical. Subtle supraumbilical midline ventral hernia  contains nonobstructed colon. ADDITIONAL COMMENTS: N/A    Impression  1. Progression of disease. Increased size of the pulmonary metastatic nodules. Increased size of the left peritoneal metastatic nodule. 2. No acute process. Resolution of right lower lobe pneumonia since August.       Assessment/PLAN:     1) Pancreatic Adenocarcinoma metastatic to the peritoneum and lungs. ECOG PS 1  Intent of Treatment - palliative  Prognosis: Guarded.     Received neoadjuvant chemotherapy. Radiation with Dr Raul Hernandez. Definitive resection with Dr Emigdio Garcia. BRCA negative. Now peritoneal disease/mets  On chemo since 7/2021    Recent CT - progression of disease in the lungs and peritoneum  I will review the CT with Radiology and consider a Bx of the lung nodules or peritoneal implant. I will see her in 1 week. Signed By: Natalie Segura MD          The patient was evaluated through a synchronous (real-time) audio-video encounter. The patient (or guardian if applicable) is aware that this is a billable service, which includes applicable co-pays. This Virtual Visit was conducted with patient's (and/or legal guardian's) consent. The visit was conducted pursuant to the emergency declaration under the [address] [place] [address] waiver authority and the [employer] General Act. Patient identification was verified, and a caregiver was present when appropriate. The patient was located in a state where the provider was licensed to provide care.

## 2022-10-17 NOTE — PROGRESS NOTES
Ibrahima Petty is a 79 y.o. female here for follow up for pancreatic cancer. Patient is scheduled for C20D15 chemotherapy in [facility] today. Patient is tearful today, anxiety with changing doctor. Patient has experienced increase in pain in LUQ, this pain is dull acking. At its worst it is a 5/10, the pain in intermittent. She also has a pain in her lower abdomen and back, she rates this acking pain as 4/10. Patient states she is tired a lot. Patient has +1 edema in LE bilateral below the knees. She is not sleeping well at night, she naps a lot during the day. Key Oncology Meds       Patient is on no Oncologic meds. Key Pain Meds               HYDROmorphone (DILAUDID) 2 mg tablet Take 1 Tablet by mouth every eight (8) hours as needed for Pain for up to 30 days. Max Daily Amount: 6 mg.    acetaminophen (TYLENOL) 500 mg tablet Take 1,000 mg by mouth two (2) times a day. Indications: pain    ibuprofen (MOTRIN) 200 mg tablet Take 600 mg by mouth two (2) times a day.  Indications: pain          Chemotherapy Flowsheet 10/4/2022   Cycle C20 D1   Date 10/4/2022   Drug / Regimen Abraxane/Gemzar   Dosage 184 mg/1656 mg   Time Up -   Time Down -   Pre Hydration -   Pre Meds Zofran 8 mg IV, Decadron 10 mg IVP   Notes -

## 2022-10-17 NOTE — PROGRESS NOTES
[facility] Progress Note    Date: 2022    Name: Jeffry Reddy    MRN: [id number]         : 1955    Ms. Ba Arrived ambulatory and in no distress for cycle 20 day 15 of Gemzar/Abraxane regimen. Assessment was completed, no acute issues at this time, continues to have balance issues, also having word finding issues at times but no confusion. Has edema lower legs up to mid calf- no pitting. Port accessed without difficulty, labs drawn and in process. Chemotherapy Flowsheet 10/17/2022   Cycle C 20 D15   Date 10/17/2022   Drug / Regimen HELD   Dosage -   Time Up -   Time Down -   Pre Hydration -   Pre Meds -   Notes regimen D/C due to progression     Proceeded to appt with Dr. Yayo Umaña. Ms. Ba's vitals were reviewed. Visit Vitals  BP (!) 182/89 (BP 1 Location: Left upper arm, BP Patient Position: Sitting)   Pulse 87   Temp 98.7 °F (37.1 °C)   Resp 18   Ht 5' 4\" (1.626 m)   Wt 86.1 kg (189 lb 12.8 oz)   SpO2 95%   BMI 32.58 kg/m²       Lab results were obtained and reviewed. Recent Results (from the past 12 hour(s))   CBC WITH AUTOMATED DIFF    Collection Time: 10/17/22  8:30 AM   Result Value Ref Range    WBC 5.2 3.6 - 11.0 K/uL    RBC 3.63 (L) 3.80 - 5.20 M/uL    HGB 11.1 (L) 11.5 - 16.0 g/dL    HCT 34.4 (L) 35.0 - 47.0 %    MCV 94.8 80.0 - 99.0 FL    MCH 30.6 26.0 - 34.0 PG    MCHC 32.3 30.0 - 36.5 g/dL    RDW 16.4 (H) 11.5 - 14.5 %    PLATELET 625 (H) 170 - 400 K/uL    MPV 10.1 8.9 - 12.9 FL    NRBC 0.0 0.0  WBC    ABSOLUTE NRBC 0.00 0.00 - 0.01 K/uL    NEUTROPHILS 33 32 - 75 %    LYMPHOCYTES 31 12 - 49 %    MONOCYTES 28 (H) 5 - 13 %    EOSINOPHILS 7 0 - 7 %    BASOPHILS 1 0 - 1 %    IMMATURE GRANULOCYTES 0 0 - 0.5 %    ABS. NEUTROPHILS 1.6 (L) 1.8 - 8.0 K/UL    ABS. LYMPHOCYTES 1.6 0.8 - 3.5 K/UL    ABS. MONOCYTES 1.5 (H) 0.0 - 1.0 K/UL    ABS. EOSINOPHILS 0.4 0.0 - 0.4 K/UL    ABS. BASOPHILS 0.1 0.0 - 0.1 K/UL    ABS. IMM.  GRANS. 0.0 0.00 - 0.04 K/UL    DF AUTOMATED      PLATELET COMMENTS PLATELETS APPEAR SLIGHTLY INCREASED     RBC COMMENTS NORMOCYTIC, NORMOCHROMIC     METABOLIC PANEL, COMPREHENSIVE    Collection Time: 10/17/22  8:30 AM   Result Value Ref Range    Sodium 140 136 - 145 mmol/L    Potassium 3.7 3.5 - 5.1 mmol/L    Chloride 103 97 - 108 mmol/L    CO2 30 21 - 32 mmol/L    Anion gap 7 5 - 15 mmol/L    Glucose 233 (H) 65 - 100 mg/dL    BUN 13 6 - 20 MG/DL    Creatinine 0.75 0.55 - 1.02 MG/DL    BUN/Creatinine ratio 17 12 - 20      eGFR >60 >60 ml/min/1.73m2    Calcium 8.9 8.5 - 10.1 MG/DL    Bilirubin, total 0.3 0.2 - 1.0 MG/DL    ALT (SGPT) 31 12 - 78 U/L    AST (SGOT) 26 15 - 37 U/L    Alk. phosphatase 188 (H) 45 - 117 U/L    Protein, total 6.4 6.4 - 8.2 g/dL    Albumin 3.1 (L) 3.5 - 5.0 g/dL    Globulin 3.3 2.0 - 4.0 g/dL    A-G Ratio 0.9 (L) 1.1 - 2.2       Per visit with MD, chemo will be held today and current regimen discontinued. Patient will be going to [facility] 10/18/22 for evaluation with Dr. Pernell Ervin. She reports that she was told by Dr. Jeanna España that he wanted her to have a biospy and would be changing chemo regimens. Port was flushed and de-accessed and site intact. Ms. Felipe Mcpherson  was discharged from [place] [address] in stable condition at 1030. Given CD of CT scans to take with her to [facility].  [facility] appointment not scheduled at this time until plan received from MD.    Jossy Jaquez RN  October 17, 2022

## 2022-10-18 ENCOUNTER — HOSPITAL ENCOUNTER (OUTPATIENT)
Dept: INFUSION THERAPY | Age: 67
End: 2022-10-18
Payer: MEDICARE

## 2022-10-18 DIAGNOSIS — C25.9 MALIGNANT NEOPLASM OF PANCREAS, UNSPECIFIED LOCATION OF MALIGNANCY (HCC): ICD-10-CM

## 2022-10-19 RX ORDER — LORAZEPAM 1 MG/1
TABLET ORAL
Qty: 90 TABLET | Refills: 0 | Status: SHIPPED | OUTPATIENT
Start: 2022-10-19 | End: 2022-10-21 | Stop reason: SDUPTHER

## 2022-10-21 ENCOUNTER — OFFICE VISIT (OUTPATIENT)
Dept: FAMILY MEDICINE CLINIC | Age: 67
End: 2022-10-21
Payer: MEDICARE

## 2022-10-21 VITALS
OXYGEN SATURATION: 95 % | HEART RATE: 90 BPM | SYSTOLIC BLOOD PRESSURE: 166 MMHG | DIASTOLIC BLOOD PRESSURE: 92 MMHG | WEIGHT: 188.2 LBS | TEMPERATURE: 97.4 F | RESPIRATION RATE: 18 BRPM | HEIGHT: 64 IN | BODY MASS INDEX: 32.13 KG/M2

## 2022-10-21 DIAGNOSIS — C25.9 ADENOCARCINOMA OF PANCREAS (HCC): ICD-10-CM

## 2022-10-21 DIAGNOSIS — Z23 NEEDS FLU SHOT: ICD-10-CM

## 2022-10-21 DIAGNOSIS — I10 ESSENTIAL HYPERTENSION: ICD-10-CM

## 2022-10-21 DIAGNOSIS — C25.9 MALIGNANT NEOPLASM OF PANCREAS, UNSPECIFIED LOCATION OF MALIGNANCY (HCC): ICD-10-CM

## 2022-10-21 DIAGNOSIS — E11.65 UNCONTROLLED TYPE 2 DIABETES MELLITUS WITH HYPERGLYCEMIA (HCC): Primary | ICD-10-CM

## 2022-10-21 PROCEDURE — G0008 ADMIN INFLUENZA VIRUS VAC: HCPCS | Performed by: INTERNAL MEDICINE

## 2022-10-21 PROCEDURE — 90686 IIV4 VACC NO PRSV 0.5 ML IM: CPT | Performed by: INTERNAL MEDICINE

## 2022-10-21 PROCEDURE — 99214 OFFICE O/P EST MOD 30 MIN: CPT | Performed by: INTERNAL MEDICINE

## 2022-10-21 RX ORDER — LORAZEPAM 1 MG/1
TABLET ORAL
Qty: 90 TABLET | Refills: 3 | Status: SHIPPED | OUTPATIENT
Start: 2022-10-21 | End: 2022-11-03 | Stop reason: SDUPTHER

## 2022-10-21 NOTE — PATIENT INSTRUCTIONS
Vaccine Information Statement    Influenza (Flu) Vaccine (Inactivated or Recombinant): What You Need to Know    Many vaccine information statements are available in Hebrew and other languages. See www.immunize.org/vis. Hojas de información sobre vacunas están disponibles en español y en muchos otros idiomas. Visite www.immunize.org/vis. 1. Why get vaccinated? Influenza vaccine can prevent influenza (flu). Flu is a contagious disease that spreads around the [facility] every year, usually between October and May. Anyone can get the flu, but it is more dangerous for some people. Infants and young children, people 72 years and older, pregnant people, and people with certain health conditions or a weakened immune system are at greatest risk of flu complications. Pneumonia, bronchitis, sinus infections, and ear infections are examples of flu-related complications. If you have a medical condition, such as heart disease, cancer, or diabetes, flu can make it worse. Flu can cause fever and chills, sore throat, muscle aches, fatigue, cough, headache, and runny or stuffy nose. Some people may have vomiting and diarrhea, though this is more common in children than adults. In an average year, thousands of people in the [facility] die from flu, and many more are hospitalized. Flu vaccine prevents millions of illnesses and flu-related visits to the doctor each year. 2. Influenza vaccines     CDC recommends everyone 6 months and older get vaccinated every flu season. Children 6 months through 6years of age may need 2 doses during a single flu season. Everyone else needs only 1 dose each flu season. It takes about 2 weeks for protection to develop after vaccination. There are many flu viruses, and they are always changing. Each year a new flu vaccine is made to protect against the influenza viruses believed to be likely to cause disease in the upcoming flu season.  Even when the vaccine doesnt exactly match these viruses, it may still provide some protection. Influenza vaccine does not cause flu. Influenza vaccine may be given at the same time as other vaccines. 3. Talk with your health care provider    Tell your vaccination provider if the person getting the vaccine:  Has had an allergic reaction after a previous dose of influenza vaccine, or has any severe, life-threatening allergies   Has ever had Guillain-Barré Syndrome (also called GBS)    In some cases, your health care provider may decide to postpone influenza vaccination until a future visit. Influenza vaccine can be administered at any time during pregnancy. People who are or will be pregnant during influenza season should receive inactivated influenza vaccine. People with minor illnesses, such as a cold, may be vaccinated. People who are moderately or severely ill should usually wait until they recover before getting influenza vaccine. Your health care provider can give you more information. 4. Risks of a vaccine reaction    Soreness, redness, and swelling where the shot is given, fever, muscle aches, and headache can happen after influenza vaccination. There may be a very small increased risk of Guillain-Barré Syndrome (GBS) after inactivated influenza vaccine (the flu shot). Aixa Valenzuela children who get the flu shot along with pneumococcal vaccine (PCV13) and/or DTaP vaccine at the same time might be slightly more likely to have a seizure caused by fever. Tell your health care provider if a child who is getting flu vaccine has ever had a seizure. People sometimes faint after medical procedures, including vaccination. Tell your provider if you feel dizzy or have vision changes or ringing in the ears. As with any medicine, there is a very remote chance of a vaccine causing a severe allergic reaction, other serious injury, or death. 5. What if there is a serious problem?     An allergic reaction could occur after the vaccinated person leaves the clinic. If you see signs of a severe allergic reaction (hives, swelling of the face and throat, difficulty breathing, a fast heartbeat, dizziness, or weakness), call 9-1-1 and get the person to the nearest hospital.    For other signs that concern you, call your health care provider. Adverse reactions should be reported to the Vaccine Adverse Event Reporting System (VAERS). Your health care provider will usually file this report, or you can do it yourself. Visit the VAERS website at www.vaers. [facility].gov or call 7-296.295.4114. VAERS is only for reporting reactions, and VAERS staff members do not give medical advice. 6. The National Vaccine Injury Compensation Program    The [facility] Vaccine Injury Compensation Program (VICP) is a federal program that was created to compensate people who may have been injured by certain vaccines. Claims regarding alleged injury or death due to vaccination have a time limit for filing, which may be as short as two years. Visit the VICP website at www.[facility].gov/vaccinecompensation or call 7-935.910.9368 to learn about the program and about filing a claim. 7. How can I learn more? Ask your health care provider. Call your local or state health department. Visit the website of the Food and Drug Administration (FDA) for vaccine package inserts and additional information at www.fda.gov/vaccines-blood-biologics/vaccines. Contact the Centers for Disease Control and Prevention (CDC): Call 8-123.996.8462 (2-248-TKY-INFO) or  Visit CDCs influenza website at www.cdc.gov/flu. Vaccine Information Statement   Inactivated Influenza Vaccine   8/6/2021  42 EDELMIRA Llamas 283PM-44   Department of Health and Human Services  Centers for Disease Control and Prevention    Office Use Only

## 2022-10-21 NOTE — PROGRESS NOTES
Raji Varghese is a 79 y.o. female presenting for/with:    Chief Complaint   Patient presents with    Medication Adjustment     In process of decreasing diluadid. Wants to know process    Pancreatic Cancer     Has F/U with [facility]    Immunization/Injection     Flu vaccine provided    Diabetes       Visit Vitals  BP (!) 166/92 (BP 1 Location: Left upper arm, BP Patient Position: Sitting, BP Cuff Size: Adult long)   Pulse 90   Temp 97.4 °F (36.3 °C) (Temporal)   Resp 18   Ht 5' 4\" (1.626 m)   Wt 188 lb 3.2 oz (85.4 kg)   SpO2 95%   BMI 32.30 kg/m²     Pain Scale: 0 - No pain/10  Pain Location:     1. \"Have you been to the ER, urgent care clinic since your last visit? Hospitalized since your last visit? \" No    2. \"Have you seen or consulted any other health care providers outside of the [address] [place] [address] since your last visit? \" No     3. For patients aged 39-70: Has the patient had a colonoscopy / FIT/ Cologuard? Yes - Care Gap present. Most recent result on file      If the patient is female:    4. For patients aged 41-77: Has the patient had a mammogram within the past 2 years? Yes - Care Gap present. Most recent result on file      5. For patients aged 21-65: Has the patient had a pap smear? NA - based on age or sex      Learning Assessment 6/11/2021   PRIMARY LEARNER Patient   PRIMARY LANGUAGE ENGLISH   LEARNER PREFERENCE PRIMARY DEMONSTRATION   ANSWERED BY patient   RELATIONSHIP SELF     Fall Risk Assessment, last 12 mths 10/21/2022   Able to walk? Yes   Fall in past 12 months? 0   Do you feel unsteady? 0   Are you worried about falling 0   Is TUG test greater than 12 seconds? -   Is the gait abnormal? -   Number of falls in past 12 months -   Fall with injury?  -       3 most recent PHQ Screens 10/21/2022   Little interest or pleasure in doing things Not at all   Feeling down, depressed, irritable, or hopeless Not at all   Total Score PHQ 2 0   Trouble falling or staying asleep, or sleeping too much - Feeling tired or having little energy -   Poor appetite, weight loss, or overeating -   Feeling bad about yourself - or that you are a failure or have let yourself or your family down -   Trouble concentrating on things such as school, work, reading, or watching TV -   Moving or speaking so slowly that other people could have noticed; or the opposite being so fidgety that others notice -   Thoughts of being better off dead, or hurting yourself in some way -   PHQ 9 Score -     Abuse Screening Questionnaire 10/21/2022   Do you ever feel afraid of your partner? N   Are you in a relationship with someone who physically or mentally threatens you? N   Is it safe for you to go home? Y       ADL Assessment 10/21/2022   Feeding yourself No Help Needed   Getting from bed to chair No Help Needed   Getting dressed No Help Needed   Bathing or showering No Help Needed   Walk across the room (includes cane/walker) No Help Needed   Using the telphone No Help Needed   Taking your medications Help Needed   Preparing meals No Help Needed   Managing money (expenses/bills) No Help Needed   Moderately strenuous housework (laundry) No Help Needed   Shopping for personal items (toiletries/medicines) No Help Needed   Shopping for groceries No Help Needed   Driving Help Needed   Climbing a flight of stairs No Help Needed   Getting to places beyond walking distances No Help Needed      Advance Care Planning 10/4/2022   Patient's Healthcare Decision Maker is: Named in scanned ACP document   Confirm Advance Directive Yes, on file   Patient Would Like to Complete Advance Directive -   Does the patient have other document types -      After obtaining consent, and per orders of Dr. Blanco Contreras, injection of Fluad to (L) deltoid given by Suresh Che. Patient instructed to remain in clinic for 20 minutes afterwards, and to report any adverse reaction to me immediately.

## 2022-10-25 NOTE — PROGRESS NOTES
Sharlene Dove is a 79 y.o. female here for follow up for pancreatic cancer. Patient is scheduled for C21D1 chemotherapy on 11/1/2022 in [place]. ?? ? Biopsy ??? Change chemo      Key Oncology Meds       Patient is on no Oncologic meds. Key Pain Meds               HYDROmorphone (DILAUDID) 2 mg tablet Take 1 Tablet by mouth every eight (8) hours as needed for Pain for up to 30 days. Max Daily Amount: 6 mg.    acetaminophen (TYLENOL) 500 mg tablet Take 1,000 mg by mouth two (2) times a day. Indications: pain    ibuprofen (MOTRIN) 200 mg tablet Take 600 mg by mouth two (2) times a day.  Indications: pain          Chemotherapy Flowsheet 10/17/2022   Cycle C 20 D15   Date 10/17/2022   Drug / Regimen HELD   Dosage -   Time Up -   Time Down -   Pre Hydration -   Pre Meds -   Notes regimen D/C due to progression

## 2022-10-26 ENCOUNTER — VIRTUAL VISIT (OUTPATIENT)
Dept: ONCOLOGY | Age: 67
End: 2022-10-26
Payer: MEDICARE

## 2022-10-26 DIAGNOSIS — C25.9 PRIMARY PANCREATIC CANCER WITH METASTASIS TO OTHER SITE (HCC): Primary | ICD-10-CM

## 2022-10-26 PROCEDURE — 99215 OFFICE O/P EST HI 40 MIN: CPT | Performed by: INTERNAL MEDICINE

## 2022-10-26 PROCEDURE — 1123F ACP DISCUSS/DSCN MKR DOCD: CPT | Performed by: INTERNAL MEDICINE

## 2022-10-26 PROCEDURE — 1101F PT FALLS ASSESS-DOCD LE1/YR: CPT | Performed by: INTERNAL MEDICINE

## 2022-10-26 PROCEDURE — G8432 DEP SCR NOT DOC, RNG: HCPCS | Performed by: INTERNAL MEDICINE

## 2022-10-26 PROCEDURE — 3017F COLORECTAL CA SCREEN DOC REV: CPT | Performed by: INTERNAL MEDICINE

## 2022-10-26 PROCEDURE — G8417 CALC BMI ABV UP PARAM F/U: HCPCS | Performed by: INTERNAL MEDICINE

## 2022-10-26 PROCEDURE — G8536 NO DOC ELDER MAL SCRN: HCPCS | Performed by: INTERNAL MEDICINE

## 2022-10-26 PROCEDURE — G0463 HOSPITAL OUTPT CLINIC VISIT: HCPCS | Performed by: INTERNAL MEDICINE

## 2022-10-26 PROCEDURE — 1090F PRES/ABSN URINE INCON ASSESS: CPT | Performed by: INTERNAL MEDICINE

## 2022-10-26 PROCEDURE — G8399 PT W/DXA RESULTS DOCUMENT: HCPCS | Performed by: INTERNAL MEDICINE

## 2022-10-26 PROCEDURE — G8427 DOCREV CUR MEDS BY ELIG CLIN: HCPCS | Performed by: INTERNAL MEDICINE

## 2022-10-26 PROCEDURE — G8756 NO BP MEASURE DOC: HCPCS | Performed by: INTERNAL MEDICINE

## 2022-10-26 NOTE — PROGRESS NOTES
Jovan Vázquez (: 1955) is a 79 y.o. female, patient, here for evaluation of the following chief complaint(s):   No chief complaint on file. ASSESSMENT/PLAN:  Below is the assessment and plan developed based on review of pertinent history, labs, studies, and medications. No follow-ups on file. SUBJECTIVE/OBJECTIVE:  HPI    Review of Systems     No data recorded     Physical Exam    Jovan Vázquez, was evaluated through a synchronous (real-time) audio-video encounter. The patient (or guardian if applicable) is aware that this is a billable service, which includes applicable co-pays. This Virtual Visit was conducted with patient's (and/or legal guardian's) consent. The visit was conducted pursuant to the emergency declaration under the [address] [place] [address] waiver authority and the [employer] General Act. Patient identification was verified, and a caregiver was present when appropriate. The patient was located at  The provider was located at: An electronic signature was used to authenticate this note.   -- Kori Lloyd

## 2022-10-26 NOTE — PROGRESS NOTES
[address] [facility] [address], [address] \[facility]\"", 45 [facility], [address] [place] [address]  [phone]          Reason for Visit:   Papito Ron is a 79 y.o. female who is seen by synchronous (real-time) audio-video technology for follow up of pancreatic adenocarcinoma     Treatment History:     Dx: Pancreatic Adenocarcinoma--May 2020--[id number]--jdhsqhqweoa of splenic vein and superior mesenteric vein    Tx: mFOLFIRINOX--first cycle 5/26/2020, second cycle 6/10/2020, dose reduced 15% cycle 3 on 6/30/2020--delayed due to severe side effects--switched to Gemcitabine/Abraxane--Cycle #1 7/29/2002, Cycle #2 9/2/2020, Cycle #3 10/7/2020, Cycle #4 11/4/2020. Neoadjuvant Radiation with Dr Fred Zacarias ending 12/18/2021. Neoadjuvant [place]/Abraxane, 06/2020 - 11/2020    Distal Pancreatectomy, Splenectomy, and Civa Sheet with Dr Laurie Benitez on 3/17/2021. Adjuvant radiation with Dr Fred Zacarias. Palliative chemotherapy    Restart Gemcitabine/abraxane Cycle #1 07/06/2021   D/C'd 10/2022    Goal: Prolong life--Palliative    History of Present Illness:     Ms. Diane Hoover is a women with metastatic pancreatic adenocarcinoma. She had locally advanced pan Can Dx'ed in 2020. She received 4 cycles of FOLFIRINOX. She had severe toxicities from it with neutropenic fever. She then transitioned to [place]/Abraxane. She then underwent distal pancreatectomy in 2020 with Dr. Laurie Benitez. Disease recurrent in the peritoneum in 07/2021. It was never biopsied. She then started receiving [place]/abraxane. She is doing fair. Some numbness in hands and feet. Recent CT showed disease progression in the peritoneum.         Past Medical History:   Diagnosis Date    Adenocarcinoma of pancreas ([address] 05/13/2020    Dr Pati Nicholas biopsy via EUS    Diabetes [facility])     GERD (gastroesophageal reflux disease)     Hernia of abdominal cavity     Subxyphoid hernia    Hypertension     Inflammatory polyarthropathy (HCC)     Joint pain Joint swelling     Menopause     Ocular nevus of left eye     Pancreatitis     Tracheal stenosis       Past Surgical History:   Procedure Laterality Date    HX CARPAL TUNNEL RELEASE Bilateral     HX COLONOSCOPY      HX HYSTERECTOMY      HX KNEE ARTHROSCOPY Right     HX KNEE REPLACEMENT Right     partial    HX LAP CHOLECYSTECTOMY      HX TONSILLECTOMY      HX VASCULAR ACCESS        Social History     Tobacco Use    Smoking status: Never    Smokeless tobacco: Never   Substance Use Topics    Alcohol use: No     Alcohol/week: 0.0 standard drinks      Family History   Problem Relation Age of Onset    Heart Disease Mother     Heart Attack Mother     Cancer Father         lung    Diabetes Sister      Current Outpatient Medications   Medication Sig    LORazepam (ATIVAN) 1 mg tablet TAKE 1 TABLET BY MOUTH EVERY MORNING AND TAKE 2 TABLETS AT BEDTIME    gabapentin (NEURONTIN) 300 mg capsule TAKE 2 CAPSULES BY MOUTH THREE TIMES DAILY    HYDROmorphone (DILAUDID) 2 mg tablet Take 1 Tablet by mouth every eight (8) hours as needed for Pain for up to 30 days. Max Daily Amount: 6 mg.    famotidine (PEPCID) 20 mg tablet Take 20 mg by mouth two (2) times a day. multivit-minerals/folic acid (MULTIVITAMIN GUMMIES PO) Take 2 Each by mouth Every morning. methylphenidate HCl (RITALIN) 10 mg tablet Take 1 Tablet by mouth two (2) times a day. Max Daily Amount: 20 mg.    nystatin (MYCOSTATIN) powder Apply 1 g to affected area three (3) times daily. DULoxetine (CYMBALTA) 30 mg capsule Take 1 Capsule by mouth every evening. acetaminophen (TYLENOL) 500 mg tablet Take 1,000 mg by mouth two (2) times a day. Indications: pain    ibuprofen (MOTRIN) 200 mg tablet Take 600 mg by mouth two (2) times a day. Indications: pain    senna-docusate (PERICOLACE) 8.6-50 mg per tablet Take 1 Tablet by mouth nightly. DULoxetine (CYMBALTA) 60 mg capsule Take 1 Capsule by mouth daily.  Indications: neuropathic pain (Patient taking differently: Take 60 mg by mouth Every morning. At 0900  Indications: neuropathic pain)    Toujeo SoloStar U-300 Insulin 300 unit/mL (1.5 mL) inpn pen INJECT 55 UNITS ONCE DAILY AT BEDTIME - DOSE INCREASED (Patient taking differently: 28 Units by SubCUTAneous route nightly. Patient uses differently during around chemo time.)    HumaLOG KwikPen Insulin 100 unit/mL kwikpen patient on sliding scale    nirmatrelvir-ritonavir (Paxlovid, EUA,) 150 mg x 2- 100 mg tablet Take 3 Tablets by mouth every twelve (12) hours for 5 days. No current facility-administered medications for this visit. No Known Allergies     Review of Systems: A complete review of systems was obtained, negative except as described above. Physical Exam:     General: alert, cooperative, no distress   Mental  status: normal mood, behavior, speech, dress, motor activity, and thought processes, able to follow commands   HENT: NCAT   Neck: no visualized mass   Resp: no respiratory distress   Neuro: no gross deficits   Skin: no discoloration or lesions of concern on visible areas   Psychiatric: normal affect, consistent with stated mood, no evidence of hallucinations       Due to this being a TeleHealth evaluation (During [place] [address] public health emergency), many elements of the physical examination are unable to be assessed. Evaluation of the following organ systems was limited: Vitals/Constitutional/EENT/Resp/CV/GI//MS/Neuro/Skin/Heme-Lymph-Imm. Results:     Lab Results   Component Value Date/Time    WBC 5.2 10/17/2022 08:30 AM    HGB 11.1 (L) 10/17/2022 08:30 AM    HCT 34.4 (L) 10/17/2022 08:30 AM    PLATELET 308 (H) 24/68/8568 08:30 AM    MCV 94.8 10/17/2022 08:30 AM    ABS.  NEUTROPHILS 1.6 (L) 10/17/2022 08:30 AM     Lab Results   Component Value Date/Time    Sodium 140 10/17/2022 08:30 AM    Potassium 3.7 10/17/2022 08:30 AM    Chloride 103 10/17/2022 08:30 AM    CO2 30 10/17/2022 08:30 AM    Glucose 233 (H) 10/17/2022 08:30 AM    BUN 13 10/17/2022 08:30 AM    Creatinine 0.75 10/17/2022 08:30 AM    GFR est AA >60 09/20/2022 08:27 AM    GFR est non-AA >60 09/20/2022 08:27 AM    Calcium 8.9 10/17/2022 08:30 AM    Glucose (POC) 282 (H) 07/24/2022 08:26 AM    Creatinine (POC) 0.7 02/06/2013 10:21 AM     Lab Results   Component Value Date/Time    Bilirubin, total 0.3 10/17/2022 08:30 AM    ALT (SGPT) 31 10/17/2022 08:30 AM    Alk. phosphatase 188 (H) 10/17/2022 08:30 AM    Protein, total 6.4 10/17/2022 08:30 AM    Albumin 3.1 (L) 10/17/2022 08:30 AM    Globulin 3.3 10/17/2022 08:30 AM         CT Results (most recent):  Results from [place] encounter on 10/04/22    CT CHEST ABD PELV W CONT    Narrative  EXAM: CT CHEST ABD PELV W CONT    INDICATION: Pancreatic carcinoma, pulmonary nodules, and omental nodule. Distal  pancreatectomy and splenectomy. Evaluate response to therapy. COMPARISON: CT chest, abdomen, and pelvis on 8/9/2022 and 5/17/2022. IV CONTRAST: 100 mL of Isovue-300. ORAL CONTRAST: Oral contrast was administered to better evaluate the bowel. TECHNIQUE: CT chest, abdomen, pelvis. Following the uneventful intravenous administration of contrast, thin axial  images were obtained through the chest, abdomen and pelvis. Coronal and sagittal  reformats were generated. CT dose reduction was achieved through use of a  standardized protocol tailored for this examination and automatic exposure  control for dose modulation. FINDINGS:    CHEST WALL: Left port and catheter are unchanged and in good position. THYROID: No nodule. MEDIASTINUM: No mass or lymphadenopathy. RADHA: No mass or lymphadenopathy. THORACIC AORTA: Aortic atherosclerosis without aneurysm. Pulsation artifact. MAIN PULMONARY ARTERY: No central embolism. TRACHEA/BRONCHI: Patent. ESOPHAGUS: No wall thickening or dilatation. HEART: Normal size, no effusion. PLEURA: No effusion or pneumothorax. LUNGS: Right lower lobe nodular pneumonia has resolved.  The pulmonary nodules in  both lungs are slightly increased since the comparison studies. A posterior  right lower lobe nodule previously measured 6 mm and now measures 8 mm (series  3, image 32). Largest nodule is lateral right lower lobe and measures 9 mm  (previously 6 mm). Largest left lung nodule is anterior, lateral left lower lobe  and measures 6 mm. No new nodule. LIVER: Hepatic cysts are unchanged. No solid hepatic mass. Biliary dilatation in  the left hepatic lobe is more conspicuous. BILIARY TREE: Cholecystectomy. CBD is not dilated. SPLEEN: Splenectomy. PANCREAS: Resection of the body and tail. Unchanged head and neck. No measurable  mass. ADRENALS: Unremarkable. KIDNEYS: No mass, calculus, or hydronephrosis. STOMACH: Incomplete distention, limited evaluation. SMALL BOWEL: No dilatation or wall thickening. COLON: Colonic constipation pattern is unchanged. APPENDIX: Normal. Located in the right upper quadrant. PERITONEUM: 1 cm nodule in the left omentum of the peritoneum has increased in  size. Right central abdominal omental nodularity is slightly more conspicuous. RETROPERITONEUM: No lymphadenopathy or aortic aneurysm. REPRODUCTIVE ORGANS: Hysterectomy. URINARY BLADDER: No mass or calculus. BONES: No osseous metastatic disease. Multilevel degenerative spine disease. ABDOMINAL WALL: Postsurgical. Subtle supraumbilical midline ventral hernia  contains nonobstructed colon. ADDITIONAL COMMENTS: N/A    Impression  1. Progression of disease. Increased size of the pulmonary metastatic nodules. Increased size of the left peritoneal metastatic nodule. 2. No acute process. Resolution of right lower lobe pneumonia since August.       Assessment/PLAN:     1) Pancreatic Adenocarcinoma metastatic to the peritoneum and lungs. ECOG PS 1  Intent of Treatment - palliative  Prognosis: Guarded. Received neoadjuvant chemotherapy. Radiation with Dr Chelsey Odom. Definitive resection with Dr Sarajane Eisenmenger. BRCA negative. Now peritoneal disease/mets  On chemo since 7/2021    Recent CT - progression of disease in the lungs and peritoneum  Plan for CT guided Bx  D/C [place]/Abraxane    Start Onivyde/5-FU after the Bx  I explained to the patient the usual side effects of the treatment such as nausea, diarrhea and low blood counts. She understood the side effects and ways to manage it. I will see her at the time of starting Onivyde/5-FU      Signed By: Tiburcio Jefferson MD          The patient was evaluated through a synchronous (real-time) audio-video encounter. The patient (or guardian if applicable) is aware that this is a billable service, which includes applicable co-pays. This Virtual Visit was conducted with patient's (and/or legal guardian's) consent. The visit was conducted pursuant to the emergency declaration under the [address] [place] [address], [address] [place] [address] waiver authority and the [employer] Act. Patient identification was verified, and a caregiver was present when appropriate. The patient was located in a state where the provider was licensed to provide care.

## 2022-10-26 NOTE — PROGRESS NOTES
Óscar Navarro is a 79 y.o. female  Here for follow up for pancreatic cancer. Pt here to discuss treatment plan. States she is doing well. 1. Have you been to the ER, urgent care clinic since your last visit? Hospitalized since your last visit? no    2. Have you seen or consulted any other health care providers outside of the [address] [place] [address] since your last visit? Include any pap smears or colon screening. Dr Johanne Perez.

## 2022-10-28 DIAGNOSIS — C25.9 MALIGNANT NEOPLASM OF PANCREAS, UNSPECIFIED LOCATION OF MALIGNANCY (HCC): Primary | ICD-10-CM

## 2022-11-01 ENCOUNTER — HOSPITAL ENCOUNTER (OUTPATIENT)
Dept: INFUSION THERAPY | Age: 67
End: 2022-11-01

## 2022-11-03 DIAGNOSIS — C25.9 MALIGNANT NEOPLASM OF PANCREAS, UNSPECIFIED LOCATION OF MALIGNANCY (HCC): ICD-10-CM

## 2022-11-03 DIAGNOSIS — G89.3 CANCER RELATED PAIN: ICD-10-CM

## 2022-11-03 RX ORDER — HYDROMORPHONE HYDROCHLORIDE 2 MG/1
2 TABLET ORAL
Qty: 90 TABLET | Refills: 0 | Status: SHIPPED | OUTPATIENT
Start: 2022-11-03 | End: 2022-12-03

## 2022-11-03 RX ORDER — LORAZEPAM 1 MG/1
TABLET ORAL
Qty: 90 TABLET | Refills: 3 | Status: SHIPPED | OUTPATIENT
Start: 2022-11-03

## 2022-11-08 ENCOUNTER — TELEPHONE (OUTPATIENT)
Dept: ONCOLOGY | Age: 67
End: 2022-11-08

## 2022-11-14 DIAGNOSIS — T45.1X5A CHEMOTHERAPY-INDUCED FATIGUE: ICD-10-CM

## 2022-11-14 DIAGNOSIS — F32.A DEPRESSION, UNSPECIFIED DEPRESSION TYPE: ICD-10-CM

## 2022-11-14 DIAGNOSIS — R53.83 CHEMOTHERAPY-INDUCED FATIGUE: ICD-10-CM

## 2022-11-15 ENCOUNTER — APPOINTMENT (OUTPATIENT)
Dept: INFUSION THERAPY | Age: 67
End: 2022-11-15

## 2022-11-15 ENCOUNTER — HOSPITAL ENCOUNTER (OUTPATIENT)
Dept: CT IMAGING | Age: 67
Discharge: HOME | End: 2022-11-15
Attending: INTERNAL MEDICINE
Payer: MEDICARE

## 2022-11-15 VITALS
BODY MASS INDEX: 32.43 KG/M2 | RESPIRATION RATE: 18 BRPM | TEMPERATURE: 98.5 F | HEART RATE: 72 BPM | WEIGHT: 183 LBS | HEIGHT: 63 IN | DIASTOLIC BLOOD PRESSURE: 60 MMHG | SYSTOLIC BLOOD PRESSURE: 132 MMHG | OXYGEN SATURATION: 100 %

## 2022-11-15 DIAGNOSIS — C25.9 MALIGNANT NEOPLASM OF PANCREAS, UNSPECIFIED LOCATION OF MALIGNANCY (HCC): ICD-10-CM

## 2022-11-15 PROCEDURE — 88305 TISSUE EXAM BY PATHOLOGIST: CPT

## 2022-11-15 PROCEDURE — 88342 IMHCHEM/IMCYTCHM 1ST ANTB: CPT

## 2022-11-15 PROCEDURE — 77030014115

## 2022-11-15 PROCEDURE — 77012 CT SCAN FOR NEEDLE BIOPSY: CPT

## 2022-11-15 PROCEDURE — 74011250636 HC RX REV CODE- 250/636: Performed by: INTERNAL MEDICINE

## 2022-11-15 PROCEDURE — 88333 PATH CONSLTJ SURG CYTO XM 1: CPT

## 2022-11-15 PROCEDURE — 49180 BIOPSY ABDOMINAL MASS: CPT

## 2022-11-15 PROCEDURE — 77030003503 HC NDL BIOP TISS BD -B

## 2022-11-15 PROCEDURE — 88341 IMHCHEM/IMCYTCHM EA ADD ANTB: CPT

## 2022-11-15 RX ORDER — FENTANYL CITRATE 50 UG/ML
100 INJECTION, SOLUTION INTRAMUSCULAR; INTRAVENOUS
Status: DISCONTINUED | OUTPATIENT
Start: 2022-11-15 | End: 2022-11-15

## 2022-11-15 RX ORDER — SODIUM CHLORIDE 9 MG/ML
25 INJECTION, SOLUTION INTRAVENOUS CONTINUOUS
Status: DISCONTINUED | OUTPATIENT
Start: 2022-11-15 | End: 2022-11-15

## 2022-11-15 RX ORDER — METHYLPHENIDATE HYDROCHLORIDE 10 MG/1
10 TABLET ORAL 2 TIMES DAILY
Qty: 60 TABLET | Refills: 0 | Status: SHIPPED | OUTPATIENT
Start: 2022-11-15

## 2022-11-15 RX ORDER — MIDAZOLAM HYDROCHLORIDE 1 MG/ML
1-5 INJECTION, SOLUTION INTRAMUSCULAR; INTRAVENOUS
Status: DISCONTINUED | OUTPATIENT
Start: 2022-11-15 | End: 2022-11-15

## 2022-11-15 RX ORDER — LIDOCAINE HYDROCHLORIDE 10 MG/ML
10 INJECTION INFILTRATION; PERINEURAL
Status: DISPENSED | OUTPATIENT
Start: 2022-11-15 | End: 2022-11-15

## 2022-11-15 RX ADMIN — SODIUM CHLORIDE 25 ML/HR: 9 INJECTION, SOLUTION INTRAVENOUS at 10:55

## 2022-11-15 RX ADMIN — FENTANYL CITRATE 25 MCG: 50 INJECTION, SOLUTION INTRAMUSCULAR; INTRAVENOUS at 11:06

## 2022-11-15 RX ADMIN — MIDAZOLAM 2 MG: 1 INJECTION INTRAMUSCULAR; INTRAVENOUS at 11:00

## 2022-11-15 RX ADMIN — MIDAZOLAM 1 MG: 1 INJECTION INTRAMUSCULAR; INTRAVENOUS at 11:04

## 2022-11-15 RX ADMIN — FENTANYL CITRATE 50 MCG: 50 INJECTION, SOLUTION INTRAMUSCULAR; INTRAVENOUS at 10:55

## 2022-11-15 RX ADMIN — MIDAZOLAM 1 MG: 1 INJECTION INTRAMUSCULAR; INTRAVENOUS at 11:19

## 2022-11-15 NOTE — DISCHARGE INSTRUCTIONS
[facility]  Radiology Department  [phone]    Radiologist:Dr. Ruben Almanza    Date:11/15/2022       Peritoneal Mass Biopsy Discharge Instructions      Go home and rest  and restrict your activity the next 24 hours. You have been given sedating medications, so do not drive or make important decisions today. Resume your previous diet and prescribed medications. You may shower in 24 hours. Do not soak or swim until the biopsy site has healed completely to minimize any risk of infection. Watch site for redness, drainage, pus, foul odor, increasing pain and fevers. Should this occur call you doctor immediatly. You may take Tylenol if allowed, as directed on the label, for pain or discomfort. Avoid Ibuprofen (Advil, Motrin etc.) and Aspirin today as they may increase your risk of bleeding. Be sure to follow up with your physician, and let him know how you are progressing and to receive your results. If you have any questions about your procedure today please call and ask to speak to a radiology nurse.        I

## 2022-11-15 NOTE — H&P
INTERVENTIONAL RADIOLOGY  Preoperative History and Physical      Patient:  Ta Morrison  :  1955  Age:  79 y.o. MRN:  [id number]  Today's Date:  11/15/2022      CC / HPI   Ta Morrison is a 79 y.o. female with a history of peritoneal mass who presents for CT guided biopsy.     PAST MEDICAL HISTORY  Past Medical History:   Diagnosis Date    Adenocarcinoma of pancreas ([facility] [address] 2020    Dr Rukhsana Schmidt biopsy via EUS    [facility]     GERD (gastroesophageal reflux disease)     Hernia of abdominal cavity     Subxyphoid hernia    Hypertension     Inflammatory polyarthropathy (HCC)     Joint pain     Joint swelling     Menopause     Ocular nevus of left eye     Pancreatitis     Tracheal stenosis        PAST SURGICAL HISTORY  Past Surgical History:   Procedure Laterality Date    HX CARPAL TUNNEL RELEASE Bilateral     HX COLONOSCOPY      HX HYSTERECTOMY      HX KNEE ARTHROSCOPY Right     HX KNEE REPLACEMENT Right     partial    HX LAP CHOLECYSTECTOMY      HX TONSILLECTOMY      HX VASCULAR ACCESS         SOCIAL HISTORY  Social History     Socioeconomic History    Marital status:      Spouse name: Peggye Cabot     Number of children: 3    Years of education: Not on file    Highest education level: Associate degree: academic program   Occupational History    Occupation: RN  [employer]   Tobacco Use    Smoking status: Never    Smokeless tobacco: Never   Substance and Sexual Activity    Alcohol use: No     Alcohol/week: 0.0 standard drinks    Drug use: No    Sexual activity: Not Currently   Other Topics Concern     Service Not Asked    Blood Transfusions Not Asked    Caffeine Concern Not Asked    Occupational Exposure Not Asked    Hobby Hazards Not Asked    Sleep Concern Not Asked    Stress Concern Not Asked    Weight Concern Not Asked    Special Diet Not Asked    Back Care Not Asked    Exercise Not Asked    Bike Helmet Not Asked    Seat Belt Not Asked    Self-Exams Not Asked   Social History Narrative    Not on file Social Determinants of Health     Financial Resource Strain: Low Risk     Difficulty of Paying Living Expenses: Not hard at all   Food Insecurity: No Food Insecurity    Worried About Running Out of Food in the Last Year: Never true    Ran Out of Food in the Last Year: Never true   Transportation Needs: Not on file   Physical Activity: Not on file   Stress: Not on file   Social Connections: Not on file   Intimate Partner Violence: Not on file   Housing Stability: Not on file       FAMILY HISTORY  Family History   Problem Relation Age of Onset    Heart Disease Mother     Heart Attack Mother     Cancer Father         lung    Diabetes Sister        CURRENT MEDICATIONS  Current Outpatient Medications   Medication Sig Dispense Refill    methylphenidate HCl (RITALIN) 10 mg tablet Take 1 Tablet by mouth two (2) times a day. Max Daily Amount: 20 mg. 60 Tablet 0    HumaLOG KwikPen Insulin 100 unit/mL kwikpen INJECT SUBCUTANEOUSLY ON SLIDING SCALE AS DIRECTED 15 mL 6    gabapentin (NEURONTIN) 300 mg capsule TAKE 2 CAPSULES BY MOUTH THREE TIMES DAILY 180 Capsule 3    LORazepam (ATIVAN) 1 mg tablet TAKE 1 TABLET BY MOUTH EVERY MORNING AND TAKE 2 TABLETS AT BEDTIME 90 Tablet 3    HYDROmorphone (DILAUDID) 2 mg tablet Take 1 Tablet by mouth every eight (8) hours as needed for Pain for up to 30 days. Max Daily Amount: 6 mg. 90 Tablet 0    famotidine (PEPCID) 20 mg tablet Take 20 mg by mouth two (2) times a day. multivit-minerals/folic acid (MULTIVITAMIN GUMMIES PO) Take 2 Each by mouth Every morning. nystatin (MYCOSTATIN) powder Apply 1 g to affected area three (3) times daily. 60 g 0    DULoxetine (CYMBALTA) 30 mg capsule Take 1 Capsule by mouth every evening. 90 Capsule 3    acetaminophen (TYLENOL) 500 mg tablet Take 1,000 mg by mouth two (2) times a day. Indications: pain      ibuprofen (MOTRIN) 200 mg tablet Take 600 mg by mouth two (2) times a day.  Indications: pain      nirmatrelvir-ritonavir (Paxlovid, EUA,) 150 mg x 2- 100 mg tablet Take 3 Tablets by mouth every twelve (12) hours for 5 days. 1 Box 0    senna-docusate (PERICOLACE) 8.6-50 mg per tablet Take 1 Tablet by mouth nightly. DULoxetine (CYMBALTA) 60 mg capsule Take 1 Capsule by mouth daily. Indications: neuropathic pain (Patient taking differently: Take 60 mg by mouth Every morning. At 0900  Indications: neuropathic pain) 90 Capsule 2    Toujeo SoloStar U-300 Insulin 300 unit/mL (1.5 mL) inpn pen INJECT 55 UNITS ONCE DAILY AT BEDTIME - DOSE INCREASED (Patient taking differently: 28 Units by SubCUTAneous route nightly.  Patient uses differently during around chemo time.) 4.5 mL 10     Current Facility-Administered Medications   Medication Dose Route Frequency Provider Last Rate Last Admin    0.9% sodium chloride infusion  25 mL/hr IntraVENous CONTINUOUS Ivan Fairchild MD        fentaNYL citrate (PF) injection 100 mcg  100 mcg IntraVENous Carmelita Fairchild MD        midazolam (VERSED) injection 1-5 mg  1-5 mg IntraVENous Carmelita Fairchild MD        lidocaine (XYLOCAINE) 10 mg/mL (1 %) injection 10 mL  10 mL SubCUTAneous RAD ONCE Debbie DUDLEY MD           ALLERGIES  No Known Allergies    DIAGNOSTIC STUDIES   IMAGING STUDIES  Relevant Imaging studies reviewed    LABS  Lab Results   Component Value Date/Time    WBC 5.2 10/17/2022 08:30 AM    HGB 11.1 (L) 10/17/2022 08:30 AM    HCT 34.4 (L) 10/17/2022 08:30 AM    PLATELET 543 (H) 58/85/1086 08:30 AM    MCV 94.8 10/17/2022 08:30 AM     Lab Results   Component Value Date/Time    Sodium 140 10/17/2022 08:30 AM    Potassium 3.7 10/17/2022 08:30 AM    Chloride 103 10/17/2022 08:30 AM    CO2 30 10/17/2022 08:30 AM    Anion gap 7 10/17/2022 08:30 AM    Glucose 233 (H) 10/17/2022 08:30 AM    BUN 13 10/17/2022 08:30 AM    Creatinine 0.75 10/17/2022 08:30 AM    BUN/Creatinine ratio 17 10/17/2022 08:30 AM    GFR est AA >60 09/20/2022 08:27 AM    GFR est non-AA >60 09/20/2022 08:27 AM    Calcium 8.9 10/17/2022 08:30 AM     No results found for: INR, PTMR, PTP, PT1, PT2, INREXT    PHYSICAL EXAM   BP (!) 181/97   Pulse 86   Temp 98.5 °F (36.9 °C)   Resp 29   Ht 5' 3\" (1.6 m)   Wt 83 kg (183 lb)   SpO2 98%   Breastfeeding No   BMI 32.42 kg/m²   General:  NAD  Heart:  RRR  Lungs:  NWOB  Neurological:  AAOX3    PLAN   Procedure to be performed:  CT guided biopsy  Plan for sedation:  moderate  Post procedure plan:  observation per protocol  Informed consent:  risks, benefits, and alternatives reviewed with the patient / family who agree to proceed  Code status:  full      [address] [facility], [facility] [address] Radiology, [facility].

## 2022-11-15 NOTE — PROGRESS NOTES
Name of Procedure: image guided peritoneal mass biopsy with moderate sedation    Sedation medications given: yes    Versed: 4 mg    Fentanyl: 25 mcg    Sedation Tolerated: well    Total Sedation Time: 35min    Sedation Start: 11:00 a.m. Sedation End: 11:35 a.m. Vital Signs: stable    Samples sent to lab: samples sent with cytology    Any complications related to procedure: none    Pt is A&Ox4, on RA, and is in NAD post procedure. Pt is being wheeled out to daughters car whom is driving her home. Pt has discharge paperwork in hand.

## 2022-11-15 NOTE — PROGRESS NOTES
Procedure reviewed with patient by Dr. Vargas Estimable. Opportunity to verbalize questions and concerns. Consent obtained.

## 2022-11-17 ENCOUNTER — DOCUMENTATION ONLY (OUTPATIENT)
Dept: ONCOLOGY | Age: 67
End: 2022-11-17

## 2022-11-17 DIAGNOSIS — C25.9 MALIGNANT NEOPLASM OF PANCREAS, UNSPECIFIED LOCATION OF MALIGNANCY (HCC): Primary | ICD-10-CM

## 2022-11-17 RX ORDER — ONDANSETRON 4 MG/1
4 TABLET, ORALLY DISINTEGRATING ORAL
Qty: 40 TABLET | Refills: 2 | Status: SHIPPED | OUTPATIENT
Start: 2022-11-17

## 2022-11-17 RX ORDER — LIDOCAINE AND PRILOCAINE 25; 25 MG/G; MG/G
CREAM TOPICAL AS NEEDED
Qty: 30 G | Refills: 2 | Status: SHIPPED | OUTPATIENT
Start: 2022-11-17

## 2022-11-17 RX ORDER — PROCHLORPERAZINE MALEATE 10 MG
10 TABLET ORAL
Qty: 40 TABLET | Refills: 2 | Status: SHIPPED | OUTPATIENT
Start: 2022-11-17

## 2022-11-17 NOTE — TELEPHONE ENCOUNTER
Requested Prescriptions     Pending Prescriptions Disp Refills    ondansetron (ZOFRAN ODT) 4 mg disintegrating tablet 40 Tablet 2     Sig: Take 1 Tablet by mouth every eight (8) hours as needed for Nausea or Vomiting. prochlorperazine (Compazine) 10 mg tablet 40 Tablet 2     Sig: Take 1 Tablet by mouth every six (6) hours as needed for Nausea or Vomiting.    lidocaine-prilocaine (EMLA) topical cream 30 g 2     Sig: Apply  to affected area as needed for Pain.  Apply generous amount to port site 30-45 min prior to infusions     Approved per VORB from [address] [place] [address]

## 2022-11-17 NOTE — PROGRESS NOTES
Chemotherapy/Immunotherapy education and consent discussed with patient, on phone. Will provide educational written information on Onivyde, Leucovirin and Fluorouracil on Monday, date of infusion. Side effects/symptom management reviewed. Reviewed chemo packet, and after hours contact information. Patient has previous chemo packet, denies need of another. Patient denied any additional questions or concerns. Consent for onivyde, Leucovorin and Fluorouracil will be obtained on first chemo day for the above medications . A hard copy of the chemotherapy consent will offered to the patient at time of consent.

## 2022-11-18 RX ORDER — DIPHENHYDRAMINE HYDROCHLORIDE 50 MG/ML
50 INJECTION, SOLUTION INTRAMUSCULAR; INTRAVENOUS AS NEEDED
Status: CANCELLED
Start: 2022-11-21

## 2022-11-18 RX ORDER — SODIUM CHLORIDE 9 MG/ML
5-40 INJECTION INTRAMUSCULAR; INTRAVENOUS; SUBCUTANEOUS AS NEEDED
Status: CANCELLED | OUTPATIENT
Start: 2022-11-23

## 2022-11-18 RX ORDER — SODIUM CHLORIDE 9 MG/ML
5-40 INJECTION INTRAMUSCULAR; INTRAVENOUS; SUBCUTANEOUS AS NEEDED
Status: CANCELLED | OUTPATIENT
Start: 2022-11-21

## 2022-11-18 RX ORDER — HEPARIN 100 UNIT/ML
500 SYRINGE INTRAVENOUS AS NEEDED
Status: CANCELLED
Start: 2022-11-21

## 2022-11-18 RX ORDER — DIPHENHYDRAMINE HYDROCHLORIDE 50 MG/ML
25 INJECTION, SOLUTION INTRAMUSCULAR; INTRAVENOUS AS NEEDED
Status: CANCELLED
Start: 2022-11-21

## 2022-11-18 RX ORDER — ACETAMINOPHEN 325 MG/1
650 TABLET ORAL AS NEEDED
Status: CANCELLED
Start: 2022-11-21

## 2022-11-18 RX ORDER — ATROPINE SULFATE 0.4 MG/ML
0.4 INJECTION, SOLUTION ENDOTRACHEAL; INTRAMEDULLARY; INTRAMUSCULAR; INTRAVENOUS; SUBCUTANEOUS
Status: CANCELLED | OUTPATIENT
Start: 2022-11-21

## 2022-11-18 RX ORDER — SODIUM CHLORIDE 9 MG/ML
5-250 INJECTION, SOLUTION INTRAVENOUS AS NEEDED
Status: CANCELLED | OUTPATIENT
Start: 2022-11-21

## 2022-11-18 RX ORDER — SODIUM CHLORIDE 0.9 % (FLUSH) 0.9 %
5-40 SYRINGE (ML) INJECTION AS NEEDED
Status: CANCELLED | OUTPATIENT
Start: 2022-11-21

## 2022-11-18 RX ORDER — HEPARIN 100 UNIT/ML
500 SYRINGE INTRAVENOUS AS NEEDED
Status: CANCELLED
Start: 2022-11-23

## 2022-11-18 RX ORDER — PROCHLORPERAZINE EDISYLATE 5 MG/ML
10 INJECTION INTRAMUSCULAR; INTRAVENOUS
Status: CANCELLED | OUTPATIENT
Start: 2022-11-21

## 2022-11-18 RX ORDER — SODIUM CHLORIDE 0.9 % (FLUSH) 0.9 %
5-40 SYRINGE (ML) INJECTION AS NEEDED
Status: CANCELLED | OUTPATIENT
Start: 2022-11-23

## 2022-11-18 RX ORDER — EPINEPHRINE 1 MG/ML
0.3 INJECTION, SOLUTION, CONCENTRATE INTRAVENOUS AS NEEDED
Status: CANCELLED | OUTPATIENT
Start: 2022-11-21

## 2022-11-18 RX ORDER — PALONOSETRON 0.05 MG/ML
0.25 INJECTION, SOLUTION INTRAVENOUS ONCE
Status: CANCELLED | OUTPATIENT
Start: 2022-11-21 | End: 2022-11-21

## 2022-11-18 RX ORDER — ONDANSETRON 2 MG/ML
8 INJECTION INTRAMUSCULAR; INTRAVENOUS AS NEEDED
Status: CANCELLED | OUTPATIENT
Start: 2022-11-21

## 2022-11-18 RX ORDER — ALBUTEROL SULFATE 0.83 MG/ML
2.5 SOLUTION RESPIRATORY (INHALATION) AS NEEDED
Status: CANCELLED
Start: 2022-11-21

## 2022-11-18 RX ORDER — SODIUM CHLORIDE 9 MG/ML
5-250 INJECTION, SOLUTION INTRAVENOUS AS NEEDED
Status: CANCELLED | OUTPATIENT
Start: 2022-11-23

## 2022-11-18 RX ORDER — HYDROCORTISONE SODIUM SUCCINATE 100 MG/2ML
100 INJECTION, POWDER, FOR SOLUTION INTRAMUSCULAR; INTRAVENOUS AS NEEDED
Status: CANCELLED | OUTPATIENT
Start: 2022-11-21

## 2022-11-21 ENCOUNTER — HOSPITAL ENCOUNTER (OUTPATIENT)
Dept: INFUSION THERAPY | Age: 67
Discharge: HOME | End: 2022-11-21
Payer: MEDICARE

## 2022-11-21 ENCOUNTER — VIRTUAL VISIT (OUTPATIENT)
Dept: ONCOLOGY | Age: 67
End: 2022-11-21
Payer: MEDICARE

## 2022-11-21 VITALS
DIASTOLIC BLOOD PRESSURE: 82 MMHG | TEMPERATURE: 98.2 F | RESPIRATION RATE: 20 BRPM | SYSTOLIC BLOOD PRESSURE: 176 MMHG | BODY MASS INDEX: 32.92 KG/M2 | OXYGEN SATURATION: 96 % | WEIGHT: 185.8 LBS | HEART RATE: 92 BPM | HEIGHT: 63 IN

## 2022-11-21 VITALS
HEART RATE: 92 BPM | SYSTOLIC BLOOD PRESSURE: 177 MMHG | DIASTOLIC BLOOD PRESSURE: 78 MMHG | RESPIRATION RATE: 20 BRPM | WEIGHT: 185.8 LBS | TEMPERATURE: 98.2 F | OXYGEN SATURATION: 96 % | BODY MASS INDEX: 32.92 KG/M2 | HEIGHT: 63 IN

## 2022-11-21 DIAGNOSIS — C25.9 PRIMARY PANCREATIC CANCER WITH METASTASIS TO OTHER SITE (HCC): Primary | ICD-10-CM

## 2022-11-21 DIAGNOSIS — Z51.11 ENCOUNTER FOR ANTINEOPLASTIC CHEMOTHERAPY: ICD-10-CM

## 2022-11-21 DIAGNOSIS — C25.1 MALIGNANT NEOPLASM OF BODY OF PANCREAS (HCC): ICD-10-CM

## 2022-11-21 DIAGNOSIS — C78.6 PERITONEAL CARCINOMATOSIS (HCC): ICD-10-CM

## 2022-11-21 DIAGNOSIS — C25.9 ADENOCARCINOMA OF PANCREAS (HCC): ICD-10-CM

## 2022-11-21 DIAGNOSIS — C25.0 PANCREATIC MALIGNANCY SYNDROME (HCC): Primary | ICD-10-CM

## 2022-11-21 LAB
ALBUMIN SERPL-MCNC: 3.3 G/DL (ref 3.5–5)
ALBUMIN/GLOB SERPL: 0.9 {RATIO} (ref 1.1–2.2)
ALP SERPL-CCNC: 178 U/L (ref 45–117)
ALT SERPL-CCNC: 19 U/L (ref 12–78)
ANION GAP SERPL CALC-SCNC: 2 MMOL/L (ref 5–15)
AST SERPL-CCNC: 21 U/L (ref 15–37)
BASOPHILS # BLD: 0.1 K/UL (ref 0–0.1)
BASOPHILS NFR BLD: 1 % (ref 0–1)
BILIRUB SERPL-MCNC: 0.4 MG/DL (ref 0.2–1)
BUN SERPL-MCNC: 11 MG/DL (ref 6–20)
BUN/CREAT SERPL: 14 (ref 12–20)
CALCIUM SERPL-MCNC: 9 MG/DL (ref 8.5–10.1)
CHLORIDE SERPL-SCNC: 102 MMOL/L (ref 97–108)
CO2 SERPL-SCNC: 31 MMOL/L (ref 21–32)
CREAT SERPL-MCNC: 0.8 MG/DL (ref 0.55–1.02)
DIFFERENTIAL METHOD BLD: ABNORMAL
EOSINOPHIL # BLD: 0.4 K/UL (ref 0–0.4)
EOSINOPHIL NFR BLD: 5 % (ref 0–7)
ERYTHROCYTE [DISTWIDTH] IN BLOOD BY AUTOMATED COUNT: 14.2 % (ref 11.5–14.5)
GLOBULIN SER CALC-MCNC: 3.6 G/DL (ref 2–4)
GLUCOSE SERPL-MCNC: 279 MG/DL (ref 65–100)
HBV SURFACE AB SER QL: NONREACTIVE
HBV SURFACE AB SER-ACNC: 7.69 MIU/ML
HBV SURFACE AG SER QL: <0.1 INDEX
HBV SURFACE AG SER QL: NEGATIVE
HCT VFR BLD AUTO: 37.8 % (ref 35–47)
HGB BLD-MCNC: 12.4 G/DL (ref 11.5–16)
IMM GRANULOCYTES # BLD AUTO: 0 K/UL (ref 0–0.04)
IMM GRANULOCYTES NFR BLD AUTO: 0 % (ref 0–0.5)
LYMPHOCYTES # BLD: 2.5 K/UL (ref 0.8–3.5)
LYMPHOCYTES NFR BLD: 27 % (ref 12–49)
MAGNESIUM SERPL-MCNC: 1.8 MG/DL (ref 1.6–2.4)
MCH RBC QN AUTO: 29.9 PG (ref 26–34)
MCHC RBC AUTO-ENTMCNC: 32.8 G/DL (ref 30–36.5)
MCV RBC AUTO: 91.1 FL (ref 80–99)
MONOCYTES # BLD: 1.3 K/UL (ref 0–1)
MONOCYTES NFR BLD: 14 % (ref 5–13)
NEUTS SEG # BLD: 4.7 K/UL (ref 1.8–8)
NEUTS SEG NFR BLD: 53 % (ref 32–75)
NRBC # BLD: 0 K/UL (ref 0–0.01)
NRBC BLD-RTO: 0 PER 100 WBC
PLATELET # BLD AUTO: 441 K/UL (ref 150–400)
PMV BLD AUTO: 10.2 FL (ref 8.9–12.9)
POTASSIUM SERPL-SCNC: 4 MMOL/L (ref 3.5–5.1)
PROT SERPL-MCNC: 6.9 G/DL (ref 6.4–8.2)
RBC # BLD AUTO: 4.15 M/UL (ref 3.8–5.2)
SODIUM SERPL-SCNC: 135 MMOL/L (ref 136–145)
WBC # BLD AUTO: 9 K/UL (ref 3.6–11)

## 2022-11-21 PROCEDURE — 86301 IMMUNOASSAY TUMOR CA 19-9: CPT

## 2022-11-21 PROCEDURE — 74011250636 HC RX REV CODE- 250/636: Performed by: INTERNAL MEDICINE

## 2022-11-21 PROCEDURE — 36415 COLL VENOUS BLD VENIPUNCTURE: CPT

## 2022-11-21 PROCEDURE — 96415 CHEMO IV INFUSION ADDL HR: CPT

## 2022-11-21 PROCEDURE — 86704 HEP B CORE ANTIBODY TOTAL: CPT

## 2022-11-21 PROCEDURE — 96372 THER/PROPH/DIAG INJ SC/IM: CPT

## 2022-11-21 PROCEDURE — 74011000250 HC RX REV CODE- 250: Performed by: INTERNAL MEDICINE

## 2022-11-21 PROCEDURE — 96375 TX/PRO/DX INJ NEW DRUG ADDON: CPT

## 2022-11-21 PROCEDURE — 85025 COMPLETE CBC W/AUTO DIFF WBC: CPT

## 2022-11-21 PROCEDURE — 83735 ASSAY OF MAGNESIUM: CPT

## 2022-11-21 PROCEDURE — 96413 CHEMO IV INFUSION 1 HR: CPT

## 2022-11-21 PROCEDURE — 87340 HEPATITIS B SURFACE AG IA: CPT

## 2022-11-21 PROCEDURE — G0463 HOSPITAL OUTPT CLINIC VISIT: HCPCS | Performed by: INTERNAL MEDICINE

## 2022-11-21 PROCEDURE — 96416 CHEMO PROLONG INFUSE W/PUMP: CPT

## 2022-11-21 PROCEDURE — 86706 HEP B SURFACE ANTIBODY: CPT

## 2022-11-21 PROCEDURE — 80053 COMPREHEN METABOLIC PANEL: CPT

## 2022-11-21 PROCEDURE — 77030012965 HC NDL HUBR BBMI -A

## 2022-11-21 PROCEDURE — 96367 TX/PROPH/DG ADDL SEQ IV INF: CPT

## 2022-11-21 PROCEDURE — 74011000258 HC RX REV CODE- 258: Performed by: INTERNAL MEDICINE

## 2022-11-21 RX ORDER — ATROPINE SULFATE 0.4 MG/ML
0.4 INJECTION, SOLUTION ENDOTRACHEAL; INTRAMEDULLARY; INTRAMUSCULAR; INTRAVENOUS; SUBCUTANEOUS
Status: DISPENSED | OUTPATIENT
Start: 2022-11-21 | End: 2022-11-21

## 2022-11-21 RX ORDER — HEPARIN 100 UNIT/ML
500 SYRINGE INTRAVENOUS AS NEEDED
Status: ACTIVE | OUTPATIENT
Start: 2022-11-21 | End: 2022-11-21

## 2022-11-21 RX ORDER — SODIUM CHLORIDE 9 MG/ML
5-250 INJECTION, SOLUTION INTRAVENOUS AS NEEDED
Status: CANCELLED | OUTPATIENT
Start: 2022-11-30

## 2022-11-21 RX ORDER — SODIUM CHLORIDE 9 MG/ML
5-250 INJECTION, SOLUTION INTRAVENOUS AS NEEDED
Status: DISPENSED | OUTPATIENT
Start: 2022-11-21 | End: 2022-11-21

## 2022-11-21 RX ORDER — HEPARIN 100 UNIT/ML
500 SYRINGE INTRAVENOUS AS NEEDED
Status: CANCELLED
Start: 2022-11-30

## 2022-11-21 RX ORDER — SODIUM CHLORIDE 9 MG/ML
5-40 INJECTION INTRAMUSCULAR; INTRAVENOUS; SUBCUTANEOUS AS NEEDED
Status: ACTIVE | OUTPATIENT
Start: 2022-11-21 | End: 2022-11-21

## 2022-11-21 RX ORDER — SODIUM CHLORIDE 0.9 % (FLUSH) 0.9 %
5-40 SYRINGE (ML) INJECTION AS NEEDED
Status: CANCELLED | OUTPATIENT
Start: 2022-11-30

## 2022-11-21 RX ORDER — ONDANSETRON 2 MG/ML
8 INJECTION INTRAMUSCULAR; INTRAVENOUS AS NEEDED
Status: ACTIVE | OUTPATIENT
Start: 2022-11-21 | End: 2022-11-21

## 2022-11-21 RX ORDER — PROCHLORPERAZINE EDISYLATE 5 MG/ML
10 INJECTION INTRAMUSCULAR; INTRAVENOUS
Status: DISPENSED | OUTPATIENT
Start: 2022-11-21 | End: 2022-11-21

## 2022-11-21 RX ORDER — PALONOSETRON 0.05 MG/ML
0.25 INJECTION, SOLUTION INTRAVENOUS ONCE
Status: COMPLETED | OUTPATIENT
Start: 2022-11-21 | End: 2022-11-21

## 2022-11-21 RX ORDER — ACETAMINOPHEN 325 MG/1
650 TABLET ORAL AS NEEDED
Status: ACTIVE | OUTPATIENT
Start: 2022-11-21 | End: 2022-11-21

## 2022-11-21 RX ORDER — DIPHENHYDRAMINE HYDROCHLORIDE 50 MG/ML
25 INJECTION, SOLUTION INTRAMUSCULAR; INTRAVENOUS AS NEEDED
Status: ACTIVE | OUTPATIENT
Start: 2022-11-21 | End: 2022-11-21

## 2022-11-21 RX ORDER — SODIUM CHLORIDE 9 MG/ML
5-40 INJECTION INTRAMUSCULAR; INTRAVENOUS; SUBCUTANEOUS AS NEEDED
Status: CANCELLED | OUTPATIENT
Start: 2022-11-30

## 2022-11-21 RX ADMIN — ATROPINE SULFATE 0.4 MG: 0.4 INJECTION, SOLUTION INTRAMUSCULAR; INTRAVENOUS; SUBCUTANEOUS at 11:23

## 2022-11-21 RX ADMIN — DEXAMETHASONE SODIUM PHOSPHATE 12 MG: 4 INJECTION, SOLUTION INTRAMUSCULAR; INTRAVENOUS at 10:53

## 2022-11-21 RX ADMIN — LEUCOVORIN CALCIUM 768 MG: 100 INJECTION, POWDER, LYOPHILIZED, FOR SUSPENSION INTRAMUSCULAR; INTRAVENOUS at 13:19

## 2022-11-21 RX ADMIN — IRINOTECAN HYDROCHLORIDE 134.59 MG: 4.3 INJECTION, POWDER, FOR SOLUTION INTRAVENOUS at 11:29

## 2022-11-21 RX ADMIN — PALONOSETRON 0.25 MG: 0.05 INJECTION, SOLUTION INTRAVENOUS at 10:38

## 2022-11-21 RX ADMIN — SODIUM CHLORIDE 25 ML/HR: 9 INJECTION, SOLUTION INTRAVENOUS at 10:35

## 2022-11-21 RX ADMIN — FLUOROURACIL 4608 MG: 50 INJECTION, SOLUTION INTRAVENOUS at 14:18

## 2022-11-21 NOTE — PROGRESS NOTES
[facility] Progress Note    Date: 2022    Name: Jonel Patricia    MRN: [id number]         : 1955    Ms. Ba Arrived ambulatory and in no distress for cycle 1 day 1 of Onyvide/Leucovorin/5FU regimen. Assessment was completed, no acute issues at this time, no new complaints voiced. Port accessed without difficulty, blood return obtained but sluggish, flushes easily. Lab work drawn peripherally and in process. Chemotherapy Flowsheet 2022   Cycle C1 D1   Date 2022   Drug / Regimen Onyvide/Leucovorin/5FU   Dosage 135.8 mg/776 mg/4656 mg   Time Up -   Time Down -   Pre Hydration -   Pre Meds Aloxi 0.25 mg IV, Decadron 12 mg IV, Atropine 0.4 mg Subcut   Notes CIV 5FU initiated to infuse over 46 hours      Proceeded to appt with Dr. Carter Holcomb. Ms. Ba's vitals were reviewed. Visit Vitals  BP (!) 177/78 (BP 1 Location: Left upper arm, BP Patient Position: Sitting)   Pulse 92   Temp 98.2 °F (36.8 °C)   Resp 20   Ht 5' 3\" (1.6 m)   Wt 84.3 kg (185 lb 12.8 oz)   SpO2 96%   BMI 32.91 kg/m²       Lab results were obtained and reviewed. Recent Results (from the past 12 hour(s))   CBC WITH AUTOMATED DIFF    Collection Time: 22  9:35 AM   Result Value Ref Range    WBC 9.0 3.6 - 11.0 K/uL    RBC 4.15 3.80 - 5.20 M/uL    HGB 12.4 11.5 - 16.0 g/dL    HCT 37.8 35.0 - 47.0 %    MCV 91.1 80.0 - 99.0 FL    MCH 29.9 26.0 - 34.0 PG    MCHC 32.8 30.0 - 36.5 g/dL    RDW 14.2 11.5 - 14.5 %    PLATELET 830 (H) 323 - 400 K/uL    MPV 10.2 8.9 - 12.9 FL    NRBC 0.0 0  WBC    ABSOLUTE NRBC 0.00 0.00 - 0.01 K/uL    NEUTROPHILS 53 32 - 75 %    LYMPHOCYTES 27 12 - 49 %    MONOCYTES 14 (H) 5 - 13 %    EOSINOPHILS 5 0 - 7 %    BASOPHILS 1 0 - 1 %    IMMATURE GRANULOCYTES 0 0.0 - 0.5 %    ABS. NEUTROPHILS 4.7 1.8 - 8.0 K/UL    ABS. LYMPHOCYTES 2.5 0.8 - 3.5 K/UL    ABS. MONOCYTES 1.3 (H) 0.0 - 1.0 K/UL    ABS. EOSINOPHILS 0.4 0.0 - 0.4 K/UL    ABS. BASOPHILS 0.1 0.0 - 0.1 K/UL    ABS. IMM.  GRANS. 0.0 0.00 - 0.04 K/UL    DF AUTOMATED     METABOLIC PANEL, COMPREHENSIVE    Collection Time: 11/21/22  9:35 AM   Result Value Ref Range    Sodium 135 (L) 136 - 145 mmol/L    Potassium 4.0 3.5 - 5.1 mmol/L    Chloride 102 97 - 108 mmol/L    CO2 31 21 - 32 mmol/L    Anion gap 2 (L) 5 - 15 mmol/L    Glucose 279 (H) 65 - 100 mg/dL    BUN 11 6 - 20 MG/DL    Creatinine 0.80 0.55 - 1.02 MG/DL    BUN/Creatinine ratio 14 12 - 20      eGFR >60 >60 ml/min/1.73m2    Calcium 9.0 8.5 - 10.1 MG/DL    Bilirubin, total 0.4 0.2 - 1.0 MG/DL    ALT (SGPT) 19 12 - 78 U/L    AST (SGOT) 21 15 - 37 U/L    Alk. phosphatase 178 (H) 45 - 117 U/L    Protein, total 6.9 6.4 - 8.2 g/dL    Albumin 3.3 (L) 3.5 - 5.0 g/dL    Globulin 3.6 2.0 - 4.0 g/dL    A-G Ratio 0.9 (L) 1.1 - 2.2     MAGNESIUM    Collection Time: 11/21/22  9:35 AM   Result Value Ref Range    Magnesium 1.8 1.6 - 2.4 mg/dL       Pre-medications  were administered as ordered and chemotherapy was initiated. Two nurses verified prior to administering: drug name, drug dose, infusion volume or drug volume  when prepared in a syringe, rate of administration, route of administration , expiration dates and/or times, appearance and physical integrity of the drugs, rate set on infusion pump, when used, sequencing of drug administration.  ml IV main line established. 1038: Aloxi 0.25 mg IVP given  1053: Decadron 12 mg IV given  1123: Atropine 0.4 mg subcut. given right upper arm and site intact. 1129: Onyvide 135.8 mg IV started to infuse over 90 minutes/  1319: Leucovorin 776 mg IV started to infuse over 30 minutes. 1418: CIV 5FU 4656 mg initiated to infuse over 46 hours via CADD Legacy Plus infusion pump. Patient ate a salad for lunch and tolerated well. Ms. Billingsley Lively tolerated treatment well . She complained of \"queasiness\" when getting ready for discharge. Offered patient compazine but she states she preferred to wait until she got home to take it.  Reviewed decadron order for day 2 and 3 as listed on treatment plan with Dr. Aaron Cho and patient will not be prescribed that to take. Patient also states she was told during her MD visit this AM that she would be having labs done next week. No orders seen at present but will review with Dr. Aaron Cho and call patient. She was discharged from [place] [address] in stable condition at 1430, accompanied by friend. . She is to return on November 23 at 200 for her next appointment.     Akil Stanford RN  November 21, 2022

## 2022-11-21 NOTE — DISCHARGE INSTRUCTIONS
OUTPATIENT INFUSION CENTER    DISCHARGE INSTRUCTIONS FOR:  CHEMOTHERAPY / BIOTHERAPY    Today you received: Onyvide 135.8 mg, Leucovorin 776 mg, Continuous infusion 5FU 4656 mg started to infuse over 46 hours    Pre meds: Aloxi 0.25 mg, Decdron 12 mg, Atropine 0.4 mg subcut. Chemotherapy has the potential to cause many side effects. The following are general precautions that chemo patients should take:    1. Practice good hand washing:   * Use soap and water for at least 15 seconds, covering all areas of hands. * Always wash hands before eating. * Wash hands after contact with public surfaces such as door knobs and         handles, shopping carts, telephones and elevator buttons. 2. Get plenty of rest:    * You will likely experience fatigue three to five days following your treatment. It may last as long as seven days. 3. Drink plenty of fluids. Water is best. Drink 8-10 glasses of fluids daily. 4. Eat a well balanced diet:  * Small frequent meals may help if you are having trouble with nausea or your  appetite. Some people also do well with nutritional supplements. Wash fruits and vegetables well. 5. Pace yourself with daily activities:   * Take frequent breaks and ask for help if you need it. 6. Exercise is very important:  * It will increase circulation and will help the fatigue. Do what you can each day. 7. If your regimen results in hair loss:  *  You will likely notice effects between two and three weeks following your first treatment. Some lose all hair while others only experience thinning. 8. Practice good oral hygiene:   *  Notify your M.D. immediately if any mouth sores or discomfort develop. 9. Protect yourself from the sun. Signs/Symptoms of an allergic reaction and/or some side effects may require immediate medical attention.   Notify your physician if you develop one or more of the following:     Temperature of 100.5 degrees or greater;   Skin redness, itching, swelling, blistering, weeping, crusting, rash, or hives; Wheezing, chest tightness, cough, or shortness of breath;   Swelling of the face, eyelids, lips, tongue, or throat;  Severe, persistent headache;  Stuffy nose, runny nose, sneezing;   Red (bloodshot), itchy, swollen, or watery eyes;   Stomach  pain, nausea, vomiting, diarrhea, or bloody stools;  Mouth sores        Your physician should also be aware of the following symptoms:    Persistent and unresolved nausea and/or vomiting;   Persistent and unresolved diarrhea or constipation;   Numbness/tingling/burning of the extremities, including the fingers and toes; Bleeding or unexplained bruising; Unexplained redness/swelling/pain in the arms or legs; Shortness of breath or fatigue that worsens;   Pain with urination or blood in the urine; Chills;  Cough, especially a productive cough;  Mouth sores or a white coating of the tongue; Redness, swelling, pain or drainage at the port-a-cath or IV site; Increased feeling of bloating or water retention; Excessive weight loss or gain;  Ringing in the ears; Difficulty swallowing;  Dizziness, vertigo, lightheadedness or fainting.           Jamey Ba Signature: ____________________________ 11/21/2022  Edith Olivo RN

## 2022-11-21 NOTE — PROGRESS NOTES
Jesika Nicholas is a 79 y.o. female here for follow up for pancreatic cancer . Patient is scheduled for C1D1 Onivyde, Leucovorin and Fluorouracil today in [facility]. Patient feels unsteady at times, she has occasional crying, but she is keeping herself busy. Key Oncology Meds               ondansetron (ZOFRAN ODT) 4 mg disintegrating tablet (Taking) Take 1 Tablet by mouth every eight (8) hours as needed for Nausea or Vomiting. prochlorperazine (Compazine) 10 mg tablet (Taking) Take 1 Tablet by mouth every six (6) hours as needed for Nausea or Vomiting. Key Pain Meds               HYDROmorphone (DILAUDID) 2 mg tablet (Taking) Take 1 Tablet by mouth every eight (8) hours as needed for Pain for up to 30 days. Max Daily Amount: 6 mg.    acetaminophen (TYLENOL) 500 mg tablet (Taking) Take 1,000 mg by mouth two (2) times a day. Indications: pain    ibuprofen (MOTRIN) 200 mg tablet (Taking) Take 600 mg by mouth two (2) times a day.  Indications: pain          Chemotherapy Flowsheet 10/17/2022   Cycle C 20 D15   Date 10/17/2022   Drug / Regimen HELD   Dosage -   Time Up -   Time Down -   Pre Hydration -   Pre Meds -   Notes regimen D/C due to progression

## 2022-11-21 NOTE — PROGRESS NOTES
[address] [facility] [address], [address] [facility] [place], [address] [facility], [address] [place] [address]  [phone]          Reason for Visit:   Ethel Hooper is a 79 y.o. female who is seen by synchronous (real-time) audio-video technology for follow up of pancreatic adenocarcinoma     Treatment History:     Dx: Pancreatic Adenocarcinoma--May 2020--[id number]--blhvawtrigr of splenic vein and superior mesenteric vein    Tx: mFOLFIRINOX--first cycle 5/26/2020, second cycle 6/10/2020, dose reduced 15% cycle 3 on 6/30/2020--delayed due to severe side effects--switched to Gemcitabine/Abraxane--Cycle #1 7/29/2002, Cycle #2 9/2/2020, Cycle #3 10/7/2020, Cycle #4 11/4/2020. Neoadjuvant Radiation with Dr Aparna Luna ending 12/18/2021. Neoadjuvant [place]/Abraxane, 06/2020 - 11/2020    Distal Pancreatectomy, Splenectomy, and Civa Sheet with Dr Andreas Blanco on 3/17/2021. Adjuvant radiation with Dr Aparna Luna. Palliative chemotherapy    Gemcitabine/abraxane 1 Cycle  D/C'd 10/2022    Onivyde+5-FU, cycle 1 day 1    Goal: Prolong life--Palliative    History of Present Illness:     Ms. Lesia Rosas is a women with metastatic pancreatic adenocarcinoma. She had locally advanced pan Can Dx'ed in 2020. She received 4 cycles of FOLFIRINOX. She had severe toxicities from it with neutropenic fever. She then transitioned to [place]/Abraxane. She then underwent distal pancreatectomy in 2020 with Dr. Andreas Blanco. Disease recurrent in the peritoneum in 07/2021. It was never biopsied. She then started receiving [place]/abraxane. She is doing fair. Some numbness in hands and feet. Recent CT showed disease progression in the peritoneum. She is starting a new treatment today.          Past Medical History:   Diagnosis Date    Adenocarcinoma of pancreas ([address] 05/13/2020    Dr Erickson Ce biopsy via EUS    Diabetes [facility])     GERD (gastroesophageal reflux disease)     Hernia of abdominal cavity     Subxyphoid hernia    Hypertension Inflammatory polyarthropathy (HCC)     Joint pain     Joint swelling     Menopause     Ocular nevus of left eye     Pancreatitis     Tracheal stenosis       Past Surgical History:   Procedure Laterality Date    HX CARPAL TUNNEL RELEASE Bilateral     HX COLONOSCOPY      HX HYSTERECTOMY      HX KNEE ARTHROSCOPY Right     HX KNEE REPLACEMENT Right     partial    HX LAP CHOLECYSTECTOMY      HX TONSILLECTOMY      HX VASCULAR ACCESS        Social History     Tobacco Use    Smoking status: Never    Smokeless tobacco: Never   Substance Use Topics    Alcohol use: No     Alcohol/week: 0.0 standard drinks      Family History   Problem Relation Age of Onset    Heart Disease Mother     Heart Attack Mother     Cancer Father         lung    Diabetes Sister      Current Outpatient Medications   Medication Sig    ondansetron (ZOFRAN ODT) 4 mg disintegrating tablet Take 1 Tablet by mouth every eight (8) hours as needed for Nausea or Vomiting. prochlorperazine (Compazine) 10 mg tablet Take 1 Tablet by mouth every six (6) hours as needed for Nausea or Vomiting.    lidocaine-prilocaine (EMLA) topical cream Apply  to affected area as needed for Pain. Apply generous amount to port site 30-45 min prior to infusions    methylphenidate HCl (RITALIN) 10 mg tablet Take 1 Tablet by mouth two (2) times a day. Max Daily Amount: 20 mg. HumaLOG KwikPen Insulin 100 unit/mL kwikpen INJECT SUBCUTANEOUSLY ON SLIDING SCALE AS DIRECTED (Patient taking differently: by SubCUTAneous route two (2) times daily as needed. INJECT SUBCUTANEOUSLY ON SLIDING SCALE AS DIRECTED)    gabapentin (NEURONTIN) 300 mg capsule TAKE 2 CAPSULES BY MOUTH THREE TIMES DAILY    LORazepam (ATIVAN) 1 mg tablet TAKE 1 TABLET BY MOUTH EVERY MORNING AND TAKE 2 TABLETS AT BEDTIME    HYDROmorphone (DILAUDID) 2 mg tablet Take 1 Tablet by mouth every eight (8) hours as needed for Pain for up to 30 days. Max Daily Amount: 6 mg.  (Patient taking differently: Take 2 mg by mouth every eight (8) hours as needed for Pain. 2mg morning, 1mg afternoon, 2 mg at bedtime)    famotidine (PEPCID) 20 mg tablet Take 20 mg by mouth two (2) times a day. multivit-minerals/folic acid (MULTIVITAMIN GUMMIES PO) Take 2 Each by mouth Every morning. nystatin (MYCOSTATIN) powder Apply 1 g to affected area three (3) times daily. DULoxetine (CYMBALTA) 30 mg capsule Take 1 Capsule by mouth every evening. acetaminophen (TYLENOL) 500 mg tablet Take 1,000 mg by mouth two (2) times a day. Indications: pain    ibuprofen (MOTRIN) 200 mg tablet Take 600 mg by mouth two (2) times a day. Indications: pain    senna-docusate (PERICOLACE) 8.6-50 mg per tablet Take 1 Tablet by mouth nightly. DULoxetine (CYMBALTA) 60 mg capsule Take 1 Capsule by mouth daily. Indications: neuropathic pain (Patient taking differently: Take 60 mg by mouth Every morning. At 0900  Indications: neuropathic pain)    Toujeo SoloStar U-300 Insulin 300 unit/mL (1.5 mL) inpn pen INJECT 55 UNITS ONCE DAILY AT BEDTIME - DOSE INCREASED (Patient taking differently: 28 Units by SubCUTAneous route nightly. Patient uses differently during around chemo time.)    nirmatrelvir-ritonavir (Paxlovid, EUA,) 150 mg x 2- 100 mg tablet Take 3 Tablets by mouth every twelve (12) hours for 5 days. No current facility-administered medications for this visit. No Known Allergies     Review of Systems: A complete review of systems was obtained, negative except as described above.     Physical Exam:     General: alert, cooperative, no distress   Mental  status: normal mood, behavior, speech, dress, motor activity, and thought processes, able to follow commands   HENT: NCAT   Neck: no visualized mass   Resp: no respiratory distress   Neuro: no gross deficits   Skin: no discoloration or lesions of concern on visible areas   Psychiatric: normal affect, consistent with stated mood, no evidence of hallucinations       Due to this being a TeleHealth evaluation (During [place] [address] public health emergency), many elements of the physical examination are unable to be assessed. Evaluation of the following organ systems was limited: Vitals/Constitutional/EENT/Resp/CV/GI//MS/Neuro/Skin/Heme-Lymph-Imm. Results:     Lab Results   Component Value Date/Time    WBC 9.0 11/21/2022 09:35 AM    HGB 12.4 11/21/2022 09:35 AM    HCT 37.8 11/21/2022 09:35 AM    PLATELET 195 (H) 49/37/8976 09:35 AM    MCV 91.1 11/21/2022 09:35 AM    ABS. NEUTROPHILS 4.7 11/21/2022 09:35 AM     Lab Results   Component Value Date/Time    Sodium 140 10/17/2022 08:30 AM    Potassium 3.7 10/17/2022 08:30 AM    Chloride 103 10/17/2022 08:30 AM    CO2 30 10/17/2022 08:30 AM    Glucose 233 (H) 10/17/2022 08:30 AM    BUN 13 10/17/2022 08:30 AM    Creatinine 0.75 10/17/2022 08:30 AM    GFR est AA >60 09/20/2022 08:27 AM    GFR est non-AA >60 09/20/2022 08:27 AM    Calcium 8.9 10/17/2022 08:30 AM    Glucose (POC) 282 (H) 07/24/2022 08:26 AM    Creatinine (POC) 0.7 02/06/2013 10:21 AM     Lab Results   Component Value Date/Time    Bilirubin, total 0.3 10/17/2022 08:30 AM    ALT (SGPT) 31 10/17/2022 08:30 AM    Alk. phosphatase 188 (H) 10/17/2022 08:30 AM    Protein, total 6.4 10/17/2022 08:30 AM    Albumin 3.1 (L) 10/17/2022 08:30 AM    Globulin 3.3 10/17/2022 08:30 AM       EXAM: CT CHEST ABD PELV W CONT     INDICATION: pancreatic cancer on chemo eval response to chemo     COMPARISON: May 17, 2022     IV CONTRAST: 100 mL of Isovue-370. ORAL CONTRAST:      Oral contrast was administered for optimal bowel visualization. TECHNIQUE:   Following the uneventful intravenous administration of contrast, thin axial  images were obtained through the chest, abdomen and pelvis. Coronal and sagittal  reformats were generated. CT dose reduction was achieved through use of a  standardized protocol tailored for this examination and automatic exposure  control for dose modulation.      FINDINGS:      CHEST WALL: Left chest wall port catheter terminates in the SVC. THYROID: No nodule. MEDIASTINUM: No mass or lymphadenopathy. RADHA: No mass or lymphadenopathy. THORACIC AORTA: No dissection or aneurysm. MAIN PULMONARY ARTERY: Normal in caliber. TRACHEA/BRONCHI: Patent. ESOPHAGUS: No wall thickening or dilatation. HEART: Normal in size. PLEURA: No effusion or pneumothorax. LUNGS: New multifocal heterogeneous airspace opacities in the right middle and  lower lobes which appear infectious/inflammatory. Stable groundglass nodule in  the left lower lobe. Stable partially solid nodule in the left upper lobe. Stable lateral right upper lobe nodule (4:21). Minimally larger right apical nodule (4:16). LIVER: Simple cysts in the liver. No solid liver lesions. BILIARY TREE: Status post cholecystectomy. CBD is not dilated. SPLEEN: Surgically absent spleen. PANCREAS: Status post distal pancreatectomy. Residual pancreas is unchanged in  appearance. ADRENALS: Unremarkable. KIDNEYS: No mass, calculus, or hydronephrosis. STOMACH: Unremarkable. SMALL BOWEL: No dilatation or wall thickening. COLON: No dilatation or wall thickening. APPENDIX: Nonvisualized  PERITONEUM: Left upper quadrant omental nodule is similar to minimally larger  (5:33). Fat stranding in the omentum anteriorly is unchanged. No ascites. RETROPERITONEUM: No lymphadenopathy or aortic aneurysm. REPRODUCTIVE ORGANS: Status post hysterectomy. URINARY BLADDER: Normal  BONES: Degenerative changes in the lumbar spine. ABDOMINAL WALL: Postsurgical changes from midline surgical incision. Small  postoperative seroma in the anterior abdominal wall. ADDITIONAL COMMENTS: N/A     IMPRESSION  1. New nodular groundglass opacities throughout the lungs, worst in the right  middle and lower lobes concerning for infection. These obscure the evaluation  for underlying metastases as several of these nodules are likely infectious.   2.  Stable to minimally larger pulmonary nodules as above.  3.  Stable left upper quadrant omental nodule. 4.  Postsurgical changes from distal pancreatectomy and splenectomy. Assessment/PLAN:     1) Pancreatic Adenocarcinoma metastatic to the peritoneum and lungs. ECOG PS 1  Intent of Treatment - palliative  Prognosis: Guarded. Received neoadjuvant chemotherapy. Radiation with Dr Mauricio Infante. Definitive resection with Dr Jaime Santos. BRCA negative. Now peritoneal disease/mets  On chemo since 7/2021    Recent CT - progression of disease in the lungs and peritoneum  Plan for CT guided Bx  D/C [place]/Abraxane    Start Onivyde/5-FU, cycle 1 day 1  I explained to the patient the usual side effects of the treatment such as nausea, diarrhea and low blood counts. She understood the side effects and ways to manage it. Blood counts are acceptable. The disease has a high risk of progression and the treatment also carries a substantial risk of side effects. All of this was assessed during this visit. Signed By: Jayne Donnelly MD          The patient was evaluated through a synchronous (real-time) audio-video encounter. The patient (or guardian if applicable) is aware that this is a billable service, which includes applicable co-pays. This Virtual Visit was conducted with patient's (and/or legal guardian's) consent. The visit was conducted pursuant to the emergency declaration under the [address] [place] [address], [address] [place] [address] waiver authority and the [employer] and [employer] General Act. Patient identification was verified, and a caregiver was present when appropriate. The patient was located in a state where the provider was licensed to provide care.

## 2022-11-22 LAB — HBV CORE AB SERPL QL IA: NEGATIVE

## 2022-11-23 ENCOUNTER — HOSPITAL ENCOUNTER (OUTPATIENT)
Dept: INFUSION THERAPY | Age: 67
Discharge: HOME | End: 2022-11-23
Payer: MEDICARE

## 2022-11-23 VITALS
SYSTOLIC BLOOD PRESSURE: 197 MMHG | DIASTOLIC BLOOD PRESSURE: 89 MMHG | HEART RATE: 78 BPM | HEIGHT: 63 IN | OXYGEN SATURATION: 97 % | TEMPERATURE: 98.1 F | BODY MASS INDEX: 32.99 KG/M2 | RESPIRATION RATE: 20 BRPM | WEIGHT: 186.2 LBS

## 2022-11-23 DIAGNOSIS — C25.0 PANCREATIC MALIGNANCY SYNDROME (HCC): Primary | ICD-10-CM

## 2022-11-23 DIAGNOSIS — C25.9 ADENOCARCINOMA OF PANCREAS (HCC): ICD-10-CM

## 2022-11-23 LAB — CANCER AG19-9 SERPL-ACNC: 2123 U/ML (ref 0–35)

## 2022-11-23 PROCEDURE — 74011250636 HC RX REV CODE- 250/636: Performed by: INTERNAL MEDICINE

## 2022-11-23 PROCEDURE — 96523 IRRIG DRUG DELIVERY DEVICE: CPT

## 2022-11-23 PROCEDURE — 74011000250 HC RX REV CODE- 250: Performed by: INTERNAL MEDICINE

## 2022-11-23 RX ORDER — SODIUM CHLORIDE 9 MG/ML
5-40 INJECTION INTRAMUSCULAR; INTRAVENOUS; SUBCUTANEOUS AS NEEDED
Status: DISCONTINUED | OUTPATIENT
Start: 2022-11-23 | End: 2022-11-24 | Stop reason: HOSPADM

## 2022-11-23 RX ORDER — HEPARIN 100 UNIT/ML
500 SYRINGE INTRAVENOUS AS NEEDED
Status: DISCONTINUED | OUTPATIENT
Start: 2022-11-23 | End: 2022-11-24 | Stop reason: HOSPADM

## 2022-11-23 RX ADMIN — HEPARIN 500 UNITS: 100 SYRINGE at 13:33

## 2022-11-23 RX ADMIN — SODIUM CHLORIDE, PRESERVATIVE FREE 10 ML: 5 INJECTION INTRAVENOUS at 13:33

## 2022-11-23 NOTE — PROGRESS NOTES
Patient in for pump disconnection. CIV 5FU noted to be completed. Pump removed. Port with brisk blood return. Flushed and de-accessed. Site intact. She states she has tolerated chemo well, denies N/V or diarrhea. Discharged in stable condition at 1335. She returns November 30 at 1 pm for lab work.

## 2022-11-29 ENCOUNTER — APPOINTMENT (OUTPATIENT)
Dept: INFUSION THERAPY | Age: 67
End: 2022-11-29

## 2022-11-30 ENCOUNTER — HOSPITAL ENCOUNTER (OUTPATIENT)
Dept: INFUSION THERAPY | Age: 67
Discharge: HOME | End: 2022-11-30
Payer: MEDICARE

## 2022-11-30 DIAGNOSIS — C25.9 ADENOCARCINOMA OF PANCREAS (HCC): Primary | ICD-10-CM

## 2022-11-30 LAB
ALBUMIN SERPL-MCNC: 3.5 G/DL (ref 3.5–5)
ALBUMIN/GLOB SERPL: 0.9 {RATIO} (ref 1.1–2.2)
ALP SERPL-CCNC: 151 U/L (ref 45–117)
ALT SERPL-CCNC: 22 U/L (ref 12–78)
ANION GAP SERPL CALC-SCNC: 6 MMOL/L (ref 5–15)
AST SERPL-CCNC: 22 U/L (ref 15–37)
BASOPHILS # BLD: 0 K/UL (ref 0–0.1)
BASOPHILS NFR BLD: 0 % (ref 0–1)
BILIRUB SERPL-MCNC: 0.4 MG/DL (ref 0.2–1)
BUN SERPL-MCNC: 16 MG/DL (ref 6–20)
BUN/CREAT SERPL: 19 (ref 12–20)
CALCIUM SERPL-MCNC: 9.5 MG/DL (ref 8.5–10.1)
CHLORIDE SERPL-SCNC: 99 MMOL/L (ref 97–108)
CO2 SERPL-SCNC: 29 MMOL/L (ref 21–32)
CREAT SERPL-MCNC: 0.86 MG/DL (ref 0.55–1.02)
DIFFERENTIAL METHOD BLD: ABNORMAL
EOSINOPHIL # BLD: 0.3 K/UL (ref 0–0.4)
EOSINOPHIL NFR BLD: 3 % (ref 0–7)
ERYTHROCYTE [DISTWIDTH] IN BLOOD BY AUTOMATED COUNT: 13.6 % (ref 11.5–14.5)
GLOBULIN SER CALC-MCNC: 3.7 G/DL (ref 2–4)
GLUCOSE SERPL-MCNC: 153 MG/DL (ref 65–100)
HCT VFR BLD AUTO: 38.7 % (ref 35–47)
HGB BLD-MCNC: 12.7 G/DL (ref 11.5–16)
IMM GRANULOCYTES # BLD AUTO: 0 K/UL (ref 0–0.04)
IMM GRANULOCYTES NFR BLD AUTO: 0 % (ref 0–0.5)
LYMPHOCYTES # BLD: 2.3 K/UL (ref 0.8–3.5)
LYMPHOCYTES NFR BLD: 25 % (ref 12–49)
MCH RBC QN AUTO: 29.6 PG (ref 26–34)
MCHC RBC AUTO-ENTMCNC: 32.8 G/DL (ref 30–36.5)
MCV RBC AUTO: 90.2 FL (ref 80–99)
MONOCYTES # BLD: 0.6 K/UL (ref 0–1)
MONOCYTES NFR BLD: 6 % (ref 5–13)
NEUTS SEG # BLD: 5.8 K/UL (ref 1.8–8)
NEUTS SEG NFR BLD: 66 % (ref 32–75)
NRBC # BLD: 0.05 K/UL (ref 0–0.01)
NRBC BLD-RTO: 0.6 PER 100 WBC
PLATELET # BLD AUTO: 325 K/UL (ref 150–400)
PMV BLD AUTO: 9.7 FL (ref 8.9–12.9)
POTASSIUM SERPL-SCNC: 3.8 MMOL/L (ref 3.5–5.1)
PROT SERPL-MCNC: 7.2 G/DL (ref 6.4–8.2)
RBC # BLD AUTO: 4.29 M/UL (ref 3.8–5.2)
SODIUM SERPL-SCNC: 134 MMOL/L (ref 136–145)
WBC # BLD AUTO: 8.9 K/UL (ref 3.6–11)

## 2022-11-30 PROCEDURE — 80053 COMPREHEN METABOLIC PANEL: CPT

## 2022-11-30 PROCEDURE — 36415 COLL VENOUS BLD VENIPUNCTURE: CPT

## 2022-11-30 PROCEDURE — 85025 COMPLETE CBC W/AUTO DIFF WBC: CPT

## 2022-12-05 ENCOUNTER — HOSPITAL ENCOUNTER (OUTPATIENT)
Dept: INFUSION THERAPY | Age: 67
Discharge: HOME | End: 2022-12-05
Payer: MEDICARE

## 2022-12-05 VITALS
DIASTOLIC BLOOD PRESSURE: 81 MMHG | WEIGHT: 183 LBS | HEART RATE: 88 BPM | SYSTOLIC BLOOD PRESSURE: 180 MMHG | RESPIRATION RATE: 20 BRPM | TEMPERATURE: 98.5 F | BODY MASS INDEX: 32.43 KG/M2 | HEIGHT: 63 IN | OXYGEN SATURATION: 96 %

## 2022-12-05 DIAGNOSIS — C25.0 PANCREATIC MALIGNANCY SYNDROME (HCC): Primary | ICD-10-CM

## 2022-12-05 DIAGNOSIS — C25.9 ADENOCARCINOMA OF PANCREAS (HCC): ICD-10-CM

## 2022-12-05 LAB
ALBUMIN SERPL-MCNC: 3 G/DL (ref 3.5–5)
ALBUMIN/GLOB SERPL: 0.9 {RATIO} (ref 1.1–2.2)
ALP SERPL-CCNC: 145 U/L (ref 45–117)
ALT SERPL-CCNC: 19 U/L (ref 12–78)
ANION GAP SERPL CALC-SCNC: 4 MMOL/L (ref 5–15)
AST SERPL-CCNC: 19 U/L (ref 15–37)
BASOPHILS # BLD: 0.1 K/UL (ref 0–0.1)
BASOPHILS NFR BLD: 1 % (ref 0–1)
BILIRUB SERPL-MCNC: 0.3 MG/DL (ref 0.2–1)
BUN SERPL-MCNC: 12 MG/DL (ref 6–20)
BUN/CREAT SERPL: 16 (ref 12–20)
CALCIUM SERPL-MCNC: 8.7 MG/DL (ref 8.5–10.1)
CHLORIDE SERPL-SCNC: 105 MMOL/L (ref 97–108)
CO2 SERPL-SCNC: 31 MMOL/L (ref 21–32)
CREAT SERPL-MCNC: 0.77 MG/DL (ref 0.55–1.02)
DIFFERENTIAL METHOD BLD: ABNORMAL
EOSINOPHIL # BLD: 0.4 K/UL (ref 0–0.4)
EOSINOPHIL NFR BLD: 6 % (ref 0–7)
ERYTHROCYTE [DISTWIDTH] IN BLOOD BY AUTOMATED COUNT: 14.1 % (ref 11.5–14.5)
GLOBULIN SER CALC-MCNC: 3.5 G/DL (ref 2–4)
GLUCOSE SERPL-MCNC: 97 MG/DL (ref 65–100)
HCT VFR BLD AUTO: 35.7 % (ref 35–47)
HGB BLD-MCNC: 11.7 G/DL (ref 11.5–16)
IMM GRANULOCYTES # BLD AUTO: 0 K/UL (ref 0–0.04)
IMM GRANULOCYTES NFR BLD AUTO: 0 % (ref 0–0.5)
LYMPHOCYTES # BLD: 2 K/UL (ref 0.8–3.5)
LYMPHOCYTES NFR BLD: 30 % (ref 12–49)
MAGNESIUM SERPL-MCNC: 1.5 MG/DL (ref 1.6–2.4)
MCH RBC QN AUTO: 29.9 PG (ref 26–34)
MCHC RBC AUTO-ENTMCNC: 32.8 G/DL (ref 30–36.5)
MCV RBC AUTO: 91.3 FL (ref 80–99)
MONOCYTES # BLD: 0.9 K/UL (ref 0–1)
MONOCYTES NFR BLD: 13 % (ref 5–13)
NEUTS SEG # BLD: 3.3 K/UL (ref 1.8–8)
NEUTS SEG NFR BLD: 50 % (ref 32–75)
NRBC # BLD: 0.02 K/UL (ref 0–0.01)
NRBC BLD-RTO: 0.3 PER 100 WBC
PLATELET # BLD AUTO: 402 K/UL (ref 150–400)
PMV BLD AUTO: 9.3 FL (ref 8.9–12.9)
POTASSIUM SERPL-SCNC: 3.4 MMOL/L (ref 3.5–5.1)
PROT SERPL-MCNC: 6.5 G/DL (ref 6.4–8.2)
RBC # BLD AUTO: 3.91 M/UL (ref 3.8–5.2)
SODIUM SERPL-SCNC: 140 MMOL/L (ref 136–145)
WBC # BLD AUTO: 6.6 K/UL (ref 3.6–11)

## 2022-12-05 PROCEDURE — 96416 CHEMO PROLONG INFUSE W/PUMP: CPT

## 2022-12-05 PROCEDURE — 83735 ASSAY OF MAGNESIUM: CPT

## 2022-12-05 PROCEDURE — 96372 THER/PROPH/DIAG INJ SC/IM: CPT

## 2022-12-05 PROCEDURE — 80053 COMPREHEN METABOLIC PANEL: CPT

## 2022-12-05 PROCEDURE — 36415 COLL VENOUS BLD VENIPUNCTURE: CPT

## 2022-12-05 PROCEDURE — 77030012965 HC NDL HUBR BBMI -A

## 2022-12-05 PROCEDURE — 96415 CHEMO IV INFUSION ADDL HR: CPT

## 2022-12-05 PROCEDURE — 96413 CHEMO IV INFUSION 1 HR: CPT

## 2022-12-05 PROCEDURE — 74011000250 HC RX REV CODE- 250: Performed by: INTERNAL MEDICINE

## 2022-12-05 PROCEDURE — 85025 COMPLETE CBC W/AUTO DIFF WBC: CPT

## 2022-12-05 PROCEDURE — 74011250636 HC RX REV CODE- 250/636: Performed by: INTERNAL MEDICINE

## 2022-12-05 PROCEDURE — 96375 TX/PRO/DX INJ NEW DRUG ADDON: CPT

## 2022-12-05 PROCEDURE — 96367 TX/PROPH/DG ADDL SEQ IV INF: CPT

## 2022-12-05 PROCEDURE — 74011000258 HC RX REV CODE- 258: Performed by: INTERNAL MEDICINE

## 2022-12-05 RX ORDER — PALONOSETRON 0.05 MG/ML
0.25 INJECTION, SOLUTION INTRAVENOUS ONCE
Status: COMPLETED | OUTPATIENT
Start: 2022-12-05 | End: 2022-12-05

## 2022-12-05 RX ORDER — ONDANSETRON 2 MG/ML
8 INJECTION INTRAMUSCULAR; INTRAVENOUS AS NEEDED
Status: ACTIVE | OUTPATIENT
Start: 2022-12-05 | End: 2022-12-05

## 2022-12-05 RX ORDER — DIPHENHYDRAMINE HYDROCHLORIDE 50 MG/ML
25 INJECTION, SOLUTION INTRAMUSCULAR; INTRAVENOUS AS NEEDED
Status: ACTIVE | OUTPATIENT
Start: 2022-12-05 | End: 2022-12-05

## 2022-12-05 RX ORDER — SODIUM CHLORIDE 9 MG/ML
5-250 INJECTION, SOLUTION INTRAVENOUS AS NEEDED
Status: DISPENSED | OUTPATIENT
Start: 2022-12-05 | End: 2022-12-05

## 2022-12-05 RX ORDER — SODIUM CHLORIDE 9 MG/ML
5-40 INJECTION INTRAMUSCULAR; INTRAVENOUS; SUBCUTANEOUS AS NEEDED
Status: ACTIVE | OUTPATIENT
Start: 2022-12-05 | End: 2022-12-05

## 2022-12-05 RX ORDER — ATROPINE SULFATE 0.4 MG/ML
0.4 INJECTION, SOLUTION ENDOTRACHEAL; INTRAMEDULLARY; INTRAMUSCULAR; INTRAVENOUS; SUBCUTANEOUS
Status: DISPENSED | OUTPATIENT
Start: 2022-12-05 | End: 2022-12-05

## 2022-12-05 RX ORDER — ACETAMINOPHEN 325 MG/1
650 TABLET ORAL AS NEEDED
Status: ACTIVE | OUTPATIENT
Start: 2022-12-05 | End: 2022-12-05

## 2022-12-05 RX ORDER — HEPARIN 100 UNIT/ML
500 SYRINGE INTRAVENOUS AS NEEDED
Status: ACTIVE | OUTPATIENT
Start: 2022-12-05 | End: 2022-12-05

## 2022-12-05 RX ADMIN — ATROPINE SULFATE 0.4 MG: 0.4 INJECTION, SOLUTION INTRAMUSCULAR; INTRAVENOUS; SUBCUTANEOUS at 12:27

## 2022-12-05 RX ADMIN — SODIUM CHLORIDE 25 ML/HR: 9 INJECTION, SOLUTION INTRAVENOUS at 10:22

## 2022-12-05 RX ADMIN — LEUCOVORIN CALCIUM 768 MG: 200 INJECTION, POWDER, LYOPHILIZED, FOR SUSPENSION INTRAMUSCULAR; INTRAVENOUS at 13:37

## 2022-12-05 RX ADMIN — SODIUM CHLORIDE 4608 MG: 9 INJECTION INTRAMUSCULAR; INTRAVENOUS; SUBCUTANEOUS at 14:20

## 2022-12-05 RX ADMIN — IRINOTECAN HYDROCHLORIDE 134.59 MG: 4.3 INJECTION, POWDER, FOR SOLUTION INTRAVENOUS at 11:36

## 2022-12-05 RX ADMIN — DEXAMETHASONE SODIUM PHOSPHATE 12 MG: 4 INJECTION, SOLUTION INTRAMUSCULAR; INTRAVENOUS at 11:04

## 2022-12-05 RX ADMIN — PALONOSETRON 0.25 MG: 0.05 INJECTION, SOLUTION INTRAVENOUS at 10:23

## 2022-12-05 NOTE — PROGRESS NOTES
[facility] Progress Note    Date: 2022    Name: Susana Barth    MRN: [id number]         : 1955    Ms. Ba Arrived ambulatory and in no distress for cycle 2 day 1 of chemotherapy regimen. Assessment was completed, no acute issues at this time. States has had nausea off/on but zofran manages well. Has had a few episodes of diarrhea, managed with Immodium. States stools are yellow. No jaundice noted. Right flank pain has been a bit more persistent- radiates to right abdomen. Port accessed without difficulty, labs drawn and in process. She reports that she has taken insulin this morning but has not eaten. Breakfast tray ordered. Chemotherapy Flowsheet 2022   Cycle C2 D1   Date 2022   Drug / Regimen Onyvide/Leucovorin/CIV 5FU started   Dosage 134.59 mg/768 mg/4608mg   Time Up see MAR   Time Down -   Pre Hydration -   Pre Meds Aloxi . 25 mg IV, Decadron 12 mg IV, Atropine 0.4 mg Subcut   Notes CIV 5FU initiated       Ms. Ba's vitals were reviewed. Visit Vitals  BP (!) 180/81 (BP 1 Location: Left upper arm, BP Patient Position: Sitting)   Pulse 88   Temp 98.5 °F (36.9 °C)   Resp 20   Ht 5' 3\" (1.6 m)   Wt 83 kg (183 lb)   SpO2 96%   Breastfeeding No   BMI 32.42 kg/m²       Lab results were obtained and reviewed. Recent Results (from the past 12 hour(s))   CBC WITH AUTOMATED DIFF    Collection Time: 22  9:00 AM   Result Value Ref Range    WBC 6.6 3.6 - 11.0 K/uL    RBC 3.91 3.80 - 5.20 M/uL    HGB 11.7 11.5 - 16.0 g/dL    HCT 35.7 35.0 - 47.0 %    MCV 91.3 80.0 - 99.0 FL    MCH 29.9 26.0 - 34.0 PG    MCHC 32.8 30.0 - 36.5 g/dL    RDW 14.1 11.5 - 14.5 %    PLATELET 372 (H) 096 - 400 K/uL    MPV 9.3 8.9 - 12.9 FL    NRBC 0.3 (H) 0  WBC    ABSOLUTE NRBC 0.02 (H) 0.00 - 0.01 K/uL    NEUTROPHILS 50 32 - 75 %    LYMPHOCYTES 30 12 - 49 %    MONOCYTES 13 5 - 13 %    EOSINOPHILS 6 0 - 7 %    BASOPHILS 1 0 - 1 %    IMMATURE GRANULOCYTES 0 0.0 - 0.5 %    ABS.  NEUTROPHILS 3.3 1.8 - 8.0 K/UL    ABS. LYMPHOCYTES 2.0 0.8 - 3.5 K/UL    ABS. MONOCYTES 0.9 0.0 - 1.0 K/UL    ABS. EOSINOPHILS 0.4 0.0 - 0.4 K/UL    ABS. BASOPHILS 0.1 0.0 - 0.1 K/UL    ABS. IMM. GRANS. 0.0 0.00 - 0.04 K/UL    DF AUTOMATED     METABOLIC PANEL, COMPREHENSIVE    Collection Time: 12/05/22  9:00 AM   Result Value Ref Range    Sodium 140 136 - 145 mmol/L    Potassium 3.4 (L) 3.5 - 5.1 mmol/L    Chloride 105 97 - 108 mmol/L    CO2 31 21 - 32 mmol/L    Anion gap 4 (L) 5 - 15 mmol/L    Glucose 97 65 - 100 mg/dL    BUN 12 6 - 20 MG/DL    Creatinine 0.77 0.55 - 1.02 MG/DL    BUN/Creatinine ratio 16 12 - 20      eGFR >60 >60 ml/min/1.73m2    Calcium 8.7 8.5 - 10.1 MG/DL    Bilirubin, total 0.3 0.2 - 1.0 MG/DL    ALT (SGPT) 19 12 - 78 U/L    AST (SGOT) 19 15 - 37 U/L    Alk. phosphatase 145 (H) 45 - 117 U/L    Protein, total 6.5 6.4 - 8.2 g/dL    Albumin 3.0 (L) 3.5 - 5.0 g/dL    Globulin 3.5 2.0 - 4.0 g/dL    A-G Ratio 0.9 (L) 1.1 - 2.2     MAGNESIUM    Collection Time: 12/05/22  9:00 AM   Result Value Ref Range    Magnesium 1.5 (L) 1.6 - 2.4 mg/dL       Pre-medications  were administered as ordered and chemotherapy was initiated. Two nurses verified prior to administering: drug name, drug dose, infusion volume or drug volume  when prepared in a syringe, rate of administration, route of administration , expiration dates and/or times, appearance and physical integrity of the drugs, rate set on infusion pump, when used, sequencing of drug administration.  ml IV mainline established. 1023: Aloxi 0.25 mg IVP given  1104: Decadron 12 mg IV given  1136: Onyvide 134.59 mg IV started to infused over 90 minutes. 1227: Aropine 0.4 mg subcut. Given. 1337: Leucovorin 768 mg IV started to infuse over 30 minutes. 1420: CIV 5FU 4068 mg initiated to infuse over 46 hours via [facility] pump. Ms. Socorro Turcios tolerated treatment well and was discharged from [place] [address] in stable condition at 1430.  She is to return on December 7 at 1230 for her next appointment to remove pump.     Bartolome Kennedy RN  December 5, 2022

## 2022-12-06 DIAGNOSIS — C25.9 MALIGNANT NEOPLASM OF PANCREAS, UNSPECIFIED LOCATION OF MALIGNANCY (HCC): Primary | ICD-10-CM

## 2022-12-06 RX ORDER — HYDROMORPHONE HYDROCHLORIDE 2 MG/1
2 TABLET ORAL
Qty: 90 TABLET | Refills: 0 | Status: SHIPPED | OUTPATIENT
Start: 2022-12-06 | End: 2023-01-05

## 2022-12-07 ENCOUNTER — HOSPITAL ENCOUNTER (OUTPATIENT)
Dept: INFUSION THERAPY | Age: 67
Discharge: HOME | End: 2022-12-07
Payer: MEDICARE

## 2022-12-07 VITALS
RESPIRATION RATE: 18 BRPM | DIASTOLIC BLOOD PRESSURE: 94 MMHG | TEMPERATURE: 98.7 F | SYSTOLIC BLOOD PRESSURE: 168 MMHG | HEART RATE: 82 BPM

## 2022-12-07 DIAGNOSIS — C25.9 ADENOCARCINOMA OF PANCREAS (HCC): ICD-10-CM

## 2022-12-07 DIAGNOSIS — C25.0 PANCREATIC MALIGNANCY SYNDROME (HCC): Primary | ICD-10-CM

## 2022-12-07 PROCEDURE — 96523 IRRIG DRUG DELIVERY DEVICE: CPT

## 2022-12-07 PROCEDURE — 74011250636 HC RX REV CODE- 250/636: Performed by: INTERNAL MEDICINE

## 2022-12-07 PROCEDURE — 74011000250 HC RX REV CODE- 250: Performed by: INTERNAL MEDICINE

## 2022-12-07 RX ORDER — SODIUM CHLORIDE 0.9 % (FLUSH) 0.9 %
5-40 SYRINGE (ML) INJECTION AS NEEDED
Status: DISCONTINUED | OUTPATIENT
Start: 2022-12-07 | End: 2022-12-08 | Stop reason: HOSPADM

## 2022-12-07 RX ORDER — HEPARIN 100 UNIT/ML
500 SYRINGE INTRAVENOUS AS NEEDED
Status: DISCONTINUED | OUTPATIENT
Start: 2022-12-07 | End: 2022-12-08 | Stop reason: HOSPADM

## 2022-12-07 RX ADMIN — HEPARIN 500 UNITS: 100 SYRINGE at 12:40

## 2022-12-07 RX ADMIN — SODIUM CHLORIDE, PRESERVATIVE FREE 10 ML: 5 INJECTION INTRAVENOUS at 12:40

## 2022-12-07 NOTE — PROGRESS NOTES
Patient in for disconnection of pump. She reports that the pump began alarming about an hour ago and she had to turn it off. On inspection the cassette is empty. Pump removed. Port flushed and de-accessed and site intact. She reports having a good day yesterday but feels more fatigued today. Also had crawling skin sensations the first evening of chemo but had no rash or other symptoms and it resolved after a short while. Offers no other complaints. Discharged in stable condition at [phone]. She returns December 19 at 1130 for her next appointment.

## 2022-12-10 RX ORDER — SODIUM CHLORIDE 9 MG/ML
5-40 INJECTION INTRAMUSCULAR; INTRAVENOUS; SUBCUTANEOUS AS NEEDED
OUTPATIENT
Start: 2022-12-19

## 2022-12-10 RX ORDER — SODIUM CHLORIDE 9 MG/ML
5-250 INJECTION, SOLUTION INTRAVENOUS AS NEEDED
OUTPATIENT
Start: 2022-12-21

## 2022-12-10 RX ORDER — DIPHENHYDRAMINE HYDROCHLORIDE 50 MG/ML
50 INJECTION, SOLUTION INTRAMUSCULAR; INTRAVENOUS AS NEEDED
Start: 2022-12-19

## 2022-12-10 RX ORDER — EPINEPHRINE 1 MG/ML
0.3 INJECTION, SOLUTION, CONCENTRATE INTRAVENOUS AS NEEDED
OUTPATIENT
Start: 2022-12-19

## 2022-12-10 RX ORDER — SODIUM CHLORIDE 9 MG/ML
5-250 INJECTION, SOLUTION INTRAVENOUS AS NEEDED
OUTPATIENT
Start: 2022-12-19

## 2022-12-10 RX ORDER — SODIUM CHLORIDE 0.9 % (FLUSH) 0.9 %
5-40 SYRINGE (ML) INJECTION AS NEEDED
OUTPATIENT
Start: 2022-12-21

## 2022-12-10 RX ORDER — HYDROCORTISONE SODIUM SUCCINATE 100 MG/2ML
100 INJECTION, POWDER, FOR SOLUTION INTRAMUSCULAR; INTRAVENOUS AS NEEDED
OUTPATIENT
Start: 2022-12-19

## 2022-12-10 RX ORDER — HEPARIN 100 UNIT/ML
500 SYRINGE INTRAVENOUS AS NEEDED
Start: 2022-12-21

## 2022-12-10 RX ORDER — SODIUM CHLORIDE 9 MG/ML
5-40 INJECTION INTRAMUSCULAR; INTRAVENOUS; SUBCUTANEOUS AS NEEDED
OUTPATIENT
Start: 2022-12-21

## 2022-12-10 RX ORDER — ACETAMINOPHEN 325 MG/1
650 TABLET ORAL AS NEEDED
Start: 2022-12-19

## 2022-12-10 RX ORDER — SODIUM CHLORIDE 0.9 % (FLUSH) 0.9 %
5-40 SYRINGE (ML) INJECTION AS NEEDED
OUTPATIENT
Start: 2022-12-19

## 2022-12-10 RX ORDER — ALBUTEROL SULFATE 0.83 MG/ML
2.5 SOLUTION RESPIRATORY (INHALATION) AS NEEDED
Start: 2022-12-19

## 2022-12-10 RX ORDER — PALONOSETRON 0.05 MG/ML
0.25 INJECTION, SOLUTION INTRAVENOUS ONCE
OUTPATIENT
Start: 2022-12-19 | End: 2022-12-19

## 2022-12-10 RX ORDER — ONDANSETRON 2 MG/ML
8 INJECTION INTRAMUSCULAR; INTRAVENOUS AS NEEDED
OUTPATIENT
Start: 2022-12-19

## 2022-12-10 RX ORDER — ATROPINE SULFATE 0.4 MG/ML
0.4 INJECTION, SOLUTION ENDOTRACHEAL; INTRAMEDULLARY; INTRAMUSCULAR; INTRAVENOUS; SUBCUTANEOUS
OUTPATIENT
Start: 2022-12-19

## 2022-12-10 RX ORDER — HEPARIN 100 UNIT/ML
500 SYRINGE INTRAVENOUS AS NEEDED
Start: 2022-12-19

## 2022-12-10 RX ORDER — DIPHENHYDRAMINE HYDROCHLORIDE 50 MG/ML
25 INJECTION, SOLUTION INTRAMUSCULAR; INTRAVENOUS AS NEEDED
Start: 2022-12-19

## 2022-12-10 RX ORDER — PROCHLORPERAZINE EDISYLATE 5 MG/ML
10 INJECTION INTRAMUSCULAR; INTRAVENOUS
OUTPATIENT
Start: 2022-12-19

## 2022-12-19 ENCOUNTER — HOSPITAL ENCOUNTER (OUTPATIENT)
Dept: INFUSION THERAPY | Age: 67
Discharge: HOME | End: 2022-12-19
Payer: MEDICARE

## 2022-12-19 VITALS
WEIGHT: 174.6 LBS | BODY MASS INDEX: 30.94 KG/M2 | HEIGHT: 63 IN | HEART RATE: 81 BPM | DIASTOLIC BLOOD PRESSURE: 75 MMHG | RESPIRATION RATE: 20 BRPM | TEMPERATURE: 98.4 F | SYSTOLIC BLOOD PRESSURE: 147 MMHG

## 2022-12-19 DIAGNOSIS — C25.0 PANCREATIC MALIGNANCY SYNDROME (HCC): Primary | ICD-10-CM

## 2022-12-19 DIAGNOSIS — E87.6 HYPOKALEMIA: ICD-10-CM

## 2022-12-19 DIAGNOSIS — C25.9 PRIMARY PANCREATIC CANCER WITH METASTASIS TO OTHER SITE (HCC): Primary | ICD-10-CM

## 2022-12-19 DIAGNOSIS — C25.9 ADENOCARCINOMA OF PANCREAS (HCC): ICD-10-CM

## 2022-12-19 LAB
ALBUMIN SERPL-MCNC: 3.1 G/DL (ref 3.5–5)
ALBUMIN/GLOB SERPL: 1 {RATIO} (ref 1.1–2.2)
ALP SERPL-CCNC: 149 U/L (ref 45–117)
ALT SERPL-CCNC: 24 U/L (ref 12–78)
ANION GAP SERPL CALC-SCNC: 7 MMOL/L (ref 5–15)
AST SERPL-CCNC: 25 U/L (ref 15–37)
BASOPHILS # BLD: 0.1 K/UL (ref 0–0.1)
BASOPHILS NFR BLD: 1 % (ref 0–1)
BILIRUB SERPL-MCNC: 0.3 MG/DL (ref 0.2–1)
BUN SERPL-MCNC: 13 MG/DL (ref 6–20)
BUN/CREAT SERPL: 15 (ref 12–20)
CALCIUM SERPL-MCNC: 8.9 MG/DL (ref 8.5–10.1)
CHLORIDE SERPL-SCNC: 104 MMOL/L (ref 97–108)
CO2 SERPL-SCNC: 30 MMOL/L (ref 21–32)
CREAT SERPL-MCNC: 0.86 MG/DL (ref 0.55–1.02)
DIFFERENTIAL METHOD BLD: ABNORMAL
EOSINOPHIL # BLD: 0.3 K/UL (ref 0–0.4)
EOSINOPHIL NFR BLD: 6 % (ref 0–7)
ERYTHROCYTE [DISTWIDTH] IN BLOOD BY AUTOMATED COUNT: 14.6 % (ref 11.5–14.5)
GLOBULIN SER CALC-MCNC: 3.1 G/DL (ref 2–4)
GLUCOSE SERPL-MCNC: 216 MG/DL (ref 65–100)
HCT VFR BLD AUTO: 36.3 % (ref 35–47)
HGB BLD-MCNC: 12.3 G/DL (ref 11.5–16)
IMM GRANULOCYTES # BLD AUTO: 0 K/UL (ref 0–0.04)
IMM GRANULOCYTES NFR BLD AUTO: 0 % (ref 0–0.5)
LYMPHOCYTES # BLD: 1.4 K/UL (ref 0.8–3.5)
LYMPHOCYTES NFR BLD: 34 % (ref 12–49)
MAGNESIUM SERPL-MCNC: 1.5 MG/DL (ref 1.6–2.4)
MCH RBC QN AUTO: 30.4 PG (ref 26–34)
MCHC RBC AUTO-ENTMCNC: 33.9 G/DL (ref 30–36.5)
MCV RBC AUTO: 89.6 FL (ref 80–99)
MONOCYTES # BLD: 0.7 K/UL (ref 0–1)
MONOCYTES NFR BLD: 16 % (ref 5–13)
NEUTS SEG # BLD: 1.8 K/UL (ref 1.8–8)
NEUTS SEG NFR BLD: 43 % (ref 32–75)
NRBC # BLD: 0 K/UL (ref 0–0.01)
NRBC BLD-RTO: 0 PER 100 WBC
PLATELET # BLD AUTO: 393 K/UL (ref 150–400)
PMV BLD AUTO: 9.3 FL (ref 8.9–12.9)
POTASSIUM SERPL-SCNC: 2.8 MMOL/L (ref 3.5–5.1)
PROT SERPL-MCNC: 6.2 G/DL (ref 6.4–8.2)
RBC # BLD AUTO: 4.05 M/UL (ref 3.8–5.2)
SODIUM SERPL-SCNC: 141 MMOL/L (ref 136–145)
WBC # BLD AUTO: 4.2 K/UL (ref 3.6–11)

## 2022-12-19 PROCEDURE — 83735 ASSAY OF MAGNESIUM: CPT

## 2022-12-19 PROCEDURE — 74011250637 HC RX REV CODE- 250/637: Performed by: INTERNAL MEDICINE

## 2022-12-19 PROCEDURE — 96375 TX/PRO/DX INJ NEW DRUG ADDON: CPT

## 2022-12-19 PROCEDURE — 74011250636 HC RX REV CODE- 250/636: Performed by: NURSE PRACTITIONER

## 2022-12-19 PROCEDURE — 74011250637 HC RX REV CODE- 250/637: Performed by: NURSE PRACTITIONER

## 2022-12-19 PROCEDURE — 96366 THER/PROPH/DIAG IV INF ADDON: CPT

## 2022-12-19 PROCEDURE — 96415 CHEMO IV INFUSION ADDL HR: CPT

## 2022-12-19 PROCEDURE — 96416 CHEMO PROLONG INFUSE W/PUMP: CPT

## 2022-12-19 PROCEDURE — 36415 COLL VENOUS BLD VENIPUNCTURE: CPT

## 2022-12-19 PROCEDURE — 96367 TX/PROPH/DG ADDL SEQ IV INF: CPT

## 2022-12-19 PROCEDURE — 74011000250 HC RX REV CODE- 250: Performed by: INTERNAL MEDICINE

## 2022-12-19 PROCEDURE — 85025 COMPLETE CBC W/AUTO DIFF WBC: CPT

## 2022-12-19 PROCEDURE — 93005 ELECTROCARDIOGRAM TRACING: CPT

## 2022-12-19 PROCEDURE — 80053 COMPREHEN METABOLIC PANEL: CPT

## 2022-12-19 PROCEDURE — 74011250636 HC RX REV CODE- 250/636: Performed by: INTERNAL MEDICINE

## 2022-12-19 PROCEDURE — 96372 THER/PROPH/DIAG INJ SC/IM: CPT

## 2022-12-19 PROCEDURE — 74011000258 HC RX REV CODE- 258: Performed by: INTERNAL MEDICINE

## 2022-12-19 PROCEDURE — 77030012965 HC NDL HUBR BBMI -A

## 2022-12-19 PROCEDURE — 96413 CHEMO IV INFUSION 1 HR: CPT

## 2022-12-19 RX ORDER — POTASSIUM CHLORIDE 7.45 MG/ML
10 INJECTION INTRAVENOUS ONCE
Status: DISCONTINUED | OUTPATIENT
Start: 2022-12-19 | End: 2022-12-19 | Stop reason: SDUPTHER

## 2022-12-19 RX ORDER — SODIUM CHLORIDE 9 MG/ML
5-250 INJECTION, SOLUTION INTRAVENOUS AS NEEDED
Status: DISPENSED | OUTPATIENT
Start: 2022-12-19 | End: 2022-12-19

## 2022-12-19 RX ORDER — POTASSIUM CHLORIDE 750 MG/1
20 TABLET, EXTENDED RELEASE ORAL 2 TIMES DAILY
Qty: 120 TABLET | Refills: 1 | Status: SHIPPED | OUTPATIENT
Start: 2022-12-19

## 2022-12-19 RX ORDER — SODIUM CHLORIDE 9 MG/ML
5-40 INJECTION INTRAMUSCULAR; INTRAVENOUS; SUBCUTANEOUS AS NEEDED
Status: ACTIVE | OUTPATIENT
Start: 2022-12-19 | End: 2022-12-19

## 2022-12-19 RX ORDER — ACETAMINOPHEN 325 MG/1
650 TABLET ORAL AS NEEDED
Status: DISPENSED | OUTPATIENT
Start: 2022-12-19 | End: 2022-12-19

## 2022-12-19 RX ORDER — POTASSIUM CHLORIDE 7.45 MG/ML
10 INJECTION INTRAVENOUS
Status: COMPLETED | OUTPATIENT
Start: 2022-12-19 | End: 2022-12-19

## 2022-12-19 RX ORDER — POTASSIUM CHLORIDE 7.45 MG/ML
10 INJECTION INTRAVENOUS ONCE
Status: DISCONTINUED | OUTPATIENT
Start: 2022-12-19 | End: 2022-12-19 | Stop reason: DRUGHIGH

## 2022-12-19 RX ORDER — PROCHLORPERAZINE EDISYLATE 5 MG/ML
10 INJECTION INTRAMUSCULAR; INTRAVENOUS
Status: ACTIVE | OUTPATIENT
Start: 2022-12-19 | End: 2022-12-19

## 2022-12-19 RX ORDER — ATROPINE SULFATE 0.4 MG/ML
0.4 INJECTION, SOLUTION ENDOTRACHEAL; INTRAMEDULLARY; INTRAMUSCULAR; INTRAVENOUS; SUBCUTANEOUS
Status: DISPENSED | OUTPATIENT
Start: 2022-12-19 | End: 2022-12-19

## 2022-12-19 RX ORDER — DIPHENHYDRAMINE HYDROCHLORIDE 50 MG/ML
25 INJECTION, SOLUTION INTRAMUSCULAR; INTRAVENOUS AS NEEDED
Status: ACTIVE | OUTPATIENT
Start: 2022-12-19 | End: 2022-12-19

## 2022-12-19 RX ORDER — POTASSIUM CHLORIDE 750 MG/1
40 TABLET, FILM COATED, EXTENDED RELEASE ORAL
Status: COMPLETED | OUTPATIENT
Start: 2022-12-19 | End: 2022-12-19

## 2022-12-19 RX ORDER — PALONOSETRON 0.05 MG/ML
0.25 INJECTION, SOLUTION INTRAVENOUS ONCE
Status: COMPLETED | OUTPATIENT
Start: 2022-12-19 | End: 2022-12-19

## 2022-12-19 RX ORDER — ONDANSETRON 2 MG/ML
8 INJECTION INTRAMUSCULAR; INTRAVENOUS AS NEEDED
Status: ACTIVE | OUTPATIENT
Start: 2022-12-19 | End: 2022-12-19

## 2022-12-19 RX ADMIN — ATROPINE SULFATE 0.4 MG: 0.4 INJECTION, SOLUTION INTRAMUSCULAR; INTRAVENOUS; SUBCUTANEOUS at 13:18

## 2022-12-19 RX ADMIN — LEUCOVORIN CALCIUM 768 MG: 350 INJECTION, POWDER, LYOPHILIZED, FOR SUSPENSION INTRAMUSCULAR; INTRAVENOUS at 15:10

## 2022-12-19 RX ADMIN — ACETAMINOPHEN 650 MG: 325 TABLET, FILM COATED ORAL at 15:40

## 2022-12-19 RX ADMIN — IRINOTECAN HYDROCHLORIDE 134.59 MG: 4.3 INJECTION, POWDER, FOR SOLUTION INTRAVENOUS at 13:29

## 2022-12-19 RX ADMIN — FLUOROURACIL 4608 MG: 50 INJECTION, SOLUTION INTRAVENOUS at 15:48

## 2022-12-19 RX ADMIN — DEXAMETHASONE SODIUM PHOSPHATE 12 MG: 4 INJECTION, SOLUTION INTRAMUSCULAR; INTRAVENOUS at 13:12

## 2022-12-19 RX ADMIN — PALONOSETRON 0.25 MG: 0.05 INJECTION, SOLUTION INTRAVENOUS at 10:22

## 2022-12-19 RX ADMIN — SODIUM CHLORIDE 25 ML/HR: 9 INJECTION, SOLUTION INTRAVENOUS at 10:19

## 2022-12-19 RX ADMIN — POTASSIUM CHLORIDE 10 MEQ: 7.46 INJECTION, SOLUTION INTRAVENOUS at 12:02

## 2022-12-19 RX ADMIN — POTASSIUM CHLORIDE 10 MEQ: 7.46 INJECTION, SOLUTION INTRAVENOUS at 11:06

## 2022-12-19 RX ADMIN — POTASSIUM CHLORIDE 40 MEQ: 750 TABLET, FILM COATED, EXTENDED RELEASE ORAL at 11:06

## 2022-12-19 NOTE — PROGRESS NOTES
Problem: Chemotherapy Treatment  Goal: *Chemotherapy regimen followed  Outcome: Resolved/Met  Goal: *Hemodynamically stable  Outcome: Resolved/Met  Goal: *Tolerating diet  Outcome: Resolved/Met     Problem: Infection - Risk of, Central Venous Catheter-Associated Bloodstream Infection  Goal: *Absence of infection signs and symptoms  Outcome: Resolved/Met     Problem: Knowledge Deficit  Goal: *Verbalizes understanding of procedures and medications  Outcome: Resolved/Met     Problem: Patient Education:  Go to Education Activity  Goal: Patient/Family Education  Outcome: Resolved/Met

## 2022-12-19 NOTE — PROGRESS NOTES
hospitals Progress Note    Date: 2022    Name: Jeffry Reddy    MRN: [id number]         : 1955    Ms. Ba Arrived ambulatory and in no distress for cycle 3 day 1 of Onivyde/Leucovorin/Adrucil regimen. Assessment was completed, states she has had some palpitations and didn't know if it could be a side effect from the chemo - suggested she discuss with her PCP. Also states that after her last chemo she developed some nausea and diarrhea. Left single port accessed without difficulty - no blood return. labs drawn peripherally and in process. Chemotherapy Flowsheet 2022   Cycle C3D1   Date 2022   Drug / Regimen Onyvide/Leucovorin   Dosage 134.59mg/768mg   Time Up see MAR   Time Down -   Pre Hydration -   Pre Meds Aloxi, Decadron and Atropine   Notes -         Ms. Ba's vitals were reviewed. Visit Vitals  BP (!) 147/75 (BP 1 Location: Left upper arm, BP Patient Position: Sitting)   Pulse 81   Temp 98.4 °F (36.9 °C)   Resp 20   Ht 5' 3\" (1.6 m)   Wt 79.2 kg (174 lb 9.6 oz)   Breastfeeding No   BMI 30.93 kg/m²       Lab results were obtained and reviewed. Recent Results (from the past 12 hour(s))   CBC WITH AUTOMATED DIFF    Collection Time: 22  9:10 AM   Result Value Ref Range    WBC 4.2 3.6 - 11.0 K/uL    RBC 4.05 3.80 - 5.20 M/uL    HGB 12.3 11.5 - 16.0 g/dL    HCT 36.3 35.0 - 47.0 %    MCV 89.6 80.0 - 99.0 FL    MCH 30.4 26.0 - 34.0 PG    MCHC 33.9 30.0 - 36.5 g/dL    RDW 14.6 (H) 11.5 - 14.5 %    PLATELET 623 277 - 561 K/uL    MPV 9.3 8.9 - 12.9 FL    NRBC 0.0 0  WBC    ABSOLUTE NRBC 0.00 0.00 - 0.01 K/uL    NEUTROPHILS 43 32 - 75 %    LYMPHOCYTES 34 12 - 49 %    MONOCYTES 16 (H) 5 - 13 %    EOSINOPHILS 6 0 - 7 %    BASOPHILS 1 0 - 1 %    IMMATURE GRANULOCYTES 0 0.0 - 0.5 %    ABS. NEUTROPHILS 1.8 1.8 - 8.0 K/UL    ABS. LYMPHOCYTES 1.4 0.8 - 3.5 K/UL    ABS. MONOCYTES 0.7 0.0 - 1.0 K/UL    ABS. EOSINOPHILS 0.3 0.0 - 0.4 K/UL    ABS. BASOPHILS 0.1 0.0 - 0.1 K/UL    ABS. JESICA Francois. 0.0 0.00 - 0.04 K/UL    DF AUTOMATED     METABOLIC PANEL, COMPREHENSIVE    Collection Time: 12/19/22  9:10 AM   Result Value Ref Range    Sodium 141 136 - 145 mmol/L    Potassium 2.8 (L) 3.5 - 5.1 mmol/L    Chloride 104 97 - 108 mmol/L    CO2 30 21 - 32 mmol/L    Anion gap 7 5 - 15 mmol/L    Glucose 216 (H) 65 - 100 mg/dL    BUN 13 6 - 20 MG/DL    Creatinine 0.86 0.55 - 1.02 MG/DL    BUN/Creatinine ratio 15 12 - 20      eGFR >60 >60 ml/min/1.73m2    Calcium 8.9 8.5 - 10.1 MG/DL    Bilirubin, total 0.3 0.2 - 1.0 MG/DL    ALT (SGPT) 24 12 - 78 U/L    AST (SGOT) 25 15 - 37 U/L    Alk. phosphatase 149 (H) 45 - 117 U/L    Protein, total 6.2 (L) 6.4 - 8.2 g/dL    Albumin 3.1 (L) 3.5 - 5.0 g/dL    Globulin 3.1 2.0 - 4.0 g/dL    A-G Ratio 1.0 (L) 1.1 - 2.2     MAGNESIUM    Collection Time: 12/19/22  9:10 AM   Result Value Ref Range    Magnesium 1.5 (L) 1.6 - 2.4 mg/dL     Received 2 runs of 10mEq KCL IVPB  in addition to 40mEq KCL PO. (K+ - 2.8)    Also while here she did experience her irregular heartbeat, EKG ordered and done. Dr. Johnson Alamo was made aware. Pre-medications (Aloxi, Decadron and Atropine) were administered as ordered and chemotherapy was initiated. Two nurses verified prior to administering: drug name, drug dose, infusion volume or drug volume  when prepared in a syringe, rate of administration, route of administration , expiration dates and/or times, appearance and physical integrity of the drugs, rate set on infusion pump, when used, sequencing of drug administration. 1329 134.59mg Onivyde to infuse over 90 minutes  1510 768mg Leucovorin to infuse over 30 minutes  Fluorouraceil 4,608mg CADD [facility] started at 1548 to infuse over 46 hours    Made aware of the Potassium script that was sent to her pharmacy. Ms. Joaquin Rodrigues tolerated treatment well and was discharged from [place] [address] in stable condition at [phone].  She is to return on December 21 between 2-3pm for pump removal.    Daphene Holstein, RN  December 19, 2022

## 2022-12-19 NOTE — TELEPHONE ENCOUNTER
Approved per  VOR[facility] from ELIZAB[facility] Flores. Requested Prescriptions     Pending Prescriptions Disp Refills    potassium chloride (KLOR-CON M10) 10 mEq tablet 60 Tablet 1     Sig: Take 2 Tablets by mouth two (2) times a day.

## 2022-12-20 LAB
ATRIAL RATE: 90 BPM
CALCULATED P AXIS, ECG09: 8 DEGREES
CALCULATED R AXIS, ECG10: 9 DEGREES
CALCULATED T AXIS, ECG11: 29 DEGREES
DIAGNOSIS, 93000: NORMAL
P-R INTERVAL, ECG05: 148 MS
Q-T INTERVAL, ECG07: 382 MS
QRS DURATION, ECG06: 106 MS
QTC CALCULATION (BEZET), ECG08: 467 MS
VENTRICULAR RATE, ECG03: 90 BPM

## 2022-12-21 ENCOUNTER — HOSPITAL ENCOUNTER (OUTPATIENT)
Dept: INFUSION THERAPY | Age: 67
Discharge: HOME | End: 2022-12-21
Payer: MEDICARE

## 2022-12-21 VITALS
HEART RATE: 84 BPM | DIASTOLIC BLOOD PRESSURE: 76 MMHG | SYSTOLIC BLOOD PRESSURE: 160 MMHG | RESPIRATION RATE: 20 BRPM | TEMPERATURE: 98.1 F | OXYGEN SATURATION: 97 % | WEIGHT: 179 LBS | BODY MASS INDEX: 31.71 KG/M2

## 2022-12-21 DIAGNOSIS — C25.0 PANCREATIC MALIGNANCY SYNDROME (HCC): Primary | ICD-10-CM

## 2022-12-21 DIAGNOSIS — C25.9 ADENOCARCINOMA OF PANCREAS (HCC): ICD-10-CM

## 2022-12-21 PROCEDURE — 74011250636 HC RX REV CODE- 250/636: Performed by: INTERNAL MEDICINE

## 2022-12-21 PROCEDURE — 96523 IRRIG DRUG DELIVERY DEVICE: CPT

## 2022-12-21 PROCEDURE — 74011000250 HC RX REV CODE- 250: Performed by: INTERNAL MEDICINE

## 2022-12-21 RX ORDER — HEPARIN 100 UNIT/ML
500 SYRINGE INTRAVENOUS AS NEEDED
Status: DISCONTINUED | OUTPATIENT
Start: 2022-12-21 | End: 2022-12-22 | Stop reason: HOSPADM

## 2022-12-21 RX ORDER — SODIUM CHLORIDE 0.9 % (FLUSH) 0.9 %
5-40 SYRINGE (ML) INJECTION AS NEEDED
Status: DISCONTINUED | OUTPATIENT
Start: 2022-12-21 | End: 2022-12-22 | Stop reason: HOSPADM

## 2022-12-21 RX ADMIN — HEPARIN 500 UNITS: 100 SYRINGE at 13:56

## 2022-12-21 RX ADMIN — SODIUM CHLORIDE, PRESERVATIVE FREE 10 ML: 5 INJECTION INTRAVENOUS at 13:56

## 2022-12-21 NOTE — PROGRESS NOTES
Is This A New Presentation, Or A Follow-Up?: Skin Lesion Patient in for pump disconnection. CIV 5FU infusion has completed. Pump removed. Port flushed and de-accessed and site intact. She reports increased neuropathy for the first evening after chemo and then the next day it was back to her usual. Pain is well controlled today. Has had some nausea but managed with medication. Discharged in stable condition at 1400. She returns   January 4 at 8:30 for her next chemotherapy. How Severe Is Your Skin Lesion?: moderate

## 2022-12-27 ENCOUNTER — HOSPITAL ENCOUNTER (OUTPATIENT)
Dept: INFUSION THERAPY | Age: 67
Discharge: HOME | End: 2022-12-27
Payer: MEDICARE

## 2022-12-27 VITALS
WEIGHT: 167.4 LBS | OXYGEN SATURATION: 99 % | TEMPERATURE: 98.1 F | BODY MASS INDEX: 29.65 KG/M2 | HEART RATE: 91 BPM | DIASTOLIC BLOOD PRESSURE: 82 MMHG | RESPIRATION RATE: 18 BRPM | SYSTOLIC BLOOD PRESSURE: 180 MMHG

## 2022-12-27 DIAGNOSIS — E83.42 HYPOMAGNESEMIA: Primary | ICD-10-CM

## 2022-12-27 LAB
ALBUMIN SERPL-MCNC: 3 G/DL (ref 3.5–5)
ALBUMIN/GLOB SERPL: 1 {RATIO} (ref 1.1–2.2)
ALP SERPL-CCNC: 162 U/L (ref 45–117)
ALT SERPL-CCNC: 20 U/L (ref 12–78)
ANION GAP SERPL CALC-SCNC: 9 MMOL/L (ref 5–15)
AST SERPL-CCNC: 20 U/L (ref 15–37)
BASOPHILS # BLD: 0 K/UL (ref 0–0.1)
BASOPHILS NFR BLD: 1 % (ref 0–1)
BILIRUB SERPL-MCNC: 0.5 MG/DL (ref 0.2–1)
BUN SERPL-MCNC: 16 MG/DL (ref 6–20)
BUN/CREAT SERPL: 17 (ref 12–20)
CALCIUM SERPL-MCNC: 8.8 MG/DL (ref 8.5–10.1)
CHLORIDE SERPL-SCNC: 102 MMOL/L (ref 97–108)
CO2 SERPL-SCNC: 25 MMOL/L (ref 21–32)
CREAT SERPL-MCNC: 0.93 MG/DL (ref 0.55–1.02)
DIFFERENTIAL METHOD BLD: ABNORMAL
EOSINOPHIL # BLD: 0.5 K/UL (ref 0–0.4)
EOSINOPHIL NFR BLD: 10 % (ref 0–7)
ERYTHROCYTE [DISTWIDTH] IN BLOOD BY AUTOMATED COUNT: 14.8 % (ref 11.5–14.5)
GLOBULIN SER CALC-MCNC: 3 G/DL (ref 2–4)
GLUCOSE SERPL-MCNC: 282 MG/DL (ref 65–100)
HCT VFR BLD AUTO: 37.5 % (ref 35–47)
HGB BLD-MCNC: 12.5 G/DL (ref 11.5–16)
IMM GRANULOCYTES # BLD AUTO: 0 K/UL (ref 0–0.04)
IMM GRANULOCYTES NFR BLD AUTO: 1 % (ref 0–0.5)
LYMPHOCYTES # BLD: 1.3 K/UL (ref 0.8–3.5)
LYMPHOCYTES NFR BLD: 28 % (ref 12–49)
MAGNESIUM SERPL-MCNC: 1.5 MG/DL (ref 1.6–2.4)
MCH RBC QN AUTO: 30 PG (ref 26–34)
MCHC RBC AUTO-ENTMCNC: 33.3 G/DL (ref 30–36.5)
MCV RBC AUTO: 90.1 FL (ref 80–99)
MONOCYTES # BLD: 0.3 K/UL (ref 0–1)
MONOCYTES NFR BLD: 8 % (ref 5–13)
NEUTS SEG # BLD: 2.4 K/UL (ref 1.8–8)
NEUTS SEG NFR BLD: 52 % (ref 32–75)
NRBC # BLD: 0.08 K/UL (ref 0–0.01)
NRBC BLD-RTO: 1.8 PER 100 WBC
PLATELET # BLD AUTO: 311 K/UL (ref 150–400)
PMV BLD AUTO: 10.3 FL (ref 8.9–12.9)
POTASSIUM SERPL-SCNC: 3.8 MMOL/L (ref 3.5–5.1)
PROT SERPL-MCNC: 6 G/DL (ref 6.4–8.2)
RBC # BLD AUTO: 4.16 M/UL (ref 3.8–5.2)
SODIUM SERPL-SCNC: 136 MMOL/L (ref 136–145)
WBC # BLD AUTO: 4.5 K/UL (ref 3.6–11)

## 2022-12-27 PROCEDURE — 96360 HYDRATION IV INFUSION INIT: CPT

## 2022-12-27 PROCEDURE — 36415 COLL VENOUS BLD VENIPUNCTURE: CPT

## 2022-12-27 PROCEDURE — 74011250636 HC RX REV CODE- 250/636: Performed by: INTERNAL MEDICINE

## 2022-12-27 PROCEDURE — 77030012965 HC NDL HUBR BBMI -A

## 2022-12-27 PROCEDURE — 85025 COMPLETE CBC W/AUTO DIFF WBC: CPT

## 2022-12-27 PROCEDURE — 83735 ASSAY OF MAGNESIUM: CPT

## 2022-12-27 PROCEDURE — 80053 COMPREHEN METABOLIC PANEL: CPT

## 2022-12-27 PROCEDURE — 36591 DRAW BLOOD OFF VENOUS DEVICE: CPT

## 2022-12-27 PROCEDURE — 74011000250 HC RX REV CODE- 250: Performed by: INTERNAL MEDICINE

## 2022-12-27 RX ORDER — HEPARIN 100 UNIT/ML
500 SYRINGE INTRAVENOUS AS NEEDED
Status: DISCONTINUED | OUTPATIENT
Start: 2022-12-27 | End: 2022-12-28 | Stop reason: HOSPADM

## 2022-12-27 RX ORDER — SODIUM CHLORIDE 9 MG/ML
1000 INJECTION, SOLUTION INTRAVENOUS ONCE
Status: COMPLETED | OUTPATIENT
Start: 2022-12-27 | End: 2022-12-27

## 2022-12-27 RX ORDER — LANOLIN ALCOHOL/MO/W.PET/CERES
400 CREAM (GRAM) TOPICAL DAILY
Qty: 100 TABLET | Refills: 4
Start: 2022-12-27

## 2022-12-27 RX ORDER — SODIUM CHLORIDE 0.9 % (FLUSH) 0.9 %
5-10 SYRINGE (ML) INJECTION AS NEEDED
Status: DISCONTINUED | OUTPATIENT
Start: 2022-12-27 | End: 2022-12-28 | Stop reason: HOSPADM

## 2022-12-27 RX ADMIN — HEPARIN 500 UNITS: 100 SYRINGE at 11:18

## 2022-12-27 RX ADMIN — SODIUM CHLORIDE, PRESERVATIVE FREE 10 ML: 5 INJECTION INTRAVENOUS at 10:05

## 2022-12-27 RX ADMIN — SODIUM CHLORIDE 1000 ML/HR: 9 INJECTION, SOLUTION INTRAVENOUS at 10:10

## 2022-12-27 NOTE — PROGRESS NOTES
[facility] Progress Note    Date: 2022    Name: Namita Olsen    MRN: [id number]         : 1955      Ms. Ba was assessed and education was provided. Patient with complaints of watery diarrhea since 2022. Unable to eat, stating food \"goes right through me\" causing diarrhea. 14 watery yellow stools yesterday, 4 overnight and 3 this morning. Orders received from oncologist for hydration/labs today. Using sterile technique accessed port a cath without difficulty, labs drawn and in process. Ms. Ba's vitals were reviewed and patient was observed for 5 minutes prior to treatment. Visit Vitals  BP (!) 180/82 (BP 1 Location: Left upper arm, BP Patient Position: Sitting)   Pulse 91   Temp 98.1 °F (36.7 °C)   Resp 18   Wt 75.9 kg (167 lb 6.4 oz)   SpO2 99%   BMI 29.65 kg/m²       Lab results were obtained and reviewed. Recent Results (from the past 12 hour(s))   CBC WITH AUTOMATED DIFF    Collection Time: 22 10:05 AM   Result Value Ref Range    WBC 4.5 3.6 - 11.0 K/uL    RBC 4.16 3.80 - 5.20 M/uL    HGB 12.5 11.5 - 16.0 g/dL    HCT 37.5 35.0 - 47.0 %    MCV 90.1 80.0 - 99.0 FL    MCH 30.0 26.0 - 34.0 PG    MCHC 33.3 30.0 - 36.5 g/dL    RDW 14.8 (H) 11.5 - 14.5 %    PLATELET 176 144 - 678 K/uL    MPV 10.3 8.9 - 12.9 FL    NRBC 1.8 (H) 0  WBC    ABSOLUTE NRBC 0.08 (H) 0.00 - 0.01 K/uL    NEUTROPHILS 52 32 - 75 %    LYMPHOCYTES 28 12 - 49 %    MONOCYTES 8 5 - 13 %    EOSINOPHILS 10 (H) 0 - 7 %    BASOPHILS 1 0 - 1 %    IMMATURE GRANULOCYTES 1 (H) 0.0 - 0.5 %    ABS. NEUTROPHILS 2.4 1.8 - 8.0 K/UL    ABS. LYMPHOCYTES 1.3 0.8 - 3.5 K/UL    ABS. MONOCYTES 0.3 0.0 - 1.0 K/UL    ABS. EOSINOPHILS 0.5 (H) 0.0 - 0.4 K/UL    ABS. BASOPHILS 0.0 0.0 - 0.1 K/UL    ABS. IMM.  GRANS. 0.0 0.00 - 0.04 K/UL    DF AUTOMATED     METABOLIC PANEL, COMPREHENSIVE    Collection Time: 22 10:05 AM   Result Value Ref Range    Sodium 136 136 - 145 mmol/L    Potassium 3.8 3.5 - 5.1 mmol/L Chloride 102 97 - 108 mmol/L    CO2 25 21 - 32 mmol/L    Anion gap 9 5 - 15 mmol/L    Glucose 282 (H) 65 - 100 mg/dL    BUN 16 6 - 20 MG/DL    Creatinine 0.93 0.55 - 1.02 MG/DL    BUN/Creatinine ratio 17 12 - 20      eGFR >60 >60 ml/min/1.73m2    Calcium 8.8 8.5 - 10.1 MG/DL    Bilirubin, total 0.5 0.2 - 1.0 MG/DL    ALT (SGPT) 20 12 - 78 U/L    AST (SGOT) 20 15 - 37 U/L    Alk. phosphatase 162 (H) 45 - 117 U/L    Protein, total 6.0 (L) 6.4 - 8.2 g/dL    Albumin 3.0 (L) 3.5 - 5.0 g/dL    Globulin 3.0 2.0 - 4.0 g/dL    A-G Ratio 1.0 (L) 1.1 - 2.2     MAGNESIUM    Collection Time: 12/27/22 10:05 AM   Result Value Ref Range    Magnesium 1.5 (L) 1.6 - 2.4 mg/dL     NS 1000 ml IV infused over one hour via pump. Port  flushed at completion of hydration, brisk blood return, port needle removed and band aid to site. Site without redness, swelling or pain. Patient advised to add Imodium to her antidiarrheal plan and alternate with Lomotil. Patient and her daughter verbalized understanding instructions. If nausea returns, she will alternate compazine and Zofran. Dr. Waite Apo office notified of assessment findings. Ms. Daria Palumbo tolerated the infusion, and had no complaints. Patient armband removed and shredded. Ms. Daria Palumbo was discharged from [place] [address] in stable condition at 1130. She is to return on January 4, 2023 at 0830 for her next appointment.     Leana Arnold RN  December 27, 2022  11:04 AM

## 2023-01-03 NOTE — PROGRESS NOTES
Senait Hardin is a 79 y.o. female here for follow up for Pancreatic Cancer. Patient had biopsy on 11/15/22 of peritoneal mass. Patient is scheduled for C4D1 chemotherapy today in [facility]. Patient has not felt well since the last chemo, she has had nausea, vomiting and diarrhea. She has been tired, felt like she just needed to sleep. She no energy. Patient sat down and ate a bowl of soup, when she stood up she vomited. She had taken compazine before she ate the soup. Patient states she only started feeling better 2 days ago. She had been eating a lot of popsicles. Monday and Tuesday this week, she felt a little better. She was able to get up and take care of her self, wash clothes, wash dishes. She could usually move around until 1300 each day. She continues to have diarrhea at least twice daily. She has questioned herself as to if she needs to continue to take chemo, due to her feeling so bad over the past few weeks. Patient has questions today, concerning if chemo is working. Key Oncology Meds               ondansetron (ZOFRAN ODT) 4 mg disintegrating tablet Take 1 Tablet by mouth every eight (8) hours as needed for Nausea or Vomiting. prochlorperazine (Compazine) 10 mg tablet Take 1 Tablet by mouth every six (6) hours as needed for Nausea or Vomiting. Key Pain Meds               HYDROmorphone (DILAUDID) 2 mg tablet Take 1 Tablet by mouth three (3) times daily as needed for Pain for up to 30 days. Max Daily Amount: 6 mg. Indications: excessive pain    acetaminophen (TYLENOL) 500 mg tablet Take 1,000 mg by mouth two (2) times a day. Indications: pain    ibuprofen (MOTRIN) 200 mg tablet Take 600 mg by mouth two (2) times a day.  Indications: pain          Chemotherapy Flowsheet 12/21/2022   Cycle C3 D3   Date 12/21/2022   Drug / Regimen -   Dosage -   Time Up -   Time Down -   Pre Hydration -   Pre Meds -   Notes CIV 5FU completed and pump removed

## 2023-01-04 ENCOUNTER — VIRTUAL VISIT (OUTPATIENT)
Dept: ONCOLOGY | Age: 68
End: 2023-01-04
Payer: MEDICARE

## 2023-01-04 ENCOUNTER — HOSPITAL ENCOUNTER (OUTPATIENT)
Dept: INFUSION THERAPY | Age: 68
Discharge: HOME | End: 2023-01-04
Payer: MEDICARE

## 2023-01-04 VITALS
TEMPERATURE: 98.1 F | BODY MASS INDEX: 30.26 KG/M2 | DIASTOLIC BLOOD PRESSURE: 78 MMHG | WEIGHT: 170.8 LBS | HEIGHT: 63 IN | OXYGEN SATURATION: 98 % | SYSTOLIC BLOOD PRESSURE: 146 MMHG | HEART RATE: 102 BPM | RESPIRATION RATE: 20 BRPM

## 2023-01-04 VITALS
HEART RATE: 107 BPM | BODY MASS INDEX: 30.26 KG/M2 | WEIGHT: 170.8 LBS | TEMPERATURE: 98.1 F | DIASTOLIC BLOOD PRESSURE: 77 MMHG | OXYGEN SATURATION: 98 % | HEIGHT: 63 IN | SYSTOLIC BLOOD PRESSURE: 167 MMHG | RESPIRATION RATE: 20 BRPM

## 2023-01-04 DIAGNOSIS — C78.6 PERITONEAL CARCINOMATOSIS (HCC): ICD-10-CM

## 2023-01-04 DIAGNOSIS — K59.1 FUNCTIONAL DIARRHEA: ICD-10-CM

## 2023-01-04 DIAGNOSIS — T45.1X5A CINV (CHEMOTHERAPY-INDUCED NAUSEA AND VOMITING): ICD-10-CM

## 2023-01-04 DIAGNOSIS — C25.0 PANCREATIC MALIGNANCY SYNDROME (HCC): Primary | ICD-10-CM

## 2023-01-04 DIAGNOSIS — C25.9 ADENOCARCINOMA OF PANCREAS (HCC): ICD-10-CM

## 2023-01-04 DIAGNOSIS — R11.2 CINV (CHEMOTHERAPY-INDUCED NAUSEA AND VOMITING): ICD-10-CM

## 2023-01-04 DIAGNOSIS — C25.9 PRIMARY PANCREATIC CANCER WITH METASTASIS TO OTHER SITE (HCC): Primary | ICD-10-CM

## 2023-01-04 LAB
ALBUMIN SERPL-MCNC: 2.8 G/DL (ref 3.5–5)
ALBUMIN/GLOB SERPL: 0.9 {RATIO} (ref 1.1–2.2)
ALP SERPL-CCNC: 205 U/L (ref 45–117)
ALT SERPL-CCNC: 32 U/L (ref 12–78)
ANION GAP SERPL CALC-SCNC: 8 MMOL/L (ref 5–15)
APPEARANCE UR: CLEAR
AST SERPL-CCNC: 34 U/L (ref 15–37)
BACTERIA URNS QL MICRO: NEGATIVE /HPF
BASOPHILS # BLD: 0.1 K/UL (ref 0–0.1)
BASOPHILS NFR BLD: 1 % (ref 0–1)
BILIRUB SERPL-MCNC: 0.3 MG/DL (ref 0.2–1)
BILIRUB UR QL: NEGATIVE
BUN SERPL-MCNC: 14 MG/DL (ref 6–20)
BUN/CREAT SERPL: 18 (ref 12–20)
CALCIUM SERPL-MCNC: 8.4 MG/DL (ref 8.5–10.1)
CHLORIDE SERPL-SCNC: 106 MMOL/L (ref 97–108)
CO2 SERPL-SCNC: 28 MMOL/L (ref 21–32)
COLOR UR: ABNORMAL
CREAT SERPL-MCNC: 0.77 MG/DL (ref 0.55–1.02)
DIFFERENTIAL METHOD BLD: ABNORMAL
EOSINOPHIL # BLD: 0.6 K/UL (ref 0–0.4)
EOSINOPHIL NFR BLD: 11 % (ref 0–7)
EPITH CASTS URNS QL MICRO: ABNORMAL /LPF
ERYTHROCYTE [DISTWIDTH] IN BLOOD BY AUTOMATED COUNT: 15.5 % (ref 11.5–14.5)
GLOBULIN SER CALC-MCNC: 3.1 G/DL (ref 2–4)
GLUCOSE SERPL-MCNC: 164 MG/DL (ref 65–100)
GLUCOSE UR STRIP.AUTO-MCNC: 500 MG/DL
HCT VFR BLD AUTO: 34.8 % (ref 35–47)
HGB BLD-MCNC: 11.8 G/DL (ref 11.5–16)
HGB UR QL STRIP: NEGATIVE
IMM GRANULOCYTES # BLD AUTO: 0 K/UL (ref 0–0.04)
IMM GRANULOCYTES NFR BLD AUTO: 0 % (ref 0–0.5)
KETONES UR QL STRIP.AUTO: NEGATIVE MG/DL
LEUKOCYTE ESTERASE UR QL STRIP.AUTO: NEGATIVE
LYMPHOCYTES # BLD: 1.6 K/UL (ref 0.8–3.5)
LYMPHOCYTES NFR BLD: 33 % (ref 12–49)
MAGNESIUM SERPL-MCNC: 1.2 MG/DL (ref 1.6–2.4)
MCH RBC QN AUTO: 31.6 PG (ref 26–34)
MCHC RBC AUTO-ENTMCNC: 33.9 G/DL (ref 30–36.5)
MCV RBC AUTO: 93.3 FL (ref 80–99)
MONOCYTES # BLD: 1.2 K/UL (ref 0–1)
MONOCYTES NFR BLD: 24 % (ref 5–13)
NEUTS SEG # BLD: 1.6 K/UL (ref 1.8–8)
NEUTS SEG NFR BLD: 31 % (ref 32–75)
NITRITE UR QL STRIP.AUTO: NEGATIVE
NRBC # BLD: 0 K/UL (ref 0–0.01)
NRBC BLD-RTO: 0 PER 100 WBC
PH UR STRIP: 6 [PH] (ref 5–8)
PLATELET # BLD AUTO: 457 K/UL (ref 150–400)
PLATELET COMMENTS,PCOM: ABNORMAL
PMV BLD AUTO: 10 FL (ref 8.9–12.9)
POTASSIUM SERPL-SCNC: 3.2 MMOL/L (ref 3.5–5.1)
PROT SERPL-MCNC: 5.9 G/DL (ref 6.4–8.2)
PROT UR STRIP-MCNC: NEGATIVE MG/DL
RBC # BLD AUTO: 3.73 M/UL (ref 3.8–5.2)
RBC #/AREA URNS HPF: ABNORMAL /HPF (ref 0–5)
RBC MORPH BLD: ABNORMAL
SODIUM SERPL-SCNC: 142 MMOL/L (ref 136–145)
SP GR UR REFRACTOMETRY: 1.01 (ref 1–1.03)
UA: UC IF INDICATED,UAUC: ABNORMAL
UROBILINOGEN UR QL STRIP.AUTO: 0.2 EU/DL (ref 0.2–1)
WBC # BLD AUTO: 5.1 K/UL (ref 3.6–11)
WBC URNS QL MICRO: ABNORMAL /HPF (ref 0–4)

## 2023-01-04 PROCEDURE — G0463 HOSPITAL OUTPT CLINIC VISIT: HCPCS | Performed by: INTERNAL MEDICINE

## 2023-01-04 PROCEDURE — 99215 OFFICE O/P EST HI 40 MIN: CPT | Performed by: INTERNAL MEDICINE

## 2023-01-04 PROCEDURE — 96413 CHEMO IV INFUSION 1 HR: CPT

## 2023-01-04 PROCEDURE — 96366 THER/PROPH/DIAG IV INF ADDON: CPT

## 2023-01-04 PROCEDURE — 96416 CHEMO PROLONG INFUSE W/PUMP: CPT

## 2023-01-04 PROCEDURE — 83735 ASSAY OF MAGNESIUM: CPT

## 2023-01-04 PROCEDURE — 77030012965 HC NDL HUBR BBMI -A

## 2023-01-04 PROCEDURE — 74011000250 HC RX REV CODE- 250: Performed by: INTERNAL MEDICINE

## 2023-01-04 PROCEDURE — 1123F ACP DISCUSS/DSCN MKR DOCD: CPT | Performed by: INTERNAL MEDICINE

## 2023-01-04 PROCEDURE — G8427 DOCREV CUR MEDS BY ELIG CLIN: HCPCS | Performed by: INTERNAL MEDICINE

## 2023-01-04 PROCEDURE — 74011250636 HC RX REV CODE- 250/636: Performed by: INTERNAL MEDICINE

## 2023-01-04 PROCEDURE — 86301 IMMUNOASSAY TUMOR CA 19-9: CPT

## 2023-01-04 PROCEDURE — 3017F COLORECTAL CA SCREEN DOC REV: CPT | Performed by: INTERNAL MEDICINE

## 2023-01-04 PROCEDURE — 36415 COLL VENOUS BLD VENIPUNCTURE: CPT

## 2023-01-04 PROCEDURE — 3078F DIAST BP <80 MM HG: CPT | Performed by: INTERNAL MEDICINE

## 2023-01-04 PROCEDURE — 1101F PT FALLS ASSESS-DOCD LE1/YR: CPT | Performed by: INTERNAL MEDICINE

## 2023-01-04 PROCEDURE — 96415 CHEMO IV INFUSION ADDL HR: CPT

## 2023-01-04 PROCEDURE — 1090F PRES/ABSN URINE INCON ASSESS: CPT | Performed by: INTERNAL MEDICINE

## 2023-01-04 PROCEDURE — 74011000258 HC RX REV CODE- 258: Performed by: INTERNAL MEDICINE

## 2023-01-04 PROCEDURE — 3077F SYST BP >= 140 MM HG: CPT | Performed by: INTERNAL MEDICINE

## 2023-01-04 PROCEDURE — 74011250636 HC RX REV CODE- 250/636: Performed by: NURSE PRACTITIONER

## 2023-01-04 PROCEDURE — G8399 PT W/DXA RESULTS DOCUMENT: HCPCS | Performed by: INTERNAL MEDICINE

## 2023-01-04 PROCEDURE — 85025 COMPLETE CBC W/AUTO DIFF WBC: CPT

## 2023-01-04 PROCEDURE — 96367 TX/PROPH/DG ADDL SEQ IV INF: CPT

## 2023-01-04 PROCEDURE — G8536 NO DOC ELDER MAL SCRN: HCPCS | Performed by: INTERNAL MEDICINE

## 2023-01-04 PROCEDURE — 96375 TX/PRO/DX INJ NEW DRUG ADDON: CPT

## 2023-01-04 PROCEDURE — G8510 SCR DEP NEG, NO PLAN REQD: HCPCS | Performed by: INTERNAL MEDICINE

## 2023-01-04 PROCEDURE — G8417 CALC BMI ABV UP PARAM F/U: HCPCS | Performed by: INTERNAL MEDICINE

## 2023-01-04 PROCEDURE — 80053 COMPREHEN METABOLIC PANEL: CPT

## 2023-01-04 PROCEDURE — 96417 CHEMO IV INFUS EACH ADDL SEQ: CPT

## 2023-01-04 PROCEDURE — 81001 URINALYSIS AUTO W/SCOPE: CPT

## 2023-01-04 RX ORDER — SODIUM CHLORIDE 9 MG/ML
5-40 INJECTION INTRAMUSCULAR; INTRAVENOUS; SUBCUTANEOUS AS NEEDED
Status: DISCONTINUED | OUTPATIENT
Start: 2023-01-04 | End: 2023-01-04 | Stop reason: HOSPADM

## 2023-01-04 RX ORDER — ACETAMINOPHEN 325 MG/1
650 TABLET ORAL AS NEEDED
Status: ACTIVE | OUTPATIENT
Start: 2023-01-04 | End: 2023-01-04

## 2023-01-04 RX ORDER — HEPARIN 100 UNIT/ML
500 SYRINGE INTRAVENOUS AS NEEDED
Status: DISCONTINUED | OUTPATIENT
Start: 2023-01-04 | End: 2023-01-04 | Stop reason: HOSPADM

## 2023-01-04 RX ORDER — SODIUM CHLORIDE 9 MG/ML
5-250 INJECTION, SOLUTION INTRAVENOUS AS NEEDED
Status: DISPENSED | OUTPATIENT
Start: 2023-01-04 | End: 2023-01-04

## 2023-01-04 RX ORDER — SODIUM CHLORIDE 9 MG/ML
5-250 INJECTION, SOLUTION INTRAVENOUS AS NEEDED
Status: DISCONTINUED | OUTPATIENT
Start: 2023-01-04 | End: 2023-01-04 | Stop reason: HOSPADM

## 2023-01-04 RX ORDER — SODIUM CHLORIDE 0.9 % (FLUSH) 0.9 %
5-40 SYRINGE (ML) INJECTION AS NEEDED
Status: DISCONTINUED | OUTPATIENT
Start: 2023-01-04 | End: 2023-01-04 | Stop reason: HOSPADM

## 2023-01-04 RX ORDER — ATROPINE SULFATE 0.4 MG/ML
0.4 INJECTION, SOLUTION ENDOTRACHEAL; INTRAMEDULLARY; INTRAMUSCULAR; INTRAVENOUS; SUBCUTANEOUS
Status: DISPENSED | OUTPATIENT
Start: 2023-01-04 | End: 2023-01-04

## 2023-01-04 RX ORDER — DIPHENHYDRAMINE HYDROCHLORIDE 50 MG/ML
25 INJECTION, SOLUTION INTRAMUSCULAR; INTRAVENOUS AS NEEDED
Status: ACTIVE | OUTPATIENT
Start: 2023-01-04 | End: 2023-01-04

## 2023-01-04 RX ORDER — MAGNESIUM SULFATE HEPTAHYDRATE 40 MG/ML
2 INJECTION, SOLUTION INTRAVENOUS ONCE
Status: COMPLETED | OUTPATIENT
Start: 2023-01-04 | End: 2023-01-04

## 2023-01-04 RX ORDER — ONDANSETRON 2 MG/ML
8 INJECTION INTRAMUSCULAR; INTRAVENOUS AS NEEDED
Status: ACTIVE | OUTPATIENT
Start: 2023-01-04 | End: 2023-01-04

## 2023-01-04 RX ORDER — PALONOSETRON 0.05 MG/ML
0.25 INJECTION, SOLUTION INTRAVENOUS ONCE
Status: COMPLETED | OUTPATIENT
Start: 2023-01-04 | End: 2023-01-04

## 2023-01-04 RX ORDER — OLANZAPINE 10 MG/1
10 TABLET ORAL
Qty: 30 TABLET | Refills: 1 | Status: SHIPPED | OUTPATIENT
Start: 2023-01-04

## 2023-01-04 RX ADMIN — SODIUM CHLORIDE 150 MG: 900 INJECTION, SOLUTION INTRAVENOUS at 14:21

## 2023-01-04 RX ADMIN — LEUCOVORIN CALCIUM 768 MG: 350 INJECTION, POWDER, LYOPHILIZED, FOR SUSPENSION INTRAMUSCULAR; INTRAVENOUS at 17:04

## 2023-01-04 RX ADMIN — IRINOTECAN HYDROCHLORIDE 95.89 MG: 4.3 INJECTION, POWDER, FOR SOLUTION INTRAVENOUS at 15:19

## 2023-01-04 RX ADMIN — MAGNESIUM SULFATE HEPTAHYDRATE 2 G: 40 INJECTION, SOLUTION INTRAVENOUS at 11:54

## 2023-01-04 RX ADMIN — ATROPINE SULFATE 0.4 MG: 0.4 INJECTION, SOLUTION INTRAVENOUS at 15:13

## 2023-01-04 RX ADMIN — FLUOROURACIL 4608 MG: 50 INJECTION, SOLUTION INTRAVENOUS at 17:40

## 2023-01-04 RX ADMIN — PALONOSETRON 0.25 MG: 0.05 INJECTION, SOLUTION INTRAVENOUS at 14:48

## 2023-01-04 RX ADMIN — SODIUM CHLORIDE 100 ML/HR: 9 INJECTION, SOLUTION INTRAVENOUS at 11:54

## 2023-01-04 RX ADMIN — DEXAMETHASONE SODIUM PHOSPHATE 12 MG: 4 INJECTION, SOLUTION INTRAMUSCULAR; INTRAVENOUS at 14:53

## 2023-01-04 NOTE — PROGRESS NOTES
[address] [facility] [address], [address] [facility], 45 [facility], [address] [place] [address]  [phone]          Reason for Visit:   Tessie Kumari is a 79 y.o. female who is seen by synchronous (real-time) audio-video technology for follow up of pancreatic adenocarcinoma     Treatment History:     Dx: Pancreatic Adenocarcinoma--May 2020--[id number]--[id number] of splenic vein and superior mesenteric vein    Tx: mFOLFIRINOX--first cycle 5/26/2020, second cycle 6/10/2020, dose reduced 15% cycle 3 on 6/30/2020--delayed due to severe side effects--switched to Gemcitabine/Abraxane--Cycle #1 7/29/2002, Cycle #2 9/2/2020, Cycle #3 10/7/2020, Cycle #4 11/4/2020. Neoadjuvant Radiation with Dr Praneeth Grewal ending 12/18/2021. Neoadjuvant Lawrence/Abraxane, 06/2020 - 11/2020    Distal Pancreatectomy, Splenectomy, and Civa Sheet with Dr Sandrita Jones on 3/17/2021. Adjuvant radiation with Dr Praneeth Grewal. Palliative chemotherapy    Gemcitabine/abraxane 1 Cycle  D/C'd 10/2022    Onivyde+5-FU, cycle 4 day 1    Goal: Prolong life--Palliative    History of Present Illness:     Ms. Sonia Zurita is a women with metastatic pancreatic adenocarcinoma. She had locally advanced pan Can Dx'ed in 2020. She received 4 cycles of FOLFIRINOX. She had severe toxicities from it with neutropenic fever. She then transitioned to Lawrence/Abraxane. She then underwent distal pancreatectomy in 2020 with Dr. Sandrita Jones. Disease recurrent in the peritoneum in 07/2021. It was never biopsied. She then started receiving Lawrence/abraxane. She is doing fair. Some numbness in hands and feet. Recent CT showed disease progression in the peritoneum. She is receiving Onivyde and 5-U. She has been experiencing diarrhea and nausea and vomiting after each cycle of therapy.          Past Medical History:   Diagnosis Date    Adenocarcinoma of pancreas ([facility] [address] 05/13/2020    Dr Stu Hagan biopsy via EUS    Diabetes [facility])     GERD (gastroesophageal reflux disease)     Hernia of abdominal cavity     Subxyphoid hernia    Hypertension     Inflammatory polyarthropathy (HCC)     Joint pain     Joint swelling     Menopause     Ocular nevus of left eye     Pancreatitis     Tracheal stenosis       Past Surgical History:   Procedure Laterality Date    HX CARPAL TUNNEL RELEASE Bilateral     HX COLONOSCOPY      HX HYSTERECTOMY      HX KNEE ARTHROSCOPY Right     HX KNEE REPLACEMENT Right     partial    HX LAP CHOLECYSTECTOMY      HX TONSILLECTOMY      HX VASCULAR ACCESS        Social History     Tobacco Use    Smoking status: Never    Smokeless tobacco: Never   Substance Use Topics    Alcohol use: No     Alcohol/week: 0.0 standard drinks      Family History   Problem Relation Age of Onset    Heart Disease Mother     Heart Attack Mother     Cancer Father         lung    Diabetes Sister      Current Outpatient Medications   Medication Sig    OLANZapine (ZyPREXA) 10 mg tablet Take 1 Tablet by mouth nightly. For 5 nights starting the night after chemotherapy    magnesium oxide (MAG-OX) 400 mg tablet Take 1 Tablet by mouth daily. potassium chloride (KLOR-CON M10) 10 mEq tablet Take 2 Tablets by mouth two (2) times a day. Toujeo SoloStar U-300 Insulin 300 unit/mL (1.5 mL) inpn pen INJECT 55 UNITS ONCE DAILY AT BEDTIME - DOSE INCREASED (Patient taking differently: INJECT 25 UNITS ONCE DAILY AT BEDTIME - DOSE INCREASED)    HYDROmorphone (DILAUDID) 2 mg tablet Take 1 Tablet by mouth three (3) times daily as needed for Pain for up to 30 days. Max Daily Amount: 6 mg. Indications: excessive pain    ondansetron (ZOFRAN ODT) 4 mg disintegrating tablet Take 1 Tablet by mouth every eight (8) hours as needed for Nausea or Vomiting. prochlorperazine (Compazine) 10 mg tablet Take 1 Tablet by mouth every six (6) hours as needed for Nausea or Vomiting.    lidocaine-prilocaine (EMLA) topical cream Apply  to affected area as needed for Pain.  Apply generous amount to port site 30-45 min prior to infusions    HumaLOG KwikPen Insulin 100 unit/mL kwikpen INJECT SUBCUTANEOUSLY ON SLIDING SCALE AS DIRECTED (Patient taking differently: by SubCUTAneous route two (2) times daily as needed. INJECT SUBCUTANEOUSLY ON SLIDING SCALE AS DIRECTED)    gabapentin (NEURONTIN) 300 mg capsule TAKE 2 CAPSULES BY MOUTH THREE TIMES DAILY (Patient taking differently: TAKE 2 CAPSULES  in AM and 2 capsules in PM)    LORazepam (ATIVAN) 1 mg tablet TAKE 1 TABLET BY MOUTH EVERY MORNING AND TAKE 2 TABLETS AT BEDTIME    famotidine (PEPCID) 20 mg tablet Take 20 mg by mouth two (2) times a day. multivit-minerals/folic acid (MULTIVITAMIN GUMMIES PO) Take 2 Each by mouth Every morning. nystatin (MYCOSTATIN) powder Apply 1 g to affected area three (3) times daily. DULoxetine (CYMBALTA) 30 mg capsule Take 1 Capsule by mouth every evening. acetaminophen (TYLENOL) 500 mg tablet Take 1,000 mg by mouth two (2) times daily as needed. Indications: pain    ibuprofen (MOTRIN) 200 mg tablet Take 600 mg by mouth two (2) times daily as needed. Indications: pain    DULoxetine (CYMBALTA) 60 mg capsule Take 1 Capsule by mouth daily. Indications: neuropathic pain (Patient taking differently: Take 60 mg by mouth Every morning. At 0900  Indications: neuropathic pain)    methylphenidate HCl (RITALIN) 10 mg tablet Take 1 Tablet by mouth two (2) times a day. Max Daily Amount: 20 mg.    senna-docusate (PERICOLACE) 8.6-50 mg per tablet Take 1 Tablet by mouth nightly.  (Patient not taking: Reported on 1/4/2023)     Current Facility-Administered Medications   Medication Dose Route Frequency    sodium chloride (NS) flush 5-40 mL  5-40 mL IntraVENous PRN    0.9% sodium chloride injection 5-40 mL  5-40 mL IntraVENous PRN    0.9% sodium chloride infusion  5-250 mL/hr IntraVENous PRN    heparin (porcine) pf 500 Units  500 Units InterCATHeter PRN    alteplase (CATHFLO) 2 mg in sterile water (preservative free) 2 mL injection  2 mg IntraCATHeter PRN Facility-Administered Medications Ordered in Other Visits   Medication Dose Route Frequency    fosaprepitant (EMEND) 150 mg in 0.9% sodium chloride 150 mL IVPB  150 mg IntraVENous ONCE    0.9% sodium chloride infusion  5-250 mL/hr IntraVENous PRN    palonosetron HCl (ALOXI) injection 0.25 mg  0.25 mg IntraVENous ONCE    dexamethasone (DECADRON) 12 mg in 0.9% sodium chloride 50 mL IVPB  12 mg IntraVENous ONCE    atropine 0.4 mg/mL injection 0.4 mg  0.4 mg SubCUTAneous Q2H PRN    irinotecan liposomaL (ONIVYDE) 95.89 mg in 0.9% sodium chloride 500 mL, overfill volume chemo infusion  50 mg/m2 (Order-Specific) IntraVENous ONCE    leucovorin (WELLCOVORIN) 768 mg in 0.9% sodium chloride 250 mL, overfill volume 25 mL IVPB  400 mg/m2 (Order-Specific) IntraVENous ONCE    fluorouraciL (ADRUCIL) 4,608 mg, 0.9% sodium chloride 7.84 mL 100 mL CADD Cassette  2,400 mg/m2 (Order-Specific) IntraVENous ONCE    sodium chloride 0.9 % bolus infusion 500 mL  500 mL IntraVENous PRN    diphenhydrAMINE (BENADRYL) injection 25 mg  25 mg IntraVENous PRN    famotidine (PF) (PEPCID) 20 mg in 0.9% sodium chloride 10 mL injection  20 mg IntraVENous PRN    acetaminophen (TYLENOL) tablet 650 mg  650 mg Oral PRN    meperidine (DEMEROL) injection 25 mg  25 mg IntraVENous PRN    ondansetron (ZOFRAN) injection 8 mg  8 mg IntraVENous PRN      No Known Allergies     Review of Systems: A complete review of systems was obtained, negative except as described above.     Physical Exam:     General: alert, cooperative, no distress   Mental  status: normal mood, behavior, speech, dress, motor activity, and thought processes, able to follow commands   HENT: NCAT   Neck: no visualized mass   Resp: no respiratory distress   Neuro: no gross deficits   Skin: no discoloration or lesions of concern on visible areas   Psychiatric: normal affect, consistent with stated mood, no evidence of hallucinations       Due to this being a TeleHealth evaluation (During COVID-19 public health emergency), many elements of the physical examination are unable to be assessed. Evaluation of the following organ systems was limited: Vitals/Constitutional/EENT/Resp/CV/GI//MS/Neuro/Skin/Heme-Lymph-Imm. Results:     Lab Results   Component Value Date/Time    WBC 5.1 01/04/2023 08:55 AM    HGB 11.8 01/04/2023 08:55 AM    HCT 34.8 (L) 01/04/2023 08:55 AM    PLATELET 952 (H) 11/21/3296 08:55 AM    MCV 93.3 01/04/2023 08:55 AM    ABS. NEUTROPHILS 1.6 (L) 01/04/2023 08:55 AM     Lab Results   Component Value Date/Time    Sodium 142 01/04/2023 08:55 AM    Potassium 3.2 (L) 01/04/2023 08:55 AM    Chloride 106 01/04/2023 08:55 AM    CO2 28 01/04/2023 08:55 AM    Glucose 164 (H) 01/04/2023 08:55 AM    BUN 14 01/04/2023 08:55 AM    Creatinine 0.77 01/04/2023 08:55 AM    GFR est AA >60 09/20/2022 08:27 AM    GFR est non-AA >60 09/20/2022 08:27 AM    Calcium 8.4 (L) 01/04/2023 08:55 AM    Glucose (POC) 282 (H) 07/24/2022 08:26 AM    Creatinine (POC) 0.7 02/06/2013 10:21 AM     Lab Results   Component Value Date/Time    Bilirubin, total 0.3 01/04/2023 08:55 AM    ALT (SGPT) 32 01/04/2023 08:55 AM    Alk. phosphatase 205 (H) 01/04/2023 08:55 AM    Protein, total 5.9 (L) 01/04/2023 08:55 AM    Albumin 2.8 (L) 01/04/2023 08:55 AM    Globulin 3.1 01/04/2023 08:55 AM            Assessment/PLAN:     1) Pancreatic Adenocarcinoma metastatic to the peritoneum and lungs. ECOG PS 1  Intent of Treatment - palliative  Prognosis: Guarded. Received neoadjuvant chemotherapy. Radiation with Dr Vicente Luevano. Definitive resection with Dr Janie Alvarado. BRCA negative. Now peritoneal disease/mets  On chemo since 7/2021    Recent CT - progression of disease in the lungs and peritoneum  Plan for CT guided Bx  D/C [place]/Abraxane    Onivyde/5-FU, cycle 4 day 1    CINV Gr 2  Anorexia  Diarrhea Gr 2  Weight loss +     A detailed system by system evaluation of side effect was performed to assess adverse events.    Blood counts are acceptable. Results reviewed with the patient    The disease has a high risk of progression and the treatment also carries a substantial risk of side effects. All of this was assessed during this visit. 2. CINV    Escalate anti-emetics      3. Chemotherapy induced diarrhea    Educated about the appropriate use of lomotil/Immodium      4. Epigastric discomfort    Probably from retching  PPI      Repeat CT chest/abd/pelvis      Signed By: Addison Campbell MD          The patient was evaluated through a synchronous (real-time) audio-video encounter. The patient (or guardian if applicable) is aware that this is a billable service, which includes applicable co-pays. This Virtual Visit was conducted with patient's (and/or legal guardian's) consent. The visit was conducted pursuant to the emergency declaration under the [address] [place] [address], [address] [place] [address] waiver authority and the [employer] Act. Patient identification was verified, and a caregiver was present when appropriate. The patient was located in a state where the provider was licensed to provide care.

## 2023-01-04 NOTE — PROGRESS NOTES
[facility] Progress Note    Date: 2023    Name: Arnulfo Corona    MRN: [id number]         : 1955    Ms. Ba Arrived ambulatory and in no distress for cycle 4 day 1 of Onivyde/Leucovrin/5FU regimen. Assessment was completed, no acute issues at this time, no new complaints voiced. Port accessed without difficulty, labs drawn and in process. New orders given for Magnesium 2 grams. Emend added to plan today and Onivyde dose reduced. Chemotherapy Flowsheet 2023   Cycle C4 D1   Date 2023   Drug / Regimen Onivyde/Leucovorin/5FU   Dosage 96 mg/768 mg/4,608 mg   Time Up see MAR   Time Down -   Pre Hydration -   Pre Meds -   Notes CIV 5FU initiated         09:00:  Proceeded to appt with Dr. Kat Maria    Ms. Ba's vitals were reviewed. Visit Vitals  BP (!) 167/77 (BP 1 Location: Left upper arm, BP Patient Position: Sitting)   Pulse (!) 107   Temp 98.1 °F (36.7 °C)   Resp 20   Ht 5' 3\" (1.6 m)   Wt 77.5 kg (170 lb 12.8 oz)   SpO2 98%   Breastfeeding No   BMI 30.26 kg/m²       Lab results were obtained and reviewed. Recent Results (from the past 12 hour(s))   CBC WITH AUTOMATED DIFF    Collection Time: 23  8:55 AM   Result Value Ref Range    WBC 5.1 3.6 - 11.0 K/uL    RBC 3.73 (L) 3.80 - 5.20 M/uL    HGB 11.8 11.5 - 16.0 g/dL    HCT 34.8 (L) 35.0 - 47.0 %    MCV 93.3 80.0 - 99.0 FL    MCH 31.6 26.0 - 34.0 PG    MCHC 33.9 30.0 - 36.5 g/dL    RDW 15.5 (H) 11.5 - 14.5 %    PLATELET 192 (H) 196 - 400 K/uL    MPV 10.0 8.9 - 12.9 FL    NRBC 0.0 0.0  WBC    ABSOLUTE NRBC 0.00 0.00 - 0.01 K/uL    NEUTROPHILS 31 (L) 32 - 75 %    LYMPHOCYTES 33 12 - 49 %    MONOCYTES 24 (H) 5 - 13 %    EOSINOPHILS 11 (H) 0 - 7 %    BASOPHILS 1 0 - 1 %    IMMATURE GRANULOCYTES 0 0 - 0.5 %    ABS. NEUTROPHILS 1.6 (L) 1.8 - 8.0 K/UL    ABS. LYMPHOCYTES 1.6 0.8 - 3.5 K/UL    ABS. MONOCYTES 1.2 (H) 0.0 - 1.0 K/UL    ABS. EOSINOPHILS 0.6 (H) 0.0 - 0.4 K/UL    ABS. BASOPHILS 0.1 0.0 - 0.1 K/UL    ABS. IMM.  GRANS. 0.0 0.00 - 0.04 K/UL    DF SMEAR SCANNED      PLATELET COMMENTS Increased Platelets      RBC COMMENTS ANISOCYTOSIS  1+       METABOLIC PANEL, COMPREHENSIVE    Collection Time: 01/04/23  8:55 AM   Result Value Ref Range    Sodium 142 136 - 145 mmol/L    Potassium 3.2 (L) 3.5 - 5.1 mmol/L    Chloride 106 97 - 108 mmol/L    CO2 28 21 - 32 mmol/L    Anion gap 8 5 - 15 mmol/L    Glucose 164 (H) 65 - 100 mg/dL    BUN 14 6 - 20 MG/DL    Creatinine 0.77 0.55 - 1.02 MG/DL    BUN/Creatinine ratio 18 12 - 20      eGFR >60 >60 ml/min/1.73m2    Calcium 8.4 (L) 8.5 - 10.1 MG/DL    Bilirubin, total 0.3 0.2 - 1.0 MG/DL    ALT (SGPT) 32 12 - 78 U/L    AST (SGOT) 34 15 - 37 U/L    Alk. phosphatase 205 (H) 45 - 117 U/L    Protein, total 5.9 (L) 6.4 - 8.2 g/dL    Albumin 2.8 (L) 3.5 - 5.0 g/dL    Globulin 3.1 2.0 - 4.0 g/dL    A-G Ratio 0.9 (L) 1.1 - 2.2     MAGNESIUM    Collection Time: 01/04/23  8:55 AM   Result Value Ref Range    Magnesium 1.2 (L) 1.6 - 2.4 mg/dL       Pre-medications  were administered as ordered and chemotherapy was initiated. Two nurses verified prior to administering: drug name, drug dose, infusion volume or drug volume  when prepared in a syringe, rate of administration, route of administration , expiration dates and/or times, appearance and physical integrity of the drugs, rate set on infusion pump, when used, sequencing of drug administration. 11:54 - 17:39 NS mainline  11:54 - 14:19  Magnesium 2 grams IVPB  14:21 - 14:46 Emend 150 mg IVPB  14:48  Aloxi 0.25 mg IVP  14:53 - 15:13 Decadron 12 mg IVPB  15:13  Atropine 0.4 mg subcutaneous in the left arm  15:19 - 16:56 Onivyde 95.89 mg infusion  17:04 - 17:36  Leucovorin 768 mg infusion   17:40  Fluorouracil 4,608 mg CIV pump to infuse over 46 hours. Ms. Norma Layne tolerated treatment well and was discharged from [place] [address] in stable condition at 17:45. She is to return on January 6 at 16:00 for her next appointment.  She would like to go back to Mondays which would be January 23rd at 8:30.     Jonna Schilder, RN  January 4, 2023

## 2023-01-04 NOTE — DISCHARGE INSTRUCTIONS

## 2023-01-05 LAB — CANCER AG19-9 SERPL-ACNC: 1540 U/ML (ref 0–35)

## 2023-01-06 ENCOUNTER — HOSPITAL ENCOUNTER (OUTPATIENT)
Dept: INFUSION THERAPY | Age: 68
Discharge: HOME | End: 2023-01-06
Payer: MEDICARE

## 2023-01-06 DIAGNOSIS — C25.9 ADENOCARCINOMA OF PANCREAS (HCC): ICD-10-CM

## 2023-01-06 DIAGNOSIS — C25.0 PANCREATIC MALIGNANCY SYNDROME (HCC): Primary | ICD-10-CM

## 2023-01-06 PROCEDURE — 74011000250 HC RX REV CODE- 250: Performed by: INTERNAL MEDICINE

## 2023-01-06 PROCEDURE — 96523 IRRIG DRUG DELIVERY DEVICE: CPT

## 2023-01-06 PROCEDURE — 74011250636 HC RX REV CODE- 250/636: Performed by: INTERNAL MEDICINE

## 2023-01-06 RX ORDER — SODIUM CHLORIDE 0.9 % (FLUSH) 0.9 %
5-40 SYRINGE (ML) INJECTION AS NEEDED
Status: DISCONTINUED | OUTPATIENT
Start: 2023-01-06 | End: 2023-01-07 | Stop reason: HOSPADM

## 2023-01-06 RX ORDER — SODIUM CHLORIDE 9 MG/ML
5-250 INJECTION, SOLUTION INTRAVENOUS AS NEEDED
Status: DISCONTINUED | OUTPATIENT
Start: 2023-01-06 | End: 2023-01-07 | Stop reason: HOSPADM

## 2023-01-06 RX ORDER — HEPARIN 100 UNIT/ML
500 SYRINGE INTRAVENOUS AS NEEDED
Status: DISCONTINUED | OUTPATIENT
Start: 2023-01-06 | End: 2023-01-07 | Stop reason: HOSPADM

## 2023-01-06 RX ADMIN — SODIUM CHLORIDE, PRESERVATIVE FREE 10 ML: 5 INJECTION INTRAVENOUS at 16:13

## 2023-01-06 RX ADMIN — HEPARIN 500 UNITS: 100 SYRINGE at 16:13

## 2023-01-06 NOTE — PROGRESS NOTES
[facility] Short Visit Note:    5608:  Pt arrived ambulatory and in no distress, for DC of 5FU CIV. Infusion verified complete. Pt reports no issues, no issues from Zyprexa, denies nausea. Port flushed with NS and Heparin, brisk blood return. Needle removed from site without redness, swelling or pain and band-aid applied. D/Cd from [facility] ambulatory and in no distress.

## 2023-01-09 ENCOUNTER — TELEPHONE (OUTPATIENT)
Dept: ONCOLOGY | Age: 68
End: 2023-01-09

## 2023-01-09 DIAGNOSIS — B37.0 THRUSH: ICD-10-CM

## 2023-01-09 DIAGNOSIS — B37.2 YEAST INFECTION OF THE SKIN: ICD-10-CM

## 2023-01-09 DIAGNOSIS — C25.9 PRIMARY PANCREATIC CANCER WITH METASTASIS TO OTHER SITE (HCC): Primary | ICD-10-CM

## 2023-01-09 DIAGNOSIS — K12.30 MUCOSITIS: ICD-10-CM

## 2023-01-09 RX ORDER — NYSTATIN 100000 [USP'U]/ML
5 SUSPENSION ORAL 4 TIMES DAILY
Qty: 473 ML | Refills: 0 | Status: SHIPPED | OUTPATIENT
Start: 2023-01-09

## 2023-01-09 NOTE — TELEPHONE ENCOUNTER
Returned patients call, HIPAA verified. Patient c/o red mouth, soreness/pain in Corners of Lips/mouth. This area is red and cracked as well. The outer edge of her tongue is red,  swollen and tender. She has white patches in the \"oral Mucosa\" and pain in her throat. Please advise. Patient is using [facility] pharmacy.

## 2023-01-09 NOTE — TELEPHONE ENCOUNTER
1/9/23: Order placed for magic mouthwash and nystatin oral rinse - sent to patient's outpatient pharmacy. 1/10/23: Order placed for nystatin powder to outpatient pharmacy on file.

## 2023-01-09 NOTE — TELEPHONE ENCOUNTER
1/9/23 at 5:34 pm - Called the patient and verified ID x 2. Informed the patient that Nicolle Cavanaugh NP sent in 2 prescriptions to her listed pharmacy and that she should swish and spit or swallow for throat pain 10 minutes before eating or as needed and then nothing by mouth for 5 minutes after to allow medication to sit as it is topical.  The patient stated that her daughter went to  the medications however they were not ready so she will pick them up in the morning. The patient also stated that when she first gets chemotherapy she takes decadron and it \"shoots my blood sugar up\" and she gets a rash under folds in her skin and that nystatin powder has helped before and requested a refill. Informed the patient that Isabella Soliz RN and Nicolle Cavanaugh NP will be made aware of all of the above information and this office will contact her with an update. The patient verbalized understanding and denied any questions or concerns.

## 2023-01-10 RX ORDER — NYSTATIN 100000 [USP'U]/G
1 POWDER TOPICAL 3 TIMES DAILY
Qty: 120 G | Refills: 1 | Status: SHIPPED | OUTPATIENT
Start: 2023-01-10

## 2023-01-11 ENCOUNTER — HOSPITAL ENCOUNTER (OUTPATIENT)
Dept: CT IMAGING | Age: 68
Discharge: HOME | End: 2023-01-11
Attending: INTERNAL MEDICINE
Payer: MEDICARE

## 2023-01-11 DIAGNOSIS — C25.9 PRIMARY PANCREATIC CANCER WITH METASTASIS TO OTHER SITE (HCC): ICD-10-CM

## 2023-01-11 PROCEDURE — 74011000636 HC RX REV CODE- 636: Performed by: INTERNAL MEDICINE

## 2023-01-11 PROCEDURE — 74177 CT ABD & PELVIS W/CONTRAST: CPT

## 2023-01-11 PROCEDURE — 74011000250 HC RX REV CODE- 250: Performed by: INTERNAL MEDICINE

## 2023-01-11 RX ORDER — BARIUM SULFATE 20 MG/ML
450 SUSPENSION ORAL
Status: COMPLETED | OUTPATIENT
Start: 2023-01-11 | End: 2023-01-11

## 2023-01-11 RX ADMIN — IOPAMIDOL 100 ML: 612 INJECTION, SOLUTION INTRAVENOUS at 10:37

## 2023-01-11 RX ADMIN — BARIUM SULFATE 450 ML: 20 SUSPENSION ORAL at 09:15

## 2023-01-18 ENCOUNTER — APPOINTMENT (OUTPATIENT)
Dept: INFUSION THERAPY | Age: 68
End: 2023-01-18

## 2023-01-18 DIAGNOSIS — T45.1X5A CHEMOTHERAPY-INDUCED FATIGUE: ICD-10-CM

## 2023-01-18 DIAGNOSIS — R53.83 CHEMOTHERAPY-INDUCED FATIGUE: ICD-10-CM

## 2023-01-18 DIAGNOSIS — F32.A DEPRESSION, UNSPECIFIED DEPRESSION TYPE: ICD-10-CM

## 2023-01-19 RX ORDER — METHYLPHENIDATE HYDROCHLORIDE 10 MG/1
10 TABLET ORAL 2 TIMES DAILY
Qty: 60 TABLET | Refills: 0 | Status: SHIPPED | OUTPATIENT
Start: 2023-01-19

## 2023-01-20 ENCOUNTER — APPOINTMENT (OUTPATIENT)
Dept: INFUSION THERAPY | Age: 68
End: 2023-01-20
Payer: MEDICARE

## 2023-01-23 ENCOUNTER — VIRTUAL VISIT (OUTPATIENT)
Dept: ONCOLOGY | Age: 68
End: 2023-01-23
Payer: MEDICARE

## 2023-01-23 ENCOUNTER — HOSPITAL ENCOUNTER (OUTPATIENT)
Dept: INFUSION THERAPY | Age: 68
Discharge: HOME | End: 2023-01-23
Payer: MEDICARE

## 2023-01-23 VITALS
HEIGHT: 63 IN | HEART RATE: 101 BPM | SYSTOLIC BLOOD PRESSURE: 139 MMHG | OXYGEN SATURATION: 96 % | WEIGHT: 166.6 LBS | RESPIRATION RATE: 18 BRPM | BODY MASS INDEX: 29.52 KG/M2 | TEMPERATURE: 98.3 F | DIASTOLIC BLOOD PRESSURE: 88 MMHG

## 2023-01-23 VITALS
HEART RATE: 101 BPM | RESPIRATION RATE: 18 BRPM | WEIGHT: 166.6 LBS | TEMPERATURE: 98.3 F | DIASTOLIC BLOOD PRESSURE: 88 MMHG | OXYGEN SATURATION: 96 % | BODY MASS INDEX: 29.52 KG/M2 | HEIGHT: 63 IN | SYSTOLIC BLOOD PRESSURE: 139 MMHG

## 2023-01-23 DIAGNOSIS — C25.9 PRIMARY PANCREATIC CANCER WITH METASTASIS TO OTHER SITE (HCC): Primary | ICD-10-CM

## 2023-01-23 DIAGNOSIS — C25.9 MALIGNANT NEOPLASM OF PANCREAS, UNSPECIFIED LOCATION OF MALIGNANCY (HCC): Primary | ICD-10-CM

## 2023-01-23 DIAGNOSIS — C78.6 PERITONEAL CARCINOMATOSIS (HCC): ICD-10-CM

## 2023-01-23 LAB
ALBUMIN SERPL-MCNC: 2.8 G/DL (ref 3.5–5)
ALBUMIN/GLOB SERPL: 0.8 (ref 1.1–2.2)
ALP SERPL-CCNC: 220 U/L (ref 45–117)
ALT SERPL-CCNC: 36 U/L (ref 12–78)
ANION GAP SERPL CALC-SCNC: 5 MMOL/L (ref 5–15)
AST SERPL-CCNC: 31 U/L (ref 15–37)
BASOPHILS # BLD: 0.1 K/UL (ref 0–0.1)
BASOPHILS NFR BLD: 2 % (ref 0–1)
BILIRUB SERPL-MCNC: 0.3 MG/DL (ref 0.2–1)
BUN SERPL-MCNC: 13 MG/DL (ref 6–20)
BUN/CREAT SERPL: 16 (ref 12–20)
CALCIUM SERPL-MCNC: 8.5 MG/DL (ref 8.5–10.1)
CHLORIDE SERPL-SCNC: 102 MMOL/L (ref 97–108)
CO2 SERPL-SCNC: 30 MMOL/L (ref 21–32)
CREAT SERPL-MCNC: 0.79 MG/DL (ref 0.55–1.02)
DIFFERENTIAL METHOD BLD: ABNORMAL
EOSINOPHIL # BLD: 0.5 K/UL (ref 0–0.4)
EOSINOPHIL NFR BLD: 7 % (ref 0–7)
ERYTHROCYTE [DISTWIDTH] IN BLOOD BY AUTOMATED COUNT: 16.8 % (ref 11.5–14.5)
GLOBULIN SER CALC-MCNC: 3.6 G/DL (ref 2–4)
GLUCOSE SERPL-MCNC: 231 MG/DL (ref 65–100)
HCT VFR BLD AUTO: 37.2 % (ref 35–47)
HGB BLD-MCNC: 12.3 G/DL (ref 11.5–16)
IMM GRANULOCYTES # BLD AUTO: 0.1 K/UL (ref 0–0.04)
IMM GRANULOCYTES NFR BLD AUTO: 2 % (ref 0–0.5)
LYMPHOCYTES # BLD: 2.1 K/UL (ref 0.8–3.5)
LYMPHOCYTES NFR BLD: 30 % (ref 12–49)
MAGNESIUM SERPL-MCNC: 1.8 MG/DL (ref 1.6–2.4)
MCH RBC QN AUTO: 31.2 PG (ref 26–34)
MCHC RBC AUTO-ENTMCNC: 33.1 G/DL (ref 30–36.5)
MCV RBC AUTO: 94.4 FL (ref 80–99)
MONOCYTES # BLD: 2 K/UL (ref 0–1)
MONOCYTES NFR BLD: 30 % (ref 5–13)
NEUTS SEG # BLD: 1.9 K/UL (ref 1.8–8)
NEUTS SEG NFR BLD: 29 % (ref 32–75)
NRBC # BLD: 0 K/UL (ref 0–0.01)
NRBC BLD-RTO: 0 PER 100 WBC
PLATELET # BLD AUTO: 670 K/UL (ref 150–400)
PLATELET COMMENTS,PCOM: ABNORMAL
PMV BLD AUTO: 10.4 FL (ref 8.9–12.9)
POTASSIUM SERPL-SCNC: 3.7 MMOL/L (ref 3.5–5.1)
PROT SERPL-MCNC: 6.4 G/DL (ref 6.4–8.2)
RBC # BLD AUTO: 3.94 M/UL (ref 3.8–5.2)
RBC MORPH BLD: ABNORMAL
SODIUM SERPL-SCNC: 137 MMOL/L (ref 136–145)
WBC # BLD AUTO: 6.7 K/UL (ref 3.6–11)

## 2023-01-23 PROCEDURE — G0463 HOSPITAL OUTPT CLINIC VISIT: HCPCS | Performed by: INTERNAL MEDICINE

## 2023-01-23 PROCEDURE — 3017F COLORECTAL CA SCREEN DOC REV: CPT | Performed by: INTERNAL MEDICINE

## 2023-01-23 PROCEDURE — 77030012965 HC NDL HUBR BBMI -A

## 2023-01-23 PROCEDURE — 3079F DIAST BP 80-89 MM HG: CPT | Performed by: INTERNAL MEDICINE

## 2023-01-23 PROCEDURE — 3075F SYST BP GE 130 - 139MM HG: CPT | Performed by: INTERNAL MEDICINE

## 2023-01-23 PROCEDURE — 99215 OFFICE O/P EST HI 40 MIN: CPT | Performed by: INTERNAL MEDICINE

## 2023-01-23 PROCEDURE — 80053 COMPREHEN METABOLIC PANEL: CPT

## 2023-01-23 PROCEDURE — G8427 DOCREV CUR MEDS BY ELIG CLIN: HCPCS | Performed by: INTERNAL MEDICINE

## 2023-01-23 PROCEDURE — G8510 SCR DEP NEG, NO PLAN REQD: HCPCS | Performed by: INTERNAL MEDICINE

## 2023-01-23 PROCEDURE — 74011250636 HC RX REV CODE- 250/636: Performed by: INTERNAL MEDICINE

## 2023-01-23 PROCEDURE — 1090F PRES/ABSN URINE INCON ASSESS: CPT | Performed by: INTERNAL MEDICINE

## 2023-01-23 PROCEDURE — 85025 COMPLETE CBC W/AUTO DIFF WBC: CPT

## 2023-01-23 PROCEDURE — G8417 CALC BMI ABV UP PARAM F/U: HCPCS | Performed by: INTERNAL MEDICINE

## 2023-01-23 PROCEDURE — 1101F PT FALLS ASSESS-DOCD LE1/YR: CPT | Performed by: INTERNAL MEDICINE

## 2023-01-23 PROCEDURE — 36591 DRAW BLOOD OFF VENOUS DEVICE: CPT

## 2023-01-23 PROCEDURE — G8399 PT W/DXA RESULTS DOCUMENT: HCPCS | Performed by: INTERNAL MEDICINE

## 2023-01-23 PROCEDURE — 74011000250 HC RX REV CODE- 250: Performed by: INTERNAL MEDICINE

## 2023-01-23 PROCEDURE — 1123F ACP DISCUSS/DSCN MKR DOCD: CPT | Performed by: INTERNAL MEDICINE

## 2023-01-23 PROCEDURE — 36415 COLL VENOUS BLD VENIPUNCTURE: CPT

## 2023-01-23 PROCEDURE — G8536 NO DOC ELDER MAL SCRN: HCPCS | Performed by: INTERNAL MEDICINE

## 2023-01-23 PROCEDURE — 83735 ASSAY OF MAGNESIUM: CPT

## 2023-01-23 RX ORDER — DIPHENOXYLATE HYDROCHLORIDE AND ATROPINE SULFATE 2.5; .025 MG/1; MG/1
1 TABLET ORAL AS NEEDED
COMMUNITY

## 2023-01-23 RX ORDER — HYDROMORPHONE HYDROCHLORIDE 2 MG/1
2 TABLET ORAL
Qty: 100 TABLET | Refills: 0 | Status: SHIPPED | OUTPATIENT
Start: 2023-01-23 | End: 2023-02-22

## 2023-01-23 RX ORDER — HEPARIN 100 UNIT/ML
500 SYRINGE INTRAVENOUS AS NEEDED
Status: DISCONTINUED | OUTPATIENT
Start: 2023-01-23 | End: 2023-01-24 | Stop reason: HOSPADM

## 2023-01-23 RX ORDER — HYDROMORPHONE HYDROCHLORIDE 2 MG/1
2 TABLET ORAL
COMMUNITY
End: 2023-01-23 | Stop reason: SDUPTHER

## 2023-01-23 RX ORDER — LOPERAMIDE HYDROCHLORIDE 2 MG/1
4 CAPSULE ORAL AS NEEDED
COMMUNITY

## 2023-01-23 RX ORDER — SODIUM CHLORIDE 0.9 % (FLUSH) 0.9 %
5-10 SYRINGE (ML) INJECTION AS NEEDED
Status: DISCONTINUED | OUTPATIENT
Start: 2023-01-23 | End: 2023-01-24 | Stop reason: HOSPADM

## 2023-01-23 RX ADMIN — HEPARIN 500 UNITS: 100 SYRINGE at 10:30

## 2023-01-23 RX ADMIN — SODIUM CHLORIDE, PRESERVATIVE FREE 10 ML: 5 INJECTION INTRAVENOUS at 10:30

## 2023-01-23 NOTE — PROGRESS NOTES
[address] [facility] [address], [address] [facility], [address] [place] [address], [address] [place] [address]  [phone]          Reason for Visit:   Bernabe Castañeda is a 79 y.o. female who is seen by synchronous (real-time) audio-video technology for follow up of pancreatic adenocarcinoma     Treatment History:     Dx: Pancreatic Adenocarcinoma--May 2020--[id number]--exactcesxiz of splenic vein and superior mesenteric vein    Tx: mFOLFIRINOX--first cycle 5/26/2020, second cycle 6/10/2020, dose reduced 15% cycle 3 on 6/30/2020--delayed due to severe side effects--switched to Gemcitabine/Abraxane--Cycle #1 7/29/2002, Cycle #2 9/2/2020, Cycle #3 10/7/2020, Cycle #4 11/4/2020. Neoadjuvant Radiation with Dr Kimmy Back ending 12/18/2021. Neoadjuvant [place]/Abraxane, 06/2020 - 11/2020    Distal Pancreatectomy, Splenectomy, and Civa Sheet with Dr Dee Conn on 3/17/2021. Adjuvant radiation with Dr Kimmy Back. Palliative chemotherapy    Gemcitabine/abraxane 1 Cycle  D/C'd 10/2022    Onivyde+5-FU, 4 cycles    Goal: Prolong life--Palliative    History of Present Illness:     Ms. Paulie Calix is a women with metastatic pancreatic adenocarcinoma. She had locally advanced pan Can Dx'ed in 2020. She received 4 cycles of FOLFIRINOX. She had severe toxicities from it with neutropenic fever. She then transitioned to [place]/Abraxane. She then underwent distal pancreatectomy in 2020 with Dr. Dee Conn. Disease recurrent in the peritoneum in 07/2021. It was never biopsied. The disease has progressed on both [place]/Abraxane and Onivyde/5-FU. She has suffered with diarrhea and anorexia each cycle of treatment. She has recovered from it. A CT was obtained last week.          Past Medical History:   Diagnosis Date    Adenocarcinoma of pancreas ([facility] [address] 05/13/2020    Dr [employer] biopsy via EUS    Diabetes [facility])     GERD (gastroesophageal reflux disease)     Hernia of abdominal cavity     Subxyphoid hernia    Hypertension Inflammatory polyarthropathy (HCC)     Joint pain     Joint swelling     Menopause     Ocular nevus of left eye     Pancreatitis     Tracheal stenosis       Past Surgical History:   Procedure Laterality Date    HX CARPAL TUNNEL RELEASE Bilateral     HX COLONOSCOPY      HX HYSTERECTOMY      HX KNEE ARTHROSCOPY Right     HX KNEE REPLACEMENT Right     partial    HX LAP CHOLECYSTECTOMY      HX TONSILLECTOMY      HX VASCULAR ACCESS        Social History     Tobacco Use    Smoking status: Never    Smokeless tobacco: Never   Substance Use Topics    Alcohol use: No     Alcohol/week: 0.0 standard drinks      Family History   Problem Relation Age of Onset    Heart Disease Mother     Heart Attack Mother     Cancer Father         lung    Diabetes Sister      Current Outpatient Medications   Medication Sig    HYDROmorphone (DILAUDID) 2 mg tablet Take 2 mg by mouth three (3) times daily as needed for Pain. loperamide (IMODIUM) 2 mg capsule Take 4 mg by mouth as needed for Diarrhea. diphenoxylate-atropine (LomotiL) 2.5-0.025 mg per tablet Take 1 Tablet by mouth as needed. methylphenidate HCl (RITALIN) 10 mg tablet Take 1 Tablet by mouth two (2) times a day. Max Daily Amount: 20 mg.    nystatin (MYCOSTATIN) powder Apply 1 g to affected area three (3) times daily. OLANZapine (ZyPREXA) 10 mg tablet Take 1 Tablet by mouth nightly. For 5 nights starting the night after chemotherapy    magnesium oxide (MAG-OX) 400 mg tablet Take 1 Tablet by mouth daily. potassium chloride (KLOR-CON M10) 10 mEq tablet Take 2 Tablets by mouth two (2) times a day. Toujeo SoloStar U-300 Insulin 300 unit/mL (1.5 mL) inpn pen INJECT 55 UNITS ONCE DAILY AT BEDTIME - DOSE INCREASED (Patient taking differently: INJECT 25 UNITS ONCE DAILY AT BEDTIME - DOSE INCREASED)    ondansetron (ZOFRAN ODT) 4 mg disintegrating tablet Take 1 Tablet by mouth every eight (8) hours as needed for Nausea or Vomiting.     prochlorperazine (Compazine) 10 mg tablet Take 1 Tablet by mouth every six (6) hours as needed for Nausea or Vomiting.    lidocaine-prilocaine (EMLA) topical cream Apply  to affected area as needed for Pain. Apply generous amount to port site 30-45 min prior to infusions    HumaLOG KwikPen Insulin 100 unit/mL kwikpen INJECT SUBCUTANEOUSLY ON SLIDING SCALE AS DIRECTED (Patient taking differently: by SubCUTAneous route two (2) times daily as needed. INJECT SUBCUTANEOUSLY ON SLIDING SCALE AS DIRECTED)    gabapentin (NEURONTIN) 300 mg capsule TAKE 2 CAPSULES BY MOUTH THREE TIMES DAILY (Patient taking differently: TAKE 2 CAPSULES  in AM and 2 capsules in PM)    LORazepam (ATIVAN) 1 mg tablet TAKE 1 TABLET BY MOUTH EVERY MORNING AND TAKE 2 TABLETS AT BEDTIME    famotidine (PEPCID) 20 mg tablet Take 20 mg by mouth two (2) times a day. multivit-minerals/folic acid (MULTIVITAMIN GUMMIES PO) Take 2 Each by mouth Every morning. DULoxetine (CYMBALTA) 30 mg capsule Take 1 Capsule by mouth every evening. acetaminophen (TYLENOL) 500 mg tablet Take 1,000 mg by mouth two (2) times daily as needed. Indications: pain    ibuprofen (MOTRIN) 200 mg tablet Take 600 mg by mouth two (2) times daily as needed. Indications: pain    senna-docusate (PERICOLACE) 8.6-50 mg per tablet nightly as needed. DULoxetine (CYMBALTA) 60 mg capsule Take 1 Capsule by mouth daily. Indications: neuropathic pain (Patient taking differently: Take 60 mg by mouth Every morning. At 0900  Indications: neuropathic pain)    magic mouthwash solution Swish and swallow 15-30 mL every 2 hours as needed for mouth pain. Magic mouth wash: Maalox, Lidocaine 2% viscous, Diphenhydramine oral solution - Pharmacy to mix equal portions of ingredients to a total volume as indicated in the dispense amount. (Patient not taking: Reported on 1/23/2023)    nystatin (MYCOSTATIN) 100,000 unit/mL suspension Take 5 mL by mouth four (4) times daily.  swish and swallow (Patient not taking: Reported on 1/23/2023)     No current facility-administered medications for this visit. No Known Allergies     Review of Systems: A complete review of systems was obtained, negative except as described above. Physical Exam:     General: alert, cooperative, no distress   Mental  status: normal mood, behavior, speech, dress, motor activity, and thought processes, able to follow commands   HENT: NCAT   Neck: no visualized mass   Resp: no respiratory distress   Neuro: no gross deficits   Skin: no discoloration or lesions of concern on visible areas   Psychiatric: normal affect, consistent with stated mood, no evidence of hallucinations       Due to this being a TeleHealth evaluation (During [place] [address] public health emergency), many elements of the physical examination are unable to be assessed. Evaluation of the following organ systems was limited: Vitals/Constitutional/EENT/Resp/CV/GI//MS/Neuro/Skin/Heme-Lymph-Imm. Results:     Lab Results   Component Value Date/Time    WBC 6.7 01/23/2023 08:45 AM    HGB 12.3 01/23/2023 08:45 AM    HCT 37.2 01/23/2023 08:45 AM    PLATELET 709 (H) 07/92/4265 08:45 AM    MCV 94.4 01/23/2023 08:45 AM    ABS. NEUTROPHILS 1.9 01/23/2023 08:45 AM     Lab Results   Component Value Date/Time    Sodium 137 01/23/2023 08:45 AM    Potassium 3.7 01/23/2023 08:45 AM    Chloride 102 01/23/2023 08:45 AM    CO2 30 01/23/2023 08:45 AM    Glucose 231 (H) 01/23/2023 08:45 AM    BUN 13 01/23/2023 08:45 AM    Creatinine 0.79 01/23/2023 08:45 AM    GFR est AA >60 09/20/2022 08:27 AM    GFR est non-AA >60 09/20/2022 08:27 AM    Calcium 8.5 01/23/2023 08:45 AM    Glucose (POC) 282 (H) 07/24/2022 08:26 AM    Creatinine (POC) 0.7 02/06/2013 10:21 AM     Lab Results   Component Value Date/Time    Bilirubin, total 0.3 01/23/2023 08:45 AM    ALT (SGPT) 36 01/23/2023 08:45 AM    Alk.  phosphatase 220 (H) 01/23/2023 08:45 AM    Protein, total 6.4 01/23/2023 08:45 AM    Albumin 2.8 (L) 01/23/2023 08:45 AM    Globulin 3.6 01/23/2023 08:45 AM            Assessment/PLAN:     1) Pancreatic Adenocarcinoma metastatic to the peritoneum and lungs. ECOG PS 1  Intent of Treatment - palliative  Prognosis: Guarded. Received neoadjuvant chemotherapy. Radiation with Dr Georgina Balbuena. Definitive resection with Dr Jimmy Gandhi. BRCA negative. Now peritoneal disease/mets  On chemo since 7/2021    Recent CT - progression of disease in the lungs and peritoneum  Plan for CT guided Bx  D/C [place]/Abraxane    Onivyde/5-FU, 4 cycles    CINV Gr 2  Anorexia  Diarrhea Gr 2  Weight loss +     CT shows disease progression in the lungs and peritoneum. CA 19-9 is rising over time    I offered her to screen for a ph 1 trial @ [facility]. I referred her to Dr. Christine Hunt @ [facility]    Second option is to treat with Enhertu since the tumor is her 2 amplified. I educated her about the fact that this drug is not approved and not proven in this disease. The side effects include nausea, diarrhea and ILD. She understands and is willing to take the medicine. Enhertu 5.4 mg/kg Q3w      2. CINV    Escalate anti-emetics      3. Chemotherapy induced diarrhea    Educated about the appropriate use of lomotil/Immodium        Signed By: Ladi Dash MD          The patient was evaluated through a synchronous (real-time) audio-video encounter. The patient (or guardian if applicable) is aware that this is a billable service, which includes applicable co-pays. This Virtual Visit was conducted with patient's (and/or legal guardian's) consent. The visit was conducted pursuant to the emergency declaration under the [address] [place] [address] authority and the [employer] General Act. Patient identification was verified, and a caregiver was present when appropriate. The patient was located in a state where the provider was licensed to provide care.

## 2023-01-23 NOTE — PROGRESS NOTES
[facility] [place] Progress Note    Date: 2023    Name: Jese Villar    MRN: [id number]         : 1955    Ms. Ba Arrived ambulatory and in no distress for cycle 5 day 1 of Onivyde/Leucovorin/5FU regimen. She is accompanied by friend. Assessment was completed,  no new complaints voiced, she has some nausea at times and has had a few sudden episodes of vomiting. Compazine has helped with nausea. She had difficulties with thrush and yeast infection in skin folds about one week after treatment, it is now resolved- no longer needs the magic mouthwash or nystatin powder. Port accessed without difficulty, labs drawn and in process. Chemotherapy Flowsheet 2023   Cycle C5 D1   Date 2023   Drug / Regimen Discontinued   Dosage -   Time Up -   Time Down -   Pre Hydration -   Pre Meds -   Notes -     Proceeded to appt with Dr. Nu Sheppard. Ms. Ba's vitals were reviewed. Visit Vitals  /88 (BP 1 Location: Left upper arm, BP Patient Position: Sitting)   Pulse (!) 101   Temp 98.3 °F (36.8 °C)   Resp 18   Ht 5' 3\" (1.6 m)   Wt 75.6 kg (166 lb 9.6 oz)   SpO2 96%   BMI 29.51 kg/m²       Lab results were obtained and reviewed. Recent Results (from the past 12 hour(s))   CBC WITH AUTOMATED DIFF    Collection Time: 23  8:45 AM   Result Value Ref Range    WBC 6.7 3.6 - 11.0 K/uL    RBC 3.94 3.80 - 5.20 M/uL    HGB 12.3 11.5 - 16.0 g/dL    HCT 37.2 35.0 - 47.0 %    MCV 94.4 80.0 - 99.0 FL    MCH 31.2 26.0 - 34.0 PG    MCHC 33.1 30.0 - 36.5 g/dL    RDW 16.8 (H) 11.5 - 14.5 %    PLATELET 560 (H) 519 - 400 K/uL    MPV 10.4 8.9 - 12.9 FL    NRBC 0.0 0.0  WBC    ABSOLUTE NRBC 0.00 0.00 - 0.01 K/uL    NEUTROPHILS 29 (L) 32 - 75 %    LYMPHOCYTES 30 12 - 49 %    MONOCYTES 30 (H) 5 - 13 %    EOSINOPHILS 7 0 - 7 %    BASOPHILS 2 (H) 0 - 1 %    IMMATURE GRANULOCYTES 2 (H) 0 - 0.5 %    ABS. NEUTROPHILS 1.9 1.8 - 8.0 K/UL    ABS. LYMPHOCYTES 2.1 0.8 - 3.5 K/UL    ABS.  MONOCYTES 2.0 (H) 0.0 - 1.0 K/UL ABS. EOSINOPHILS 0.5 (H) 0.0 - 0.4 K/UL    ABS. BASOPHILS 0.1 0.0 - 0.1 K/UL    ABS. IMM. GRANS. 0.1 (H) 0.00 - 0.04 K/UL    DF AUTOMATED      PLATELET COMMENTS PLATELETS APPEAR INCREASED     RBC COMMENTS POIKILOCYTOSIS  1+       METABOLIC PANEL, COMPREHENSIVE    Collection Time: 01/23/23  8:45 AM   Result Value Ref Range    Sodium 137 136 - 145 mmol/L    Potassium 3.7 3.5 - 5.1 mmol/L    Chloride 102 97 - 108 mmol/L    CO2 30 21 - 32 mmol/L    Anion gap 5 5 - 15 mmol/L    Glucose 231 (H) 65 - 100 mg/dL    BUN 13 6 - 20 MG/DL    Creatinine 0.79 0.55 - 1.02 MG/DL    BUN/Creatinine ratio 16 12 - 20      eGFR >60 >60 ml/min/1.73m2    Calcium 8.5 8.5 - 10.1 MG/DL    Bilirubin, total 0.3 0.2 - 1.0 MG/DL    ALT (SGPT) 36 12 - 78 U/L    AST (SGOT) 31 15 - 37 U/L    Alk. phosphatase 220 (H) 45 - 117 U/L    Protein, total 6.4 6.4 - 8.2 g/dL    Albumin 2.8 (L) 3.5 - 5.0 g/dL    Globulin 3.6 2.0 - 4.0 g/dL    A-G Ratio 0.8 (L) 1.1 - 2.2     MAGNESIUM    Collection Time: 01/23/23  8:45 AM   Result Value Ref Range    Magnesium 1.8 1.6 - 2.4 mg/dL     Per Dr. Ortega Alert, the current chemotherapy regimen will be discontinued due to progression of disease. Per Mrs. Ba, she will have a referral to [facility] regarding clinical trial  but also may start Enhertu if drug can be obtained through compassionate release. She voices disappointment at progression of disease but hopeful at possibility of receiving Enhertu. She wants to hear what the doctor at [facility] has to say but is doubtful that she could manage or tolerate making trips to [facility]. She also asked questions regarding Hospice. Offered support and information about Hospice. Port was flushed and de-accessed and site intact. Ms. Liban Marcos was discharged from [address] in stable condition at [phone]. No return appointment scheduled at present. Will schedule once status of Enhertu is determined.    Dilia Bacon RN  January 23, 2023

## 2023-01-23 NOTE — PROGRESS NOTES
JONATHAN FROM DR Cheikh Rivera REFERRAL TO [facility] DR SUSAN FERREIRA FOR SECOND OPINION.

## 2023-01-23 NOTE — PROGRESS NOTES
Beveryl Anne is a 79 y.o. female here for follow up for Pancreatic cancer. Patient scheduled for C5D1 chemotherapy  today in [facility]. Patient completed CT C/A/P on 1/11/23. Patient states chemo is a little rough, the first 2-3 days are okay, later in the week she starts having diarrhea. Patient started diarrhea after there CT scan, this lasted a week, she was taking imodium and lomotil. The diarrhea lasted 5 days. She is now having normal BM's small 3-4 a day. A week after chemo patient had 3 days of nausea and vomiting, she vomited 2-3 times those days. This has resolved. She took compazine during the N//V period. Key Oncology Meds               ondansetron (ZOFRAN ODT) 4 mg disintegrating tablet Take 1 Tablet by mouth every eight (8) hours as needed for Nausea or Vomiting. prochlorperazine (Compazine) 10 mg tablet Take 1 Tablet by mouth every six (6) hours as needed for Nausea or Vomiting. Key Pain Meds               acetaminophen (TYLENOL) 500 mg tablet Take 1,000 mg by mouth two (2) times daily as needed. Indications: pain    ibuprofen (MOTRIN) 200 mg tablet Take 600 mg by mouth two (2) times daily as needed.  Indications: pain          Chemotherapy Flowsheet 1/4/2023   Cycle C4 D1   Date 1/4/2023   Drug / Regimen Onivyde/Leucovorin/5FU   Dosage 96 mg/768 mg/4,608 mg   Time Up see MAR   Time Down -   Pre Hydration -   Pre Meds -   Notes CIV 5FU initiated

## 2023-01-23 NOTE — PROGRESS NOTES
Spoke with patient by phone and informed her of appointment for Enhertu on February 6 at 8:15. Explained that this date is tentative pending the authorization process. She voices understanding.

## 2023-01-24 DIAGNOSIS — Z79.899 OTHER LONG TERM (CURRENT) DRUG THERAPY: ICD-10-CM

## 2023-01-24 DIAGNOSIS — C25.9 PRIMARY PANCREATIC CANCER WITH METASTASIS TO OTHER SITE (HCC): Primary | ICD-10-CM

## 2023-01-25 ENCOUNTER — APPOINTMENT (OUTPATIENT)
Dept: INFUSION THERAPY | Age: 68
End: 2023-01-25

## 2023-01-31 ENCOUNTER — TELEPHONE (OUTPATIENT)
Dept: ONCOLOGY | Age: 68
End: 2023-01-31

## 2023-01-31 ENCOUNTER — DOCUMENTATION ONLY (OUTPATIENT)
Dept: ONCOLOGY | Age: 68
End: 2023-01-31

## 2023-01-31 ENCOUNTER — HOSPITAL ENCOUNTER (OUTPATIENT)
Dept: ULTRASOUND IMAGING | Age: 68
Discharge: HOME | End: 2023-01-31
Attending: NURSE PRACTITIONER
Payer: MEDICARE

## 2023-01-31 DIAGNOSIS — C25.9 PRIMARY PANCREATIC CANCER WITH METASTASIS TO OTHER SITE (HCC): ICD-10-CM

## 2023-01-31 DIAGNOSIS — Z79.899 OTHER LONG TERM (CURRENT) DRUG THERAPY: ICD-10-CM

## 2023-01-31 PROCEDURE — 93306 TTE W/DOPPLER COMPLETE: CPT

## 2023-01-31 RX ORDER — SODIUM CHLORIDE 9 MG/ML
5-40 INJECTION INTRAVENOUS AS NEEDED
OUTPATIENT
Start: 2023-02-06

## 2023-01-31 RX ORDER — DIPHENHYDRAMINE HYDROCHLORIDE 50 MG/ML
25 INJECTION, SOLUTION INTRAMUSCULAR; INTRAVENOUS AS NEEDED
Start: 2023-02-06

## 2023-01-31 RX ORDER — DIPHENHYDRAMINE HYDROCHLORIDE 50 MG/ML
50 INJECTION, SOLUTION INTRAMUSCULAR; INTRAVENOUS AS NEEDED
Start: 2023-02-06

## 2023-01-31 RX ORDER — HEPARIN 100 UNIT/ML
500 SYRINGE INTRAVENOUS AS NEEDED
Start: 2023-02-06

## 2023-01-31 RX ORDER — HYDROCORTISONE SODIUM SUCCINATE 100 MG/2ML
100 INJECTION, POWDER, FOR SOLUTION INTRAMUSCULAR; INTRAVENOUS AS NEEDED
OUTPATIENT
Start: 2023-02-06

## 2023-01-31 RX ORDER — SODIUM CHLORIDE 9 MG/ML
5-250 INJECTION, SOLUTION INTRAVENOUS AS NEEDED
OUTPATIENT
Start: 2023-02-06

## 2023-01-31 RX ORDER — EPINEPHRINE 1 MG/ML
0.3 INJECTION, SOLUTION, CONCENTRATE INTRAVENOUS AS NEEDED
OUTPATIENT
Start: 2023-02-06

## 2023-01-31 RX ORDER — ACETAMINOPHEN 325 MG/1
650 TABLET ORAL AS NEEDED
Start: 2023-02-06

## 2023-01-31 RX ORDER — PALONOSETRON 0.05 MG/ML
0.25 INJECTION, SOLUTION INTRAVENOUS ONCE
OUTPATIENT
Start: 2023-02-06 | End: 2023-02-06

## 2023-01-31 RX ORDER — ONDANSETRON 2 MG/ML
8 INJECTION INTRAMUSCULAR; INTRAVENOUS AS NEEDED
OUTPATIENT
Start: 2023-02-06

## 2023-01-31 RX ORDER — ALBUTEROL SULFATE 0.83 MG/ML
2.5 SOLUTION RESPIRATORY (INHALATION) AS NEEDED
Start: 2023-02-06

## 2023-01-31 RX ORDER — DEXTROSE MONOHYDRATE 50 MG/ML
5-250 INJECTION, SOLUTION INTRAVENOUS AS NEEDED
OUTPATIENT
Start: 2023-02-06

## 2023-01-31 RX ORDER — SODIUM CHLORIDE 0.9 % (FLUSH) 0.9 %
5-40 SYRINGE (ML) INJECTION AS NEEDED
OUTPATIENT
Start: 2023-02-06

## 2023-01-31 NOTE — PROGRESS NOTES
Chemotherapy/Immunotherapy education and consent discussed with patient, information on Enhertu provided and reviewed. Side effects/symptom management reviewed. Patient denied needing another chemo packet. Reviewed after hours contact information. Patient denied any additional questions or concerns. Consent for Palliative treatment obtained at this time. A hard copy of the chemotherapy consent was offered to the patient and the patient refused a copy.

## 2023-01-31 NOTE — TELEPHONE ENCOUNTER
Called Mrs Cindy Acosta to inquire about the status of coverage for patient to start Enhertu on 2/6/23.     Left VM to return call, no HIPAA disclosed    Phone number [phone]

## 2023-02-01 PROBLEM — D70.1 CHEMOTHERAPY INDUCED NEUTROPENIA (HCC): Status: ACTIVE | Noted: 2023-02-01

## 2023-02-01 PROBLEM — T45.1X5A CHEMOTHERAPY INDUCED NEUTROPENIA (HCC): Status: ACTIVE | Noted: 2023-02-01

## 2023-02-01 PROBLEM — T45.1X5A CHEMOTHERAPY-INDUCED NEUROPATHY (HCC): Status: ACTIVE | Noted: 2023-02-01

## 2023-02-01 PROBLEM — G62.0 CHEMOTHERAPY-INDUCED NEUROPATHY (HCC): Status: ACTIVE | Noted: 2023-02-01

## 2023-02-01 NOTE — PROGRESS NOTES
ASSESSMENT AND PLAN:    1. Pancreatic cancer metastasized to liver [facility])  Hopefully she will qualify for Enhertu therapy    2. Chemotherapy induced neutropenia (HCC)  Clinically resolved    3. Chemotherapy-induced neuropathy (HCC)  Clinically stable    4. Uncontrolled type 2 diabetes mellitus with hyperglycemia ([facility] [address]  Will continue to monitor status. No orders of the defined types were placed in this encounter. Follow-up and Dispositions    Return in about 3 months (around 5/3/2023). Chief Complaint   Patient presents with    Pancreatic Cancer     Routine F/U    Diabetes     Routine F/U. 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 She has poorly controlled DM, LBP, GERD and chemo induced neuropathy. Pending results from the insurance company to see if she qualifies. She also has an appointment in about 3 weeks at [facility] to see if she qualifies for clinical trial.    Her diabetes remains poorly controlled. ROS:    Denies nausea, vomiting, diarrhea, weight loss, weight gain, hemoptysis, hematochezia or melena. Denies rash, frequency, or dysuria. EXAM:   The patient appears tired and mildly ill  Visit Vitals  BP (!) 140/80   Pulse 88   Temp 98.3 °F (36.8 °C) (Temporal)   Resp 18   Ht 5' 3\" (1.6 m)   Wt 172 lb 4 oz (78.1 kg)   SpO2 97%   BMI 30.51 kg/m²     HEENT:  NC/AT PERRLA, EOMI, oropharynx is clear, oral mucosa   Neck: supple, without JVD, thyromegaly, mass or bruit  Lungs:  clear,without wheezes, rales, rubs or ronchi   Cardiovascular: RRR without MGR   Abdomen is soft without tenderness, guarding, mass or organomegaly.    Extremities: no cyanosis, clubbing or edema   Neurological:  fluent, ambulatory, CN 2-12 are grossly intact  Skin:  No rash    Narda Orozco MD

## 2023-02-02 LAB
ECHO AO ROOT DIAM: 3.1 CM
ECHO AV AREA PEAK VELOCITY: 1.7 CM2
ECHO AV AREA PEAK VELOCITY: 1.8 CM2
ECHO AV AREA VTI: 1.7 CM2
ECHO AV MEAN GRADIENT: 9 MMHG
ECHO AV MEAN VELOCITY: 1.4 M/S
ECHO AV PEAK GRADIENT: 15 MMHG
ECHO AV PEAK GRADIENT: 16 MMHG
ECHO AV PEAK VELOCITY: 1.9 M/S
ECHO AV PEAK VELOCITY: 2 M/S
ECHO AV VTI: 39.8 CM
ECHO EST RA PRESSURE: 10 MMHG
ECHO LA DIAMETER: 3.4 CM
ECHO LA TO AORTIC ROOT RATIO: 1.1
ECHO LA VOL 2C: 33 ML (ref 22–52)
ECHO LA VOL 4C: 31 ML (ref 22–52)
ECHO LA VOLUME AREA LENGTH: 35 ML
ECHO LV E' SEPTAL VELOCITY: 8 CM/S
ECHO LV EDV A2C: 94 ML
ECHO LV EDV A4C: 95 ML
ECHO LV EDV BP: 96 ML (ref 56–104)
ECHO LV EJECTION FRACTION A2C: 49 %
ECHO LV EJECTION FRACTION A4C: 65 %
ECHO LV EJECTION FRACTION BIPLANE: 57 % (ref 55–100)
ECHO LV ESV A2C: 48 ML
ECHO LV ESV A4C: 33 ML
ECHO LV ESV BP: 41 ML (ref 19–49)
ECHO LV FRACTIONAL SHORTENING: 33 % (ref 28–44)
ECHO LV INTERNAL DIMENSION DIASTOLIC: 4.8 CM (ref 3.9–5.3)
ECHO LV INTERNAL DIMENSION SYSTOLIC: 3.2 CM
ECHO LV IVSD: 0.9 CM (ref 0.6–0.9)
ECHO LV MASS 2D: 137.1 G (ref 67–162)
ECHO LV POSTERIOR WALL DIASTOLIC: 0.8 CM (ref 0.6–0.9)
ECHO LV RELATIVE WALL THICKNESS RATIO: 0.33
ECHO LVOT AREA: 3.1 CM2
ECHO LVOT AV VTI INDEX: 0.52
ECHO LVOT DIAM: 2 CM
ECHO LVOT MEAN GRADIENT: 3 MMHG
ECHO LVOT PEAK GRADIENT: 4 MMHG
ECHO LVOT PEAK VELOCITY: 1.1 M/S
ECHO LVOT SV: 64.7 ML
ECHO LVOT VTI: 20.6 CM
ECHO MV A VELOCITY: 1.04 M/S
ECHO MV E DECELERATION TIME (DT): 300.3 MS
ECHO MV E VELOCITY: 0.56 M/S
ECHO MV E/A RATIO: 0.54
ECHO MV E/E' SEPTAL: 7
ECHO RIGHT VENTRICULAR SYSTOLIC PRESSURE (RVSP): 29 MMHG
ECHO TV REGURGITANT MAX VELOCITY: 2.2 M/S
ECHO TV REGURGITANT PEAK GRADIENT: 19 MMHG

## 2023-02-02 PROCEDURE — 93306 TTE W/DOPPLER COMPLETE: CPT | Performed by: INTERNAL MEDICINE

## 2023-02-03 ENCOUNTER — OFFICE VISIT (OUTPATIENT)
Dept: FAMILY MEDICINE CLINIC | Age: 68
End: 2023-02-03
Payer: MEDICARE

## 2023-02-03 VITALS
RESPIRATION RATE: 18 BRPM | BODY MASS INDEX: 30.52 KG/M2 | DIASTOLIC BLOOD PRESSURE: 80 MMHG | WEIGHT: 172.25 LBS | OXYGEN SATURATION: 97 % | SYSTOLIC BLOOD PRESSURE: 140 MMHG | HEIGHT: 63 IN | HEART RATE: 88 BPM | TEMPERATURE: 98.3 F

## 2023-02-03 DIAGNOSIS — C78.7 PANCREATIC CANCER METASTASIZED TO LIVER (HCC): Primary | ICD-10-CM

## 2023-02-03 DIAGNOSIS — T45.1X5A CHEMOTHERAPY INDUCED NEUTROPENIA (HCC): ICD-10-CM

## 2023-02-03 DIAGNOSIS — D70.1 CHEMOTHERAPY INDUCED NEUTROPENIA (HCC): ICD-10-CM

## 2023-02-03 DIAGNOSIS — C25.9 PANCREATIC CANCER METASTASIZED TO LIVER (HCC): Primary | ICD-10-CM

## 2023-02-03 DIAGNOSIS — T45.1X5A CHEMOTHERAPY-INDUCED NEUROPATHY (HCC): ICD-10-CM

## 2023-02-03 DIAGNOSIS — G62.0 CHEMOTHERAPY-INDUCED NEUROPATHY (HCC): ICD-10-CM

## 2023-02-03 DIAGNOSIS — E11.65 UNCONTROLLED TYPE 2 DIABETES MELLITUS WITH HYPERGLYCEMIA (HCC): ICD-10-CM

## 2023-02-03 NOTE — PROGRESS NOTES
Visit Vitals  BP (!) 140/80   Pulse 88   Temp 98.3 °F (36.8 °C) (Temporal)   Resp 18   Ht 5' 3\" (1.6 m)   Wt 172 lb 4 oz (78.1 kg)   SpO2 97%   BMI 30.51 kg/m²     Chief Complaint   Patient presents with    Follow-up     1. Have you been to the ER, urgent care clinic since your last visit? Hospitalized since your last visit? No    2. Have you seen or consulted any other health care providers outside of the [address] [place] [address] since your last visit? Include any pap smears or colon screening.  No

## 2023-02-06 ENCOUNTER — HOSPITAL ENCOUNTER (OUTPATIENT)
Dept: INFUSION THERAPY | Age: 68
Discharge: HOME | End: 2023-02-06
Payer: MEDICARE

## 2023-02-06 ENCOUNTER — VIRTUAL VISIT (OUTPATIENT)
Dept: ONCOLOGY | Age: 68
End: 2023-02-06
Payer: MEDICARE

## 2023-02-06 VITALS
HEIGHT: 63 IN | OXYGEN SATURATION: 99 % | SYSTOLIC BLOOD PRESSURE: 154 MMHG | DIASTOLIC BLOOD PRESSURE: 71 MMHG | RESPIRATION RATE: 20 BRPM | TEMPERATURE: 98.8 F | HEART RATE: 79 BPM | BODY MASS INDEX: 30.16 KG/M2 | WEIGHT: 170.2 LBS

## 2023-02-06 DIAGNOSIS — C25.9 PRIMARY PANCREATIC CANCER WITH METASTASIS TO OTHER SITE (HCC): Primary | ICD-10-CM

## 2023-02-06 DIAGNOSIS — C78.6 PERITONEAL CARCINOMATOSIS (HCC): ICD-10-CM

## 2023-02-06 LAB
ALBUMIN SERPL-MCNC: 3 G/DL (ref 3.5–5)
ALBUMIN/GLOB SERPL: 0.8 (ref 1.1–2.2)
ALP SERPL-CCNC: 189 U/L (ref 45–117)
ALT SERPL-CCNC: 27 U/L (ref 12–78)
ANION GAP SERPL CALC-SCNC: 5 MMOL/L (ref 5–15)
AST SERPL-CCNC: 26 U/L (ref 15–37)
BASOPHILS # BLD: 0.1 K/UL (ref 0–0.1)
BASOPHILS NFR BLD: 1 % (ref 0–1)
BILIRUB SERPL-MCNC: 0.5 MG/DL (ref 0.2–1)
BUN SERPL-MCNC: 15 MG/DL (ref 6–20)
BUN/CREAT SERPL: 19 (ref 12–20)
CALCIUM SERPL-MCNC: 8.9 MG/DL (ref 8.5–10.1)
CHLORIDE SERPL-SCNC: 101 MMOL/L (ref 97–108)
CO2 SERPL-SCNC: 30 MMOL/L (ref 21–32)
CREAT SERPL-MCNC: 0.79 MG/DL (ref 0.55–1.02)
DIFFERENTIAL METHOD BLD: ABNORMAL
EOSINOPHIL # BLD: 0.3 K/UL (ref 0–0.4)
EOSINOPHIL NFR BLD: 3 % (ref 0–7)
ERYTHROCYTE [DISTWIDTH] IN BLOOD BY AUTOMATED COUNT: 16.8 % (ref 11.5–14.5)
GLOBULIN SER CALC-MCNC: 3.7 G/DL (ref 2–4)
GLUCOSE SERPL-MCNC: 244 MG/DL (ref 65–100)
HCT VFR BLD AUTO: 35.8 % (ref 35–47)
HGB BLD-MCNC: 11.4 G/DL (ref 11.5–16)
IMM GRANULOCYTES # BLD AUTO: 0 K/UL (ref 0–0.04)
IMM GRANULOCYTES NFR BLD AUTO: 0 % (ref 0–0.5)
LYMPHOCYTES # BLD: 2.2 K/UL (ref 0.8–3.5)
LYMPHOCYTES NFR BLD: 25 % (ref 12–49)
MCH RBC QN AUTO: 30.4 PG (ref 26–34)
MCHC RBC AUTO-ENTMCNC: 31.8 G/DL (ref 30–36.5)
MCV RBC AUTO: 95.5 FL (ref 80–99)
MONOCYTES # BLD: 1.5 K/UL (ref 0–1)
MONOCYTES NFR BLD: 17 % (ref 5–13)
NEUTS SEG # BLD: 4.8 K/UL (ref 1.8–8)
NEUTS SEG NFR BLD: 54 % (ref 32–75)
NRBC # BLD: 0 K/UL (ref 0–0.01)
NRBC BLD-RTO: 0 PER 100 WBC
PLATELET # BLD AUTO: 383 K/UL (ref 150–400)
PMV BLD AUTO: 10.5 FL (ref 8.9–12.9)
POTASSIUM SERPL-SCNC: 4.3 MMOL/L (ref 3.5–5.1)
PROT SERPL-MCNC: 6.7 G/DL (ref 6.4–8.2)
RBC # BLD AUTO: 3.75 M/UL (ref 3.8–5.2)
SODIUM SERPL-SCNC: 136 MMOL/L (ref 136–145)
WBC # BLD AUTO: 9 K/UL (ref 3.6–11)

## 2023-02-06 PROCEDURE — 36415 COLL VENOUS BLD VENIPUNCTURE: CPT

## 2023-02-06 PROCEDURE — 85025 COMPLETE CBC W/AUTO DIFF WBC: CPT

## 2023-02-06 PROCEDURE — G8427 DOCREV CUR MEDS BY ELIG CLIN: HCPCS | Performed by: INTERNAL MEDICINE

## 2023-02-06 PROCEDURE — 1090F PRES/ABSN URINE INCON ASSESS: CPT | Performed by: INTERNAL MEDICINE

## 2023-02-06 PROCEDURE — G8536 NO DOC ELDER MAL SCRN: HCPCS | Performed by: INTERNAL MEDICINE

## 2023-02-06 PROCEDURE — G0463 HOSPITAL OUTPT CLINIC VISIT: HCPCS | Performed by: INTERNAL MEDICINE

## 2023-02-06 PROCEDURE — 74011000250 HC RX REV CODE- 250: Performed by: INTERNAL MEDICINE

## 2023-02-06 PROCEDURE — 80053 COMPREHEN METABOLIC PANEL: CPT

## 2023-02-06 PROCEDURE — G8432 DEP SCR NOT DOC, RNG: HCPCS | Performed by: INTERNAL MEDICINE

## 2023-02-06 PROCEDURE — 3017F COLORECTAL CA SCREEN DOC REV: CPT | Performed by: INTERNAL MEDICINE

## 2023-02-06 PROCEDURE — G8399 PT W/DXA RESULTS DOCUMENT: HCPCS | Performed by: INTERNAL MEDICINE

## 2023-02-06 PROCEDURE — 1101F PT FALLS ASSESS-DOCD LE1/YR: CPT | Performed by: INTERNAL MEDICINE

## 2023-02-06 PROCEDURE — G8417 CALC BMI ABV UP PARAM F/U: HCPCS | Performed by: INTERNAL MEDICINE

## 2023-02-06 PROCEDURE — 36591 DRAW BLOOD OFF VENOUS DEVICE: CPT

## 2023-02-06 PROCEDURE — 99213 OFFICE O/P EST LOW 20 MIN: CPT | Performed by: INTERNAL MEDICINE

## 2023-02-06 PROCEDURE — 77030012965 HC NDL HUBR BBMI -A

## 2023-02-06 PROCEDURE — 74011250636 HC RX REV CODE- 250/636: Performed by: INTERNAL MEDICINE

## 2023-02-06 PROCEDURE — 1123F ACP DISCUSS/DSCN MKR DOCD: CPT | Performed by: INTERNAL MEDICINE

## 2023-02-06 RX ORDER — SODIUM CHLORIDE 0.9 % (FLUSH) 0.9 %
5-10 SYRINGE (ML) INJECTION AS NEEDED
Status: DISCONTINUED | OUTPATIENT
Start: 2023-02-06 | End: 2023-02-07 | Stop reason: HOSPADM

## 2023-02-06 RX ORDER — HEPARIN 100 UNIT/ML
500 SYRINGE INTRAVENOUS AS NEEDED
Status: DISCONTINUED | OUTPATIENT
Start: 2023-02-06 | End: 2023-02-07 | Stop reason: HOSPADM

## 2023-02-06 RX ADMIN — SODIUM CHLORIDE, PRESERVATIVE FREE 10 ML: 5 INJECTION INTRAVENOUS at 11:45

## 2023-02-06 RX ADMIN — HEPARIN 500 UNITS: 100 SYRINGE at 11:45

## 2023-02-06 NOTE — PROGRESS NOTES
Sundeep Alfonso is a 79 y.o. female  Here for follow up for pancreatic cancer. Pt doing well. No concerns brought up. 1. Have you been to the ER, urgent care clinic since your last visit? Hospitalized since your last visit? no    2. Have you seen or consulted any other health care providers outside of the [address] [place] [address] since your last visit? Include any pap smears or colon screening.    no

## 2023-02-06 NOTE — PROGRESS NOTES
[facility] Progress Note    Date: 2023    Name: Marybel Denney    MRN: [id number]         : 1955    Ms. Ba Arrived ambulatory and in no distress for cycle 1 day 1 of Enhertu regimen. Assessment was completed, no acute issues at this time, no new complaints voiced. Port a Cath accessed without difficulty, labs drawn and in process. Chemotherapy Flowsheet 2023   Cycle C1D1   Date 2023   Drug / Regimen Enhertu   Dosage 408 mg IV   Time Up HELD   Time Down -   Pre Hydration -   Pre Meds -   Notes -         0900:  Proceeded to appt with Dr. Mendel Copper. Ms. Ba's vitals were reviewed. Visit Vitals  BP (!) 154/71 (BP 1 Location: Left upper arm, BP Patient Position: Sitting)   Pulse 79   Temp 98.8 °F (37.1 °C)   Resp 20   Ht 5' 3\" (1.6 m)   Wt 77.2 kg (170 lb 3.2 oz)   SpO2 99%   BMI 30.15 kg/m²       Lab results were obtained and reviewed. Recent Results (from the past 12 hour(s))   CBC WITH AUTOMATED DIFF    Collection Time: 23  8:55 AM   Result Value Ref Range    WBC 9.0 3.6 - 11.0 K/uL    RBC 3.75 (L) 3.80 - 5.20 M/uL    HGB 11.4 (L) 11.5 - 16.0 g/dL    HCT 35.8 35.0 - 47.0 %    MCV 95.5 80.0 - 99.0 FL    MCH 30.4 26.0 - 34.0 PG    MCHC 31.8 30.0 - 36.5 g/dL    RDW 16.8 (H) 11.5 - 14.5 %    PLATELET 878 168 - 000 K/uL    MPV 10.5 8.9 - 12.9 FL    NRBC 0.0 0  WBC    ABSOLUTE NRBC 0.00 0.00 - 0.01 K/uL    NEUTROPHILS 54 32 - 75 %    LYMPHOCYTES 25 12 - 49 %    MONOCYTES 17 (H) 5 - 13 %    EOSINOPHILS 3 0 - 7 %    BASOPHILS 1 0 - 1 %    IMMATURE GRANULOCYTES 0 0.0 - 0.5 %    ABS. NEUTROPHILS 4.8 1.8 - 8.0 K/UL    ABS. LYMPHOCYTES 2.2 0.8 - 3.5 K/UL    ABS. MONOCYTES 1.5 (H) 0.0 - 1.0 K/UL    ABS. EOSINOPHILS 0.3 0.0 - 0.4 K/UL    ABS. BASOPHILS 0.1 0.0 - 0.1 K/UL    ABS. IMM.  GRANS. 0.0 0.00 - 0.04 K/UL    DF AUTOMATED     METABOLIC PANEL, COMPREHENSIVE    Collection Time: 23  8:55 AM   Result Value Ref Range    Sodium 136 136 - 145 mmol/L    Potassium 4.3 3.5 - 5.1 mmol/L    Chloride 101 97 - 108 mmol/L    CO2 30 21 - 32 mmol/L    Anion gap 5 5 - 15 mmol/L    Glucose 244 (H) 65 - 100 mg/dL    BUN 15 6 - 20 MG/DL    Creatinine 0.79 0.55 - 1.02 MG/DL    BUN/Creatinine ratio 19 12 - 20      eGFR >60 >60 ml/min/1.73m2    Calcium 8.9 8.5 - 10.1 MG/DL    Bilirubin, total 0.5 0.2 - 1.0 MG/DL    ALT (SGPT) 27 12 - 78 U/L    AST (SGOT) 26 15 - 37 U/L    Alk. phosphatase 189 (H) 45 - 117 U/L    Protein, total 6.7 6.4 - 8.2 g/dL    Albumin 3.0 (L) 3.5 - 5.0 g/dL    Globulin 3.7 2.0 - 4.0 g/dL    A-G Ratio 0.8 (L) 1.1 - 2.2         Enhertu held today and until insurance approval is confirmed. Port flushed and port needle removed. Band aid to site. Ms. Mario Zepeda tolerated treatment well and was discharged from [place] [address] in stable condition at 1150. Her next appointment will be scheduled once insurance approval obtained/confirmed.     Sarkis Andrews RN  February 6, 2023

## 2023-02-06 NOTE — PROGRESS NOTES
[address] [facility] [address], [address] [facility], [address]  [phone]          Reason for Visit:   Simona Lefort is a 79 y.o. female who is seen by synchronous (real-time) audio-video technology for follow up of pancreatic adenocarcinoma     Treatment History:     Dx: Pancreatic Adenocarcinoma--May 2020--[id number]--[id number] of splenic vein and superior mesenteric vein    Tx: mFOLFIRINOX--first cycle 5/26/2020, second cycle 6/10/2020, dose reduced 15% cycle 3 on 6/30/2020--delayed due to severe side effects--switched to Gemcitabine/Abraxane--Cycle #1 7/29/2002, Cycle #2 9/2/2020, Cycle #3 10/7/2020, Cycle #4 11/4/2020. Neoadjuvant Radiation with Dr Lyric Jones ending 12/18/2021. Neoadjuvant [place]/Abraxane, 06/2020 - 11/2020    Distal Pancreatectomy, Splenectomy, and Civa Sheet with Dr Stacey Cavazos on 3/17/2021. Adjuvant radiation with Dr Lyric Jones. Palliative chemotherapy    Gemcitabine/abraxane 1 Cycle  D/C'd 10/2022    Onivyde+5-FU, 4 cycles    Goal: Prolong life--Palliative    History of Present Illness:     Ms. Janie Johnson is a women with metastatic pancreatic adenocarcinoma. She had locally advanced pan Can Dx'ed in 2020. She received 4 cycles of FOLFIRINOX. She had severe toxicities from it with neutropenic fever. She then transitioned to [place]/Abraxane. She then underwent distal pancreatectomy in 2020 with Dr. Stacey Cavazos. Disease recurrent in the peritoneum in 07/2021. It was never biopsied. The disease has progressed on both [place]/Abraxane and Onivyde/5-FU. She has suffered with diarrhea and anorexia each cycle of treatment. She has recovered from it. A CT revealed progression of disease in the liver and peritoneum. Interval History:    She feels fair. She is here to start systemic therapy with Enhertu.         Past Medical History:   Diagnosis Date    Adenocarcinoma of pancreas ([facility] [address] 05/13/2020    Dr Marlee Cortes biopsy via EUS Diabetes ([address]     GERD (gastroesophageal reflux disease)     Hernia of abdominal cavity     Subxyphoid hernia    Hypertension     Inflammatory polyarthropathy (HCC)     Joint pain     Joint swelling     Menopause     Ocular nevus of left eye     Pancreatitis     Tracheal stenosis       Past Surgical History:   Procedure Laterality Date    HX CARPAL TUNNEL RELEASE Bilateral     HX COLONOSCOPY      HX HYSTERECTOMY      HX KNEE ARTHROSCOPY Right     HX KNEE REPLACEMENT Right     partial    HX LAP CHOLECYSTECTOMY      HX TONSILLECTOMY      HX VASCULAR ACCESS        Social History     Tobacco Use    Smoking status: Never    Smokeless tobacco: Never   Substance Use Topics    Alcohol use: No     Alcohol/week: 0.0 standard drinks      Family History   Problem Relation Age of Onset    Heart Disease Mother     Heart Attack Mother     Cancer Father         lung    Diabetes Sister      Current Outpatient Medications   Medication Sig    loperamide (IMODIUM) 2 mg capsule Take 4 mg by mouth as needed for Diarrhea. diphenoxylate-atropine (LOMOTIL) 2.5-0.025 mg per tablet Take 1 Tablet by mouth as needed. HYDROmorphone (DILAUDID) 2 mg tablet Take 1 Tablet by mouth three (3) times daily as needed for Pain for up to 30 days. Max Daily Amount: 6 mg.    methylphenidate HCl (RITALIN) 10 mg tablet Take 1 Tablet by mouth two (2) times a day. Max Daily Amount: 20 mg.    nystatin (MYCOSTATIN) powder Apply 1 g to affected area three (3) times daily. OLANZapine (ZyPREXA) 10 mg tablet Take 1 Tablet by mouth nightly. For 5 nights starting the night after chemotherapy    magnesium oxide (MAG-OX) 400 mg tablet Take 1 Tablet by mouth daily. potassium chloride (KLOR-CON M10) 10 mEq tablet Take 2 Tablets by mouth two (2) times a day.     Toujeo SoloStar U-300 Insulin 300 unit/mL (1.5 mL) inpn pen INJECT 55 UNITS ONCE DAILY AT BEDTIME - DOSE INCREASED (Patient taking differently: INJECT 25 UNITS ONCE DAILY AT BEDTIME - DOSE INCREASED) ondansetron (ZOFRAN ODT) 4 mg disintegrating tablet Take 1 Tablet by mouth every eight (8) hours as needed for Nausea or Vomiting. prochlorperazine (Compazine) 10 mg tablet Take 1 Tablet by mouth every six (6) hours as needed for Nausea or Vomiting.    lidocaine-prilocaine (EMLA) topical cream Apply  to affected area as needed for Pain. Apply generous amount to port site 30-45 min prior to infusions    HumaLOG KwikPen Insulin 100 unit/mL kwikpen INJECT SUBCUTANEOUSLY ON SLIDING SCALE AS DIRECTED (Patient taking differently: by SubCUTAneous route two (2) times daily as needed. INJECT SUBCUTANEOUSLY ON SLIDING SCALE AS DIRECTED)    gabapentin (NEURONTIN) 300 mg capsule TAKE 2 CAPSULES BY MOUTH THREE TIMES DAILY (Patient taking differently: TAKE 2 CAPSULES  in AM and 2 capsules in PM)    LORazepam (ATIVAN) 1 mg tablet TAKE 1 TABLET BY MOUTH EVERY MORNING AND TAKE 2 TABLETS AT BEDTIME    famotidine (PEPCID) 20 mg tablet Take 20 mg by mouth two (2) times a day. multivit-minerals/folic acid (MULTIVITAMIN GUMMIES PO) Take 2 Each by mouth Every morning. DULoxetine (CYMBALTA) 30 mg capsule Take 1 Capsule by mouth every evening. acetaminophen (TYLENOL) 500 mg tablet Take 1,000 mg by mouth two (2) times daily as needed. Indications: pain    ibuprofen (MOTRIN) 200 mg tablet Take 600 mg by mouth two (2) times daily as needed. Indications: pain    senna-docusate (PERICOLACE) 8.6-50 mg per tablet nightly as needed. DULoxetine (CYMBALTA) 60 mg capsule Take 1 Capsule by mouth daily. Indications: neuropathic pain (Patient taking differently: Take 60 mg by mouth Every morning. At 0900  Indications: neuropathic pain)     No current facility-administered medications for this visit. No Known Allergies     Review of Systems: A complete review of systems was obtained, negative except as described above.     Physical Exam:     General: alert, cooperative, no distress   Mental  status: normal mood, behavior, speech, dress, motor activity, and thought processes, able to follow commands   HENT: NCAT   Neck: no visualized mass   Resp: no respiratory distress   Neuro: no gross deficits   Skin: no discoloration or lesions of concern on visible areas   Psychiatric: normal affect, consistent with stated mood, no evidence of hallucinations       Due to this being a TeleHealth evaluation (During [facility]-21 public health emergency), many elements of the physical examination are unable to be assessed. Evaluation of the following organ systems was limited: Vitals/Constitutional/EENT/Resp/CV/GI//MS/Neuro/Skin/Heme-Lymph-Imm. Results:     Lab Results   Component Value Date/Time    WBC 9.0 02/06/2023 08:55 AM    HGB 11.4 (L) 02/06/2023 08:55 AM    HCT 35.8 02/06/2023 08:55 AM    PLATELET 730 24/82/1052 08:55 AM    MCV 95.5 02/06/2023 08:55 AM    ABS. NEUTROPHILS 4.8 02/06/2023 08:55 AM     Lab Results   Component Value Date/Time    Sodium 136 02/06/2023 08:55 AM    Potassium 4.3 02/06/2023 08:55 AM    Chloride 101 02/06/2023 08:55 AM    CO2 30 02/06/2023 08:55 AM    Glucose 244 (H) 02/06/2023 08:55 AM    BUN 15 02/06/2023 08:55 AM    Creatinine 0.79 02/06/2023 08:55 AM    GFR est AA >60 09/20/2022 08:27 AM    GFR est non-AA >60 09/20/2022 08:27 AM    Calcium 8.9 02/06/2023 08:55 AM    Glucose (POC) 282 (H) 07/24/2022 08:26 AM    Creatinine (POC) 0.7 02/06/2013 10:21 AM     Lab Results   Component Value Date/Time    Bilirubin, total 0.5 02/06/2023 08:55 AM    ALT (SGPT) 27 02/06/2023 08:55 AM    Alk. phosphatase 189 (H) 02/06/2023 08:55 AM    Protein, total 6.7 02/06/2023 08:55 AM    Albumin 3.0 (L) 02/06/2023 08:55 AM    Globulin 3.7 02/06/2023 08:55 AM       CT Results (most recent):  Results from Hospital Encounter encounter on 01/11/23    CT CHEST ABD PELV W CONT    Narrative  EXAM: CT CHEST ABD PELV W CONT    INDICATION: Malignant neoplasm of pancreas    COMPARISON: August 9, 2022    IV CONTRAST: 100 mL of Isovue-370.     ORAL CONTRAST: Oral contrast was administered to better evaluate the bowel. TECHNIQUE:  Following the uneventful intravenous administration of contrast, thin axial  images were obtained through the chest, abdomen and pelvis. Coronal and sagittal  reformats were generated. CT dose reduction was achieved through use of a  standardized protocol tailored for this examination and automatic exposure  control for dose modulation. FINDINGS:    CHEST WALL: Left chest wall port catheter terminates in the SVC. THYROID: No nodule. MEDIASTINUM: No mass or lymphadenopathy. RADHA: No mass or lymphadenopathy. THORACIC AORTA: No dissection or aneurysm. MAIN PULMONARY ARTERY: Normal in caliber. TRACHEA/BRONCHI: Patent. ESOPHAGUS: No wall thickening or dilatation. HEART: Normal in size. Coronary artery calcium: present  PLEURA: No effusion or pneumothorax. LUNGS: Multiple bilateral pulmonary nodules have slightly increased in size for  example a right lower lobe pulmonary nodule measuring 8 mm previously measuring  7 mm and a right upper lobe pulmonary nodule measuring 6 mm, previously  measuring 4 mm. Majority of the pre-existing nodules demonstrate some subjective  increase in size. LIVER: Worsening intrahepatic biliary ductal dilation in the left hepatic lobe  with resultant left lobe atrophy. Scattered hepatic cysts. BILIARY TREE: Cholecystectomy. CBD is not dilated. SPLEEN: Status post splenectomy. PANCREAS: Status post distal pancreatectomy. ADRENALS: Unremarkable. KIDNEYS: No mass, calculus, or hydronephrosis. STOMACH: Unremarkable. SMALL BOWEL: No dilatation or wall thickening. COLON: No dilatation or wall thickening. APPENDIX:  PERITONEUM: Increased density of the peritoneal nodularity deep to the anterior  abdominal wall. No ascites. RETROPERITONEUM: No lymphadenopathy or aortic aneurysm. REPRODUCTIVE ORGANS: Status post hysterectomy. URINARY BLADDER: No mass or calculus.   BONES: Lumbar spine spondylolisthesis and endplate degenerative changes. No  fracture or aggressive osseous lesion. ABDOMINAL WALL: No mass or hernia. ADDITIONAL COMMENTS: N/A    Impression  1. Progression of disease characterized by increase in size of pre-existing  pulmonary nodules and increased density and progression of peritoneal disease. 2.  No ascites. Assessment/PLAN:     1) Pancreatic Adenocarcinoma metastatic to the peritoneum and lungs. ECOG PS 1  Intent of Treatment - palliative  Prognosis: Guarded. Received neoadjuvant chemotherapy. Radiation with Dr Manuel Lainez. Definitive resection with Dr Ana Paula Galaviz. BRCA negative. Now peritoneal disease/mets  On chemo since 7/2021    Recent CT - progression of disease in the lungs and peritoneum  Plan for CT guided Bx  D/C [place]/Abraxane    Onivyde/5-FU, 4 cycles    CINV Gr 2  Anorexia  Diarrhea Gr 2  Weight loss +     CT shows disease progression in the lungs and peritoneum. CA 19-9 is rising over time    I offered her to screen for a ph 1 trial @ [facility]. I referred her to Dr. Burton Armstrong @ [facility]    Second option is to treat with Enhertu since the tumor is her 2 amplified. I educated her about the fact that this drug is not approved and not proven in this disease. The side effects include nausea, diarrhea and ILD. She understands and is willing to take the medicine. Enhertu 5.4 mg/kg Q3w    Labs look good. We may have to delay treatment since insurance approval is not in place. 2. CINV    Escalate anti-emetics      3. Chemotherapy induced diarrhea    Educated about the appropriate use of lomotil/Immodium        Signed By: Camacho Flores MD          The patient was evaluated through a synchronous (real-time) audio-video encounter. The patient (or guardian if applicable) is aware that this is a billable service, which includes applicable co-pays. This Virtual Visit was conducted with patient's (and/or legal guardian's) consent.  The visit was conducted pursuant to the emergency declaration under the [address] [place] [address], [address] [place] [address] waiver authority and the [employer] General Act. Patient identification was verified, and a caregiver was present when appropriate. The patient was located in a state where the provider was licensed to provide care.

## 2023-02-07 ENCOUNTER — PATIENT MESSAGE (OUTPATIENT)
Dept: FAMILY MEDICINE CLINIC | Age: 68
End: 2023-02-07

## 2023-02-07 ENCOUNTER — HOSPITAL ENCOUNTER (OUTPATIENT)
Dept: INFUSION THERAPY | Age: 68
Discharge: HOME | End: 2023-02-07
Payer: MEDICARE

## 2023-02-07 VITALS
HEART RATE: 94 BPM | DIASTOLIC BLOOD PRESSURE: 87 MMHG | HEIGHT: 63 IN | RESPIRATION RATE: 20 BRPM | TEMPERATURE: 98.5 F | WEIGHT: 169.8 LBS | BODY MASS INDEX: 30.09 KG/M2 | SYSTOLIC BLOOD PRESSURE: 165 MMHG | OXYGEN SATURATION: 98 %

## 2023-02-07 DIAGNOSIS — C25.9 PANCREATIC CANCER METASTASIZED TO LIVER (HCC): Primary | ICD-10-CM

## 2023-02-07 DIAGNOSIS — C78.7 PANCREATIC CANCER METASTASIZED TO LIVER (HCC): Primary | ICD-10-CM

## 2023-02-07 DIAGNOSIS — E11.65 UNCONTROLLED TYPE 2 DIABETES MELLITUS WITH HYPERGLYCEMIA (HCC): ICD-10-CM

## 2023-02-07 DIAGNOSIS — C25.9 ADENOCARCINOMA OF PANCREAS (HCC): ICD-10-CM

## 2023-02-07 DIAGNOSIS — C25.0 PANCREATIC MALIGNANCY SYNDROME (HCC): Primary | ICD-10-CM

## 2023-02-07 DIAGNOSIS — E11.21 TYPE 2 DIABETES MELLITUS WITH NEPHROPATHY (HCC): ICD-10-CM

## 2023-02-07 PROCEDURE — 96413 CHEMO IV INFUSION 1 HR: CPT

## 2023-02-07 PROCEDURE — 74011250636 HC RX REV CODE- 250/636: Performed by: INTERNAL MEDICINE

## 2023-02-07 PROCEDURE — 96375 TX/PRO/DX INJ NEW DRUG ADDON: CPT

## 2023-02-07 PROCEDURE — 74011000258 HC RX REV CODE- 258: Performed by: INTERNAL MEDICINE

## 2023-02-07 PROCEDURE — 74011000250 HC RX REV CODE- 250: Performed by: INTERNAL MEDICINE

## 2023-02-07 PROCEDURE — 77030012965 HC NDL HUBR BBMI -A

## 2023-02-07 RX ORDER — ACETAMINOPHEN 325 MG/1
650 TABLET ORAL AS NEEDED
Status: ACTIVE | OUTPATIENT
Start: 2023-02-07 | End: 2023-02-07

## 2023-02-07 RX ORDER — DEXTROSE MONOHYDRATE 50 MG/ML
5-250 INJECTION, SOLUTION INTRAVENOUS AS NEEDED
Status: DISPENSED | OUTPATIENT
Start: 2023-02-07 | End: 2023-02-07

## 2023-02-07 RX ORDER — HEPARIN 100 UNIT/ML
500 SYRINGE INTRAVENOUS AS NEEDED
Status: DISPENSED | OUTPATIENT
Start: 2023-02-07 | End: 2023-02-07

## 2023-02-07 RX ORDER — ONDANSETRON 2 MG/ML
8 INJECTION INTRAMUSCULAR; INTRAVENOUS AS NEEDED
Status: ACTIVE | OUTPATIENT
Start: 2023-02-07 | End: 2023-02-07

## 2023-02-07 RX ORDER — PALONOSETRON 0.05 MG/ML
0.25 INJECTION, SOLUTION INTRAVENOUS ONCE
Status: COMPLETED | OUTPATIENT
Start: 2023-02-07 | End: 2023-02-07

## 2023-02-07 RX ORDER — SODIUM CHLORIDE 0.9 % (FLUSH) 0.9 %
5-40 SYRINGE (ML) INJECTION AS NEEDED
Status: DISPENSED | OUTPATIENT
Start: 2023-02-07 | End: 2023-02-07

## 2023-02-07 RX ORDER — DIPHENHYDRAMINE HYDROCHLORIDE 50 MG/ML
25 INJECTION, SOLUTION INTRAMUSCULAR; INTRAVENOUS AS NEEDED
Status: ACTIVE | OUTPATIENT
Start: 2023-02-07 | End: 2023-02-07

## 2023-02-07 RX ADMIN — SODIUM CHLORIDE, PRESERVATIVE FREE 10 ML: 5 INJECTION INTRAVENOUS at 12:21

## 2023-02-07 RX ADMIN — PALONOSETRON 0.25 MG: 0.05 INJECTION, SOLUTION INTRAVENOUS at 10:07

## 2023-02-07 RX ADMIN — SODIUM CHLORIDE, PRESERVATIVE FREE 10 ML: 5 INJECTION INTRAVENOUS at 10:06

## 2023-02-07 RX ADMIN — FAM-TRASTUZUMAB DERUXTECAN-NXKI 408 MG: 100 INJECTION, POWDER, LYOPHILIZED, FOR SOLUTION INTRAVENOUS at 10:44

## 2023-02-07 RX ADMIN — DEXTROSE MONOHYDRATE 25 ML/HR: 50 INJECTION, SOLUTION INTRAVENOUS at 10:10

## 2023-02-07 RX ADMIN — Medication 500 UNITS: at 12:22

## 2023-02-07 RX ADMIN — SODIUM CHLORIDE, PRESERVATIVE FREE 10 ML: 5 INJECTION INTRAVENOUS at 10:08

## 2023-02-07 RX ADMIN — DEXAMETHASONE SODIUM PHOSPHATE 12 MG: 4 INJECTION, SOLUTION INTRAMUSCULAR; INTRAVENOUS at 10:10

## 2023-02-07 NOTE — PROGRESS NOTES
[facility] Progress Note    Date: 2023    Name: Wilfrid Todd    MRN: [id number]         : 1955    Ms. Ba Arrived ambulatory and in no distress for cycle 1 day 1 of Enhertu regimen. Assessment was completed, no acute issues at this time, no new complaints voiced. Port a Cath accessed without difficulty, brisk blood return. Lab work done 2023 and met criteria for treatment. Chemotherapy Flowsheet 2023   Cycle C1D1   Date 2023   Drug / Regimen Enhertu   Dosage 408 mg IV   Time Up 1044   Time Down 1215   Pre Hydration -   Pre Meds Aloxi 0.25 mg IV/Dexamethasone 12 mg IV   Notes -         Ms. Ba's vitals were reviewed. Visit Vitals  BP (!) 165/87 (BP 1 Location: Left upper arm, BP Patient Position: Sitting)   Pulse 94   Temp 98.5 °F (36.9 °C)   Resp 20   Ht 5' 3\" (1.6 m)   Wt 77 kg (169 lb 12.8 oz)   SpO2 98%   Breastfeeding No   BMI 30.08 kg/m²       Pre-medications  were administered as ordered and chemotherapy was initiated. Two nurses verified prior to administering: drug name, drug dose, infusion volume or drug volume  when prepared in a syringe, rate of administration, route of administration , expiration dates and/or times, appearance and physical integrity of the drugs, rate set on infusion pump, when used, sequencing of drug administration. Aloxi 0.25 mg IVP  D5W at [facility]. Dexamethasone 12 mg IV infused over 15 minutes via pump. Enhertu 408 mg IV infused over 90 minutes via pump. Line and port flushed at completion of infusion, brisk blood return, port needle removed and band aid to site. Site without redness, swelling or pain. No adverse reactions noted. Ms. Elenita Toledo tolerated treatment well and was discharged from [facility] in stable condition at 1230. She is to return on 2023 at [facility] [address] for her next appointment.     Reji Shhaid RN  2023

## 2023-02-07 NOTE — DISCHARGE INSTRUCTIONS
OUTPATIENT INFUSION CENTER    DISCHARGE INSTRUCTIONS FOR:  CHEMOTHERAPY / BIOTHERAPY    Aloxi 0.25 mg IVP  Dexamethasone 12 mg IV  Enhertu 408 mg IV    Chemotherapy has the potential to cause many side effects. The following are general precautions that chemo patients should take:    1. Practice good hand washing:   * Use soap and water for at least 15 seconds, covering all areas of hands. * Always wash hands before eating. * Wash hands after contact with public surfaces such as door knobs and         handles, shopping carts, telephones and elevator buttons. 2. Get plenty of rest:    * You will likely experience fatigue three to five days following your treatment. It may last as long as seven days. 3. Drink plenty of fluids. Water is best.    4. Eat a well balanced diet:  * Small frequent meals may help if you are having trouble with nausea or your  appetite. Some people also do well with nutritional supplements. 5. Pace yourself with daily activities:   * Take frequent breaks and ask for help if you need it. 6. Exercise is very important:  * It will increase circulation and will help the fatigue. Do what you can each day. 7. If your regimen results in hair loss:  *  You will likely notice effects between two and three weeks following your first treatment. Some lose all hair while others only experience thinning. 8. Practice good oral hygiene:   *  Notify your M.D. immediately if any mouth sores or discomfort develop. 9. Protect yourself from the sun. Signs/Symptoms of an allergic reaction and/or some side effects may require immediate medical attention. Notify your physician if you develop one or more of the following:     Temperature of 100.5 degrees or greater;   Skin redness, itching, swelling, blistering, weeping, crusting, rash, or hives;    Wheezing, chest tightness, cough, or shortness of breath;   Swelling of the face, eyelids, lips, tongue, or throat;  Severe, persistent headache;  Stuffy nose, runny nose, sneezing;   Red (bloodshot), itchy, swollen, or watery eyes;   Stomach  pain, nausea, vomiting, diarrhea, or bloody stools;  Mouth sores        Your physician should also be aware of the following symptoms:    Persistent and unresolved nausea and/or vomiting;   Persistent and unresolved diarrhea or constipation;   Numbness/tingling/burning of the extremities, including the fingers and toes; Bleeding or unexplained bruising; Unexplained redness/swelling/pain in the arms or legs; Shortness of breath or fatigue that worsens;   Pain with urination or blood in the urine; Chills;  Cough, especially a productive cough;  Mouth sores or a white coating of the tongue; Redness, swelling, pain or drainage at the port-a-cath or IV site; Increased feeling of bloating or water retention; Excessive weight loss or gain;  Ringing in the ears; Difficulty swallowing;  Dizziness, vertigo, lightheadedness or fainting.           Evonne Ba Signature: ____________________________ 2/7/2023  Sakshi West RN

## 2023-02-08 ENCOUNTER — APPOINTMENT (OUTPATIENT)
Dept: INFUSION THERAPY | Age: 68
End: 2023-02-08
Payer: MEDICARE

## 2023-02-09 RX ORDER — BLOOD-GLUCOSE SENSOR
1 EACH MISCELLANEOUS EVERY 2 WEEKS
Qty: 6 EACH | Refills: 3 | Status: SHIPPED | OUTPATIENT
Start: 2023-02-09 | End: 2023-02-10 | Stop reason: SDUPTHER

## 2023-02-10 RX ORDER — BLOOD-GLUCOSE SENSOR
1 EACH MISCELLANEOUS EVERY 2 WEEKS
Qty: 6 EACH | Refills: 3 | Status: SHIPPED | OUTPATIENT
Start: 2023-02-10 | End: 2023-02-10 | Stop reason: ALTCHOICE

## 2023-02-10 RX ORDER — BLOOD-GLUCOSE,RECEIVER,CONT
1 EACH MISCELLANEOUS 4 TIMES DAILY
Qty: 1 EACH | Refills: 0 | Status: SHIPPED | OUTPATIENT
Start: 2023-02-10

## 2023-02-10 RX ORDER — BLOOD-GLUCOSE SENSOR
1 EACH MISCELLANEOUS EVERY 2 WEEKS
Qty: 2 EACH | Refills: 11 | Status: SHIPPED | OUTPATIENT
Start: 2023-02-10

## 2023-02-10 RX ORDER — BLOOD-GLUCOSE TRANSMITTER
1 EACH MISCELLANEOUS 4 TIMES DAILY
Qty: 1 EACH | Refills: 0 | Status: SHIPPED | OUTPATIENT
Start: 2023-02-10

## 2023-02-13 ENCOUNTER — APPOINTMENT (OUTPATIENT)
Dept: INFUSION THERAPY | Age: 68
End: 2023-02-13
Payer: MEDICARE

## 2023-02-14 DIAGNOSIS — C25.9 PRIMARY PANCREATIC CANCER WITH METASTASIS TO OTHER SITE (HCC): ICD-10-CM

## 2023-02-14 DIAGNOSIS — E87.6 HYPOKALEMIA: ICD-10-CM

## 2023-02-15 ENCOUNTER — APPOINTMENT (OUTPATIENT)
Dept: INFUSION THERAPY | Age: 68
End: 2023-02-15

## 2023-02-15 RX ORDER — POTASSIUM CHLORIDE 750 MG/1
TABLET, FILM COATED, EXTENDED RELEASE ORAL
Qty: 120 TABLET | Refills: 1 | Status: SHIPPED | OUTPATIENT
Start: 2023-02-15

## 2023-02-20 ENCOUNTER — APPOINTMENT (OUTPATIENT)
Dept: INFUSION THERAPY | Age: 68
End: 2023-02-20
Payer: MEDICARE

## 2023-02-20 RX ORDER — ONDANSETRON 2 MG/ML
8 INJECTION INTRAMUSCULAR; INTRAVENOUS AS NEEDED
OUTPATIENT
Start: 2023-02-27

## 2023-02-20 RX ORDER — SODIUM CHLORIDE 9 MG/ML
5-40 INJECTION INTRAVENOUS AS NEEDED
OUTPATIENT
Start: 2023-02-27

## 2023-02-20 RX ORDER — HYDROCORTISONE SODIUM SUCCINATE 100 MG/2ML
100 INJECTION, POWDER, FOR SOLUTION INTRAMUSCULAR; INTRAVENOUS AS NEEDED
OUTPATIENT
Start: 2023-02-27

## 2023-02-20 RX ORDER — ACETAMINOPHEN 325 MG/1
650 TABLET ORAL AS NEEDED
Start: 2023-02-27

## 2023-02-20 RX ORDER — SODIUM CHLORIDE 0.9 % (FLUSH) 0.9 %
5-40 SYRINGE (ML) INJECTION AS NEEDED
OUTPATIENT
Start: 2023-02-27

## 2023-02-20 RX ORDER — DEXTROSE MONOHYDRATE 50 MG/ML
5-250 INJECTION, SOLUTION INTRAVENOUS AS NEEDED
OUTPATIENT
Start: 2023-02-27

## 2023-02-20 RX ORDER — ALBUTEROL SULFATE 0.83 MG/ML
2.5 SOLUTION RESPIRATORY (INHALATION) AS NEEDED
Start: 2023-02-27

## 2023-02-20 RX ORDER — DIPHENHYDRAMINE HYDROCHLORIDE 50 MG/ML
50 INJECTION, SOLUTION INTRAMUSCULAR; INTRAVENOUS AS NEEDED
Start: 2023-02-27

## 2023-02-20 RX ORDER — PALONOSETRON 0.05 MG/ML
0.25 INJECTION, SOLUTION INTRAVENOUS ONCE
OUTPATIENT
Start: 2023-02-27 | End: 2023-02-27

## 2023-02-20 RX ORDER — SODIUM CHLORIDE 9 MG/ML
5-250 INJECTION, SOLUTION INTRAVENOUS AS NEEDED
OUTPATIENT
Start: 2023-02-27

## 2023-02-20 RX ORDER — DIPHENHYDRAMINE HYDROCHLORIDE 50 MG/ML
25 INJECTION, SOLUTION INTRAMUSCULAR; INTRAVENOUS AS NEEDED
Start: 2023-02-27

## 2023-02-20 RX ORDER — EPINEPHRINE 1 MG/ML
0.3 INJECTION, SOLUTION, CONCENTRATE INTRAVENOUS AS NEEDED
OUTPATIENT
Start: 2023-02-27

## 2023-02-20 RX ORDER — HEPARIN 100 UNIT/ML
500 SYRINGE INTRAVENOUS AS NEEDED
Start: 2023-02-27

## 2023-02-22 ENCOUNTER — APPOINTMENT (OUTPATIENT)
Dept: INFUSION THERAPY | Age: 68
End: 2023-02-22

## 2023-02-23 ENCOUNTER — TELEPHONE (OUTPATIENT)
Dept: FAMILY MEDICINE CLINIC | Age: 68
End: 2023-02-23

## 2023-02-23 NOTE — TELEPHONE ENCOUNTER
Below information was faxed to [employer] at [phone] attention Medicare CGM. Meliton Ba  1955  [address] [place] [address] [address] [id number]    No Known Allergies    Past Medical History:   Diagnosis Date    Adenocarcinoma of pancreas ([address] 05/13/2020    Dr Phil Yeung biopsy via EUS    Diabetes [facility])     GERD (gastroesophageal reflux disease)     Hernia of abdominal cavity     Subxyphoid hernia    Hypertension     Inflammatory polyarthropathy (HCC)     Joint pain     Joint swelling     Menopause     Ocular nevus of left eye     Pancreatitis     Tracheal stenosis          Current Outpatient Medications   Medication Instructions    acetaminophen (TYLENOL) 1,000 mg, Oral, 2 TIMES DAILY AS NEEDED    Blood-Glucose Meter,Continuous (Dexcom G6 ) misc 1 Each, Does Not Apply, 4 TIMES DAILY, DM2 on multiple insulin injections. Pancreatic CA; z79.4, e11.9, c25.9    Blood-Glucose Sensor (Dexcom G6 Sensor) fadi 1 Each, Does Not Apply, EVERY 2 WEEKS, DM2 on multiple insulin injections.  Pancreatic CA; z79.4, e11.9, c25.9    Blood-Glucose Transmitter (Dexcom G6 Transmitter) fadi 1 Each, Does Not Apply, 4 TIMES DAILY    diphenoxylate-atropine (LOMOTIL) 2.5-0.025 mg per tablet 1 Tablet, Oral, AS NEEDED    DULoxetine (CYMBALTA) 60 mg, Oral, DAILY    DULoxetine (CYMBALTA) 30 mg, Oral, EVERY EVENING    famotidine (PEPCID) 20 mg, Oral, 2 TIMES DAILY    gabapentin (NEURONTIN) 300 mg capsule TAKE 2 CAPSULES BY MOUTH THREE TIMES DAILY    HumaLOG KwikPen Insulin 100 unit/mL kwikpen INJECT SUBCUTANEOUSLY ON SLIDING SCALE AS DIRECTED    ibuprofen (MOTRIN) 600 mg, Oral, 2 TIMES DAILY AS NEEDED    lidocaine-prilocaine (EMLA) topical cream Topical, AS NEEDED, Apply generous amount to port site 30-45 min prior to infusions    loperamide (IMODIUM) 4 mg, Oral, AS NEEDED    LORazepam (ATIVAN) 1 mg tablet TAKE 1 TABLET BY MOUTH EVERY MORNING AND TAKE 2 TABLETS AT BEDTIME    magnesium oxide (MAG-OX) 400 mg, Oral, DAILY methylphenidate HCl (RITALIN) 10 mg, Oral, 2 TIMES DAILY    multivit-minerals/folic acid (MULTIVITAMIN GUMMIES PO) 2 Each, Oral, EVERY MORNING    nystatin (MYCOSTATIN) 100,000 Units, Topical, 3 TIMES DAILY    OLANZapine (ZYPREXA) 10 mg, Oral, EVERY BEDTIME, For 5 nights starting the night after chemotherapy    ondansetron (ZOFRAN ODT) 4 mg, Oral, EVERY 8 HOURS AS NEEDED    potassium chloride SR (KLOR-CON 10) 10 mEq tablet Take 2 Tablets by mouth two (2) times a day.     prochlorperazine (COMPAZINE) 10 mg, Oral, EVERY 6 HOURS AS NEEDED    senna-docusate (PERICOLACE) 8.6-50 mg per tablet BEDTIME PRN    Carlos Jimenez U-300 Insulin 300 unit/mL (1.5 mL) inpn pen INJECT 55 UNITS ONCE DAILY AT BEDTIME - DOSE INCREASED

## 2023-02-24 NOTE — PROGRESS NOTES
Bharathi Shipley is a 79 y.o. female here for follow up for Pancreatic cancer. Patient is scheduled for C2D1 Enhertu today in [facility]. Patient fell on 2/9/23, 2/11/23 and 2/15/23. The last fall she hit her head, which cause a large lump. The head is not painful at this time. Patient did have swelling in her R leg for 5-6 days, she stayed home and elevated her leg during this time. She has trace pitting edema today in LE bilaterally. He R foot on outside is slightly swollen as well. Patient denies additional complaint. Key Oncology Meds               ondansetron (ZOFRAN ODT) 4 mg disintegrating tablet Take 1 Tablet by mouth every eight (8) hours as needed for Nausea or Vomiting. prochlorperazine (Compazine) 10 mg tablet Take 1 Tablet by mouth every six (6) hours as needed for Nausea or Vomiting. Key Pain Meds               acetaminophen (TYLENOL) 500 mg tablet Take 1,000 mg by mouth two (2) times daily as needed. Indications: pain    ibuprofen (MOTRIN) 200 mg tablet Take 600 mg by mouth two (2) times daily as needed.  Indications: pain            Chemotherapy Flowsheet 2/7/2023   Cycle C1D1   Date 2/7/2023   Drug / Regimen Enhertu   Dosage 408 mg IV   Time Up 1044   Time Down 1215   Pre Hydration -   Pre Meds Aloxi 0.25 mg IV/Dexamethasone 12 mg IV   Notes -

## 2023-02-27 ENCOUNTER — HOSPITAL ENCOUNTER (OUTPATIENT)
Dept: INFUSION THERAPY | Age: 68
Discharge: HOME | End: 2023-02-27
Payer: MEDICARE

## 2023-02-27 ENCOUNTER — VIRTUAL VISIT (OUTPATIENT)
Dept: ONCOLOGY | Age: 68
End: 2023-02-27
Payer: MEDICARE

## 2023-02-27 VITALS
WEIGHT: 169.8 LBS | HEIGHT: 63 IN | HEART RATE: 94 BPM | TEMPERATURE: 98.3 F | OXYGEN SATURATION: 99 % | RESPIRATION RATE: 18 BRPM | DIASTOLIC BLOOD PRESSURE: 81 MMHG | BODY MASS INDEX: 30.09 KG/M2 | SYSTOLIC BLOOD PRESSURE: 142 MMHG

## 2023-02-27 VITALS
WEIGHT: 169.8 LBS | BODY MASS INDEX: 30.09 KG/M2 | DIASTOLIC BLOOD PRESSURE: 81 MMHG | RESPIRATION RATE: 18 BRPM | SYSTOLIC BLOOD PRESSURE: 142 MMHG | HEART RATE: 94 BPM | OXYGEN SATURATION: 99 % | TEMPERATURE: 98.3 F | HEIGHT: 63 IN

## 2023-02-27 DIAGNOSIS — C78.6 PERITONEAL CARCINOMATOSIS (HCC): ICD-10-CM

## 2023-02-27 DIAGNOSIS — C25.9 PRIMARY PANCREATIC CANCER WITH METASTASIS TO OTHER SITE (HCC): Primary | ICD-10-CM

## 2023-02-27 DIAGNOSIS — C25.0 PANCREATIC MALIGNANCY SYNDROME (HCC): Primary | ICD-10-CM

## 2023-02-27 DIAGNOSIS — Z51.11 ENCOUNTER FOR ANTINEOPLASTIC CHEMOTHERAPY: ICD-10-CM

## 2023-02-27 DIAGNOSIS — C25.9 ADENOCARCINOMA OF PANCREAS (HCC): ICD-10-CM

## 2023-02-27 LAB
ALBUMIN SERPL-MCNC: 3 G/DL (ref 3.5–5)
ALBUMIN/GLOB SERPL: 0.8 (ref 1.1–2.2)
ALP SERPL-CCNC: 350 U/L (ref 45–117)
ALT SERPL-CCNC: 86 U/L (ref 12–78)
ANION GAP SERPL CALC-SCNC: 7 MMOL/L (ref 5–15)
AST SERPL-CCNC: 100 U/L (ref 15–37)
BASOPHILS # BLD: 0.1 K/UL (ref 0–0.1)
BASOPHILS NFR BLD: 1 % (ref 0–1)
BILIRUB SERPL-MCNC: 0.4 MG/DL (ref 0.2–1)
BUN SERPL-MCNC: 12 MG/DL (ref 6–20)
BUN/CREAT SERPL: 16 (ref 12–20)
CALCIUM SERPL-MCNC: 9 MG/DL (ref 8.5–10.1)
CHLORIDE SERPL-SCNC: 101 MMOL/L (ref 97–108)
CO2 SERPL-SCNC: 28 MMOL/L (ref 21–32)
COMMENT, HOLDF: NORMAL
CREAT SERPL-MCNC: 0.74 MG/DL (ref 0.55–1.02)
DIFFERENTIAL METHOD BLD: ABNORMAL
EOSINOPHIL # BLD: 0.3 K/UL (ref 0–0.4)
EOSINOPHIL NFR BLD: 4 % (ref 0–7)
ERYTHROCYTE [DISTWIDTH] IN BLOOD BY AUTOMATED COUNT: 16.9 % (ref 11.5–14.5)
GLOBULIN SER CALC-MCNC: 3.9 G/DL (ref 2–4)
GLUCOSE SERPL-MCNC: 246 MG/DL (ref 65–100)
HCT VFR BLD AUTO: 34.6 % (ref 35–47)
HGB BLD-MCNC: 11.5 G/DL (ref 11.5–16)
IMM GRANULOCYTES # BLD AUTO: 0 K/UL (ref 0–0.04)
IMM GRANULOCYTES NFR BLD AUTO: 0 % (ref 0–0.5)
LYMPHOCYTES # BLD: 1.9 K/UL (ref 0.8–3.5)
LYMPHOCYTES NFR BLD: 26 % (ref 12–49)
MCH RBC QN AUTO: 33.3 PG (ref 26–34)
MCHC RBC AUTO-ENTMCNC: 33.2 G/DL (ref 30–36.5)
MCV RBC AUTO: 100.3 FL (ref 80–99)
MONOCYTES # BLD: 1.4 K/UL (ref 0–1)
MONOCYTES NFR BLD: 20 % (ref 5–13)
NEUTS SEG # BLD: 3.5 K/UL (ref 1.8–8)
NEUTS SEG NFR BLD: 49 % (ref 32–75)
NRBC # BLD: 0.02 K/UL (ref 0–0.01)
NRBC BLD-RTO: 0.3 PER 100 WBC
PLATELET # BLD AUTO: 534 K/UL (ref 150–400)
PMV BLD AUTO: 10.3 FL (ref 8.9–12.9)
POTASSIUM SERPL-SCNC: 3.9 MMOL/L (ref 3.5–5.1)
PROT SERPL-MCNC: 6.9 G/DL (ref 6.4–8.2)
RBC # BLD AUTO: 3.45 M/UL (ref 3.8–5.2)
SAMPLES BEING HELD,HOLD: NORMAL
SODIUM SERPL-SCNC: 136 MMOL/L (ref 136–145)
WBC # BLD AUTO: 7.2 K/UL (ref 3.6–11)

## 2023-02-27 PROCEDURE — 3079F DIAST BP 80-89 MM HG: CPT | Performed by: INTERNAL MEDICINE

## 2023-02-27 PROCEDURE — G8536 NO DOC ELDER MAL SCRN: HCPCS | Performed by: INTERNAL MEDICINE

## 2023-02-27 PROCEDURE — 74011000258 HC RX REV CODE- 258: Performed by: NURSE PRACTITIONER

## 2023-02-27 PROCEDURE — 3077F SYST BP >= 140 MM HG: CPT | Performed by: INTERNAL MEDICINE

## 2023-02-27 PROCEDURE — G0463 HOSPITAL OUTPT CLINIC VISIT: HCPCS | Performed by: INTERNAL MEDICINE

## 2023-02-27 PROCEDURE — 1123F ACP DISCUSS/DSCN MKR DOCD: CPT | Performed by: INTERNAL MEDICINE

## 2023-02-27 PROCEDURE — 74011000250 HC RX REV CODE- 250: Performed by: NURSE PRACTITIONER

## 2023-02-27 PROCEDURE — 1090F PRES/ABSN URINE INCON ASSESS: CPT | Performed by: INTERNAL MEDICINE

## 2023-02-27 PROCEDURE — G8417 CALC BMI ABV UP PARAM F/U: HCPCS | Performed by: INTERNAL MEDICINE

## 2023-02-27 PROCEDURE — 77030012965 HC NDL HUBR BBMI -A

## 2023-02-27 PROCEDURE — 96375 TX/PRO/DX INJ NEW DRUG ADDON: CPT

## 2023-02-27 PROCEDURE — 96413 CHEMO IV INFUSION 1 HR: CPT

## 2023-02-27 PROCEDURE — 36415 COLL VENOUS BLD VENIPUNCTURE: CPT

## 2023-02-27 PROCEDURE — G8399 PT W/DXA RESULTS DOCUMENT: HCPCS | Performed by: INTERNAL MEDICINE

## 2023-02-27 PROCEDURE — 74011250636 HC RX REV CODE- 250/636: Performed by: NURSE PRACTITIONER

## 2023-02-27 PROCEDURE — 1101F PT FALLS ASSESS-DOCD LE1/YR: CPT | Performed by: INTERNAL MEDICINE

## 2023-02-27 PROCEDURE — G8427 DOCREV CUR MEDS BY ELIG CLIN: HCPCS | Performed by: INTERNAL MEDICINE

## 2023-02-27 PROCEDURE — 80053 COMPREHEN METABOLIC PANEL: CPT

## 2023-02-27 PROCEDURE — 85025 COMPLETE CBC W/AUTO DIFF WBC: CPT

## 2023-02-27 PROCEDURE — 99215 OFFICE O/P EST HI 40 MIN: CPT | Performed by: INTERNAL MEDICINE

## 2023-02-27 PROCEDURE — G8510 SCR DEP NEG, NO PLAN REQD: HCPCS | Performed by: INTERNAL MEDICINE

## 2023-02-27 PROCEDURE — 3017F COLORECTAL CA SCREEN DOC REV: CPT | Performed by: INTERNAL MEDICINE

## 2023-02-27 RX ORDER — DIPHENHYDRAMINE HYDROCHLORIDE 50 MG/ML
25 INJECTION, SOLUTION INTRAMUSCULAR; INTRAVENOUS AS NEEDED
Status: ACTIVE | OUTPATIENT
Start: 2023-02-27 | End: 2023-02-27

## 2023-02-27 RX ORDER — ACETAMINOPHEN 325 MG/1
650 TABLET ORAL AS NEEDED
Status: ACTIVE | OUTPATIENT
Start: 2023-02-27 | End: 2023-02-27

## 2023-02-27 RX ORDER — SODIUM CHLORIDE 0.9 % (FLUSH) 0.9 %
5-40 SYRINGE (ML) INJECTION AS NEEDED
Status: DISPENSED | OUTPATIENT
Start: 2023-02-27 | End: 2023-02-27

## 2023-02-27 RX ORDER — DEXTROSE MONOHYDRATE 50 MG/ML
5-250 INJECTION, SOLUTION INTRAVENOUS AS NEEDED
Status: DISPENSED | OUTPATIENT
Start: 2023-02-27 | End: 2023-02-27

## 2023-02-27 RX ORDER — ONDANSETRON 2 MG/ML
8 INJECTION INTRAMUSCULAR; INTRAVENOUS AS NEEDED
Status: ACTIVE | OUTPATIENT
Start: 2023-02-27 | End: 2023-02-27

## 2023-02-27 RX ORDER — HEPARIN 100 UNIT/ML
500 SYRINGE INTRAVENOUS AS NEEDED
Status: DISPENSED | OUTPATIENT
Start: 2023-02-27 | End: 2023-02-27

## 2023-02-27 RX ORDER — PALONOSETRON 0.05 MG/ML
0.25 INJECTION, SOLUTION INTRAVENOUS ONCE
Status: COMPLETED | OUTPATIENT
Start: 2023-02-27 | End: 2023-02-27

## 2023-02-27 RX ADMIN — SODIUM CHLORIDE, PRESERVATIVE FREE 10 ML: 5 INJECTION INTRAVENOUS at 11:14

## 2023-02-27 RX ADMIN — HEPARIN 500 UNITS: 100 SYRINGE at 11:14

## 2023-02-27 RX ADMIN — DEXTROSE MONOHYDRATE 25 ML/HR: 50 INJECTION, SOLUTION INTRAVENOUS at 09:55

## 2023-02-27 RX ADMIN — FAM-TRASTUZUMAB DERUXTECAN-NXKI 408 MG: 100 INJECTION, POWDER, LYOPHILIZED, FOR SOLUTION INTRAVENOUS at 10:40

## 2023-02-27 RX ADMIN — DEXAMETHASONE SODIUM PHOSPHATE 12 MG: 4 INJECTION, SOLUTION INTRAMUSCULAR; INTRAVENOUS at 10:00

## 2023-02-27 RX ADMIN — SODIUM CHLORIDE, PRESERVATIVE FREE 10 ML: 5 INJECTION INTRAVENOUS at 09:51

## 2023-02-27 RX ADMIN — SODIUM CHLORIDE, PRESERVATIVE FREE 10 ML: 5 INJECTION INTRAVENOUS at 09:52

## 2023-02-27 RX ADMIN — PALONOSETRON 0.25 MG: 0.05 INJECTION, SOLUTION INTRAVENOUS at 09:51

## 2023-02-27 NOTE — DISCHARGE INSTRUCTIONS
OUTPATIENT INFUSION CENTER    DISCHARGE INSTRUCTIONS FOR:  CHEMOTHERAPY / BIOTHERAPY    Aloxi 0.25 mg IV  Dexamethasone 12 mg IV  Enhertu 408 mg IV    Chemotherapy has the potential to cause many side effects. The following are general precautions that chemo patients should take:    1. Practice good hand washing:   * Use soap and water for at least 15 seconds, covering all areas of hands. * Always wash hands before eating. * Wash hands after contact with public surfaces such as door knobs and         handles, shopping carts, telephones and elevator buttons. 2. Get plenty of rest:    * You will likely experience fatigue three to five days following your treatment. It may last as long as seven days. 3. Drink plenty of fluids. Water is best.    4. Eat a well balanced diet:  * Small frequent meals may help if you are having trouble with nausea or your  appetite. Some people also do well with nutritional supplements. 5. Pace yourself with daily activities:   * Take frequent breaks and ask for help if you need it. 6. Exercise is very important:  * It will increase circulation and will help the fatigue. Do what you can each day. 7. If your regimen results in hair loss:  *  You will likely notice effects between two and three weeks following your first treatment. Some lose all hair while others only experience thinning. 8. Practice good oral hygiene:   *  Notify your M.D. immediately if any mouth sores or discomfort develop. 9. Protect yourself from the sun. Signs/Symptoms of an allergic reaction and/or some side effects may require immediate medical attention. Notify your physician if you develop one or more of the following:     Temperature of 100.5 degrees or greater;   Skin redness, itching, swelling, blistering, weeping, crusting, rash, or hives;    Wheezing, chest tightness, cough, or shortness of breath;   Swelling of the face, eyelids, lips, tongue, or throat;  Severe, persistent headache;  Stuffy nose, runny nose, sneezing;   Red (bloodshot), itchy, swollen, or watery eyes;   Stomach  pain, nausea, vomiting, diarrhea, or bloody stools;  Mouth sores        Your physician should also be aware of the following symptoms:    Persistent and unresolved nausea and/or vomiting;   Persistent and unresolved diarrhea or constipation;   Numbness/tingling/burning of the extremities, including the fingers and toes; Bleeding or unexplained bruising; Unexplained redness/swelling/pain in the arms or legs; Shortness of breath or fatigue that worsens;   Pain with urination or blood in the urine; Chills;  Cough, especially a productive cough;  Mouth sores or a white coating of the tongue; Redness, swelling, pain or drainage at the port-a-cath or IV site; Increased feeling of bloating or water retention; Excessive weight loss or gain;  Ringing in the ears; Difficulty swallowing;  Dizziness, vertigo, lightheadedness or fainting.           Zarina Ba Signature: ____________________________ 2/27/2023  Mikaela Conley RN

## 2023-02-27 NOTE — PROGRESS NOTES
[facility] Progress Note    Date: 2023    Name: Taryn Fontana    MRN: [id number]         : 1955    Ms. Ba Arrived ambulatory and in no distress for cycle 2 day 1 of Enhertu regimen. Assessment was completed. Patient reports fine red, dry  rash to abdomen following last Enhertu treatment followed by dry flaky skin. Resolved. Rash did not itch. She has +1 pitting edema to right foot and trace edema to right ankle with tenderness to ankle area with palpation. Denies calf pain. The right foot was more swollen and red initially, patient reports. Port a cath accessed without difficulty, labs drawn and in process. Chemotherapy Flowsheet 2023   Cycle C2D1   Date 2023   Drug / Regimen Enhertu   Dosage 408 mg IV   Time Up 1040   Time Down 1114   Pre Hydration -   Pre Meds Aloxi 0.25 mg IV/Decadron 12 mg IV   Notes -         0915:  Proceeded to virtual appt with Dr. Stefanie Maya. Ms. Ba's vitals were reviewed. Visit Vitals  BP (!) 142/81 (BP 1 Location: Left upper arm, BP Patient Position: Sitting)   Pulse 94   Temp 98.3 °F (36.8 °C)   Resp 18   Ht 5' 3\" (1.6 m)   Wt 77 kg (169 lb 12.8 oz)   SpO2 99%   BMI 30.08 kg/m²       Lab results were obtained and reviewed. Recent Results (from the past 12 hour(s))   CBC WITH AUTOMATED DIFF    Collection Time: 23  8:44 AM   Result Value Ref Range    WBC 7.2 3.6 - 11.0 K/uL    RBC 3.45 (L) 3.80 - 5.20 M/uL    HGB 11.5 11.5 - 16.0 g/dL    HCT 34.6 (L) 35.0 - 47.0 %    .3 (H) 80.0 - 99.0 FL    MCH 33.3 26.0 - 34.0 PG    MCHC 33.2 30.0 - 36.5 g/dL    RDW 16.9 (H) 11.5 - 14.5 %    PLATELET 040 (H) 136 - 400 K/uL    MPV 10.3 8.9 - 12.9 FL    NRBC 0.3 (H) 0.0  WBC    ABSOLUTE NRBC 0.02 (H) 0.00 - 0.01 K/uL    NEUTROPHILS 49 32 - 75 %    LYMPHOCYTES 26 12 - 49 %    MONOCYTES 20 (H) 5 - 13 %    EOSINOPHILS 4 0 - 7 %    BASOPHILS 1 0 - 1 %    IMMATURE GRANULOCYTES 0 0 - 0.5 %    ABS. NEUTROPHILS 3.5 1.8 - 8.0 K/UL    ABS.  LYMPHOCYTES 1.9 0.8 - 3.5 K/UL    ABS. MONOCYTES 1.4 (H) 0.0 - 1.0 K/UL    ABS. EOSINOPHILS 0.3 0.0 - 0.4 K/UL    ABS. BASOPHILS 0.1 0.0 - 0.1 K/UL    ABS. IMM. GRANS. 0.0 0.00 - 0.04 K/UL    DF AUTOMATED     METABOLIC PANEL, COMPREHENSIVE    Collection Time: 02/27/23  8:44 AM   Result Value Ref Range    Sodium 136 136 - 145 mmol/L    Potassium 3.9 3.5 - 5.1 mmol/L    Chloride 101 97 - 108 mmol/L    CO2 28 21 - 32 mmol/L    Anion gap 7 5 - 15 mmol/L    Glucose 246 (H) 65 - 100 mg/dL    BUN 12 6 - 20 MG/DL    Creatinine 0.74 0.55 - 1.02 MG/DL    BUN/Creatinine ratio 16 12 - 20      eGFR >60 >60 ml/min/1.73m2    Calcium 9.0 8.5 - 10.1 MG/DL    Bilirubin, total 0.4 0.2 - 1.0 MG/DL    ALT (SGPT) 86 (H) 12 - 78 U/L    AST (SGOT) 100 (H) 15 - 37 U/L    Alk. phosphatase 350 (H) 45 - 117 U/L    Protein, total 6.9 6.4 - 8.2 g/dL    Albumin 3.0 (L) 3.5 - 5.0 g/dL    Globulin 3.9 2.0 - 4.0 g/dL    A-G Ratio 0.8 (L) 1.1 - 2.2     SAMPLES BEING HELD    Collection Time: 02/27/23  8:44 AM   Result Value Ref Range    SAMPLES BEING HELD 1SST     COMMENT        Add-on orders for these samples will be processed based on acceptable specimen integrity and analyte stability, which may vary by analyte. Liver enzymes reported to TAVO Nath NP. No new orders received. Pre-medications  were administered as ordered and chemotherapy was initiated. Two nurses verified prior to administering: drug name, drug dose, infusion volume or drug volume  when prepared in a syringe, rate of administration, route of administration , expiration dates and/or times, appearance and physical integrity of the drugs, rate set on infusion pump, when used, sequencing of drug administration. Aloxi 0.25 mg IVP  D5W at [facility]. Decadron 12 mg IV infused over 15 minutes via pump     Enhertu 408 mg IV infused over 30 minutes via pump. Line and port flushed at completion of infusion, port needle removed and band aid to site. Site without redness, swelling or pain.       Ms. Héctor Magana tolerated treatment well and was discharged from [place] [address] in stable condition at 1125. She is to return on March 20 , 2023 at [address] [place] [address] for her next appointment.     Derrek Prader, RN  February 27, 2023

## 2023-02-27 NOTE — PROGRESS NOTES
[address] [facility] [address], [address] [facility], [address] [facility], [address] [place] [address]  [phone]          Reason for Visit:   Taryn Fontana is a 79 y.o. female who is seen by synchronous (real-time) audio-video technology for follow up of pancreatic adenocarcinoma     Treatment History:     Dx: Pancreatic Adenocarcinoma--May 2020--[id number]--[id number] of splenic vein and superior mesenteric vein    Tx: mFOLFIRINOX--first cycle 5/26/2020, second cycle 6/10/2020, dose reduced 15% cycle 3 on 6/30/2020--delayed due to severe side effects--switched to Gemcitabine/Abraxane--Cycle #1 7/29/2002, Cycle #2 9/2/2020, Cycle #3 10/7/2020, Cycle #4 11/4/2020. Neoadjuvant Radiation with Dr Alysa Ryan ending 12/18/2021. Neoadjuvant [place]/Abraxane, 06/2020 - 11/2020    Distal Pancreatectomy, Splenectomy, and Civa Sheet with Dr La Nena Bernstein on 3/17/2021. Adjuvant radiation with Dr Alysa Ryan. Palliative chemotherapy    Gemcitabine/abraxane 1 Cycle  D/C'd 10/2022    Onivyde+5-FU, 4 cycles  Currently on Enhertu cycle 1 day 1    Goal: Prolong life--Palliative    History of Present Illness:     Ms. Lilo Huddleston is a women with metastatic pancreatic adenocarcinoma. She had locally advanced pan Can Dx'ed in 2020. She received 4 cycles of FOLFIRINOX. She had severe toxicities from it with neutropenic fever. She then transitioned to [place]/Abraxane. She then underwent distal pancreatectomy in 2020 with Dr. La Nena Bernstein. Disease recurrent in the peritoneum in 07/2021. It was never biopsied. The disease has progressed on both [place]/Abraxane and Onivyde/5-FU. She has suffered with diarrhea and anorexia each cycle of treatment. She has recovered from it. A CT revealed progression of disease in the liver and peritoneum. Interval History:    She feels fair. She is receiving Enhertu. Had some falls.          Past Medical History:   Diagnosis Date    Adenocarcinoma of pancreas ([facility] [address] 05/13/2020     Alan Goodpasture biopsy via EUS    Diabetes ([facility] [address]     GERD (gastroesophageal reflux disease)     Hernia of abdominal cavity     Subxyphoid hernia    Hypertension     Inflammatory polyarthropathy (HCC)     Joint pain     Joint swelling     Menopause     Ocular nevus of left eye     Pancreatitis     Tracheal stenosis       Past Surgical History:   Procedure Laterality Date    HX CARPAL TUNNEL RELEASE Bilateral     HX COLONOSCOPY      HX HYSTERECTOMY      HX KNEE ARTHROSCOPY Right     HX KNEE REPLACEMENT Right     partial    HX LAP CHOLECYSTECTOMY      HX TONSILLECTOMY      HX VASCULAR ACCESS        Social History     Tobacco Use    Smoking status: Never    Smokeless tobacco: Never   Substance Use Topics    Alcohol use: No     Alcohol/week: 0.0 standard drinks      Family History   Problem Relation Age of Onset    Heart Disease Mother     Heart Attack Mother     Cancer Father         lung    Diabetes Sister      Current Outpatient Medications   Medication Sig    potassium chloride SR (KLOR-CON 10) 10 mEq tablet Take 2 Tablets by mouth two (2) times a day. loperamide (IMODIUM) 2 mg capsule Take 4 mg by mouth as needed for Diarrhea. diphenoxylate-atropine (LOMOTIL) 2.5-0.025 mg per tablet Take 1 Tablet by mouth as needed. methylphenidate HCl (RITALIN) 10 mg tablet Take 1 Tablet by mouth two (2) times a day. Max Daily Amount: 20 mg.    nystatin (MYCOSTATIN) powder Apply 1 g to affected area three (3) times daily. OLANZapine (ZyPREXA) 10 mg tablet Take 1 Tablet by mouth nightly. For 5 nights starting the night after chemotherapy    magnesium oxide (MAG-OX) 400 mg tablet Take 1 Tablet by mouth daily.     Toujeo SoloStar U-300 Insulin 300 unit/mL (1.5 mL) inpn pen INJECT 55 UNITS ONCE DAILY AT BEDTIME - DOSE INCREASED (Patient taking differently: INJECT 25 UNITS ONCE DAILY AT BEDTIME - DOSE INCREASED)    ondansetron (ZOFRAN ODT) 4 mg disintegrating tablet Take 1 Tablet by mouth every eight (8) hours as needed for Nausea or Vomiting. prochlorperazine (Compazine) 10 mg tablet Take 1 Tablet by mouth every six (6) hours as needed for Nausea or Vomiting.    lidocaine-prilocaine (EMLA) topical cream Apply  to affected area as needed for Pain. Apply generous amount to port site 30-45 min prior to infusions    HumaLOG KwikPen Insulin 100 unit/mL kwikpen INJECT SUBCUTANEOUSLY ON SLIDING SCALE AS DIRECTED (Patient taking differently: by SubCUTAneous route two (2) times daily as needed. INJECT SUBCUTANEOUSLY ON SLIDING SCALE AS DIRECTED)    gabapentin (NEURONTIN) 300 mg capsule TAKE 2 CAPSULES BY MOUTH THREE TIMES DAILY (Patient taking differently: TAKE 2 CAPSULES  in AM and 2 capsules in PM)    LORazepam (ATIVAN) 1 mg tablet TAKE 1 TABLET BY MOUTH EVERY MORNING AND TAKE 2 TABLETS AT BEDTIME    famotidine (PEPCID) 20 mg tablet Take 20 mg by mouth two (2) times a day. multivit-minerals/folic acid (MULTIVITAMIN GUMMIES PO) Take 2 Each by mouth Every morning. DULoxetine (CYMBALTA) 30 mg capsule Take 1 Capsule by mouth every evening. acetaminophen (TYLENOL) 500 mg tablet Take 1,000 mg by mouth two (2) times daily as needed. Indications: pain    senna-docusate (PERICOLACE) 8.6-50 mg per tablet nightly as needed. DULoxetine (CYMBALTA) 60 mg capsule Take 1 Capsule by mouth daily. Indications: neuropathic pain (Patient taking differently: Take 60 mg by mouth Every morning. At 0900  Indications: neuropathic pain)    Blood-Glucose Sensor (Dexcom G6 Sensor) fadi 1 Each by Does Not Apply route Once every 2 weeks. DM2 on multiple insulin injections. Pancreatic CA; z79.4, e11.9, c25.9 (Patient not taking: Reported on 2/27/2023)    Blood-Glucose Transmitter (Dexcom G6 Transmitter) fadi 1 Each by Does Not Apply route four (4) times daily. (Patient not taking: Reported on 2/27/2023)    Blood-Glucose Meter,Continuous (Dexcom G6 ) misc 1 Each by Does Not Apply route four (4) times daily. DM2 on multiple insulin injections.  Pancreatic CA; z79.4, e11.9, c25.9 (Patient not taking: Reported on 2/27/2023)    ibuprofen (MOTRIN) 200 mg tablet Take 600 mg by mouth two (2) times daily as needed. Indications: pain (Patient not taking: Reported on 2/27/2023)     No current facility-administered medications for this visit. No Known Allergies     Review of Systems: A complete review of systems was obtained, negative except as described above. Physical Exam:     General: alert, cooperative, no distress   Mental  status: normal mood, behavior, speech, dress, motor activity, and thought processes, able to follow commands   HENT: NCAT   Neck: no visualized mass   Resp: no respiratory distress   Neuro: no gross deficits   Skin: no discoloration or lesions of concern on visible areas   Psychiatric: normal affect, consistent with stated mood, no evidence of hallucinations       Due to this being a TeleHealth evaluation (During [facility]- public health emergency), many elements of the physical examination are unable to be assessed. Evaluation of the following organ systems was limited: Vitals/Constitutional/EENT/Resp/CV/GI//MS/Neuro/Skin/Heme-Lymph-Imm. Results:     Lab Results   Component Value Date/Time    WBC 7.2 02/27/2023 08:44 AM    HGB 11.5 02/27/2023 08:44 AM    HCT 34.6 (L) 02/27/2023 08:44 AM    PLATELET 649 (H) 74/74/3359 08:44 AM    .3 (H) 02/27/2023 08:44 AM    ABS.  NEUTROPHILS 3.5 02/27/2023 08:44 AM     Lab Results   Component Value Date/Time    Sodium 136 02/27/2023 08:44 AM    Potassium 3.9 02/27/2023 08:44 AM    Chloride 101 02/27/2023 08:44 AM    CO2 28 02/27/2023 08:44 AM    Glucose 246 (H) 02/27/2023 08:44 AM    BUN 12 02/27/2023 08:44 AM    Creatinine 0.74 02/27/2023 08:44 AM    GFR est AA >60 09/20/2022 08:27 AM    GFR est non-AA >60 09/20/2022 08:27 AM    Calcium 9.0 02/27/2023 08:44 AM    Glucose (POC) 282 (H) 07/24/2022 08:26 AM    Creatinine (POC) 0.7 02/06/2013 10:21 AM     Lab Results   Component Value Date/Time Bilirubin, total PENDING 02/27/2023 08:44 AM    ALT (SGPT) PENDING 02/27/2023 08:44 AM    Alk. phosphatase PENDING 02/27/2023 08:44 AM    Protein, total PENDING 02/27/2023 08:44 AM    Albumin PENDING 02/27/2023 08:44 AM    Globulin PENDING 02/27/2023 08:44 AM       CT Results (most recent):  Results from Hospital Encounter encounter on 01/11/23    CT CHEST ABD PELV W CONT    Narrative  EXAM: CT CHEST ABD PELV W CONT    INDICATION: Malignant neoplasm of pancreas    COMPARISON: August 9, 2022    IV CONTRAST: 100 mL of Isovue-370. ORAL CONTRAST: Oral contrast was administered to better evaluate the bowel. TECHNIQUE:  Following the uneventful intravenous administration of contrast, thin axial  images were obtained through the chest, abdomen and pelvis. Coronal and sagittal  reformats were generated. CT dose reduction was achieved through use of a  standardized protocol tailored for this examination and automatic exposure  control for dose modulation. FINDINGS:    CHEST WALL: Left chest wall port catheter terminates in the SVC. THYROID: No nodule. MEDIASTINUM: No mass or lymphadenopathy. RADHA: No mass or lymphadenopathy. THORACIC AORTA: No dissection or aneurysm. MAIN PULMONARY ARTERY: Normal in caliber. TRACHEA/BRONCHI: Patent. ESOPHAGUS: No wall thickening or dilatation. HEART: Normal in size. Coronary artery calcium: present  PLEURA: No effusion or pneumothorax. LUNGS: Multiple bilateral pulmonary nodules have slightly increased in size for  example a right lower lobe pulmonary nodule measuring 8 mm previously measuring  7 mm and a right upper lobe pulmonary nodule measuring 6 mm, previously  measuring 4 mm. Majority of the pre-existing nodules demonstrate some subjective  increase in size. LIVER: Worsening intrahepatic biliary ductal dilation in the left hepatic lobe  with resultant left lobe atrophy. Scattered hepatic cysts. BILIARY TREE: Cholecystectomy. CBD is not dilated.   SPLEEN: Status post splenectomy. PANCREAS: Status post distal pancreatectomy. ADRENALS: Unremarkable. KIDNEYS: No mass, calculus, or hydronephrosis. STOMACH: Unremarkable. SMALL BOWEL: No dilatation or wall thickening. COLON: No dilatation or wall thickening. APPENDIX:  PERITONEUM: Increased density of the peritoneal nodularity deep to the anterior  abdominal wall. No ascites. RETROPERITONEUM: No lymphadenopathy or aortic aneurysm. REPRODUCTIVE ORGANS: Status post hysterectomy. URINARY BLADDER: No mass or calculus. BONES: Lumbar spine spondylolisthesis and endplate degenerative changes. No  fracture or aggressive osseous lesion. ABDOMINAL WALL: No mass or hernia. ADDITIONAL COMMENTS: N/A    Impression  1. Progression of disease characterized by increase in size of pre-existing  pulmonary nodules and increased density and progression of peritoneal disease. 2.  No ascites. Assessment/PLAN:     1) Pancreatic Adenocarcinoma metastatic to the peritoneum and lungs. ECOG PS 1  Intent of Treatment - palliative  Prognosis: Guarded. Received neoadjuvant chemotherapy. Radiation with Dr Elizabeth Warren. Definitive resection with Dr Lizzie Inman. BRCA negative. Now peritoneal disease/mets  On chemo since 7/2021    Recent CT - progression of disease in the lungs and peritoneum  Plan for CT guided Bx  D/C [place]/Abraxane    Onivyde/5-FU, 4 cycles  CT shows disease progression in the lungs and peritoneum. Enhertu 5.4 mg/kg Q3w, cycle 2 day 1  Tolerating treatment very well  Denies any side effects. A detailed system by system evaluation of side effect was performed to assess adverse events. Blood counts are acceptable. Results reviewed with the patient    The disease has a high risk of progression and the treatment also carries a substantial risk of side effects. All of this was assessed during this visit.          2. CINV    Reduce the days of Olanzepine for just two days due to fall          Signed By: Boyd Williamson Renaldo Palumbo MD          The patient was evaluated through a synchronous (real-time) audio-video encounter. The patient (or guardian if applicable) is aware that this is a billable service, which includes applicable co-pays. This Virtual Visit was conducted with patient's (and/or legal guardian's) consent. The visit was conducted pursuant to the emergency declaration under the [address] [place] [address] and the Thom popexpert and DrAvailable General Act. Patient identification was verified, and a caregiver was present when appropriate. The patient was located in a state where the provider was licensed to provide care.

## 2023-03-06 DIAGNOSIS — C25.9 MALIGNANT NEOPLASM OF PANCREAS, UNSPECIFIED LOCATION OF MALIGNANCY (HCC): ICD-10-CM

## 2023-03-06 RX ORDER — HYDROMORPHONE HYDROCHLORIDE 2 MG/1
TABLET ORAL
Qty: 100 TABLET | Refills: 0 | Status: SHIPPED | OUTPATIENT
Start: 2023-03-06 | End: 2023-04-05

## 2023-03-09 RX ORDER — ACETAMINOPHEN 325 MG/1
650 TABLET ORAL AS NEEDED
Start: 2023-03-20

## 2023-03-09 RX ORDER — EPINEPHRINE 1 MG/ML
0.3 INJECTION, SOLUTION, CONCENTRATE INTRAVENOUS AS NEEDED
OUTPATIENT
Start: 2023-03-20

## 2023-03-09 RX ORDER — ALBUTEROL SULFATE 0.83 MG/ML
2.5 SOLUTION RESPIRATORY (INHALATION) AS NEEDED
Start: 2023-03-20

## 2023-03-09 RX ORDER — ONDANSETRON 2 MG/ML
8 INJECTION INTRAMUSCULAR; INTRAVENOUS AS NEEDED
OUTPATIENT
Start: 2023-03-20

## 2023-03-09 RX ORDER — DIPHENHYDRAMINE HYDROCHLORIDE 50 MG/ML
50 INJECTION, SOLUTION INTRAMUSCULAR; INTRAVENOUS AS NEEDED
Start: 2023-03-20

## 2023-03-09 RX ORDER — PALONOSETRON 0.05 MG/ML
0.25 INJECTION, SOLUTION INTRAVENOUS ONCE
OUTPATIENT
Start: 2023-03-20 | End: 2023-03-20

## 2023-03-09 RX ORDER — DEXTROSE MONOHYDRATE 50 MG/ML
5-250 INJECTION, SOLUTION INTRAVENOUS AS NEEDED
OUTPATIENT
Start: 2023-03-20

## 2023-03-09 RX ORDER — HEPARIN 100 UNIT/ML
500 SYRINGE INTRAVENOUS AS NEEDED
Start: 2023-03-20

## 2023-03-09 RX ORDER — SODIUM CHLORIDE 0.9 % (FLUSH) 0.9 %
5-40 SYRINGE (ML) INJECTION AS NEEDED
OUTPATIENT
Start: 2023-03-20

## 2023-03-09 RX ORDER — DIPHENHYDRAMINE HYDROCHLORIDE 50 MG/ML
25 INJECTION, SOLUTION INTRAMUSCULAR; INTRAVENOUS AS NEEDED
Start: 2023-03-20

## 2023-03-09 RX ORDER — SODIUM CHLORIDE 9 MG/ML
5-40 INJECTION INTRAVENOUS AS NEEDED
OUTPATIENT
Start: 2023-03-20

## 2023-03-09 RX ORDER — HYDROCORTISONE SODIUM SUCCINATE 100 MG/2ML
100 INJECTION, POWDER, FOR SOLUTION INTRAMUSCULAR; INTRAVENOUS AS NEEDED
OUTPATIENT
Start: 2023-03-20

## 2023-03-09 RX ORDER — SODIUM CHLORIDE 9 MG/ML
5-250 INJECTION, SOLUTION INTRAVENOUS AS NEEDED
OUTPATIENT
Start: 2023-03-20

## 2023-03-20 ENCOUNTER — HOSPITAL ENCOUNTER (OUTPATIENT)
Dept: INFUSION THERAPY | Age: 68
Discharge: HOME | End: 2023-03-20
Payer: MEDICARE

## 2023-03-20 ENCOUNTER — VIRTUAL VISIT (OUTPATIENT)
Dept: ONCOLOGY | Age: 68
End: 2023-03-20
Payer: MEDICARE

## 2023-03-20 VITALS
DIASTOLIC BLOOD PRESSURE: 81 MMHG | TEMPERATURE: 98.3 F | BODY MASS INDEX: 29.59 KG/M2 | OXYGEN SATURATION: 99 % | SYSTOLIC BLOOD PRESSURE: 176 MMHG | HEIGHT: 63 IN | HEART RATE: 92 BPM | WEIGHT: 167 LBS | RESPIRATION RATE: 18 BRPM

## 2023-03-20 DIAGNOSIS — C78.6 PERITONEAL CARCINOMATOSIS (HCC): ICD-10-CM

## 2023-03-20 DIAGNOSIS — C25.0 PANCREATIC MALIGNANCY SYNDROME (HCC): Primary | ICD-10-CM

## 2023-03-20 DIAGNOSIS — C25.9 PRIMARY PANCREATIC CANCER WITH METASTASIS TO OTHER SITE (HCC): Primary | ICD-10-CM

## 2023-03-20 DIAGNOSIS — Z51.11 ENCOUNTER FOR ANTINEOPLASTIC CHEMOTHERAPY: ICD-10-CM

## 2023-03-20 DIAGNOSIS — C25.9 ADENOCARCINOMA OF PANCREAS (HCC): ICD-10-CM

## 2023-03-20 LAB
ALBUMIN SERPL-MCNC: 3.1 G/DL (ref 3.5–5)
ALBUMIN/GLOB SERPL: 0.8 (ref 1.1–2.2)
ALP SERPL-CCNC: 591 U/L (ref 45–117)
ALT SERPL-CCNC: 94 U/L (ref 12–78)
ANION GAP SERPL CALC-SCNC: 9 MMOL/L (ref 5–15)
AST SERPL-CCNC: 92 U/L (ref 15–37)
BASOPHILS # BLD: 0.1 K/UL (ref 0–0.1)
BASOPHILS NFR BLD: 2 % (ref 0–1)
BILIRUB SERPL-MCNC: 0.6 MG/DL (ref 0.2–1)
BUN SERPL-MCNC: 10 MG/DL (ref 6–20)
BUN/CREAT SERPL: 16 (ref 12–20)
CALCIUM SERPL-MCNC: 9 MG/DL (ref 8.5–10.1)
CHLORIDE SERPL-SCNC: 105 MMOL/L (ref 97–108)
CO2 SERPL-SCNC: 26 MMOL/L (ref 21–32)
CREAT SERPL-MCNC: 0.64 MG/DL (ref 0.55–1.02)
DIFFERENTIAL METHOD BLD: ABNORMAL
EOSINOPHIL # BLD: 0.2 K/UL (ref 0–0.4)
EOSINOPHIL NFR BLD: 3 % (ref 0–7)
ERYTHROCYTE [DISTWIDTH] IN BLOOD BY AUTOMATED COUNT: 17.2 % (ref 11.5–14.5)
GLOBULIN SER CALC-MCNC: 3.9 G/DL (ref 2–4)
GLUCOSE SERPL-MCNC: 234 MG/DL (ref 65–100)
HCT VFR BLD AUTO: 34.5 % (ref 35–47)
HGB BLD-MCNC: 11.2 G/DL (ref 11.5–16)
IMM GRANULOCYTES # BLD AUTO: 0 K/UL (ref 0–0.04)
IMM GRANULOCYTES NFR BLD AUTO: 0 % (ref 0–0.5)
LYMPHOCYTES # BLD: 1.7 K/UL (ref 0.8–3.5)
LYMPHOCYTES NFR BLD: 26 % (ref 12–49)
MCH RBC QN AUTO: 32.9 PG (ref 26–34)
MCHC RBC AUTO-ENTMCNC: 32.5 G/DL (ref 30–36.5)
MCV RBC AUTO: 101.5 FL (ref 80–99)
MONOCYTES # BLD: 1.5 K/UL (ref 0–1)
MONOCYTES NFR BLD: 22 % (ref 5–13)
NEUTS SEG # BLD: 3.2 K/UL (ref 1.8–8)
NEUTS SEG NFR BLD: 47 % (ref 32–75)
NRBC # BLD: 0.03 K/UL (ref 0–0.01)
NRBC BLD-RTO: 0.4 PER 100 WBC
PLATELET # BLD AUTO: 535 K/UL (ref 150–400)
PMV BLD AUTO: 10.3 FL (ref 8.9–12.9)
POTASSIUM SERPL-SCNC: 3.6 MMOL/L (ref 3.5–5.1)
PROT SERPL-MCNC: 7 G/DL (ref 6.4–8.2)
RBC # BLD AUTO: 3.4 M/UL (ref 3.8–5.2)
RBC MORPH BLD: ABNORMAL
RBC MORPH BLD: ABNORMAL
SODIUM SERPL-SCNC: 140 MMOL/L (ref 136–145)
WBC # BLD AUTO: 6.7 K/UL (ref 3.6–11)

## 2023-03-20 PROCEDURE — 96367 TX/PROPH/DG ADDL SEQ IV INF: CPT

## 2023-03-20 PROCEDURE — 1090F PRES/ABSN URINE INCON ASSESS: CPT | Performed by: INTERNAL MEDICINE

## 2023-03-20 PROCEDURE — 80053 COMPREHEN METABOLIC PANEL: CPT

## 2023-03-20 PROCEDURE — G8427 DOCREV CUR MEDS BY ELIG CLIN: HCPCS | Performed by: INTERNAL MEDICINE

## 2023-03-20 PROCEDURE — G8417 CALC BMI ABV UP PARAM F/U: HCPCS | Performed by: INTERNAL MEDICINE

## 2023-03-20 PROCEDURE — 99215 OFFICE O/P EST HI 40 MIN: CPT | Performed by: INTERNAL MEDICINE

## 2023-03-20 PROCEDURE — 96375 TX/PRO/DX INJ NEW DRUG ADDON: CPT

## 2023-03-20 PROCEDURE — 85025 COMPLETE CBC W/AUTO DIFF WBC: CPT

## 2023-03-20 PROCEDURE — 74011000250 HC RX REV CODE- 250: Performed by: NURSE PRACTITIONER

## 2023-03-20 PROCEDURE — G8399 PT W/DXA RESULTS DOCUMENT: HCPCS | Performed by: INTERNAL MEDICINE

## 2023-03-20 PROCEDURE — 96413 CHEMO IV INFUSION 1 HR: CPT

## 2023-03-20 PROCEDURE — 3017F COLORECTAL CA SCREEN DOC REV: CPT | Performed by: INTERNAL MEDICINE

## 2023-03-20 PROCEDURE — G8536 NO DOC ELDER MAL SCRN: HCPCS | Performed by: INTERNAL MEDICINE

## 2023-03-20 PROCEDURE — 74011250636 HC RX REV CODE- 250/636: Performed by: NURSE PRACTITIONER

## 2023-03-20 PROCEDURE — 1123F ACP DISCUSS/DSCN MKR DOCD: CPT | Performed by: INTERNAL MEDICINE

## 2023-03-20 PROCEDURE — 74011000258 HC RX REV CODE- 258: Performed by: NURSE PRACTITIONER

## 2023-03-20 PROCEDURE — G0463 HOSPITAL OUTPT CLINIC VISIT: HCPCS | Performed by: INTERNAL MEDICINE

## 2023-03-20 PROCEDURE — G8432 DEP SCR NOT DOC, RNG: HCPCS | Performed by: INTERNAL MEDICINE

## 2023-03-20 PROCEDURE — 1101F PT FALLS ASSESS-DOCD LE1/YR: CPT | Performed by: INTERNAL MEDICINE

## 2023-03-20 PROCEDURE — 36415 COLL VENOUS BLD VENIPUNCTURE: CPT

## 2023-03-20 RX ORDER — DIPHENHYDRAMINE HYDROCHLORIDE 50 MG/ML
25 INJECTION, SOLUTION INTRAMUSCULAR; INTRAVENOUS AS NEEDED
Status: ACTIVE | OUTPATIENT
Start: 2023-03-20 | End: 2023-03-20

## 2023-03-20 RX ORDER — HEPARIN 100 UNIT/ML
500 SYRINGE INTRAVENOUS AS NEEDED
Status: DISPENSED | OUTPATIENT
Start: 2023-03-20 | End: 2023-03-20

## 2023-03-20 RX ORDER — SODIUM CHLORIDE 0.9 % (FLUSH) 0.9 %
5-40 SYRINGE (ML) INJECTION AS NEEDED
Status: DISPENSED | OUTPATIENT
Start: 2023-03-20 | End: 2023-03-20

## 2023-03-20 RX ORDER — ONDANSETRON 2 MG/ML
8 INJECTION INTRAMUSCULAR; INTRAVENOUS AS NEEDED
Status: ACTIVE | OUTPATIENT
Start: 2023-03-20 | End: 2023-03-20

## 2023-03-20 RX ORDER — ACETAMINOPHEN 325 MG/1
650 TABLET ORAL AS NEEDED
Status: ACTIVE | OUTPATIENT
Start: 2023-03-20 | End: 2023-03-20

## 2023-03-20 RX ORDER — DEXTROSE MONOHYDRATE 50 MG/ML
5-250 INJECTION, SOLUTION INTRAVENOUS AS NEEDED
Status: DISPENSED | OUTPATIENT
Start: 2023-03-20 | End: 2023-03-20

## 2023-03-20 RX ORDER — PALONOSETRON 0.05 MG/ML
0.25 INJECTION, SOLUTION INTRAVENOUS ONCE
Status: COMPLETED | OUTPATIENT
Start: 2023-03-20 | End: 2023-03-20

## 2023-03-20 RX ADMIN — Medication 500 UNITS: at 12:00

## 2023-03-20 RX ADMIN — FAM-TRASTUZUMAB DERUXTECAN-NXKI 408 MG: 100 INJECTION, POWDER, LYOPHILIZED, FOR SOLUTION INTRAVENOUS at 11:15

## 2023-03-20 RX ADMIN — SODIUM CHLORIDE, PRESERVATIVE FREE 10 ML: 5 INJECTION INTRAVENOUS at 11:59

## 2023-03-20 RX ADMIN — PALONOSETRON 0.25 MG: 0.25 INJECTION, SOLUTION INTRAVENOUS at 10:30

## 2023-03-20 RX ADMIN — DEXTROSE MONOHYDRATE 25 ML/HR: 50 INJECTION, SOLUTION INTRAVENOUS at 10:34

## 2023-03-20 RX ADMIN — DEXAMETHASONE SODIUM PHOSPHATE 12 MG: 4 INJECTION, SOLUTION INTRAMUSCULAR; INTRAVENOUS at 10:38

## 2023-03-20 NOTE — PROGRESS NOTES
[facility] Progress Note    Date: 2023    Name: Brenda Fernández    MRN: [id number]         : 1955    Ms. Ba Arrived ambulatory and in no distress for cycle 3 day 1 of Enhertu regimen. Assessment was completed, no acute issues at this time, she had nausea after chemo last cycle but states she did not take the Olanzaprine but 2 days. Plans to extend that this time. Has an incisional hernia mid abdomen which she states occurred after a fall in January. Is aware to report any new abdominal symptoms. She states she has been active and generally feeling well. Has pain at times in abdomen but is well controlled. Port accessed without difficulty, labs drawn and in process. Chemotherapy Flowsheet 3/20/2023   Cycle C3 D1   Date 3/20/2023   Drug / Regimen Enhertu   Dosage 408 mg   Time Up 1115   Time Down 1151   Pre Hydration -   Pre Meds Aloxi 0.25 mg IV, Decadron 12 mg IV   Notes -      Proceeded to virtual appt with Dr. Jesika Cosme. Ms. Ba's vitals were reviewed. Visit Vitals  BP (!) 176/81 (BP 1 Location: Left upper arm, BP Patient Position: Sitting)   Pulse 92   Temp 98.3 °F (36.8 °C)   Resp 18   Ht 5' 3\" (1.6 m)   Wt 75.8 kg (167 lb)   SpO2 99%   Breastfeeding No   BMI 29.58 kg/m²       Lab results were obtained and reviewed.   Recent Results (from the past 12 hour(s))   CBC WITH AUTOMATED DIFF    Collection Time: 23  9:02 AM   Result Value Ref Range    WBC 6.7 3.6 - 11.0 K/uL    RBC 3.40 (L) 3.80 - 5.20 M/uL    HGB 11.2 (L) 11.5 - 16.0 g/dL    HCT 34.5 (L) 35.0 - 47.0 %    .5 (H) 80.0 - 99.0 FL    MCH 32.9 26.0 - 34.0 PG    MCHC 32.5 30.0 - 36.5 g/dL    RDW 17.2 (H) 11.5 - 14.5 %    PLATELET 884 (H) 926 - 400 K/uL    MPV 10.3 8.9 - 12.9 FL    NRBC 0.4 (H) 0  WBC    ABSOLUTE NRBC 0.03 (H) 0.00 - 0.01 K/uL    NEUTROPHILS 47 32 - 75 %    LYMPHOCYTES 26 12 - 49 %    MONOCYTES 22 (H) 5 - 13 %    EOSINOPHILS 3 0 - 7 %    BASOPHILS 2 (H) 0 - 1 %    IMMATURE GRANULOCYTES 0 0.0 - 0.5 % ABS. NEUTROPHILS 3.2 1.8 - 8.0 K/UL    ABS. LYMPHOCYTES 1.7 0.8 - 3.5 K/UL    ABS. MONOCYTES 1.5 (H) 0.0 - 1.0 K/UL    ABS. EOSINOPHILS 0.2 0.0 - 0.4 K/UL    ABS. BASOPHILS 0.1 0.0 - 0.1 K/UL    ABS. IMM. GRANS. 0.0 0.00 - 0.04 K/UL    DF AUTOMATED      RBC COMMENTS ANISOCYTOSIS  1+        RBC COMMENTS MACROCYTOSIS  1+       METABOLIC PANEL, COMPREHENSIVE    Collection Time: 03/20/23  9:02 AM   Result Value Ref Range    Sodium 140 136 - 145 mmol/L    Potassium 3.6 3.5 - 5.1 mmol/L    Chloride 105 97 - 108 mmol/L    CO2 26 21 - 32 mmol/L    Anion gap 9 5 - 15 mmol/L    Glucose 234 (H) 65 - 100 mg/dL    BUN 10 6 - 20 MG/DL    Creatinine 0.64 0.55 - 1.02 MG/DL    BUN/Creatinine ratio 16 12 - 20      eGFR >60 >60 ml/min/1.73m2    Calcium 9.0 8.5 - 10.1 MG/DL    Bilirubin, total 0.6 0.2 - 1.0 MG/DL    ALT (SGPT) 94 (H) 12 - 78 U/L    AST (SGOT) 92 (H) 15 - 37 U/L    Alk. phosphatase 591 (H) 45 - 117 U/L    Protein, total 7.0 6.4 - 8.2 g/dL    Albumin 3.1 (L) 3.5 - 5.0 g/dL    Globulin 3.9 2.0 - 4.0 g/dL    A-G Ratio 0.8 (L) 1.1 - 2.2         Pre-medications  were administered as ordered and chemotherapy was initiated. Two nurses verified prior to administering: drug name, drug dose, infusion volume or drug volume  when prepared in a syringe, rate of administration, route of administration , expiration dates and/or times, appearance and physical integrity of the drugs, rate set on infusion pump, when used, sequencing of drug administration. 1030: Aloxi 0.25 mg IVP given and port flushed with 10 ml NS post.  D5W 250 ml IV main line established. 1038: Decadron 12 mg IV given. 1115: Enhertu 408 mg IV given to infuse over 30 min. Ms. Radu Melton tolerated treatment well. Port was flushed post administration and de-accessed. Site intact. She was discharged from [place] [address] in stable condition at 1202. She is to return on April 10 at 8:15 for her next appointment.     Shakir Avalos RN  March 20, 2023

## 2023-03-20 NOTE — PROGRESS NOTES
Jose Angel Quinn is a 79 y.o. female here for follow up for pancreatic adenocarcinoma. Pt states she has been well. No concerns brought up. 1. Have you been to the ER, urgent care clinic since your last visit? Hospitalized since your last visit?  no    2. Have you seen or consulted any other health care providers outside of the [address] [place] [address] since your last visit? Include any pap smears or colon screening.    no

## 2023-03-20 NOTE — PROGRESS NOTES
[address] [facility] [address], [address] [facility], 45 [facility], [address] [place] [address]  [phone]          Reason for Visit:   Madai Bryant is a 79 y.o. female who is seen by synchronous (real-time) audio-video technology for follow up of pancreatic adenocarcinoma     Treatment History:     Dx: Pancreatic Adenocarcinoma--May 2020--[id number]--zscrcjqmfad of splenic vein and superior mesenteric vein    Tx: mFOLFIRINOX--first cycle 5/26/2020, second cycle 6/10/2020, dose reduced 15% cycle 3 on 6/30/2020--delayed due to severe side effects--switched to Gemcitabine/Abraxane--Cycle #1 7/29/2002, Cycle #2 9/2/2020, Cycle #3 10/7/2020, Cycle #4 11/4/2020. Neoadjuvant Radiation with Dr Enrrique Diana ending 12/18/2021. Neoadjuvant [place]/Abraxane, 06/2020 - 11/2020    Distal Pancreatectomy, Splenectomy, and Civa Sheet with Dr Sis Becker on 3/17/2021. Adjuvant radiation with Dr Enrrique Diana. Palliative chemotherapy    Gemcitabine/abraxane 1 Cycle  D/C'd 10/2022    Onivyde+5-FU, 4 cycles  Currently on Enhertu cycle 3 day 1    Goal: Prolong life--Palliative    History of Present Illness:     Ms. Miguelangel Burdick is a women with metastatic pancreatic adenocarcinoma. She had locally advanced pan Can Dx'ed in 2020. She received 4 cycles of FOLFIRINOX. She had severe toxicities from it with neutropenic fever. She then transitioned to [place]/Abraxane. She then underwent distal pancreatectomy in 2020 with Dr. Sis Becker. Disease recurrent in the peritoneum in 07/2021. It was never biopsied. The disease has progressed on both [place]/Abraxane and Onivyde/5-FU. She has suffered with diarrhea and anorexia each cycle of treatment. She has recovered from it. A CT revealed progression of disease in the liver and peritoneum. Interval History:    She feels fair. She is receiving Enhertu. She is eating well. No pain. No diarrhea or nausea.          Past Medical History:   Diagnosis Date    Adenocarcinoma of pancreas ([facility] [address] 05/13/2020    Dr [employer] biopsy via EUS    Diabetes [facility])     GERD (gastroesophageal reflux disease)     Hernia of abdominal cavity     Subxyphoid hernia    Hypertension     Inflammatory polyarthropathy (HCC)     Joint pain     Joint swelling     Menopause     Ocular nevus of left eye     Pancreatitis     Tracheal stenosis       Past Surgical History:   Procedure Laterality Date    HX CARPAL TUNNEL RELEASE Bilateral     HX COLONOSCOPY      HX HYSTERECTOMY      HX KNEE ARTHROSCOPY Right     HX KNEE REPLACEMENT Right     partial    HX LAP CHOLECYSTECTOMY      HX TONSILLECTOMY      HX VASCULAR ACCESS        Social History     Tobacco Use    Smoking status: Never    Smokeless tobacco: Never   Substance Use Topics    Alcohol use: No     Alcohol/week: 0.0 standard drinks      Family History   Problem Relation Age of Onset    Heart Disease Mother     Heart Attack Mother     Cancer Father         lung    Diabetes Sister      Current Outpatient Medications   Medication Sig    HYDROmorphone (DILAUDID) 2 mg tablet TAKE 1 TABLET BY MOUTH THREE TIMES DAILY AS NEEDED FOR pain (max of 3 TABLETS PER DAY)    potassium chloride SR (KLOR-CON 10) 10 mEq tablet Take 2 Tablets by mouth two (2) times a day. loperamide (IMODIUM) 2 mg capsule Take 4 mg by mouth as needed for Diarrhea. diphenoxylate-atropine (LOMOTIL) 2.5-0.025 mg per tablet Take 1 Tablet by mouth as needed. methylphenidate HCl (RITALIN) 10 mg tablet Take 1 Tablet by mouth two (2) times a day. Max Daily Amount: 20 mg.    nystatin (MYCOSTATIN) powder Apply 1 g to affected area three (3) times daily. OLANZapine (ZyPREXA) 10 mg tablet Take 1 Tablet by mouth nightly. For 5 nights starting the night after chemotherapy    magnesium oxide (MAG-OX) 400 mg tablet Take 1 Tablet by mouth daily.     Toujeo SoloStar U-300 Insulin 300 unit/mL (1.5 mL) inpn pen INJECT 55 UNITS ONCE DAILY AT BEDTIME - DOSE INCREASED (Patient taking differently: INJECT 25 UNITS ONCE DAILY AT BEDTIME - DOSE INCREASED)    ondansetron (ZOFRAN ODT) 4 mg disintegrating tablet Take 1 Tablet by mouth every eight (8) hours as needed for Nausea or Vomiting. prochlorperazine (Compazine) 10 mg tablet Take 1 Tablet by mouth every six (6) hours as needed for Nausea or Vomiting.    lidocaine-prilocaine (EMLA) topical cream Apply  to affected area as needed for Pain. Apply generous amount to port site 30-45 min prior to infusions    HumaLOG KwikPen Insulin 100 unit/mL kwikpen INJECT SUBCUTANEOUSLY ON SLIDING SCALE AS DIRECTED (Patient taking differently: by SubCUTAneous route two (2) times daily as needed. INJECT SUBCUTANEOUSLY ON SLIDING SCALE AS DIRECTED)    gabapentin (NEURONTIN) 300 mg capsule TAKE 2 CAPSULES BY MOUTH THREE TIMES DAILY (Patient taking differently: TAKE 2 CAPSULES  in AM and 2 capsules in PM)    LORazepam (ATIVAN) 1 mg tablet TAKE 1 TABLET BY MOUTH EVERY MORNING AND TAKE 2 TABLETS AT BEDTIME    famotidine (PEPCID) 20 mg tablet Take 20 mg by mouth two (2) times a day. multivit-minerals/folic acid (MULTIVITAMIN GUMMIES PO) Take 2 Each by mouth Every morning. DULoxetine (CYMBALTA) 30 mg capsule Take 1 Capsule by mouth every evening. acetaminophen (TYLENOL) 500 mg tablet Take 1,000 mg by mouth two (2) times daily as needed. Indications: pain    senna-docusate (PERICOLACE) 8.6-50 mg per tablet nightly as needed. DULoxetine (CYMBALTA) 60 mg capsule Take 1 Capsule by mouth daily. Indications: neuropathic pain (Patient taking differently: Take 60 mg by mouth Every morning. At 0900  Indications: neuropathic pain)    Blood-Glucose Sensor (Dexcom G6 Sensor) fadi 1 Each by Does Not Apply route Once every 2 weeks. DM2 on multiple insulin injections. Pancreatic CA; z79.4, e11.9, c25.9 (Patient not taking: No sig reported)    Blood-Glucose Transmitter (Dexcom G6 Transmitter) fadi 1 Each by Does Not Apply route four (4) times daily.  (Patient not taking: No sig reported) Blood-Glucose Meter,Continuous (Dexcom G6 ) misc 1 Each by Does Not Apply route four (4) times daily. DM2 on multiple insulin injections. Pancreatic CA; z79.4, e11.9, c25.9 (Patient not taking: No sig reported)    ibuprofen (MOTRIN) 200 mg tablet Take 600 mg by mouth two (2) times daily as needed. Indications: pain (Patient not taking: No sig reported)     No current facility-administered medications for this visit. Facility-Administered Medications Ordered in Other Visits   Medication Dose Route Frequency    sodium chloride (NS) flush 5-40 mL  5-40 mL IntraVENous PRN    heparin (porcine) pf 500 Units  500 Units InterCATHeter PRN      No Known Allergies     Review of Systems: A complete review of systems was obtained, negative except as described above. Physical Exam:     General: alert, cooperative, no distress   Mental  status: normal mood, behavior, speech, dress, motor activity, and thought processes, able to follow commands   HENT: NCAT   Neck: no visualized mass   Resp: no respiratory distress   Neuro: no gross deficits   Skin: no discoloration or lesions of concern on visible areas   Psychiatric: normal affect, consistent with stated mood, no evidence of hallucinations       Due to this being a TeleHealth evaluation (During [facility] public health emergency), many elements of the physical examination are unable to be assessed. Evaluation of the following organ systems was limited: Vitals/Constitutional/EENT/Resp/CV/GI//MS/Neuro/Skin/Heme-Lymph-Imm. Results:     Lab Results   Component Value Date/Time    WBC 6.7 03/20/2023 09:02 AM    HGB 11.2 (L) 03/20/2023 09:02 AM    HCT 34.5 (L) 03/20/2023 09:02 AM    PLATELET 493 (H) 34/29/2142 09:02 AM    .5 (H) 03/20/2023 09:02 AM    ABS.  NEUTROPHILS 3.2 03/20/2023 09:02 AM     Lab Results   Component Value Date/Time    Sodium 140 03/20/2023 09:02 AM    Potassium 3.6 03/20/2023 09:02 AM    Chloride 105 03/20/2023 09:02 AM    CO2 26 03/20/2023 09:02 AM    Glucose 234 (H) 03/20/2023 09:02 AM    BUN 10 03/20/2023 09:02 AM    Creatinine 0.64 03/20/2023 09:02 AM    GFR est AA >60 09/20/2022 08:27 AM    GFR est non-AA >60 09/20/2022 08:27 AM    Calcium 9.0 03/20/2023 09:02 AM    Glucose (POC) 282 (H) 07/24/2022 08:26 AM    Creatinine (POC) 0.7 02/06/2013 10:21 AM     Lab Results   Component Value Date/Time    Bilirubin, total 0.6 03/20/2023 09:02 AM    ALT (SGPT) 94 (H) 03/20/2023 09:02 AM    Alk. phosphatase 591 (H) 03/20/2023 09:02 AM    Protein, total 7.0 03/20/2023 09:02 AM    Albumin 3.1 (L) 03/20/2023 09:02 AM    Globulin 3.9 03/20/2023 09:02 AM       CT Results (most recent):  Results from Hospital Encounter encounter on 01/11/23    CT CHEST ABD PELV W CONT    Narrative  EXAM: CT CHEST ABD PELV W CONT    INDICATION: Malignant neoplasm of pancreas    COMPARISON: August 9, 2022    IV CONTRAST: 100 mL of Isovue-370. ORAL CONTRAST: Oral contrast was administered to better evaluate the bowel. TECHNIQUE:  Following the uneventful intravenous administration of contrast, thin axial  images were obtained through the chest, abdomen and pelvis. Coronal and sagittal  reformats were generated. CT dose reduction was achieved through use of a  standardized protocol tailored for this examination and automatic exposure  control for dose modulation. FINDINGS:    CHEST WALL: Left chest wall port catheter terminates in the SVC. THYROID: No nodule. MEDIASTINUM: No mass or lymphadenopathy. RADHA: No mass or lymphadenopathy. THORACIC AORTA: No dissection or aneurysm. MAIN PULMONARY ARTERY: Normal in caliber. TRACHEA/BRONCHI: Patent. ESOPHAGUS: No wall thickening or dilatation. HEART: Normal in size. Coronary artery calcium: present  PLEURA: No effusion or pneumothorax.   LUNGS: Multiple bilateral pulmonary nodules have slightly increased in size for  example a right lower lobe pulmonary nodule measuring 8 mm previously measuring  7 mm and a right upper lobe pulmonary nodule measuring 6 mm, previously  measuring 4 mm. Majority of the pre-existing nodules demonstrate some subjective  increase in size. LIVER: Worsening intrahepatic biliary ductal dilation in the left hepatic lobe  with resultant left lobe atrophy. Scattered hepatic cysts. BILIARY TREE: Cholecystectomy. CBD is not dilated. SPLEEN: Status post splenectomy. PANCREAS: Status post distal pancreatectomy. ADRENALS: Unremarkable. KIDNEYS: No mass, calculus, or hydronephrosis. STOMACH: Unremarkable. SMALL BOWEL: No dilatation or wall thickening. COLON: No dilatation or wall thickening. APPENDIX:  PERITONEUM: Increased density of the peritoneal nodularity deep to the anterior  abdominal wall. No ascites. RETROPERITONEUM: No lymphadenopathy or aortic aneurysm. REPRODUCTIVE ORGANS: Status post hysterectomy. URINARY BLADDER: No mass or calculus. BONES: Lumbar spine spondylolisthesis and endplate degenerative changes. No  fracture or aggressive osseous lesion. ABDOMINAL WALL: No mass or hernia. ADDITIONAL COMMENTS: N/A    Impression  1. Progression of disease characterized by increase in size of pre-existing  pulmonary nodules and increased density and progression of peritoneal disease. 2.  No ascites. Assessment/PLAN:     1) Pancreatic Adenocarcinoma metastatic to the peritoneum and lungs. ECOG PS 1  Intent of Treatment - palliative  Prognosis: Guarded. Received neoadjuvant chemotherapy. Radiation with Dr Sal Ambriz. Definitive resection with Dr Angelica Tan. BRCA negative. Now peritoneal disease/mets  On chemo since 7/2021    Recent CT - progression of disease in the lungs and peritoneum  Plan for CT guided Bx  D/C [place]/Abraxane    Onivyde/5-FU, 4 cycles  CT shows disease progression in the lungs and peritoneum. Enhertu 5.4 mg/kg Q3w, cycle 3 day 1  Tolerating treatment very well  Denies any side effects.    A detailed system by system evaluation of side effect was performed to assess adverse events. Blood counts are acceptable. Results reviewed with the patient    The disease has a high risk of progression and the treatment also carries a substantial risk of side effects. All of this was assessed during this visit. Repeat imaging. 2. CINV    Reduce the days of Olanzepine for just two days due to fall        Signed By: Reinier Alamo MD          The patient was evaluated through a synchronous (real-time) audio-video encounter. The patient (or guardian if applicable) is aware that this is a billable service, which includes applicable co-pays. This Virtual Visit was conducted with patient's (and/or legal guardian's) consent. The visit was conducted pursuant to the emergency declaration under the [address] [place] [id number] authority and the [employer] [place] [employer] Act. Patient identification was verified, and a caregiver was present when appropriate. The patient was located in a state where the provider was licensed to provide care.

## 2023-03-25 DIAGNOSIS — R53.83 CHEMOTHERAPY-INDUCED FATIGUE: ICD-10-CM

## 2023-03-25 DIAGNOSIS — F32.A DEPRESSION, UNSPECIFIED DEPRESSION TYPE: ICD-10-CM

## 2023-03-25 DIAGNOSIS — T45.1X5A CHEMOTHERAPY-INDUCED FATIGUE: ICD-10-CM

## 2023-03-27 RX ORDER — METHYLPHENIDATE HYDROCHLORIDE 10 MG/1
10 TABLET ORAL 2 TIMES DAILY
Qty: 60 TABLET | Refills: 0 | Status: SHIPPED | OUTPATIENT
Start: 2023-03-27

## 2023-04-03 RX ORDER — DIPHENHYDRAMINE HYDROCHLORIDE 50 MG/ML
50 INJECTION, SOLUTION INTRAMUSCULAR; INTRAVENOUS AS NEEDED
Start: 2023-04-10

## 2023-04-03 RX ORDER — HEPARIN 100 UNIT/ML
500 SYRINGE INTRAVENOUS AS NEEDED
Start: 2023-04-10

## 2023-04-03 RX ORDER — ONDANSETRON 2 MG/ML
8 INJECTION INTRAMUSCULAR; INTRAVENOUS AS NEEDED
OUTPATIENT
Start: 2023-04-10

## 2023-04-03 RX ORDER — ACETAMINOPHEN 325 MG/1
650 TABLET ORAL AS NEEDED
Start: 2023-04-10

## 2023-04-03 RX ORDER — DEXTROSE MONOHYDRATE 50 MG/ML
5-250 INJECTION, SOLUTION INTRAVENOUS AS NEEDED
OUTPATIENT
Start: 2023-04-10

## 2023-04-03 RX ORDER — PALONOSETRON 0.05 MG/ML
0.25 INJECTION, SOLUTION INTRAVENOUS ONCE
OUTPATIENT
Start: 2023-04-10 | End: 2023-04-10

## 2023-04-03 RX ORDER — ALBUTEROL SULFATE 0.83 MG/ML
2.5 SOLUTION RESPIRATORY (INHALATION) AS NEEDED
Start: 2023-04-10

## 2023-04-03 RX ORDER — SODIUM CHLORIDE 9 MG/ML
5-40 INJECTION INTRAVENOUS AS NEEDED
OUTPATIENT
Start: 2023-04-10

## 2023-04-03 RX ORDER — DIPHENHYDRAMINE HYDROCHLORIDE 50 MG/ML
25 INJECTION, SOLUTION INTRAMUSCULAR; INTRAVENOUS AS NEEDED
Start: 2023-04-10

## 2023-04-03 RX ORDER — SODIUM CHLORIDE 0.9 % (FLUSH) 0.9 %
5-40 SYRINGE (ML) INJECTION AS NEEDED
OUTPATIENT
Start: 2023-04-10

## 2023-04-03 RX ORDER — EPINEPHRINE 1 MG/ML
0.3 INJECTION, SOLUTION, CONCENTRATE INTRAVENOUS AS NEEDED
OUTPATIENT
Start: 2023-04-10

## 2023-04-03 RX ORDER — SODIUM CHLORIDE 9 MG/ML
5-250 INJECTION, SOLUTION INTRAVENOUS AS NEEDED
OUTPATIENT
Start: 2023-04-10

## 2023-04-03 RX ORDER — HYDROCORTISONE SODIUM SUCCINATE 100 MG/2ML
100 INJECTION, POWDER, FOR SOLUTION INTRAMUSCULAR; INTRAVENOUS AS NEEDED
OUTPATIENT
Start: 2023-04-10

## 2023-04-04 ENCOUNTER — APPOINTMENT (OUTPATIENT)
Dept: CT IMAGING | Age: 68
DRG: 445 | End: 2023-04-04
Attending: STUDENT IN AN ORGANIZED HEALTH CARE EDUCATION/TRAINING PROGRAM
Payer: MEDICARE

## 2023-04-04 ENCOUNTER — HOSPITAL ENCOUNTER (OUTPATIENT)
Dept: INFUSION THERAPY | Age: 68
End: 2023-04-04
Payer: MEDICARE

## 2023-04-04 ENCOUNTER — HOSPITAL ENCOUNTER (OUTPATIENT)
Age: 68
LOS: 2 days | DRG: 445 | End: 2023-04-04
Attending: STUDENT IN AN ORGANIZED HEALTH CARE EDUCATION/TRAINING PROGRAM
Payer: MEDICARE

## 2023-04-04 DIAGNOSIS — C78.6 PERITONEAL CARCINOMATOSIS (HCC): ICD-10-CM

## 2023-04-04 DIAGNOSIS — M79.2 NEUROPATHIC PAIN: ICD-10-CM

## 2023-04-04 DIAGNOSIS — K83.1 BILIARY OBSTRUCTION: ICD-10-CM

## 2023-04-04 DIAGNOSIS — K83.1 OBSTRUCTIVE JAUNDICE: Primary | ICD-10-CM

## 2023-04-04 LAB
ALBUMIN SERPL-MCNC: 2.9 G/DL (ref 3.5–5)
ALBUMIN SERPL-MCNC: 3 G/DL (ref 3.5–5)
ALBUMIN/GLOB SERPL: 0.8 (ref 1.1–2.2)
ALBUMIN/GLOB SERPL: 0.9 (ref 1.1–2.2)
ALP SERPL-CCNC: 779 U/L (ref 45–117)
ALP SERPL-CCNC: 786 U/L (ref 45–117)
ALT SERPL-CCNC: 171 U/L (ref 12–78)
ALT SERPL-CCNC: 174 U/L (ref 12–78)
AMMONIA PLAS-SCNC: 33 UMOL/L
ANION GAP SERPL CALC-SCNC: 5 MMOL/L (ref 5–15)
ANION GAP SERPL CALC-SCNC: 8 MMOL/L (ref 5–15)
APPEARANCE UR: ABNORMAL
APPEARANCE UR: CLEAR
APTT PPP: 24.1 SEC (ref 22.1–31)
AST SERPL-CCNC: 180 U/L (ref 15–37)
AST SERPL-CCNC: 192 U/L (ref 15–37)
BACTERIA URNS QL MICRO: ABNORMAL /HPF
BACTERIA URNS QL MICRO: ABNORMAL /HPF
BASOPHILS # BLD: 0.1 K/UL (ref 0–0.1)
BASOPHILS # BLD: 0.1 K/UL (ref 0–0.1)
BASOPHILS NFR BLD: 1 % (ref 0–1)
BASOPHILS NFR BLD: 1 % (ref 0–1)
BILIRUB SERPL-MCNC: 8.5 MG/DL (ref 0.2–1)
BILIRUB SERPL-MCNC: 9.1 MG/DL (ref 0.2–1)
BILIRUB UR QL CFM: POSITIVE
BILIRUB UR QL CFM: POSITIVE
BUN SERPL-MCNC: 7 MG/DL (ref 6–20)
BUN SERPL-MCNC: 8 MG/DL (ref 6–20)
BUN/CREAT SERPL: 10 (ref 12–20)
BUN/CREAT SERPL: 11 (ref 12–20)
CALCIUM SERPL-MCNC: 9 MG/DL (ref 8.5–10.1)
CALCIUM SERPL-MCNC: 9.8 MG/DL (ref 8.5–10.1)
CHLORIDE SERPL-SCNC: 100 MMOL/L (ref 97–108)
CHLORIDE SERPL-SCNC: 102 MMOL/L (ref 97–108)
CO2 SERPL-SCNC: 27 MMOL/L (ref 21–32)
CO2 SERPL-SCNC: 29 MMOL/L (ref 21–32)
COLOR UR: ABNORMAL
COLOR UR: ABNORMAL
CREAT SERPL-MCNC: 0.7 MG/DL (ref 0.55–1.02)
CREAT SERPL-MCNC: 0.73 MG/DL (ref 0.55–1.02)
DIFFERENTIAL METHOD BLD: ABNORMAL
DIFFERENTIAL METHOD BLD: ABNORMAL
EOSINOPHIL # BLD: 0.2 K/UL (ref 0–0.4)
EOSINOPHIL # BLD: 0.3 K/UL (ref 0–0.4)
EOSINOPHIL NFR BLD: 3 % (ref 0–7)
EOSINOPHIL NFR BLD: 3 % (ref 0–7)
EPITH CASTS URNS QL MICRO: ABNORMAL /LPF
EPITH CASTS URNS QL MICRO: ABNORMAL /LPF
ERYTHROCYTE [DISTWIDTH] IN BLOOD BY AUTOMATED COUNT: 17.7 % (ref 11.5–14.5)
ERYTHROCYTE [DISTWIDTH] IN BLOOD BY AUTOMATED COUNT: 17.7 % (ref 11.5–14.5)
GLOBULIN SER CALC-MCNC: 3.3 G/DL (ref 2–4)
GLOBULIN SER CALC-MCNC: 4 G/DL (ref 2–4)
GLUCOSE BLD STRIP.AUTO-MCNC: 197 MG/DL (ref 65–117)
GLUCOSE BLD STRIP.AUTO-MCNC: 206 MG/DL (ref 65–117)
GLUCOSE SERPL-MCNC: 214 MG/DL (ref 65–100)
GLUCOSE SERPL-MCNC: 266 MG/DL (ref 65–100)
GLUCOSE UR STRIP.AUTO-MCNC: 500 MG/DL
GLUCOSE UR STRIP.AUTO-MCNC: 500 MG/DL
GRAN CASTS URNS QL MICRO: ABNORMAL /LPF
HCT VFR BLD AUTO: 31.6 % (ref 35–47)
HCT VFR BLD AUTO: 33.3 % (ref 35–47)
HGB BLD-MCNC: 11.1 G/DL (ref 11.5–16)
HGB BLD-MCNC: 11.4 G/DL (ref 11.5–16)
HGB UR QL STRIP: NEGATIVE
HGB UR QL STRIP: NEGATIVE
IMM GRANULOCYTES # BLD AUTO: 0 K/UL (ref 0–0.04)
IMM GRANULOCYTES # BLD AUTO: 0 K/UL (ref 0–0.04)
IMM GRANULOCYTES NFR BLD AUTO: 0 % (ref 0–0.5)
IMM GRANULOCYTES NFR BLD AUTO: 0 % (ref 0–0.5)
INR PPP: 1 (ref 0.9–1.1)
KETONES UR QL STRIP.AUTO: ABNORMAL MG/DL
KETONES UR QL STRIP.AUTO: NEGATIVE MG/DL
LACTATE SERPL-SCNC: 1.3 MMOL/L (ref 0.4–2)
LEUKOCYTE ESTERASE UR QL STRIP.AUTO: ABNORMAL
LEUKOCYTE ESTERASE UR QL STRIP.AUTO: NEGATIVE
LYMPHOCYTES # BLD: 1.5 K/UL (ref 0.8–3.5)
LYMPHOCYTES # BLD: 2 K/UL (ref 0.8–3.5)
LYMPHOCYTES NFR BLD: 18 % (ref 12–49)
LYMPHOCYTES NFR BLD: 21 % (ref 12–49)
MCH RBC QN AUTO: 34.3 PG (ref 26–34)
MCH RBC QN AUTO: 35.1 PG (ref 26–34)
MCHC RBC AUTO-ENTMCNC: 34.2 G/DL (ref 30–36.5)
MCHC RBC AUTO-ENTMCNC: 35.1 G/DL (ref 30–36.5)
MCV RBC AUTO: 100 FL (ref 80–99)
MCV RBC AUTO: 100.3 FL (ref 80–99)
MONOCYTES # BLD: 1.3 K/UL (ref 0–1)
MONOCYTES # BLD: 1.7 K/UL (ref 0–1)
MONOCYTES NFR BLD: 16 % (ref 5–13)
MONOCYTES NFR BLD: 18 % (ref 5–13)
NEUTS SEG # BLD: 4.9 K/UL (ref 1.8–8)
NEUTS SEG # BLD: 5.3 K/UL (ref 1.8–8)
NEUTS SEG NFR BLD: 57 % (ref 32–75)
NEUTS SEG NFR BLD: 61 % (ref 32–75)
NITRITE UR QL STRIP.AUTO: NEGATIVE
NITRITE UR QL STRIP.AUTO: POSITIVE
NRBC # BLD: 0.19 K/UL (ref 0–0.01)
NRBC # BLD: 0.2 K/UL (ref 0–0.01)
NRBC BLD-RTO: 2.2 PER 100 WBC
NRBC BLD-RTO: 2.4 PER 100 WBC
OTHER,OTHU: ABNORMAL
PH UR STRIP: 5.5 (ref 5–8)
PH UR STRIP: 6 (ref 5–8)
PLATELET # BLD AUTO: 544 K/UL (ref 150–400)
PLATELET # BLD AUTO: 577 K/UL (ref 150–400)
PMV BLD AUTO: 10.9 FL (ref 8.9–12.9)
PMV BLD AUTO: 11.4 FL (ref 8.9–12.9)
POTASSIUM SERPL-SCNC: 3.8 MMOL/L (ref 3.5–5.1)
POTASSIUM SERPL-SCNC: 4.5 MMOL/L (ref 3.5–5.1)
PROT SERPL-MCNC: 6.2 G/DL (ref 6.4–8.2)
PROT SERPL-MCNC: 7 G/DL (ref 6.4–8.2)
PROT UR STRIP-MCNC: 30 MG/DL
PROT UR STRIP-MCNC: ABNORMAL MG/DL
PROTHROMBIN TIME: 10.5 SEC (ref 9–11.1)
RBC # BLD AUTO: 3.16 M/UL (ref 3.8–5.2)
RBC # BLD AUTO: 3.32 M/UL (ref 3.8–5.2)
RBC #/AREA URNS HPF: ABNORMAL /HPF (ref 0–5)
RBC #/AREA URNS HPF: ABNORMAL /HPF (ref 0–5)
SERVICE CMNT-IMP: ABNORMAL
SERVICE CMNT-IMP: ABNORMAL
SODIUM SERPL-SCNC: 135 MMOL/L (ref 136–145)
SODIUM SERPL-SCNC: 136 MMOL/L (ref 136–145)
SP GR UR REFRACTOMETRY: 1.02 (ref 1–1.03)
SP GR UR REFRACTOMETRY: 1.03
THERAPEUTIC RANGE,PTTT: NORMAL SECS (ref 58–77)
UA: UC IF INDICATED,UAUC: ABNORMAL
UROBILINOGEN UR QL STRIP.AUTO: 0.2 EU/DL (ref 0.2–1)
UROBILINOGEN UR QL STRIP.AUTO: 0.2 EU/DL (ref 0.2–1)
WBC # BLD AUTO: 8 K/UL (ref 3.6–11)
WBC # BLD AUTO: 9.3 K/UL (ref 3.6–11)
WBC URNS QL MICRO: ABNORMAL /HPF (ref 0–4)
WBC URNS QL MICRO: ABNORMAL /HPF (ref 0–4)

## 2023-04-04 PROCEDURE — 85610 PROTHROMBIN TIME: CPT

## 2023-04-04 PROCEDURE — 87086 URINE CULTURE/COLONY COUNT: CPT

## 2023-04-04 PROCEDURE — 74011250636 HC RX REV CODE- 250/636: Performed by: STUDENT IN AN ORGANIZED HEALTH CARE EDUCATION/TRAINING PROGRAM

## 2023-04-04 PROCEDURE — 65270000046 HC RM TELEMETRY

## 2023-04-04 PROCEDURE — 82140 ASSAY OF AMMONIA: CPT

## 2023-04-04 PROCEDURE — P9047 ALBUMIN (HUMAN), 25%, 50ML: HCPCS | Performed by: STUDENT IN AN ORGANIZED HEALTH CARE EDUCATION/TRAINING PROGRAM

## 2023-04-04 PROCEDURE — 74011000636 HC RX REV CODE- 636: Performed by: STUDENT IN AN ORGANIZED HEALTH CARE EDUCATION/TRAINING PROGRAM

## 2023-04-04 PROCEDURE — 99285 EMERGENCY DEPT VISIT HI MDM: CPT

## 2023-04-04 PROCEDURE — 80053 COMPREHEN METABOLIC PANEL: CPT

## 2023-04-04 PROCEDURE — 74011250637 HC RX REV CODE- 250/637: Performed by: STUDENT IN AN ORGANIZED HEALTH CARE EDUCATION/TRAINING PROGRAM

## 2023-04-04 PROCEDURE — 82962 GLUCOSE BLOOD TEST: CPT

## 2023-04-04 PROCEDURE — 74011636637 HC RX REV CODE- 636/637: Performed by: STUDENT IN AN ORGANIZED HEALTH CARE EDUCATION/TRAINING PROGRAM

## 2023-04-04 PROCEDURE — 36415 COLL VENOUS BLD VENIPUNCTURE: CPT

## 2023-04-04 PROCEDURE — 85025 COMPLETE CBC W/AUTO DIFF WBC: CPT

## 2023-04-04 PROCEDURE — 96374 THER/PROPH/DIAG INJ IV PUSH: CPT

## 2023-04-04 PROCEDURE — 81001 URINALYSIS AUTO W/SCOPE: CPT

## 2023-04-04 PROCEDURE — 74177 CT ABD & PELVIS W/CONTRAST: CPT

## 2023-04-04 PROCEDURE — 83605 ASSAY OF LACTIC ACID: CPT

## 2023-04-04 PROCEDURE — 74011000258 HC RX REV CODE- 258: Performed by: STUDENT IN AN ORGANIZED HEALTH CARE EDUCATION/TRAINING PROGRAM

## 2023-04-04 PROCEDURE — 85730 THROMBOPLASTIN TIME PARTIAL: CPT

## 2023-04-04 RX ORDER — IBUPROFEN 200 MG
16 TABLET ORAL AS NEEDED
Status: DISCONTINUED | OUTPATIENT
Start: 2023-04-04 | End: 2023-04-06 | Stop reason: HOSPADM

## 2023-04-04 RX ORDER — GABAPENTIN 300 MG/1
600 CAPSULE ORAL 2 TIMES DAILY
Status: DISCONTINUED | OUTPATIENT
Start: 2023-04-04 | End: 2023-04-06 | Stop reason: HOSPADM

## 2023-04-04 RX ORDER — LOPERAMIDE HYDROCHLORIDE 2 MG/1
4 CAPSULE ORAL AS NEEDED
Status: DISCONTINUED | OUTPATIENT
Start: 2023-04-04 | End: 2023-04-06 | Stop reason: HOSPADM

## 2023-04-04 RX ORDER — HYDROMORPHONE HYDROCHLORIDE 2 MG/1
2 TABLET ORAL ONCE
Status: COMPLETED | OUTPATIENT
Start: 2023-04-04 | End: 2023-04-04

## 2023-04-04 RX ORDER — SODIUM CHLORIDE 9 MG/ML
5-40 INJECTION INTRAVENOUS AS NEEDED
Start: 2023-04-04

## 2023-04-04 RX ORDER — LIDOCAINE 40 MG/G
CREAM TOPICAL AS NEEDED
Status: DISCONTINUED | OUTPATIENT
Start: 2023-04-04 | End: 2023-04-06 | Stop reason: HOSPADM

## 2023-04-04 RX ORDER — POLYETHYLENE GLYCOL 3350 17 G/17G
17 POWDER, FOR SOLUTION ORAL DAILY PRN
Status: DISCONTINUED | OUTPATIENT
Start: 2023-04-04 | End: 2023-04-06 | Stop reason: HOSPADM

## 2023-04-04 RX ORDER — LORAZEPAM 1 MG/1
1 TABLET ORAL 2 TIMES DAILY
Status: DISCONTINUED | OUTPATIENT
Start: 2023-04-04 | End: 2023-04-06 | Stop reason: HOSPADM

## 2023-04-04 RX ORDER — SODIUM CHLORIDE 0.9 % (FLUSH) 0.9 %
5-40 SYRINGE (ML) INJECTION AS NEEDED
Status: CANCELLED
Start: 2023-04-04

## 2023-04-04 RX ORDER — ALBUMIN HUMAN 250 G/1000ML
50 SOLUTION INTRAVENOUS ONCE
Status: COMPLETED | OUTPATIENT
Start: 2023-04-04 | End: 2023-04-05

## 2023-04-04 RX ORDER — PROMETHAZINE HYDROCHLORIDE 25 MG/1
12.5 TABLET ORAL
Status: DISCONTINUED | OUTPATIENT
Start: 2023-04-04 | End: 2023-04-06 | Stop reason: HOSPADM

## 2023-04-04 RX ORDER — HEPARIN 100 UNIT/ML
500 SYRINGE INTRAVENOUS AS NEEDED
Status: ACTIVE
Start: 2023-04-04 | End: 2023-04-04

## 2023-04-04 RX ORDER — POTASSIUM CHLORIDE 750 MG/1
20 TABLET, FILM COATED, EXTENDED RELEASE ORAL 2 TIMES DAILY
Status: DISCONTINUED | OUTPATIENT
Start: 2023-04-04 | End: 2023-04-06 | Stop reason: HOSPADM

## 2023-04-04 RX ORDER — LORAZEPAM 1 MG/1
2 TABLET ORAL
Status: COMPLETED | OUTPATIENT
Start: 2023-04-04 | End: 2023-04-04

## 2023-04-04 RX ORDER — DULOXETIN HYDROCHLORIDE 30 MG/1
30 CAPSULE, DELAYED RELEASE ORAL EVERY EVENING
Status: DISCONTINUED | OUTPATIENT
Start: 2023-04-04 | End: 2023-04-06 | Stop reason: HOSPADM

## 2023-04-04 RX ORDER — OLANZAPINE 10 MG/1
10 TABLET ORAL
Status: DISCONTINUED | OUTPATIENT
Start: 2023-04-04 | End: 2023-04-06 | Stop reason: HOSPADM

## 2023-04-04 RX ORDER — INSULIN LISPRO 100 [IU]/ML
1-8 INJECTION, SOLUTION INTRAVENOUS; SUBCUTANEOUS
Status: DISCONTINUED | OUTPATIENT
Start: 2023-04-04 | End: 2023-04-06 | Stop reason: HOSPADM

## 2023-04-04 RX ORDER — HEPARIN 100 UNIT/ML
500 SYRINGE INTRAVENOUS AS NEEDED
Status: CANCELLED
Start: 2023-04-04

## 2023-04-04 RX ORDER — METHYLPHENIDATE HYDROCHLORIDE 5 MG/1
10 TABLET ORAL 2 TIMES DAILY
Status: DISCONTINUED | OUTPATIENT
Start: 2023-04-04 | End: 2023-04-06 | Stop reason: HOSPADM

## 2023-04-04 RX ORDER — HYDROMORPHONE HYDROCHLORIDE 2 MG/1
2 TABLET ORAL
Status: DISCONTINUED | OUTPATIENT
Start: 2023-04-04 | End: 2023-04-06 | Stop reason: HOSPADM

## 2023-04-04 RX ORDER — DIPHENOXYLATE HYDROCHLORIDE AND ATROPINE SULFATE 2.5; .025 MG/1; MG/1
1 TABLET ORAL AS NEEDED
Status: DISCONTINUED | OUTPATIENT
Start: 2023-04-04 | End: 2023-04-06 | Stop reason: HOSPADM

## 2023-04-04 RX ORDER — ENOXAPARIN SODIUM 100 MG/ML
40 INJECTION SUBCUTANEOUS DAILY
Status: DISCONTINUED | OUTPATIENT
Start: 2023-04-05 | End: 2023-04-06 | Stop reason: HOSPADM

## 2023-04-04 RX ORDER — ACETAMINOPHEN 325 MG/1
650 TABLET ORAL
Status: DISCONTINUED | OUTPATIENT
Start: 2023-04-04 | End: 2023-04-06 | Stop reason: HOSPADM

## 2023-04-04 RX ORDER — SODIUM CHLORIDE 0.9 % (FLUSH) 0.9 %
5-40 SYRINGE (ML) INJECTION AS NEEDED
Status: DISPENSED
Start: 2023-04-04 | End: 2023-04-04

## 2023-04-04 RX ORDER — LANOLIN ALCOHOL/MO/W.PET/CERES
400 CREAM (GRAM) TOPICAL DAILY
Status: DISCONTINUED | OUTPATIENT
Start: 2023-04-05 | End: 2023-04-06 | Stop reason: HOSPADM

## 2023-04-04 RX ORDER — DULOXETIN HYDROCHLORIDE 30 MG/1
60 CAPSULE, DELAYED RELEASE ORAL EVERY MORNING
Status: DISCONTINUED | OUTPATIENT
Start: 2023-04-05 | End: 2023-04-06 | Stop reason: HOSPADM

## 2023-04-04 RX ORDER — ONDANSETRON 2 MG/ML
4 INJECTION INTRAMUSCULAR; INTRAVENOUS
Status: DISCONTINUED | OUTPATIENT
Start: 2023-04-04 | End: 2023-04-06 | Stop reason: HOSPADM

## 2023-04-04 RX ORDER — SODIUM CHLORIDE 9 MG/ML
5-250 INJECTION, SOLUTION INTRAVENOUS AS NEEDED
Start: 2023-04-04

## 2023-04-04 RX ORDER — ACETAMINOPHEN 650 MG/1
650 SUPPOSITORY RECTAL
Status: DISCONTINUED | OUTPATIENT
Start: 2023-04-04 | End: 2023-04-06 | Stop reason: HOSPADM

## 2023-04-04 RX ADMIN — POTASSIUM CHLORIDE 20 MEQ: 750 TABLET, EXTENDED RELEASE ORAL at 22:43

## 2023-04-04 RX ADMIN — DULOXETINE 30 MG: 30 CAPSULE, DELAYED RELEASE ORAL at 22:42

## 2023-04-04 RX ADMIN — METHYLPHENIDATE HYDROCHLORIDE 10 MG: 5 TABLET ORAL at 22:43

## 2023-04-04 RX ADMIN — Medication 2 UNITS: at 23:23

## 2023-04-04 RX ADMIN — HYDROMORPHONE HYDROCHLORIDE 2 MG: 2 TABLET ORAL at 20:32

## 2023-04-04 RX ADMIN — SODIUM CHLORIDE 1 G: 900 INJECTION INTRAVENOUS at 20:09

## 2023-04-04 RX ADMIN — IOMEPROL INJECTION 100 ML: 714 INJECTION, SOLUTION INTRAVASCULAR at 17:39

## 2023-04-04 RX ADMIN — ALBUMIN (HUMAN) 50 G: 0.25 INJECTION, SOLUTION INTRAVENOUS at 23:25

## 2023-04-04 RX ADMIN — LORAZEPAM 2 MG: 1 TABLET ORAL at 20:32

## 2023-04-04 RX ADMIN — GABAPENTIN 500 MG: 300 CAPSULE ORAL at 20:32

## 2023-04-04 NOTE — ED NOTES
Pt's daughter at bedside. Pt reports turning yellow, extremely itchy, dark orange urine - onset of 4/1/23. Pt reports yellow stools that has been occurring for months. Pt denies SOB, chest pain, nausea, diarrhea, constipation, cough, etc. Pt reports intermediate heaviness / can't catch breath pain upon taking a deep breath - onset of 2 weeks. Pt A&O x4. Pt reports hernia on abdomen. PMH: pancreatic cancer stage 4, DM T2 (insulin - pt states compliance)     Per outpatient infusion center:   Bilirubin 0.6 (2 weeks ago) -> 8.5 (today, 4/4/23)     (today)   (today)   Alk.  Phosphatase 786 (today)   Albumin 2.9 (today)

## 2023-04-04 NOTE — ED PROVIDER NOTES
[address]       Pt Name: Megha Quiroz  MRN: [id number]  [place] 1955  Date of evaluation: 4/4/2023  Provider: Antonette Schmitt DO   PCP: Kristy Lorenzo MD  Note Started: 4:26 PM 4/4/23     CHIEF COMPLAINT       Chief Complaint   Patient presents with    Abnormal Lab Results     Patient ambulatory into ED c/o lethargy, darkened urine and yellow discoloration of skin x 1 week. Currently being treated for pancreatic cancer. Today had blood drawn and bilirubin was 8.5. Told to come to ED for eval for blockage in liver. HISTORY OF PRESENT ILLNESS: 1 or more elements      History From: Patient  HPI Limitations : None     Megha Quiroz is a 79 y.o. female who presents with c/c of referral from PCP for itchy skin, elevated bilirubin. Patient has hx of pancreatic cancer, currently being treated by Dr. Eliud Patel. She was seen today by her PCP as she had been experiencing itchy skin and skin discoloration this weekend, vito labs and her bili was 8.5, referred to the ER. Nursing Notes were all reviewed and agreed with or any disagreements were addressed in the HPI. REVIEW OF SYSTEMS      Review of Systems   Constitutional:  Negative for chills and fever. Respiratory:  Negative for shortness of breath. Gastrointestinal:  Positive for abdominal pain. Negative for diarrhea, nausea and vomiting. Skin:  Positive for color change. Negative for rash. Itchy skin   Neurological:  Negative for weakness and headaches. Positives and Pertinent negatives as per HPI.     PAST HISTORY     Past Medical History:  Past Medical History:   Diagnosis Date    Adenocarcinoma of pancreas ([facility] [address] 05/13/2020    Dr Joshua Chau biopsy via EUS    Diabetes [facility])     GERD (gastroesophageal reflux disease)     Hernia of abdominal cavity     Subxyphoid hernia    Hypertension     Inflammatory polyarthropathy (HCC)     Joint pain     Joint swelling     Menopause     Ocular nevus of left eye     Pancreatitis     Tracheal stenosis        Past Surgical History:  Past Surgical History:   Procedure Laterality Date    HX CARPAL TUNNEL RELEASE Bilateral     HX COLONOSCOPY      HX HYSTERECTOMY      HX KNEE ARTHROSCOPY Right     HX KNEE REPLACEMENT Right     partial    HX LAP CHOLECYSTECTOMY      HX TONSILLECTOMY      HX VASCULAR ACCESS         Family History:  Family History   Problem Relation Age of Onset    Heart Disease Mother     Heart Attack Mother     Cancer Father         lung    Diabetes Sister        Social History:  Social History     Tobacco Use    Smoking status: Never    Smokeless tobacco: Never   Substance Use Topics    Alcohol use: No     Alcohol/week: 0.0 standard drinks    Drug use: No       Allergies:  No Known Allergies    CURRENT MEDICATIONS      Current Discharge Medication List        CONTINUE these medications which have NOT CHANGED    Details   methylphenidate HCl (RITALIN) 10 mg tablet Take 1 Tablet by mouth two (2) times a day. Max Daily Amount: 20 mg.  Qty: 60 Tablet, Refills: 0    Associated Diagnoses: Depression, unspecified depression type; Chemotherapy-induced fatigue      HYDROmorphone (DILAUDID) 2 mg tablet TAKE 1 TABLET BY MOUTH THREE TIMES DAILY AS NEEDED FOR pain (max of 3 TABLETS PER DAY)  Qty: 100 Tablet, Refills: 0    Associated Diagnoses: Malignant neoplasm of pancreas, unspecified location of malignancy (HCC)      potassium chloride SR (KLOR-CON 10) 10 mEq tablet Take 2 Tablets by mouth two (2) times a day. Qty: 120 Tablet, Refills: 1    Associated Diagnoses: Primary pancreatic cancer with metastasis to other site [facility]); Hypokalemia      Blood-Glucose Sensor (Dexcom G6 Sensor) fadi 1 Each by Does Not Apply route Once every 2 weeks. DM2 on multiple insulin injections. Pancreatic CA; z79.4, e11.9, c25.9  Qty: 2 Each, Refills: 11    Associated Diagnoses: Pancreatic cancer metastasized to liver ([facility] [address] Type 2 diabetes mellitus with nephropathy ([facility] [address]  Uncontrolled type 2 diabetes mellitus with hyperglycemia (HCC)      Blood-Glucose Transmitter (Dexcom G6 Transmitter) fadi 1 Each by Does Not Apply route four (4) times daily. Qty: 1 Each, Refills: 0    Associated Diagnoses: Pancreatic cancer metastasized to liver ([facility] [address] Type 2 diabetes mellitus with nephropathy ([facility] [address] Uncontrolled type 2 diabetes mellitus with hyperglycemia (HCC)      Blood-Glucose Meter,Continuous (Dexcom G6 ) misc 1 Each by Does Not Apply route four (4) times daily. DM2 on multiple insulin injections. Pancreatic CA; z79.4, e11.9, c25.9  Qty: 1 Each, Refills: 0    Associated Diagnoses: Pancreatic cancer metastasized to liver ([facility] [address] Type 2 diabetes mellitus with nephropathy ([facility] [address] Uncontrolled type 2 diabetes mellitus with hyperglycemia (HCC)      loperamide (IMODIUM) 2 mg capsule Take 4 mg by mouth as needed for Diarrhea. diphenoxylate-atropine (LOMOTIL) 2.5-0.025 mg per tablet Take 1 Tablet by mouth as needed. nystatin (MYCOSTATIN) powder Apply 1 g to affected area three (3) times daily. Qty: 120 g, Refills: 1    Associated Diagnoses: Yeast infection of the skin      OLANZapine (ZyPREXA) 10 mg tablet Take 1 Tablet by mouth nightly. For 5 nights starting the night after chemotherapy  Qty: 30 Tablet, Refills: 1    Associated Diagnoses: CINV (chemotherapy-induced nausea and vomiting)      magnesium oxide (MAG-OX) 400 mg tablet Take 1 Tablet by mouth daily. Qty: 100 Tablet, Refills: 4    Associated Diagnoses: Hypomagnesemia      Toujeo SoloStar U-300 Insulin 300 unit/mL (1.5 mL) inpn pen INJECT 55 UNITS ONCE DAILY AT BEDTIME - DOSE INCREASED  Qty: 4.5 mL, Refills: 10    Associated Diagnoses: Type 2 diabetes mellitus without complication, without long-term current use of insulin (HCC)      ondansetron (ZOFRAN ODT) 4 mg disintegrating tablet Take 1 Tablet by mouth every eight (8) hours as needed for Nausea or Vomiting.   Qty: 40 Tablet, Refills: 2    Associated Diagnoses: Malignant neoplasm of pancreas, unspecified location of malignancy (HCC)      prochlorperazine (Compazine) 10 mg tablet Take 1 Tablet by mouth every six (6) hours as needed for Nausea or Vomiting. Qty: 40 Tablet, Refills: 2    Associated Diagnoses: Malignant neoplasm of pancreas, unspecified location of malignancy (HCC)      lidocaine-prilocaine (EMLA) topical cream Apply  to affected area as needed for Pain. Apply generous amount to port site 30-45 min prior to infusions  Qty: 30 g, Refills: 2    Associated Diagnoses: Malignant neoplasm of pancreas, unspecified location of malignancy (HCC)      HumaLOG KwikPen Insulin 100 unit/mL kwikpen INJECT SUBCUTANEOUSLY ON SLIDING SCALE AS DIRECTED  Qty: 15 mL, Refills: 6      gabapentin (NEURONTIN) 300 mg capsule TAKE 2 CAPSULES BY MOUTH THREE TIMES DAILY  Qty: 180 Capsule, Refills: 3    Associated Diagnoses: Malignant neoplasm of pancreas, unspecified location of malignancy (HCC)      LORazepam (ATIVAN) 1 mg tablet TAKE 1 TABLET BY MOUTH EVERY MORNING AND TAKE 2 TABLETS AT BEDTIME  Qty: 90 Tablet, Refills: 3    Associated Diagnoses: Malignant neoplasm of pancreas, unspecified location of malignancy (HCC)      famotidine (PEPCID) 20 mg tablet Take 20 mg by mouth two (2) times a day. multivit-minerals/folic acid (MULTIVITAMIN GUMMIES PO) Take 2 Each by mouth Every morning. !! DULoxetine (CYMBALTA) 30 mg capsule Take 1 Capsule by mouth every evening. Qty: 90 Capsule, Refills: 3      acetaminophen (TYLENOL) 500 mg tablet Take 1,000 mg by mouth two (2) times daily as needed. Indications: pain      ibuprofen (MOTRIN) 200 mg tablet Take 600 mg by mouth two (2) times daily as needed. Indications: pain      senna-docusate (PERICOLACE) 8.6-50 mg per tablet nightly as needed. !! DULoxetine (CYMBALTA) 60 mg capsule Take 1 Capsule by mouth daily.  Indications: neuropathic pain  Qty: 90 Capsule, Refills: 2    Associated Diagnoses: Neuropathic pain       !! - Potential duplicate medications found. Please discuss with provider. SCREENINGS               No data recorded         PHYSICAL EXAM      ED Triage Vitals [04/04/23 1547]   ED Encounter Vitals Group      BP (!) 145/78      Pulse (Heart Rate) 94      Resp Rate 17      Temp 98.4 °F (36.9 °C)      Temp src       O2 Sat (%) 99 %      Weight 165 lb 5.5 oz      Height         Physical Exam  Vitals and nursing note reviewed. Constitutional:       Appearance: Normal appearance. HENT:      Head: Normocephalic and atraumatic. Mouth/Throat:      Mouth: Mucous membranes are moist.   Eyes:      Conjunctiva/sclera: Conjunctivae normal.   Cardiovascular:      Rate and Rhythm: Normal rate and regular rhythm. Pulmonary:      Effort: Pulmonary effort is normal.      Breath sounds: Normal breath sounds. Abdominal:      General: Abdomen is flat. Palpations: Abdomen is soft. Musculoskeletal:      Right lower leg: No edema. Left lower leg: No edema. Skin:     General: Skin is warm. Capillary Refill: Capillary refill takes less than 2 seconds. Coloration: Skin is jaundiced. Neurological:      Mental Status: She is alert. Mental status is at baseline. DIAGNOSTIC RESULTS   LABS:     Recent Results (from the past 12 hour(s))   METABOLIC PANEL, COMPREHENSIVE    Collection Time: 04/05/23  2:21 AM   Result Value Ref Range    Sodium 135 (L) 136 - 145 mmol/L    Potassium 3.5 3.5 - 5.1 mmol/L    Chloride 102 97 - 108 mmol/L    CO2 28 21 - 32 mmol/L    Anion gap 5 5 - 15 mmol/L    Glucose 170 (H) 65 - 100 mg/dL    BUN 7 6 - 20 MG/DL    Creatinine 0.65 0.55 - 1.02 MG/DL    BUN/Creatinine ratio 11 (L) 12 - 20      eGFR >60 >60 ml/min/1.73m2    Calcium 9.3 8.5 - 10.1 MG/DL    Bilirubin, total 8.4 (H) 0.2 - 1.0 MG/DL    ALT (SGPT) 136 (H) 12 - 78 U/L    AST (SGOT) 145 (H) 15 - 37 U/L    Alk.  phosphatase 645 (H) 45 - 117 U/L    Protein, total 6.7 6.4 - 8.2 g/dL    Albumin 3.2 (L) 3.5 - 5.0 g/dL    Globulin 3.5 2.0 - 4.0 g/dL A-G Ratio 0.9 (L) 1.1 - 2.2     CBC WITH AUTOMATED DIFF    Collection Time: 04/05/23  2:21 AM   Result Value Ref Range    WBC 9.5 3.6 - 11.0 K/uL    RBC 2.97 (L) 3.80 - 5.20 M/uL    HGB 10.1 (L) 11.5 - 16.0 g/dL    HCT 29.8 (L) 35.0 - 47.0 %    .3 (H) 80.0 - 99.0 FL    MCH 34.0 26.0 - 34.0 PG    MCHC 33.9 30.0 - 36.5 g/dL    RDW 18.2 (H) 11.5 - 14.5 %    PLATELET 661 (H) 609 - 400 K/uL    MPV 10.6 8.9 - 12.9 FL    NRBC 2.1 (H) 0  WBC    ABSOLUTE NRBC 0.20 (H) 0.00 - 0.01 K/uL    NEUTROPHILS 59 32 - 75 %    LYMPHOCYTES 18 12 - 49 %    MONOCYTES 19 (H) 5 - 13 %    EOSINOPHILS 3 0 - 7 %    BASOPHILS 1 0 - 1 %    IMMATURE GRANULOCYTES 0 0.0 - 0.5 %    ABS. NEUTROPHILS 5.6 1.8 - 8.0 K/UL    ABS. LYMPHOCYTES 1.7 0.8 - 3.5 K/UL    ABS. MONOCYTES 1.8 (H) 0.0 - 1.0 K/UL    ABS. EOSINOPHILS 0.3 0.0 - 0.4 K/UL    ABS. BASOPHILS 0.1 0.0 - 0.1 K/UL    ABS. IMM. GRANS. 0.0 0.00 - 0.04 K/UL    DF SMEAR SCANNED      RBC COMMENTS MACROCYTOSIS  1+       GLUCOSE, POC    Collection Time: 04/05/23  6:47 AM   Result Value Ref Range    Glucose (POC) 185 (H) 65 - 117 mg/dL    Performed by Richard Varags RN    GLUCOSE, POC    Collection Time: 04/05/23 11:36 AM   Result Value Ref Range    Glucose (POC) 298 (H) 65 - 117 mg/dL    Performed by Margie Mix          RADIOLOGY:  Non-plain film images such as CT, Ultrasound and MRI are read by the radiologist. Plain radiographic images are visualized and preliminarily interpreted by the ED Provider with the below findings:       Interpretation per the Radiologist below, if available at the time of this note:     CT ABD PELV W CONT    Result Date: 4/4/2023  EXAM: CT ABD PELV W CONT INDICATION: elevated bilirubin, hx pancreatic cancer, jaundice COMPARISON: 1/11/2023 CONTRAST: 100 mL of Isovue-370. ORAL CONTRAST: None TECHNIQUE: Following the uneventful intravenous administration of contrast, thin axial images were obtained through the abdomen and pelvis.  Coronal and sagittal reconstructions were generated. CT dose reduction was achieved through use of a standardized protocol tailored for this examination and automatic exposure control for dose modulation. FINDINGS: LOWER THORAX: Bilateral pulmonary nodules in the lung bases, increased in size and number since the prior study. The largest on the left measures 0.7 cm and the largest on the right 0.8 cm. Small right pleural effusion. LIVER: Significant worsening in severity of biliary ductal dilatation which is most severe in the left hepatic lobe. New biliary ductal dilatation in the right hepatic lobe. Stable right hepatic lobe cyst. BILIARY TREE: The gallbladder surgically absent. Prominence of extrahepatic ductal structures on that basis. The spleen appears surgically absent. SPLEEN: The spleen is surgically absent. PANCREAS: The distal pancreas is surgically absent. ADRENALS: Unremarkable. KIDNEYS: No mass, or hydronephrosis. Nonobstructing tiny left intrarenal calculus. STOMACH: Unremarkable. SMALL BOWEL: No dilatation or wall thickening. COLON: No dilatation or wall thickening. Short segmental narrowing of rectal caliber is likely related to peristalsis. APPENDIX: Normal, subhepatic right upper quadrant. PERITONEUM: Small ascites surrounding the liver and spleen and in the pelvis. Omental nodule in the left upper quadrant 1.7 cm, increased since the prior study other peritoneal/omental nodularity also slightly increased. RETROPERITONEUM: No lymphadenopathy or aortic aneurysm. REPRODUCTIVE ORGANS: The uterus is surgically absent. URINARY BLADDER: No mass or calculus. Minimally distended. BONES: No destructive bone lesion. ABDOMINAL WALL: Ventral hernia containing a loop of colon without associated obstruction. ADDITIONAL COMMENTS: N/A     1. Significant progression of disease characterized by worsened biliary ductal dilatation, increase in metastatic pulmonary nodules, increased peritoneal nodules and ascites.  2. Ventral hernia containing transverse colon without obstruction.        PROCEDURES   Unless otherwise noted below, none  Procedures     CRITICAL CARE TIME       EMERGENCY DEPARTMENT COURSE and DIFFERENTIAL DIAGNOSIS/MDM   Vitals:    Vitals:    04/04/23 2130 04/04/23 2311 04/05/23 0322 04/05/23 0800   BP:  136/65 138/76 136/71   Pulse: 85 86 83 83   Resp:  17  16   Temp:  98.2 °F (36.8 °C) 98.2 °F (36.8 °C) 98.2 °F (36.8 °C)   SpO2:  97% 96% 95%   Weight:            Patient was given the following medications:  Medications   acetaminophen (TYLENOL) tablet 650 mg (650 mg Oral Given 4/5/23 0710)     Or   acetaminophen (TYLENOL) suppository 650 mg ( Rectal See Alternative 4/5/23 0710)   polyethylene glycol (MIRALAX) packet 17 g (has no administration in time range)   promethazine (PHENERGAN) tablet 12.5 mg (has no administration in time range)     Or   ondansetron (ZOFRAN) injection 4 mg (has no administration in time range)   cefTRIAXone (ROCEPHIN) 1 g in 0.9% sodium chloride (MBP/ADV) 50 mL MBP (0 g IntraVENous Transfusion Held 4/4/23 2051)   enoxaparin (LOVENOX) injection 40 mg (40 mg SubCUTAneous Given 4/5/23 1108)   pantoprazole (PROTONIX) 40 mg in 0.9% sodium chloride 10 mL injection (40 mg IntraVENous Given 4/5/23 1106)   glucagon (GLUCAGEN) injection 1 mg (has no administration in time range)   dextrose 10 % infusion 250 mL (has no administration in time range)   glucose chewable tablet 16 g (has no administration in time range)   insulin lispro (HUMALOG) injection 1-8 Units (5 Units SubCUTAneous Given 4/5/23 1237)   DULoxetine (CYMBALTA) capsule 60 mg (60 mg Oral Given 4/5/23 1106)   DULoxetine (CYMBALTA) capsule 30 mg (30 mg Oral Given 4/4/23 2242)   gabapentin (NEURONTIN) capsule 600 mg (600 mg Oral Given 4/5/23 1106)   magnesium oxide (MAG-OX) tablet 400 mg (400 mg Oral Given 4/5/23 0900)   OLANZapine (ZyPREXA) tablet 10 mg (10 mg Oral Refused 4/4/23 1583)   loperamide (IMODIUM) capsule 4 mg (has no administration in time range)   diphenoxylate-atropine (LOMOTIL) tablet 1 Tablet (has no administration in time range)   potassium chloride SR (KLOR-CON 10) tablet 20 mEq (20 mEq Oral Given 4/5/23 1106)   HYDROmorphone (DILAUDID) tablet 2 mg (2 mg Oral Given 4/5/23 1117)   methylphenidate HCl (RITALIN) tablet 10 mg (10 mg Oral Given 4/5/23 1106)   lidocaine (XYLOCAINE) 4 % cream (has no administration in time range)   LORazepam (ATIVAN) tablet 1 mg (1 mg Oral Given 4/5/23 1107)   iomeprol (IOMERON 350) 350 mg iodine /mL (71.44 %) contrast injection 100 mL (100 mL IntraVENous Given 4/4/23 1739)   cefTRIAXone (ROCEPHIN) 1 g in 0.9% sodium chloride (MBP/ADV) 50 mL MBP (0 g IntraVENous Stopped 4/5/23 0400)   HYDROmorphone (DILAUDID) tablet 2 mg (2 mg Oral Given 4/4/23 2032)   LORazepam (ATIVAN) tablet 2 mg (2 mg Oral Given 4/4/23 2032)   gabapentin (NEURONTIN) capsule 500 mg (500 mg Oral Given 4/4/23 2032)   albumin human 25% (BUMINATE) solution 50 g (0 g IntraVENous Stopped 4/5/23 6413)       CONSULTS: (Who and What was discussed)  IP CONSULT TO GENERAL SURGERY  IP CONSULT TO ONCOLOGY  IP CONSULT TO INTERVENTIONAL RADIOLOGY    Chronic Conditions: pancreatic cancer    Social Determinants affecting Dx or Tx: None    Records Reviewed (source and summary of external notes): Prior medical records, Previous Radiology studies, and Previous Laboratory studies, labs noted below    CC/HPI Summary, DDx, ED Course, and Reassessment: Presenting with chief complaint of abdominal pain, left-sided flank pain, headache, dark urine output, increased fatigue. He had labs done significant for CBC done today with hemoglobin of 11, CMP with sodium of 135, glucose 255, , , bilirubin 8.5 up from 0.6. Urinalysis with trace ketones, 1+ bacteria no blood. Concern for worsneing biliary obstruction due to known pancreatic mass, will repeat labs, obtain CT. CT w/ worsening obstruction and metastases.  Will admit to hospital for discussion for possible stent, oncology consultation. She has no vomiting, fever or other concerning vitals or complaints to necessitate emergent drainage. Disposition Considerations (Tests not done, Shared Decision Making, Pt Expectation of Test or Tx.):      FINAL IMPRESSION     1. Obstructive jaundice          DISPOSITION/PLAN   Admitted         PATIENT REFERRED TO:  Follow-up Information    None           DISCHARGE MEDICATIONS:  Current Discharge Medication List            DISCONTINUED MEDICATIONS:  Current Discharge Medication List            (Please note that parts of this dictation were completed with voice recognition software. Quite often unanticipated grammatical, syntax, homophones, and other interpretive errors are inadvertently transcribed by the computer software. Please disregards these errors.  Please excuse any errors that have escaped final proofreading.)    Laurie Martínez, DO Out of season

## 2023-04-04 NOTE — PROGRESS NOTES
1110 Patient called this morning stating that she is having abdominal and left flank pain, headache, itching all over, orange urine and increased fatigue and sleeping. Symptoms reviewed with Mitra Whitaker NP. Patient advised to come in to [facility] for labs. Patient ambulatory to [facility] with cane. Patient's skin and eyes appear juandice. Patient complaining of pain in abdomen and left flank area. Large umbilical hernia noted. Also, small nodules palpated in left flank area which are tender to the touch. Labs drawn and sent for processing. Labs reviewed with NP.  NP advised patient that [facility] [address]. Patient was advised to go to ED [facility] or [address].   Patient stated that she will have her daughter drive her to [address].    1320 Patient left [facility] with  and daughter and states that she will [facility] [address] ED.

## 2023-04-05 ENCOUNTER — APPOINTMENT (OUTPATIENT)
Dept: ULTRASOUND IMAGING | Age: 68
DRG: 445 | End: 2023-04-05
Attending: STUDENT IN AN ORGANIZED HEALTH CARE EDUCATION/TRAINING PROGRAM
Payer: MEDICARE

## 2023-04-05 LAB
ALBUMIN SERPL-MCNC: 3.2 G/DL (ref 3.5–5)
ALBUMIN/GLOB SERPL: 0.9 (ref 1.1–2.2)
ALP SERPL-CCNC: 645 U/L (ref 45–117)
ALT SERPL-CCNC: 136 U/L (ref 12–78)
ANION GAP SERPL CALC-SCNC: 5 MMOL/L (ref 5–15)
AST SERPL-CCNC: 145 U/L (ref 15–37)
BACTERIA SPEC CULT: NORMAL
BACTERIA SPEC CULT: NORMAL
BASOPHILS # BLD: 0.1 K/UL (ref 0–0.1)
BASOPHILS NFR BLD: 1 % (ref 0–1)
BILIRUB SERPL-MCNC: 8.4 MG/DL (ref 0.2–1)
BUN SERPL-MCNC: 7 MG/DL (ref 6–20)
BUN/CREAT SERPL: 11 (ref 12–20)
CALCIUM SERPL-MCNC: 9.3 MG/DL (ref 8.5–10.1)
CHLORIDE SERPL-SCNC: 102 MMOL/L (ref 97–108)
CO2 SERPL-SCNC: 28 MMOL/L (ref 21–32)
CREAT SERPL-MCNC: 0.65 MG/DL (ref 0.55–1.02)
DIFFERENTIAL METHOD BLD: ABNORMAL
EOSINOPHIL # BLD: 0.3 K/UL (ref 0–0.4)
EOSINOPHIL NFR BLD: 3 % (ref 0–7)
ERYTHROCYTE [DISTWIDTH] IN BLOOD BY AUTOMATED COUNT: 18.2 % (ref 11.5–14.5)
GLOBULIN SER CALC-MCNC: 3.5 G/DL (ref 2–4)
GLUCOSE BLD STRIP.AUTO-MCNC: 185 MG/DL (ref 65–117)
GLUCOSE BLD STRIP.AUTO-MCNC: 288 MG/DL (ref 65–117)
GLUCOSE BLD STRIP.AUTO-MCNC: 298 MG/DL (ref 65–117)
GLUCOSE BLD STRIP.AUTO-MCNC: 387 MG/DL (ref 65–117)
GLUCOSE SERPL-MCNC: 170 MG/DL (ref 65–100)
HCT VFR BLD AUTO: 29.8 % (ref 35–47)
HGB BLD-MCNC: 10.1 G/DL (ref 11.5–16)
IMM GRANULOCYTES # BLD AUTO: 0 K/UL (ref 0–0.04)
IMM GRANULOCYTES NFR BLD AUTO: 0 % (ref 0–0.5)
LYMPHOCYTES # BLD: 1.7 K/UL (ref 0.8–3.5)
LYMPHOCYTES NFR BLD: 18 % (ref 12–49)
MCH RBC QN AUTO: 34 PG (ref 26–34)
MCHC RBC AUTO-ENTMCNC: 33.9 G/DL (ref 30–36.5)
MCV RBC AUTO: 100.3 FL (ref 80–99)
MONOCYTES # BLD: 1.8 K/UL (ref 0–1)
MONOCYTES NFR BLD: 19 % (ref 5–13)
NEUTS SEG # BLD: 5.6 K/UL (ref 1.8–8)
NEUTS SEG NFR BLD: 59 % (ref 32–75)
NRBC # BLD: 0.2 K/UL (ref 0–0.01)
NRBC BLD-RTO: 2.1 PER 100 WBC
PLATELET # BLD AUTO: 524 K/UL (ref 150–400)
PMV BLD AUTO: 10.6 FL (ref 8.9–12.9)
POTASSIUM SERPL-SCNC: 3.5 MMOL/L (ref 3.5–5.1)
PROT SERPL-MCNC: 6.7 G/DL (ref 6.4–8.2)
RBC # BLD AUTO: 2.97 M/UL (ref 3.8–5.2)
RBC MORPH BLD: ABNORMAL
SERVICE CMNT-IMP: ABNORMAL
SERVICE CMNT-IMP: NORMAL
SERVICE CMNT-IMP: NORMAL
SODIUM SERPL-SCNC: 135 MMOL/L (ref 136–145)
WBC # BLD AUTO: 9.5 K/UL (ref 3.6–11)

## 2023-04-05 PROCEDURE — 82962 GLUCOSE BLOOD TEST: CPT

## 2023-04-05 PROCEDURE — 74011250637 HC RX REV CODE- 250/637: Performed by: STUDENT IN AN ORGANIZED HEALTH CARE EDUCATION/TRAINING PROGRAM

## 2023-04-05 PROCEDURE — C9113 INJ PANTOPRAZOLE SODIUM, VIA: HCPCS | Performed by: STUDENT IN AN ORGANIZED HEALTH CARE EDUCATION/TRAINING PROGRAM

## 2023-04-05 PROCEDURE — 74011636637 HC RX REV CODE- 636/637: Performed by: STUDENT IN AN ORGANIZED HEALTH CARE EDUCATION/TRAINING PROGRAM

## 2023-04-05 PROCEDURE — 80053 COMPREHEN METABOLIC PANEL: CPT

## 2023-04-05 PROCEDURE — 76705 ECHO EXAM OF ABDOMEN: CPT

## 2023-04-05 PROCEDURE — 74011250636 HC RX REV CODE- 250/636: Performed by: INTERNAL MEDICINE

## 2023-04-05 PROCEDURE — 65270000046 HC RM TELEMETRY

## 2023-04-05 PROCEDURE — 74011250636 HC RX REV CODE- 250/636: Performed by: STUDENT IN AN ORGANIZED HEALTH CARE EDUCATION/TRAINING PROGRAM

## 2023-04-05 PROCEDURE — 74011000258 HC RX REV CODE- 258: Performed by: INTERNAL MEDICINE

## 2023-04-05 PROCEDURE — 74011250637 HC RX REV CODE- 250/637: Performed by: INTERNAL MEDICINE

## 2023-04-05 PROCEDURE — 94760 N-INVAS EAR/PLS OXIMETRY 1: CPT

## 2023-04-05 PROCEDURE — 74011000250 HC RX REV CODE- 250: Performed by: STUDENT IN AN ORGANIZED HEALTH CARE EDUCATION/TRAINING PROGRAM

## 2023-04-05 PROCEDURE — 36415 COLL VENOUS BLD VENIPUNCTURE: CPT

## 2023-04-05 PROCEDURE — 85025 COMPLETE CBC W/AUTO DIFF WBC: CPT

## 2023-04-05 RX ADMIN — MAGNESIUM OXIDE TAB 400 MG (241.3 MG ELEMENTAL MG) 400 MG: 400 (241.3 MG) TAB at 09:00

## 2023-04-05 RX ADMIN — SODIUM CHLORIDE, PRESERVATIVE FREE 40 MG: 5 INJECTION INTRAVENOUS at 11:06

## 2023-04-05 RX ADMIN — Medication 7 UNITS: at 18:43

## 2023-04-05 RX ADMIN — GABAPENTIN 600 MG: 300 CAPSULE ORAL at 11:06

## 2023-04-05 RX ADMIN — Medication 3 UNITS: at 21:53

## 2023-04-05 RX ADMIN — HYDROMORPHONE HYDROCHLORIDE 2 MG: 2 TABLET ORAL at 11:17

## 2023-04-05 RX ADMIN — ENOXAPARIN SODIUM 40 MG: 100 INJECTION SUBCUTANEOUS at 11:08

## 2023-04-05 RX ADMIN — POTASSIUM CHLORIDE 20 MEQ: 750 TABLET, EXTENDED RELEASE ORAL at 11:06

## 2023-04-05 RX ADMIN — Medication 5 UNITS: at 12:37

## 2023-04-05 RX ADMIN — METHYLPHENIDATE HYDROCHLORIDE 10 MG: 5 TABLET ORAL at 11:06

## 2023-04-05 RX ADMIN — DIPHENHYDRAMINE HYDROCHLORIDE, ZINC ACETATE: 2; .1 CREAM TOPICAL at 20:10

## 2023-04-05 RX ADMIN — SODIUM CHLORIDE 1 G: 900 INJECTION INTRAVENOUS at 20:10

## 2023-04-05 RX ADMIN — ACETAMINOPHEN 650 MG: 325 TABLET ORAL at 07:10

## 2023-04-05 RX ADMIN — DULOXETINE 30 MG: 30 CAPSULE, DELAYED RELEASE ORAL at 18:43

## 2023-04-05 RX ADMIN — LORAZEPAM 1 MG: 1 TABLET ORAL at 18:43

## 2023-04-05 RX ADMIN — POTASSIUM CHLORIDE 20 MEQ: 750 TABLET, EXTENDED RELEASE ORAL at 18:43

## 2023-04-05 RX ADMIN — DULOXETINE HYDROCHLORIDE 60 MG: 30 CAPSULE, DELAYED RELEASE ORAL at 11:06

## 2023-04-05 RX ADMIN — LORAZEPAM 1 MG: 1 TABLET ORAL at 11:07

## 2023-04-05 RX ADMIN — GABAPENTIN 600 MG: 300 CAPSULE ORAL at 18:43

## 2023-04-05 NOTE — PROGRESS NOTES
Transition of Care Plan:    RUR:19%  Disposition:Home w/Hospice  If SNF or IPR: Date FOC offered:  Date FOC received:Hospice list provided on 4/5  Date authorization started with reference number:  Date authorization received and expires:  Accepting facility:  Follow up appointments:  DME needed:n/a  Transportation at [address] [facility] or means to access home:        IM Medicare Letter:2nd IM needed  Is patient a [place] and connected with the [facility]? If yes, was [employer] transfer form completed and VA notified? Caregiver Contact:daughterJeremy [phone]  Discharge Caregiver contacted prior to discharge? Care Conference needed?:       2:18  Per pt's request, CM sent a referral to [facility]. CM completed assessment w/pt at bedside. Patient is independent w/ADL/IADL. Patient states she needs a little assistance occasionally. Family & friends provide transportation. Patient report a large support system to include,  family & friends. PCP-     Domenica Goetz MD  [facility] Management Interventions  PCP Verified by CM: Yes  Mode of Transport at Discharge:  Other (see comment) (daughter)  Transition of Care Consult (CM Consult): Discharge Planning  Discharge Durable Medical Equipment: No (has a walker, cane, shower chair & rollator)  Physical Therapy Consult: Yes  Occupational Therapy Consult: Yes  Speech Therapy Consult: No  Support Systems: Child(cindy), Other Family Member(s), Spouse/Significant Other, Friend/Neighbor (Lives w/daughter, , grandchildren in a one story home w/3 [address])  Confirm Follow Up Transport: Family  Discharge Location  Patient Expects to be Discharged to[de-identified]  ([address] [facility] w/Hospice)    Tab Mendoza  Ext [phone]

## 2023-04-05 NOTE — PROGRESS NOTES
End of Shift Note    Bedside shift change report given to Sundeep Patrick RN (oncoming nurse) by Herman Camacho RN (offgoing nurse). Report included the following information SBAR, ED Summary, Procedure Summary, Intake/Output, MAR, Recent Results, Med Rec Status, and Cardiac Rhythm NSR    Shift worked:  [id number]     Shift summary and any significant changes:     Pt arrived to unit at approximately 11 pm with her daughter. Oriented to unit, made comfortable and started IV albumin. AM labs drawn, vital signs stable, no significant events.      Concerns for physician to address:  Plan of care     Zone phone for oncoming shift:   [phone]       Activity:  Activity Level: Up ad tyrell  Number times ambulated in hallways past shift: 0  Number of times OOB to chair past shift: 0    Cardiac:   Cardiac Monitoring: Yes           Access:  Current line(s): PIV     Genitourinary:   Urinary status: voiding    Respiratory:   O2 Device: None (Room air)  Chronic home O2 use?: NO  Incentive spirometer at bedside: NO       GI:  Last Bowel Movement Date: 04/04/23  Current diet:  ADULT ORAL NUTRITION SUPPLEMENT Breakfast, Lunch, Dinner; Diabetic Supplement  Passing flatus: YES  Tolerating current diet: YES       Pain Management:   Patient states pain is manageable on current regimen: YES    Skin:  Ric Score: 19  Interventions: increase time out of bed and PT/OT consult    Patient Safety:  Fall Score:    Interventions: assistive device (walker, cane, etc), gripper socks, and pt to call before getting OOB       Length of Stay:  Expected LOS: - - -  Actual LOS: 1      Herman Camacho RN

## 2023-04-05 NOTE — H&P
PLEASE NOTE: I HAVE GENERATED THIS NOTE WITH THE ASSISTANCE OF VOICE-RECOGNITION TECHNOLOGY. PLEASE EXCUSE ANY SPELLING, GRAMMATICAL, AND SYNTAX ERRORS YOU MAY FIND. IF YOU NEED CLARIFICATION ON ANYTHING, PLEASE REACH OUT TO ME.  Alison COX        [facility]  History and Physical - Dr. Tinajero Prom:     Assessment:    79 y.o. female with pertinent medical hx of pancreatic cancer presented with jaundice and itching    Differential diagnosis, explanation and analysis:     Pretty obvious that the patient is having worsening obstruction due to pancreatic blockage. Acute problems:    # Acute hyperbilirubinemia secondary to pancreatic mass  -Cholestyramine for itching  -General surgery consult  -Home medications  -I do not think an inpatient heme-onc consult is really necessary at this point, patient already follows up outpatient, right now really the concern is how to make patient more comfortable    # UTI  -Rocephin  -Follow-up blood cultures  -Fluid resuscitation    PRN Orders, per group policy: + zofran or phenergan if prolonged QTc, + ACEP, + bowel regimen    Chronic Problems:    Diabetes  -Sliding scale insulin  -Hold home medications    Psych  -Continue home meds    ADHD  -Continue home med      General Admission Parameters:    GI Prophylaxis: Protonix    DVT Prophylaxis: Lovenox    Code status: Full  If code status was discussed with the patient, then code status entered as per the orders                                                                              Subjective:   Primary Care Provider: Everett Allen MD  Chief Complaint: Itching    79 y.o. female presents with several days duration of gradual onset, gradual worsening, severe, constant, itching that is associated with jaundice and darkened urine, remitted by nothing, exacerbated by nothing.     Further history: I came to bedside to see the patient patient, she and her daughter both sound asleep, I tried to wake them up, but very very tired. Pertinent chart review: Nothing else pertinent. Case discussed with ED specialist; Documentation from the ED reviewed, as well as outpatient charts reviewed. Review of Systems:  12 point ROS obtained and otherwise negative, except as per HPI and above. Past Medical History:   Diagnosis Date    Adenocarcinoma of pancreas ([facility] [address] 05/13/2020    Dr Kaleigh Zimmerman biopsy via EUS    Diabetes [facility])     GERD (gastroesophageal reflux disease)     Hernia of abdominal cavity     Subxyphoid hernia    Hypertension     Inflammatory polyarthropathy (HCC)     Joint pain     Joint swelling     Menopause     Ocular nevus of left eye     Pancreatitis     Tracheal stenosis       Past Surgical History:   Procedure Laterality Date    HX CARPAL TUNNEL RELEASE Bilateral     HX COLONOSCOPY      HX HYSTERECTOMY      HX KNEE ARTHROSCOPY Right     HX KNEE REPLACEMENT Right     partial    HX LAP CHOLECYSTECTOMY      HX TONSILLECTOMY      HX VASCULAR ACCESS       Prior to Admission medications    Medication Sig Start Date End Date Taking? Authorizing Provider   methylphenidate HCl (RITALIN) 10 mg tablet Take 1 Tablet by mouth two (2) times a day. Max Daily Amount: 20 mg. 3/27/23   Juancho Baires, NP   HYDROmorphone (DILAUDID) 2 mg tablet TAKE 1 TABLET BY MOUTH THREE TIMES DAILY AS NEEDED FOR pain (max of 3 TABLETS PER DAY) 3/6/23 4/5/23  Kee Yang MD   potassium chloride SR (KLOR-CON 10) 10 mEq tablet Take 2 Tablets by mouth two (2) times a day. 2/15/23   Javier Means MD   Blood-Glucose Sensor (Dexcom G6 Sensor) fadi 1 Each by Does Not Apply route Once every 2 weeks. DM2 on multiple insulin injections. Pancreatic CA; z79.4, e11.9, c25.9  Patient not taking: No sig reported 2/10/23   Javier Means MD   Blood-Glucose Transmitter (Dexcom G6 Transmitter) fadi 1 Each by Does Not Apply route four (4) times daily.   Patient not taking: No sig reported 2/10/23   Javier Means MD Blood-Glucose Meter,Continuous (Dexcom G6 ) misc 1 Each by Does Not Apply route four (4) times daily. DM2 on multiple insulin injections. Pancreatic CA; z79.4, e11.9, c25.9  Patient not taking: No sig reported 2/10/23   Dallas Ramsey MD   loperamide (IMODIUM) 2 mg capsule Take 4 mg by mouth as needed for Diarrhea. Provider, Historical   diphenoxylate-atropine (LOMOTIL) 2.5-0.025 mg per tablet Take 1 Tablet by mouth as needed. Provider, Historical   nystatin (MYCOSTATIN) powder Apply 1 g to affected area three (3) times daily. 1/10/23   Miya Barajas NP   OLANZapine (ZyPREXA) 10 mg tablet Take 1 Tablet by mouth nightly. For 5 nights starting the night after chemotherapy 1/4/23   Stoney Cervantes NP   magnesium oxide (MAG-OX) 400 mg tablet Take 1 Tablet by mouth daily. 12/27/22   Nargis Yang MD Toujeo SoloStar U-300 Insulin 300 unit/mL (1.5 mL) inpn pen INJECT 55 UNITS ONCE DAILY AT BEDTIME - DOSE INCREASED  Patient taking differently: INJECT 25 UNITS ONCE DAILY AT BEDTIME - DOSE INCREASED 12/16/22   Dallas Ramsey MD   ondansetron (ZOFRAN ODT) 4 mg disintegrating tablet Take 1 Tablet by mouth every eight (8) hours as needed for Nausea or Vomiting. 11/17/22   Herman Cuevas MD   prochlorperazine (Compazine) 10 mg tablet Take 1 Tablet by mouth every six (6) hours as needed for Nausea or Vomiting. 11/17/22   Herman Cuevas MD   lidocaine-prilocaine (EMLA) topical cream Apply  to affected area as needed for Pain. Apply generous amount to port site 30-45 min prior to infusions 11/17/22   Herman Cuevas MD   HumaLOG KwikPen Insulin 100 unit/mL kwikpen INJECT SUBCUTANEOUSLY ON SLIDING SCALE AS DIRECTED  Patient taking differently: by SubCUTAneous route two (2) times daily as needed.  INJECT SUBCUTANEOUSLY ON SLIDING SCALE AS DIRECTED 11/9/22   Dallas Ramsey MD   gabapentin (NEURONTIN) 300 mg capsule TAKE 2 CAPSULES BY MOUTH THREE TIMES DAILY  Patient taking differently: TAKE 2 CAPSULES  in AM and 2 capsules in PM 11/9/22   Bhanu Yang MD   LORazepam (ATIVAN) 1 mg tablet TAKE 1 TABLET BY MOUTH EVERY MORNING AND TAKE 2 TABLETS AT BEDTIME 11/3/22   Laz Cedeño MD   famotidine (PEPCID) 20 mg tablet Take 20 mg by mouth two (2) times a day. Provider, Historical   multivit-minerals/folic acid (MULTIVITAMIN GUMMIES PO) Take 2 Each by mouth Every morning. Provider, Historical   DULoxetine (CYMBALTA) 30 mg capsule Take 1 Capsule by mouth every evening. 8/23/22   Nurys Limost,    acetaminophen (TYLENOL) 500 mg tablet Take 1,000 mg by mouth two (2) times daily as needed. Indications: pain    Provider, Historical   ibuprofen (MOTRIN) 200 mg tablet Take 600 mg by mouth two (2) times daily as needed. Indications: pain  Patient not taking: No sig reported    Provider, Historical   senna-docusate (PERICOLACE) 8.6-50 mg per tablet nightly as needed. Provider, Historical   DULoxetine (CYMBALTA) 60 mg capsule Take 1 Capsule by mouth daily. Indications: neuropathic pain  Patient taking differently: Take 60 mg by mouth Every morning. At 0900  Indications: neuropathic pain 7/18/22   Laz Cedeño MD     No Known Allergies   Family History   Problem Relation Age of Onset    Heart Disease Mother     Heart Attack Mother     Cancer Father         lung    Diabetes Sister    .   Social History     Socioeconomic History    Marital status:      Spouse name: Yung Simmons     Number of children: 3    Highest education level: Associate degree: academic program   Occupational History    Occupation: RN  [employer]   Tobacco Use    Smoking status: Never    Smokeless tobacco: Never   Substance and Sexual Activity    Alcohol use: No     Alcohol/week: 0.0 standard drinks    Drug use: No    Sexual activity: Not Currently     Social Determinants of Health     Financial Resource Strain: Low Risk     Difficulty of Paying Living Expenses: Not hard at all   Food Insecurity: No Food Insecurity Worried About [address] [facility] in the Last Year: Never true    [facility] in the Last Year: Never true   . Objective:   Physical Exam:     VS as below    Const'l:         Normal body habitus, a&o, no acute distress  Head/Neck:       no cervical/head mass  Eyes:     nonicteric sclera, eom intact  ENT:      auditory acuity grossly intact with or w/o device, no nasal deformity  Cardio:          Regular rate regular rhythm  Pulm:     no accessory muscle use  Abd:      S NT ND  Derm:     no rashes, no ulcers, no lesions  Extr:     No edema, no cyanosis, no calf tenderness  Neuro:    cn II-XII intact  Psych:  mood unable to ascertain, judgement unable to ascertain    Data Review: All diagnostic labs and studies have been reviewed.

## 2023-04-05 NOTE — PROGRESS NOTES
Hospitalist Progress Note    NAME: Álvaro Vee   :  1955   MRN:  [id number]       Assessment / Plan:    Obstructive jaundice  Pancreatic adenocarcinoma with metastasis  - Known history of pancreatic adenocarcinoma with metastasis to the peritoneum and lungs  - She follows with oncology as an outpatient, will consult  - Presented with painless jaundice  - Bilirubin 8.4 on admission  - General surgery and IR consulted for stent evaluation, input is highly appreciated  - Currently patient is asymptomatic, will continue supportive care    Acute urinary tract infection, POA  - UA on admission with bacteriuria and pyuria  - Started on ceftriaxone, continue    Diabetes mellitus   - Continue correction scale with hypoglycemia protocol        25.0 - 29.9 Overweight / Body mass index is 29.29 kg/m². Estimated discharge date:   Barriers: Oncology and IR evaluation, clinical improvement    Code status: Full  Prophylaxis: Lovenox  Recommended Disposition: Home w/Family     Subjective:     Patient was seen and examined. No acute events overnight. Discussed with RN overnight events. All patient's questions were answered. Patient's daughter was at bedside, questions were answered. \"Feeling all right\"      Review of Systems:  Symptom Y/N Comments  Symptom Y/N Comments   Fever/Chills n   Chest Pain n    Poor Appetite    Edema     Cough n   Abdominal Pain n    Sputum    Joint Pain     SOB/TINSLEY n   Pruritis/Rash     Nausea/vomit n   Tolerating PT/OT     Diarrhea    Tolerating Diet y    Constipation    Other       Could NOT obtain due to:          Objective:     VITALS:   Last 24hrs VS reviewed since prior progress note.  Most recent are:  Patient Vitals for the past 24 hrs:   Temp Pulse Resp BP SpO2   23 0800 98.2 °F (36.8 °C) 83 16 136/71 95 %   23 0322 98.2 °F (36.8 °C) 83 -- 138/76 96 %   23 2311 98.2 °F (36.8 °C) 86 17 136/65 97 %   230 -- 85 -- -- --   23 -- -- -- (!) 141/85 --   04/04/23 2025 -- -- -- 132/76 --   04/04/23 2010 -- -- -- 134/73 --   04/1955 -- -- -- 117/60 96 %   04/04/23 1940 -- -- -- 122/63 96 %   04/04/23 1925 -- -- -- (!) 109/53 96 %   04/04/23 1910 -- -- -- 119/61 95 %   04/04/23 1841 -- -- -- 121/64 95 %   04/04/23 1720 -- 78 -- (!) 108/55 99 %   04/04/23 1719 -- -- -- -- 98 %   04/04/23 1547 98.4 °F (36.9 °C) 94 17 (!) 145/78 99 %       Intake/Output Summary (Last 24 hours) at 4/5/2023 1054  Last data filed at 4/5/2023 0500  Gross per 24 hour   Intake 200 ml   Output --   Net 200 ml        I had a face to face encounter and independently examined this patient on 4/5/2023, as outlined below:  PHYSICAL EXAM:  General: WD, WN. Alert, cooperative, no acute distress    EENT:  EOMI. icteric sclerae. MMM  Resp:  CTA bilaterally, no wheezing or rales. No accessory muscle use  CV:  Regular  rhythm,  No edema  GI:  Soft, Non distended, Non tender. +Bowel sounds  Neurologic:  EOMs intact. No facial asymmetry. No aphasia or slurred speech. Symmetrical strength, Sensation grossly intact. Alert and oriented X 4.     Psych:   Good insight. Not anxious nor agitated  Skin:  No rashes. Jaundice    Reviewed most current lab test results and cultures  YES  Reviewed most current radiology test results   YES  Review and summation of old records today    NO  Reviewed patient's current orders and MAR    YES  PMH/SH reviewed - no change compared to H&P  ________________________________________________________________________  Care Plan discussed with:    Comments   Patient X    Family  X    RN X    Care Manager X    Consultant  X Dr. Aditya Carlisle from general surgery team                    X Multidiciplinary team rounds were held today with , nursing, pharmacist and clinical coordinator. Patient's plan of care was discussed; medications were reviewed and discharge planning was addressed. ________________________________________________________________________        Comments   >50% of visit spent in counseling and coordination of care X    ________________________________________________________________________  Christin Molina MD     Procedures: see electronic medical records for all procedures/Xrays and details which were not copied into this note but were reviewed prior to creation of Plan. LABS:  I reviewed today's most current labs and imaging studies.   Pertinent labs include:  Recent Labs     04/05/23 0221 04/04/23 1648 04/04/23  1130   WBC 9.5 9.3 8.0   HGB 10.1* 11.4* 11.1*   HCT 29.8* 33.3* 31.6*   * 544* 577*     Recent Labs     04/05/23 0221 04/04/23 1648 04/04/23  1130   * 136 135*   K 3.5 3.8 4.5    102 100   CO2 28 29 27   * 214* 266*   BUN 7 8 7   CREA 0.65 0.73 0.70   CA 9.3 9.8 9.0   ALB 3.2* 3.0* 2.9*   TBILI 8.4* 9.1* 8.5*   * 171* 174*   INR  --  1.0  --        Signed: Christin Molina MD

## 2023-04-05 NOTE — PROGRESS NOTES
Surgery Consult  Consulted by: Dr. Lorrie Smith  PCP: Jane Jameson MD    Thank you for allowing me to participate in your patient's care. Please call me with any questions. Assessment:   Widely metastatic pancreatic adenocarcinoma. Now with obstructive jaundice. No enteric obstruction. Goals are palliative. Plan:   Spoke with Dr. Lorrie Smith this AM.  Recommended IR external drain of biliary tree. Perhaps they can get an internal/external.  This will relieve her symptoms. Spoke with patients daughter. Patient was off floor. No surgical intervention. Subjective:      Koko Gonzalez is a 79 y.o. female who is seen in consultation at the request of Dr. Aliza Silveira for obstructive jaundice. Patient underwent distal pancreatectomy and splenectomy for pancreatic cancer at [facility] by Dr. Tejal Shultz 3/17/2021. NICs ordered from Dr. Favio Curtis most recent progress note: \"Tx: mFOLFIRINOX--first cycle 2020, second cycle 6/10/2020, dose reduced 15% cycle 3 on 2020--delayed due to severe side effects--switched to Gemcitabine/Abraxane--Cycle #1 2002, Cycle #2 2020, Cycle #3 10/7/2020, Cycle #4 2020. Neoadjuvant Radiation with Dr Deep Hess ending 2021. \"      Few days ago she became very itchy and jaundiced. She saw her primary care doctor who vito labs and she was found to have a bilirubin of 8.5. She was referred to the hospital.  Imaging demonstrates significant intra and extra hepatic biliary dilatation, intra-abdominal nodules and ascites, and pulmonary metastases. Objective:   Blood pressure 136/71, pulse 83, temperature 98.2 °F (36.8 °C), resp. rate 16, weight 75 kg (165 lb 5.5 oz), SpO2 95 %.   Temp (24hrs), Av.3 °F (36.8 °C), Min:98.2 °F (36.8 °C), Max:98.4 °F (36.9 °C)      Physical Exam:  PHYSICAL EXAM:  Gen:  [x]     A&O     [x]      No acute distress    [x]     non-toxic     []     ill apearing     []     Critical    ***        HEENT:   [x] anicteric    []      scleral icterus    [x]     moist mucosa     []     dry mucosa    RESP:   [x]     CTA bilaterally, no wheezing/rhonchi/rales/crackles    []     wheezing     []     rhonchi     []     crackles     []     use of accessory muscles    CARD:  [x]     regular rate and rhythm     [x]     No murmurs/rubs/gallops    []     irregular rhythm     []     Murmur     []     Rubs     []     Gallops    ABD:    []  soft  [x]     non distended  [x]     non tender  [x]      NABS    ***no rebound or guarding    SKIN:   [x]     normal      []Rashes      []Ulcers     EXT:  [x]      No CCE     []2+ pulses throughout    []      Clubbing     []Cyanosis     []Edema     []diminished pulses    NEUR:  [x]     Strength normal     []weakness   []LUE     []RUE     []LLE     []RLE    [x]     follows commands          PSYCH:   insight []Poor   [x]good                      []     depressed     []     anxious     []     agitated    Past Medical History:   Diagnosis Date    Adenocarcinoma of pancreas ([facility] 05/13/2020    Dr Luis Locke biopsy via EUS    [facility]     GERD (gastroesophageal reflux disease)     Hernia of abdominal cavity     Subxyphoid hernia    Hypertension     [facility]     Joint pain     Joint swelling     Menopause     Ocular nevus of left eye     Pancreatitis     Tracheal stenosis      Past Surgical History:   Procedure Laterality Date    HX CARPAL TUNNEL RELEASE Bilateral     HX COLONOSCOPY      HX HYSTERECTOMY      HX KNEE ARTHROSCOPY Right     HX KNEE REPLACEMENT Right     partial    HX LAP CHOLECYSTECTOMY      HX TONSILLECTOMY      HX VASCULAR ACCESS        Family History   Problem Relation Age of Onset    Heart Disease Mother     Heart Attack Mother     Cancer Father         lung    Diabetes Sister      Social History     Socioeconomic History    Marital status:      Spouse name: Daquan Terry     Number of children: 3    Highest education level: Associate degree: academic program Occupational History    Occupation: RN  [employer]   Tobacco Use    Smoking status: Never    Smokeless tobacco: Never   Substance and Sexual Activity    Alcohol use: No     Alcohol/week: 0.0 standard drinks    Drug use: No    Sexual activity: Not Currently     Social Determinants of Health     Financial Resource Strain: Low Risk     Difficulty of Paying Living Expenses: Not hard at all   Food Insecurity: No Food Insecurity    Worried About [address] in the Last Year: Never true    [address] [facility] in the Last Year: Never true      Prior to Admission medications    Medication Sig Start Date End Date Taking? Authorizing Provider   methylphenidate HCl (RITALIN) 10 mg tablet Take 1 Tablet by mouth two (2) times a day. Max Daily Amount: 20 mg. 3/27/23   Sunni Baires NP   HYDROmorphone (DILAUDID) 2 mg tablet TAKE 1 TABLET BY MOUTH THREE TIMES DAILY AS NEEDED FOR pain (max of 3 TABLETS PER DAY) 3/6/23 4/5/23  Mary Yang MD   potassium chloride SR (KLOR-CON 10) 10 mEq tablet Take 2 Tablets by mouth two (2) times a day. 2/15/23   Krystina Garcia MD   Blood-Glucose Sensor (Dexcom G6 Sensor) fadi 1 Each by Does Not Apply route Once every 2 weeks. DM2 on multiple insulin injections. Pancreatic CA; z79.4, e11.9, c25.9  Patient not taking: No sig reported 2/10/23   Krystina Garcia MD   Blood-Glucose Transmitter (Dexcom G6 Transmitter) fadi 1 Each by Does Not Apply route four (4) times daily. Patient not taking: No sig reported 2/10/23   Krystina Garcia MD   Blood-Glucose Meter,Continuous (Dexcom G6 ) misc 1 Each by Does Not Apply route four (4) times daily. DM2 on multiple insulin injections. Pancreatic CA; z79.4, e11.9, c25.9  Patient not taking: No sig reported 2/10/23   Krystina Garcia MD   loperamide (IMODIUM) 2 mg capsule Take 4 mg by mouth as needed for Diarrhea. Provider, Historical   diphenoxylate-atropine (LOMOTIL) 2.5-0.025 mg per tablet Take 1 Tablet by mouth as needed.     Provider, Historical   nystatin (MYCOSTATIN) powder Apply 1 g to affected area three (3) times daily. 1/10/23   Miya Barajas NP   OLANZapine (ZyPREXA) 10 mg tablet Take 1 Tablet by mouth nightly. For 5 nights starting the night after chemotherapy 1/4/23   Lizbeth Petit NP   magnesium oxide (MAG-OX) 400 mg tablet Take 1 Tablet by mouth daily. 12/27/22   Bartolome Yang MD Tou[employer] SoloStar U-300 Insulin 300 unit/mL (1.5 mL) inpn pen INJECT 55 UNITS ONCE DAILY AT BEDTIME - DOSE INCREASED  Patient taking differently: INJECT 25 UNITS ONCE DAILY AT BEDTIME - DOSE INCREASED 12/16/22   Vj Guevara MD   ondansetron (ZOFRAN ODT) 4 mg disintegrating tablet Take 1 Tablet by mouth every eight (8) hours as needed for Nausea or Vomiting. 11/17/22   Anne Wyatt MD   prochlorperazine (Compazine) 10 mg tablet Take 1 Tablet by mouth every six (6) hours as needed for Nausea or Vomiting. 11/17/22   Anne Wyatt MD   lidocaine-prilocaine (EMLA) topical cream Apply  to affected area as needed for Pain. Apply generous amount to port site 30-45 min prior to infusions 11/17/22   Anne Wyatt MD   HumaLOG KwikPen Insulin 100 unit/mL kwikpen INJECT SUBCUTANEOUSLY ON SLIDING SCALE AS DIRECTED  Patient taking differently: by SubCUTAneous route two (2) times daily as needed. INJECT SUBCUTANEOUSLY ON SLIDING SCALE AS DIRECTED 11/9/22   Bartolome Yang MD   gabapentin (NEURONTIN) 300 mg capsule TAKE 2 CAPSULES BY MOUTH THREE TIMES DAILY  Patient taking differently: TAKE 2 CAPSULES  in AM and 2 capsules in PM 11/9/22   Bartolome Yang MD   LORazepam (ATIVAN) 1 mg tablet TAKE 1 TABLET BY MOUTH EVERY MORNING AND TAKE 2 TABLETS AT BEDTIME 11/3/22   Vj Guevara MD   famotidine (PEPCID) 20 mg tablet Take 20 mg by mouth two (2) times a day. Provider, Historical   multivit-minerals/folic acid (MULTIVITAMIN GUMMIES PO) Take 2 Each by mouth Every morning.     Provider, Historical   DULoxetine (CYMBALTA) 30 mg capsule Take 1 Capsule by mouth every evening. 8/23/22   Maile Lanes, Juluis Cure, DO   acetaminophen (TYLENOL) 500 mg tablet Take 1,000 mg by mouth two (2) times daily as needed. Indications: pain    Provider, Historical   ibuprofen (MOTRIN) 200 mg tablet Take 600 mg by mouth two (2) times daily as needed. Indications: pain  Patient not taking: No sig reported    Provider, Historical   senna-docusate (PERICOLACE) 8.6-50 mg per tablet nightly as needed. Provider, Historical   DULoxetine (CYMBALTA) 60 mg capsule Take 1 Capsule by mouth daily. Indications: neuropathic pain  Patient taking differently: Take 60 mg by mouth Every morning.  At 0900  Indications: neuropathic pain 7/18/22   Vj Guevara MD     ALLERGIES:  No Known Allergies    Review of Systems:  (unchecked were asked but negative)  Constitutional: [] Fever/chills   [] Sweats   [] Loss of appetite   [] Fatigue/weakness   [] Weight loss  Head & Neck:   [] Headaches   [] Visual loss   [] Hearing loss   [] Thyroid problems  Cardiovascular:   [] Stroke   [] Calf pain when walking   [] Chest pain   [] Rapid heart beat       [] Heart attack   [] Stents   [] Congestive heart failure   [] Leg swelling   [] Murmur  Respiratory:   [] Sleep apnea   [] Cough/congestion   [] Shortness of breath   [] Wheezing/asthma      [] COPD/emphysema  Gastrointestinal:   [] Frequent indigestion   [] Trouble swallowing   [] Nausea   [] Vomiting   [] Bloating   [] Abd pain       [] Diarrhea   [] Constipation   [] Blood in stool   [] Ulcers   [] Intestinal disease   [] Hepatitis    Genitourinary:   [] Painful urination   [] Difficulty urinating   [] Frequent urination       [] Enlarged prostate   [] Vasectomy   [] Blood in urine   [] Dialysis  Musculoskeletal:  [] Muscle aches   [] Back pain   [] Joint pain  Neurologic:    [] Seizures   [] Dizziness   [] Numbness  Hematologic:   [] Nosebleeds   [] Easy bruising   [] Anemia   [] Easy bleeding  Psychiatric:    [] Depression   [] Anxiety   [] Bipolar disorder   [] Schizophrenia                                  []     Unable to obtain  ROS due to  []     mental status change  []     sedated   []     intubated    LABS:  Recent Labs     04/05/23 0221 04/04/23  1648   WBC 9.5 9.3   HGB 10.1* 11.4*   HCT 29.8* 33.3*   * 544*     Recent Labs     04/05/23 0221 04/04/23 1648 04/04/23  1130   * 136 135*   K 3.5 3.8 4.5    102 100   CO2 28 29 27   BUN 7 8 7   CREA 0.65 0.73 0.70   * 214* 266*   CA 9.3 9.8 9.0     Recent Labs     04/05/23 0221 04/04/23 1648 04/04/23  1130   * 779* 786*   TP 6.7 7.0 6.2*   ALB 3.2* 3.0* 2.9*   GLOB 3.5 4.0 3.3     Recent Labs     04/04/23  1648   INR 1.0   PTP 10.5   APTT 24.1         Signed By: Eliz Weinberg MD     April 5, 2023

## 2023-04-05 NOTE — PROGRESS NOTES
Physical Therapy  Order received and chart reviewed, attempted to see however patient off the floor. Will continue to follow. Addendum: per Oncology note \"patient wishes to have drain placed and discharge home quickly with hospice\".   Will sign off

## 2023-04-05 NOTE — PROGRESS NOTES
Patient arrived from room, alert & oriented X4 and on room air. Spoke with patient about paracentesis, ultrasound came over to verify fluid to be able to preform procedure, per ultrasound no fluid for paracentesis. Explained to patient we are unable to preform procedure at this time due to no fluid being present.

## 2023-04-05 NOTE — PROGRESS NOTES
Would get opinion from oncology on prognosis. Her treatment is palliative in intent. A discussion of likely longevity would be helpful in terms of what could be offered surgically from a palliative standpoint. Will be by to see her later today. No report of vomiting in notes. If just biliary obstruction would have IR place external drain if patient agrees.

## 2023-04-05 NOTE — PROGRESS NOTES
P&T-Approved DVT Prophylaxis Dosing    Per P&T Committee-approved protocol enoxaparin 30mg subq daily has been adjusted to enoxaparin 40mg subq daily based on weight and renal function as shown in the table below.          Yudy Sanchez, PHARMD

## 2023-04-05 NOTE — PROGRESS NOTES
Occupational Therapy  Order received and chart reviewed, attempted to see however patient off the floor. Will continue to follow. Addendum: per Oncology note \"patient wishes to have drain placed and discharge home quickly with hospice\"     Will sign off at this time.   Xavier Jacobson OTR/L

## 2023-04-05 NOTE — PROGRESS NOTES
Problem: Falls - Risk of  Goal: *Absence of Falls  Description: Document Tanmay Silva Fall Risk and appropriate interventions in the flowsheet.   Outcome: Progressing Towards Goal  Note: Fall Risk Interventions:                                Problem: Patient Education: Go to Patient Education Activity  Goal: Patient/Family Education  Outcome: Progressing Towards Goal

## 2023-04-05 NOTE — CONSULTS
[address] [facility]  at [facility] [address], [address] [facility], [address] [facility], [address] [place] [address]  [phone]      Oncology Consult Note     Reason for Visit:   Moni Sin is a 79 y.o. female who has been followed for treatment of metastatic pancreatic cancer at [facility]. She is now admitted to [address] [facility] with biliary obstruction related to progression of cancer. Treatment History:     Dx: Pancreatic Adenocarcinoma--May 2020--[id number]--ncklsshntqy of splenic vein and superior mesenteric vein    Tx: mFOLFIRINOX--first cycle 5/26/2020, second cycle 6/10/2020, dose reduced 15% cycle 3 on 6/30/2020--delayed due to severe side effects--switched to Gemcitabine/Abraxane--Cycle #1 7/29/2002, Cycle #2 9/2/2020, Cycle #3 10/7/2020, Cycle #4 11/4/2020. Neoadjuvant Radiation with Dr José Miguel Hill ending 12/18/2021. Neoadjuvant [place]/Abraxane, 06/2020 - 11/2020    Distal Pancreatectomy, Splenectomy, and Civa Sheet with Dr Nikko Tomlin on 3/17/2021. Adjuvant radiation with Dr José Miguel Hill. Palliative chemotherapy    Gemcitabine/abraxane 1 Cycle  D/C'd 10/2022    Onivyde+5-FU, 4 cycles  Currently on Enhertu cycle 3 day 1    Goal: Prolong life--Palliative    History of Present Illness:     Ms. Kei Becerra is a women with metastatic pancreatic adenocarcinoma. She had locally advanced pan Can Dx'ed in 2020. She received 4 cycles of FOLFIRINOX. She had severe toxicities from it with neutropenic fever. She then transitioned to [place]/Abraxane. She then underwent distal pancreatectomy in 2020 with Dr. Nikko Tomlin. Disease recurrent in the peritoneum in 07/2021. It was never biopsied. The disease has progressed on both [place]/Abraxane and Onivyde/5-FU. She has suffered with diarrhea and anorexia each cycle of treatment. She has recovered from it. A CT revealed progression of disease in the liver and peritoneum. Interval History:     04/05/23    Patient seen at bedside with daughter. She was tearful at times.  She has been feeling poorly at home over the last few days. 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 She is aware of CT results that show progression of disease and stated recent Enhertu was the last like of treatment available. She shared she felt like her disease was progressing and that her appt with Dr. Vitaly Marr next week would confirm her suspicions. She verbalized that she wishes to have her drain placed for comfort and transition home with her family ASAP! She lives with her daughter, YRIS, their children and her . Her main focus is quality of life and she wants to live long enough to see her first great grandchild's birth in May and her grandson's graduation from high school in June.      Past Medical History:   Diagnosis Date    Adenocarcinoma of pancreas ([facility] [address] 05/13/2020    Dr Aleksandra Dorman biopsy via EUS    Diabetes [facility])     GERD (gastroesophageal reflux disease)     Hernia of abdominal cavity     Subxyphoid hernia    Hypertension     Inflammatory polyarthropathy (HCC)     Joint pain     Joint swelling     Menopause     Ocular nevus of left eye     Pancreatitis     Tracheal stenosis       Past Surgical History:   Procedure Laterality Date    HX CARPAL TUNNEL RELEASE Bilateral     HX COLONOSCOPY      HX HYSTERECTOMY      HX KNEE ARTHROSCOPY Right     HX KNEE REPLACEMENT Right     partial    HX LAP CHOLECYSTECTOMY      HX TONSILLECTOMY      HX VASCULAR ACCESS        Social History     Tobacco Use    Smoking status: Never    Smokeless tobacco: Never   Substance Use Topics    Alcohol use: No     Alcohol/week: 0.0 standard drinks      Family History   Problem Relation Age of Onset    Heart Disease Mother     Heart Attack Mother     Cancer Father         lung    Diabetes Sister      Current Facility-Administered Medications   Medication Dose Route Frequency    acetaminophen (TYLENOL) tablet 650 mg  650 mg Oral Q6H PRN    Or    acetaminophen (TYLENOL) suppository 650 mg  650 mg Rectal Q6H PRN polyethylene glycol (MIRALAX) packet 17 g  17 g Oral DAILY PRN    promethazine (PHENERGAN) tablet 12.5 mg  12.5 mg Oral Q6H PRN    Or    ondansetron (ZOFRAN) injection 4 mg  4 mg IntraVENous Q6H PRN    cefTRIAXone (ROCEPHIN) 1 g in 0.9% sodium chloride (MBP/ADV) 50 mL MBP  1 g IntraVENous Q24H    enoxaparin (LOVENOX) injection 40 mg  40 mg SubCUTAneous DAILY    pantoprazole (PROTONIX) 40 mg in 0.9% sodium chloride 10 mL injection  40 mg IntraVENous DAILY    glucagon (GLUCAGEN) injection 1 mg  1 mg IntraMUSCular PRN    dextrose 10 % infusion 250 mL  250 mL IntraVENous DIALYSIS PRN    glucose chewable tablet 16 g  16 g Oral PRN    insulin lispro (HUMALOG) injection 1-8 Units  1-8 Units SubCUTAneous AC&HS    DULoxetine (CYMBALTA) capsule 60 mg  60 mg Oral QAM    DULoxetine (CYMBALTA) capsule 30 mg  30 mg Oral QPM    gabapentin (NEURONTIN) capsule 600 mg  600 mg Oral BID    magnesium oxide (MAG-OX) tablet 400 mg  400 mg Oral DAILY    OLANZapine (ZyPREXA) tablet 10 mg  10 mg Oral QHS    loperamide (IMODIUM) capsule 4 mg  4 mg Oral PRN    diphenoxylate-atropine (LOMOTIL) tablet 1 Tablet  1 Tablet Oral PRN    potassium chloride SR (KLOR-CON 10) tablet 20 mEq  20 mEq Oral BID    HYDROmorphone (DILAUDID) tablet 2 mg  2 mg Oral Q6H PRN    methylphenidate HCl (RITALIN) tablet 10 mg  10 mg Oral BID    lidocaine (XYLOCAINE) 4 % cream   Topical PRN    LORazepam (ATIVAN) tablet 1 mg  1 mg Oral BID      No Known Allergies     Review of Systems: A complete review of systems was obtained, negative except as described above.     Physical Exam:     General: alert, cooperative, no distress, tearful    Mental  status: normal mood, behavior, speech, dress, motor activity, and thought processes, able to follow commands   HENT: NCAT   Neck: no visualized mass   Resp: no respiratory distress   Neuro: no gross deficits   Skin: no discoloration or lesions of concern on visible areas   Psychiatric: normal affect, consistent with stated mood, no evidence of hallucinations     Results:     Lab Results   Component Value Date/Time    WBC 9.5 04/05/2023 02:21 AM    HGB 10.1 (L) 04/05/2023 02:21 AM    HCT 29.8 (L) 04/05/2023 02:21 AM    PLATELET 838 (H) 93/47/1303 02:21 AM    .3 (H) 04/05/2023 02:21 AM    ABS. NEUTROPHILS 5.6 04/05/2023 02:21 AM     Lab Results   Component Value Date/Time    Sodium 135 (L) 04/05/2023 02:21 AM    Potassium 3.5 04/05/2023 02:21 AM    Chloride 102 04/05/2023 02:21 AM    CO2 28 04/05/2023 02:21 AM    Glucose 170 (H) 04/05/2023 02:21 AM    BUN 7 04/05/2023 02:21 AM    Creatinine 0.65 04/05/2023 02:21 AM    GFR est AA >60 09/20/2022 08:27 AM    GFR est non-AA >60 09/20/2022 08:27 AM    Calcium 9.3 04/05/2023 02:21 AM    Glucose (POC) 298 (H) 04/05/2023 11:36 AM    Creatinine (POC) 0.7 02/06/2013 10:21 AM     Lab Results   Component Value Date/Time    Bilirubin, total 8.4 (H) 04/05/2023 02:21 AM    ALT (SGPT) 136 (H) 04/05/2023 02:21 AM    Alk. phosphatase 645 (H) 04/05/2023 02:21 AM    Protein, total 6.7 04/05/2023 02:21 AM    Albumin 3.2 (L) 04/05/2023 02:21 AM    Globulin 3.5 04/05/2023 02:21 AM       CT Results (most recent):  Results from Hospital Encounter encounter on 04/04/23    CT ABD PELV W CONT    Narrative  EXAM: CT ABD PELV W CONT    INDICATION: elevated bilirubin, hx pancreatic cancer, jaundice    COMPARISON: 1/11/2023    CONTRAST: 100 mL of Isovue-370. ORAL CONTRAST: None    TECHNIQUE:  Following the uneventful intravenous administration of contrast, thin axial  images were obtained through the abdomen and pelvis. Coronal and sagittal  reconstructions were generated. CT dose reduction was achieved through use of a  standardized protocol tailored for this examination and automatic exposure  control for dose modulation. FINDINGS:  LOWER THORAX: Bilateral pulmonary nodules in the lung bases, increased in size  and number since the prior study.  The largest on the left measures 0.7 cm and  the largest on the right 0.8 cm. Small right pleural effusion. LIVER: Significant worsening in severity of biliary ductal dilatation which is  most severe in the left hepatic lobe. New biliary ductal dilatation in the right  hepatic lobe. Stable right hepatic lobe cyst.  BILIARY TREE: The gallbladder surgically absent. Prominence of extrahepatic  ductal structures on that basis. The spleen appears surgically absent. SPLEEN: The spleen is surgically absent. PANCREAS: The distal pancreas is surgically absent. ADRENALS: Unremarkable. KIDNEYS: No mass, or hydronephrosis. Nonobstructing tiny left intrarenal  calculus. STOMACH: Unremarkable. SMALL BOWEL: No dilatation or wall thickening. COLON: No dilatation or wall thickening. Short segmental narrowing of rectal  caliber is likely related to peristalsis. APPENDIX: Normal, subhepatic right upper quadrant. PERITONEUM: Small ascites surrounding the liver and spleen and in the pelvis. Omental nodule in the left upper quadrant 1.7 cm, increased since the prior  study other peritoneal/omental nodularity also slightly increased. RETROPERITONEUM: No lymphadenopathy or aortic aneurysm. REPRODUCTIVE ORGANS: The uterus is surgically absent. URINARY BLADDER: No mass or calculus. Minimally distended. BONES: No destructive bone lesion. ABDOMINAL WALL: Ventral hernia containing a loop of colon without associated  obstruction. ADDITIONAL COMMENTS: N/A    Impression  1. Significant progression of disease characterized by worsened biliary ductal  dilatation, increase in metastatic pulmonary nodules, increased peritoneal  nodules and ascites. 2. Ventral hernia containing transverse colon without obstruction. Assessment/PLAN:     1) Pancreatic Adenocarcinoma metastatic to the peritoneum and lungs. ECOG PS 1  Intent of Treatment - palliative  Prognosis: Guarded. Received neoadjuvant chemotherapy. Radiation with Dr Gabino Putnam. Definitive resection with Dr Kody Burris.     BRCA negative. Now peritoneal disease/mets  On chemo since 7/2021    Recent CT - progression of disease in the lungs and peritoneum  Plan for CT guided Bx  D/C [place]/Abraxane    Onivyde/5-FU, 4 cycles  CT shows disease progression in the lungs and peritoneum. Enhertu 5.4 mg/kg Q3w, cycle 3     CT scans show progression of disease (as below) causing biliary obstruction   IMPRESSION   1. Significant progression of disease characterized by worsened biliary ductal  dilatation, increase in metastatic pulmonary nodules, increased peritoneal  nodules and ascites. 2. Ventral hernia containing transverse colon without obstruction.    -Agree with plans for biliary drain placement for comfort   -See conversation as above, patient wishes to transition home with hospice   -Consult to hospice     Discussed with Dr. Anastasia Esteban    Thank you for your excellent care of Mrs. Ba, plan is to transition home with hospice which is appropriate given progression of disease.      Signed By: Scottie Landau, NP

## 2023-04-05 NOTE — ED NOTES
eTRANSFER SBAR NOTE    IP UNIT CALLED NOTE IS READY: Yes Spoke to [place]    IF there are questions Call transferring nurse Yana Ledy at phone # [phone]    SITUATION/BACKGROUND:    Patient is being transferred to [address], Room# [phone]    Patient's Chief Complaint on arrival to ED was Abnormal Lab Results and is admitted for Biliary Obstruction. CODE STATUS: Full Code    VITAL SIGNS (MUST BE WITHIN 1 HOUR OF TRANSFER TO IP UNIT:    TEMP: 98.7  PULSE: 89  RESP: 16  BP: 126/76  PAIN SCORE: 0  MEWS: 1     ISOLATION/PRECAUTIONS: No   ISOLATION TYPE: N/A    Called outstanding consults: No  Routine    Are there sign and held orders that need to be released? No   Are all STAT orders completed: Yes    STAT labs collected: Yes  REPEAT LACTIC ACID DUE? No  TIME DUE: N/A  Critical Labs Results? No  What? N/A    PHARMACY NEEDS:  Are there any titrating drips? No   If so what? None    Are there STAT meds  that need to be given? Yes    The following personal items will be sent with the patient during transfer to the floor: All valuables:   ITEM:    ITEM: Visual Aid: Glasses, With patient, At bedside  ITEM:           ASSESSMENT:    CIWA Assessment: No  Last Score: N/A    NEURO:   NIH SCORE:    0    JAMES SCREENING:      Pass    NEURO ASSESSMENT:    A+Ox4    If a Stroke Case . .. When are the next V/S, nuero, NIH due? N/A    Is patient impulsive? No   Is patient oriented? Yes   Do they follow commands? Yes  Is the patient ambulatory? Yes Device need Cane    FALL RISK? Yes   Is the [place] 1 Assessment completed? Yes  INTERVENTIONS: N/A    INTEGUMENTARY:   IS THE PATIENT UNDRESSED? Yes  WOUNDS PRESENT? No  On admit to ED No   ARE THE WOUNDS DOCUMENTED? Yes    RESPIRATORY:   Is patient on Oxygen? No    OXYGEN: Oxygen Therapy  O2 Device: None (04/04/23 1720)  IS patient on VENT? No      CARDIAC:   Is cardiac monitoring ordered? Yes Last Rhythm: Sinus Rhythm  Patient to transfer with tele box on? Yes  Is patient using a LIFE VEST? No     LINE ACCESS:   Venous Access Device port-a-cath 20 Upper chest (subclavicular area), left (Active)       Peripheral IV 23 Right Antecubital (Active)   Site Assessment Clean, dry, & intact 23 1654   Phlebitis Assessment 0 23 1654   Infiltration Assessment 0 23 1654   Dressing Status Clean, dry, & intact 23 165   Hub Color/Line Status Pink 23 165        /GI:   CONTINENT BOWEL/BLADDER? Yes  URINARY OUTPUT: voiding   Written Order for Jaime Cath? No   CHRONIC OR ACUTE? N/A   If CHRONIC, is it 1days old, was it changed prior to specimen collection? N/A  WAS UA WITH REFLEX SENT TO LAB? Yes IF NO,  COLLECT AND SEND PRIOR TO TRANSPORT TO INPATIENT AREA    RESTRAINTS IN USE: No      IS DOCUMENTATION COMPLETE: Yes  Is there a current Order? No  When does it ? N/A    Additional details as Needed:      Albumin not started due to lack of pump, transport arrived prior to insulin administration.

## 2023-04-06 ENCOUNTER — ANESTHESIA (OUTPATIENT)
Dept: INTERVENTIONAL RADIOLOGY/VASCULAR | Age: 68
End: 2023-04-06
Payer: MEDICARE

## 2023-04-06 ENCOUNTER — APPOINTMENT (OUTPATIENT)
Dept: INTERVENTIONAL RADIOLOGY/VASCULAR | Age: 68
DRG: 445 | End: 2023-04-06
Attending: INTERNAL MEDICINE
Payer: MEDICARE

## 2023-04-06 ENCOUNTER — ANESTHESIA EVENT (OUTPATIENT)
Dept: INTERVENTIONAL RADIOLOGY/VASCULAR | Age: 68
End: 2023-04-06
Payer: MEDICARE

## 2023-04-06 VITALS
OXYGEN SATURATION: 96 % | BODY MASS INDEX: 29.29 KG/M2 | SYSTOLIC BLOOD PRESSURE: 143 MMHG | HEART RATE: 85 BPM | DIASTOLIC BLOOD PRESSURE: 77 MMHG | WEIGHT: 165.34 LBS | TEMPERATURE: 98.2 F | RESPIRATION RATE: 17 BRPM

## 2023-04-06 LAB
GLUCOSE BLD STRIP.AUTO-MCNC: 216 MG/DL (ref 65–117)
GLUCOSE BLD STRIP.AUTO-MCNC: 236 MG/DL (ref 65–117)
GLUCOSE BLD STRIP.AUTO-MCNC: 373 MG/DL (ref 65–117)
SERVICE CMNT-IMP: ABNORMAL

## 2023-04-06 PROCEDURE — 94760 N-INVAS EAR/PLS OXIMETRY 1: CPT

## 2023-04-06 PROCEDURE — C1769 GUIDE WIRE: HCPCS

## 2023-04-06 PROCEDURE — 47540 PERQ PLMT BILE DUCT STENT: CPT

## 2023-04-06 PROCEDURE — 74011250636 HC RX REV CODE- 250/636: Performed by: ANESTHESIOLOGY

## 2023-04-06 PROCEDURE — 74011250636 HC RX REV CODE- 250/636: Performed by: NURSE ANESTHETIST, CERTIFIED REGISTERED

## 2023-04-06 PROCEDURE — 76210000006 HC OR PH I REC 0.5 TO 1 HR

## 2023-04-06 PROCEDURE — C9113 INJ PANTOPRAZOLE SODIUM, VIA: HCPCS | Performed by: STUDENT IN AN ORGANIZED HEALTH CARE EDUCATION/TRAINING PROGRAM

## 2023-04-06 PROCEDURE — C1725 CATH, TRANSLUMIN NON-LASER: HCPCS

## 2023-04-06 PROCEDURE — C1894 INTRO/SHEATH, NON-LASER: HCPCS

## 2023-04-06 PROCEDURE — 0F793DZ DILATION OF COMMON BILE DUCT WITH INTRALUMINAL DEVICE, PERCUTANEOUS APPROACH: ICD-10-PCS | Performed by: STUDENT IN AN ORGANIZED HEALTH CARE EDUCATION/TRAINING PROGRAM

## 2023-04-06 PROCEDURE — 82962 GLUCOSE BLOOD TEST: CPT

## 2023-04-06 PROCEDURE — 76060000033 HC ANESTHESIA 1 TO 1.5 HR

## 2023-04-06 PROCEDURE — 77030011229 HC DIL VESL COON COOK -A

## 2023-04-06 PROCEDURE — 74011000250 HC RX REV CODE- 250: Performed by: STUDENT IN AN ORGANIZED HEALTH CARE EDUCATION/TRAINING PROGRAM

## 2023-04-06 PROCEDURE — BF111ZZ FLUOROSCOPY OF BILIARY AND PANCREATIC DUCTS USING LOW OSMOLAR CONTRAST: ICD-10-PCS | Performed by: STUDENT IN AN ORGANIZED HEALTH CARE EDUCATION/TRAINING PROGRAM

## 2023-04-06 PROCEDURE — 2709999900 HC NON-CHARGEABLE SUPPLY

## 2023-04-06 PROCEDURE — 74011000250 HC RX REV CODE- 250: Performed by: NURSE ANESTHETIST, CERTIFIED REGISTERED

## 2023-04-06 PROCEDURE — C1874 STENT, COATED/COV W/DEL SYS: HCPCS

## 2023-04-06 PROCEDURE — 74011000636 HC RX REV CODE- 636: Performed by: STUDENT IN AN ORGANIZED HEALTH CARE EDUCATION/TRAINING PROGRAM

## 2023-04-06 PROCEDURE — 77030008684 HC TU ET CUF COVD -B: Performed by: ANESTHESIOLOGY

## 2023-04-06 PROCEDURE — 77030026438 HC STYL ET INTUB CARD -A: Performed by: ANESTHESIOLOGY

## 2023-04-06 PROCEDURE — 74011250637 HC RX REV CODE- 250/637: Performed by: STUDENT IN AN ORGANIZED HEALTH CARE EDUCATION/TRAINING PROGRAM

## 2023-04-06 PROCEDURE — 74011250636 HC RX REV CODE- 250/636: Performed by: STUDENT IN AN ORGANIZED HEALTH CARE EDUCATION/TRAINING PROGRAM

## 2023-04-06 PROCEDURE — 74011250636 HC RX REV CODE- 250/636

## 2023-04-06 PROCEDURE — 77010033678 HC OXYGEN DAILY

## 2023-04-06 PROCEDURE — 77030008584 HC TOOL GDWRE DEV TERU -A

## 2023-04-06 PROCEDURE — 74011636637 HC RX REV CODE- 636/637: Performed by: STUDENT IN AN ORGANIZED HEALTH CARE EDUCATION/TRAINING PROGRAM

## 2023-04-06 PROCEDURE — 77030010548 HC BG WND DRN ARMD -B

## 2023-04-06 RX ORDER — CEPHALEXIN 500 MG/1
500 CAPSULE ORAL 4 TIMES DAILY
Qty: 16 CAPSULE | Refills: 0 | Status: SHIPPED | OUTPATIENT
Start: 2023-04-06 | End: 2023-04-10

## 2023-04-06 RX ORDER — SODIUM CHLORIDE 0.9 % (FLUSH) 0.9 %
5-40 SYRINGE (ML) INJECTION AS NEEDED
Status: DISCONTINUED | OUTPATIENT
Start: 2023-04-06 | End: 2023-04-06 | Stop reason: HOSPADM

## 2023-04-06 RX ORDER — SODIUM CHLORIDE, SODIUM LACTATE, POTASSIUM CHLORIDE, CALCIUM CHLORIDE 600; 310; 30; 20 MG/100ML; MG/100ML; MG/100ML; MG/100ML
25 INJECTION, SOLUTION INTRAVENOUS CONTINUOUS
Status: CANCELLED | OUTPATIENT
Start: 2023-04-06 | End: 2023-04-07

## 2023-04-06 RX ORDER — HYDROMORPHONE HYDROCHLORIDE 1 MG/ML
INJECTION, SOLUTION INTRAMUSCULAR; INTRAVENOUS; SUBCUTANEOUS
Status: DISCONTINUED
Start: 2023-04-06 | End: 2023-04-06 | Stop reason: HOSPADM

## 2023-04-06 RX ORDER — FENTANYL CITRATE 50 UG/ML
INJECTION, SOLUTION INTRAMUSCULAR; INTRAVENOUS
Status: COMPLETED
Start: 2023-04-06 | End: 2023-04-06

## 2023-04-06 RX ORDER — MORPHINE SULFATE 2 MG/ML
2 INJECTION, SOLUTION INTRAMUSCULAR; INTRAVENOUS
Status: DISCONTINUED | OUTPATIENT
Start: 2023-04-06 | End: 2023-04-06 | Stop reason: HOSPADM

## 2023-04-06 RX ORDER — SODIUM CHLORIDE 0.9 % (FLUSH) 0.9 %
5-40 SYRINGE (ML) INJECTION EVERY 8 HOURS
Status: DISCONTINUED | OUTPATIENT
Start: 2023-04-06 | End: 2023-04-06 | Stop reason: HOSPADM

## 2023-04-06 RX ORDER — LIDOCAINE HYDROCHLORIDE 10 MG/ML
0.1 INJECTION, SOLUTION EPIDURAL; INFILTRATION; INTRACAUDAL; PERINEURAL AS NEEDED
Status: CANCELLED | OUTPATIENT
Start: 2023-04-06

## 2023-04-06 RX ORDER — FENTANYL CITRATE 50 UG/ML
25 INJECTION, SOLUTION INTRAMUSCULAR; INTRAVENOUS
Status: DISCONTINUED | OUTPATIENT
Start: 2023-04-06 | End: 2023-04-06 | Stop reason: HOSPADM

## 2023-04-06 RX ORDER — HEPARIN SODIUM 200 [USP'U]/100ML
400 INJECTION, SOLUTION INTRAVENOUS ONCE
Status: COMPLETED | OUTPATIENT
Start: 2023-04-06 | End: 2023-04-06

## 2023-04-06 RX ORDER — LIDOCAINE HYDROCHLORIDE 20 MG/ML
18 INJECTION, SOLUTION INFILTRATION; PERINEURAL ONCE
Status: COMPLETED | OUTPATIENT
Start: 2023-04-06 | End: 2023-04-06

## 2023-04-06 RX ORDER — FENTANYL CITRATE 50 UG/ML
50 INJECTION, SOLUTION INTRAMUSCULAR; INTRAVENOUS AS NEEDED
Status: CANCELLED | OUTPATIENT
Start: 2023-04-06

## 2023-04-06 RX ORDER — MIDAZOLAM HYDROCHLORIDE 1 MG/ML
0.5 INJECTION, SOLUTION INTRAMUSCULAR; INTRAVENOUS
Status: DISCONTINUED | OUTPATIENT
Start: 2023-04-06 | End: 2023-04-06 | Stop reason: HOSPADM

## 2023-04-06 RX ORDER — ONDANSETRON 2 MG/ML
4 INJECTION INTRAMUSCULAR; INTRAVENOUS AS NEEDED
Status: DISCONTINUED | OUTPATIENT
Start: 2023-04-06 | End: 2023-04-06 | Stop reason: HOSPADM

## 2023-04-06 RX ORDER — DULOXETIN HYDROCHLORIDE 60 MG/1
60 CAPSULE, DELAYED RELEASE ORAL EVERY MORNING
Qty: 30 CAPSULE | Refills: 0 | Status: SHIPPED | OUTPATIENT
Start: 2023-04-06

## 2023-04-06 RX ORDER — HYDROMORPHONE HYDROCHLORIDE 1 MG/ML
.2-.5 INJECTION, SOLUTION INTRAMUSCULAR; INTRAVENOUS; SUBCUTANEOUS
Status: DISCONTINUED | OUTPATIENT
Start: 2023-04-06 | End: 2023-04-06 | Stop reason: HOSPADM

## 2023-04-06 RX ORDER — MIDAZOLAM HYDROCHLORIDE 1 MG/ML
INJECTION, SOLUTION INTRAMUSCULAR; INTRAVENOUS
Status: COMPLETED
Start: 2023-04-06 | End: 2023-04-06

## 2023-04-06 RX ADMIN — SODIUM CHLORIDE, PRESERVATIVE FREE 40 MG: 5 INJECTION INTRAVENOUS at 12:30

## 2023-04-06 RX ADMIN — SUCCINYLCHOLINE CHLORIDE 100 MG: 20 INJECTION, SOLUTION INTRAMUSCULAR; INTRAVENOUS at 09:44

## 2023-04-06 RX ADMIN — METHYLPHENIDATE HYDROCHLORIDE 10 MG: 5 TABLET ORAL at 12:30

## 2023-04-06 RX ADMIN — MAGNESIUM OXIDE TAB 400 MG (241.3 MG ELEMENTAL MG) 400 MG: 400 (241.3 MG) TAB at 09:00

## 2023-04-06 RX ADMIN — IOPAMIDOL 50 ML: 755 INJECTION, SOLUTION INTRAVENOUS at 09:40

## 2023-04-06 RX ADMIN — Medication 80 MCG: at 10:01

## 2023-04-06 RX ADMIN — LORAZEPAM 1 MG: 1 TABLET ORAL at 12:30

## 2023-04-06 RX ADMIN — Medication 3 UNITS: at 12:32

## 2023-04-06 RX ADMIN — Medication 40 MCG: at 10:07

## 2023-04-06 RX ADMIN — GABAPENTIN 600 MG: 300 CAPSULE ORAL at 12:30

## 2023-04-06 RX ADMIN — Medication 80 MCG: at 09:52

## 2023-04-06 RX ADMIN — MIDAZOLAM 2 MG: 1 INJECTION INTRAMUSCULAR; INTRAVENOUS at 09:43

## 2023-04-06 RX ADMIN — LIDOCAINE HYDROCHLORIDE 360 MG: 20 INJECTION, SOLUTION INFILTRATION; PERINEURAL at 09:40

## 2023-04-06 RX ADMIN — ONDANSETRON HYDROCHLORIDE 8 MG: 2 INJECTION, SOLUTION INTRAMUSCULAR; INTRAVENOUS at 10:05

## 2023-04-06 RX ADMIN — DULOXETINE HYDROCHLORIDE 60 MG: 30 CAPSULE, DELAYED RELEASE ORAL at 12:38

## 2023-04-06 RX ADMIN — FENTANYL CITRATE 100 MCG: 50 INJECTION, SOLUTION INTRAMUSCULAR; INTRAVENOUS at 09:43

## 2023-04-06 RX ADMIN — POTASSIUM CHLORIDE 20 MEQ: 750 TABLET, EXTENDED RELEASE ORAL at 12:30

## 2023-04-06 RX ADMIN — SODIUM CHLORIDE, POTASSIUM CHLORIDE, SODIUM LACTATE AND CALCIUM CHLORIDE: 600; 310; 30; 20 INJECTION, SOLUTION INTRAVENOUS at 09:36

## 2023-04-06 RX ADMIN — ENOXAPARIN SODIUM 40 MG: 100 INJECTION SUBCUTANEOUS at 12:30

## 2023-04-06 RX ADMIN — LIDOCAINE HYDROCHLORIDE 100 MG: 20 INJECTION, SOLUTION INTRAVENOUS at 09:43

## 2023-04-06 RX ADMIN — DEXAMETHASONE SODIUM PHOSPHATE 8 MG: 4 INJECTION, SOLUTION INTRAMUSCULAR; INTRAVENOUS at 10:05

## 2023-04-06 RX ADMIN — FENTANYL CITRATE 25 MCG: 50 INJECTION INTRAMUSCULAR; INTRAVENOUS at 11:06

## 2023-04-06 RX ADMIN — Medication 40 MCG: at 09:58

## 2023-04-06 RX ADMIN — Medication 40 MCG: at 09:55

## 2023-04-06 RX ADMIN — HYDROMORPHONE HYDROCHLORIDE 1 MG: 1 INJECTION, SOLUTION INTRAMUSCULAR; INTRAVENOUS; SUBCUTANEOUS at 10:45

## 2023-04-06 RX ADMIN — FENTANYL CITRATE 25 MCG: 50 INJECTION INTRAMUSCULAR; INTRAVENOUS at 11:10

## 2023-04-06 RX ADMIN — HEPARIN SODIUM 200 UNITS: 200 INJECTION, SOLUTION INTRAVENOUS at 10:30

## 2023-04-06 RX ADMIN — Medication 40 MCG: at 09:49

## 2023-04-06 RX ADMIN — MIDAZOLAM 3 MG: 1 INJECTION INTRAMUSCULAR; INTRAVENOUS at 09:40

## 2023-04-06 RX ADMIN — PROPOFOL 120 MG: 10 INJECTION, EMULSION INTRAVENOUS at 09:44

## 2023-04-06 RX ADMIN — Medication 40 MCG: at 10:04

## 2023-04-06 RX ADMIN — HYDROMORPHONE HYDROCHLORIDE 2 MG: 2 TABLET ORAL at 12:45

## 2023-04-06 NOTE — PROGRESS NOTES
Transition of Care Plan:     RUR:19%  Disposition:Home  / [facility]  If SNF or IPR: Date FOC offered:  Date FOC received:Hospice list provided on 4/5  Date authorization started with reference number:  Date authorization received and expires:  Accepting facility:  Follow up appointments:  DME needed:n/a  Transportation at [address] [facility] or means to access home:        IM Medicare Letter:2nd IM needed  Is patient a  and connected with the [place]? If yes, was [employer] transfer form completed and VA notified? Caregiver Contact:daughterKenan [phone]  Discharge Caregiver contacted prior to discharge? Care Conference needed?:        2pm: CM received a follow-up phone call from [place] with [facility]. CM was informed that they have spoken with Pt and have agreed to admit the Pt on Monday. Hospice agency requested that the physician sends comfort medications to Pt's local pharmacy. CM was provided a list of specific comfort medications the physician will need to send. 12pm: CM contacted [address] [place] [address] to follow-up on referral. The agency has confirmed that they have received the referral and will be in contact with CM and Pt today.        Tracy Gomez, [facility], LATOYA Napier

## 2023-04-06 NOTE — PROGRESS NOTES
Hospitalist Progress Note    NAME: Sharon Castro   :  1955   MRN:  [id number]       Assessment / Plan:    Obstructive jaundice  Pancreatic adenocarcinoma with metastasis  - Known history of pancreatic adenocarcinoma with metastasis to the peritoneum and lungs  - She follows with oncology as an outpatient, will consult  - Presented with painless jaundice  - Bilirubin 8.4 on admission  - General surgery and IR consulted for stent PLACEMENT  -s/p of the common bile duct stricture and placement of an external biliary drain   - Currently patient is asymptomatic, will continue supportive care  -Patient and her family decided on home hospice    Acute urinary tract infection, POA  - UA on admission with bacteriuria and pyuria  - Started on ceftriaxone, continue    Diabetes mellitus   - Continue correction scale with hypoglycemia protocol        25.0 - 29.9 Overweight / Body mass index is 29.29 kg/m². Estimated discharge date:   Barriers: once home hospice arrangement is ready    Code status: Full  Prophylaxis: Lovenox  Recommended Disposition: Home w/Family     Subjective:     Patient was seen and examined. No acute events overnight. Discussed with RN overnight events. All patient's questions were answered. Patient's daughter was at bedside, questions were answered. \"Doing very well\"      Review of Systems:  Symptom Y/N Comments  Symptom Y/N Comments   Fever/Chills n   Chest Pain n    Poor Appetite    Edema     Cough n   Abdominal Pain n    Sputum    Joint Pain     SOB/TINSLEY n   Pruritis/Rash     Nausea/vomit n   Tolerating PT/OT     Diarrhea    Tolerating Diet y    Constipation    Other       Could NOT obtain due to:          Objective:     VITALS:   Last 24hrs VS reviewed since prior progress note.  Most recent are:  Patient Vitals for the past 24 hrs:   Temp Pulse Resp BP SpO2   23 1525 98.2 °F (36.8 °C) 85 17 (!) 143/77 96 %   23 1157 98.4 °F (36.9 °C) 87 16 (!) 141/69 96 %   04/06/23 1130 98.2 °F (36.8 °C) 87 18 (!) 142/70 96 %   04/06/23 1115 -- 89 28 (!) 143/64 96 %   04/06/23 1105 -- 88 19 (!) 142/53 97 %   04/06/23 1100 -- 88 15 137/66 97 %   04/06/23 1055 -- 88 19 118/83 96 %   04/06/23 1050 -- 87 20 (!) 154/68 96 %   04/06/23 1045 -- 87 23 (!) 146/62 98 %   04/06/23 1040 -- 86 24 (!) 161/81 99 %   04/06/23 1039 98 °F (36.7 °C) 88 24 (!) 158/76 99 %   04/06/23 0813 -- -- -- -- 96 %   04/06/23 0812 98.7 °F (37.1 °C) 83 -- (!) 152/73 96 %   04/06/23 0749 98.8 °F (37.1 °C) 87 17 (!) 155/70 97 %   04/05/23 1922 98.2 °F (36.8 °C) 88 16 128/71 94 %   04/05/23 1612 98 °F (36.7 °C) 81 18 122/71 95 %         Intake/Output Summary (Last 24 hours) at 4/6/2023 1556  Last data filed at 4/6/2023 1042  Gross per 24 hour   Intake 800 ml   Output --   Net 800 ml          I had a face to face encounter and independently examined this patient on 4/6/2023, as outlined below:  PHYSICAL EXAM:  General: WD, WN. Alert, cooperative, no acute distress    EENT:  EOMI. icteric sclerae. MMM  Resp:  CTA bilaterally, no wheezing or rales. No accessory muscle use  CV:  Regular  rhythm,  No edema  GI:  Soft, Non distended, Non tender. +Bowel sounds  Neurologic:  EOMs intact. No facial asymmetry. No aphasia or slurred speech. Symmetrical strength, Sensation grossly intact. Alert and oriented X 4.     Psych:   Good insight. Not anxious nor agitated  Skin:  No rashes. Jaundice    Reviewed most current lab test results and cultures  YES  Reviewed most current radiology test results   YES  Review and summation of old records today    NO  Reviewed patient's current orders and MAR    YES  PMH/ reviewed - no change compared to H&P  ________________________________________________________________________  Care Plan discussed with:    Comments   Patient X    Family  X    RN X    Care Manager X    Consultant  X Oncology                    X Multidiciplinary team rounds were held today with , nursing, pharmacist and clinical coordinator. Patient's plan of care was discussed; medications were reviewed and discharge planning was addressed. ________________________________________________________________________        Comments   >50% of visit spent in counseling and coordination of care X    ________________________________________________________________________  Chelsy Mosquera MD     Procedures: see electronic medical records for all procedures/Xrays and details which were not copied into this note but were reviewed prior to creation of Plan. LABS:  I reviewed today's most current labs and imaging studies.   Pertinent labs include:  Recent Labs     04/05/23 0221 04/04/23 1648 04/04/23  1130   WBC 9.5 9.3 8.0   HGB 10.1* 11.4* 11.1*   HCT 29.8* 33.3* 31.6*   * 544* 577*       Recent Labs     04/05/23 0221 04/04/23 1648 04/04/23  1130   * 136 135*   K 3.5 3.8 4.5    102 100   CO2 28 29 27   * 214* 266*   BUN 7 8 7   CREA 0.65 0.73 0.70   CA 9.3 9.8 9.0   ALB 3.2* 3.0* 2.9*   TBILI 8.4* 9.1* 8.5*   * 171* 174*   INR  --  1.0  --          Signed: Chelsy Mosquera MD

## 2023-04-06 NOTE — PERIOP NOTES
Handoff Report from Operating Room to PACU    Report received from Lebron Mayfield RN and Giselle Wayne CRNA regarding Scotty Lagos. * No surgeons listed *  And * No procedures listed *  confirmed   with dressings discussed. Anesthesia type, drugs, patient history, complications, estimated blood loss, vital signs, intake and output, and last pain medication, lines, and temperature were reviewed.

## 2023-04-06 NOTE — ANESTHESIA PREPROCEDURE EVALUATION
Relevant Problems   CARDIOVASCULAR   (+) Hypertension      GASTROINTESTINAL   (+) GERD (gastroesophageal reflux disease)      ENDOCRINE   (+) Type 2 diabetes mellitus with nephropathy (HCC)   (+) Type 2 diabetes mellitus without complication, without long-term current use of insulin (HCC)      PERSONAL HX & FAMILY HX OF CANCER   (+) Adenocarcinoma of pancreas (HCC)   (+) Peritoneal carcinomatosis (HCC)       Anesthetic History   No history of anesthetic complications            Review of Systems / Medical History  Patient summary reviewed, nursing notes reviewed and pertinent labs reviewed    Pulmonary  Within defined limits                 Neuro/Psych   Within defined limits           Cardiovascular    Hypertension          Hyperlipidemia    Exercise tolerance: >4 METS  Comments: EKG 12/19/22: Sinus rhythm with occasional premature ventricular complexes   Incomplete right bundle branch block    2015 ECHO: 60% EF    2015 Neg Stress Test   GI/Hepatic/Renal     GERD          Comments: Hx Pancreatic Adenocarcinoma   Exocrine pancreatic insufficiency  Biliary obstruction   Endo/Other    Diabetes: well controlled, type 2, using insulin    Obesity, arthritis and anemia     Other Findings   Comments: Hb 10.1  Visibly jaundiced  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  Inflammatory polyarthropathy  Ocular nevus, Left  Joint pain    Joint swelling    DJD              Physical Exam    Airway  Mallampati: II  TM Distance: 4 - 6 cm  Neck ROM: normal range of motion   Mouth opening: Normal     Cardiovascular  Regular rate and rhythm,  S1 and S2 normal,  no murmur, click, rub, or gallop             Dental      Comments: 1 missing tooth   Pulmonary  Breath sounds clear to auscultation               Abdominal  GI exam deferred       Other Findings            Anesthetic Plan    ASA: 3  Anesthesia type: general    Monitoring Plan: BIS      Induction: Intravenous  Anesthetic plan and risks discussed with: Patient

## 2023-04-06 NOTE — ANESTHESIA POSTPROCEDURE EVALUATION
* No procedures listed *.    general    Anesthesia Post Evaluation        Patient location during evaluation: PACU  Note status: Adequate. Level of consciousness: responsive to verbal stimuli and sleepy but conscious  Pain management: satisfactory to patient  Airway patency: patent  Anesthetic complications: no  Cardiovascular status: acceptable  Respiratory status: acceptable  Hydration status: acceptable  Comments: +Post-Anesthesia Evaluation and Assessment    Patient: Luc Parker MRN: [id number]  SSN: [id number]   YOB: 1955  Age: 79 y.o. Sex: female      Cardiovascular Function/Vital Signs    BP (!) 142/70   Pulse 87   Temp 36.7 °C (98 °F)   Resp 18   Wt 75 kg (165 lb 5.5 oz)   SpO2 96%   BMI 29.29 kg/m²     Patient is status post * No procedures listed *. Nausea/Vomiting: Controlled. Postoperative hydration reviewed and adequate. Pain:  Pain Scale 1: Numeric (0 - 10) (04/06/23 1130)  Pain Intensity 1: 3 (04/06/23 1130)   Managed. Neurological Status:   Neuro (WDL): Exceptions to WDL (04/06/23 1039)   At baseline. Mental Status and Level of Consciousness: Arousable. Pulmonary Status:   O2 Device: Nasal cannula (04/06/23 1039)   Adequate oxygenation and airway patent. Complications related to anesthesia: None    Post-anesthesia assessment completed. No concerns. Signed By: Jayna Mesa MD    4/6/2023  Post anesthesia nausea and vomiting:  controlled      INITIAL Post-op Vital signs:   Vitals Value Taken Time   /70 04/06/23 1130   Temp 36.7 °C (98 °F) 04/06/23 1039   Pulse 86 04/06/23 1132   Resp 22 04/06/23 1132   SpO2 97 % 04/06/23 1132   Vitals shown include unvalidated device data.

## 2023-04-06 NOTE — PERIOP NOTES
TRANSFER - OUT REPORT:    Verbal report given to Aniket Mello RN(name) on [employer]  being transferred to [facility](unit) for routine post - op       Report consisted of patients Situation, Background, Assessment and   Recommendations(SBAR). Information from the following report(s) OR Summary, Procedure Summary, Intake/Output, and MAR was reviewed with the receiving nurse. Opportunity for questions and clarification was provided. Patient transported with:   O2 @ 2 liters  Tech    Reviewed with receiving nurse that only PACU orders were released. Postop orders will need to be released when pt gets to room.

## 2023-04-06 NOTE — PROGRESS NOTES
Miss. Rachael Gant is alert and oriented. She had complaints of mild abd pain and received pain medications. She has discharge orders and I have spoken to the hospice care manager who confirmed that everything is set for patient to discharge home at this time. I went over discharge education and patient has no further questions at this time. Will discharge patient at this time.

## 2023-04-06 NOTE — PROGRESS NOTES
End of Shift Note    Bedside shift change report given to , RN (oncoming nurse) by Mike Villafana, Student RN (offgoing nurse). Report included the following information SBAR, Kardex, and MAR    Shift worked: [id number]     Shift summary and any significant changes:    Patient A/Ox4, continent of bowel and bladder, and x1 assist with ambulation. All scheduled medications given during shift except Zyprexa because patient stated that she \"no longer needs to be on that medication\". Patient has been NPO since midnight due to scheduled biliary drain placement 4/6. CHG performed during shift for surgery preparation. Patient denied pain and nausea during shift. Concerns for physician to address: Patient stated that she \"no longer takes Zyprexa\"     Zone phone for oncoming shift:  N/A     Activity:  Activity Level:  Up with Assistance  Number times ambulated in hallways past shift: 0  Number of times OOB to chair past shift: 0    Cardiac:   Cardiac Monitoring: No           Access:  Current line(s): PIV     Genitourinary:   Urinary status: voiding    Respiratory:   O2 Device: None (Room air)  Chronic home O2 use?: NO  Incentive spirometer at bedside: YES       GI:  Last Bowel Movement Date: 04/03/23  Current diet:  ADULT ORAL NUTRITION SUPPLEMENT Breakfast, Lunch, Dinner; Diabetic Supplement  DIET NPO  Passing flatus: YES  Tolerating current diet: YES       Pain Management:   Patient states pain is manageable on current regimen: YES    Skin:  Ric Score: 20  Interventions: float heels, increase time out of bed, and PT/OT consult    Patient Safety:  Fall Score:    Interventions: assistive device (walker, cane, etc), gripper socks, and pt to call before getting OOB       Length of Stay:  Expected LOS: 3d 0h  Actual LOS: [address] [facility]

## 2023-04-06 NOTE — PROGRESS NOTES
Spiritual Care Partner Volunteer visited patient at [facility] in [facility] 3 SURG TELE on 4/6/2023   Documented by:     NATALIO England, [facility], Staff [address]    [address] Paging Service  [phone])

## 2023-04-06 NOTE — PROGRESS NOTES
0830: pt transported to xray recovery via stretcher. Aox4. VSS. Consent presented, MD to sign. Pt signed. Anesthesia to see pt.     0845: MD signed consent and spoke with pt. 0906: anesthesia signed consent and spoke with pt.     0930: pt transported via stretcher and xray recovery RN to angio. Anesthesia at bedside. 1017: called PACU to inform of procedure completing and will be heading their way soon. Has follow up appt scheduled Monday 4/28 for 9:00am.     1040: report given to anesthesia. Pt reports no pain, VSS.

## 2023-04-07 LAB
BACTERIA SPEC CULT: NORMAL
CC UR VC: NORMAL
SERVICE CMNT-IMP: NORMAL

## 2023-04-07 RX ORDER — MORPHINE SULFATE 20 MG/5ML
5 SOLUTION ORAL
Qty: 20 ML | Refills: 0 | Status: SHIPPED | OUTPATIENT
Start: 2023-04-07 | End: 2023-04-10

## 2023-04-07 RX ORDER — ACETAMINOPHEN 650 MG/1
650 SUPPOSITORY RECTAL
Qty: 12 SUPPOSITORY | Refills: 0 | Status: SHIPPED | OUTPATIENT
Start: 2023-04-07 | End: 2023-04-10

## 2023-04-07 RX ORDER — HALOPERIDOL 2 MG/1
2 TABLET ORAL
Qty: 9 TABLET | Refills: 0 | Status: SHIPPED | OUTPATIENT
Start: 2023-04-07 | End: 2023-04-10

## 2023-04-07 RX ORDER — PROMETHAZINE HYDROCHLORIDE 25 MG/1
25 TABLET ORAL
Qty: 12 TABLET | Refills: 0 | Status: SHIPPED | OUTPATIENT
Start: 2023-04-07 | End: 2023-04-10

## 2023-04-07 RX ORDER — FACIAL-BODY WIPES
10 EACH TOPICAL
Qty: 4 SUPPOSITORY | Refills: 0 | Status: SHIPPED | OUTPATIENT
Start: 2023-04-07 | End: 2023-04-10

## 2023-09-09 NOTE — TELEPHONE ENCOUNTER
Called patient, HIPAA verified. Notified patient Dr Rashaad Hilton has ordered her chemotherapy to start on 11/21/2022, she should arrive at [facility] at 0830. She will see Dr Rashaad Hilton via VV on the same day. Patient states she would like to do phone education this week, and will sign consent at day of chemo. Denied further questions.
09-Sep-2023 09:39

## 2024-09-03 NOTE — TELEPHONE ENCOUNTER
Mason Rear and her mom, Shyla Flores are returning nurses call. Call transferred to nurse [place]. Dr. Welsh

## 2024-10-04 NOTE — PROGRESS NOTES
Job Gandhi is a 77 y.o. female presenting for/with:    Chief Complaint   Patient presents with    Diabetes     insulin is SSI based on steriod and glucose levels    Pancreatic Cancer       Visit Vitals  /82 (BP 1 Location: Left upper arm, BP Patient Position: Sitting, BP Cuff Size: Adult long)   Pulse 95   Temp 98.2 °F (36.8 °C) (Temporal)   Resp 18   Ht 5' 4\" (1.626 m)   Wt 179 lb 9.6 oz (81.5 kg)   SpO2 98%   BMI 30.83 kg/m²     Pain Scale: 0 - No pain/10  Pain Location:     1. Have you been to the ER, urgent care clinic since your last visit? Hospitalized since your last visit? NO    2. Have you seen or consulted any other health care providers outside of the [address] [place] [address] since your last visit? Include any pap smears or colon screening. NO    Symptom review:  NO  Fever   NO  Shaking chills  NO  Cough  NO  Body aches  NO  Coughing up blood  NO  Chest congestion  NO  Chest pain  NO  Shortness of breath  NO  Profound Loss of smell/taste  NO  Nausea/Vomiting   NO  Loose stool/Diarrhea  NO  any skin issues    Patient Risk Factors Reviewed as follows:  NO  have you been in Close contact with confirmed COVID19 patient   NO  History of recent travel to affected geographical areas within the past 14 days  NO  COPD  YES  Active Cancer/Leukemia/Lymphoma/Chemotherapy  YES  Oral steroid use  NO  Pregnant  YES  Diabetes Mellitus  YES  Heart disease  NO  Asthma  NO Health care worker at home  [address] care worker  NO Is there a Pregnant Woman in the home  NO Dialysis pt in the home   NO a large number of people living in the home    Learning Assessment 6/11/2021   PRIMARY LEARNER Patient   PRIMARY LANGUAGE ENGLISH   LEARNER PREFERENCE PRIMARY DEMONSTRATION   ANSWERED BY patient   RELATIONSHIP SELF     Fall Risk Assessment, last 12 mths 2/21/2022   Able to walk? Yes   Fall in past 12 months? 1   Do you feel unsteady? 1   Are you worried about falling 1   Is TUG test greater than 12 seconds?  -   Is the gait abnormal? 1   Number of falls in past 12 months 2   Fall with injury? 0       3 most recent PHQ Screens 2/21/2022   Little interest or pleasure in doing things Several days   Feeling down, depressed, irritable, or hopeless Several days   Total Score PHQ 2 2   Trouble falling or staying asleep, or sleeping too much Nearly every day   Feeling tired or having little energy Nearly every day   Poor appetite, weight loss, or overeating Several days   Feeling bad about yourself - or that you are a failure or have let yourself or your family down Not at all   Trouble concentrating on things such as school, work, reading, or watching TV Several days   Moving or speaking so slowly that other people could have noticed; or the opposite being so fidgety that others notice Several days   Thoughts of being better off dead, or hurting yourself in some way Not at all   PHQ 9 Score 11     Abuse Screening Questionnaire 2/21/2022   Do you ever feel afraid of your partner? N   Are you in a relationship with someone who physically or mentally threatens you? N   Is it safe for you to go home?  Y       ADL Assessment 2/21/2022   Feeding yourself No Help Needed   Getting from bed to chair No Help Needed   Getting dressed No Help Needed   Bathing or showering No Help Needed   Walk across the room (includes cane/walker) No Help Needed   Using the telphone No Help Needed   Taking your medications Help Needed   Preparing meals No Help Needed   Managing money (expenses/bills) No Help Needed   Moderately strenuous housework (laundry) No Help Needed   Shopping for personal items (toiletries/medicines) No Help Needed   Shopping for groceries No Help Needed   Driving Help Needed   Climbing a flight of stairs No Help Needed   Getting to places beyond walking distances No Help Needed      Advance Care Planning 2/8/2022   Patient's Healthcare Decision Maker is: Named in scanned ACP document   Confirm Advance Directive Yes, on file   Patient Would Like to Complete Advance Directive -   Does the patient have other document types - You can access the FollowMyHealth Patient Portal offered by [facility] by registering at the following website: [email or web address]. By joining [email or web address]’s FollowMyHealth portal, you will also be able to view your health information using other applications (apps) compatible with our system. You can access the FollowMyHealth Patient Portal offered by [facility] by registering at the following website: [email or web address]. By joining [employer]’s FollowMyHealth portal, you will also be able to view your health information using other applications (apps) compatible with our system.

## 2025-02-26 NOTE — TELEPHONE ENCOUNTER
Identified patient with two patient identifiers (name and ). Reviewed chart in preparation for visit and have obtained necessary documentation.    Mariel Hudson is a 34 y.o. female  Chief Complaint   Patient presents with    H&P     LAPAROSCOPIC SLEEVE GASTRETOMY, EGD *ERAS* with Dr. Lloyd 3/10/25     LMP 2025     1. Have you been to the ER, urgent care clinic since your last visit?  Hospitalized since your last visit?Yes urgent care in January diagnosed with ovarian cyst    2. Have you seen or consulted any other health care providers outside of the [facility] since your last visit?  Include any pap smears or colon screening. no     Unable to order freestyle R/T having to be ordered through a DME company. New orders for dexcom g6 sent to [employer]. Will await prior auth.

## (undated) LAB
GLUCOSE BLD STRIP.AUTO-MCNC: 216 MG/DL (ref 65–117)
GLUCOSE BLD STRIP.AUTO-MCNC: 236 MG/DL (ref 65–117)
GLUCOSE BLD STRIP.AUTO-MCNC: 373 MG/DL (ref 65–117)
SERVICE CMNT-IMP: (no result)

## (undated) DEVICE — CATH IV AUTOGRD BC PNK 20GA 25 -- INSYTE

## (undated) DEVICE — Device

## (undated) DEVICE — SYR 10ML LUER LOK 1/5ML GRAD --

## (undated) DEVICE — TOWEL 4 PLY TISS 19X30 SUE WHT

## (undated) DEVICE — FORCEPS BX L160CM DIA8MM GRSP DISECT CUP TIP NONLOCKING ROT

## (undated) DEVICE — SOLIDIFIER MEDC 1200ML -- CONVERT TO 356117

## (undated) DEVICE — NEONATAL-ADULT SPO2 SENSOR: Brand: NELLCOR

## (undated) DEVICE — Z DISCONTINUED PER MEDLINE LINE GAS SAMPLING O2/CO2 LNG AD 13 FT NSL W/ TBNG FILTERLINE

## (undated) DEVICE — 1200 GUARD II KIT W/5MM TUBE W/O VAC TUBE: Brand: GUARDIAN

## (undated) DEVICE — BLOCK BITE ENDOSCP AD 21 MM W/ DIL BLU LF DISP

## (undated) DEVICE — SYR 3ML LL TIP 1/10ML GRAD --

## (undated) DEVICE — Device: Brand: BALLOON3

## (undated) DEVICE — Device: Brand: SINGLE USE ASPIRATION NEEDLE

## (undated) DEVICE — ECHOTIP ULTRA

## (undated) DEVICE — BASIN EMSIS 16OZ GRAPHITE PLAS KID SHP MOLD GRAD FOR ORAL

## (undated) DEVICE — SOLUTION IRRIG 1000ML H2O STRL BLT

## (undated) DEVICE — NEEDLE HYPO 18GA L1.5IN PNK S STL HUB POLYPR SHLD REG BVL

## (undated) DEVICE — CONTAINER SPEC 20 ML LID NEUT BUFF FORMALIN 10 % POLYPR STS

## (undated) DEVICE — YANKAUER,TAPERED BULBOUS TIP,W/O VENT: Brand: MEDLINE

## (undated) DEVICE — ELECTRODE,RADIOTRANSLUCENT,FOAM,5PK: Brand: MEDLINE

## (undated) DEVICE — BAG SPEC BIOHZRD 10 X 10 IN --

## (undated) DEVICE — SET ADMIN 16ML TBNG L100IN 2 Y INJ SITE IV PIGGY BK DISP